# Patient Record
Sex: FEMALE | Race: WHITE | NOT HISPANIC OR LATINO | Employment: FULL TIME | URBAN - METROPOLITAN AREA
[De-identification: names, ages, dates, MRNs, and addresses within clinical notes are randomized per-mention and may not be internally consistent; named-entity substitution may affect disease eponyms.]

---

## 2017-01-17 ENCOUNTER — TELEPHONE (OUTPATIENT)
Dept: SURGERY | Facility: CLINIC | Age: 52
End: 2017-01-17

## 2017-01-17 NOTE — TELEPHONE ENCOUNTER
Per task, I called Sarah and set up an appointment for her to see Dr. Bearden in clinic for a flexible sigmoidoscopy. She wants to do her fleets and will do those at home.

## 2017-01-27 ENCOUNTER — TELEPHONE (OUTPATIENT)
Dept: SURGERY | Facility: CLINIC | Age: 52
End: 2017-01-27

## 2017-01-27 NOTE — TELEPHONE ENCOUNTER
Sarah can't make it to her appointment next week. She called to find out if she could reschedule. I confirmed new time and date with her and let her know that she can contact me should she need to reschedule.

## 2017-01-27 NOTE — TELEPHONE ENCOUNTER
Sarah called me to let me know that she will be able to make the appointment next week. I have changed the appointment back to next week on Wednesday.

## 2017-02-01 DIAGNOSIS — D12.6 BENIGN NEOPLASM OF COLON, UNSPECIFIED PART OF COLON: Primary | ICD-10-CM

## 2017-02-02 DIAGNOSIS — D13.91 FAMILIAL ADENOMATOUS POLYPOSIS: ICD-10-CM

## 2017-03-20 ENCOUNTER — SURGERY (OUTPATIENT)
Age: 52
End: 2017-03-20

## 2017-03-20 ENCOUNTER — HOSPITAL ENCOUNTER (OUTPATIENT)
Facility: CLINIC | Age: 52
Discharge: HOME | End: 2017-03-20
Attending: COLON & RECTAL SURGERY | Admitting: COLON & RECTAL SURGERY
Payer: COMMERCIAL

## 2017-03-20 VITALS
SYSTOLIC BLOOD PRESSURE: 125 MMHG | RESPIRATION RATE: 20 BRPM | DIASTOLIC BLOOD PRESSURE: 75 MMHG | OXYGEN SATURATION: 97 %

## 2017-03-20 LAB — FLEXIBLE SIGMOIDOSCOPY: NORMAL

## 2017-03-20 PROCEDURE — 45331 SIGMOIDOSCOPY AND BIOPSY: CPT | Performed by: COLON & RECTAL SURGERY

## 2017-03-20 PROCEDURE — 88305 TISSUE EXAM BY PATHOLOGIST: CPT | Performed by: COLON & RECTAL SURGERY

## 2017-03-20 NOTE — OR NURSING
No sedation given. Pt tolerated flexible sigmoidoscopy with polypectomy via biopsy forceps well. Pt stable upon transport to recovery.

## 2017-03-20 NOTE — IP AVS SNAPSHOT
[facility], [place], Endoscopy    [address] [facility] [address]    Phone:  [phone]                                       After Visit Summary   3/20/2017    Sarah Duran    MRN: [id number]           After Visit Summary Signature Page     I have received my discharge instructions, and my questions have been answered. I have discussed any challenges I see with this plan with the nurse or doctor.    ..........................................................................................................................................  Patient/Patient Representative Signature      ..........................................................................................................................................  Patient Representative Print Name and Relationship to Patient    ..................................................               ................................................  Date                                            Time    ..........................................................................................................................................  Reviewed by Signature/Title    ...................................................              ..............................................  Date                                                            Time

## 2017-03-20 NOTE — IP AVS SNAPSHOT
MRN:[id number]                      After Visit Summary   3/20/2017    Sarah Duran    MRN: [id number]           Thank you!     Thank you for choosing [facility] for your care. Our goal is always to provide you with excellent care. Hearing back from our patients is one way we can continue to improve our services. Please take a few minutes to complete the written survey that you may receive in the mail after you visit with us. Thank you!        Patient Information     Date Of Birth          1965        About your hospital stay     You were admitted on:  March 20, 2017 You last received care in the:  [facility], Endoscopy    You were discharged on:  March 20, 2017       Who to Call     For medical emergencies, please call 911.  For non-urgent questions about your medical care, please call your primary care provider or clinic, [phone]  For questions related to your surgery, please call your surgery clinic        Attending Provider     Provider Specialty    Mariela Bearden MD Colon and Rectal Surgery       Primary Care Provider Office Phone # Fax #    Ema Torres PA-C [phone]       [facility] [address] [place] [address] [address]  [facility] [id number]        Your next 10 appointments already scheduled     Apr 24, 2017 10:30 AM CDT   MA DIAGNOSTIC DIGITAL RIGHT with [id number]   [facility] ([facility])    [address] [place] [address], [address]  [facility] [id number] [phone]           Do not use any powder, lotion or deodorant under your arms or on your breast. If you do, we will ask you to remove it before your exam.  Wear comfortable, two-piece clothing.  If you have any allergies, tell your care team.  Bring any previous mammograms from other facilities or have them mailed to the breast center.            Apr 24, 2017 11:00 AM CDT   Return Visit with Jessica Rasheed MD   [facility]  ([facility])    [address] [facility]  [address]  [place] [id number] [phone]            Apr 27, 2017 11:15 AM CDT   New Visit with Treasure Cleveland GC   Cancer Risk Management Program ([facility])    [facility]  [address]  [place] [address] [phone]              Further instructions from your care team       Discharge Instructions after Flexible Sigmoidoscopy with polyp removal by Dr Bearden    Activity and Diet  You may return to your normal diet and medicines.    Discomfort    Air was placed in your colon during the exam in order to see it. Walking helps to pass the air.    You may take Tylenol (acetaminophen) for pain unless your doctor has told you not to.  Do not take aspirin or ibuprofen (Advil, Motrin, or other anti-inflammatory  drugs) for __3___ days.    Follow-up  __X__ We took small polyps to study. Your doctor will send you a letter with the results  within two weeks.    When to call:    Call right away if you have:    Unusual pain in belly or chest pain not relieved with passing air.    More than 1 to 2 Tablespoons of bleeding from your rectum.    Fever above 100.6  F (37.5  C).    If you have severe pain, bleeding, or shortness of breath, go to an emergency room.    If you have questions, call:  Monday to Friday, 7 a.m. to 4:30 p.m.  Endoscopy: [phone] (We may have to call you back)    After hours  Hospital: [phone] (Ask for the GI fellow on call)    Pending Results     No orders found from 3/18/2017 to 3/21/2017.            Admission Information     Date & Time Provider Department Dept. Phone    3/20/2017 Mariela Bearden MD [facility], [place], Endoscopy [phone]      Your Vitals Were     Blood Pressure Respirations Last Period Pulse Oximetry          125/75 20 03/19/2017 (Approximate) 97%        MyChart Information     [employer] gives you secure access to your electronic health record. If you see  a primary care provider, you can also send messages to your care team and make appointments. If you have questions, please call your primary care clinic.  If you do not have a primary care provider, please call [phone] and they will assist you.        Care EveryWhere ID     This is your Care EveryWhere ID. This could be used by other organizations to access your [facility] medical records  [id number]           Review of your medicines      UNREVIEWED medicines. Ask your doctor about these medicines        Dose / Directions    acetaminophen 325 MG tablet   Commonly known as:  TYLENOL   Used for:  Atypical hyperplasia of breast        Dose:  650 mg   Take 2 tablets (650 mg) by mouth every 4 hours as needed for other (mild pain)   Quantity:  100 tablet   Refills:  0       albuterol 90 MCG/ACT inhaler        Dose:  2 puff   Inhale 2 puffs into the lungs every 6 hours as needed.   Refills:  0       ALLEGRA 180 MG tablet   Generic drug:  fexofenadine        Dose:  180 mg   Take 180 mg by mouth daily.   Refills:  0       calcium-magnesium 500-250 MG Tabs per tablet   Commonly known as:  CALMAG        Dose:  1 tablet   Take 1 tablet by mouth daily   Refills:  0       celecoxib 100 MG capsule   Commonly known as:  celeBREX   Used for:  FAP (familial adenomatous polyposis)        Dose:  100 mg   Take 1 capsule (100 mg) by mouth 2 times daily as needed for pain   Quantity:  180 capsule   Refills:  6       HYDROcodone-acetaminophen 5-325 MG per tablet   Commonly known as:  NORCO   Used for:  Atypical hyperplasia of breast        Dose:  1-2 tablet   Take 1-2 tablets by mouth every 6 hours as needed for other (Moderate to Severe Pain)   Quantity:  15 tablet   Refills:  0       ibuprofen 600 MG tablet   Commonly known as:  ADVIL/MOTRIN   Used for:  Atypical hyperplasia of breast        Dose:  600 mg   Take 1 tablet (600 mg) by mouth every 6 hours as needed for pain (mild)   Quantity:  30 tablet   Refills:  0       MELATONIN  PO        Dose:  1 mg   Take 1 mg by mouth nightly as needed   Refills:  0       multivitamin, therapeutic with minerals Tabs tablet        Dose:  1 tablet   Take 1 tablet by mouth daily.   Refills:  0       NASONEX 50 MCG/ACT spray   Generic drug:  mometasone        Dose:  2 spray   2 sprays by Both Nostrils route daily.   Refills:  0       OMEGA-3 FISH OIL PO        Take  by mouth.   Refills:  0       ondansetron 4 MG tablet   Commonly known as:  ZOFRAN   Used for:  Nausea        Dose:  4 mg   Take 1 tablet (4 mg) by mouth every 8 hours as needed for nausea   Quantity:  18 tablet   Refills:  0       ZOMIG PO        Take  by mouth at onset of headache.   Refills:  0                Protect others around you: Learn how to safely use, store and throw away your medicines at www.disposemymeds.org.             Medication List: This is a list of all your medications and when to take them. Check marks below indicate your daily home schedule. Keep this list as a reference.      Medications           Morning Afternoon Evening Bedtime As Needed    acetaminophen 325 MG tablet   Commonly known as:  TYLENOL   Take 2 tablets (650 mg) by mouth every 4 hours as needed for other (mild pain)                                albuterol 90 MCG/ACT inhaler   Inhale 2 puffs into the lungs every 6 hours as needed.                                ALLEGRA 180 MG tablet   Take 180 mg by mouth daily.   Generic drug:  fexofenadine                                calcium-magnesium 500-250 MG Tabs per tablet   Commonly known as:  CALMAG   Take 1 tablet by mouth daily                                celecoxib 100 MG capsule   Commonly known as:  celeBREX   Take 1 capsule (100 mg) by mouth 2 times daily as needed for pain                                HYDROcodone-acetaminophen 5-325 MG per tablet   Commonly known as:  NORCO   Take 1-2 tablets by mouth every 6 hours as needed for other (Moderate to Severe Pain)                                ibuprofen  600 MG tablet   Commonly known as:  ADVIL/MOTRIN   Take 1 tablet (600 mg) by mouth every 6 hours as needed for pain (mild)                                MELATONIN PO   Take 1 mg by mouth nightly as needed                                multivitamin, therapeutic with minerals Tabs tablet   Take 1 tablet by mouth daily.                                NASONEX 50 MCG/ACT spray   2 sprays by Both Nostrils route daily.   Generic drug:  mometasone                                OMEGA-3 FISH OIL PO   Take  by mouth.                                ondansetron 4 MG tablet   Commonly known as:  ZOFRAN   Take 1 tablet (4 mg) by mouth every 8 hours as needed for nausea                                ZOMIG PO   Take  by mouth at onset of headache.

## 2017-03-20 NOTE — DISCHARGE INSTRUCTIONS
Discharge Instructions after Flexible Sigmoidoscopy with polyp removal by Dr Bearden    Activity and Diet  You may return to your normal diet and medicines.    Discomfort    Air was placed in your colon during the exam in order to see it. Walking helps to pass the air.    You may take Tylenol (acetaminophen) for pain unless your doctor has told you not to.  Do not take aspirin or ibuprofen (Advil, Motrin, or other anti-inflammatory  drugs) for __3___ days.    Follow-up  __X__ We took small polyps to study. Your doctor will send you a letter with the results  within two weeks.    When to call:    Call right away if you have:    Unusual pain in belly or chest pain not relieved with passing air.    More than 1 to 2 Tablespoons of bleeding from your rectum.    Fever above 100.6  F (37.5  C).    If you have severe pain, bleeding, or shortness of breath, go to an emergency room.    If you have questions, call:  Monday to Friday, 7 a.m. to 4:30 p.m.  Endoscopy: [phone] (We may have to call you back)    After hours  Hospital: [phone] (Ask for the GI fellow on call)

## 2017-03-21 LAB — COPATH REPORT: NORMAL

## 2017-04-24 ENCOUNTER — HOSPITAL ENCOUNTER (OUTPATIENT)
Dept: MAMMOGRAPHY | Facility: CLINIC | Age: 52
Discharge: HOME | End: 2017-04-24
Attending: SURGERY | Admitting: SURGERY
Payer: COMMERCIAL

## 2017-04-24 ENCOUNTER — OFFICE VISIT (OUTPATIENT)
Dept: SURGERY | Facility: CLINIC | Age: 52
End: 2017-04-24
Payer: COMMERCIAL

## 2017-04-24 VITALS
DIASTOLIC BLOOD PRESSURE: 81 MMHG | SYSTOLIC BLOOD PRESSURE: 125 MMHG | HEIGHT: 62 IN | BODY MASS INDEX: 23.92 KG/M2 | WEIGHT: 130 LBS | HEART RATE: 62 BPM

## 2017-04-24 DIAGNOSIS — Z09: ICD-10-CM

## 2017-04-24 DIAGNOSIS — N60.99 ATYPICAL DUCTAL HYPERPLASIA OF BREAST: Primary | ICD-10-CM

## 2017-04-24 PROCEDURE — 99213 OFFICE O/P EST LOW 20 MIN: CPT | Performed by: SURGERY

## 2017-04-24 PROCEDURE — G0206 DX MAMMO INCL CAD UNI: HCPCS | Mod: RT

## 2017-04-24 PROCEDURE — G0206 DX MAMMO INCL CAD UNI: HCPCS

## 2017-04-24 NOTE — PROGRESS NOTES
CHIEF COMPLAINT:  Chief Complaint   Patient presents with     RECHECK     f/u right breast ADH       HISTORY OF PRESENT ILLNESS:  Sarah Duran is a 51 year old female who is seen in follow up regarding right breast atypia and FH for breast cancer.  She is s/p right breast excisional biopsy on 11/28/16.   Follow up right mammograms today to reevaluate the densities seen on mammogram that were not biopsied, shows no findings of concern on review with the radiologist.  Sarah states that she has healed well and has noted no new breast changes except for an occasional sharp pain in her right breast.    REVIEW OF SYSTEMS:  Constitutional:  Negative for chills, fatigue, fever and weight change.  Eyes:  Negative for blurred vision, eye pain and photophobia.  ENT:  Negative for hearing problems, ENT pain, congestion, rhinorrhea, epistaxis, hoarseness and dental problems.  Cardiovascular:  Negative for chest pain, palpitations, tachycardia, orthopnea and edema.  Respiratory:  Negative for cough, dyspnea and hemoptysis.  Gastrointestinal:  Negative for abdominal pain, heartburn, constipation, diarrhea and stool changes.  Musculoskeletal:  Negative for arthralgias, back pain and myalgias.  Integumentary/Breast:  See HPI.    Past Medical History:   Diagnosis Date     Asthma      Coagulation disorder (H)     factor 5     Colon cancer (H)      Familial adenomatous polyposis      GERD (gastroesophageal reflux disease)      Migraine      Pancreatitis, acute      PONV (postoperative nausea and vomiting)        Past Surgical History:   Procedure Laterality Date     BIOPSY BREAST SEED LOCALIZATION Right 11/30/2016    Procedure: BIOPSY BREAST SEED LOCALIZATION;  Surgeon: Jessica Rasheed MD;  Location: [facility] OR     C LAP,SURG,COLECTOMY,W/ANAST      for colon cancer     COLECTOMY      subtotal     ENDOSCOPIC RETROGRADE CHOLANGIOPANCREATOGRAPHY       ESOPHAGOSCOPY, GASTROSCOPY, DUODENOSCOPY (EGD), COMBINED  11/5/2012    Procedure:  COMBINED ESOPHAGOSCOPY, GASTROSCOPY, DUODENOSCOPY (EGD), BIOPSY SINGLE OR MULTIPLE;;  Surgeon: Angélica Garcia MD;  Location: [facility]     ESOPHAGOSCOPY, GASTROSCOPY, DUODENOSCOPY (EGD), COMBINED  12/3/2013    Procedure: COMBINED ESOPHAGOSCOPY, GASTROSCOPY, DUODENOSCOPY (EGD);;  Surgeon: Angélica Garcia MD;  Location: [facility]     EXCISE POLYP RECTUM       LAPAROSCOPIC LYSIS ADHESIONS       SIGMOIDOSCOPY FLEXIBLE       SIGMOIDOSCOPY FLEXIBLE N/A 12/7/2015    Procedure: SIGMOIDOSCOPY FLEXIBLE;  Surgeon: Mariela Bearden MD;  Location: [facility]       Family History   Problem Relation Age of Onset     Hyperlipidemia Mother      CEREBROVASCULAR DISEASE Mother      CEREBROVASCULAR DISEASE Father      Colon Cancer Father      Prostate Cancer Brother        Social History   Substance Use Topics     Smoking status: Never Smoker     Smokeless tobacco: Not on file     Alcohol use No       Patient Active Problem List   Diagnosis     Familial adenomatous polyposis     Atypical hyperplasia of breast     Allergies   Allergen Reactions     Cipro [Ciprofloxacin]      Reddened skin     Flagyl [Metronidazole] Nausea and Vomiting     Keflex [Cephalosporins] Nausea and Vomiting     Prilosec Otc [Omeprazole Magnesium]      migraines     Erythromycin Rash     Sulfa Drugs Rash     Current Outpatient Prescriptions   Medication Sig Dispense Refill     acetaminophen (TYLENOL) 325 MG tablet Take 2 tablets (650 mg) by mouth every 4 hours as needed for other (mild pain) 100 tablet 0     ibuprofen (ADVIL,MOTRIN) 600 MG tablet Take 1 tablet (600 mg) by mouth every 6 hours as needed for pain (mild) 30 tablet 0     calcium-magnesium (CALMAG) 500-250 MG TABS Take 1 tablet by mouth daily       ondansetron (ZOFRAN) 4 MG tablet Take 1 tablet (4 mg) by mouth every 8 hours as needed for nausea 18 tablet 0     MELATONIN PO Take 1 mg by mouth nightly as needed       celecoxib (CELEBREX) 100 MG capsule Take 1 capsule (100 mg) by mouth 2 times daily as  "needed for pain 180 capsule 6     Omega-3 Fatty Acids (OMEGA-3 FISH OIL PO) Take  by mouth.       multivitamin, therapeutic with minerals (THERA-VIT-M) TABS Take 1 tablet by mouth daily.       ZOLMitriptan (ZOMIG PO) Take  by mouth at onset of headache.       fexofenadine (ALLEGRA) 180 MG tablet Take 180 mg by mouth daily.       mometasone (NASONEX) 50 MCG/ACT nasal spray 2 sprays by Both Nostrils route daily.       albuterol 90 MCG/ACT inhaler Inhale 2 puffs into the lungs every 6 hours as needed.       HYDROcodone-acetaminophen (NORCO) 5-325 MG per tablet Take 1-2 tablets by mouth every 6 hours as needed for other (Moderate to Severe Pain) (Patient not taking: Reported on 4/24/2017) 15 tablet 0     Vitals: /81  Pulse 62  Ht 5' 2\" (1.575 m)  Wt 130 lb (59 kg)  BMI 23.78 kg/m2  BMI= Body mass index is 23.78 kg/(m^2).    EXAM:  GENERAL: healthy, alert and no distress   BREAST:  The breasts appear symmetric with no overlying skin changes.  The nipples are normal bilaterally.  There is no dimpling or thickening of the skin.  The right breast periareolar scar is very well healed.  No mass is appreciated in either breast.  The breast tissue is generally soft and without abnormality.  A small skin cyst is palpated on the upper inner right breast that seems to be slightly inflamed.  There is no axillary or supraclavicular lymphadenopathy.  CARDIOVASCULAR:  No edema or JVD.  RESPIRATORY: negative findings: no chest deformities noted, no chest wall tenderness, breathing is unlabored.  NECK: Neck supple. No adenopathy.   SKIN: No suspicious lesions or rashes  LYMPH: Normal cervical lymph nodes    ASSESSMENT:  Sarah Duran is doing well.  There are no findings of concern on exam or right mammograms at this time.  We talked again about her risk which is somewhat elevated due to her FH and atypical ductal hyperplasia in the right breast.  We had previously discussed genetics and I thought that she was scheduled to " see them but I see no notes in Epic.  Today I talked to Sarah about seeing Clarissa Brown in the Cancer Risk Management Program for risk management and to discuss taking Tamoxifen for prevention.  She agreed to do so.  We also talked about the importance of continued screening.    PLAN:  Sarah has been referred to Clarissa Brown.  She will have annual screening mammograms next fall and will follow up with me as needed.      Jessica Rasheed MD    Please route or send letter to:  Primary Care Provider (PCP)

## 2017-04-24 NOTE — LETTER
"2017      RE:  Sarah Duran-:  [birth year]    HISTORY OF PRESENT ILLNESS: Sarah Duran is a 51 year old female who is seen in follow up regarding right breast atypia and FH for breast cancer. She is s/p right breast excisional biopsy on 16. Follow up right mammograms today to reevaluate the densities seen on mammogram that were not biopsied, shows no findings of concern on review with the radiologist. Sarah states that she has healed well and has noted no new breast changes except for an occasional sharp pain in her right breast.     REVIEW OF SYSTEMS:  Constitutional: Negative for chills, fatigue, fever and weight change.  Eyes: Negative for blurred vision, eye pain and photophobia.  ENT: Negative for hearing problems, ENT pain, congestion, rhinorrhea, epistaxis, hoarseness and dental problems.  Cardiovascular: Negative for chest pain, palpitations, tachycardia, orthopnea and edema.  Respiratory: Negative for cough, dyspnea and hemoptysis.  Gastrointestinal: Negative for abdominal pain, heartburn, constipation, diarrhea and stool changes.  Musculoskeletal: Negative for arthralgias, back pain and myalgias.  Integumentary/Breast: See HPI.     Vitals: /81  Pulse 62  Ht 5' 2\" (1.575 m)  Wt 130 lb (59 kg)  BMI 23.78 kg/m2  BMI= Body mass index is 23.78 kg/(m^2).     EXAM:  GENERAL: healthy, alert and no distress   BREAST:  The breasts appear symmetric with no overlying skin changes. The nipples are normal bilaterally. There is no dimpling or thickening of the skin. The right breast periareolar scar is very well healed.  No mass is appreciated in either breast. The breast tissue is generally soft and without abnormality. A small skin cyst is palpated on the upper inner right breast that seems to be slightly inflamed.  There is no axillary or supraclavicular lymphadenopathy.  CARDIOVASCULAR: No edema or JVD.  RESPIRATORY: negative findings: no chest deformities noted, no chest wall " tenderness, breathing is unlabored.  NECK: Neck supple. No adenopathy.   SKIN: No suspicious lesions or rashes  LYMPH: Normal cervical lymph nodes     ASSESSMENT:  Sarah Duran is doing well. There are no findings of concern on exam or right mammograms at this time. We talked again about her risk which is somewhat elevated due to her FH and atypical ductal hyperplasia in the right breast. We had previously discussed genetics and I thought that she was scheduled to see them but I see no notes in Epic. Today I talked to Sarah about seeing Clarissa Brown in the Cancer Risk Management Program for risk management and to discuss taking Tamoxifen for prevention. She agreed to do so. We also talked about the importance of continued screening.     PLAN:  Sarah has been referred to Clarissa Brown. She will have annual screening mammograms next fall and will follow up with me as needed.        Jessica Rasheed MD

## 2017-04-24 NOTE — MR AVS SNAPSHOT
After Visit Summary   4/24/2017    Sarah Duran    MRN: [id number]           Patient Information     Date Of Birth          1965        Visit Information        Provider Department      4/24/2017 11:00 AM Jessica Rasheed MD [facility] General Surgery      Today's Diagnoses     Atypical ductal hyperplasia of breast    -  1       Follow-ups after your visit        Additional Services     CANCER RISK MGMT/CANCER GENETIC COUNSELING REFERRAL       Your provider has referred you to the Cancer Risk Management Program - Cancer Genetic Counseling.    Reason for Referral: history of atypical ductal hyperplasia    We have a sent a notice to a staff member of the Cancer Risk Management Program to give you a call to assist with scheduling your appointment.  You may also call  [phone] ([phone]) to initiate scheduling.    Please be aware that coverage of these services is subject to the terms and limitations of your health insurance plan.  Call member services at your health plan with any benefit or coverage questions.      Please bring the completed family history sheet to your appointment in addition to any available outside medical records documenting your cancer diagnosis.                  Your next 10 appointments already scheduled     Apr 27, 2017 11:15 AM CDT   New Visit with Treasure Cleveland GC   Cancer Risk Management Program ([facility])    [facility]  [address]  [facility] [address] [phone]              Who to contact     If you have questions or need follow up information about today's clinic visit or your schedule please contact [facility] directly at [phone].  Normal or non-critical lab and imaging results will be communicated to you by MyChart, letter or phone within 4 business days after the clinic has received the  "results. If you do not hear from us within 7 days, please contact the clinic through [email or web address] or phone. If you have a critical or abnormal lab result, we will notify you by phone as soon as possible.  Submit refill requests through [email or web address] or call your pharmacy and they will forward the refill request to us. Please allow 3 business days for your refill to be completed.          Additional Information About Your Visit        [employer] Information     [email or web address] gives you secure access to your electronic health record. If you see a primary care provider, you can also send messages to your care team and make appointments. If you have questions, please call your primary care clinic.  If you do not have a primary care provider, please call [phone] and they will assist you.        Care EveryWhere ID     This is your Care EveryWhere ID. This could be used by other organizations to access your [facility] medical records  [id number]        Your Vitals Were     Pulse Height BMI (Body Mass Index)             62 5' 2\" (1.575 m) 23.78 kg/m2          Blood Pressure from Last 3 Encounters:   04/24/17 125/81   03/20/17 125/75   12/19/16 127/81    Weight from Last 3 Encounters:   04/24/17 130 lb (59 kg)   12/19/16 130 lb (59 kg)   11/30/16 134 lb 4.8 oz (60.9 kg)              We Performed the Following     CANCER RISK MGMT/CANCER GENETIC COUNSELING REFERRAL        Primary Care Provider Office Phone # Fax #    Ema Torres PA-C [phone]       [facility] [address] [address]        Thank you!     Thank you for choosing [facility]  for your care. Our goal is always to provide you with excellent care. Hearing back from our patients is one way we can continue to improve our services. Please take a few minutes to complete the written survey that you may receive in the mail after your visit with us. Thank you!             Your Updated Medication " List - Protect others around you: Learn how to safely use, store and throw away your medicines at www.disposemymeds.org.          This list is accurate as of: 4/24/17 11:16 AM.  Always use your most recent med list.                   Brand Name Dispense Instructions for use    acetaminophen 325 MG tablet    TYLENOL    100 tablet    Take 2 tablets (650 mg) by mouth every 4 hours as needed for other (mild pain)       albuterol 90 MCG/ACT inhaler      Inhale 2 puffs into the lungs every 6 hours as needed.       ALLEGRA 180 MG tablet   Generic drug:  fexofenadine      Take 180 mg by mouth daily.       calcium-magnesium 500-250 MG Tabs per tablet    CALMAG     Take 1 tablet by mouth daily       celecoxib 100 MG capsule    celeBREX    180 capsule    Take 1 capsule (100 mg) by mouth 2 times daily as needed for pain       HYDROcodone-acetaminophen 5-325 MG per tablet    NORCO    15 tablet    Take 1-2 tablets by mouth every 6 hours as needed for other (Moderate to Severe Pain)       ibuprofen 600 MG tablet    ADVIL/MOTRIN    30 tablet    Take 1 tablet (600 mg) by mouth every 6 hours as needed for pain (mild)       MELATONIN PO      Take 1 mg by mouth nightly as needed       multivitamin, therapeutic with minerals Tabs tablet      Take 1 tablet by mouth daily.       NASONEX 50 MCG/ACT spray   Generic drug:  mometasone      2 sprays by Both Nostrils route daily.       OMEGA-3 FISH OIL PO      Take  by mouth.       ondansetron 4 MG tablet    ZOFRAN    18 tablet    Take 1 tablet (4 mg) by mouth every 8 hours as needed for nausea       ZOMIG PO      Take  by mouth at onset of headache.

## 2017-04-27 ENCOUNTER — ONCOLOGY VISIT (OUTPATIENT)
Dept: ONCOLOGY | Facility: CLINIC | Age: 52
End: 2017-04-27
Attending: GENETIC COUNSELOR, MS
Payer: COMMERCIAL

## 2017-04-27 DIAGNOSIS — N60.99 ATYPICAL HYPERPLASIA OF BREAST: ICD-10-CM

## 2017-04-27 DIAGNOSIS — D13.91 FAMILIAL ADENOMATOUS POLYPOSIS: Primary | ICD-10-CM

## 2017-04-27 DIAGNOSIS — Z80.0 FAMILY HISTORY OF COLON CANCER: ICD-10-CM

## 2017-04-27 DIAGNOSIS — Z80.42 FAMILY HISTORY OF PROSTATE CANCER: ICD-10-CM

## 2017-04-27 PROCEDURE — 96040 ZZH GENETIC COUNSELING, EACH 30 MINUTES: CPT | Performed by: GENETIC COUNSELOR, MS

## 2017-04-27 NOTE — MR AVS SNAPSHOT
After Visit Summary   4/27/2017    Sarah Duran    MRN: [id number]           Patient Information     Date Of Birth          1965        Visit Information        Provider Department      4/27/2017 11:15 AM Treasure Cleveland GC Cancer Risk Management Program        Today's Diagnoses     Familial adenomatous polyposis    -  1    Atypical hyperplasia of breast        Family history of colon cancer        Family history of prostate cancer          Care Instructions      Assessing Cancer Risk  Only about 5-10% of cancers are thought to be due to an inherited cancer susceptibility gene.    These families often have:    Several people with the same or related types of cancer    Cancers diagnosed at a young age (before age 50)    Individuals with more than one primary cancer    Multiple generations of the family affected with cancer    Familial Adenomatous Polyposis (FAP)  FAP is a hereditary condition that causes the formation of many (more than 100) adenoma type polyps in the colon and increases the risk of colon cancer. Individuals affected with FAP can begin to develop these precancerous polyps in their teens. Without preventative surgery, colon cancer is almost inevitable.     Attenuated Familial Adenomatous Polyposis (AFAP)  AFAP is also a condition that causes colon polyps. It is milder than Classic FAP, however, and the lifetime risk of colon cancer is lower at approximately 70%.  Usually individuals affected with AFAP will have  adenoma type polyps.     FAP and AFAP may also be known as Ernst Syndrome or Turcot Syndrome.    APC Gene  We each inherit two copies of every gene in our bodies: one from our mother, and one from our father.  Each gene has a specific job to do.  When a gene has a mistake or  mutation  in it, it does not work like it should.  Everyone has two copies of the APC gene.  A single mutation in one of the copies of the APC gene causes FAP or AFAP.  This causes the formation  of polyps and increases colon cancer risk.  The risk for other types of cancers including gastric, pancreatic, and certain brain tumors is also slightly increased.   The table below shows the chance that someone with an APC mutation would develop cancer in their lifetime1,2.     Lifetime Cancer Risks   Colon %   Duodenal 4-12%   Thyroid <2%   Liver (before age 5) 1-2%   Pancreatic <1%   Stomach <1%     Other Findings  Individuals with FAP may also show these features:    Polyps in the stomach and small bowel    CHRPE: freckle like spots on the inside of the eye    Desmoid Tumors: benign tumors typically found in the abdomen    Osteomas: benign bony tumors typically found in the skull and/or jaw    Epidermoid cysts: benign cysts found on the skin    Extra teeth or other dental abnormalities     Inheritance   APC mutations are inherited in an autosomal dominant pattern.  This means that if a parent has a mutation, each of his or her children will have a 50% chance of inheriting that same mutation.  Therefore, each child--male or female--would have a 50% chance of being at increased risk for developing colon polyps and cancer.  Up to 30% of people with an APC mutation, however, did not inherit it from a parent.  Rather, the mutation occurred de caitlin (for the first time) in them.  Now that they have the mutation, however, they can pass it on to their children.              Image obtained from Genetics Home Reference, 2013    Genetic Testing  Genetic testing involves a simple blood test and will look at the genetic information in the APC gene for any harmful mutations that are associated with increased risk for polyps and cancer.  If possible, it is recommended that the person(s) who has had polyps or cancer be tested before other family members.  That person will give us the most useful information about whether or not a specific gene is associated with the cancer in the family.     Results  There are three  possible results of APC genetic testing:    Positive--a harmful mutation was identified    Negative--no mutation was identified    Variant of unknown significance--a variation in the gene was identified, but it is unclear how this impacts cancer risk in the family    Advantages and Disadvantages  There are advantages and disadvantages to genetic testing of this gene.    Advantages    May clarify your cancer risk    Can help you make medical decisions    May explain the polyps and cancers in your family    May give useful information to your family members (if you share your results)    Disadvantages    Possible negative emotional impact of learning about inherited cancer risk    Uncertainty in interpreting a negative test result in some situations    Possible genetic discrimination concerns (see below)        Reducing Cancer Risk  Current screening recommendations for people with Classic FAP include1:    Colon:   o Annual colonoscopy or sigmoidoscopy starting at age 10-15 years  o Removal of the colon with continued rectal surveillance as needed    Duodenum and Stomach:   o Baseline upper endoscopy including side-viewing starting at age 25-30; repeated based on polyp findings3  o Consider adding small bowel visualization to CT or MRI done for desmoids (below)    Thyroid:    o Annual thyroid exam starting in late teens  o Consider annual thyroid ultrasound    CNS: Annual physical exam    Liver:   o Liver palpation, abdominal ultrasound, and AFP measurement; repeated every 3-6 months until age 5    Desmoids:   o Annual abdominal palpation    o Consider abdominal MRI or CT 1-3 years after colectomy; repeated every 5-10 years    Current screening recommendations for individuals with Attenuated FAP (AFAP) include1:    Colon:   o Colonoscopy starting at late teens; repeated every 2-3  o Consider removal of the colon when more than 20 or 30 adenoma polyps are found    Duodenum and Stomach:   o Baseline upper endoscopy  starting at age 25-30; repeated based on findings    Thyroid:  Annual thyroid exam     CNS: Annual physical exam    Some medications are available that may reduce the number of polyps.  Osteomas, desmoids, and epidermoid cysts should be evaluated for treatment or removal.    The age at which to start and repeat screening may be modified based on personal and family history.    Genetic Information Nondiscrimination Act (ESTELLA)  ESTELLA is a federal law that protects individuals from health insurance or employment discrimination based on a genetic test result alone.  Although rare, there are currently no legal protections in terms of life insurance, long term care, or disability insurances.  Visit the National Human [employer] [place] at Genome.gov/73934858 to learn more.    Questions to Think About Regarding Genetic Testing    What effect will the test result have on me and my relationship with my family members if I have an inherited gene mutation?  If I don t have a gene mutation?    Should I share my test results, and how will my family react to this news, which may also affect them?    Are my children ready to learn new information that may one day affect their own health?    Resources  Colorectal Cancer Coalition [email or web address]   Colon Cancer [place] (CCA) ccalliance.org   American Cancer Society (ACS) cancer.org   National Cancer [place] (NCI) cancer.gov     Please call us if you have any questions or concerns.     Cancer Risk Management Program [phone] ([phone])    ? Mariela Tim, MS, [facility]  [phone]  ? Sara Sexton, MS, [facility]  [phone]  ? Corazon Perera, MS, [facility]  [phone]  ? Treasure Cleveland, MS, [facility]  [phone]    References  1. National Comprehensive Cancer Network. Clinical practice guidelines in oncology, colorectal cancer screening. Available online (registration required). 2014.  2. Marlen DW, Fmailia J, Tavo KM, Arabella RA, Matssaul N, Rohan J, Eladio PALMER, Reginaldo  HEBERT, Farrukh RW. American  mutation for attenuated familial adenomatous polyposis. Clin Gastroenterol Hepatol. 2008;6:46-52.  3. National Comprehensive Cancer Network. Gastric Cancer. Available online (registration required). 2015.          Follow-ups after your visit        Who to contact     If you have questions or need follow up information about today's clinic visit or your schedule please contact CANCER RISK MANAGEMENT PROGRAM directly at [phone].  Normal or non-critical lab and imaging results will be communicated to you by Adzill[employer]art, letter or phone within 4 business days after the clinic has received the results. If you do not hear from us within 7 days, please contact the clinic through [employer] or phone. If you have a critical or abnormal lab result, we will notify you by phone as soon as possible.  Submit refill requests through [employer] or call your pharmacy and they will forward the refill request to us. Please allow 3 business days for your refill to be completed.          Additional Information About Your Visit        [employer] Information     [employer] gives you secure access to your electronic health record. If you see a primary care provider, you can also send messages to your care team and make appointments. If you have questions, please call your primary care clinic.  If you do not have a primary care provider, please call [phone] and they will assist you.        Care EveryWhere ID     This is your Care EveryWhere ID. This could be used by other organizations to access your [facility] medical records  [id number]         Blood Pressure from Last 3 Encounters:   04/24/17 125/81   03/20/17 125/75   12/19/16 127/81    Weight from Last 3 Encounters:   04/24/17 59 kg (130 lb)   12/19/16 59 kg (130 lb)   11/30/16 60.9 kg (134 lb 4.8 oz)              Today, you had the following     No orders found for display       Primary Care Provider Office Phone # Fax #    Ema Torres PA-C  [phone]       [facility] [address] [facility] [address]  [facility] [address]        Thank you!     Thank you for choosing CANCER RISK MANAGEMENT PROGRAM  for your care. Our goal is always to provide you with excellent care. Hearing back from our patients is one way we can continue to improve our services. Please take a few minutes to complete the written survey that you may receive in the mail after your visit with us. Thank you!             Your Updated Medication List - Protect others around you: Learn how to safely use, store and throw away your medicines at www.disposemymeds.org.          This list is accurate as of: 4/27/17 12:18 PM.  Always use your most recent med list.                   Brand Name Dispense Instructions for use    acetaminophen 325 MG tablet    TYLENOL    100 tablet    Take 2 tablets (650 mg) by mouth every 4 hours as needed for other (mild pain)       albuterol 90 MCG/ACT inhaler      Inhale 2 puffs into the lungs every 6 hours as needed.       ALLEGRA 180 MG tablet   Generic drug:  fexofenadine      Take 180 mg by mouth daily.       calcium-magnesium 500-250 MG Tabs per tablet    CALMAG     Take 1 tablet by mouth daily       celecoxib 100 MG capsule    celeBREX    180 capsule    Take 1 capsule (100 mg) by mouth 2 times daily as needed for pain       HYDROcodone-acetaminophen 5-325 MG per tablet    NORCO    15 tablet    Take 1-2 tablets by mouth every 6 hours as needed for other (Moderate to Severe Pain)       ibuprofen 600 MG tablet    ADVIL/MOTRIN    30 tablet    Take 1 tablet (600 mg) by mouth every 6 hours as needed for pain (mild)       MELATONIN PO      Take 1 mg by mouth nightly as needed       multivitamin, therapeutic with minerals Tabs tablet      Take 1 tablet by mouth daily.       NASONEX 50 MCG/ACT spray   Generic drug:  mometasone      2 sprays by Both Nostrils route daily.       OMEGA-3 FISH OIL PO      Take  by mouth.       ondansetron 4 MG tablet     ZOFRAN    18 tablet    Take 1 tablet (4 mg) by mouth every 8 hours as needed for nausea       ZOMIG PO      Take  by mouth at onset of headache.

## 2017-04-27 NOTE — PATIENT INSTRUCTIONS
Assessing Cancer Risk  Only about 5-10% of cancers are thought to be due to an inherited cancer susceptibility gene.    These families often have:    Several people with the same or related types of cancer    Cancers diagnosed at a young age (before age 50)    Individuals with more than one primary cancer    Multiple generations of the family affected with cancer    Familial Adenomatous Polyposis (FAP)  FAP is a hereditary condition that causes the formation of many (more than 100) adenoma type polyps in the colon and increases the risk of colon cancer. Individuals affected with FAP can begin to develop these precancerous polyps in their teens. Without preventative surgery, colon cancer is almost inevitable.     Attenuated Familial Adenomatous Polyposis (AFAP)  AFAP is also a condition that causes colon polyps. It is milder than Classic FAP, however, and the lifetime risk of colon cancer is lower at approximately 70%.  Usually individuals affected with AFAP will have  adenoma type polyps.     FAP and AFAP may also be known as Ernst Syndrome or Turcot Syndrome.    APC Gene  We each inherit two copies of every gene in our bodies: one from our mother, and one from our father.  Each gene has a specific job to do.  When a gene has a mistake or  mutation  in it, it does not work like it should.  Everyone has two copies of the APC gene.  A single mutation in one of the copies of the APC gene causes FAP or AFAP.  This causes the formation of polyps and increases colon cancer risk.  The risk for other types of cancers including gastric, pancreatic, and certain brain tumors is also slightly increased.   The table below shows the chance that someone with an APC mutation would develop cancer in their lifetime1,2.     Lifetime Cancer Risks   Colon %   Duodenal 4-12%   Thyroid <2%   Liver (before age 5) 1-2%   Pancreatic <1%   Stomach <1%     Other Findings  Individuals with FAP may also show these  features:    Polyps in the stomach and small bowel    CHRPE: freckle like spots on the inside of the eye    Desmoid Tumors: benign tumors typically found in the abdomen    Osteomas: benign bony tumors typically found in the skull and/or jaw    Epidermoid cysts: benign cysts found on the skin    Extra teeth or other dental abnormalities     Inheritance   APC mutations are inherited in an autosomal dominant pattern.  This means that if a parent has a mutation, each of his or her children will have a 50% chance of inheriting that same mutation.  Therefore, each child--male or female--would have a 50% chance of being at increased risk for developing colon polyps and cancer.  Up to 30% of people with an APC mutation, however, did not inherit it from a parent.  Rather, the mutation occurred de caitlin (for the first time) in them.  Now that they have the mutation, however, they can pass it on to their children.              Image obtained from Genetics Home Reference, 2013    Genetic Testing  Genetic testing involves a simple blood test and will look at the genetic information in the APC gene for any harmful mutations that are associated with increased risk for polyps and cancer.  If possible, it is recommended that the person(s) who has had polyps or cancer be tested before other family members.  That person will give us the most useful information about whether or not a specific gene is associated with the cancer in the family.     Results  There are three possible results of APC genetic testing:    Positive--a harmful mutation was identified    Negative--no mutation was identified    Variant of unknown significance--a variation in the gene was identified, but it is unclear how this impacts cancer risk in the family    Advantages and Disadvantages  There are advantages and disadvantages to genetic testing of this gene.    Advantages    May clarify your cancer risk    Can help you make medical decisions    May explain the  polyps and cancers in your family    May give useful information to your family members (if you share your results)    Disadvantages    Possible negative emotional impact of learning about inherited cancer risk    Uncertainty in interpreting a negative test result in some situations    Possible genetic discrimination concerns (see below)        Reducing Cancer Risk  Current screening recommendations for people with Classic FAP include1:    Colon:   o Annual colonoscopy or sigmoidoscopy starting at age 10-15 years  o Removal of the colon with continued rectal surveillance as needed    Duodenum and Stomach:   o Baseline upper endoscopy including side-viewing starting at age 25-30; repeated based on polyp findings3  o Consider adding small bowel visualization to CT or MRI done for desmoids (below)    Thyroid:    o Annual thyroid exam starting in late teens  o Consider annual thyroid ultrasound    CNS: Annual physical exam    Liver:   o Liver palpation, abdominal ultrasound, and AFP measurement; repeated every 3-6 months until age 5    Desmoids:   o Annual abdominal palpation    o Consider abdominal MRI or CT 1-3 years after colectomy; repeated every 5-10 years    Current screening recommendations for individuals with Attenuated FAP (AFAP) include1:    Colon:   o Colonoscopy starting at late teens; repeated every 2-3  o Consider removal of the colon when more than 20 or 30 adenoma polyps are found    Duodenum and Stomach:   o Baseline upper endoscopy starting at age 25-30; repeated based on findings    Thyroid:  Annual thyroid exam     CNS: Annual physical exam    Some medications are available that may reduce the number of polyps.  Osteomas, desmoids, and epidermoid cysts should be evaluated for treatment or removal.    The age at which to start and repeat screening may be modified based on personal and family history.    Genetic Information Nondiscrimination Act (ESTELLA)  ESTELLA is a federal law that protects individuals  from health insurance or employment discrimination based on a genetic test result alone.  Although rare, there are currently no legal protections in terms of life insurance, long term care, or disability insurances.  Visit the National Human Genome Research [place] at Genome.gov/03347680 to learn more.    Questions to Think About Regarding Genetic Testing    What effect will the test result have on me and my relationship with my family members if I have an inherited gene mutation?  If I don t have a gene mutation?    Should I share my test results, and how will my family react to this news, which may also affect them?    Are my children ready to learn new information that may one day affect their own health?    Resources  Colorectal Cancer Coalition [email or web address]   Colon Cancer [place] (CCA) ccalliance.org   American Cancer Society (ACS) cancer.org   National Cancer [place] (NCI) cancer.gov     Please call us if you have any questions or concerns.     Cancer Risk Management Program [phone] ([phone])    ? Mariela Tim, MS, [facility]  [phone]  ? Sara Sexton, MS, [facility]  [phone]  ? Corazon Perera, MS, [facility]  [phone]  ? Treasure [place], MS, [facility]  [phone]    References  1. National Comprehensive Cancer Network. Clinical practice guidelines in oncology, colorectal cancer screening. Available online (registration required). 2014.  2. Marlen BARKLEY, Familia J, Tavo KM, Arabella RA, Matssaul N, Rohan J, Eladio G, Reginaldo MF, Farrukh RW. American  mutation for attenuated familial adenomatous polyposis. Clin Gastroenterol Hepatol. 2008;6:46-52.  3. National Comprehensive Cancer Network. Gastric Cancer. Available online (registration required). 2015.

## 2017-04-27 NOTE — LETTER
Cancer Risk Management  Program Locations    [facility]  Mailing Address  Cancer Risk Management Program  [facility]  [address]  [place] [address]    New patient appointments  [phone]  May 2, 2017    Sarah Duran  [address] [address]      Dear Sarah,    It was a pleasure meeting with you at the [facility] on 4/27/2017.  Here is a copy of the progress note from your recent genetic counseling visit to the Cancer Risk Management Program.  If you have any additional questions, please feel free to call.    Referring Provider: Jessica Rasheed MD    Presenting Information:   I met with Sarah Duran today for genetic counseling at the Cancer Risk Management Program at the [facility] to discuss her personal history of polyposis and family history of colon and prostate cancer.  She is here today to review this history, cancer screening recommendations, and available genetic testing options.    Personal History:  Sarah is a 51 year old female.  She underwent a total colectomy with ileal rectal anastomosis in May 1988 and is currently being followed for familial adenomatous polyposis. She has annual upper endoscopy and annual-biannual flexible sigmoidoscopy with a history of rectal adenomatous polyps and stomach polyps (including fundic gland polyps with focal adenomatous change).      She had her first menstrual period at age 12 and went through menopause at about age 50.  She does not have biological children.  Sarah has her ovaries, fallopian tubes and uterus in place, and she has had no ovarian cancer screening to date.      Her most recent OB-GYN exam and Pap smear in 2016 were normal.  She has annual clinical breast exams and mammograms.  She  underwent a right excisional breast biopsy on 11/28/2017 which identified atypical ductal hyperplasia.  She plans to follow up with REYNOLD Murcia, to discuss breast screening and management options.      Sarah is a non-smoker and reported no concerns regarding alcohol use or environmental exposures.     Family History: (Please see scanned pedigree for detailed family history information)    Sarah's older brother was diagnosed with prostate cancer at age 53 and is now 59.      Sarah's maternal uncle was diagnosed with prostate cancer in his 60's-70's and passed away at 77    Her father was diagnosed with colon cancer at 57 and passed away at 63    Sarah's family history is otherwise not significant for cancer or features of a hereditary cancer syndrome.      Her maternal ethnicity is [place] citizen and [place]. Her paternal ethnicity is [place] citizen and [place].  There is no known Ashkenazi Jew ancestry on either side of her family. There is no reported consanguinity.    Discussion:    Sarah's personal history of polyposis and family history cancer is suggestive of a possible hereditary cancer syndrome.    We first discussed the natural history and genetics of several polyposis syndromes, including Familial Adenomatous Polyposis (FAP), attenuated FAP, and MUTYH Associated Polyposis (MAP). A detailed handout regarding these conditions and the information we discussed was provided to Sarah at the end of our appointment today and can be found in the after visit summary.  Topics included: inheritance pattern, cancer risks, cancer screening recommendations, and also risks, benefits and limitations of testing.    We discussed that there are additional genes that could cause increased risk of colon polyps and colon cancer.  As many of these genes present with overlapping features in a family, it would be reasonable for Sarah to consider panel genetic testing to analyze multiple genes at once.    We reviewed genetic  testing options, including Familial Adenomatous Polyposis (caused by mutations in the APC gene) and MUTYH Associated Polyposis (caused by mutations in the MUTYH gene).  We also discussed that genetic testing is available for 17 genes associated with increased risk for colon cancer: ColoNext (APC, BMPR1A, CDH1, CHEK2, GREM1, EPCAM, MLH1, MSH2, MSH6, MUTYH, PMS2, POLD1, POLE, PTEN, SMAD4, STK11, and TP53).  We discussed that some of the genes in the ColoNext panel are associated with specific hereditary cancer syndromes and have published management guidelines: Adair syndrome (MLH1, MSH2, MSH6, PMS2, EPCAM), Familial Adenomatous Polyposis (APC), MUTYH Associated Polyposis (MUTYH), Juvenile Polyposis syndrome (SMAD4, BMPR1A), Peutz-Jeghers syndrome (STK11), Cowden syndrome (PTEN), Li Fraumeni syndrome (TP53), Hereditary Diffuse Gastric Cancer (CDH1).   The CHEK2, GREM1, POLD1, and POLE genes have also been associated with increased colon cancer risk and have published management guidelines.    Sarah stated that she may have completed genetic testing in the past, however these records were not available today for review.  She elected to postpone genetic testing until after determining whether this testing had indeed been completed.  [facility] to revisit cancer screening recommendations and genetic testing options. My contact information was provided.    Medical Management: The information from genetic testing may determine additional cancer screening recommendations as well as options for risk reducing surgeries for Sarah and her relatives.  She also plans to meet with REYNOLD Murcia, to discuss risk management options due to her history of atypical ductal hyperplasia. These recommendations will be discussed in detail once genetic testing is completed.    Plan:  1) Sarah elected to postpone genetic testing  2) She plans to schedule a follow up appointment  after obtaining her previous genetic testing records.  She has a follow up  appointment scheduled for 5/18/2017      Treasure [place] MS, [facility]  Certified Genetic Counselor  [phone]

## 2017-05-02 NOTE — PROGRESS NOTES
4/27/2017    Referring Provider: Jessica Rasheed MD    Presenting Information:   I met with Sarah Duran today for genetic counseling at the Cancer Risk Management Program at the [facility] to discuss her personal history of polyposis and family history of colon and prostate cancer.  She is here today to review this history, cancer screening recommendations, and available genetic testing options.    Personal History:  Sarah is a 51 year old female.  She underwent colectomy with ileal rectal anastomosis in May 1988 and is currently being followed for familial adenomatous polyposis. She has annual upper endoscopy and biannual flexible sigmoidoscopy with a history of rectal adenomatous polyps and stomach polyps (including fundic gland polyps with focal adenomatous change).      She had her first menstrual period at age 12 and went through menopause at about age 50.  She does not have biological children.  Sarah has her ovaries, fallopian tubes and uterus in place, and she has had no ovarian cancer screening to date.      Her most recent OB-GYN exam and Pap smear in 2016 were normal.  She has annual clinical breast exams and mammograms.  She underwent a right excisional breast biopsy on 11/28/2017 which identified atypical ductal hyperplasia.  She plans to follow up with REYNOLD Murcia, to discuss breast screening and management options.      Sarah is a non-smoker and reported no concerns regarding alcohol use or environmental exposures.     Family History: (Please see scanned pedigree for detailed family history information)    Sarah's older brother was diagnosed with prostate cancer at age 53 and is now 59.      Sarah's maternal uncle was diagnosed with prostate cancer in his 60's-70's and passed away at 77    Her father was diagnosed with colon cancer at 57 and passed away at 63    Sarah's family history is otherwise not significant for cancer or features of a hereditary cancer syndrome.       Her maternal ethnicity is [place] and [place]. Her paternal ethnicity is [place] and [place].  There is no known Ashkenazi Buddhism ancestry on either side of her family. There is no reported consanguinity.    Discussion:    Sarah's personal history of polyposis and family history of cancer is suggestive of a possible hereditary cancer syndrome.    We first discussed the natural history and genetics of several polyposis syndromes, including Familial Adenomatous Polyposis (FAP), attenuated FAP, and MUTYH Associated Polyposis (MAP). A detailed handout regarding these conditions and the information we discussed was provided to Sarah at the end of our appointment today and can be found in the after visit summary.  Topics included: inheritance pattern, cancer risks, cancer screening recommendations, and also risks, benefits and limitations of testing.    We discussed that there are additional genes that could cause increased risk of colon polyps and colon cancer.  As many of these genes present with overlapping features in a family, it would be reasonable for Sarah to consider panel genetic testing to analyze multiple genes at once.    We reviewed genetic testing options, including Familial Adenomatous Polyposis (caused by mutations in the APC gene) and MUTYH Associated Polyposis (caused by mutations in the MUTYH gene).  We also discussed that genetic testing is available for 17 genes associated with increased risk for colon cancer: ColoNext (APC, BMPR1A, CDH1, CHEK2, GREM1, EPCAM, MLH1, MSH2, MSH6, MUTYH, PMS2, POLD1, POLE, PTEN, SMAD4, STK11, and TP53).  We discussed that some of the genes in the ColoNext panel are associated with specific hereditary cancer syndromes and have published management guidelines: Adair syndrome (MLH1, MSH2, MSH6, PMS2, EPCAM), Familial Adenomatous Polyposis (APC), MUTYH Associated Polyposis (MUTYH), Juvenile Polyposis syndrome (SMAD4, BMPR1A), Peutz-Jeghers syndrome (STK11), Cowden  syndrome (PTEN), Li Fraumeni syndrome (TP53), Hereditary Diffuse Gastric Cancer (CDH1).   The CHEK2, GREM1, POLD1, and POLE genes have also been associated with increased colon cancer risk and have published management guidelines.    Sarah stated that she may have completed genetic testing in the past, however these records were not available today for review.  She elected to postpone genetic testing until after determining whether this testing had indeed been completed.  [facility] cancer screening recommendations and genetic testing options. My contact information was provided.    Medical Management: The information from genetic testing may determine additional cancer screening recommendations as well as options for risk reducing surgeries for Sarah and her relatives.  She also plans to meet with REYNOLD Murcia, to discuss risk management options due to her history of atypical ductal hyperplasia. These recommendations will be discussed in detail once genetic testing is completed.    Plan:  1) Sarah elected to postpone genetic testing  2) She plans to schedule a follow up appointment after obtaining her previous genetic testing records.  She has a follow up GC appointment scheduled for 5/18/2017    Face to face time: 40 minutes    Treasure [place] MS, [facility]  Certified Genetic Counselor  [phone]

## 2017-05-04 ENCOUNTER — CARE COORDINATION (OUTPATIENT)
Dept: SURGERY | Facility: CLINIC | Age: 52
End: 2017-05-04

## 2017-05-04 DIAGNOSIS — D13.91 FAP (FAMILIAL ADENOMATOUS POLYPOSIS): ICD-10-CM

## 2017-05-04 RX ORDER — CELECOXIB 100 MG/1
100 CAPSULE ORAL 2 TIMES DAILY PRN
Qty: 180 CAPSULE | Refills: 6 | Status: SHIPPED | OUTPATIENT
Start: 2017-05-04 | End: 2018-05-03 | Stop reason: ALTCHOICE

## 2017-05-04 NOTE — PROGRESS NOTES
Dr. Bearden approved patient for refill of Celebrex.  Mailed to [facility] per patient request (mail only pharmacy)

## 2017-06-24 ENCOUNTER — HEALTH MAINTENANCE LETTER (OUTPATIENT)
Age: 52
End: 2017-06-24

## 2017-09-23 ENCOUNTER — HEALTH MAINTENANCE LETTER (OUTPATIENT)
Age: 52
End: 2017-09-23

## 2017-11-25 ENCOUNTER — HEALTH MAINTENANCE LETTER (OUTPATIENT)
Age: 52
End: 2017-11-25

## 2018-02-07 ENCOUNTER — TELEPHONE (OUTPATIENT)
Dept: SURGERY | Facility: CLINIC | Age: 53
End: 2018-02-07

## 2018-02-07 NOTE — TELEPHONE ENCOUNTER
Per task, called patient and left a message informing her she is due for EGD and flexible sigmoidoscopy.  Informed patient that since Dr. Bearden is no longer here, and that the colon/rectal surgeons cannot do upper endoscopies, that Dr. Gasca can do both procedures (also per Dr. Bearden's last clinic note).  Informed patient  endoscopy scheduling, will contact her to schedule.  Provided my direct number for questions or concerns.

## 2018-02-07 NOTE — TELEPHONE ENCOUNTER
----- Message from Dayana Mason RN sent at 2/6/2018  2:17 PM CST -----  Regarding: RE: Previous patient of Dr. Bearden  Contact: [phone]  Per phone note from 06/9/16 she was suppose to have an EGD 1 year later, meaning she was supposed to have it in 06/17.... The note was forwarded to the [facility] pool to schedule this back in 2016.     KJ   ----- Message -----     From: Liz Saha     Sent: 2/6/2018  12:13 PM       To: Dayana Mason RN  Subject: FW: Previous patient of Dr. Bearden                Farnaz Lipscomb,    Wondering if you ca help me with this or direct me to the right person?  She had an EGD with Dr. Gasca in June, and I can't quite tell when her recall should be (trying to figure out if she needs EGD and flex sig at the same time).    Thanks!  ----- Message -----     From: Vinod Post     Sent: 2/5/2018   1:05 PM       To: Liz Saha  Subject: Previous patient of Dr. Bearden                    Good afternoon Liz,    Patient called in today in regards to setting up annual follow up appointment. Previous patient of Dr. Bearden, stated that someone would usually call her to set up appt. But she wasn't sure who she should see because she saw both Dr. Bearden and Dr. Paulino, therefore she wanted to clarify before making the appt. Please follow up with patient at your earliest convenience.    Thank you,  Vinod Post

## 2018-02-12 ENCOUNTER — TELEPHONE (OUTPATIENT)
Dept: GASTROENTEROLOGY | Facility: CLINIC | Age: 53
End: 2018-02-12

## 2018-02-12 DIAGNOSIS — D13.91 FAP (FAMILIAL ADENOMATOUS POLYPOSIS): Primary | ICD-10-CM

## 2018-03-21 ENCOUNTER — TELEPHONE (OUTPATIENT)
Dept: GASTROENTEROLOGY | Facility: CLINIC | Age: 53
End: 2018-03-21

## 2018-03-21 NOTE — TELEPHONE ENCOUNTER
Patient scheduled for EGD and flexible sigmoidoscopy    Indication for procedure. FAP    Referring Provider. Dr. Samson    ? no    Arrival time verified? 8:00 am    Facility location verified? [address]    Instructions given regarding prep and procedure    Prep Type fleet enemas    Are you taking any anticoagulants or blood thinners? no    Instructions given? Yes, verbally and written    Electronic implanted devices? no    Pre procedure teaching completed? Yes    Transportation from procedure? Yes,     H&P / Pre op physical completed? Na    Mariela Maldonado RN

## 2018-03-28 ENCOUNTER — HOSPITAL ENCOUNTER (OUTPATIENT)
Facility: CLINIC | Age: 53
Discharge: HOME | End: 2018-03-28
Attending: INTERNAL MEDICINE | Admitting: INTERNAL MEDICINE
Payer: COMMERCIAL

## 2018-03-28 ENCOUNTER — TELEPHONE (OUTPATIENT)
Dept: GASTROENTEROLOGY | Facility: CLINIC | Age: 53
End: 2018-03-28
Payer: COMMERCIAL

## 2018-03-28 ENCOUNTER — TELEPHONE (OUTPATIENT)
Dept: GASTROENTEROLOGY | Facility: CLINIC | Age: 53
End: 2018-03-28

## 2018-03-28 VITALS
DIASTOLIC BLOOD PRESSURE: 79 MMHG | HEIGHT: 62 IN | RESPIRATION RATE: 16 BRPM | SYSTOLIC BLOOD PRESSURE: 128 MMHG | WEIGHT: 142 LBS | OXYGEN SATURATION: 95 % | HEART RATE: 76 BPM | BODY MASS INDEX: 26.13 KG/M2

## 2018-03-28 DIAGNOSIS — Z98.890 PONV (POSTOPERATIVE NAUSEA AND VOMITING): Primary | ICD-10-CM

## 2018-03-28 DIAGNOSIS — R11.2 PONV (POSTOPERATIVE NAUSEA AND VOMITING): Primary | ICD-10-CM

## 2018-03-28 DIAGNOSIS — R11.0 NAUSEA: ICD-10-CM

## 2018-03-28 PROCEDURE — G0104 CA SCREEN;FLEXI SIGMOIDSCOPE: HCPCS | Performed by: INTERNAL MEDICINE

## 2018-03-28 PROCEDURE — 43235 EGD DIAGNOSTIC BRUSH WASH: CPT | Performed by: INTERNAL MEDICINE

## 2018-03-28 PROCEDURE — 45330 DIAGNOSTIC SIGMOIDOSCOPY: CPT | Performed by: INTERNAL MEDICINE

## 2018-03-28 PROCEDURE — G0500 MOD SEDAT ENDO SERVICE >5YRS: HCPCS | Performed by: INTERNAL MEDICINE

## 2018-03-28 PROCEDURE — 25000128 H RX IP 250 OP 636: Performed by: INTERNAL MEDICINE

## 2018-03-28 PROCEDURE — 99153 MOD SED SAME PHYS/QHP EA: CPT | Performed by: INTERNAL MEDICINE

## 2018-03-28 RX ORDER — FENTANYL CITRATE 50 UG/ML
INJECTION, SOLUTION INTRAMUSCULAR; INTRAVENOUS PRN
Status: DISCONTINUED | OUTPATIENT
Start: 2018-03-28 | End: 2018-03-28 | Stop reason: HOSPADM

## 2018-03-28 RX ORDER — ONDANSETRON 4 MG/1
4 TABLET, FILM COATED ORAL EVERY 8 HOURS PRN
Qty: 18 TABLET | Refills: 0 | Status: SHIPPED | OUTPATIENT
Start: 2018-03-28 | End: 2019-03-05

## 2018-03-28 RX ORDER — ONDANSETRON 2 MG/ML
INJECTION INTRAMUSCULAR; INTRAVENOUS PRN
Status: DISCONTINUED | OUTPATIENT
Start: 2018-03-28 | End: 2018-03-28 | Stop reason: HOSPADM

## 2018-03-28 NOTE — DISCHARGE INSTRUCTIONS
Discharge Instructions after  Upper Endoscopy (EGD) and Flexible Sigmoidoscopy (UNABLE TO COMPLETE PROCEDURES) by Dr Gasca    Activity and Diet  You were given medicine for pain. You may be dizzy or sleepy.    Do not drive or use heavy equipment today.    Do not make important decisions.    Do not drink any alcohol.  _X__ You may return to your regular diet.    Discomfort  You may have a sore throat for 2 to 3 days. It may help to:    Use sore throat lozenges.    Gargle as needed with salt water up to 4 times a day. Mix 1 cup of warm water  with 1 teaspoon of salt. Do not swallow.  You may take Tylenol (acetaminophen) for pain unless your doctor has told you not to.    Follow-up  __X_ Re-schedule an Upper Endoscopy and Flexible Sigmoidoscopy under [facility] (Monitored Anesthesia Care).    When to call us:  Problems are rare. Call right away if you have:    Unusual throat pain or trouble swallowing    Unusual pain in belly or chest that is not relieved by belching or passing air    Black stools (tar-like looking bowel movement)    Temperature above 100.6  F. (37.5  C).    If you vomit blood or have severe pain, go to an emergency room.    If you have questions, call:  Monday to Friday, 7 a.m. to 4:30 p.m.: Endoscopy: [phone] (We may have to call you back)    After hours: Hospital: [phone] (Ask for the GI fellow on call)

## 2018-03-28 NOTE — TELEPHONE ENCOUNTER
"Pt seen today in GI lab for EGD with duodenoscopy as well as flex sig for polyp surveillance related to history of FAP and prior \"subtotal\" colectomy (has significant residual sigmoid colon).    Unable to adequately sedate for upper exam and definitely has polyps which will require APC, as well and possible gastric polypectomy. In addition, sigmoidoscopic exam was limited by solid stool.    Please reschedule EGD/duodenoscopy and sigmoidoscopy with me in OR with MAC. That would allow for conversion to general if MAC inadequate.    Also will need standard colonoscopy prep rather than flex sig prep.    FATMATA Gasca MD  Associate Professor of Medicine  Division of Gastroenterology, Hepatology and Nutrition  [facility]    "

## 2018-03-28 NOTE — IP AVS SNAPSHOT
MRN:[id number]                      After Visit Summary   3/28/2018    Sarah Duran    MRN: [id number]           Thank you!     Thank you for choosing [facility] for your care. Our goal is always to provide you with excellent care. Hearing back from our patients is one way we can continue to improve our services. Please take a few minutes to complete the written survey that you may receive in the mail after you visit with us. Thank you!        Patient Information     Date Of Birth          1965        About your hospital stay     You were admitted on:  March 28, 2018 You last received care in the:  [facility], Endoscopy    You were discharged on:  March 28, 2018       Who to Call     For medical emergencies, please call 911.  For non-urgent questions about your medical care, please call your primary care provider or clinic, [phone]  For questions related to your surgery, please call your surgery clinic        Attending Provider     Provider Stephen Tran MD Gastroenterology       Primary Care Provider Office Phone # Fax #    Ida Sandoval [phone]      Further instructions from your care team       Discharge Instructions after  Upper Endoscopy (EGD) and Flexible Sigmoidoscopy (UNABLE TO COMPLETE PROCEDURES) by Dr Gasca    Activity and Diet  You were given medicine for pain. You may be dizzy or sleepy.    Do not drive or use heavy equipment today.    Do not make important decisions.    Do not drink any alcohol.  _X__ You may return to your regular diet.    Discomfort  You may have a sore throat for 2 to 3 days. It may help to:    Use sore throat lozenges.    Gargle as needed with salt water up to 4 times a day. Mix 1 cup of warm water  with 1 teaspoon of salt. Do not swallow.  You may take Tylenol (acetaminophen) for pain unless your doctor has told you not to.    Follow-up  __X_ Re-schedule an Upper Endoscopy and Flexible Sigmoidoscopy under  "MAC (Monitored Anesthesia Care).    When to call us:  Problems are rare. Call right away if you have:    Unusual throat pain or trouble swallowing    Unusual pain in belly or chest that is not relieved by belching or passing air    Black stools (tar-like looking bowel movement)    Temperature above 100.6  F. (37.5  C).    If you vomit blood or have severe pain, go to an emergency room.    If you have questions, call:  Monday to Friday, 7 a.m. to 4:30 p.m.: Endoscopy: [phone] (We may have to call you back)    After hours: Hospital: [phone] (Ask for the GI fellow on call)    Pending Results     No orders found from 3/26/2018 to 3/29/2018.            Admission Information     Date & Time Provider Department Dept. Phone    3/28/2018 Stephen Gasca MD [facility], [place], Endoscopy [phone]      Your Vitals Were     Blood Pressure Pulse Respirations Height Weight Pulse Oximetry    117/75 76 20 1.575 m (5' 2\") 64.4 kg (142 lb) 98%    BMI (Body Mass Index)                   25.97 kg/m2           [employer] Information     [facility] gives you secure access to your electronic health record. If you see a primary care provider, you can also send messages to your care team and make appointments. If you have questions, please call your primary care clinic.  If you do not have a primary care provider, please call [phone] and they will assist you.        Care EveryWhere ID     This is your Care EveryWhere ID. This could be used by other organizations to access your [place] medical records  [id number]        Equal Access to Services     [facility]: Hadii aad ku hadasho Soomaali, waaxda luqadaha, qaybta kaalmada adeegyada, thelma hess. So [facility] [phone].    ATENCIÓN: Si habla español, tiene a rodriguez disposición servicios gratuitos de asistencia lingüística. Llame al [phone].    We comply with applicable federal civil rights laws and [place] laws. We do not discriminate on the " basis of race, color, national origin, age, disability, sex, sexual orientation, or gender identity.               Review of your medicines      UNREVIEWED medicines. Ask your doctor about these medicines        Dose / Directions    acetaminophen 325 MG tablet   Commonly known as:  TYLENOL   Used for:  Atypical hyperplasia of breast        Dose:  650 mg   Take 2 tablets (650 mg) by mouth every 4 hours as needed for other (mild pain)   Quantity:  100 tablet   Refills:  0       albuterol 90 MCG/ACT inhaler        Dose:  2 puff   Inhale 2 puffs into the lungs every 6 hours as needed.   Refills:  0       ALLEGRA 180 MG tablet   Generic drug:  fexofenadine        Dose:  180 mg   Take 180 mg by mouth daily.   Refills:  0       calcium-magnesium 500-250 MG Tabs per tablet   Commonly known as:  CALMAG        Dose:  1 tablet   Take 1 tablet by mouth daily   Refills:  0       celecoxib 100 MG capsule   Commonly known as:  celeBREX   Used for:  FAP (familial adenomatous polyposis)        Dose:  100 mg   Take 1 capsule (100 mg) by mouth 2 times daily as needed for pain   Quantity:  180 capsule   Refills:  6       HYDROcodone-acetaminophen 5-325 MG per tablet   Commonly known as:  NORCO   Used for:  Atypical hyperplasia of breast        Dose:  1-2 tablet   Take 1-2 tablets by mouth every 6 hours as needed for other (Moderate to Severe Pain)   Quantity:  15 tablet   Refills:  0       ibuprofen 600 MG tablet   Commonly known as:  ADVIL/MOTRIN   Used for:  Atypical hyperplasia of breast        Dose:  600 mg   Take 1 tablet (600 mg) by mouth every 6 hours as needed for pain (mild)   Quantity:  30 tablet   Refills:  0       MELATONIN PO        Dose:  1 mg   Take 1 mg by mouth nightly as needed   Refills:  0       multivitamin, therapeutic with minerals Tabs tablet        Dose:  1 tablet   Take 1 tablet by mouth daily.   Refills:  0       NASONEX 50 MCG/ACT spray   Generic drug:  mometasone        Dose:  2 spray   2 sprays by Both  Nostrils route daily.   Refills:  0       OMEGA-3 FISH OIL PO        Take  by mouth.   Refills:  0       PREDNISONE PO        Refills:  0       ZOMIG PO        Take  by mouth at onset of headache.   Refills:  0         CONTINUE these medicines which have NOT CHANGED        Dose / Directions    ondansetron 4 MG tablet   Commonly known as:  ZOFRAN   Used for:  Nausea        Dose:  4 mg   Take 1 tablet (4 mg) by mouth every 8 hours as needed for nausea   Quantity:  18 tablet   Refills:  0            Where to get your medicines      These medications were sent to [facility] [place], [place] - [address]  [address] [address]     Phone:  [phone]     ondansetron 4 MG tablet                Protect others around you: Learn how to safely use, store and throw away your medicines at www.disposemymeds.org.             Medication List: This is a list of all your medications and when to take them. Check marks below indicate your daily home schedule. Keep this list as a reference.      Medications           Morning Afternoon Evening Bedtime As Needed    acetaminophen 325 MG tablet   Commonly known as:  TYLENOL   Take 2 tablets (650 mg) by mouth every 4 hours as needed for other (mild pain)                                albuterol 90 MCG/ACT inhaler   Inhale 2 puffs into the lungs every 6 hours as needed.                                ALLEGRA 180 MG tablet   Take 180 mg by mouth daily.   Generic drug:  fexofenadine                                calcium-magnesium 500-250 MG Tabs per tablet   Commonly known as:  CALMAG   Take 1 tablet by mouth daily                                celecoxib 100 MG capsule   Commonly known as:  celeBREX   Take 1 capsule (100 mg) by mouth 2 times daily as needed for pain                                HYDROcodone-acetaminophen 5-325 MG per tablet   Commonly known as:  NORCO   Take 1-2 tablets by mouth every 6 hours as needed for other (Moderate to  Severe Pain)                                ibuprofen 600 MG tablet   Commonly known as:  ADVIL/MOTRIN   Take 1 tablet (600 mg) by mouth every 6 hours as needed for pain (mild)                                MELATONIN PO   Take 1 mg by mouth nightly as needed                                multivitamin, therapeutic with minerals Tabs tablet   Take 1 tablet by mouth daily.                                NASONEX 50 MCG/ACT spray   2 sprays by Both Nostrils route daily.   Generic drug:  mometasone                                OMEGA-3 FISH OIL PO   Take  by mouth.                                ondansetron 4 MG tablet   Commonly known as:  ZOFRAN   Take 1 tablet (4 mg) by mouth every 8 hours as needed for nausea                                PREDNISONE PO                                ZOMIG PO   Take  by mouth at onset of headache.

## 2018-03-28 NOTE — IP AVS SNAPSHOT
[facility], [place], Endoscopy    [address] [address]    Phone:  [phone]                                       After Visit Summary   3/28/2018    Sarah Duran    MRN: [id number]           After Visit Summary Signature Page     I have received my discharge instructions, and my questions have been answered. I have discussed any challenges I see with this plan with the nurse or doctor.    ..........................................................................................................................................  Patient/Patient Representative Signature      ..........................................................................................................................................  Patient Representative Print Name and Relationship to Patient    ..................................................               ................................................  Date                                            Time    ..........................................................................................................................................  Reviewed by Signature/Title    ...................................................              ..............................................  Date                                                            Time

## 2018-03-29 ENCOUNTER — CARE COORDINATION (OUTPATIENT)
Dept: GASTROENTEROLOGY | Facility: CLINIC | Age: 53
End: 2018-03-29

## 2018-03-29 ENCOUNTER — TELEPHONE (OUTPATIENT)
Dept: GASTROENTEROLOGY | Facility: CLINIC | Age: 53
End: 2018-03-29

## 2018-03-29 DIAGNOSIS — D13.91 FAP (FAMILIAL ADENOMATOUS POLYPOSIS): Primary | ICD-10-CM

## 2018-03-30 LAB
FLEXIBLE SIGMOIDOSCOPY: NORMAL
UPPER GI ENDOSCOPY: NORMAL

## 2018-04-04 ENCOUNTER — TELEPHONE (OUTPATIENT)
Dept: SURGERY | Facility: CLINIC | Age: 53
End: 2018-04-04

## 2018-04-04 NOTE — TELEPHONE ENCOUNTER
Patient is previously established with Dr. Phillisp and Dr. Bearden for FAP.  Dr. Gasca has requested patient follow up with a colon/rectal surgeon for future screening.  Called and spoke with patient.  Patient is scheduled 4/24/18 at 3:00 pm.  Offered sooner appointment, however patient kindly declined. Dr. Gasca updated.

## 2018-04-06 ENCOUNTER — CARE COORDINATION (OUTPATIENT)
Dept: GASTROENTEROLOGY | Facility: CLINIC | Age: 53
End: 2018-04-06

## 2018-04-06 NOTE — PROGRESS NOTES
"Care Coordination Telephone Call  GI Service and Surgical Oncology    Called patient to discuss worries that she has about doing complete colon prep with Bridgett.  She relates that the last time she did this prior to her surgery years ago she \"threw up\" and \"granted it could have been nerves\" but is concerned.  I have provided her with instructions for cutting back on her diet of solid foods a couple of days before and try liquids and clear liquids day before only.  She is averse to doing so as she does not use any artifical sweeteners or anything with corn syrup and does not like juices.  I have asked her to try to find things that will work for her that are clear liquids.    I have asked the patient to call with any additional questions or concerns and have provided my contact information.    Char WEBSTERN, HNBC, STAR-T  RN Care Coordinator  Surgical Oncology and GI service  Ph: [phone]  FAX: [phone]          "

## 2018-04-09 ENCOUNTER — TRANSFERRED RECORDS (OUTPATIENT)
Dept: HEALTH INFORMATION MANAGEMENT | Facility: CLINIC | Age: 53
End: 2018-04-09

## 2018-04-09 RX ORDER — TRIAMCINOLONE ACETONIDE 55 UG/1
2 SPRAY, METERED NASAL DAILY
COMMUNITY

## 2018-04-12 ENCOUNTER — SURGERY (OUTPATIENT)
Age: 53
End: 2018-04-12

## 2018-04-12 ENCOUNTER — HOSPITAL ENCOUNTER (OUTPATIENT)
Facility: CLINIC | Age: 53
Discharge: HOME | End: 2018-04-12
Attending: INTERNAL MEDICINE | Admitting: INTERNAL MEDICINE
Payer: COMMERCIAL

## 2018-04-12 ENCOUNTER — ANESTHESIA EVENT (OUTPATIENT)
Dept: SURGERY | Facility: CLINIC | Age: 53
End: 2018-04-12
Payer: COMMERCIAL

## 2018-04-12 ENCOUNTER — ANESTHESIA (OUTPATIENT)
Dept: SURGERY | Facility: CLINIC | Age: 53
End: 2018-04-12
Payer: COMMERCIAL

## 2018-04-12 VITALS
WEIGHT: 141.09 LBS | HEART RATE: 71 BPM | DIASTOLIC BLOOD PRESSURE: 83 MMHG | HEIGHT: 63 IN | OXYGEN SATURATION: 97 % | TEMPERATURE: 98.4 F | BODY MASS INDEX: 25 KG/M2 | RESPIRATION RATE: 16 BRPM | SYSTOLIC BLOOD PRESSURE: 131 MMHG

## 2018-04-12 DIAGNOSIS — K31.7 GASTRIC POLYP: Primary | ICD-10-CM

## 2018-04-12 LAB
GLUCOSE BLDC GLUCOMTR-MCNC: 119 MG/DL (ref 70–99)
HGB BLD-MCNC: 13.7 G/DL (ref 11.7–15.7)

## 2018-04-12 PROCEDURE — 25000128 H RX IP 250 OP 636: Performed by: NURSE ANESTHETIST, CERTIFIED REGISTERED

## 2018-04-12 PROCEDURE — 71000027 ZZH RECOVERY PHASE 2 EACH 15 MINS: Performed by: INTERNAL MEDICINE

## 2018-04-12 PROCEDURE — 36000064 ZZH SURGERY LEVEL 4 EA 15 ADDTL MIN - UMMC: Performed by: INTERNAL MEDICINE

## 2018-04-12 PROCEDURE — 82962 GLUCOSE BLOOD TEST: CPT

## 2018-04-12 PROCEDURE — 25000128 H RX IP 250 OP 636: Performed by: INTERNAL MEDICINE

## 2018-04-12 PROCEDURE — 85018 HEMOGLOBIN: CPT | Performed by: RESIDENTIAL TREATMENT FACILITY, PHYSICAL DISABILITIES

## 2018-04-12 PROCEDURE — 88305 TISSUE EXAM BY PATHOLOGIST: CPT | Performed by: INTERNAL MEDICINE

## 2018-04-12 PROCEDURE — 37000008 ZZH ANESTHESIA TECHNICAL FEE, 1ST 30 MIN: Performed by: INTERNAL MEDICINE

## 2018-04-12 PROCEDURE — 36415 COLL VENOUS BLD VENIPUNCTURE: CPT | Performed by: RESIDENTIAL TREATMENT FACILITY, PHYSICAL DISABILITIES

## 2018-04-12 PROCEDURE — 25000125 ZZHC RX 250: Performed by: NURSE ANESTHETIST, CERTIFIED REGISTERED

## 2018-04-12 PROCEDURE — 25000125 ZZHC RX 250: Performed by: INTERNAL MEDICINE

## 2018-04-12 PROCEDURE — 36000062 ZZH SURGERY LEVEL 4 1ST 30 MIN - UMMC: Performed by: INTERNAL MEDICINE

## 2018-04-12 PROCEDURE — 25000128 H RX IP 250 OP 636: Performed by: RESIDENTIAL TREATMENT FACILITY, PHYSICAL DISABILITIES

## 2018-04-12 PROCEDURE — 27210794 ZZH OR GENERAL SUPPLY STERILE: Performed by: INTERNAL MEDICINE

## 2018-04-12 PROCEDURE — 40000170 ZZH STATISTIC PRE-PROCEDURE ASSESSMENT II: Performed by: INTERNAL MEDICINE

## 2018-04-12 PROCEDURE — 37000009 ZZH ANESTHESIA TECHNICAL FEE, EACH ADDTL 15 MIN: Performed by: INTERNAL MEDICINE

## 2018-04-12 RX ORDER — PRAMIPEXOLE DIHYDROCHLORIDE 0.25 MG/1
0.25 TABLET ORAL
COMMUNITY
Start: 2014-07-29 | End: 2019-05-29

## 2018-04-12 RX ORDER — NALOXONE HYDROCHLORIDE 0.4 MG/ML
.1-.4 INJECTION, SOLUTION INTRAMUSCULAR; INTRAVENOUS; SUBCUTANEOUS
Status: DISCONTINUED | OUTPATIENT
Start: 2018-04-12 | End: 2018-04-12 | Stop reason: HOSPADM

## 2018-04-12 RX ORDER — MEPERIDINE HYDROCHLORIDE 25 MG/ML
12.5 INJECTION INTRAMUSCULAR; INTRAVENOUS; SUBCUTANEOUS
Status: DISCONTINUED | OUTPATIENT
Start: 2018-04-12 | End: 2018-04-12 | Stop reason: HOSPADM

## 2018-04-12 RX ORDER — TRIAMCINOLONE ACETONIDE 1 MG/G
CREAM TOPICAL
COMMUNITY
Start: 2018-02-26 | End: 2019-03-05

## 2018-04-12 RX ORDER — ONDANSETRON 2 MG/ML
4 INJECTION INTRAMUSCULAR; INTRAVENOUS EVERY 30 MIN PRN
Status: DISCONTINUED | OUTPATIENT
Start: 2018-04-12 | End: 2018-04-12 | Stop reason: HOSPADM

## 2018-04-12 RX ORDER — ONDANSETRON 2 MG/ML
4 INJECTION INTRAMUSCULAR; INTRAVENOUS
Status: COMPLETED | OUTPATIENT
Start: 2018-04-12 | End: 2018-04-12

## 2018-04-12 RX ORDER — FLUMAZENIL 0.1 MG/ML
0.2 INJECTION, SOLUTION INTRAVENOUS
Status: DISCONTINUED | OUTPATIENT
Start: 2018-04-12 | End: 2018-04-12 | Stop reason: HOSPADM

## 2018-04-12 RX ORDER — PROPOFOL 10 MG/ML
INJECTION, EMULSION INTRAVENOUS CONTINUOUS PRN
Status: DISCONTINUED | OUTPATIENT
Start: 2018-04-12 | End: 2018-04-12

## 2018-04-12 RX ORDER — ONDANSETRON 4 MG/1
4 TABLET, ORALLY DISINTEGRATING ORAL EVERY 30 MIN PRN
Status: DISCONTINUED | OUTPATIENT
Start: 2018-04-12 | End: 2018-04-12 | Stop reason: HOSPADM

## 2018-04-12 RX ORDER — PROPOFOL 10 MG/ML
INJECTION, EMULSION INTRAVENOUS PRN
Status: DISCONTINUED | OUTPATIENT
Start: 2018-04-12 | End: 2018-04-12

## 2018-04-12 RX ORDER — FENTANYL CITRATE 50 UG/ML
25-50 INJECTION, SOLUTION INTRAMUSCULAR; INTRAVENOUS
Status: DISCONTINUED | OUTPATIENT
Start: 2018-04-12 | End: 2018-04-12 | Stop reason: HOSPADM

## 2018-04-12 RX ORDER — PANTOPRAZOLE SODIUM 40 MG/1
40 TABLET, DELAYED RELEASE ORAL 2 TIMES DAILY
Qty: 60 TABLET | Refills: 0 | Status: SHIPPED | OUTPATIENT
Start: 2018-04-12 | End: 2018-05-12

## 2018-04-12 RX ORDER — LIDOCAINE 40 MG/G
CREAM TOPICAL
Status: DISCONTINUED | OUTPATIENT
Start: 2018-04-12 | End: 2018-04-12 | Stop reason: HOSPADM

## 2018-04-12 RX ORDER — DEXAMETHASONE SODIUM PHOSPHATE 4 MG/ML
INJECTION, SOLUTION INTRA-ARTICULAR; INTRALESIONAL; INTRAMUSCULAR; INTRAVENOUS; SOFT TISSUE PRN
Status: DISCONTINUED | OUTPATIENT
Start: 2018-04-12 | End: 2018-04-12

## 2018-04-12 RX ORDER — SODIUM CHLORIDE, SODIUM LACTATE, POTASSIUM CHLORIDE, CALCIUM CHLORIDE 600; 310; 30; 20 MG/100ML; MG/100ML; MG/100ML; MG/100ML
INJECTION, SOLUTION INTRAVENOUS CONTINUOUS
Status: DISCONTINUED | OUTPATIENT
Start: 2018-04-12 | End: 2018-04-12 | Stop reason: HOSPADM

## 2018-04-12 RX ORDER — BENZONATATE 100 MG/1
CAPSULE ORAL
COMMUNITY
Start: 2018-02-07 | End: 2018-05-03

## 2018-04-12 RX ORDER — LIDOCAINE HYDROCHLORIDE 20 MG/ML
INJECTION, SOLUTION INFILTRATION; PERINEURAL PRN
Status: DISCONTINUED | OUTPATIENT
Start: 2018-04-12 | End: 2018-04-12

## 2018-04-12 RX ADMIN — PROPOFOL 20 MG: 10 INJECTION, EMULSION INTRAVENOUS at 10:11

## 2018-04-12 RX ADMIN — SODIUM CHLORIDE, POTASSIUM CHLORIDE, SODIUM LACTATE AND CALCIUM CHLORIDE: 600; 310; 30; 20 INJECTION, SOLUTION INTRAVENOUS at 09:14

## 2018-04-12 RX ADMIN — PROPOFOL 10 MG: 10 INJECTION, EMULSION INTRAVENOUS at 09:33

## 2018-04-12 RX ADMIN — PROPOFOL 100 MCG/KG/MIN: 10 INJECTION, EMULSION INTRAVENOUS at 09:20

## 2018-04-12 RX ADMIN — SIMETHICONE 4 ML: 63.3; 3.7 SOLUTION/ DROPS ORAL at 09:52

## 2018-04-12 RX ADMIN — PROPOFOL 40 MG: 10 INJECTION, EMULSION INTRAVENOUS at 09:48

## 2018-04-12 RX ADMIN — PROPOFOL 40 MG: 10 INJECTION, EMULSION INTRAVENOUS at 10:00

## 2018-04-12 RX ADMIN — PROPOFOL 20 MG: 10 INJECTION, EMULSION INTRAVENOUS at 09:30

## 2018-04-12 RX ADMIN — PROPOFOL 30 MG: 10 INJECTION, EMULSION INTRAVENOUS at 09:20

## 2018-04-12 RX ADMIN — GLUCAGON HYDROCHLORIDE 0.4 MG: KIT at 09:54

## 2018-04-12 RX ADMIN — MIDAZOLAM 2 MG: 1 INJECTION INTRAMUSCULAR; INTRAVENOUS at 09:14

## 2018-04-12 RX ADMIN — ONDANSETRON 4 MG: 2 INJECTION INTRAMUSCULAR; INTRAVENOUS at 10:36

## 2018-04-12 RX ADMIN — DEXAMETHASONE SODIUM PHOSPHATE 6 MG: 4 INJECTION, SOLUTION INTRA-ARTICULAR; INTRALESIONAL; INTRAMUSCULAR; INTRAVENOUS; SOFT TISSUE at 10:36

## 2018-04-12 RX ADMIN — PROPOFOL 20 MG: 10 INJECTION, EMULSION INTRAVENOUS at 10:08

## 2018-04-12 RX ADMIN — LIDOCAINE HYDROCHLORIDE 40 MG: 20 INJECTION, SOLUTION INFILTRATION; PERINEURAL at 09:20

## 2018-04-12 NOTE — DISCHARGE INSTRUCTIONS
[facility]  Same-Day Surgery   Adult Discharge Orders & Instructions     For 24 hours after surgery    1. Get plenty of rest.  A responsible adult must stay with you for at least 24 hours after you leave the hospital.   2. Do not drive or use heavy equipment.  If you have weakness or tingling, don't drive or use heavy equipment until this feeling goes away.  3. Do not drink alcohol.  4. Avoid strenuous or risky activities.  Ask for help when climbing stairs.   5. You may feel lightheaded.  IF so, sit for a few minutes before standing.  Have someone help you get up.   6. If you have nausea (feel sick to your stomach): Drink only clear liquids such as apple juice, ginger ale, broth or 7-Up.  Rest may also help.  Be sure to drink enough fluids.  Move to a regular diet as you feel able.  7. You may have a slight fever. Call the doctor if your fever is over 100 F (37.7 C) (taken under the tongue) or lasts longer than 24 hours.  8. You may have a dry mouth, a sore throat, muscle aches or trouble sleeping.  These should go away after 24 hours.  9. Do not make important or legal decisions.   Call your doctor for any of the followin.  Signs of infection (fever, growing tenderness at the surgery site, a large amount of drainage or bleeding, severe pain, foul-smelling drainage, redness, swelling).    2. It has been over 8 to 10 hours since surgery and you are still not able to urinate (pass water).    3.  Headache for over 24 hours.    4.  Numbness, tingling or weakness the day after surgery (if you had spinal anesthesia).  To contact a doctor, call Dr Gasca's office at [phone]   or:        [phone] and ask for the resident on call for   ______________________________________________ (answered 24 hours a day)      Emergency Department:    [facility]: [phone]       (TTY for hearing impaired: [phone])    [facility]: [phone]       (TTY for hearing  impaired: [phone])           Tips for taking pain medications  To get the best pain relief possible , remember these points:      Take pain medications as directed, before pain becomes severe      Pain medication can upset your stomach: taking it with food may help      Constipation is a common side effect of pain medication. Drink plenty of  Fluids      Eat foods high in fiber. Take a stool softener  if recommended by your doctor or  Pharmacist.        Do not drink alcohol, drive or operate machinery while taking pain medications.      Ask about other ways to control pain, such as with heat, ice or relaxation.

## 2018-04-12 NOTE — BRIEF OP NOTE
[facility], [place]  Gastroenterology Brief Operative Note    Pre-operative diagnosis: Familial adenomatous polypsis   Post-operative diagnosis Same   Procedure: EGD, flexible sigmoidoscopy   Surgeon: See Gasca MD   Assistants(s): Marciano Gonzalez MD   Anesthesia: MAC   Estimated blood loss: Minimal    Total IV fluids: (See anesthesia record)   Blood transfusion: No transfusion was given during surgery   Total urine output: (See anesthesia record)   Drains: None   Specimens: Gastric polyp   Implants: None   Findings: EGD: multiple fundic gland polyps with one large prominent polyp slightly different from the rest. This was removed with hot snare polypectomy. Multiple duodenal polyps seen and treated with APC. Major papilla examined and no periampullary lesions seen.  Flexible sigmoidoscopy: ileocolonic anastomosis at 25 cm from the anus. Multiple small polyps seen and treated with APC.   Complications: None   Condition: Stable   Comments:      Recommendations:         See dictated procedure report for full details (found in chart review under 'Procedures')    - Observe in Same Day for possible discharge  - Await pathology results  - Discharge home if minimal or no pain   - Take twice daily pantoprazole 40 mg BID for one month after gastric polyp removal  - Repeat EGD/Flexible sigmoidoscopy in 1 year     Marciano Gonzalez MD  [phone]

## 2018-04-12 NOTE — ANESTHESIA POSTPROCEDURE EVALUATION
Patient: Sarah Duran    Procedure(s):  Esophagogastroduodenoscopy with polypectomy and polyp ablation - Wound Class: II-Clean Contaminated  Flexible Sigmoidoscopy with polyp ablation - Wound Class: II-Clean Contaminated    Diagnosis:Familial Adenomatous Polyposis  Diagnosis Additional Information: No value filed.    Anesthesia Type:  MAC    Note:  Anesthesia Post Evaluation    Patient location during evaluation: Phase 2  Patient participation: Able to fully participate in evaluation  Level of consciousness: awake and alert  Pain management: adequate  Airway patency: patent  Cardiovascular status: hemodynamically stable  Respiratory status: acceptable  Hydration status: acceptable  PONV: none             Last vitals:  Vitals:    04/12/18 1115 04/12/18 1130 04/12/18 1145   BP:  135/84 131/83   Pulse:      Resp: 14 16 16   Temp:      SpO2: 97%           Electronically Signed By: Abe Levy MD  April 12, 2018  12:12 PM

## 2018-04-12 NOTE — ANESTHESIA PREPROCEDURE EVALUATION
Anesthesia Evaluation     . Pt has had prior anesthetic.     No history of anesthetic complications          ROS/MED HX    ENT/Pulmonary:     (+)asthma , . .    Neurologic:  - neg neurologic ROS     Cardiovascular:  - neg cardiovascular ROS       METS/Exercise Tolerance:     Hematologic:     (+) Other Hematologic Disorder-Factor V Leiden      Musculoskeletal:  - neg musculoskeletal ROS       GI/Hepatic:     (+) GERD Asymptomatic on medication, Other GI/Hepatic FAP      Renal/Genitourinary:         Endo:  - neg endo ROS       Psychiatric:  - neg psychiatric ROS       Infectious Disease:  - neg infectious disease ROS       Malignancy:         Other:                     Physical Exam  Normal systems: cardiovascular, pulmonary and dental    Airway   Mallampati: II  TM distance: >3 FB  Neck ROM: full    Dental     Cardiovascular       Pulmonary                     Anesthesia Plan      History & Physical Review  History and physical reviewed and following examination; no interval change.    ASA Status:  2 .    NPO Status:  > 8 hours    Plan for MAC with Propofol induction. Maintenance will be TIVA.  Reason for MAC:  Deep or markedly invasive procedure (G8)  PONV prophylaxis:  Ondansetron (or other 5HT-3)       Postoperative Care  Postoperative pain management:  Multi-modal analgesia.      Consents  Anesthetic plan, risks, benefits and alternatives discussed with:  Patient..                          .

## 2018-04-12 NOTE — IP AVS SNAPSHOT
MRN:[id number]                      After Visit Summary   4/12/2018    Sarah Duran    MRN: [id number]           Thank you!     Thank you for choosing [facility] for your care. Our goal is always to provide you with excellent care. Hearing back from our patients is one way we can continue to improve our services. Please take a few minutes to complete the written survey that you may receive in the mail after you visit with us. Thank you!        Patient Information     Date Of Birth          1965        About your hospital stay     You were admitted on:  April 12, 2018 You last received care in the:  Same Day Surgery [facility]    You were discharged on:  April 12, 2018       Who to Call     For medical emergencies, please call 911.  For non-urgent questions about your medical care, please call your primary care provider or clinic, [phone]  For questions related to your surgery, please call your surgery clinic        Attending Provider     Provider Stephen Tran MD Gastroenterology       Primary Care Provider Office Phone # Fax #    Ida Sandoval [phone]      After Care Instructions     Discharge Instructions       No driving or operating machinery until the day after procedure.            Discharge Instructions       Recommend that a responsible adult remain with the patient at home for 24 hours post discharge.            Discharge Instructions       Start with clear liquids, sips of water 1 hour after procedure. If no abdominal pain and gag reflex has returned, advance as tolerated to pre-procedure diet.              Discharge Instructions       Restart home medications.            Discharge Instructions       Check with your Provider when to start anticoagulant medication.            Discharge Instructions       No ALCOHOL 24 hours post procedure.                  Your next 10 appointments already scheduled     Apr 24, 2018  3:00 PM CDT    (Arrive by 2:45 PM)   Return Visit with Zbigniew Hernandez MD   [facility] ([facility])    [address]  4th Floor  [facility] [address] [phone]              Further instructions from your care team       [facility] [place]  Same-Day Surgery   Adult Discharge Orders & Instructions     For 24 hours after surgery    1. Get plenty of rest.  A responsible adult must stay with you for at least 24 hours after you leave the hospital.   2. Do not drive or use heavy equipment.  If you have weakness or tingling, don't drive or use heavy equipment until this feeling goes away.  3. Do not drink alcohol.  4. Avoid strenuous or risky activities.  Ask for help when climbing stairs.   5. You may feel lightheaded.  IF so, sit for a few minutes before standing.  Have someone help you get up.   6. If you have nausea (feel sick to your stomach): Drink only clear liquids such as apple juice, ginger ale, broth or 7-Up.  Rest may also help.  Be sure to drink enough fluids.  Move to a regular diet as you feel able.  7. You may have a slight fever. Call the doctor if your fever is over 100 F (37.7 C) (taken under the tongue) or lasts longer than 24 hours.  8. You may have a dry mouth, a sore throat, muscle aches or trouble sleeping.  These should go away after 24 hours.  9. Do not make important or legal decisions.   Call your doctor for any of the followin.  Signs of infection (fever, growing tenderness at the surgery site, a large amount of drainage or bleeding, severe pain, foul-smelling drainage, redness, swelling).    2. It has been over 8 to 10 hours since surgery and you are still not able to urinate (pass water).    3.  Headache for over 24 hours.    4.  Numbness, tingling or weakness the day after surgery (if you had spinal anesthesia).  To contact a doctor, call Dr Gasca's office at [phone]   or:        [phone] and  "ask for the resident on call for   ______________________________________________ (answered 24 hours a day)      Emergency Department:    [facility]: [phone]       (TTY for hearing impaired: [phone])    [facility]: [phone]       (TTY for hearing impaired: [phone])           Tips for taking pain medications  To get the best pain relief possible , remember these points:      Take pain medications as directed, before pain becomes severe      Pain medication can upset your stomach: taking it with food may help      Constipation is a common side effect of pain medication. Drink plenty of  Fluids      Eat foods high in fiber. Take a stool softener  if recommended by your doctor or  Pharmacist.        Do not drink alcohol, drive or operate machinery while taking pain medications.      Ask about other ways to control pain, such as with heat, ice or relaxation.          Pending Results     No orders found from 4/10/2018 to 4/13/2018.            Admission Information     Date & Time Provider Department Dept. Phone    4/12/2018 Stephen Gasca MD Same Day Surgery [facility] [phone]      Your Vitals Were     Blood Pressure Pulse Temperature Respirations Height Weight    131/84 71 98.4  F (36.9  C) (Oral) 14 1.588 m (5' 2.5\") 64 kg (141 lb 1.5 oz)    Last Period Pulse Oximetry BMI (Body Mass Index)             04/07/2018 98% 25.4 kg/m2         [employer] Information     [employer] gives you secure access to your electronic health record. If you see a primary care provider, you can also send messages to your care team and make appointments. If you have questions, please call your primary care clinic.  If you do not have a primary care provider, please call [phone] and they will assist you.        Care EveryWhere ID     This is your Care EveryWhere ID. This could be used by other organizations to access your [facility] medical records  [id number]        Equal Access to Services     " [facility]: Hadii aad ku peggy Astudillo, waaxda luqadaha, qaybta kaalmada adebrandon, thelma ayush renamary ellen duckworthrustam robertson shea . So [facility] [phone].    ATENCIÓN: Si habla español, tiene a rodriguez disposición servicios gratuitos de asistencia lingüística. Llame al [phone].    We comply with applicable federal civil rights laws and [place] laws. We do not discriminate on the basis of race, color, national origin, age, disability, sex, sexual orientation, or gender identity.               Review of your medicines      START taking        Dose / Directions    pantoprazole 40 MG EC tablet   Commonly known as:  PROTONIX   Used for:  Gastric polyp        Dose:  40 mg   Take 1 tablet (40 mg) by mouth 2 times daily Take 30-60 minutes before a meal.   Quantity:  60 tablet   Refills:  0         CONTINUE these medicines which have NOT CHANGED        Dose / Directions    acetaminophen 325 MG tablet   Commonly known as:  TYLENOL   Used for:  Atypical hyperplasia of breast        Dose:  650 mg   Take 2 tablets (650 mg) by mouth every 4 hours as needed for other (mild pain)   Quantity:  100 tablet   Refills:  0       albuterol 90 MCG/ACT inhaler        Dose:  2 puff   Inhale 2 puffs into the lungs every 6 hours as needed.   Refills:  0       ALLEGRA 180 MG tablet   Generic drug:  fexofenadine        Dose:  180 mg   Take 180 mg by mouth daily.   Refills:  0       benzonatate 100 MG capsule   Commonly known as:  TESSALON        Refills:  0       calcium-magnesium 500-250 MG Tabs per tablet   Commonly known as:  CALMAG        Dose:  1 tablet   Take 1 tablet by mouth daily   Refills:  0       celecoxib 100 MG capsule   Commonly known as:  celeBREX   Used for:  FAP (familial adenomatous polyposis)        Dose:  100 mg   Take 1 capsule (100 mg) by mouth 2 times daily as needed for pain   Quantity:  180 capsule   Refills:  6       ibuprofen 600 MG tablet   Commonly known as:  ADVIL/MOTRIN   Used for:  Atypical hyperplasia of  breast        Dose:  600 mg   Take 1 tablet (600 mg) by mouth every 6 hours as needed for pain (mild)   Quantity:  30 tablet   Refills:  0       MELATONIN PO        Dose:  1 mg   Take 1 mg by mouth nightly as needed   Refills:  0       multivitamin, therapeutic with minerals Tabs tablet        Dose:  1 tablet   Take 1 tablet by mouth daily.   Refills:  0       OMEGA-3 FISH OIL PO        Take  by mouth.   Refills:  0       ondansetron 4 MG tablet   Commonly known as:  ZOFRAN   Used for:  Nausea        Dose:  4 mg   Take 1 tablet (4 mg) by mouth every 8 hours as needed for nausea   Quantity:  18 tablet   Refills:  0       PEG 3350-KCl-NaBcb-NaCl-NaSulf 227.1 g Pack   Commonly known as:  GOLYTELY   Used for:  FAP (familial adenomatous polyposis)        4 - 6 pm start  GoLytely Drink 8 -8oz glasses every 10-15 minutes; 10 pm drink rest of Golytely until gone.   Quantity:  1 each   Refills:  0       pramipexole 0.25 MG tablet   Commonly known as:  MIRAPEX        Dose:  0.25 mg   Take 0.25 mg by mouth   Refills:  0       triamcinolone 0.1 % cream   Commonly known as:  KENALOG        Refills:  0       triamcinolone 55 MCG/ACT Inhaler   Commonly known as:  NASACORT        Dose:  2 spray   Spray 2 sprays into both nostrils daily   Refills:  0       ZOMIG PO        Take  by mouth at onset of headache.   Refills:  0            Where to get your medicines      These medications were sent to [facility] [place], [place] - [address], [facility] [address]  [address], [place] [address], [facility] [address]     Phone:  [phone]     pantoprazole 40 MG EC tablet                Protect others around you: Learn how to safely use, store and throw away your medicines at www.disposemymeds.org.             Medication List: This is a list of all your medications and when to take them. Check marks below indicate your daily home schedule. Keep this list as a reference.      Medications           Morning Afternoon Evening Bedtime  As Needed    acetaminophen 325 MG tablet   Commonly known as:  TYLENOL   Take 2 tablets (650 mg) by mouth every 4 hours as needed for other (mild pain)                                albuterol 90 MCG/ACT inhaler   Inhale 2 puffs into the lungs every 6 hours as needed.                                ALLEGRA 180 MG tablet   Take 180 mg by mouth daily.   Generic drug:  fexofenadine                                benzonatate 100 MG capsule   Commonly known as:  TESSALON                                calcium-magnesium 500-250 MG Tabs per tablet   Commonly known as:  CALMAG   Take 1 tablet by mouth daily                                celecoxib 100 MG capsule   Commonly known as:  celeBREX   Take 1 capsule (100 mg) by mouth 2 times daily as needed for pain                                ibuprofen 600 MG tablet   Commonly known as:  ADVIL/MOTRIN   Take 1 tablet (600 mg) by mouth every 6 hours as needed for pain (mild)                                MELATONIN PO   Take 1 mg by mouth nightly as needed                                multivitamin, therapeutic with minerals Tabs tablet   Take 1 tablet by mouth daily.                                OMEGA-3 FISH OIL PO   Take  by mouth.                                ondansetron 4 MG tablet   Commonly known as:  ZOFRAN   Take 1 tablet (4 mg) by mouth every 8 hours as needed for nausea                                pantoprazole 40 MG EC tablet   Commonly known as:  PROTONIX   Take 1 tablet (40 mg) by mouth 2 times daily Take 30-60 minutes before a meal.                                PEG 3350-KCl-NaBcb-NaCl-NaSulf 227.1 g Pack   Commonly known as:  GOLYTELY   4 - 6 pm start  GoLytely Drink 8 -8oz glasses every 10-15 minutes; 10 pm drink rest of Golytely until gone.                                pramipexole 0.25 MG tablet   Commonly known as:  MIRAPEX   Take 0.25 mg by mouth                                triamcinolone 0.1 % cream   Commonly known as:  KENALOG                                 triamcinolone 55 MCG/ACT Inhaler   Commonly known as:  NASACORT   Spray 2 sprays into both nostrils daily                                ZOMIG PO   Take  by mouth at onset of headache.

## 2018-04-12 NOTE — OR NURSING
Instructions to pt  to remind pt to check for return of gag reflex before she eats or drinks, he verbalized understanding

## 2018-04-12 NOTE — ANESTHESIA CARE TRANSFER NOTE
Patient: Sarah Duran    Procedure(s):  Esophagogastroduodenoscopy with polypectomy and polyp ablation - Wound Class: II-Clean Contaminated  Flexible Sigmoidoscopy with polyp ablation - Wound Class: II-Clean Contaminated    Diagnosis: Familial Adenomatous Polyposis  Diagnosis Additional Information: No value filed.    Anesthesia Type:   MAC     Note:  Airway :Room Air  Patient transferred to:Phase II  Comments: VSS, alert, patent airway, report to RN.Handoff Report: Identifed the Patient, Identified the Reponsible Provider, Reviewed the pertinent medical history, Discussed the surgical course, Reviewed Intra-OP anesthesia mangement and issues during anesthesia, Set expectations for post-procedure period and Allowed opportunity for questions and acknowledgement of understanding      Vitals: (Last set prior to Anesthesia Care Transfer)    CRNA VITALS  4/12/2018 1011 - 4/12/2018 1047      4/12/2018             EKG: Sinus rhythm                Electronically Signed By: REYNOLD Padilla CRNA  April 12, 2018  10:47 AM

## 2018-04-12 NOTE — OR NURSING
[place]  [place]ers to  protonix [place] at [place], they will fill RX here for pt  to pickup

## 2018-04-12 NOTE — IP AVS SNAPSHOT
Same Day Surgery [address] [address]    Phone:  [phone]                                       After Visit Summary   4/12/2018    Sarah Duran    MRN: [id number]           After Visit Summary Signature Page     I have received my discharge instructions, and my questions have been answered. I have discussed any challenges I see with this plan with the nurse or doctor.    ..........................................................................................................................................  Patient/Patient Representative Signature      ..........................................................................................................................................  Patient Representative Print Name and Relationship to Patient    ..................................................               ................................................  Date                                            Time    ..........................................................................................................................................  Reviewed by Signature/Title    ...................................................              ..............................................  Date                                                            Time

## 2018-04-16 LAB — COPATH REPORT: NORMAL

## 2018-04-17 ENCOUNTER — TELEPHONE (OUTPATIENT)
Dept: GASTROENTEROLOGY | Facility: CLINIC | Age: 53
End: 2018-04-17

## 2018-04-17 LAB
COLONOSCOPY: NORMAL
UPPER GI ENDOSCOPY: NORMAL

## 2018-04-17 NOTE — TELEPHONE ENCOUNTER
Please arrange for repeat flex sig and upper endoscopy with me in OR with MAC in 1 year. Ind = surveillance of FAP.    FATMATA Gasca MD  Associate Professor of Medicine  Division of Gastroenterology, Hepatology and Nutrition  [facility]

## 2018-04-24 ENCOUNTER — OFFICE VISIT (OUTPATIENT)
Dept: SURGERY | Facility: CLINIC | Age: 53
End: 2018-04-24
Payer: COMMERCIAL

## 2018-04-24 VITALS
DIASTOLIC BLOOD PRESSURE: 70 MMHG | TEMPERATURE: 97.7 F | HEART RATE: 50 BPM | BODY MASS INDEX: 26.36 KG/M2 | OXYGEN SATURATION: 100 % | SYSTOLIC BLOOD PRESSURE: 132 MMHG | WEIGHT: 148.8 LBS | HEIGHT: 63 IN

## 2018-04-24 DIAGNOSIS — D13.91 FAP (FAMILIAL ADENOMATOUS POLYPOSIS): Primary | ICD-10-CM

## 2018-04-24 ASSESSMENT — PAIN SCALES - GENERAL: PAINLEVEL: NO PAIN (0)

## 2018-04-24 NOTE — NURSING NOTE
"Chief Complaint   Patient presents with     GI Problem     History of FAP, here to discuss screening.       Vitals:    04/24/18 1520   BP: 132/70   BP Location: Left arm   Patient Position: Chair   Cuff Size: Adult Regular   Pulse: 50   Temp: 97.7  F (36.5  C)   TempSrc: Oral   SpO2: 100%   Weight: 148 lb 12.8 oz   Height: 5' 2.5\"       Body mass index is 26.78 kg/(m^2).    Arnold QUARLES LPN                  "

## 2018-04-24 NOTE — PROGRESS NOTES
Colon and Rectal Surgery Clinic Note      RE: Sarah Duran  : 1965  ANDREI: 2018    Mrs. Duran was seen in clinic today for follow up.  She had total abdominal colectomy with ileosigmoid/ileorectal anastomosis 30 years ago for FAP and has been undergoing stump surveillance with flexible sigmoidoscopy every 6 months to a year.  She was on Sulindac before  but took herself off it due to side effects and Dr. Beraden recommended Celebrex, which she's been taking ever since.  Between  to , she had numerous polyps in her stump that required flex sig every 6 months but this got better and she has settled into fewer and manageable polyps over the last couple of years.  Most recently she appeares to have had a poor prep after enemas with Dr. Gasca and required a full colon prep to visualize the length of the stump.  She was referred to our clinic for consideration of stump shortening/completion proctocolectomy.  On a repeat flexible sigmoidoscopy following colon prep, she was noted to have small polyps treated with APC.     Mrs. Duran states that she has satisfactory bowel function and no issues with control.  Her bowel movements alternate between diarrhea and semi-formed stool about 2-7x/day depending on her diet.         Impression: 52F with FAP and total abdominal colectomy 30 years ago.  She's been undergoing routine stump surveillance with flexible sigmoidoscopy since her surgery.  Recently scoped by Dr. Gasca and it was noted that her stump is > 50 cm.  Seen today for consideration of stump revision/resection.     Recommendations:   Given she has manageable polyp burden in the rectosigmoid stump and no evidence of dysplasia, we would not recommend any resection presently.  Indications for further resection would be overwhelming polyp burden or dysplasia.  We discussed that she may require modified colon bowel prep for her future lower endoscopies.   If for reasons mentioned above she  requires a resection, surgical option be a completion proctocolectomy with ileoanal J pouch, we briefly discussed this surgery today and it was clear that she prefers not to undergo any surgery right now.      Many thanks for involving us in her care, please don't hesitate to call for questions or if situation changes.         For details of past medical history, surgical history, family history, medications, allergies, and review of systems, please see details below.    Medical history:  Past Medical History:   Diagnosis Date     Asthma      Coagulation disorder (H)     factor 5     Familial adenomatous polyposis      GERD (gastroesophageal reflux disease)      Migraine      Pancreatitis, acute      PONV (postoperative nausea and vomiting)        Surgical history:  Past Surgical History:   Procedure Laterality Date     BIOPSY BREAST SEED LOCALIZATION Right 11/30/2016    Procedure: BIOPSY BREAST SEED LOCALIZATION;  Surgeon: Jessica Rasheed MD;  Location:  OR     C LAP,SURG,COLECTOMY,W/ANAST      for colon cancer     COLECTOMY      subtotal     COMBINED ESOPHAGOSCOPY, GASTROSCOPY, DUODENOSCOPY (EGD) WITH ARGON PLASMA COAGULATOR (APC) N/A 4/12/2018    Procedure: COMBINED ESOPHAGOSCOPY, GASTROSCOPY, DUODENOSCOPY (EGD) WITH ARGON PLASMA COAGULATOR (APC);  Esophagogastroduodenoscopy with polypectomy and polyp ablation;  Surgeon: Stephen Gasca MD;  Location: [facility] OR     ENDOSCOPIC RETROGRADE CHOLANGIOPANCREATOGRAPHY       ESOPHAGOSCOPY, GASTROSCOPY, DUODENOSCOPY (EGD), COMBINED  11/5/2012    Procedure: COMBINED ESOPHAGOSCOPY, GASTROSCOPY, DUODENOSCOPY (EGD), BIOPSY SINGLE OR MULTIPLE;;  Surgeon: Angélica Garcia MD;  Location: [facility] GI     ESOPHAGOSCOPY, GASTROSCOPY, DUODENOSCOPY (EGD), COMBINED  12/3/2013    Procedure: COMBINED ESOPHAGOSCOPY, GASTROSCOPY, DUODENOSCOPY (EGD);;  Surgeon: Angélica Garcia MD;  Location:  GI     ESOPHAGOSCOPY, GASTROSCOPY, DUODENOSCOPY (EGD), COMBINED N/A 3/28/2018     Procedure: COMBINED ESOPHAGOSCOPY, GASTROSCOPY, DUODENOSCOPY (EGD);;  Surgeon: Stephen Gasca MD;  Location: [facility] GI     EXCISE POLYP RECTUM       LAPAROSCOPIC LYSIS ADHESIONS       SIGMOIDOSCOPY FLEXIBLE       SIGMOIDOSCOPY FLEXIBLE N/A 12/7/2015    Procedure: SIGMOIDOSCOPY FLEXIBLE;  Surgeon: Mariela Bearden MD;  Location: [facility] GI     SIGMOIDOSCOPY FLEXIBLE N/A 3/28/2018    Procedure: SIGMOIDOSCOPY FLEXIBLE;  EGD/Flex Sig;  Surgeon: Stephen Gasca MD;  Location: [facility] GI     SIGMOIDOSCOPY FLEXIBLE N/A 4/12/2018    Procedure: SIGMOIDOSCOPY FLEXIBLE;  Flexible Sigmoidoscopy with polyp ablation;  Surgeon: Stephen Gasca MD;  Location: [facility] OR       Family history:  Family History   Problem Relation Age of Onset     Hyperlipidemia Mother      CEREBROVASCULAR DISEASE Mother      CEREBROVASCULAR DISEASE Father      Colon Cancer Father      Prostate Cancer Brother        Medications:  Current Outpatient Prescriptions   Medication Sig Dispense Refill     acetaminophen (TYLENOL) 325 MG tablet Take 2 tablets (650 mg) by mouth every 4 hours as needed for other (mild pain) 100 tablet 0     albuterol 90 MCG/ACT inhaler Inhale 2 puffs into the lungs every 6 hours as needed.       benzonatate (TESSALON) 100 MG capsule        calcium-magnesium (CALMAG) 500-250 MG TABS Take 1 tablet by mouth daily       celecoxib (CELEBREX) 100 MG capsule Take 1 capsule (100 mg) by mouth 2 times daily as needed for pain 180 capsule 6     fexofenadine (ALLEGRA) 180 MG tablet Take 180 mg by mouth daily.       ibuprofen (ADVIL,MOTRIN) 600 MG tablet Take 1 tablet (600 mg) by mouth every 6 hours as needed for pain (mild) 30 tablet 0     MELATONIN PO Take 1 mg by mouth nightly as needed       multivitamin, therapeutic with minerals (THERA-VIT-M) TABS Take 1 tablet by mouth daily.       Omega-3 Fatty Acids (OMEGA-3 FISH OIL PO) Take  by mouth.       ondansetron (ZOFRAN) 4 MG tablet Take 1 tablet (4 mg) by mouth every 8 hours as needed for  "nausea 18 tablet 0     pantoprazole (PROTONIX) 40 MG EC tablet Take 1 tablet (40 mg) by mouth 2 times daily Take 30-60 minutes before a meal. 60 tablet 0     pramipexole (MIRAPEX) 0.25 MG tablet Take 0.25 mg by mouth       triamcinolone (KENALOG) 0.1 % cream        triamcinolone (NASACORT) 55 MCG/ACT Inhaler Spray 2 sprays into both nostrils daily       ZOLMitriptan (ZOMIG PO) Take  by mouth at onset of headache.       Allergies:  The patientis allergic to cephalexin; cipro [ciprofloxacin]; flagyl [metronidazole]; keflex [cephalosporins]; omeprazole; prilosec otc [omeprazole magnesium]; doxycycline; erythromycin; and sulfa drugs.    Social history:  Social History   Substance Use Topics     Smoking status: Never Smoker     Smokeless tobacco: Never Used     Alcohol use No     Marital status: .    Review of Systems:  Nursing Notes:   Tashi Thorne LPN  4/24/2018  3:24 PM  Signed  Chief Complaint   Patient presents with     GI Problem     History of FAP, here to discuss screening.       Vitals:    04/24/18 1520   BP: 132/70   BP Location: Left arm   Patient Position: Chair   Cuff Size: Adult Regular   Pulse: 50   Temp: 97.7  F (36.5  C)   TempSrc: Oral   SpO2: 100%   Weight: 148 lb 12.8 oz   Height: 5' 2.5\"       Body mass index is 26.78 kg/(m^2).    TAYLOR Asencio MD   Colon and Rectal Surgery Fellow  [facility]      The patient was seen & reviewed with Dr. Hernandez.    I saw and examined the patient, led the discussion and edited the resident note.  I agree with the assessment and plan as outlined.  In brief, Sarah is currently asymptomatic and she prefers to maintain the status quo of her ileosigmoid anastomosis.  She understands that she will need a more aggressive oral bowel prep in order to provide an adequate view of her remaining large bowel.  She prefers to continue her surveillance with Dr. Paulino so she can do her upper and lower GI scopes " at the same time.  We discussed the option of converting her to an ileal rectal anastomosis, which takes care of some but not all of her problem of cancer risk.  I reemphasized the fact that if her polyps become unmanageable that she will need to proceed with surgery.  If she develops innumerable rectal polyps, she would need to decide about a proctocolectomy with ileostomy versus a J-pouch reconstruction.  We discussed her Celebrex use.  She had GI problems with sulindac, so I think this is a reasonable option, though I pointed out to her that there is some increased risk of cardiovascular events associated with Celebrex use.  Sarah has never had a thyroid ultrasound, and she should have thyroid screening based upon her FAP.  We placed a referral to the Cancer Risk Management Program so that her ongoing surveillance can be managed by Clarissa FLAHERTY.  Sarah has adopted but no biologic children.    I answered all of Sarah's questions to her stated satisfaction.  She expressed her understanding and agreement with the proposed plan.    Total time 25 minutes.  Greater than half the time was spent counseling.        Zbigniew Hernandez MD  Professor of Surgery    Referring Provider:  Stephen Gasca MD  [address]  [address] [place] [address]     Primary Care Provider:  Ida Sandoval

## 2018-04-24 NOTE — MR AVS SNAPSHOT
After Visit Summary   4/24/2018    Sarah Duran    MRN: [id number]           Patient Information     Date Of Birth          1965        Visit Information        Provider Department      4/24/2018 3:00 PM Zbigniew Hernandez MD [facility] Colon and Rectal Surgery        Today's Diagnoses     FAP (familial adenomatous polyposis)    -  1       Follow-ups after your visit        Additional Services     CANCER RISK MGMT/CANCER GENETIC COUNSELING REFERRAL       Your provider has referred you to the Cancer Risk Management Program - Cancer Genetic Counseling.    Reason for Referral: patient has known FAP and needs follow up with Clarissa    We have a sent a notice to a staff member of the Cancer Risk Management Program to give you a call to assist with scheduling your appointment.  You may also call  [phone] ([phone]) to initiate scheduling.    Please be aware that coverage of these services is subject to the terms and limitations of your health insurance plan.  Call member services at your health plan with any benefit or coverage questions.      Please bring the completed family history sheet to your appointment in addition to any available outside medical records documenting your cancer diagnosis.                  Your next 10 appointments already scheduled     May 03, 2018 11:00 AM CDT   New Visit with REYNOLD March CNS   Cancer Risk Management Program ([facility])    [facility]  [address] [address] [phone]              Who to contact     Please call your clinic at [phone] to:    Ask questions about your health    Make or cancel appointments    Discuss your medicines    Learn about your test results    Speak to your doctor            Additional Information About Your Visit        MyChart Information     [employer] gives you secure access to your electronic health record. If you see a primary care  "provider, you can also send messages to your care team and make appointments. If you have questions, please call your primary care clinic.  If you do not have a primary care provider, please call [phone] and they will assist you.      [employer] is an electronic gateway that provides easy, online access to your medical records. With [employer], you can request a clinic appointment, read your test results, renew a prescription or communicate with your care team.     To access your existing account, please contact your [facility] or call [phone] for assistance.        Care EveryWhere ID     This is your Care EveryWhere ID. This could be used by other organizations to access your [place] medical records  [id number]        Your Vitals Were     Pulse Temperature Height Last Period Pulse Oximetry BMI (Body Mass Index)    50 97.7  F (36.5  C) (Oral) 5' 2.5\" 04/07/2018 100% 26.78 kg/m2       Blood Pressure from Last 3 Encounters:   04/24/18 132/70   04/12/18 131/83   03/28/18 128/79    Weight from Last 3 Encounters:   04/24/18 148 lb 12.8 oz   04/12/18 141 lb 1.5 oz   03/28/18 142 lb              We Performed the Following     CANCER RISK MGMT/CANCER GENETIC COUNSELING REFERRAL        Primary Care Provider Office Phone # Fax #    Ida ESPINO Jaime [phone]       [facility] [place] [address]    [facility] [address]        Equal Access to Services     CASSIE ANDRES : Hadii ellis ku locoo Sosita, waaxda luqadaha, qaybta kaalmada adeegyada, thelma hess. So [facility] [phone].    ATENCIÓN: Si habla español, tiene a rodriguez disposición servicios gratuitos de asistencia lingüística. Llame al [phone].    We comply with applicable federal civil rights laws and [place] laws. We do not discriminate on the basis of race, color, national origin, age, disability, sex, sexual orientation, or gender identity.            Thank you!     " Thank you for choosing [facility] COLON AND RECTAL SURGERY  for your care. Our goal is always to provide you with excellent care. Hearing back from our patients is one way we can continue to improve our services. Please take a few minutes to complete the written survey that you may receive in the mail after your visit with us. Thank you!             Your Updated Medication List - Protect others around you: Learn how to safely use, store and throw away your medicines at www.disposemymeds.org.          This list is accurate as of 4/24/18 11:59 PM.  Always use your most recent med list.                   Brand Name Dispense Instructions for use Diagnosis    acetaminophen 325 MG tablet    TYLENOL    100 tablet    Take 2 tablets (650 mg) by mouth every 4 hours as needed for other (mild pain)    Atypical hyperplasia of breast       albuterol 90 MCG/ACT inhaler      Inhale 2 puffs into the lungs every 6 hours as needed.        ALLEGRA 180 MG tablet   Generic drug:  fexofenadine      Take 180 mg by mouth daily.        benzonatate 100 MG capsule    TESSALON          calcium-magnesium 500-250 MG Tabs per tablet    CALMAG     Take 1 tablet by mouth daily        celecoxib 100 MG capsule    celeBREX    180 capsule    Take 1 capsule (100 mg) by mouth 2 times daily as needed for pain    FAP (familial adenomatous polyposis)       ibuprofen 600 MG tablet    ADVIL/MOTRIN    30 tablet    Take 1 tablet (600 mg) by mouth every 6 hours as needed for pain (mild)    Atypical hyperplasia of breast       MELATONIN PO      Take 1 mg by mouth nightly as needed        multivitamin, therapeutic with minerals Tabs tablet      Take 1 tablet by mouth daily.        OMEGA-3 FISH OIL PO      Take  by mouth.        ondansetron 4 MG tablet    ZOFRAN    18 tablet    Take 1 tablet (4 mg) by mouth every 8 hours as needed for nausea    Nausea       pantoprazole 40 MG EC tablet    PROTONIX    60 tablet    Take 1 tablet (40 mg) by mouth 2 times daily Take 30-60  minutes before a meal.    Gastric polyp       pramipexole 0.25 MG tablet    MIRAPEX     Take 0.25 mg by mouth        triamcinolone 0.1 % cream    KENALOG          triamcinolone 55 MCG/ACT Inhaler    NASACORT     Spray 2 sprays into both nostrils daily        ZOMIG PO      Take  by mouth at onset of headache.

## 2018-04-24 NOTE — LETTER
2018       RE: Sarah Duran  [address] [facility] [id number]     Dear Colleague,    Thank you for referring your patient, Sarah Duran, to the [facility] COLON AND RECTAL SURGERY at [facility]. Please see a copy of my visit note below.    Colon and Rectal Surgery Clinic Note      RE: Sarah Duran  : 1965  ANDREI: 2018    Mrs. Duran was seen in clinic today for follow up.  She had total abdominal colectomy with ileosigmoid/ileorectal anastomosis 30 years ago for FAP and has been undergoing stump surveillance with flexible sigmoidoscopy every 6 months to a year.  She was on Sulindac before  but took herself off it due to side effects and Dr. Bearden recommended Celebrex, which she's been taking ever since.  Between  to , she had numerous polyps in her stump that required flex sig every 6 months but this got better and she has settled into fewer and manageable polyps over the last couple of years.  Most recently she appeares to have had a poor prep after enemas with Dr. Gasca and required a full colon prep to visualize the length of the stump.  She was referred to our clinic for consideration of stump shortening/completion proctocolectomy.  On a repeat flexible sigmoidoscopy following colon prep, she was noted to have small polyps treated with APC.     Mrs. Duran states that she has satisfactory bowel function and no issues with control.  Her bowel movements alternate between diarrhea and semi-formed stool about 2-7x/day depending on her diet.         Impression: 52F with FAP and total abdominal colectomy 30 years ago.  She's been undergoing routine stump surveillance with flexible sigmoidoscopy since her surgery.  Recently scoped by Dr. Gasca and it was noted that her stump is > 50 cm.  Seen today for consideration of stump revision/resection.     Recommendations:   Given she has manageable polyp burden in the rectosigmoid stump and  no evidence of dysplasia, we would not recommend any resection presently.  Indications for further resection would be overwhelming polyp burden or dysplasia.  We discussed that she may require modified colon bowel prep for her future lower endoscopies.   If for reasons mentioned above she requires a resection, surgical option be a completion proctocolectomy with ileoanal J pouch, we briefly discussed this surgery today and it was clear that she prefers not to undergo any surgery right now.      Many thanks for involving us in her care, please don't hesitate to call for questions or if situation changes.         For details of past medical history, surgical history, family history, medications, allergies, and review of systems, please see details below.    Medical history:  Past Medical History:   Diagnosis Date     Asthma      Coagulation disorder (H)     factor 5     Familial adenomatous polyposis      GERD (gastroesophageal reflux disease)      Migraine      Pancreatitis, acute      PONV (postoperative nausea and vomiting)        Surgical history:  Past Surgical History:   Procedure Laterality Date     BIOPSY BREAST SEED LOCALIZATION Right 11/30/2016    Procedure: BIOPSY BREAST SEED LOCALIZATION;  Surgeon: Jessica Rasheed MD;  Location:  OR     C LAP,SURG,COLECTOMY,W/ANAST      for colon cancer     COLECTOMY      subtotal     COMBINED ESOPHAGOSCOPY, GASTROSCOPY, DUODENOSCOPY (EGD) WITH ARGON PLASMA COAGULATOR (APC) N/A 4/12/2018    Procedure: COMBINED ESOPHAGOSCOPY, GASTROSCOPY, DUODENOSCOPY (EGD) WITH ARGON PLASMA COAGULATOR (APC);  Esophagogastroduodenoscopy with polypectomy and polyp ablation;  Surgeon: Stephen Gasca MD;  Location: [facility] OR     ENDOSCOPIC RETROGRADE CHOLANGIOPANCREATOGRAPHY       ESOPHAGOSCOPY, GASTROSCOPY, DUODENOSCOPY (EGD), COMBINED  11/5/2012    Procedure: COMBINED ESOPHAGOSCOPY, GASTROSCOPY, DUODENOSCOPY (EGD), BIOPSY SINGLE OR MULTIPLE;;  Surgeon: Angélica Garcia MD;   Location: [facility] GI     ESOPHAGOSCOPY, GASTROSCOPY, DUODENOSCOPY (EGD), COMBINED  12/3/2013    Procedure: COMBINED ESOPHAGOSCOPY, GASTROSCOPY, DUODENOSCOPY (EGD);;  Surgeon: Angélica Garcia MD;  Location: [facility] GI     ESOPHAGOSCOPY, GASTROSCOPY, DUODENOSCOPY (EGD), COMBINED N/A 3/28/2018    Procedure: COMBINED ESOPHAGOSCOPY, GASTROSCOPY, DUODENOSCOPY (EGD);;  Surgeon: Stephen Gasca MD;  Location: [facility] GI     EXCISE POLYP RECTUM       LAPAROSCOPIC LYSIS ADHESIONS       SIGMOIDOSCOPY FLEXIBLE       SIGMOIDOSCOPY FLEXIBLE N/A 12/7/2015    Procedure: SIGMOIDOSCOPY FLEXIBLE;  Surgeon: Mariela Bearden MD;  Location: [facility] GI     SIGMOIDOSCOPY FLEXIBLE N/A 3/28/2018    Procedure: SIGMOIDOSCOPY FLEXIBLE;  EGD/Flex Sig;  Surgeon: Stephen Gasca MD;  Location: [facility] GI     SIGMOIDOSCOPY FLEXIBLE N/A 4/12/2018    Procedure: SIGMOIDOSCOPY FLEXIBLE;  Flexible Sigmoidoscopy with polyp ablation;  Surgeon: Stephen Gasca MD;  Location: [facility] OR       Family history:  Family History   Problem Relation Age of Onset     Hyperlipidemia Mother      CEREBROVASCULAR DISEASE Mother      CEREBROVASCULAR DISEASE Father      Colon Cancer Father      Prostate Cancer Brother        Medications:  Current Outpatient Prescriptions   Medication Sig Dispense Refill     acetaminophen (TYLENOL) 325 MG tablet Take 2 tablets (650 mg) by mouth every 4 hours as needed for other (mild pain) 100 tablet 0     albuterol 90 MCG/ACT inhaler Inhale 2 puffs into the lungs every 6 hours as needed.       benzonatate (TESSALON) 100 MG capsule        calcium-magnesium (CALMAG) 500-250 MG TABS Take 1 tablet by mouth daily       celecoxib (CELEBREX) 100 MG capsule Take 1 capsule (100 mg) by mouth 2 times daily as needed for pain 180 capsule 6     fexofenadine (ALLEGRA) 180 MG tablet Take 180 mg by mouth daily.       ibuprofen (ADVIL,MOTRIN) 600 MG tablet Take 1 tablet (600 mg) by mouth every 6 hours as needed for pain (mild) 30 tablet 0     MELATONIN PO Take  "1 mg by mouth nightly as needed       multivitamin, therapeutic with minerals (THERA-VIT-M) TABS Take 1 tablet by mouth daily.       Omega-3 Fatty Acids (OMEGA-3 FISH OIL PO) Take  by mouth.       ondansetron (ZOFRAN) 4 MG tablet Take 1 tablet (4 mg) by mouth every 8 hours as needed for nausea 18 tablet 0     pantoprazole (PROTONIX) 40 MG EC tablet Take 1 tablet (40 mg) by mouth 2 times daily Take 30-60 minutes before a meal. 60 tablet 0     pramipexole (MIRAPEX) 0.25 MG tablet Take 0.25 mg by mouth       triamcinolone (KENALOG) 0.1 % cream        triamcinolone (NASACORT) 55 MCG/ACT Inhaler Spray 2 sprays into both nostrils daily       ZOLMitriptan (ZOMIG PO) Take  by mouth at onset of headache.       Allergies:  The patientis allergic to cephalexin; cipro [ciprofloxacin]; flagyl [metronidazole]; keflex [cephalosporins]; omeprazole; prilosec otc [omeprazole magnesium]; doxycycline; erythromycin; and sulfa drugs.    Social history:  Social History   Substance Use Topics     Smoking status: Never Smoker     Smokeless tobacco: Never Used     Alcohol use No     Marital status: .    Review of Systems:  Nursing Notes:   Tashi Thorne LPN  4/24/2018  3:24 PM  Signed  Chief Complaint   Patient presents with     GI Problem     History of FAP, here to discuss screening.       Vitals:    04/24/18 1520   BP: 132/70   BP Location: Left arm   Patient Position: Chair   Cuff Size: Adult Regular   Pulse: 50   Temp: 97.7  F (36.5  C)   TempSrc: Oral   SpO2: 100%   Weight: 148 lb 12.8 oz   Height: 5' 2.5\"       Body mass index is 26.78 kg/(m^2).    TAYLOR Asencio MD   Colon and Rectal Surgery Fellow  [facility]      The patient was seen & reviewed with Dr. Hernandez.    I saw and examined the patient, led the discussion and edited the resident note.  I agree with the assessment and plan as outlined.  In brief, Sarah is currently asymptomatic and she prefers to maintain the status quo of " her ileosigmoid anastomosis.  She understands that she will need a more aggressive oral bowel prep in order to provide an adequate view of her remaining large bowel.  She prefers to continue her surveillance with Dr. Paulino so she can do her upper and lower GI scopes at the same time.  We discussed the option of converting her to an ileal rectal anastomosis, which takes care of some but not all of her problem of cancer risk.  I reemphasized the fact that if her polyps become unmanageable that she will need to proceed with surgery.  If she develops innumerable rectal polyps, she would need to decide about a proctocolectomy with ileostomy versus a J-pouch reconstruction.  We discussed her Celebrex use.  She had GI problems with sulindac, so I think this is a reasonable option, though I pointed out to her that there is some increased risk of cardiovascular events associated with Celebrex use.  Sarah has never had a thyroid ultrasound, and she should have thyroid screening based upon her FAP.  We placed a referral to the Cancer Risk Management Program so that her ongoing surveillance can be managed by Clarissa FLAHERTY.  Sarah has adopted but no biologic children.    I answered all of Sarah's questions to her stated satisfaction.  She expressed her understanding and agreement with the proposed plan.    Total time 25 minutes.  Greater than half the time was spent counseling.      Referring Provider:  Stephen Gasca MD  [address]  [address] [place] [address]     Primary Care Provider:  Ida Sandoval, thank you for allowing me to participate in the care of your patient.      Sincerely,    Zbigniew Hernandez MD

## 2018-05-03 ENCOUNTER — ONCOLOGY VISIT (OUTPATIENT)
Dept: ONCOLOGY | Facility: CLINIC | Age: 53
End: 2018-05-03
Attending: CLINICAL NURSE SPECIALIST
Payer: COMMERCIAL

## 2018-05-03 VITALS
BODY MASS INDEX: 26.42 KG/M2 | HEART RATE: 66 BPM | DIASTOLIC BLOOD PRESSURE: 71 MMHG | RESPIRATION RATE: 16 BRPM | WEIGHT: 146.8 LBS | SYSTOLIC BLOOD PRESSURE: 109 MMHG | OXYGEN SATURATION: 98 % | TEMPERATURE: 97.5 F

## 2018-05-03 DIAGNOSIS — N60.91 ATYPICAL DUCTAL HYPERPLASIA OF RIGHT BREAST: Primary | ICD-10-CM

## 2018-05-03 DIAGNOSIS — Z12.31 ENCOUNTER FOR SCREENING MAMMOGRAM FOR HIGH-RISK PATIENT: ICD-10-CM

## 2018-05-03 DIAGNOSIS — Z12.39 ENCOUNTER FOR BREAST CANCER SCREENING OTHER THAN MAMMOGRAM: ICD-10-CM

## 2018-05-03 DIAGNOSIS — D13.91 FAP (FAMILIAL ADENOMATOUS POLYPOSIS): ICD-10-CM

## 2018-05-03 PROCEDURE — G0463 HOSPITAL OUTPT CLINIC VISIT: HCPCS

## 2018-05-03 PROCEDURE — 99205 OFFICE O/P NEW HI 60 MIN: CPT | Performed by: CLINICAL NURSE SPECIALIST

## 2018-05-03 RX ORDER — FEXOFENADINE HCL 60 MG/1
TABLET, FILM COATED ORAL
COMMUNITY
End: 2019-03-05

## 2018-05-03 RX ORDER — TRIAMCINOLONE ACETONIDE 55 UG/1
2 SPRAY, METERED NASAL
COMMUNITY
Start: 2016-08-24 | End: 2019-03-05

## 2018-05-03 RX ORDER — SULINDAC 200 MG/1
200 TABLET ORAL 2 TIMES DAILY WITH MEALS
Qty: 180 TABLET | Refills: 11 | Status: SHIPPED | OUTPATIENT
Start: 2018-05-03 | End: 2018-07-05

## 2018-05-03 RX ORDER — CELECOXIB 100 MG/1
100 CAPSULE ORAL
COMMUNITY
Start: 2014-04-22 | End: 2018-05-03 | Stop reason: ALTCHOICE

## 2018-05-03 ASSESSMENT — PAIN SCALES - GENERAL: PAINLEVEL: NO PAIN (0)

## 2018-05-03 NOTE — PROGRESS NOTES
"Oncology Rooming Note    May 3, 2018 11:07 AM   Sarah Duran is a 52 year old female who presents for:    Chief Complaint   Patient presents with     Oncology Clinic Visit     Benign neoplasm of colon     Initial Vitals: /71 (BP Location: Left arm, Patient Position: Sitting, Cuff Size: Adult Regular)  Pulse 66  Temp 97.5  F (36.4  C) (Oral)  Resp 16  Wt 66.6 kg (146 lb 12.8 oz)  LMP 04/07/2018  SpO2 98%  BMI 26.42 kg/m2 Estimated body mass index is 26.42 kg/(m^2) as calculated from the following:    Height as of 4/24/18: 1.588 m (5' 2.5\").    Weight as of this encounter: 66.6 kg (146 lb 12.8 oz). Body surface area is 1.71 meters squared.  No Pain (0) Comment: Data Unavailable   Patient's last menstrual period was 04/07/2018.  Allergies reviewed: Yes  Medications reviewed: Yes    Medications: Medication refills not needed today.  Pharmacy name entered into [facility]:    [facility] - [place], [place] - [address], [address]  [facility] USE FOR MAILING ONLY - [place], [place]    Clinical concerns: no      5 minutes for nursing intake (face to face time)        Ema Delgado MA            "

## 2018-05-03 NOTE — PROGRESS NOTES
Oncology Risk Management Consultation:  Date on this visit: 5/3/2018    Sarah Duran  is referred by Dr. Jessica Rasheed for an oncology risk management consultation. She requires evaluation for her risk of cancer secondary to having a personal history of atypical ductal hyperplasia. She also has a personal history of colonic polyposis and a family history of colon and prostate cancers.     Her history of atypical ductal hyperplasia (ADH) confers approximately a 30% lifetime  risk for breast cancer.       Primary Physician: Ida Sandoval MD    History Of Present Illness:  Ms. Duran is a 52 year old female who presents with a history of ADH in the right breast in 2016; she is s/p right excisional breast biopsy on 11/28/2016.    Pertinent history:  Colonic polyposis, s/p ileal rectal anastomosis in May 1988.   Subsequent laparotomy in 1990s for bowel obstruction from adhesions.  Previously on sulindac 150 mg BID to suppress rectal polyps.  Changed to Celebrex 100 mg BID on 8/24/2012     Followed for familial adenomatous polyposis with annual upper endoscopy and biannual flexible sigmoidoscopy screenings.     Genetic testing:  None to date. States that she discussed it while doing IVF about 17 years ago, but did  have testing done.    Pertinent history (see chart for further details)  11/7/2005 - Small bowel enteroscopy, one tubular adenoma removed from partial anal verge.  12/7/2005 - adenomatous polyp removed from rectum  2/28/2005 - tubular adenoma, duodenum.  11/16/2011 - fundic gland polyps in stomach, tubular adenoma removed from rectum.  11/5/2012 - 1 tubulovillous adenoma, rectum  7/8/2013 - 1 tubulovillous adenoma, rectum  12/3/2013 - 1 tubulovillous adenoma, rectum  7/21/2014 - 1 tubular adenoma and hyperplastic polyp, rectum  1/20/2015 - 1 tubular adenoma and hyperplastic polyp, rectum  12/7/2015 - 1 tubular adenoma, rectum  6/6/2016 - 1 fundic gland polyp with focal adenomatous changes  3/20/2017 - 2  "tubular adenomas removed from rectum  4/12/2017 - fundic gland gastric polyp with focal low grade dysplasia  10/6/2016 - right breast lumpectomy with seed localization, fibrocystic changes with ADH.    Review of Systems:  GENERAL: No change in weight, sleep or appetite.  Normal energy.  No fever or chills  EYES: Negative for vision changes or eye problems  ENT: No problems with ears, nose or throat.  No difficulty swallowing.  RESP: No coughing, wheezing or shortness of breath  CV: No chest pains or palpitations  GI: No nausea, vomiting,  heartburn, abdominal pain, diarrhea, constipation or change in bowel habits  : No urinary frequency or dysuria, bladder or kidney problems  MUSCULOSKELETAL: No significant muscle or joint pains  NEUROLOGIC: No headaches, numbness, tingling, weakness, problems with balance or coordination  PSYCHIATRIC: No problems with anxiety, depression or mental health  HEME/IMMUNE/ALLERGY: No history of bleeding or clotting problems or anemia.  No allergies or immune system problems  ENDOCRINE: No history of thyroid disease, diabetes or other endocrine disorders  Skin: POSITIVE for round, hard lump on sternum, about 1.5cm in diameter; patient states she believes it is a cyst, reviewed by Dr. Rasheed previously but that it is now \"bigger\" and she wants to have it assessed by Derm. States \"I got pus out of it before but now it's hard.\"    She denies new fatigue, breast pain, lumps, masses, thickenings, nipple discharge, nipple inversion and changes in symmetry, skin and texture of her breasts.    Past Medical/Surgical History:  Past Medical History:   Diagnosis Date     Asthma      Atypical ductal hyperplasia of right breast 10/2016    fibrocystic changes, PASH and atypical ductal hyperplasia on needle biopsy, s/p R excisional biopsy with radioactive seed localization     Coagulation disorder (H)     factor 5     Familial adenomatous polyposis      GERD (gastroesophageal reflux disease)      " Migraine      Pancreatitis, acute      PONV (postoperative nausea and vomiting)      Past Surgical History:   Procedure Laterality Date     BIOPSY BREAST SEED LOCALIZATION Right 11/30/2016    Procedure: BIOPSY BREAST SEED LOCALIZATION;  Surgeon: Jessica Rasheed MD;  Location: [facility] OR     C LAP,SURG,COLECTOMY,W/ANAST      for colon cancer     COLECTOMY      subtotal     COMBINED ESOPHAGOSCOPY, GASTROSCOPY, DUODENOSCOPY (EGD) WITH ARGON PLASMA COAGULATOR (APC) N/A 4/12/2018    Procedure: COMBINED ESOPHAGOSCOPY, GASTROSCOPY, DUODENOSCOPY (EGD) WITH ARGON PLASMA COAGULATOR (APC);  Esophagogastroduodenoscopy with polypectomy and polyp ablation;  Surgeon: Stephen Gasca MD;  Location: [facility] OR     ENDOSCOPIC RETROGRADE CHOLANGIOPANCREATOGRAPHY       ESOPHAGOSCOPY, GASTROSCOPY, DUODENOSCOPY (EGD), COMBINED  11/5/2012    Procedure: COMBINED ESOPHAGOSCOPY, GASTROSCOPY, DUODENOSCOPY (EGD), BIOPSY SINGLE OR MULTIPLE;;  Surgeon: Angélica Garcia MD;  Location: [facility]     ESOPHAGOSCOPY, GASTROSCOPY, DUODENOSCOPY (EGD), COMBINED  12/3/2013    Procedure: COMBINED ESOPHAGOSCOPY, GASTROSCOPY, DUODENOSCOPY (EGD);;  Surgeon: Angélica Garcia MD;  Location: [facility]     ESOPHAGOSCOPY, GASTROSCOPY, DUODENOSCOPY (EGD), COMBINED N/A 3/28/2018    Procedure: COMBINED ESOPHAGOSCOPY, GASTROSCOPY, DUODENOSCOPY (EGD);;  Surgeon: Stephen Gasca MD;  Location: [facility]     EXCISE POLYP RECTUM       LAPAROSCOPIC LYSIS ADHESIONS       SIGMOIDOSCOPY FLEXIBLE       SIGMOIDOSCOPY FLEXIBLE N/A 12/7/2015    Procedure: SIGMOIDOSCOPY FLEXIBLE;  Surgeon: Mariela Bearden MD;  Location: [facility]     SIGMOIDOSCOPY FLEXIBLE N/A 3/28/2018    Procedure: SIGMOIDOSCOPY FLEXIBLE;  EGD/Flex Sig;  Surgeon: Stephen Gasca MD;  Location: [facility]     SIGMOIDOSCOPY FLEXIBLE N/A 4/12/2018    Procedure: SIGMOIDOSCOPY FLEXIBLE;  Flexible Sigmoidoscopy with polyp ablation;  Surgeon: Stephen Gasca MD;  Location: [facility]       Allergies:  Allergies as  of 05/03/2018 - Roberto as Reviewed 04/24/2018   Allergen Reaction Noted     Cephalexin  06/14/2004     Cipro [ciprofloxacin]  02/24/2005     Flagyl [metronidazole] Nausea and Vomiting 02/24/2005     Keflex [cephalosporins] Nausea and Vomiting 02/24/2005     Omeprazole  04/29/2010     Prilosec otc [omeprazole magnesium]  01/28/2012     Doxycycline Rash 04/12/2018     Erythromycin Rash 02/24/2005     Sulfa drugs Rash 02/24/2005       Current Medications:  Current Outpatient Prescriptions   Medication Sig Dispense Refill     acetaminophen (TYLENOL) 325 MG tablet Take 2 tablets (650 mg) by mouth every 4 hours as needed for other (mild pain) 100 tablet 0     albuterol 90 MCG/ACT inhaler Inhale 2 puffs into the lungs every 6 hours as needed.       benzonatate (TESSALON) 100 MG capsule        calcium-magnesium (CALMAG) 500-250 MG TABS Take 1 tablet by mouth daily       celecoxib (CELEBREX) 100 MG capsule Take 1 capsule (100 mg) by mouth 2 times daily as needed for pain 180 capsule 6     fexofenadine (ALLEGRA) 180 MG tablet Take 180 mg by mouth daily.       ibuprofen (ADVIL,MOTRIN) 600 MG tablet Take 1 tablet (600 mg) by mouth every 6 hours as needed for pain (mild) 30 tablet 0     MELATONIN PO Take 1 mg by mouth nightly as needed       multivitamin, therapeutic with minerals (THERA-VIT-M) TABS Take 1 tablet by mouth daily.       Omega-3 Fatty Acids (OMEGA-3 FISH OIL PO) Take  by mouth.       ondansetron (ZOFRAN) 4 MG tablet Take 1 tablet (4 mg) by mouth every 8 hours as needed for nausea 18 tablet 0     pantoprazole (PROTONIX) 40 MG EC tablet Take 1 tablet (40 mg) by mouth 2 times daily Take 30-60 minutes before a meal. 60 tablet 0     pramipexole (MIRAPEX) 0.25 MG tablet Take 0.25 mg by mouth       triamcinolone (KENALOG) 0.1 % cream        triamcinolone (NASACORT) 55 MCG/ACT Inhaler Spray 2 sprays into both nostrils daily       ZOLMitriptan (ZOMIG PO) Take  by mouth at onset of headache.          Family History:  Family  History   Problem Relation Age of Onset     Hyperlipidemia Mother      CEREBROVASCULAR DISEASE Mother      CEREBROVASCULAR DISEASE Father      Colon Cancer Father 57      at 63     Prostate Cancer Brother 53     Prostate Cancer Maternal Uncle 65       Social History:  Social History     Social History     Marital status:      Spouse name: N/A     Number of children: 2     Years of education: N/A     Occupational History      [employer]     Social History Main Topics     Smoking status: Never Smoker     Smokeless tobacco: Never Used     Alcohol use No     Drug use: No     Sexual activity: Not on file      Comment: denies pregnancy     Other Topics Concern     Not on file     Social History Narrative    Two adopted daughters, ages 13 and 15       Physical Exam:  LMP 2018    GENERAL APPEARANCE: healthy, alert and no distress     NECK: no adenopathy, no asymmetry or masses     LYMPHATICS: No cervical, supraclavicular or axillary lymphadenopathy     RESP: lungs clear to auscultation - no rales, rhonchi or wheezes     BREAST: A multi positional, bilateral breast exam was performed.  Fairly symmetrical -Rss>L. Right breast: no palpable dominant masses, no nipple discharge, no skin changes. Fibrocystic changes at 4 o'clock.  Right axilla: no palpable adenopathy. Left breast: no palpable dominant masses, no nipple discharge, no skin changes. Left axilla: no palpable adenopathy. Fibrocystic changes throughout tissue.   CARDIOVASCULAR: regular rate and rhythm, normal S1 S2, no S3 or S4 and no murmur.      ABDOMEN:  soft, nontender       SKIN: Round, firm hard lump on sternum, about 1.5cm in diameter, beneath skin, nontender, no erythema. No suspicious lesions or rashes    Laboratory/Imaging Studies  Results for orders placed or performed during the hospital encounter of 18   UPPER GI ENDOSCOPY   Result Value Ref Range    Upper GI Endoscopy       [facility]  [address] [place] [address] [phone]     Endoscopy Department  _______________________________________________________________________________  Patient Name: Sarah Duran            Procedure Date: 4/12/2018 8:48 AM  MRN: [id number]                       Account Number: [id number]  YOB: 1965              Admit Type: Outpatient  Age: 52                               Room: OR  Gender: Female                        Note Status: Finalized  Attending MD: Stephen Gasca MD   Pause for the Cause: completed  Total Sedation Time:                    _______________________________________________________________________________     Procedure:           Upper GI endoscopy  Indications:         History of FAP, 52 year old female with FAP s/p subtotal                        colectomy with ileocolonic anastomosis, s/p ampulectomy,                        and h/o 30mm gland polyp with focal adenomatous change                        on EGD  in 2016. Patient here for surveillance                        EGD/Flexible sigmoidoscopy. Did not tolerate moderate                        sedation in J-unit and had a poor prep so reschedule in                        the OR with a full bowel prep.  Providers:           Stephen Gasca MD, Marciano Gonzalez MD  Referring MD:          Requesting Provider: Pancho Samson MD  Medicines:           Monitored Anesthesia Care  Complications:       No immediate complications. Estimated blood loss:                        Minimal.  _______________________________________________________________________________  Procedure:           Pre-Anesthesia Assessment:                       - Prior to the procedure, a History and Physical was                        performed, and patient medications and allergies were                        reviewed. The patient is competent. The risks and                        benefits of the procedure and the sedation options and                        risks  were d iscussed with the patient. All questions                        were answered and informed consent was obtained. Patient                        identification and proposed procedure were verified by                        the physician, the nurse, the anesthesiologist and the                        anesthetist in the procedure room. Mental Status                        Examination: alert and oriented. Airway Examination:                        normal oropharyngeal airway and neck mobility.                        Respiratory Examination: clear to auscultation. CV                        Examination: normal. Prophylactic Antibiotics: The                        patient does not require prophylactic antibiotics. Prior                        Anticoagulants: The patient has taken no previous                        anticoagulant or antiplatelet agents. ASA Grade                        Assessment: III - A patient with severe systemic                        disease. After reviewing the risks and  benefits, the                        patient was deemed in satisfactory condition to undergo                        the procedure. The anesthesia plan was to use moderate                        sedation / analgesia (conscious sedation). Immediately                        prior to administration of medications, the patient was                        re-assessed for adequacy to receive sedatives. The heart                        rate, respiratory rate, oxygen saturations, blood                        pressure, adequacy of pulmonary ventilation, and                        response to care were monitored throughout the                        procedure. The physical status of the patient was                        re-assessed after the procedure.                       After obtaining informed consent, the endoscope was                        passed under direct vision. Throughout the procedure,                        the patient's  blood pressure, pulse, and oxygen                        sa turations were monitored continuously. The                        Duodenoscope was introduced through the mouth, and                        advanced to the second part of duodenum. After obtaining                        informed consent, the endoscope was passed under direct                        vision. Throughout the procedure, the patient's blood                        pressure, pulse, and oxygen saturations were monitored                        continuously.The upper GI endoscopy was accomplished                        without difficulty. The patient tolerated the procedure                        well. The Endoscope was introduced through the mouth,                        and advanced to the second part of duodenum.                                                                                   Findings:       The examined esophagus was normal.       Multiple pedunculated and sessile polyps with no stigmata of recent        bleeding were found in the cardia, in the gastric fundu s and in the        gastric body consistent with hyperplastic polyps.       A single 20 mm sessile polyp was found in the gastric body with a        different pit pattern and was larger than the rest of the polyps. The        polyp was removed with a hot snare. Resection and retrieval were        complete. Verification of patient identification for the specimen was        done by the physician and nurse using the patient's name, birth date and        medical record number. Estimated blood loss was minimal.       Multiple small sessile polyps were found in the first portion of the        duodenum and in the second portion of the duodenum with the bulb mostly        spared. Fulguration to ablate the lesion by argon plasma at 0.3        liters/minute and 20 molina was successful. Estimated blood loss was        minimal.       A side viewing duodenoscope was advanced to the major  papilla. Evidence        of a prio papillectomy. There is no endoscopic evidence of mucosal        abnormalities  in the area of the papilla.                                                                                   Impression:          - Normal esophagus.                       - Multiple gastric fundic gland/hyperplastic polyps.                       - A single larger gastric polyp in the body with altered                        pit pattern was seen. Resected and retrieved with hot                        snare.                       - Multiple duodenal polyps. Treated with argon plasma                        coagulation (APC).                       - Major papilla with evidence of a prior papillotomy. No                        evidence of periampullary polypoid tissue.  Recommendation:      - Await pathology results.                       - Start pantoprazole 40 mg twice daily for 1 month for                        bleeding prophylaxis after gastric polyp resection.                       - Proceed with a flexible sigmoidoscopy for surveillance                        today. See nitin prince.                       - Repeat upper endoscopy/flexible sigmoidoscopy in 1                        year for surveillance. Will need Monitored Anesthesia                        Care for future endoscopies.                                                                                     Electronically signed by Dr. See Gasca  ____________________  Stephen Gasca MD  4/17/2018 5:42:06 PM  I was physically present for the entire viewing portion of the exam.  __________________________  Signature of teaching physician  NIKOc/Gail Gasca MD    ________________  Marciano Gonzalez MD  Number of Addenda: 0    Note Initiated On: 4/12/2018 8:48 AM  Scope In:  Scope Out:     COLONOSCOPY   Result Value Ref Range    COLONOSCOPY       [address] [place] [address] [phone]      Endoscopy Department  _______________________________________________________________________________  Patient Name: Sarah Duran            Procedure Date: 4/12/2018 8:52 AM  MRN: [id number]                       Account Number: [id number]  YOB: 1965              Admit Type: Outpatient  Age: 52                               Room: OR  Gender: Female                        Note Status: Finalized  Attending MD: Stephen Gasca MD   Pause for the Cause: completed  Total Sedation Time:                    _______________________________________________________________________________     Procedure:           Colonoscopy  Indications:         High risk colon cancer surveillance: Personal history of                        familial adenomatous polyposis, 52 year old female with                        FAP s/p subtotal colectomy with ileocolonic anastomosis,                        s/p ampulecto my, and h/o 30mm gland polyp with focal                        adenomatous change on EGD in 2016. Patient here for                        surveillance EGD/Flexible sigmoidoscopy. Did not                        tolerate moderate sedation in J-unit and had a poor prep                        so reschedule in the OR with a full bowel prep.  Providers:           Stephen Gasca MD, Marciano Gonzalez MD  Referring MD:          Requesting Provider: Pancho Samson MD  Medicines:           Monitored Anesthesia Care  Complications:       No immediate complications. Estimated blood loss:                        Minimal.  _______________________________________________________________________________  Procedure:           Pre-Anesthesia Assessment:                       - Prior to the procedure, a History and Physical was                        performed, and patient medications and allergies were                        reviewed. The patient is competent. The risks and                        b enefits of the procedure and  the sedation options and                        risks were discussed with the patient. All questions                        were answered and informed consent was obtained. Patient                        identification and proposed procedure were verified by                        the physician, the nurse, the anesthesiologist and the                        anesthetist in the procedure room. Mental Status                        Examination: alert and oriented. Airway Examination:                        normal oropharyngeal airway and neck mobility.                        Respiratory Examination: clear to auscultation. CV                        Examination: normal. Prophylactic Antibiotics: The                        patient does not require prophylactic antibiotics. Prior                        Anticoagulants: The patient has taken no previous                        anticoagulant or antiplatelet agents. ASA Grade                        Assessment: III - A p atient with severe systemic                        disease. After reviewing the risks and benefits, the                        patient was deemed in satisfactory condition to undergo                        the procedure. The anesthesia plan was to use moderate                        sedation / analgesia (conscious sedation). Immediately                        prior to administration of medications, the patient was                        re-assessed for adequacy to receive sedatives. The heart                        rate, respiratory rate, oxygen saturations, blood                        pressure, adequacy of pulmonary ventilation, and                        response to care were monitored throughout the                        procedure. The physical status of the patient was                        re-assessed after the procedure.                       After obtaining informed consent, the colonoscope was                        passed under direct vision.  Throughout the procedure,                         the patient's blood pressure, pulse, and oxygen                        saturations were monitored continuously. The Endoscope                        was introduced through the anus and advanced to the                        ileocolonic anastomosis. The colonoscopy was performed                        without difficulty. The patient tolerated the procedure                        well. The quality of the bowel preparation was adequate.                                                                                   Findings:       The perianal and digital rectal examinations were normal. Pertinent        negatives include no palpable rectal lesions.       There was evidence of a prior end-to-end ileo-colonic anastomosis at 25        cm proximal to the anus. This was patent and was characterized by        healthy appearing mucosa. The anastomosis was traversed.       The terminal ileum appeared normal.       Multiple sessile polyps were found in the recto-sigmoid colon.  The        polyps were small in size. Fulguration to ablate the lesion by argon        plasma at 0.3 liters/minute and 20 molina was successful.       The retroflexed view of the distal rectum and anal verge was normal and        showed no anal or rectal abnormalities.                                                                                   Impression:          - Patent end-to-end ileo-colonic anastomosis,                        characterized by healthy appearing mucosa at 25 cm from                        the anus.                       - The examined portion of the ileum was normal.                       - Multiple small polyps at the recto-sigmoid colon.                        Treated with argon plasma coagulation (APC).                       - The distal rectum and anal verge are normal on                        retroflexion view.                       - No specimens  "collected.  Recommendation:      - Discharge patient to home (ambulatory).                       - See EGD  report for other recommendations.                       - Repeat EGD/Flexible sigmoidoscopy in 1 year for FAP                        surveillance. Will require Monitored Anesthesia Care and                        a full bowel prep.                                                                                     Electronically signed by Dr. See Huang  ____________________  Janes Huang MD  4/17/2018 5:45:32 PM  I was physically present for the entire viewing portion of the exam.  __________________________  Signature of teaching physician  Reagan/Gail Huang MD    ________________  Marciano Gonzalez MD  Number of Addenda: 0    Note Initiated On: 4/12/2018 8:52 AM  Scope In:  Scope Out:     Hemoglobin   Result Value Ref Range    Hemoglobin 13.7 11.7 - 15.7 g/dL   Glucose by meter   Result Value Ref Range    Glucose 119 (H) 70 - 99 mg/dL   Surgical pathology exam   Result Value Ref Range    Copath Report       Patient Name: TRE HEDRICK  MR#: [id number]  Specimen #: [id number]  Collected: 4/12/2018  Received: 4/12/2018  Reported: 4/16/2018 15:13  Ordering Phy(s): JANES HUANG    For improved result formatting, select 'View Enhanced Report Format' under   Linked Documents section.    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    Electronically signed out by:    Georgiana Alford M.D., PhD, Physicians    CLINICAL HISTORY:  Familial adenomatous polyposis  GROSS:  The specimen is received in formalin with proper patient identification,   labeled \"gastric polyp\".  The  specimen consists of a 1.6 x 1.5 x 1.5 cm pink-tan bosselated " polyp.  The   presumed resection margin is inked  black, and the specimen is serially section ed and entirely submitted in   cassettes A1-A3. (Dictated by: Karin Perez 4/12/2018 05:05 PM)    MICROSCOPIC:  Microscopic examination was performed.    CPT Codes:  A: 22606-JQ1    TESTING LAB LOCATION:  [facility], [address]   [id number]  [phone]    COLLECTION SITE:  Client: [facility]  Location: [facility] (B)           ASSESSMENT    Atypical hyperplasias (ADH and ALH), especially multifocal lesions, confer a substantial increase in the risk of subsequent breast cancer (relative risk [RR] 3.7 to 5.3)  AH is associated with an increased risk of both ipsilateral and contralateral breast cancer and thus provides evidence of underlying breast abnormalities that predispose to breast cancer. In a report from the Nurses' Health Study, only 56 percent of cancers that developed in women with AH occurred in the ipsilateral breast. The cumulative incidence of breast cancer over 30 years approached 35 percent. (Jass et al. 2007. Magnitude and laterality of breast cancer risk according to histological type of atypical hyperplasia: results from the Nurses Health Study. Cancer. 109:180.)    We discussed signs and symptoms to be watchful for and she practiced palpating with the clinic's breast simulation model. She verbalized understanding of signs and symptoms to address with her physicians (vaginal bleeding, unresolved bloating, pain, tenderness, early satiety) and lumps, thickening, swelling, tenderness, nipple discharge or changes in skin of breasts.    We also discussed following  a healthy lifestyle plan recommended by both NCCN and the American Cancer Society that can reduce the risk of breast cancer. She agreed to the following recommendations:  1 Limit alcohol consumption to less than 1 drink per day  (1 drink=5 oz.wine, 12 oz. Beer or 1.5 oz. 80-proof liquor).  2. Exercise per American Cancer Society guidelines of at least 150 minutes of moderate-intensity activity or 75 minutes of vigorous activity each week. (Or a combination of both.) Exercise should be spread  out over the week, at least 5 days per week.  3. Maintain a healthy weight with a Body Mass Index between 19-24.9.  4. Do not use tobacco products and limit exposure to passive smoke.    Sarah is also under care for familial adenomatous polyposis and we reviewed the screening plan recommended for that syndrome. Because she has not had genetic testing, I am referring her back to Treasure [facility], Licensed Genetic Counselor, to follow up on this.  We can defer extracolonic screening for FAP until our next visit.    We also reviewed her continued rectal polyps and the reasons why she changed from sulindac (dietary issues and stomach upset) to Celebrex to help with rectal polyp regression. She is amenable to switching back to sulindac so I have ordered that for her today and discontinued the Celebrex.     We also discussed anastrozole as chemoprevention for breast cancer; I have consulted with Dr. Daly Levin on this and she is in agreement that this would be safer option for her and could potentially reduce her risk for breast cancer by 50%, per the KENDRA-II clinical trial results. She was given printed information on this today and will discuss it with her mother, who is a pharmacist.  Because she has Factor V Leiden, we would recommend anastrozole off label, per ASCO recommendations for breast cancer risk reduction.    I will be happy to work with her to manage her cancer risk, will follow up on her screenings and see her in the Cancer Risk Management clinic in six months.    INDIVIDUALIZED CANCER RISK MANAGEMENT PLAN:  Individualized Surveillance Plan for women  With 20% or greater lifetime risk of breast cancer   Per NCCN Breast Cancer Screening and  Diagnosis Guidelines Version 1.2017    Recommended screening Test or procedure Last done Next Scheduled    Clinical encounter Ongoing risk assessment, risk reduction counseling and clinical breast exam, every 6-12 months.   5/3/2018   Nov.  2018   However, some family histories with breast cancers at a very young age, may warrant screening starting earlier.    *May begin at age 40 if breast cancers in the family occur at later ages.    Annual mammogram beginning 10 years younger than the earliest breast cancer in the family but not prior to age 30.    Recommend annual breast MRI to begin 10 years younger than the earliest breast cancer in the family but not prior to age 25.    Breast MRIs are preferably done on day 7-15 of the menstrual cycle in premenopausal women. 4/24/2017 - R mammogram post excisional biopsy for ADH, [email or web address]    No history of breast MRI NEXT: Breast MRI soon.    Next exam: Nov. 2018 followed by tomosynthesis mammogram   Breast screening for patients at high risk due to thoracic radiation between the ages of 10-30     Begin 8-10 years post radiation treatment or age 25.   Annual mammogram   and  Annual breast MRI, preferably on day 7-15 of menstrual cycle for premenopausal women.    Annual clinical breast exams with ongoing risk assessment and risk reduction counseling until age 25, then every 6-12 months.         I spent 60 minutes with the patient with greater that 50% of it in counseling and coordinating care as documented above.    Clarissa Brown, REYNOLD-CNS, OCN, ANG-BC  Clinical Nurse Specialist  Cancer Risk Management Program  [address]  [place] [address]    phone:  [phone]  Pager: [phone]  fax: [phone]    Cc:  MD Treasure Isabel, MD Pancho Kirkland MD

## 2018-05-03 NOTE — PATIENT INSTRUCTIONS
Individualized Surveillance Plan for women  With 20% or greater lifetime risk of breast cancer   Per NCCN Breast Cancer Screening and Diagnosis Guidelines Version 1.2017    Recommended screening Test or procedure Last done Next Scheduled    Clinical encounter Ongoing risk assessment, risk reduction counseling and clinical breast exam, every 6-12 months.   5/3/2018   Nov. 2018   However, some family histories with breast cancers at a very young age, may warrant screening starting earlier.    *May begin at age 40 if breast cancers in the family occur at later ages.    Annual mammogram beginning 10 years younger than the earliest breast cancer in the family but not prior to age 30.    Recommend annual breast MRI to begin 10 years younger than the earliest breast cancer in the family but not prior to age 25.    Breast MRIs are preferably done on day 7-15 of the menstrual cycle in premenopausal women. 4/24/2017 - R mammogram post excisional biopsy for ADH, [email or web address]    No history of breast MRI NEXT: Breast MRI soon.    Next exam: Nov. 2018 followed by tomosynthesis mammogram   Breast screening for patients at high risk due to thoracic radiation between the ages of 10-30     Begin 8-10 years post radiation treatment or age 25.   Annual mammogram   and  Annual breast MRI, preferably on day 7-15 of menstrual cycle for premenopausal women.    Annual clinical breast exams with ongoing risk assessment and risk reduction counseling until age 25, then every 6-12 months.         What Are Benign Breast Conditions?  Most breast conditions are not cancer (benign), causing no serious harm to you. But all women are at some risk for breast cancer. Your risk increases as you grow older.  Common benign breast changes are covered here. Having one of these conditions does not put you at a higher risk for breast cancer. But all breast changes should be checked by a healthcare provider to know what the change is and whether it should be  treated.   Breast infection  Infections of the breast tissue can cause skin redness, warmth, and pain or tenderness. The most common infection is mastitis. This inflammation of the mammary glands can happen during breastfeeding. Mastitis and other breast infections are often treated with antibiotics.  Nipple discharge  Many women can squeeze a tiny amount of a clear or milky discharge from one or both nipples. This discharge may be normal. Other kinds of discharge may be symptoms of a breast condition. A dark or bloody discharge can be caused by a benign growth in a duct near the nipple (an intraductal papilloma). It should be checked by your healthcare provider.    Fibrocystic changes  These benign changes may cause a thickening in the breasts. Breasts may be tender or painful. They may feel more dense in some areas than in others.  These benign changes may cause a thickening and firmness in the breasts. Breasts may be tender or painful. They may feel more dense in some areas than in others. Sometimes solid or fluid-filled lumps (cysts) may also form. Cysts may be smooth, soft or firm, and tender. They may become larger and more tender right before your period. An ultrasound may be done to make sure that a lump is a fluid-filled cyst and not cancer.    Benign breast lumps  Benign breast lumps come in all shapes, textures, and sizes. A lump of fibrous tissue (fibroadenoma) may be smooth, firm, and rubbery. This type of lump is usually painless and movable. Have any lump checked by your healthcare provider.  Date Last Reviewed: 9/1/2017 2000-2017 [employer]. [address] [place] [address], [place] [address]. All rights reserved. This information is not intended as a substitute for professional medical care. Always follow your healthcare professional's instructions.        Magnetic Resonance Imaging (MRI) of the Breast  Magnetic resonance imaging (MRI) of the breast is an imaging test that uses strong  magnets and radio waves to form pictures of the inside of the breast. It also creates images of the tissues that surround the breast. Breast MRI is used to check for problems, such as a leaking breast implant or a suspicious lump or mass. It can also be used to help determine if breast cancer is present and aid in diagnosis and management. Most MRI tests take 30 to 60 minutes. Depending on the type of MRI you are having, the test may take longer. Give yourself extra time to check in.      During a breast MRI, you lie face down on a platform that slides into a tubelike machine called a scanner.   Before your test    Follow any directions you are given for taking medicines and for not eating or drinking before the test.    Follow your normal daily routine unless your provider tells you otherwise.    You ll be asked to remove your watch, hearing aids, credit cards, pens, eyeglasses, and other metal objects.    You may be asked to remove your makeup. Makeup may contain some metal.  MRI uses strong magnets. Metal is affected by magnets and can distort the image. The magnet used in MRI can cause metal objects in your body to move. If you have a metal implant, you may not be able to have an MRI unless the implant is certified as MRI safe. People with these implants should not have an MRI:    Ear (cochlear) implants    Certain clips used for brain aneurysms    Certain metal coils put in blood vessels    Most defibrillators    Most pacemakers  The radiologist or technologist may ask you if you:    Have had stereotactic breast biopsy    Have a pacemaker or implanted cardiac defibrillator    Have an artificial body part (prosthesis)    Have metal rods, screws, plates, or splinters in your body    Wear a medicated adhesive patch    Have tattoos or body piercings. Some tattoo inks contain metal.    Have implanted nerve stimulators or drug-infusion ports    Work with metal    Have braces    Have a bullet or other metal in your  body  Tell the radiologist or technologist if you:    Are allergic to any medicines    Are pregnant or think you may be pregnant    Are afraid of small, enclosed spaces (claustrophobic)    Have any serious health problems (If you have kidney disease or a liver transplant  you may not be able to have the contrast material used for MRI)    Have had previous surgeries    Are breastfeeding   During your test    You may be asked to wear a hospital gown.    You may be given earplugs to wear if you desire.    You may be injected with contrast through an intravenous (IV) line in your hand or arm. This is a special dye that makes the MRI image sharp that may be needed depending on the reason for your exam.    You ll lie on a platform that slides into a tube-like machine called a scanner. You ll be on your stomach with your breasts placed through openings in the platform.    Remain as still as you can while the camera takes the pictures. This will ensure the best images.  After your test    You can get back to normal activities right away.    If you were given contrast, it will pass naturally through your body within a day.    Drink lots of water so that the dye passes quickly out of your body.  Getting your results  Your healthcare provider will discuss the test results with you during a follow-up appointment or over the phone.  Date Last Reviewed: 2/1/2017 2000-2017 [employer]. [address] [place] [address], [place] [address]. All rights reserved. This information is not intended as a substitute for professional medical care. Always follow your healthcare professional's instructions.

## 2018-05-03 NOTE — MR AVS SNAPSHOT
After Visit Summary   5/3/2018    Sarah Duran    MRN: [id number]           Patient Information     Date Of Birth          1965        Visit Information        Provider Department      5/3/2018 11:00 AM Clarissa Brown APRN [facility]        Today's Diagnoses     Atypical ductal hyperplasia of right breast    -  1    FAP (familial adenomatous polyposis)        Encounter for breast cancer screening other than mammogram        Encounter for screening mammogram for high-risk patient          Care Instructions    Individualized Surveillance Plan for women  With 20% or greater lifetime risk of breast cancer   Per NCCN Breast Cancer Screening and Diagnosis Guidelines Version 1.2017    Recommended screening Test or procedure Last done Next Scheduled    Clinical encounter Ongoing risk assessment, risk reduction counseling and clinical breast exam, every 6-12 months.   5/3/2018   Nov. 2018   However, some family histories with breast cancers at a very young age, may warrant screening starting earlier.    *May begin at age 40 if breast cancers in the family occur at later ages.    Annual mammogram beginning 10 years younger than the earliest breast cancer in the family but not prior to age 30.    Recommend annual breast MRI to begin 10 years younger than the earliest breast cancer in the family but not prior to age 25.    Breast MRIs are preferably done on day 7-15 of the menstrual cycle in premenopausal women. 4/24/2017 - R mammogram post excisional biopsy for ADH, BiRads2    No history of breast MRI NEXT: Breast MRI soon.    Next exam: Nov. 2018 followed by tomosynthesis mammogram   Breast screening for patients at high risk due to thoracic radiation between the ages of 10-30     Begin 8-10 years post radiation treatment or age 25.   Annual mammogram   and  Annual breast MRI, preferably on day 7-15 of menstrual cycle for premenopausal women.    Annual clinical breast  exams with ongoing risk assessment and risk reduction counseling until age 25, then every 6-12 months.         What Are Benign Breast Conditions?  Most breast conditions are not cancer (benign), causing no serious harm to you. But all women are at some risk for breast cancer. Your risk increases as you grow older.  Common benign breast changes are covered here. Having one of these conditions does not put you at a higher risk for breast cancer. But all breast changes should be checked by a healthcare provider to know what the change is and whether it should be treated.   Breast infection  Infections of the breast tissue can cause skin redness, warmth, and pain or tenderness. The most common infection is mastitis. This inflammation of the mammary glands can happen during breastfeeding. Mastitis and other breast infections are often treated with antibiotics.  Nipple discharge  Many women can squeeze a tiny amount of a clear or milky discharge from one or both nipples. This discharge may be normal. Other kinds of discharge may be symptoms of a breast condition. A dark or bloody discharge can be caused by a benign growth in a duct near the nipple (an intraductal papilloma). It should be checked by your healthcare provider.    Fibrocystic changes  These benign changes may cause a thickening in the breasts. Breasts may be tender or painful. They may feel more dense in some areas than in others.  These benign changes may cause a thickening and firmness in the breasts. Breasts may be tender or painful. They may feel more dense in some areas than in others. Sometimes solid or fluid-filled lumps (cysts) may also form. Cysts may be smooth, soft or firm, and tender. They may become larger and more tender right before your period. An ultrasound may be done to make sure that a lump is a fluid-filled cyst and not cancer.    Benign breast lumps  Benign breast lumps come in all shapes, textures, and sizes. A lump of fibrous tissue  (fibroadenoma) may be smooth, firm, and rubbery. This type of lump is usually painless and movable. Have any lump checked by your healthcare provider.  Date Last Reviewed: 9/1/2017 2000-2017 [employer]. [address] [place] [address]. All rights reserved. This information is not intended as a substitute for professional medical care. Always follow your healthcare professional's instructions.        Magnetic Resonance Imaging (MRI) of the Breast  Magnetic resonance imaging (MRI) of the breast is an imaging test that uses strong magnets and radio waves to form pictures of the inside of the breast. It also creates images of the tissues that surround the breast. Breast MRI is used to check for problems, such as a leaking breast implant or a suspicious lump or mass. It can also be used to help determine if breast cancer is present and aid in diagnosis and management. Most MRI tests take 30 to 60 minutes. Depending on the type of MRI you are having, the test may take longer. Give yourself extra time to check in.      During a breast MRI, you lie face down on a platform that slides into a tubelike machine called a scanner.   Before your test    Follow any directions you are given for taking medicines and for not eating or drinking before the test.    Follow your normal daily routine unless your provider tells you otherwise.    You ll be asked to remove your watch, hearing aids, credit cards, pens, eyeglasses, and other metal objects.    You may be asked to remove your makeup. Makeup may contain some metal.  MRI uses strong magnets. Metal is affected by magnets and can distort the image. The magnet used in MRI can cause metal objects in your body to move. If you have a metal implant, you may not be able to have an MRI unless the implant is certified as MRI safe. People with these implants should not have an MRI:    Ear (cochlear) implants    Certain clips used for brain aneurysms    Certain  metal coils put in blood vessels    Most defibrillators    Most pacemakers  The radiologist or technologist may ask you if you:    Have had stereotactic breast biopsy    Have a pacemaker or implanted cardiac defibrillator    Have an artificial body part (prosthesis)    Have metal rods, screws, plates, or splinters in your body    Wear a medicated adhesive patch    Have tattoos or body piercings. Some tattoo inks contain metal.    Have implanted nerve stimulators or drug-infusion ports    Work with metal    Have braces    Have a bullet or other metal in your body  Tell the radiologist or technologist if you:    Are allergic to any medicines    Are pregnant or think you may be pregnant    Are afraid of small, enclosed spaces (claustrophobic)    Have any serious health problems (If you have kidney disease or a liver transplant  you may not be able to have the contrast material used for MRI)    Have had previous surgeries    Are breastfeeding   During your test    You may be asked to wear a hospital gown.    You may be given earplugs to wear if you desire.    You may be injected with contrast through an intravenous (IV) line in your hand or arm. This is a special dye that makes the MRI image sharp that may be needed depending on the reason for your exam.    You ll lie on a platform that slides into a tube-like machine called a scanner. You ll be on your stomach with your breasts placed through openings in the platform.    Remain as still as you can while the camera takes the pictures. This will ensure the best images.  After your test    You can get back to normal activities right away.    If you were given contrast, it will pass naturally through your body within a day.    Drink lots of water so that the dye passes quickly out of your body.  Getting your results  Your healthcare provider will discuss the test results with you during a follow-up appointment or over the phone.  Date Last Reviewed: 2/1/2017 2000-2017  [employer]. [address] [place] [address], [place] [address]. All rights reserved. This information is not intended as a substitute for professional medical care. Always follow your healthcare professional's instructions.                Follow-ups after your visit        Follow-up notes from your care team     Return in about 6 months (around 11/3/2018) for Physical Exam.      Your next 10 appointments already scheduled     May 17, 2018  6:00 PM CDT   MR BREAST BILATERAL W/O & W CONTRAST with SHMRP1   [facility] ([facility])    [address] [place] [address] [phone]           Take your medicines as usual, unless your doctor tells you not to. Bring a list of your current medicines to your exam (including vitamins, minerals and over-the-counter drugs).  The timing of your exam may depend on the start of your last period. If you re in menopause, you may have the exam anytime.  Please bring any previous mammograms or breast MRIs from other facilities to the MRI dept. Do not mail these items to us.   You will have IV contrast for this exam.  You do not need to do anything special to prepare.  The MRI machine uses a strong magnet. Please wear clothes without metal (snaps, zippers). A sweatsuit works well, or we may give you a hospital gown.  Please remove any body piercings and hair extensions before you arrive. You will also remove watches, jewelry, hairpins, wallets, dentures, partial dental plates and hearing aids. You may wear contact lenses, and you may be able to wear your rings. We have a safe place to keep your personal items, but it is safer to leave them at home.  **IMPORTANT** THE INSTRUCTIONS BELOW ARE ONLY FOR THOSE PATIENTS WHO HAVE BEEN PRESCRIBED SEDATION OR GENERAL ANESTHESIA DURING THEIR MRI PROCEDURE:  IF YOUR DOCTOR PRESCRIBED ORAL SEDATION (take medicine to help you relax during your exam):   You must get the medicine from your doctor (oral  medication) before you arrive. Bring the medicine to the exam. Do not take it at home. You ll be told when to take it upon arriving for your exam.   Arrive one hour early. Bring someone who can take you home after the test. Your medicine will make you sleepy. After the exam, you may not drive, take a bus or take a taxi by yourself.  IF YOUR DOCTOR PRESCRIBED IV SEDATION:   Arrive one hour early. Bring someone who can take you home after the test. Your medicine will make you sleepy. After the exam, you may not drive, take a bus or take a taxi by yourself.   No eating 6 hours before your exam. You may have clear liquids up until 4 hours before your exam. (Clear liquids include water, clear tea, black coffee and fruit juice without pulp.)  IF YOUR DOCTOR PRESCRIBED ANESTHESIA (be asleep for your exam):   Arrive 1 1/2 hours early. Bring someone who can take you home after the test. You may not drive, take a bus or take a taxi by yourself.   No eating 8 hours before your exam. You may have clear liquids up until 4 hours before your exam. (Clear liquids include water, clear tea, black coffee and fruit juice without pulp.)   You will spend four to five hours in the recovery room.  If you have any questions, please contact your Imaging Department exam site.            May 31, 2018 10:15 AM CDT   Return Visit with Treasure Cleveland GC   [facility] Risk Management Program ([facility])    [facility]  [address]  [facility] [address]   [phone]            Nov 08, 2018 11:30 AM CST   Return Visit with REYNOLD March CNS   Cancer Risk Management Program ([facility])    [facility]  [address]  [facility] [address]   [phone]              Future tests that were ordered for you today     Open Future Orders        Priority Expected Expires Ordered    MA Screen Bilateral w/Jose Routine 11/1/2018 5/4/2019 5/3/2018      Breast Bilateral w/o & w Contrast Routine  5/4/2019 5/3/2018            Who to contact     If you have questions or need follow up information about today's clinic visit or your schedule please contact [facility] directly at [phone].  Normal or non-critical lab and imaging results will be communicated to you by [employer], letter or phone within 4 business days after the clinic has received the results. If you do not hear from us within 7 days, please contact the clinic through [email or web address] or phone. If you have a critical or abnormal lab result, we will notify you by phone as soon as possible.  Submit refill requests through [employer] or call your pharmacy and they will forward the refill request to us. Please allow 3 business days for your refill to be completed.          Additional Information About Your Visit        [employer] Information     [employer] gives you secure access to your electronic health record. If you see a primary care provider, you can also send messages to your care team and make appointments. If you have questions, please call your primary care clinic.  If you do not have a primary care provider, please call [phone] and they will assist you.        Care EveryWhere ID     This is your Care EveryWhere ID. This could be used by other organizations to access your [facility] medical records  [id number]        Your Vitals Were     Pulse Temperature Respirations Last Period Pulse Oximetry BMI (Body Mass Index)    66 97.5  F (36.4  C) (Oral) 16 04/07/2018 98% 26.42 kg/m2       Blood Pressure from Last 3 Encounters:   05/03/18 109/71   04/24/18 132/70   04/12/18 131/83    Weight from Last 3 Encounters:   05/03/18 66.6 kg (146 lb 12.8 oz)   04/24/18 67.5 kg (148 lb 12.8 oz)   04/12/18 64 kg (141 lb 1.5 oz)                 Today's Medication Changes          These changes are accurate as of 5/3/18 12:44 PM.  If you have any questions, ask your nurse or doctor.               Start taking  these medicines.        Dose/Directions    sulindac 200 MG tablet   Commonly known as:  CLINORIL   Used for:  FAP (familial adenomatous polyposis)        Dose:  200 mg   Take 1 tablet (200 mg) by mouth 2 times daily (with meals)   Quantity:  180 tablet   Refills:  11         Stop taking these medicines if you haven't already. Please contact your care team if you have questions.     benzonatate 100 MG capsule   Commonly known as:  TESSALON           celecoxib 100 MG capsule   Commonly known as:  celeBREX                Where to get your medicines      These medications were sent to [facility] [address]  [address] [facility] [address]     Phone:  [phone]     sulindac 200 MG tablet                Primary Care Provider Office Phone # Fax #    Ida Sandoval [phone]       [facility] [address]    [facility] [address]        Equal Access to Services     [facility]: Hadii ellis gan hadasho Soomaali, waaxda luqadaha, qaybta kaalmada adeegyada, waxay ayush valdivia . So [facility] [phone].    ATENCIÓN: Si habla español, tiene a rodriguez disposición servicios gratuitos de asistencia lingüística. Fatuma al [phone].    We comply with applicable federal civil rights laws and [place] laws. We do not discriminate on the basis of race, color, national origin, age, disability, sex, sexual orientation, or gender identity.            Thank you!     Thank you for choosing [facility]  for your care. Our goal is always to provide you with excellent care. Hearing back from our patients is one way we can continue to improve our services. Please take a few minutes to complete the written survey that you may receive in the mail after your visit with us. Thank you!             Your Updated Medication List - Protect others around you: Learn how to safely use, store and throw away your medicines at  www.disposemymeds.org.          This list is accurate as of 5/3/18 12:44 PM.  Always use your most recent med list.                   Brand Name Dispense Instructions for use Diagnosis    acetaminophen 325 MG tablet    TYLENOL    100 tablet    Take 2 tablets (650 mg) by mouth every 4 hours as needed for other (mild pain)    Atypical hyperplasia of breast       albuterol 90 MCG/ACT inhaler      Inhale 2 puffs into the lungs every 6 hours as needed.        * fexofenadine 60 MG tablet    ALLEGRA          * ALLEGRA 180 MG tablet   Generic drug:  fexofenadine      Take 180 mg by mouth daily.        calcium-magnesium 500-250 MG Tabs per tablet    CALMAG     Take 1 tablet by mouth daily        ibuprofen 600 MG tablet    ADVIL/MOTRIN    30 tablet    Take 1 tablet (600 mg) by mouth every 6 hours as needed for pain (mild)    Atypical hyperplasia of breast       MELATONIN PO      Take 1 mg by mouth nightly as needed        multivitamin, therapeutic with minerals Tabs tablet      Take 1 tablet by mouth daily.        OMEGA-3 FISH OIL PO      Take  by mouth.        ondansetron 4 MG tablet    ZOFRAN    18 tablet    Take 1 tablet (4 mg) by mouth every 8 hours as needed for nausea    Nausea       pantoprazole 40 MG EC tablet    PROTONIX    60 tablet    Take 1 tablet (40 mg) by mouth 2 times daily Take 30-60 minutes before a meal.    Gastric polyp       pramipexole 0.25 MG tablet    MIRAPEX     Take 0.25 mg by mouth        sulindac 200 MG tablet    CLINORIL    180 tablet    Take 1 tablet (200 mg) by mouth 2 times daily (with meals)    FAP (familial adenomatous polyposis)       triamcinolone 0.1 % cream    KENALOG          * triamcinolone 55 MCG/ACT Inhaler    NASACORT     Spray 2 sprays into both nostrils daily        * triamcinolone 55 MCG/ACT Inhaler    NASACORT     Spray 2 sprays in nostril        ZOMIG PO      Take  by mouth at onset of headache.        * Notice:  This list has 4 medication(s) that are the same as other  medications prescribed for you. Read the directions carefully, and ask your doctor or other care provider to review them with you.

## 2018-05-03 NOTE — LETTER
Cancer Risk Management  Program Locations    [facility]  [facility]  Mailing Address  Cancer Risk Management Program  [facility]  [address]  [place] [address]    New patient appointments  [phone]  May 3, 2018    Sarah Durna  [address] [address]      Dear Sarah,    It was a pleasure meeting with you today.  Below is a copy of my note from our visit, outlining your surveillance plan.      I look forward to seeing you in the future to coordinate your care and reduce your health risks. Please feel free to contact me if you have any questions or concerns.      Oncology Risk Management Consultation:  Date on this visit: 5/3/2018    Sarah Duran  is referred by Dr. Jessica Rasheed for an oncology risk management consultation. She requires evaluation for her risk of cancer secondary to having a personal history of atypical ductal hyperplasia. She also has a personal history of colonic polyposis and a family history of colon and prostate cancers.     Her history of atypical ductal hyperplasia (ADH) confers approximately a 30% lifetime  risk for breast cancer.       Primary Physician: Ida Sandoval MD    History Of Present Illness:  Ms. Duran is a 52 year old female who presents with a history of ADH in the right breast in 2016; she is s/p right excisional breast biopsy on 11/28/2016.    Pertinent history:  Colonic polyposis, s/p ileal rectal anastomosis in May 1988.   Subsequent laparotomy in 1990s for bowel obstruction from adhesions.  Previously on sulindac 150 mg BID to suppress rectal polyps.  Changed to Celebrex 100 mg BID on 8/24/2012     Followed for familial adenomatous polyposis with annual upper endoscopy and biannual flexible sigmoidoscopy screenings.     Genetic  testing:  None to date. States that she discussed it while doing IVF about 17 years ago, but did  have testing done.    Pertinent history (see chart for further details)  11/7/2005 - Small bowel enteroscopy, one tubular adenoma removed from partial anal verge.  12/7/2005 - adenomatous polyp removed from rectum  2/28/2005 - tubular adenoma, duodenum.  11/16/2011 - fundic gland polyps in stomach, tubular adenoma removed from rectum.  11/5/2012 - 1 tubulovillous adenoma, rectum  7/8/2013 - 1 tubulovillous adenoma, rectum  12/3/2013 - 1 tubulovillous adenoma, rectum  7/21/2014 - 1 tubular adenoma and hyperplastic polyp, rectum  1/20/2015 - 1 tubular adenoma and hyperplastic polyp, rectum  12/7/2015 - 1 tubular adenoma, rectum  6/6/2016 - 1 fundic gland polyp with focal adenomatous changes  3/20/2017 - 2 tubular adenomas removed from rectum  4/12/2017 - fundic gland gastric polyp with focal low grade dysplasia  10/6/2016 - right breast lumpectomy with seed localization, fibrocystic changes with ADH.    Review of Systems:  GENERAL: No change in weight, sleep or appetite.  Normal energy.  No fever or chills  EYES: Negative for vision changes or eye problems  ENT: No problems with ears, nose or throat.  No difficulty swallowing.  RESP: No coughing, wheezing or shortness of breath  CV: No chest pains or palpitations  GI: No nausea, vomiting,  heartburn, abdominal pain, diarrhea, constipation or change in bowel habits  : No urinary frequency or dysuria, bladder or kidney problems  MUSCULOSKELETAL: No significant muscle or joint pains  NEUROLOGIC: No headaches, numbness, tingling, weakness, problems with balance or coordination  PSYCHIATRIC: No problems with anxiety, depression or mental health  HEME/IMMUNE/ALLERGY: No history of bleeding or clotting problems or anemia.  No allergies or immune system problems  ENDOCRINE: No history of thyroid disease, diabetes or other endocrine disorders  Skin: POSITIVE for round, hard lump  "on sternum, about 1.5cm in diameter; patient states she believes it is a cyst, reviewed by Dr. Rasheed previously but that it is now \"bigger\" and she wants to have it assessed by Derm. States \"I got pus out of it before but now it's hard.\"    She denies new fatigue, breast pain, lumps, masses, thickenings, nipple discharge, nipple inversion and changes in symmetry, skin and texture of her breasts.    Past Medical/Surgical History:  Past Medical History:   Diagnosis Date     Asthma      Atypical ductal hyperplasia of right breast 10/2016    fibrocystic changes, PASH and atypical ductal hyperplasia on needle biopsy, s/p R excisional biopsy with radioactive seed localization     Coagulation disorder (H)     factor 5     Familial adenomatous polyposis      GERD (gastroesophageal reflux disease)      Migraine      Pancreatitis, acute      PONV (postoperative nausea and vomiting)      Past Surgical History:   Procedure Laterality Date     BIOPSY BREAST SEED LOCALIZATION Right 11/30/2016    Procedure: BIOPSY BREAST SEED LOCALIZATION;  Surgeon: Jessica Rasheed MD;  Location:  OR     C LAP,SURG,COLECTOMY,W/ANAST      for colon cancer     COLECTOMY      subtotal     COMBINED ESOPHAGOSCOPY, GASTROSCOPY, DUODENOSCOPY (EGD) WITH ARGON PLASMA COAGULATOR (APC) N/A 4/12/2018    Procedure: COMBINED ESOPHAGOSCOPY, GASTROSCOPY, DUODENOSCOPY (EGD) WITH ARGON PLASMA COAGULATOR (APC);  Esophagogastroduodenoscopy with polypectomy and polyp ablation;  Surgeon: Stephen Gasca MD;  Location: [facility] OR     ENDOSCOPIC RETROGRADE CHOLANGIOPANCREATOGRAPHY       ESOPHAGOSCOPY, GASTROSCOPY, DUODENOSCOPY (EGD), COMBINED  11/5/2012    Procedure: COMBINED ESOPHAGOSCOPY, GASTROSCOPY, DUODENOSCOPY (EGD), BIOPSY SINGLE OR MULTIPLE;;  Surgeon: Angélica Garcia MD;  Location: [facility] GI     ESOPHAGOSCOPY, GASTROSCOPY, DUODENOSCOPY (EGD), COMBINED  12/3/2013    Procedure: COMBINED ESOPHAGOSCOPY, GASTROSCOPY, DUODENOSCOPY (EGD);;  Surgeon: Jose, " Angélica Camarillo MD;  Location: [facility] GI     ESOPHAGOSCOPY, GASTROSCOPY, DUODENOSCOPY (EGD), COMBINED N/A 3/28/2018    Procedure: COMBINED ESOPHAGOSCOPY, GASTROSCOPY, DUODENOSCOPY (EGD);;  Surgeon: Stephen Gasca MD;  Location: [facility] GI     EXCISE POLYP RECTUM       LAPAROSCOPIC LYSIS ADHESIONS       SIGMOIDOSCOPY FLEXIBLE       SIGMOIDOSCOPY FLEXIBLE N/A 12/7/2015    Procedure: SIGMOIDOSCOPY FLEXIBLE;  Surgeon: Mariela Bearden MD;  Location: [facility] GI     SIGMOIDOSCOPY FLEXIBLE N/A 3/28/2018    Procedure: SIGMOIDOSCOPY FLEXIBLE;  EGD/Flex Sig;  Surgeon: Stephen Gasca MD;  Location: [facility]     SIGMOIDOSCOPY FLEXIBLE N/A 4/12/2018    Procedure: SIGMOIDOSCOPY FLEXIBLE;  Flexible Sigmoidoscopy with polyp ablation;  Surgeon: Stephen Gasca MD;  Location: [facility] OR       Allergies:  Allergies as of 05/03/2018 - Roberto as Reviewed 04/24/2018   Allergen Reaction Noted     Cephalexin  06/14/2004     Cipro [ciprofloxacin]  02/24/2005     Flagyl [metronidazole] Nausea and Vomiting 02/24/2005     Keflex [cephalosporins] Nausea and Vomiting 02/24/2005     Omeprazole  04/29/2010     Prilosec otc [omeprazole magnesium]  01/28/2012     Doxycycline Rash 04/12/2018     Erythromycin Rash 02/24/2005     Sulfa drugs Rash 02/24/2005       Current Medications:  Current Outpatient Prescriptions   Medication Sig Dispense Refill     acetaminophen (TYLENOL) 325 MG tablet Take 2 tablets (650 mg) by mouth every 4 hours as needed for other (mild pain) 100 tablet 0     albuterol 90 MCG/ACT inhaler Inhale 2 puffs into the lungs every 6 hours as needed.       benzonatate (TESSALON) 100 MG capsule        calcium-magnesium (CALMAG) 500-250 MG TABS Take 1 tablet by mouth daily       celecoxib (CELEBREX) 100 MG capsule Take 1 capsule (100 mg) by mouth 2 times daily as needed for pain 180 capsule 6     fexofenadine (ALLEGRA) 180 MG tablet Take 180 mg by mouth daily.       ibuprofen (ADVIL,MOTRIN) 600 MG tablet Take 1 tablet (600 mg) by mouth every  6 hours as needed for pain (mild) 30 tablet 0     MELATONIN PO Take 1 mg by mouth nightly as needed       multivitamin, therapeutic with minerals (THERA-VIT-M) TABS Take 1 tablet by mouth daily.       Omega-3 Fatty Acids (OMEGA-3 FISH OIL PO) Take  by mouth.       ondansetron (ZOFRAN) 4 MG tablet Take 1 tablet (4 mg) by mouth every 8 hours as needed for nausea 18 tablet 0     pantoprazole (PROTONIX) 40 MG EC tablet Take 1 tablet (40 mg) by mouth 2 times daily Take 30-60 minutes before a meal. 60 tablet 0     pramipexole (MIRAPEX) 0.25 MG tablet Take 0.25 mg by mouth       triamcinolone (KENALOG) 0.1 % cream        triamcinolone (NASACORT) 55 MCG/ACT Inhaler Spray 2 sprays into both nostrils daily       ZOLMitriptan (ZOMIG PO) Take  by mouth at onset of headache.          Family History:  Family History   Problem Relation Age of Onset     Hyperlipidemia Mother      CEREBROVASCULAR DISEASE Mother      CEREBROVASCULAR DISEASE Father      Colon Cancer Father 57      at 63     Prostate Cancer Brother 53     Prostate Cancer Maternal Uncle 65       Social History:  Social History     Social History     Marital status:      Spouse name: N/A     Number of children: 2     Years of education: N/A     Occupational History      [employer]     Social History Main Topics     Smoking status: Never Smoker     Smokeless tobacco: Never Used     Alcohol use No     Drug use: No     Sexual activity: Not on file      Comment: denies pregnancy     Other Topics Concern     Not on file     Social History Narrative    Two adopted daughters, ages 13 and 15       Physical Exam:  LMP 2018    GENERAL APPEARANCE: healthy, alert and no distress     NECK: no adenopathy, no asymmetry or masses     LYMPHATICS: No cervical, supraclavicular or axillary lymphadenopathy     RESP: lungs clear to auscultation - no rales, rhonchi or wheezes     BREAST: A multi positional, bilateral breast exam was performed.  Fairly symmetrical  -Rss>L. Right breast: no palpable dominant masses, no nipple discharge, no skin changes. Fibrocystic changes at 4 o'clock.  Right axilla: no palpable adenopathy. Left breast: no palpable dominant masses, no nipple discharge, no skin changes. Left axilla: no palpable adenopathy. Fibrocystic changes throughout tissue.   CARDIOVASCULAR: regular rate and rhythm, normal S1 S2, no S3 or S4 and no murmur.      ABDOMEN:  soft, nontender       SKIN: Round, firm hard lump on sternum, about 1.5cm in diameter, beneath skin, nontender, no erythema. No suspicious lesions or rashes    Laboratory/Imaging Studies  Results for orders placed or performed during the hospital encounter of 04/12/18   UPPER GI ENDOSCOPY   Result Value Ref Range    Upper GI Endoscopy       [address] [place] [address] [phone]     Endoscopy Department  _______________________________________________________________________________  Patient Name: Sarah Duran            Procedure Date: 4/12/2018 8:48 AM  MRN: [id number]                       Account Number: [id number]  YOB: 1965              Admit Type: Outpatient  Age: 52                               Room: OR  Gender: Female                        Note Status: Finalized  Attending MD: Stephen Gasca MD   Pause for the Cause: completed  Total Sedation Time:                    _______________________________________________________________________________     Procedure:           Upper GI endoscopy  Indications:         History of FAP, 52 year old female with FAP s/p subtotal                        colectomy with ileocolonic anastomosis, s/p ampulectomy,                        and h/o 30mm gland polyp with focal adenomatous change                        on EGD  in 2016. Patient here for surveillance                        EGD/Flexible sigmoidoscopy. Did not tolerate moderate                        sedation in J-unit and had a poor prep so  reschedule in                        the OR with a full bowel prep.  Providers:           Stephen Gasca MD, Marciano Gonzalez MD  Referring MD:          Requesting Provider: Pancho Samson MD  Medicines:           Monitored Anesthesia Care  Complications:       No immediate complications. Estimated blood loss:                        Minimal.  _______________________________________________________________________________  Procedure:           Pre-Anesthesia Assessment:                       - Prior to the procedure, a History and Physical was                        performed, and patient medications and allergies were                        reviewed. The patient is competent. The risks and                        benefits of the procedure and the sedation options and                        risks were d iscussed with the patient. All questions                        were answered and informed consent was obtained. Patient                        identification and proposed procedure were verified by                        the physician, the nurse, the anesthesiologist and the                        anesthetist in the procedure room. Mental Status                        Examination: alert and oriented. Airway Examination:                        normal oropharyngeal airway and neck mobility.                        Respiratory Examination: clear to auscultation. CV                        Examination: normal. Prophylactic Antibiotics: The                        patient does not require prophylactic antibiotics. Prior                        Anticoagulants: The patient has taken no previous                        anticoagulant or antiplatelet agents. ASA Grade                        Assessment: III - A patient with severe systemic                        disease. After reviewing the risks and  benefits, the                        patient was deemed in satisfactory condition to undergo                        the  procedure. The anesthesia plan was to use moderate                        sedation / analgesia (conscious sedation). Immediately                        prior to administration of medications, the patient was                        re-assessed for adequacy to receive sedatives. The heart                        rate, respiratory rate, oxygen saturations, blood                        pressure, adequacy of pulmonary ventilation, and                        response to care were monitored throughout the                        procedure. The physical status of the patient was                        re-assessed after the procedure.                       After obtaining informed consent, the endoscope was                        passed under direct vision. Throughout the procedure,                        the patient's blood pressure, pulse, and oxygen                        sa turations were monitored continuously. The                        Duodenoscope was introduced through the mouth, and                        advanced to the second part of duodenum. After obtaining                        informed consent, the endoscope was passed under direct                        vision. Throughout the procedure, the patient's blood                        pressure, pulse, and oxygen saturations were monitored                        continuously.The upper GI endoscopy was accomplished                        without difficulty. The patient tolerated the procedure                        well. The Endoscope was introduced through the mouth,                        and advanced to the second part of duodenum.                                                                                   Findings:       The examined esophagus was normal.       Multiple pedunculated and sessile polyps with no stigmata of recent        bleeding were found in the cardia, in the gastric fundu s and in the        gastric body consistent with hyperplastic  polyps.       A single 20 mm sessile polyp was found in the gastric body with a        different pit pattern and was larger than the rest of the polyps. The        polyp was removed with a hot snare. Resection and retrieval were        complete. Verification of patient identification for the specimen was        done by the physician and nurse using the patient's name, birth date and        medical record number. Estimated blood loss was minimal.       Multiple small sessile polyps were found in the first portion of the        duodenum and in the second portion of the duodenum with the bulb mostly        spared. Fulguration to ablate the lesion by argon plasma at 0.3        liters/minute and 20 molina was successful. Estimated blood loss was        minimal.       A side viewing duodenoscope was advanced to the major papilla. Evidence        of a prio papillectomy. There is no endoscopic evidence of mucosal        abnormalities  in the area of the papilla.                                                                                   Impression:          - Normal esophagus.                       - Multiple gastric fundic gland/hyperplastic polyps.                       - A single larger gastric polyp in the body with altered                        pit pattern was seen. Resected and retrieved with hot                        snare.                       - Multiple duodenal polyps. Treated with argon plasma                        coagulation (APC).                       - Major papilla with evidence of a prior papillotomy. No                        evidence of periampullary polypoid tissue.  Recommendation:      - Await pathology results.                       - Start pantoprazole 40 mg twice daily for 1 month for                        bleeding prophylaxis after gastric polyp resection.                       - Proceed with a flexible sigmoidoscopy for surveillance                        today. See nitin prince.                        - Repeat upper endoscopy/flexible sigmoidoscopy in 1                        year for surveillance. Will need Monitored Anesthesia                        Care for future endoscopies.                                                                                     Electronically signed by Dr. See Gasca  ____________________  Stephen Gasca MD  4/17/2018 5:42:06 PM  I was physically present for the entire viewing portion of the exam.  __________________________  Signature of teaching physician  Reagan/Gail Gasca MD    ________________  Marciano Gonzalez MD  Number of Addenda: 0    Note Initiated On: 4/12/2018 8:48 AM  Scope In:  Scope Out:     COLONOSCOPY   Result Value Ref Range    COLONOSCOPY       [facility], [address] [place] [address] [phone]     Endoscopy Department  _______________________________________________________________________________  Patient Name: Sarah Duran            Procedure Date: 4/12/2018 8:52 AM  MRN: [id number]                       Account Number: [id number]  YOB: 1965              Admit Type: Outpatient  Age: 52                               Room: OR  Gender: Female                        Note Status: Finalized  Attending MD: Stephen Gasca MD   Pause for the Cause: completed  Total Sedation Time:                    _______________________________________________________________________________     Procedure:           Colonoscopy  Indications:         High risk colon cancer surveillance: Personal history of                        familial adenomatous polyposis, 52 year old female with                        FAP s/p subtotal colectomy with ileocolonic anastomosis,                        s/p ampulecto my, and h/o 30mm gland polyp with focal                        adenomatous change on EGD in 2016. Patient here for                        surveillance EGD/Flexible sigmoidoscopy. Did not                         tolerate moderate sedation in J-unit and had a poor prep                        so reschedule in the OR with a full bowel prep.  Providers:           Stephen Gasca MD, Marciano Gonzalez MD  Referring MD:          Requesting Provider: Pancho Samson MD  Medicines:           Monitored Anesthesia Care  Complications:       No immediate complications. Estimated blood loss:                        Minimal.  _______________________________________________________________________________  Procedure:           Pre-Anesthesia Assessment:                       - Prior to the procedure, a History and Physical was                        performed, and patient medications and allergies were                        reviewed. The patient is competent. The risks and                        b enefits of the procedure and the sedation options and                        risks were discussed with the patient. All questions                        were answered and informed consent was obtained. Patient                        identification and proposed procedure were verified by                        the physician, the nurse, the anesthesiologist and the                        anesthetist in the procedure room. Mental Status                        Examination: alert and oriented. Airway Examination:                        normal oropharyngeal airway and neck mobility.                        Respiratory Examination: clear to auscultation. CV                        Examination: normal. Prophylactic Antibiotics: The                        patient does not require prophylactic antibiotics. Prior                        Anticoagulants: The patient has taken no previous                        anticoagulant or antiplatelet agents. ASA Grade                        Assessment: III - A p atient with severe systemic                        disease. After reviewing the risks and benefits, the                        patient was deemed in  satisfactory condition to undergo                        the procedure. The anesthesia plan was to use moderate                        sedation / analgesia (conscious sedation). Immediately                        prior to administration of medications, the patient was                        re-assessed for adequacy to receive sedatives. The heart                        rate, respiratory rate, oxygen saturations, blood                        pressure, adequacy of pulmonary ventilation, and                        response to care were monitored throughout the                        procedure. The physical status of the patient was                        re-assessed after the procedure.                       After obtaining informed consent, the colonoscope was                        passed under direct vision. Throughout the procedure,                         the patient's blood pressure, pulse, and oxygen                        saturations were monitored continuously. The Endoscope                        was introduced through the anus and advanced to the                        ileocolonic anastomosis. The colonoscopy was performed                        without difficulty. The patient tolerated the procedure                        well. The quality of the bowel preparation was adequate.                                                                                   Findings:       The perianal and digital rectal examinations were normal. Pertinent        negatives include no palpable rectal lesions.       There was evidence of a prior end-to-end ileo-colonic anastomosis at 25        cm proximal to the anus. This was patent and was characterized by        healthy appearing mucosa. The anastomosis was traversed.       The terminal ileum appeared normal.       Multiple sessile polyps were found in the recto-sigmoid colon.  The        polyps were small in size. Fulguration to ablate the lesion by argon        plasma  at 0.3 liters/minute and 20 molina was successful.       The retroflexed view of the distal rectum and anal verge was normal and        showed no anal or rectal abnormalities.                                                                                   Impression:          - Patent end-to-end ileo-colonic anastomosis,                        characterized by healthy appearing mucosa at 25 cm from                        the anus.                       - The examined portion of the ileum was normal.                       - Multiple small polyps at the recto-sigmoid colon.                        Treated with argon plasma coagulation (APC).                       - The distal rectum and anal verge are normal on                        retroflexion view.                       - No specimens collected.  Recommendation:      - Discharge patient to home (ambulatory).                       - See EGD  report for other recommendations.                       - Repeat EGD/Flexible sigmoidoscopy in 1 year for FAP                        surveillance. Will require Monitored Anesthesia Care and                        a full bowel prep.                                                                                     Electronically signed by Dr. See Gasca  ____________________  Stephen Gasca MD  4/17/2018 5:45:32 PM  I was physically present for the entire viewing portion of the exam.  __________________________  Signature of teaching physician  Reagan/Gail Gasca MD    ________________  Marciano Gonzalez MD  Number of Addenda: 0    Note Initiated On: 4/12/2018 8:52 AM  Scope In:  Scope Out:     Hemoglobin   Result Value Ref Range    Hemoglobin 13.7 11.7 - 15.7 g/dL   Glucose by meter   Result Value Ref Range    Glucose 119 (H) 70 - 99 mg/dL   Surgical pathology exam   Result Value Ref Range    Copath Report       Patient Name: TRE HEDRICK  MR#: [id number]  Specimen #: [id number]  Collected: 4/12/2018  Received:  "4/12/2018  Reported: 4/16/2018 15:13  Ordering Phy(s): JANES HUANG    For improved result formatting, select 'View Enhanced Report Format' under   Linked Documents section.    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    Electronically signed out by:    Georgiana Alford M.D., PhD, [facility]    CLINICAL HISTORY:  Familial adenomatous polyposis  GROSS:  The specimen is received in formalin with proper patient identification,   labeled \"gastric polyp\".  The  specimen consists of a 1.6 x 1.5 x 1.5 cm pink-tan bosselated polyp.  The   presumed resection margin is inked  black, and the specimen is serially section ed and entirely submitted in   cassettes A1-A3. (Dictated by: Karin Perez 4/12/2018 05:05 PM)    MICROSCOPIC:  Microscopic examination was performed.    CPT Codes:  A: 04391-LI1    TESTING LAB LOCATION:  [facility], [address]   [address]  [phone]    COLLECTION SITE:  Client: [facility]  Location: [facility] ([place])           ASSESSMENT    Atypical hyperplasias (ADH and ALH), especially multifocal lesions, confer a substantial increase in the risk of subsequent breast cancer (relative risk [RR] 3.7 to 5.3)  AH is associated with an increased risk of both ipsilateral and contralateral breast cancer and thus provides evidence of underlying breast abnormalities that predispose to breast cancer. In a report from the Nurses' Health Study, only 56 percent of cancers that developed in women with AH occurred in the ipsilateral breast. The cumulative incidence of breast cancer over 30 years approached 35 percent. (Regan " et al. 2007. Magnitude and laterality of breast cancer risk according to histological type of atypical hyperplasia: results from the Nurses Health Study. Cancer. 109:180.)    We discussed signs and symptoms to be watchful for and she practiced palpating with the clinic's breast simulation model. She verbalized understanding of signs and symptoms to address with her physicians (vaginal bleeding, unresolved bloating, pain, tenderness, early satiety) and lumps, thickening, swelling, tenderness, nipple discharge or changes in skin of breasts.    We also discussed following  a healthy lifestyle plan recommended by both NCCN and the American Cancer Society that can reduce the risk of breast cancer. She agreed to the following recommendations:  1 Limit alcohol consumption to less than 1 drink per day (1 drink=5 oz.wine, 12 oz. Beer or 1.5 oz. 80-proof liquor).  2. Exercise per American Cancer Society guidelines of at least 150 minutes of moderate-intensity activity or 75 minutes of vigorous activity each week. (Or a combination of both.) Exercise should be spread  out over the week, at least 5 days per week.  3. Maintain a healthy weight with a Body Mass Index between 19-24.9.  4. Do not use tobacco products and limit exposure to passive smoke.    Sarah is also under care for familial adenomatous polyposis and we reviewed the screening plan recommended for that syndrome. Because she has not had genetic testing, I am referring her back to Treasure Cleveland, Licensed Genetic Counselor, to follow up on this.  We can defer extracolonic screening for FAP until our next visit.    We also reviewed her continued rectal polyps and the reasons why she changed from sulindac (dietary issues and stomach upset) to Celebrex to help with rectal polyp regression. She is amenable to switching back to sulindac so I have ordered that for her today and discontinued the Celebrex.     We also discussed anastrozole as chemoprevention for breast cancer;  I have consulted with Dr. Daly Levin on this and she is in agreement that this would be safer option for her and could potentially reduce her risk for breast cancer by 50%, per the KENDRA-II clinical trial results. She was given printed information on this today and will discuss it with her mother, who is a pharmacist.  Because she has Factor V Leiden, we would recommend anastrozole off label, per ASCO recommendations for breast cancer risk reduction.    I will be happy to work with her to manage her cancer risk, will follow up on her screenings and see her in the Cancer Risk Management clinic in six months.    INDIVIDUALIZED CANCER RISK MANAGEMENT PLAN:  Individualized Surveillance Plan for women  With 20% or greater lifetime risk of breast cancer   Per NCCN Breast Cancer Screening and Diagnosis Guidelines Version 1.2017    Recommended screening Test or procedure Last done Next Scheduled    Clinical encounter Ongoing risk assessment, risk reduction counseling and clinical breast exam, every 6-12 months.   5/3/2018   Nov.  2018   However, some family histories with breast cancers at a very young age, may warrant screening starting earlier.    *May begin at age 40 if breast cancers in the family occur at later ages.    Annual mammogram beginning 10 years younger than the earliest breast cancer in the family but not prior to age 30.    Recommend annual breast MRI to begin 10 years younger than the earliest breast cancer in the family but not prior to age 25.    Breast MRIs are preferably done on day 7-15 of the menstrual cycle in premenopausal women. 4/24/2017 - R mammogram post excisional biopsy for ADH, BiRads2    No history of breast MRI NEXT: Breast MRI soon.    Next exam: Nov. 2018 followed by tomosynthesis mammogram   Breast screening for patients at high risk due to thoracic radiation between the ages of 10-30     Begin 8-10 years post radiation treatment or age 25.   Annual mammogram   and  Annual breast  "MRI, preferably on day 7-15 of menstrual cycle for premenopausal women.    Annual clinical breast exams with ongoing risk assessment and risk reduction counseling until age 25, then every 6-12 months.         I spent 60 minutes with the patient with greater that 50% of it in counseling and coordinating care as documented above.    Clarissa Brown, REYNOLD-CNS, OCN, ANG-BC  Clinical Nurse Specialist  Cancer Risk Management Program  [address]  [place] [address]    phone:  [phone]  Pager: [phone]  fax: [phone]    Cc:  MD Treasure [facility]   MD Pancho Melton MD                            Oncology Rooming Note    May 3, 2018 11:07 AM   Sarah Duran is a 52 year old female who presents for:    Chief Complaint   Patient presents with     Oncology Clinic Visit     Benign neoplasm of colon     Initial Vitals: /71 (BP Location: Left arm, Patient Position: Sitting, Cuff Size: Adult Regular)  Pulse 66  Temp 97.5  F (36.4  C) (Oral)  Resp 16  Wt 66.6 kg (146 lb 12.8 oz)  LMP 04/07/2018  SpO2 98%  BMI 26.42 kg/m2 Estimated body mass index is 26.42 kg/(m^2) as calculated from the following:    Height as of 4/24/18: 1.588 m (5' 2.5\").    Weight as of this encounter: 66.6 kg (146 lb 12.8 oz). Body surface area is 1.71 meters squared.  No Pain (0) Comment: Data Unavailable   Patient's last menstrual period was 04/07/2018.  Allergies reviewed: Yes  Medications reviewed: Yes    Medications: Medication refills not needed today.  Pharmacy name entered into UN[facility]:    [facility] - [place], [place] - [address], [address]  [facility] USE FOR MAILING ONLY - [place], [place]    Clinical concerns: no      5 minutes for nursing intake (face to face time)        Ema Delgado MA                                  "

## 2018-05-10 ENCOUNTER — CARE COORDINATION (OUTPATIENT)
Dept: GASTROENTEROLOGY | Facility: CLINIC | Age: 53
End: 2018-05-10

## 2018-05-25 ENCOUNTER — HOSPITAL ENCOUNTER (OUTPATIENT)
Dept: MRI IMAGING | Facility: CLINIC | Age: 53
Discharge: HOME | End: 2018-05-25
Attending: CLINICAL NURSE SPECIALIST | Admitting: CLINICAL NURSE SPECIALIST
Payer: COMMERCIAL

## 2018-05-25 DIAGNOSIS — Z12.39 ENCOUNTER FOR BREAST CANCER SCREENING OTHER THAN MAMMOGRAM: ICD-10-CM

## 2018-05-25 DIAGNOSIS — N60.91 ATYPICAL DUCTAL HYPERPLASIA OF RIGHT BREAST: ICD-10-CM

## 2018-05-25 PROCEDURE — 27210995 ZZH RX 272: Performed by: CLINICAL NURSE SPECIALIST

## 2018-05-25 PROCEDURE — 25000128 H RX IP 250 OP 636: Performed by: CLINICAL NURSE SPECIALIST

## 2018-05-25 PROCEDURE — A9585 GADOBUTROL INJECTION: HCPCS | Performed by: CLINICAL NURSE SPECIALIST

## 2018-05-25 PROCEDURE — 77059 MR BREAST BILATERAL W/O & W CONTRAST: CPT

## 2018-05-25 RX ORDER — ACYCLOVIR 200 MG/1
60 CAPSULE ORAL ONCE
Status: COMPLETED | OUTPATIENT
Start: 2018-05-25 | End: 2018-05-25

## 2018-05-25 RX ORDER — GADOBUTROL 604.72 MG/ML
6 INJECTION INTRAVENOUS ONCE
Status: COMPLETED | OUTPATIENT
Start: 2018-05-25 | End: 2018-05-25

## 2018-05-25 RX ADMIN — GADOBUTROL 6 ML: 604.72 INJECTION INTRAVENOUS at 12:24

## 2018-05-25 RX ADMIN — SODIUM CHLORIDE 60 ML: 9 INJECTION, SOLUTION INTRAMUSCULAR; INTRAVENOUS; SUBCUTANEOUS at 12:24

## 2018-06-25 ENCOUNTER — ONCOLOGY VISIT (OUTPATIENT)
Dept: ONCOLOGY | Facility: CLINIC | Age: 53
End: 2018-06-25
Attending: CLINICAL NURSE SPECIALIST
Payer: COMMERCIAL

## 2018-06-25 DIAGNOSIS — K63.5 POLYPOSIS OF COLON: Primary | ICD-10-CM

## 2018-06-25 DIAGNOSIS — Z80.42 FAMILY HISTORY OF PROSTATE CANCER: ICD-10-CM

## 2018-06-25 DIAGNOSIS — Z80.0 FAMILY HISTORY OF COLON CANCER: ICD-10-CM

## 2018-06-25 PROCEDURE — 36415 COLL VENOUS BLD VENIPUNCTURE: CPT

## 2018-06-25 PROCEDURE — 96040 ZZH GENETIC COUNSELING, EACH 30 MINUTES: CPT | Performed by: GENETIC COUNSELOR, MS

## 2018-06-25 NOTE — PROGRESS NOTES
Medical Assistant Note:  Sarah Duran presents today for yes.    Patient seen by provider today: Yes: Treasure.   present during visit today: Not Applicable.    Concerns: No Concerns.    Procedure:  Lab draw site: LAC, Needle type: BF, Gauge: 21. Gauze and coban applied    Post Assessment:  Labs drawn without difficulty: Yes.    Discharge Plan:  Departure Mode: Ambulatory.    Face to Face Time: 5.    Ema Delgado MA

## 2018-06-25 NOTE — PROGRESS NOTES
6/25/2018    Referring Provider: Clarissa Brown, APRN-CNS, OCN, ANG-BC    Presenting Information:   Sarah Duran came in to clinic for a follow-up visit today. She had previously been seen in the Cancer Risk Management Program at the [facility] on 4/27/2017. Sarah came to clinic today to revisit genetic testing options due to her personal history of colon polyposis and family history of colon cancer.    Discussion:    We previously reviewed the details of Sarah's family and personal history. See note from previous appointment for details.  She is currently being followed by Clarissa Brown, Clinical Nurse Specialist with the Cancer Risk Management Program, for high risk breast screening due to her personal history of atypical ductal hyperplasia. No significant family history updates were made today.    We reviewed the natural history and genetics of several polyposis syndromes, including Familial Adenomatous Polyposis (FAP) and MUTYH Associated Polyposis (MAP). Topics included: inheritance pattern, cancer risks, cancer screening recommendations, and also risks, benefits and limitations of testing.    We reviewed that there are additional genes that could cause increased risk of colon polyps and colon cancer.  As many of these genes present with overlapping features in a family, it would be reasonable for Sarah to consider panel genetic testing to analyze multiple genes at once.  Sarah has a detailed handout regarding these genes and the information discussed from her previous appointment, which she brought to her visit today.    During her previous genetic counseling visit, Sarah stated that she may have completed genetic testing in the past for FAP, however these records were not available for review.  She had elected to postpone genetic testing at that time, and stated today that no known genetic test was completed.  She was interested in competing this testing today.       We reviewed genetic testing  options due to her personal history of colon polyposis and family history of colon cancer , including Familial Adenomatous Polyposis (caused by mutations in the APC gene) and MUTYH Associated Polyposis (caused by mutations in the MUTYH gene).  We also discussed that expanded genetic testing is available for 17 genes associated with increased risk for colon cancer: ColoNext (APC, BMPR1A, CDH1, CHEK2, GREM1, EPCAM, MLH1, MSH2, MSH6, MUTYH, PMS2, POLD1, POLE, PTEN, SMAD4, STK11, and TP53).  Sarah elected to proceed with the ColoNext panel.    o Genetic testing is available for 17 genes associated with increased risk for colon cancer: ColoNext (APC, BMPR1A, CDH1, CHEK2, GREM1, EPCAM, MLH1, MSH2, MSH6, MUTYH, PMS2, POLD1, POLE, PTEN, SMAD4, STK11, and TP53).  o We discussed that some of the genes in the ColoNext panel are associated with specific hereditary cancer syndromes and have published management guidelines::  Adair syndrome (MLH1, MSH2, MSH6, PMS2, EPCAM), Familial Adenomatous Polyposis (APC), MUTYH Associated Polyposis (MUTYH), Juvenile Polyposis syndrome (SMAD4, BMPR1A), Peutz-Jeghers syndrome (STK11), Cowden syndrome (PTEN), Li Fraumeni syndrome (TP53), Hereditary Diffuse Gastric Cancer (CDH1).   o The CHEK2, GREM1, POLD1, and POLE genes have also been associated with increased colon cancer risk and have published management guidelines.    Sarah was previously provided with a detailed brochure from [employer] explaining the ColoNext testing, which she brought to her appointment today.    Consent was obtained and genetic testing for ColoNext was sent to [facility]. Turn around time: 3-4 weeks after insurance authorization is obtained.     The information from this test may determine cancer screening recommendations as well as options for risk reducing surgeries for Sarah and family members.  These recommendations will be discussed in detail once genetic testing is completed.    Plan:  1. All  of Sarah's questions were answered.   2. Sarah signed a consent form at the conclusion of this visit and had her blood drawn for the ColoNext panel offered through [employer].   3. Sarah will return to clinic to discuss the results.  Turn around time: 3-4 weeks once insurance authorization is obtained     Face to face time 35 minutes.    Treasure Cleveland MS, [facility]  Licensed Genetic Counselor  [phone]

## 2018-06-25 NOTE — LETTER
6/25/2018         RE: Sarah Duran  [address] [address]        Dear Colleague,    Thank you for referring your patient, Sarah Duran, to the CANCER RISK MANAGEMENT PROGRAM. Please see a copy of my visit note below.    6/25/2018    Referring Provider: Clarissa Brown, APRN-CNS, OCN, ANG-BC    Presenting Information:   Sarah Duran came in to clinic for a follow-up visit today. She had previously been seen in the Cancer Risk Management Program at the [facility] on 4/27/2017. Sarah came to clinic today to revisit genetic testing options due to her personal history of colon polyposis and family history of colon cancer.    Discussion:    We previously reviewed the details of Sarah's family and personal history. See note from previous appointment for details.  She is currently being followed by Clarissa Brown, Clinical Nurse Specialist with the Cancer Risk Management Program, for high risk breast screening due to her personal history of atypical ductal hyperplasia. No significant family history updates were made today.    We reviewed the natural history and genetics of several polyposis syndromes, including Familial Adenomatous Polyposis (FAP) and MUTYH Associated Polyposis (MAP). Topics included: inheritance pattern, cancer risks, cancer screening recommendations, and also risks, benefits and limitations of testing.    We reviewed that there are additional genes that could cause increased risk of colon polyps and colon cancer.  As many of these genes present with overlapping features in a family, it would be reasonable for Sarah to consider panel genetic testing to analyze multiple genes at once.  Sarah has a detailed handout regarding these genes and the information discussed from her previous appointment, which she brought to her visit today.    During her previous genetic counseling visit, Sarah stated that she may have completed genetic testing in the past for FAP,  however these records were not available for review.  She  had elected to postpone genetic testing at that time, and stated today that no known genetic test was completed.  She was interested in competing this testing today.       We reviewed genetic testing options due to her personal history of colon polyposis and family history of colon cancer , including Familial Adenomatous Polyposis (caused by mutations in the APC gene) and MUTYH Associated Polyposis (caused by mutations in the MUTYH gene).  We also discussed that expanded genetic testing is available for 17 genes associated with increased risk for colon cancer: ColoNext (APC, BMPR1A, CDH1, CHEK2, GREM1, EPCAM, MLH1, MSH2, MSH6, MUTYH, PMS2, POLD1, POLE, PTEN, SMAD4, STK11, and TP53).  Sarah elected to proceed with the ColoNext panel.    o Genetic testing is available for 17 genes associated with increased risk for colon cancer: ColoNext (APC, BMPR1A, CDH1, CHEK2, GREM1, EPCAM, MLH1, MSH2, MSH6, MUTYH, PMS2, POLD1, POLE, PTEN, SMAD4, STK11, and TP53).  o We discussed that some of the genes in the ColoNext panel are associated with specific hereditary cancer syndromes and have published management guidelines::  Adair syndrome (MLH1, MSH2, MSH6, PMS2, EPCAM), Familial Adenomatous Polyposis (APC), MUTYH Associated Polyposis (MUTYH), Juvenile Polyposis syndrome (SMAD4, BMPR1A), Peutz-Jeghers syndrome (STK11), Cowden syndrome (PTEN), Li Fraumeni syndrome (TP53), Hereditary Diffuse Gastric Cancer (CDH1).   o The CHEK2, GREM1, POLD1, and POLE genes have also been associated with increased colon cancer risk and have published management guidelines.    Sarah was previously provided with a detailed brochure from [employer] explaining the ColoNext testing, which she brought to her appointment today.    Consent was obtained and genetic testing for ColoNext was sent to [facility]. Turn around time: 3-4 weeks after insurance authorization is obtained.      The information from this test may determine cancer screening recommendations as well as options for risk reducing surgeries for Sarah and family members.  These recommendations will be discussed in detail once genetic testing is completed.    Plan:  1. All of Sarah's questions were answered.   2. Sarah signed a consent form at the conclusion of this visit and had her blood drawn for the ColoNext panel offered through [employer].   3. Sarah will return to clinic to discuss the results.  Turn around time: 3-4 weeks once insurance authorization is obtained     Face to face time 35 minutes.    Treasure Cleveland MS, [facility]  Licensed Genetic Counselor  [phone]              Medical Assistant Note:  Sarah Duran presents today for yes.    Patient seen by provider today: Yes: Treasure.   present during visit today: Not Applicable.    Concerns: No Concerns.    Procedure:  Lab draw site: LAC, Needle type: BF, Gauge: 21. Gauze and coban applied    Post Assessment:  Labs drawn without difficulty: Yes.    Discharge Plan:  Departure Mode: Ambulatory.    Face to Face Time: 5.    Ema Delgado MA                Again, thank you for allowing me to participate in the care of your patient.        Sincerely,        Treasure Cleveland, GC

## 2018-06-26 LAB — MISCELLANEOUS TEST: NORMAL

## 2018-06-28 ENCOUNTER — TELEPHONE (OUTPATIENT)
Dept: ONCOLOGY | Facility: CLINIC | Age: 53
End: 2018-06-28

## 2018-07-05 ENCOUNTER — TELEPHONE (OUTPATIENT)
Dept: ONCOLOGY | Facility: CLINIC | Age: 53
End: 2018-07-05

## 2018-07-05 DIAGNOSIS — D13.91 FAP (FAMILIAL ADENOMATOUS POLYPOSIS): ICD-10-CM

## 2018-07-05 RX ORDER — SULINDAC 200 MG/1
200 TABLET ORAL 2 TIMES DAILY WITH MEALS
Qty: 180 TABLET | Refills: 11 | Status: SHIPPED | OUTPATIENT
Start: 2018-07-05 | End: 2018-09-18

## 2018-07-10 ENCOUNTER — TELEPHONE (OUTPATIENT)
Dept: ONCOLOGY | Facility: CLINIC | Age: 53
End: 2018-07-10

## 2018-07-10 NOTE — TELEPHONE ENCOUNTER
----- Message from Treasure Cleveland GC sent at 7/6/2018  1:43 PM CDT -----  Regarding: Please schedule return genetic counseling appointment  Good afternoon!  Please contact this patient to schedule a return genetic counseling appointment to discuss her test results.  I can see her during any available time slot.  Thanks!    Treasure

## 2018-07-12 LAB — LAB SCANNED RESULT: ABNORMAL

## 2018-07-30 ENCOUNTER — ONCOLOGY VISIT (OUTPATIENT)
Dept: ONCOLOGY | Facility: CLINIC | Age: 53
End: 2018-07-30
Attending: CLINICAL NURSE SPECIALIST
Payer: COMMERCIAL

## 2018-07-30 DIAGNOSIS — Z15.09 FAMILIAL ADENOMATOUS POLYPOSIS GENE POSITIVE: Primary | ICD-10-CM

## 2018-07-30 DIAGNOSIS — K63.5 POLYPOSIS OF COLON: ICD-10-CM

## 2018-07-30 DIAGNOSIS — Z80.0 FAMILY HISTORY OF COLON CANCER: ICD-10-CM

## 2018-07-30 DIAGNOSIS — D13.91 FAP (FAMILIAL ADENOMATOUS POLYPOSIS): ICD-10-CM

## 2018-07-30 DIAGNOSIS — Z80.42 FAMILY HISTORY OF PROSTATE CANCER: ICD-10-CM

## 2018-07-30 PROCEDURE — 96040 ZZH GENETIC COUNSELING, EACH 30 MINUTES: CPT | Performed by: GENETIC COUNSELOR, MS

## 2018-07-30 NOTE — PROGRESS NOTES
"7/30/2018    Referring Provider: Clarissa Brown, APRN-CNS, OCN, ANG-BC    Presenting Information:   Sarah returned to the Cancer Risk Management Program at the [facility] to discuss her genetic testing results. Her blood was drawn on 6/25/2018 and we ordered ColoNext testing from [facility]. This testing was done because of her personal history of colon polyposis and family history of colon cancer.     Genetic Testing Results: POSITIVE  Sarah is POSITIVE for a APC mutation.  Specifically her mutation is called [id number]. We discussed this mutation is consistent with a diagnosis of familial adenomatous polyposis (FAP) or attenuated FAP (AFAP). We discussed the impact of this testing on Sarah in detail.     Of note, Sarah tested negative for mutations in the following genes: BMPR1A, CDH1, CHEK2, MLH1, MSH2, MSH6, MUTYH, PMS2, POLD1, POLE, PTEN, SMAD4, STK11 and TP53 (sequencing and deletion/duplication); EPCAM and GREM1 (deletion/duplication only).  We reviewed the autosomal dominant inheritance of these genes.  Mutations in these genes do not skip generations.      A copy of the test report can be found in the Laboratory tab, dated 6/25/2018, and named \"SEND OUTS MISC TEST\". The report is scanned in as a linked document.    Cancer Risks:   We reviewed that mutations in the APC gene are associated with a condition call Familial Adenomatous Polyposis (FAP).  Individuals with FAP are at an increased risk for several cancers and other benign tumors/physical findings.       Colon Cancer and Colon Polyps:  Individuals with classic FAP typically have over 100 adenomatous colon polyps identified in their lifetime.  Individuals with classic FAP have a % lifetime risk of developing colorectal cancer, compared to a 5-6% lifetime risk in the general population.    Duodenal cancer: Approximately 4-12% lifetime risk.  Up to 50-90% of individuals with FAP have polyps of the small bowel    Thyroid " cancer (particularly papillary thyroid cancer): Up to a 2% lifetime risk    Pancreatic cancer: Up to a 1% lifetime risk    Stomach cancer: Up to a 1% lifetime risk.  Up to 50% of individuals with FAP have polyps in the stomach    Liver cancer (typically diagnosed under the age of 5): Up to a 1-2% risk    Central nervous system tumors (typically medulloblastoma): Up to a 1% lifetime risk      Individuals with FAP are also at increased risk for other benign tumors and/or physical findings. Approximately 10-30% of individuals with FAP will develop benign desmoid tumors, typically of the abdomen. Other findings include CHRPEs (freckle-like spots on the inside of the eye), osteomas (benign bone tumors, typically of the skull and/or jaw), and dental changes (including missing and/or extra teeth). Individuals with FAP may also have differences to the skin, including benign epidermoid cysts.     Cancer Screening and Prevention:  The following screening, per current National Comprehensive Cancer Network (NCCN) guidelines, is recommended for individuals who have a mutation in the APC gene.    Colon:  Annual colonoscopy or sigmoidoscopy starting at age 10-15 years.  Depending upon colon polyp burden, removal of the colon may be recommended with continued surveillance of the rectum (as needed)    Duodenum and Stomach: Baseline upper endoscopy including side-viewing starting at age 25-30; repeated based on polyp findings.  Consider adding small bowel visualization to CT or MRI done for desmoid tumors    Desmoids: Annual abdominal palpation.  Consider abdominal MRI or CT every 5-10 years if family history is significant for desmoid tumors     Thyroid: Annual thyroid exam starting in late teens.  Consider annual thyroid ultrasound    CNS: Annual physical exam    Liver: Liver palpation, abdominal ultrasound, and AFP measurement; repeated every 3-6 months until age 5.  Consider abdominal MRI or CT 1-3 years after colectomy; repeated  "every 5-10 years    Additional screening recommendations:    Sarah has a history of atypical ductal hyperplasia identified on a breast biopsy from 11/27/2017, and is being followed by DANIEL March, OCN, ANG-BC for high risk breast screening.      We discussed that Sarah could participate in our Cancer Risk Management Program in which our nursing specialist provides an individual screening plan and assists with medical management.  As Sarah is already being followed by DANIEL March, OCN, ANG-BC, for high risk breast screening, she should follow up to discuss additional screening for FAP.  She currently has an appointment scheduled in November of 2018, however this appointment may be rescheduled if needed sooner.     The above information is based on our current understanding of Sarah's genetic findings.  Sarah is encouraged to reach out to me as needed regarding any pertinent updates to her personal and/or family history of cancer, as our understanding of the genetic findings in her family may change over time.     Implications for Family Members:  We reviewed that mutations in APC are inherited in an autosomal dominant pattern. Sarah does not have biological children.  We discussed that each of her siblings have a 50% risk of having the same mutation. I am happy to help her relatives connect with a genetic counselor in their area if they would like to discuss testing.    Support Resources:  National support resources were provided with Sarah's test results today, and she is encouraged to follow up should she wish to discuss these resources further in the future.     Plan:  1.  I provided Sarah with a copy of her test results and support resources today.  2.  I provided Sarah with a \"Dear Relative\" letter to share with her family members.  3.  She plans to follow up with Clarissa Brown for high risk breast screening and management for Familial Adenomatous Polyposis.    If  " Sarah has additional questions, I encouraged her to contact  me directly at [phone].     Time spent face to face: 25 minutes.    Treasure Cleveland MS, [facility]  Licensed Genetic Counselor  [phone]

## 2018-07-30 NOTE — MR AVS SNAPSHOT
After Visit Summary   7/30/2018    Sarah Duran    MRN: [id number]           Patient Information     Date Of Birth          1965        Visit Information        Provider Department      7/30/2018 10:15 AM Treasure Cleveland GC Cancer Risk Management Program        Today's Diagnoses     Familial adenomatous polyposis gene positive    -  1    FAP (familial adenomatous polyposis)        Polyposis of colon        Family history of colon cancer        Family history of prostate cancer          Care Instructions      Assessing Cancer Risk  Only about 5-10% of cancers are thought to be due to an inherited cancer susceptibility gene.    These families often have:    Several people with the same or related types of cancer    Cancers diagnosed at a young age (before age 50)    Individuals with more than one primary cancer    Multiple generations of the family affected with cancer    Familial Adenomatous Polyposis (FAP)  FAP is a hereditary condition that causes the formation of many (more than 100) adenoma type polyps in the colon and increases the risk of colon cancer. Individuals affected with FAP can begin to develop these precancerous polyps in their teens. Without preventative surgery, colon cancer is almost inevitable.     Attenuated Familial Adenomatous Polyposis (AFAP)  AFAP is also a condition that causes colon polyps. It is milder than Classic FAP, however, and the lifetime risk of colon cancer is lower at approximately 70%.  Usually individuals affected with AFAP will have  adenoma type polyps.     FAP and AFAP may also be known as Ernst Syndrome or Turcot Syndrome.    APC Gene  We each inherit two copies of every gene in our bodies: one from our mother, and one from our father.  Each gene has a specific job to do.  When a gene has a mistake or  mutation  in it, it does not work like it should.  Everyone has two copies of the APC gene.  A single mutation in one of the copies of the APC gene  causes FAP or AFAP.  This causes the formation of polyps and increases colon cancer risk.  The risk for other types of cancers including gastric, pancreatic, and certain brain tumors is also slightly increased.   The table below shows the chance that someone with an APC mutation would develop cancer in their lifetime1,2.     Lifetime Cancer Risks   Colon %   Duodenal 4-12%   Thyroid <2%   Liver (before age 5) 1-2%   Pancreatic <1%   Stomach <1%     Other Findings  Individuals with FAP may also show these features:    Polyps in the stomach and small bowel    CHRPE: freckle like spots on the inside of the eye    Desmoid Tumors: benign tumors typically found in the abdomen    Osteomas: benign bony tumors typically found in the skull and/or jaw    Epidermoid cysts: benign cysts found on the skin    Extra teeth or other dental abnormalities     Inheritance   APC mutations are inherited in an autosomal dominant pattern.  This means that if a parent has a mutation, each of his or her children will have a 50% chance of inheriting that same mutation.  Therefore, each child--male or female--would have a 50% chance of being at increased risk for developing colon polyps and cancer.  Up to 30% of people with an APC mutation, however, did not inherit it from a parent.  Rather, the mutation occurred de caitlin (for the first time) in them.  Now that they have the mutation, however, they can pass it on to their children.              Image obtained from Genetics Home Reference, 2013    Genetic Testing  Genetic testing involves a simple blood test and will look at the genetic information in the APC gene for any harmful mutations that are associated with increased risk for polyps and cancer.  If possible, it is recommended that the person(s) who has had polyps or cancer be tested before other family members.  That person will give us the most useful information about whether or not a specific gene is associated with the cancer in  the family.     Results  There are three possible results of APC genetic testing:    Positive--a harmful mutation was identified    Negative--no mutation was identified    Variant of unknown significance--a variation in the gene was identified, but it is unclear how this impacts cancer risk in the family    Advantages and Disadvantages  There are advantages and disadvantages to genetic testing of this gene.    Advantages    May clarify your cancer risk    Can help you make medical decisions    May explain the polyps and cancers in your family    May give useful information to your family members (if you share your results)    Disadvantages    Possible negative emotional impact of learning about inherited cancer risk    Uncertainty in interpreting a negative test result in some situations    Possible genetic discrimination concerns (see below)        Reducing Cancer Risk  Current screening recommendations for people with Classic FAP include1:    Colon:   o Annual colonoscopy or sigmoidoscopy starting at age 10-15 years  o Removal of the colon with continued rectal surveillance as needed    Duodenum and Stomach:   o Baseline upper endoscopy including side-viewing starting at age 25-30; repeated based on polyp findings3  o Consider adding small bowel visualization to CT or MRI done for desmoids (below)    Thyroid:    o Annual thyroid exam starting in late teens  o Consider annual thyroid ultrasound    CNS: Annual physical exam    Liver:   o Liver palpation, abdominal ultrasound, and AFP measurement; repeated every 3-6 months until age 5    Desmoids:   o Annual abdominal palpation    o Consider abdominal MRI or CT 1-3 years after colectomy; repeated every 5-10 years    Current screening recommendations for individuals with Attenuated FAP (AFAP) include1:    Colon:   o Colonoscopy starting at late teens; repeated every 2-3  o Consider removal of the colon when more than 20 or 30 adenoma polyps are found    Duodenum and  Stomach:   o Baseline upper endoscopy starting at age 25-30; repeated based on findings    Thyroid:  Annual thyroid exam     CNS: Annual physical exam    Some medications are available that may reduce the number of polyps.  Osteomas, desmoids, and epidermoid cysts should be evaluated for treatment or removal.    The age at which to start and repeat screening may be modified based on personal and family history.    Genetic Information Nondiscrimination Act (ESTELLA)  ESTELLA is a federal law that protects individuals from health insurance or employment discrimination based on a genetic test result alone.  Although rare, there are currently no legal protections in terms of life insurance, long term care, or disability insurances.  Visit the National Human Genome Research [place] at Genome.gov/58465446 to learn more.    Questions to Think About Regarding Genetic Testing    What effect will the test result have on me and my relationship with my family members if I have an inherited gene mutation?  If I don t have a gene mutation?    Should I share my test results, and how will my family react to this news, which may also affect them?    Are my children ready to learn new information that may one day affect their own health?    Resources  Colorectal Cancer Coalition [email or web address]   Colon Cancer [place] (CCA) ccalliance.org   American Cancer Society (ACS) cancer.org   National Cancer [place] (NCI) cancer.gov     Please call us if you have any questions or concerns.     Cancer Risk Management Program [phone] ([phone])  ? Sara Lester, MS, [facility]  [phone]  ? Corazon Perera, MS, [facility]  [phone]   ? Treasure Cleveland, MS, [facility]  [phone]   ? Jeannine Mcpherson, MS, [facility]  [phone]    References  1. National Comprehensive Cancer Network. Clinical practice guidelines in oncology, colorectal cancer screening. Available online (registration required). 2014.  2. Marlen DW, Familia J, Tavo KM, Arabella RYAN,  Shahbaz N, Rohan J, Eladio G, Reginaldo MF, Farrukh RW. American  mutation for attenuated familial adenomatous polyposis. Clin Gastroenterol Hepatol. 2008;6:46-52.  3. National Comprehensive Cancer Network. Gastric Cancer. Available online (registration required). 2015.            Follow-ups after your visit        Additional Services     CANCER RISK MGMT/CANCER GENETIC COUNSELING REFERRAL       Your provider has referred you to the Cancer Risk Management Program to meet with REYNOLD March-CNS, OCN, ANG-BC    Reason for Referral: APC gene positive    We have a sent a notice to a staff member of the Cancer Risk Management Program to give you a call to assist with scheduling your appointment.  You may also call  [phone] ([phone]) to initiate scheduling.    Please be aware that coverage of these services is subject to the terms and limitations of your health insurance plan.  Call member services at your health plan with any benefit or coverage questions.      Please bring the completed family history sheet to your appointment in addition to any available outside medical records documenting your cancer diagnosis.                  Your next 10 appointments already scheduled     Nov 08, 2018 11:30 AM CST   Return Visit with REYNOLD March   Cancer Risk Management Program ([facility])    [facility]  [address] [id number] [phone]              Who to contact     If you have questions or need follow up information about today's clinic visit or your schedule please contact CANCER RISK MANAGEMENT PROGRAM directly at [phone].  Normal or non-critical lab and imaging results will be communicated to you by MyChart, letter or phone within 4 business days after the clinic has received the results. If you do not hear from us within 7 days, please contact the clinic through MyChart or phone. If you have  a critical or abnormal lab result, we will notify you by phone as soon as possible.  Submit refill requests through [employer] or call your pharmacy and they will forward the refill request to us. Please allow 3 business days for your refill to be completed.          Additional Information About Your Visit        [facility] Information     [employer] gives you secure access to your electronic health record. If you see a primary care provider, you can also send messages to your care team and make appointments. If you have questions, please call your primary care clinic.  If you do not have a primary care provider, please call [phone] and they will assist you.        Care EveryWhere ID     This is your Care EveryWhere ID. This could be used by other organizations to access your [facility] medical records  [id number]         Blood Pressure from Last 3 Encounters:   05/03/18 109/71   04/24/18 132/70   04/12/18 131/83    Weight from Last 3 Encounters:   05/03/18 66.6 kg (146 lb 12.8 oz)   04/24/18 67.5 kg (148 lb 12.8 oz)   04/12/18 64 kg (141 lb 1.5 oz)              We Performed the Following     CANCER RISK MGMT/CANCER GENETIC COUNSELING REFERRAL        Primary Care Provider Office Phone # Fax #    Ida ESPINO Jaime [phone]       [facility] [place] [address]    [facility] [address]        Equal Access to Services     CASSIE ANDRES : Hadii aad ku hadasho Soomaali, waaxda luqadaha, qaybta kaalmada jone, thelma hess. So [facility] [phone].    ATENCIÓN: Si habla español, tiene a rodriguez disposición servicios gratuitos de asistencia lingüística. Fatuma al [phone].    We comply with applicable federal civil rights laws and [place] laws. We do not discriminate on the basis of race, color, national origin, age, disability, sex, sexual orientation, or gender identity.            Thank you!     Thank you for choosing CANCER RISK MANAGEMENT PROGRAM  for your care.  Our goal is always to provide you with excellent care. Hearing back from our patients is one way we can continue to improve our services. Please take a few minutes to complete the written survey that you may receive in the mail after your visit with us. Thank you!             Your Updated Medication List - Protect others around you: Learn how to safely use, store and throw away your medicines at www.disposemymeds.org.          This list is accurate as of 7/30/18 10:52 AM.  Always use your most recent med list.                   Brand Name Dispense Instructions for use Diagnosis    acetaminophen 325 MG tablet    TYLENOL    100 tablet    Take 2 tablets (650 mg) by mouth every 4 hours as needed for other (mild pain)    Atypical hyperplasia of breast       albuterol 90 MCG/ACT inhaler      Inhale 2 puffs into the lungs every 6 hours as needed.        * fexofenadine 60 MG tablet    ALLEGRA          * ALLEGRA 180 MG tablet   Generic drug:  fexofenadine      Take 180 mg by mouth daily.        calcium-magnesium 500-250 MG Tabs per tablet    CALMAG     Take 1 tablet by mouth daily        ibuprofen 600 MG tablet    ADVIL/MOTRIN    30 tablet    Take 1 tablet (600 mg) by mouth every 6 hours as needed for pain (mild)    Atypical hyperplasia of breast       MELATONIN PO      Take 1 mg by mouth nightly as needed        multivitamin, therapeutic with minerals Tabs tablet      Take 1 tablet by mouth daily.        OMEGA-3 FISH OIL PO      Take  by mouth.        ondansetron 4 MG tablet    ZOFRAN    18 tablet    Take 1 tablet (4 mg) by mouth every 8 hours as needed for nausea    Nausea       pramipexole 0.25 MG tablet    MIRAPEX     Take 0.25 mg by mouth        sulindac 200 MG tablet    CLINORIL    180 tablet    Take 1 tablet (200 mg) by mouth 2 times daily (with meals)    FAP (familial adenomatous polyposis)       triamcinolone 0.1 % cream    KENALOG          * triamcinolone 55 MCG/ACT Inhaler    NASACORT     Spray 2 sprays into both  nostrils daily        * triamcinolone 55 MCG/ACT Inhaler    NASACORT     Spray 2 sprays in nostril        ZOMIG PO      Take  by mouth at onset of headache.        * Notice:  This list has 4 medication(s) that are the same as other medications prescribed for you. Read the directions carefully, and ask your doctor or other care provider to review them with you.

## 2018-07-30 NOTE — LETTER
Cancer Risk Management  Program Locations    [facility]  [facility]  Mailing Address  Cancer Risk Management Program  [facility]  [address]  [place] [address]    New patient appointments  [phone]  July 30, 2018      Dear Relative:  The purpose of this letter is to inform you that your relative recently underwent genetic counseling and genetic testing due to the history of colorectal cancer and polyposis.  The testing done through [employer] identified a mutation in the APC gene.  Specifically, the mutation is called [id number].  A mutation (or change in the genetic code) causes a specific gene to stop working properly.  Ultimately, individuals who have this mutation in the APC gene have a diagnosis of Familial Adenomatous Polyposis (FAP).   Familial adenomatous polyposis and attenuated FAP are colon cancer predisposition syndromes characterized by hundreds to thousands of adenomatous colon polyps.  If not treated, individuals with FAP have a % lifetime risk of developing colon cancer.  FAP is also associated with an increased risk of several other cancers, including duodenal, thyroid, pancreatic, stomach, liver cancer, and brain tumors.  Several other benign growths have been reported in FAP, such as desmoid tumors (typically of the abdomen), CHRPEs (freckle-like spots on the inside of the eye), osteomas (benign bone tumors), dental changes (including missing or extra teeth), and benign skin changes (such as epidermoid cysts).  Both men and women can have mutations in the APC gene and have a 50% chance of passing this mutation on to each of their children. If individuals are found to have a mutation in the APC gene, we would recommend increased cancer screening at younger ages.  Risk  reduction surgeries are also available options that can prevent the development of certain cancers.  All cancer risk management options should be discussed in more detail with an individual s medical providers.   I understand that this may be surprising, unexpected, and even scary news.  Because this mutation has been identified in your family, you are at risk for having the same mutation.  Thus, I encourage you to contact me with questions or to schedule a genetic counseling appointment.  If you do not live in the [facility] area, I would be happy to provide you with contact information for genetic counselors in your city or state.  Genetic counselors can be found by visiting www.nsgc.org.   Scheduling a genetic counseling appointment does not mean you have to undergo genetic testing.  The decision to pursue such testing is a very personal one that is discussed in more detail during the session.  Much of cancer genetic counseling is providing valuable information to individuals who are impacted by genetic information such as this.    Please feel free to contact me with any questions or concerns.  I can be reached at [phone].  Sincerely,   Treasure Cleveland MS, [facility]  Certified Genetic Counselor  Cancer Risk Management Program  [facility]

## 2018-07-30 NOTE — LETTER
"    7/30/2018         RE: Sarah Duran  [address] [address]        Dear Colleague,    Thank you for referring your patient, Sarah Duran, to the CANCER RISK MANAGEMENT PROGRAM. Please see a copy of my visit note below.    7/30/2018    Referring Provider: Clarissa Brown, APRN-CNS, OCN, ANG-BC    Presenting Information:   Sarah returned to the Cancer Risk Management Program at the [facility] to discuss her genetic testing results. Her blood was drawn on 6/25/2018 and we ordered ColoNext testing from [facility]. This testing was done because of her personal history of colon polyposis and family history of colon cancer.     Genetic Testing Results: POSITIVE  Sarah is POSITIVE for a APC mutation.  Specifically her mutation is called [id number]. We discussed this mutation is consistent with a diagnosis of familial adenomatous polyposis (FAP) or attenuated FAP (AFAP). We discussed the impact of this testing on Sarah in detail.     Of note, Sarah tested negative for mutations in the following genes: BMPR1A, CDH1, CHEK2, MLH1, MSH2, MSH6, MUTYH, PMS2, POLD1, POLE, PTEN, SMAD4, STK11 and TP53 (sequencing and deletion/duplication); EPCAM and GREM1 (deletion/duplication only).  We reviewed the autosomal dominant inheritance of these genes.  Mutations in these genes do not skip generations.      A copy of the test report can be found in the Laboratory tab, dated 6/25/2018, and named \"SEND OUTS MISC TEST\". The report is scanned in as a linked document.    Cancer Risks:   We reviewed that mutations in the APC gene are associated with a condition call Familial Adenomatous Polyposis (FAP).  Individuals with FAP are at an increased risk for several cancers and other benign tumors/physical findings.       Colon Cancer and Colon Polyps:  Individuals with classic FAP typically have over 100 adenomatous colon polyps identified in their lifetime.  Individuals with classic FAP have " a % lifetime risk of developing colorectal cancer, compared to a 5-6% lifetime risk in the general population.    Duodenal cancer: Approximately 4-12% lifetime risk.  Up to 50-90% of individuals with FAP have polyps of the small bowel    Thyroid cancer (particularly papillary thyroid cancer): Up to a 2% lifetime risk    Pancreatic cancer: Up to a 1% lifetime risk    Stomach cancer: Up to a 1% lifetime risk.  Up to 50% of individuals with FAP have polyps in the stomach    Liver cancer (typically diagnosed under the age of 5): Up to a 1-2% risk    Central nervous system tumors (typically medulloblastoma): Up to a 1% lifetime risk      Individuals with FAP are also at increased risk for other benign tumors and/or physical findings. Approximately 10-30% of individuals with FAP will develop benign desmoid tumors, typically of the abdomen. Other findings include CHRPEs (freckle-like spots on the inside of the eye), osteomas (benign bone tumors, typically of the skull and/or jaw), and dental changes (including missing and/or extra teeth). Individuals with FAP may also have differences to the skin, including benign epidermoid cysts.     Cancer Screening and Prevention:  The following screening, per current National Comprehensive Cancer Network (NCCN) guidelines, is recommended for individuals who have a mutation in the APC gene.    Colon:  Annual colonoscopy or sigmoidoscopy starting at age 10-15 years.  Depending upon colon polyp burden, removal of the colon may be recommended with continued surveillance of the rectum (as needed)    Duodenum and Stomach: Baseline upper endoscopy including side-viewing starting at age 25-30; repeated based on polyp findings.  Consider adding small bowel visualization to CT or MRI done for desmoid tumors    Desmoids: Annual abdominal palpation.  Consider abdominal MRI or CT every 5-10 years if family history is significant for desmoid tumors     Thyroid: Annual thyroid exam starting in  late teens.  Consider annual thyroid ultrasound    CNS: Annual physical exam    Liver: Liver palpation, abdominal ultrasound, and AFP measurement; repeated every 3-6 months until age 5.  Consider abdominal MRI or CT 1-3 years after colectomy; repeated every 5-10 years    Additional screening recommendations:    Sarah has a history of atypical ductal hyperplasia identified on a breast biopsy from 11/27/2017, and is being followed by Clarissa Brown, REYNOLD-CNS, OCN, ANG-BC for high risk breast screening.      We discussed that Sarah could participate in our Cancer Risk Management Program in which our nursing specialist provides an individual screening plan and assists with medical management.  As Sarah is already being followed by Clarissa Brown, REYNOLD-CNS, OCN, ANG-BC, for high risk breast screening, she should follow up to discuss additional screening for FAP.  She currently has an appointment scheduled in November of 2018, however this appointment may be rescheduled if needed sooner.     The above information is based on our current understanding of Sarah's genetic findings.  Sarah is encouraged to reach out to me as needed regarding any pertinent updates to her personal and/or family history of cancer, as our understanding of the genetic findings in her family may change over time.     Implications for Family Members:  We reviewed that mutations in APC are inherited in an autosomal dominant pattern. Sarah does not have biological children.  We discussed that each of her siblings have a 50% risk of having the same mutation. I am happy to help her relatives connect with a genetic counselor in their area if they would like to discuss testing.    Support Resources:  National support resources were provided with Sarah's test results today, and she is encouraged to follow up should she wish to discuss these resources further in the future.     Plan:  1.  I provided Sarah with a copy of her test results  "and support resources today.  2.  I provided Sarah with a \"Dear Relative\" letter to share with her family members.  3.  She plans to follow up with Clarissa Brown for high risk breast screening and management for Familial Adenomatous Polyposis.    If  Sarah has additional questions, I encouraged her to contact  me directly at [phone].     Time spent face to face: 25 minutes.    Treasure Cleveland MS, [facility]  Licensed Genetic Counselor  [phone]          "

## 2018-07-30 NOTE — LETTER
Cancer Risk Management  Program Locations    [facility]  Mailing Address  Cancer Risk Management Program  [facility]  [address]  [place] [address]    New patient appointments  [phone]  August 3, 2018    Sarah Duran  [address] [address]      Dear Sarah,    It was a pleasure meeting with you at the [facility] on 7/30/2018.  Here is a copy of the progress note from your recent genetic counseling visit to the Cancer Risk Management Program.  If you have any additional questions, please feel free to call.    Referring Provider: Clarissa Brown, REYNOLD-CNS, OCN, ANG-BC    Presenting Information:   Sarah returned to the Cancer Risk Management Program at the [facility] to discuss her genetic testing results. Her blood was drawn on 6/25/2018 and we ordered ColoNext testing from [facility]. This testing was done because of her personal history of colon polyposis and family history of colon cancer.     Genetic Testing Results: POSITIVE  Sarah is POSITIVE for a APC mutation.  Specifically her mutation is called [id number]. We discussed this mutation is consistent with a diagnosis of familial adenomatous polyposis (FAP) or attenuated FAP (AFAP). We discussed the impact of this testing on Sarah in detail.     Of note, Saarh tested negative for mutations in the following genes: BMPR1A, CDH1, CHEK2, MLH1, MSH2, MSH6, MUTYH, PMS2, POLD1, POLE, PTEN, SMAD4, STK11 and TP53 (sequencing and deletion/duplication); EPCAM and GREM1 (deletion/duplication only).  We reviewed the autosomal dominant inheritance of these genes.  Mutations in these genes do not skip generations.      Cancer Risks:   We reviewed that mutations in the APC gene are associated with a  condition call Familial Adenomatous Polyposis (FAP).  Individuals with FAP are at an increased risk for several cancers and other benign tumors/physical findings.       Colon Cancer and Colon Polyps:  Individuals with classic FAP typically have over 100 adenomatous colon polyps identified in their lifetime.  Individuals with classic FAP have a % lifetime risk of developing colorectal cancer, compared to a 5-6% lifetime risk in the general population.    Duodenal cancer: Approximately 4-12% lifetime risk.  Up to 50-90% of individuals with FAP have polyps of the small bowel    Thyroid cancer (particularly papillary thyroid cancer): Up to a 2% lifetime risk    Pancreatic cancer: Up to a 1% lifetime risk    Stomach cancer: Up to a 1% lifetime risk.  Up to 50% of individuals with FAP have polyps in the stomach    Liver cancer (typically diagnosed under the age of 5): Up to a 1-2% risk    Central nervous system tumors (typically medulloblastoma): Up to a 1% lifetime risk      Individuals with FAP are also at increased risk for other benign tumors and/or physical findings. Approximately 10-30% of individuals with FAP will develop benign desmoid tumors, typically of the abdomen. Other findings include CHRPEs (freckle-like spots on the inside of the eye), osteomas (benign bone tumors, typically of the skull and/or jaw), and dental changes (including missing and/or extra teeth). Individuals with FAP may also have differences to the skin, including benign epidermoid cysts.     Cancer Screening and Prevention:  The following screening, per current National Comprehensive Cancer Network (NCCN) guidelines, is recommended for individuals who have a mutation in the APC gene.    Colon:  Annual colonoscopy or sigmoidoscopy starting at age 10-15 years.  Depending upon colon polyp burden, removal of the colon may be recommended with continued surveillance of the rectum (as needed)    Duodenum and Stomach: Baseline upper endoscopy  including side-viewing starting at age 25-30; repeated based on polyp findings.  Consider adding small bowel visualization to CT or MRI done for desmoid tumors    Desmoids: Annual abdominal palpation.  Consider abdominal MRI or CT every 5-10 years if family history is significant for desmoid tumors     Thyroid: Annual thyroid exam starting in late teens.  Consider annual thyroid ultrasound    CNS: Annual physical exam    Liver: Liver palpation, abdominal ultrasound, and AFP measurement; repeated every 3-6 months until age 5.  Consider abdominal MRI or CT 1-3 years after colectomy; repeated every 5-10 years    Additional screening recommendations:    Sarah has a history of atypical ductal hyperplasia identified on a breast biopsy from 11/27/2017, and is being followed by DANIEL March, OCN, ANG-BC for high risk breast screening.      We discussed that Sarah could participate in our Cancer Risk Management Program in which our nursing specialist provides an individual screening plan and assists with medical management.  As Sarah is already being followed by DANIEL March, OCN, ANG-BC, for high risk breast screening, she should follow up to discuss additional screening for FAP.  She currently has an appointment scheduled in November of 2018, however this appointment may be rescheduled if needed sooner.     The above information is based on our current understanding of Sarah's genetic findings.  Sarah is encouraged to reach out to me as needed regarding any pertinent updates to her personal and/or family history of cancer, as our understanding of the genetic findings in her family may change over time.     Implications for Family Members:  We reviewed that mutations in APC are inherited in an autosomal dominant pattern. Sarah does not have biological children.  We discussed that each of her siblings have a 50% risk of having the same mutation. I am happy to help her relatives  "connect with a genetic counselor in their area if they would like to discuss testing.    Support Resources:  National support resources were provided with Sarah's test results today, and she is encouraged to follow up should she wish to discuss these resources further in the future.     Plan:  1.  I provided Sarah with a copy of her test results and support resources today.  2.  I provided Sarah with a \"Dear Relative\" letter to share with her family members.  3.  She plans to follow up with Clarissa Brown for high risk breast screening and management for Familial Adenomatous Polyposis.    If  Sarah has additional questions, I encouraged her to contact  me directly at [phone].     Treasure Cleveland MS, [facility]  Licensed Genetic Counselor  [phone]                              "

## 2018-07-30 NOTE — PATIENT INSTRUCTIONS
Assessing Cancer Risk  Only about 5-10% of cancers are thought to be due to an inherited cancer susceptibility gene.    These families often have:    Several people with the same or related types of cancer    Cancers diagnosed at a young age (before age 50)    Individuals with more than one primary cancer    Multiple generations of the family affected with cancer    Familial Adenomatous Polyposis (FAP)  FAP is a hereditary condition that causes the formation of many (more than 100) adenoma type polyps in the colon and increases the risk of colon cancer. Individuals affected with FAP can begin to develop these precancerous polyps in their teens. Without preventative surgery, colon cancer is almost inevitable.     Attenuated Familial Adenomatous Polyposis (AFAP)  AFAP is also a condition that causes colon polyps. It is milder than Classic FAP, however, and the lifetime risk of colon cancer is lower at approximately 70%.  Usually individuals affected with AFAP will have  adenoma type polyps.     FAP and AFAP may also be known as Ernst Syndrome or Turcot Syndrome.    APC Gene  We each inherit two copies of every gene in our bodies: one from our mother, and one from our father.  Each gene has a specific job to do.  When a gene has a mistake or  mutation  in it, it does not work like it should.  Everyone has two copies of the APC gene.  A single mutation in one of the copies of the APC gene causes FAP or AFAP.  This causes the formation of polyps and increases colon cancer risk.  The risk for other types of cancers including gastric, pancreatic, and certain brain tumors is also slightly increased.   The table below shows the chance that someone with an APC mutation would develop cancer in their lifetime1,2.     Lifetime Cancer Risks   Colon %   Duodenal 4-12%   Thyroid <2%   Liver (before age 5) 1-2%   Pancreatic <1%   Stomach <1%     Other Findings  Individuals with FAP may also show these  features:    Polyps in the stomach and small bowel    CHRPE: freckle like spots on the inside of the eye    Desmoid Tumors: benign tumors typically found in the abdomen    Osteomas: benign bony tumors typically found in the skull and/or jaw    Epidermoid cysts: benign cysts found on the skin    Extra teeth or other dental abnormalities     Inheritance   APC mutations are inherited in an autosomal dominant pattern.  This means that if a parent has a mutation, each of his or her children will have a 50% chance of inheriting that same mutation.  Therefore, each child--male or female--would have a 50% chance of being at increased risk for developing colon polyps and cancer.  Up to 30% of people with an APC mutation, however, did not inherit it from a parent.  Rather, the mutation occurred de caitlin (for the first time) in them.  Now that they have the mutation, however, they can pass it on to their children.              Image obtained from Genetics Home Reference, 2013    Genetic Testing  Genetic testing involves a simple blood test and will look at the genetic information in the APC gene for any harmful mutations that are associated with increased risk for polyps and cancer.  If possible, it is recommended that the person(s) who has had polyps or cancer be tested before other family members.  That person will give us the most useful information about whether or not a specific gene is associated with the cancer in the family.     Results  There are three possible results of APC genetic testing:    Positive--a harmful mutation was identified    Negative--no mutation was identified    Variant of unknown significance--a variation in the gene was identified, but it is unclear how this impacts cancer risk in the family    Advantages and Disadvantages  There are advantages and disadvantages to genetic testing of this gene.    Advantages    May clarify your cancer risk    Can help you make medical decisions    May explain the  polyps and cancers in your family    May give useful information to your family members (if you share your results)    Disadvantages    Possible negative emotional impact of learning about inherited cancer risk    Uncertainty in interpreting a negative test result in some situations    Possible genetic discrimination concerns (see below)        Reducing Cancer Risk  Current screening recommendations for people with Classic FAP include1:    Colon:   o Annual colonoscopy or sigmoidoscopy starting at age 10-15 years  o Removal of the colon with continued rectal surveillance as needed    Duodenum and Stomach:   o Baseline upper endoscopy including side-viewing starting at age 25-30; repeated based on polyp findings3  o Consider adding small bowel visualization to CT or MRI done for desmoids (below)    Thyroid:    o Annual thyroid exam starting in late teens  o Consider annual thyroid ultrasound    CNS: Annual physical exam    Liver:   o Liver palpation, abdominal ultrasound, and AFP measurement; repeated every 3-6 months until age 5    Desmoids:   o Annual abdominal palpation    o Consider abdominal MRI or CT 1-3 years after colectomy; repeated every 5-10 years    Current screening recommendations for individuals with Attenuated FAP (AFAP) include1:    Colon:   o Colonoscopy starting at late teens; repeated every 2-3  o Consider removal of the colon when more than 20 or 30 adenoma polyps are found    Duodenum and Stomach:   o Baseline upper endoscopy starting at age 25-30; repeated based on findings    Thyroid:  Annual thyroid exam     CNS: Annual physical exam    Some medications are available that may reduce the number of polyps.  Osteomas, desmoids, and epidermoid cysts should be evaluated for treatment or removal.    The age at which to start and repeat screening may be modified based on personal and family history.    Genetic Information Nondiscrimination Act (ESTELLA)  ESTELLA is a federal law that protects individuals  from health insurance or employment discrimination based on a genetic test result alone.  Although rare, there are currently no legal protections in terms of life insurance, long term care, or disability insurances.  Visit the National Human Genome Research [place] at Genome.gov/77707743 to learn more.    Questions to Think About Regarding Genetic Testing    What effect will the test result have on me and my relationship with my family members if I have an inherited gene mutation?  If I don t have a gene mutation?    Should I share my test results, and how will my family react to this news, which may also affect them?    Are my children ready to learn new information that may one day affect their own health?    Resources  Colorectal Cancer Coalition [email or web address]   Colon Cancer [place] (CCA) ccalliance.org   American Cancer Society (ACS) cancer.org   National Cancer [place] (NCI) cancer.gov     Please call us if you have any questions or concerns.     Cancer Risk Management Program [phone] ([phone])  ? Sara Lester, MS, [facility]  [phone]  ? Corazon Perera, MS, [facility]  [phone]   ? Treasure Cleveland, MS, [facility]  [phone]   ? Jeannine Mcpherson, MS, [facility]  [phone]    References  1. National Comprehensive Cancer Network. Clinical practice guidelines in oncology, colorectal cancer screening. Available online (registration required). 2014.  2. Marlen BARKLEY, Familia J, Tavo KM, Arabella RA, Matssaul N, Rohan J, Eladio G, Reginaldo MF, [place] RW. American  mutation for attenuated familial adenomatous polyposis. Clin Gastroenterol Hepatol. 2008;6:46-52.  3. National Comprehensive Cancer Network. Gastric Cancer. Available online (registration required). 2015.

## 2018-07-31 ENCOUNTER — TELEPHONE (OUTPATIENT)
Dept: ONCOLOGY | Facility: CLINIC | Age: 53
End: 2018-07-31

## 2018-08-09 NOTE — LETTER
Cancer Risk Management  Program Locations    [facility]  Mailing Address  Cancer Risk Management Program  [facility]  [address]  [place] [address]    New patient appointments  [phone]  June 26, 2018    Sarah Duran  [address] [address]      Dear Sarah,    It was a pleasure meeting with you at the [facility] on 6/25/2018.  Here is a copy of the progress note from your recent genetic counseling visit to the Cancer Risk Management Program.  If you have any additional questions, please feel free to call.    Referring Provider: Clarissa Brown, APRN-CNS, OCN, ANG-BC    Presenting Information:   Sarah Duran came in to clinic for a follow-up visit today. She had previously been seen in the Cancer Risk Management Program at the [facility] on 4/27/2017. Sarah came to clinic today to revisit genetic testing options due to her personal history of colon polyposis and family history of colon cancer.    Discussion:    We previously reviewed the details of Sarah's family and personal history. See note from previous appointment for details.  She is currently being followed by Clarissa Brown, Clinical Nurse Specialist with the Cancer Risk Management Program, for high risk breast screening due to her personal history of atypical ductal hyperplasia. No significant family history updates were made today.    We reviewed the natural history and genetics of several polyposis syndromes, including Familial Adenomatous Polyposis (FAP) and MUTYH Associated Polyposis (MAP). Topics included: inheritance pattern, cancer risks, cancer screening recommendations, and also risks, benefits and limitations of testing.    We reviewed that there are additional genes that could cause   Section Procedure Note  Preoperative Diagnosis:  36 year old  female at 37w4d   Active Hospital Problems    Diagnosis Date Noted   • Chronic hypertension with superimposed preeclampsia (MFM) 2018     Priority: Medium     Class: Current Pregnancy   • Transverse lie of fetus 2018     Priority: Low   • Elderly multigravida (MFM) 2018     Priority: Low     Class: Current Pregnancy   • Supervision of normal pregnancy  2017     Priority: Low   • Benign essential hypertension 2016     Priority: Low        Postoperative Diagnosis:    Same    Procedure:  primary low transverse  section    Surgeon:  Jackson Chino M.D.    Asst.:  Tech asst    Specimen:  Arterial and venous cord gas.  Cord blood    Anesthesia:  Spinal    Estimated Blood Loss:  1800 ml    Complications:  None.    Drains:  Pickens to gravity    Findings:  7 lbs,  0 oz.  female.   Apgars: 8/9     Details of Procedure:  The risks, benefits, complications, treatment options, and expected outcomes were discussed with the patient.  The patient concurred with the proposed plan, giving informed consent.  The site of surgery properly noted/marked. The patient was taken to the  section room, identified as Ambar Holt and the procedure verified as  Section Delivery. A Time Out was held and the above information confirmed.    After induction of anesthesia, the patient was prepped and draped in supine position with left lateral tilt.  A Pfannenstiel incision was made and carried down through the subcutaneous tissue to the fascia. The fascial incision was made and extended laterally using Hdz scissors. The rectus muscles were then dissected off sharply and bluntly. The parietal peritoneum was entered sharply and then extended superiorly and inferiorly. Inferior extension was with direct visualization of the bladder.     An Hussein-O retractor was placed into the abdomen.  The visceral peritoneum on  increased risk of colon polyps and colon cancer.  As many of these genes present with overlapping features in a family, it would be reasonable for Sarah to consider panel genetic testing to analyze multiple genes at once.  Sarah has a detailed handout regarding these genes and the information discussed from her previous appointment, which she brought to her visit today.    During her previous genetic counseling visit, Sarah stated that she may have completed genetic testing in the past for FAP, however these records were not available for review.  She  had elected to postpone genetic testing at that time, and stated today that no known genetic test was completed.  She was interested in competing this testing today.       We reviewed genetic testing options due to her personal history of colon polyposis and family history of colon cancer , including Familial Adenomatous Polyposis (caused by mutations in the APC gene) and MUTYH Associated Polyposis (caused by mutations in the MUTYH gene).  We also discussed that expanded genetic testing is available for 17 genes associated with increased risk for colon cancer: ColoNext (APC, BMPR1A, CDH1, CHEK2, GREM1, EPCAM, MLH1, MSH2, MSH6, MUTYH, PMS2, POLD1, POLE, PTEN, SMAD4, STK11, and TP53).  Sarah elected to proceed with the ColoNext panel.    o Genetic testing is available for 17 genes associated with increased risk for colon cancer: [facility]oNext (APC, BMPR1A, CDH1, CHEK2, GREM1, EPCAM, MLH1, MSH2, MSH6, MUTYH, PMS2, POLD1, POLE, PTEN, SMAD4, STK11, and TP53).  o We discussed that some of the genes in the ColoNext panel are associated with specific hereditary cancer syndromes and have published management guidelines::  Adair syndrome (MLH1, MSH2, MSH6, PMS2, EPCAM), Familial Adenomatous Polyposis (APC), MUTYH Associated Polyposis (MUTYH), Juvenile Polyposis syndrome (SMAD4, BMPR1A), Peutz-Jeghers syndrome (STK11), Cowden syndrome (PTEN), Li Fraumeni syndrome (TP53), Hereditary  the uterus was entered sharply and extended laterally. The bladder flap was created sharply and pushed below the retractor. A scalpel was used to make a low transverse uterine incision. The incision was extended laterally using the index fingers.    A 7 lb, 0 oz female was delivered from a transverse presentation turned to a beech and then delivered without difficulty.  No nuchal cord noted. Nose and mouth were suctioned upon the abdomen.  After the umbilical cord was clamped and cut, cord gases and cord blood were obtained for evaluation.    Placenta was extracted manually. Inspection revealed it to be intact with a 3 vessel cord.  The uterus was cleared of clot and debris.  The uterus, tubes and ovaries appeared normal.    The uterine incision was closed in 2 layers.  The first was a running locking stitch of 0 Vicryl.  The second was an imbricating stitch 0 Vicryl. Hemostasis was assured and where necessary figure-of-eight stitch was placed.    The abdomen was cleared of clot and debris and irrigated with normal saline.  The Hussein-O retractor was removed.  Fascia was closed using 0 Vicryl. Hemostasis was obtained in the subcutaneous tissue using Bovie. The skin was reapproximated using 4-0 Monocryl. Dermabond dressing was applied.    Patient was taken to recovery/postoperative blood in stable condition. All lap instrument and needle counts were completed at the conclusion of the procedure. She did receive prophylactic antibiotics. SCDs on throughout the case and then to recovery.           Diffuse Gastric Cancer (CDH1).   o The CHEK2, GREM1, POLD1, and POLE genes have also been associated with increased colon cancer risk and have published management guidelines.    Sarah was previously provided with a detailed brochure from [facility] explaining the ColoNext testing, which she brought to her appointment today.    Consent was obtained and genetic testing for ColoNext was sent to [facility]. Turn around time: 3-4 weeks after insurance authorization is obtained.     The information from this test may determine cancer screening recommendations as well as options for risk reducing surgeries for Sarah and family members.  These recommendations will be discussed in detail once genetic testing is completed.    Plan:  1. All of Sarah's questions were answered.   2. Saarh signed a consent form at the conclusion of this visit and had her blood drawn for the ColoNext panel offered through [facility].   3. Sarah will return to clinic to discuss the results.  Turn around time: 3-4 weeks once insurance authorization is obtained     Treasure Cleveland MS, [facility]  Licensed Genetic Counselor  [phone]

## 2018-09-18 DIAGNOSIS — D13.91 FAP (FAMILIAL ADENOMATOUS POLYPOSIS): ICD-10-CM

## 2018-09-18 RX ORDER — SULINDAC 200 MG/1
200 TABLET ORAL 2 TIMES DAILY WITH MEALS
Qty: 180 TABLET | Refills: 11 | Status: SHIPPED | OUTPATIENT
Start: 2018-09-18 | End: 2019-07-23

## 2018-11-07 DIAGNOSIS — D13.91 FAP (FAMILIAL ADENOMATOUS POLYPOSIS): ICD-10-CM

## 2018-11-07 DIAGNOSIS — Z15.89 MONOALLELIC MUTATION OF APC GENE: Primary | ICD-10-CM

## 2018-11-07 DIAGNOSIS — Z15.09 MONOALLELIC MUTATION OF APC GENE: Primary | ICD-10-CM

## 2018-11-08 ENCOUNTER — ONCOLOGY VISIT (OUTPATIENT)
Dept: ONCOLOGY | Facility: CLINIC | Age: 53
End: 2018-11-08
Attending: COLON & RECTAL SURGERY
Payer: COMMERCIAL

## 2018-11-08 ENCOUNTER — HOSPITAL ENCOUNTER (OUTPATIENT)
Dept: MAMMOGRAPHY | Facility: CLINIC | Age: 53
Discharge: HOME | End: 2018-11-08
Attending: CLINICAL NURSE SPECIALIST | Admitting: CLINICAL NURSE SPECIALIST
Payer: COMMERCIAL

## 2018-11-08 VITALS
HEART RATE: 57 BPM | SYSTOLIC BLOOD PRESSURE: 115 MMHG | RESPIRATION RATE: 18 BRPM | DIASTOLIC BLOOD PRESSURE: 73 MMHG | OXYGEN SATURATION: 100 % | TEMPERATURE: 97.7 F | BODY MASS INDEX: 27.86 KG/M2 | WEIGHT: 154.8 LBS

## 2018-11-08 DIAGNOSIS — D13.91 FAP (FAMILIAL ADENOMATOUS POLYPOSIS): Primary | ICD-10-CM

## 2018-11-08 DIAGNOSIS — Z12.31 ENCOUNTER FOR SCREENING MAMMOGRAM FOR HIGH-RISK PATIENT: ICD-10-CM

## 2018-11-08 DIAGNOSIS — N60.99 DUCTAL HYPERPLASIA, ATYPICAL, BREAST: ICD-10-CM

## 2018-11-08 DIAGNOSIS — N60.91 ATYPICAL DUCTAL HYPERPLASIA OF RIGHT BREAST: ICD-10-CM

## 2018-11-08 DIAGNOSIS — R92.30 DENSE BREAST: ICD-10-CM

## 2018-11-08 DIAGNOSIS — Z12.39 BREAST CANCER SCREENING, HIGH RISK PATIENT: ICD-10-CM

## 2018-11-08 PROCEDURE — 99214 OFFICE O/P EST MOD 30 MIN: CPT | Performed by: CLINICAL NURSE SPECIALIST

## 2018-11-08 PROCEDURE — G0463 HOSPITAL OUTPT CLINIC VISIT: HCPCS

## 2018-11-08 PROCEDURE — 77063 BREAST TOMOSYNTHESIS BI: CPT

## 2018-11-08 ASSESSMENT — PAIN SCALES - GENERAL: PAINLEVEL: NO PAIN (0)

## 2018-11-08 NOTE — PROGRESS NOTES
Oncology Risk Management Consultation:  Date on this visit: 2018    Sarah Duran  returns to the clinic today for a follow up appointment. She requires high risk screening and surveillance to reduce her risk of cancer secondary to having a history of atypical ductal hyperplasia (ADH) .  She is considered to be at high risk for breast cancer, with a risk of up to 35%.    She also carries an APC+ genetic mutation, consistent with a diagnosis of Familial Adenomatous Polyposis (FAP) or attenuated FAP (AFAP)    Primary Physician: Ida Sandoval MD    History Of Present Illness:  Ms. Duran is a very pleasant 53 year old female who presents with a personal history of ADH and a likely attenuated version of Familial Adenomatous Polyposis. She is s/p right excisional breast biopsy on 2016. She is also s/p ileocolonic anastomosis in .    Genetic Testin2018: POSITIVE for a APC mutation.  Specifically her mutation is called [id number],  consistent with a diagnosis of familial adenomatous polyposis (FAP) or attenuated FAP (AFAP). The mutation was identified using [employer] testing through [employer].  The testing was done because of her personal history of colonic polyposis and family history of colon cancer.     Of note, Sarah tested negative for mutations in the following genes: BMPR1A, CDH1, CHEK2, MLH1, MSH2, MSH6, MUTYH, PMS2, POLD1, POLE, PTEN, SMAD4, STK11 and TP53 (sequencing and deletion/duplication); EPCAM and GREM1 (deletion/duplication only).       Pertinent history:  Attenuated Familial adenomatous polyposis, s/p ileal rectal anastomosis in May 1988. Confirmed with APC testing (see below)  Subsequent laparotomy in  for bowel obstruction from adhesions.  Sulindac 200 mg BID to suppress rectal polyps. Taking 1/2 pill BID for total dose of 200 daily.  Menarche at 12.  Nulliparous.  Menopause at 50.  Ovaries, fallopian tubes and uterus in place.  2016 - R excisional  breast biopsy with seed localization, Fibrocystic tissue with ADH on pathology.    Pertinent screening history (see chart for further details)  11/7/2005 - Small bowel enteroscopy, one tubular adenoma removed from partial anal verge.  12/7/2005 - adenomatous polyp removed from rectum  2/28/2005 - tubular adenoma, duodenum.  11/16/2011 - fundic gland polyps in stomach, tubular adenoma removed from rectum.  11/5/2012 - 1 tubulovillous adenoma, rectum  7/8/2013 - 1 tubulovillous adenoma, rectum  12/3/2013 - 1 tubulovillous adenoma, rectum  7/21/2014 - 1 tubular adenoma and hyperplastic polyp, rectum  1/20/2015 - 1 tubular adenoma and hyperplastic polyp, rectum  12/7/2015 - 1 tubular adenoma, rectum  6/6/2016 - 1 fundic gland polyp with focal adenomatous changes  3/20/2017 - 2 tubular adenomas removed from rectum  4/12/2017 - colonoscopy and EGD, one fundic gland gastric polyp with focal low grade dysplasia removed, multiple duodenal polyps and multiple rectal polyps.    4/24/2017 - R breast mammogram, BiRads2.  5/25/2018 - Breast MRI, BiRads2.    Review of Systems:  GENERAL: No change in weight, sleep or appetite.  Normal energy.  No fever or chills  EYES: Negative for vision changes or eye problems  ENT: No problems with ears, nose or throat.  No difficulty swallowing.  BREASTS: Denies breast pain, asymmetry, lumps, masses, thickening, nipple discharge and skin changes.  RESP: No coughing, wheezing or shortness of breath  CV: No chest pains or palpitations  GI: No nausea, vomiting,  heartburn, abdominal pain, diarrhea, constipation or change in bowel habits  : No urinary frequency or dysuria, bladder or kidney problems  MUSCULOSKELETAL: No significant muscle or joint pains  NEUROLOGIC: No headaches, numbness, tingling, weakness, problems with balance or coordination  PSYCHIATRIC: No problems with anxiety, depression or mental health  HEME/IMMUNE/ALLERGY: No history of bleeding or clotting problems or anemia.  No  allergies or immune system problems  ENDOCRINE: No history of thyroid disease, diabetes or other endocrine disorders  SKIN: No rashes,worrisome lesions or skin problems       Past Medical/Surgical History:  Past Medical History:   Diagnosis Date     Asthma      Atypical ductal hyperplasia of right breast 10/2016    fibrocystic changes, PASH and atypical ductal hyperplasia on needle biopsy, s/p R excisional biopsy with radioactive seed localization     Factor 5 Leiden mutation, heterozygous (H)     factor 5     Familial adenomatous polyposis 1988    clinical presentation; s/p total abdominal colectomy with TRACEY; confirmed APC+ 2018     GERD (gastroesophageal reflux disease)      Migraine      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    [id number]     Pancreatitis, acute      PONV (postoperative nausea and vomiting)      Past Surgical History:   Procedure Laterality Date     BIOPSY BREAST SEED LOCALIZATION Right 11/30/2016    Procedure: BIOPSY BREAST SEED LOCALIZATION;  Surgeon: Jessica Rasheed MD;  Location:  OR      LAP,SURG,COLECTOMY,W/ANAST      for colon cancer     COLECTOMY      subtotal     COMBINED ESOPHAGOSCOPY, GASTROSCOPY, DUODENOSCOPY (EGD) WITH ARGON PLASMA COAGULATOR (APC) N/A 4/12/2018    Procedure: COMBINED ESOPHAGOSCOPY, GASTROSCOPY, DUODENOSCOPY (EGD) WITH ARGON PLASMA COAGULATOR (APC);  Esophagogastroduodenoscopy with polypectomy and polyp ablation;  Surgeon: Stephen Gasca MD;  Location:  OR     ENDOSCOPIC RETROGRADE CHOLANGIOPANCREATOGRAPHY       ESOPHAGOSCOPY, GASTROSCOPY, DUODENOSCOPY (EGD), COMBINED  11/5/2012    Procedure: COMBINED ESOPHAGOSCOPY, GASTROSCOPY, DUODENOSCOPY (EGD), BIOPSY SINGLE OR MULTIPLE;;  Surgeon: Angélica Garcia MD;  Location:  GI     ESOPHAGOSCOPY, GASTROSCOPY, DUODENOSCOPY (EGD), COMBINED  12/3/2013    Procedure: COMBINED ESOPHAGOSCOPY, GASTROSCOPY, DUODENOSCOPY (EGD);;  Surgeon: Angélica Garcia MD;  Location:  GI      ESOPHAGOSCOPY, GASTROSCOPY, DUODENOSCOPY (EGD), COMBINED N/A 3/28/2018    Procedure: COMBINED ESOPHAGOSCOPY, GASTROSCOPY, DUODENOSCOPY (EGD);;  Surgeon: Stephen Gasca MD;  Location: [facility] GI     EXCISE POLYP RECTUM       LAPAROSCOPIC LYSIS ADHESIONS       SIGMOIDOSCOPY FLEXIBLE       SIGMOIDOSCOPY FLEXIBLE N/A 12/7/2015    Procedure: SIGMOIDOSCOPY FLEXIBLE;  Surgeon: Mariela Bearden MD;  Location: [facility] GI     SIGMOIDOSCOPY FLEXIBLE N/A 3/28/2018    Procedure: SIGMOIDOSCOPY FLEXIBLE;  EGD/Flex Sig;  Surgeon: Stephen Gasca MD;  Location: [facility]     SIGMOIDOSCOPY FLEXIBLE N/A 4/12/2018    Procedure: SIGMOIDOSCOPY FLEXIBLE;  Flexible Sigmoidoscopy with polyp ablation;  Surgeon: Stephen Gasca MD;  Location: [facility] OR       Allergies:  Allergies as of 11/08/2018 - Roberto as Reviewed 11/08/2018   Allergen Reaction Noted     Cephalexin  06/14/2004     Cipro [ciprofloxacin]  02/24/2005     Flagyl [metronidazole] Nausea and Vomiting 02/24/2005     Keflex [cephalosporins] Nausea and Vomiting 02/24/2005     Omeprazole  04/29/2010     Prilosec otc [omeprazole magnesium]  01/28/2012     Doxycycline Rash 04/12/2018     Erythromycin Rash 02/24/2005     Sulfa drugs Rash 02/24/2005       Current Medications:  Current Outpatient Prescriptions   Medication Sig Dispense Refill     acetaminophen (TYLENOL) 325 MG tablet Take 2 tablets (650 mg) by mouth every 4 hours as needed for other (mild pain) 100 tablet 0     albuterol 90 MCG/ACT inhaler Inhale 2 puffs into the lungs every 6 hours as needed.       calcium-magnesium (CALMAG) 500-250 MG TABS Take 1 tablet by mouth daily       fexofenadine (ALLEGRA) 180 MG tablet Take 180 mg by mouth daily.       fexofenadine (ALLEGRA) 60 MG tablet        ibuprofen (ADVIL,MOTRIN) 600 MG tablet Take 1 tablet (600 mg) by mouth every 6 hours as needed for pain (mild) 30 tablet 0     MELATONIN PO Take 1 mg by mouth nightly as needed       multivitamin, therapeutic with minerals (THERA-VIT-M)  TABS Take 1 tablet by mouth daily.       Omega-3 Fatty Acids (OMEGA-3 FISH OIL PO) Take  by mouth.       ondansetron (ZOFRAN) 4 MG tablet Take 1 tablet (4 mg) by mouth every 8 hours as needed for nausea 18 tablet 0     pramipexole (MIRAPEX) 0.25 MG tablet Take 0.25 mg by mouth       sulindac (CLINORIL) 200 MG tablet Take 1 tablet (200 mg) by mouth 2 times daily (with meals) 180 tablet 11     triamcinolone (KENALOG) 0.1 % cream        triamcinolone (NASACORT) 55 MCG/ACT Inhaler Spray 2 sprays in nostril       triamcinolone (NASACORT) 55 MCG/ACT Inhaler Spray 2 sprays into both nostrils daily       ZOLMitriptan (ZOMIG PO) Take  by mouth at onset of headache.          Family History:  Family History   Problem Relation Age of Onset     Hyperlipidemia Mother      Cerebrovascular Disease Mother      Cerebrovascular Disease Father      Colon Cancer Father 57      at 63     Prostate Cancer Brother 53     Prostate Cancer Maternal Uncle 65       Social History:  Social History     Social History     Marital status:      Spouse name: N/A     Number of children: 2     Years of education: N/A     Occupational History      [employer]     Social History Main Topics     Smoking status: Never Smoker     Smokeless tobacco: Never Used     Alcohol use No     Drug use: No     Sexual activity: Not on file      Comment: denies pregnancy     Other Topics Concern     Not on file     Social History Narrative    Two adopted daughters, ages 13 and 15       Physical Exam:  /73 (BP Location: Right arm, Patient Position: Sitting, Cuff Size: Adult Regular)  Pulse 57  Temp 97.7  F (36.5  C) (Oral)  Resp 18  Wt 70.2 kg (154 lb 12.8 oz)  SpO2 100%  BMI 27.86 kg/m2    GENERAL APPEARANCE: healthy, alert and no distress     NECK: no adenopathy, no asymmetry or masses; no thyroid nodules palpated. Thyroid cartilage symmetrical.     LYMPHATICS: No cervical, supraclavicular or axillary  lymphadenopathy     RESP:  lungs clear to auscultation - no rales, rhonchi or wheezes     CARDIOVASCULAR: regular rate and rhythm, normal S1 S2, no S3 or S4 and no murmur.   BREASTS: Examined in a sitting and lying position. L sl>R, No visible distortion, swelling or rashes. No nipple inversion, nipple discharge, breast dimpling or puckering bilaterally. Breast tissue is heterogenous dense with fibrocystic changes throughout on  palpation bilaterally.  Axillary area without masses or lymphadenopathy.  SKIN: no suspicious lesions or rashes on upper extremities, anterior torso or face. Oval, hard, raised, non movable, subcutaneous nodule palpated on upper sternum, to the upper right of Left breast. About 1-2 cm in diameter. Two areas of scar tissue perpendicular to nodule in upper mid sternal area.    Laboratory/Imaging Studies  Results for orders placed or performed in visit on 06/25/18   Carolann [employer] Skype test 8822: Laboratory Miscellaneous Order   Result Value Ref Range    Miscellaneous Test         Specimen Received, Reordered and sent to Performing laboratory - Report to follow upon   completion.     Send outs misc test   Result Value Ref Range    Lab Scanned Result SEND OUTS MISC TEST-Scanned (A)        ASSESSMENT  We reviewed her history and screening plan for both AFAP and ADH.     Atypical hyperplasias (ADH and ALH), especially multifocal lesions, confer a substantial increase in the risk of subsequent breast cancer (relative risk of 3.7 to 5.3)  Atypia is associated with an increased risk of both ipsilateral and contralateral breast cancer and thus provides evidence of underlying breast abnormalities that predispose to breast cancer. In a report from the Nurses' Health Study, 56 percent of cancers that developed in women with AH occurred in the ipsilateral breast. The cumulative incidence of breast cancer over 30 years approached 35 percent. (Jass et al. 2007. Magnitude and laterality of breast cancer risk according to  histological type of atypical hyperplasia: results from the Nurses Health Study. Cancer. 109:180.)    We discussed signs and symptoms to be watchful for in between surveillance visits.  She verbalized understanding of signs and symptoms to address with her health care providers including lumps, thickening, swelling, tenderness, nipple discharge or changes in skin of breasts.    We discussed the differences between cancer risk for the general population and those with FAP according to the NCCN Guidelines  and the Gene Review from the [facility].  Familial adenomatous polyposis (FAP) occurs with an autosomal dominant genetic mutation of the adenomatous polyposis coli (APC) gene located on chromosome [id number].  This syndrome, which results in more than 100 colon polyps, occurs in approximately 1:10,000 to 1:30,000 live births.   It accounts for less than 1 percent of the total colon cancer risk in the [place]. Approximately 10-30 percent of patients who meet the clinical criteria for FAP do not have a detectable APC genetic mutation.      People with an FAP have a cumulative risk of developing colon cancer by age 80 of 70%, compared to the 4.5% risk within the general population.     They also have a 4-12 percent lifetime risk of duodenal ampullary carcinoma. They are also reported to have a greater risk of papillary thyroid cancer, with cribriform pattern,  gastric carcinoma and central nervous system tumors, mostly medulloblastomas.  Upper GI polyps occur commonly at least  30 percent of patients with FAP (up to 100 percent in some studies).  Although patients may develop adenomas in the distal small bowel and stomach (20-40 percent of FAP patients), the risk for cancer is not as great as that for colon cancer.    We reviewed her screening plan. Her exam was unremarkable; she will have a mammogram after her visit today. She will defer her thyroid ultrasound and coordinate that around her breast  screening in May.     We also discussed that she could consider using anastrozole off label, to reduce her risk for breast cancer.  The recommendation, per [employer], and with consultation with Dr. Daly Levin, would be a safer option for her than tamoxifen, as she has Factor V Leiden.  Anastrozole has been shown to reduce the incidence of breast cancer by 50% in the KENDRA-II clinical trial. She is not interested in this today.    INDIVIDUALIZED CANCER RISK MANAGEMENT PLAN:  Individualized Surveillance Plan for women  With 20% or greater lifetime risk of breast cancer   Per NCCN Breast Cancer Screening and Diagnosis Guidelines Version 2.2018    Recommended screening Test or procedure Last done Next Scheduled    Clinical encounter Ongoing risk assessment, risk reduction counseling and clinical breast exam, every 6-12 months. Refer to genetic counseling if not already done.   11/8/2018 November 2019   However, some family histories with breast cancers at a very young age, may warrant screening starting earlier.    *May begin at age 40 if breast cancers in the family occur at later ages.    Annual mammogram beginning 10 years younger than the earliest breast cancer in the family but not prior to age 30.    Recommend annual breast MRI to begin 10 years younger than the earliest breast cancer in the family but not prior to age 25.    Breast MRIs are preferably done on day 7-15 of the menstrual cycle in premenopausal women.       NA       NA   Breast screening for patients at high risk due to thoracic radiation before 30 years old    Begin 10 years post radiation treatment or age 25.   Annual mammogram beginning 10 years after radiation but not prior to age 30    Annual breast MRI, preferably on day 7-15 of menstrual cycle for premenopausal women.       NA     NA   Women who have a lifetime risk of >20% based on history of LCIS or ADH/ALH Annual screening mammogram beginning at age of LCIS or ADH/ALH but not prior to age  30.    Consider annual MRI to begin at age of diagnosis of LCIS or ADH/ALH but not prior to age 25.    Consider risk reducing strategies. 5/25/2018 - Breast MRI, BiRads2    4/24/2017 - Right breast Mammogram, BiRads2 NEXT: Tomosynthesis mammogram today after appt.    Next breast MRI in late May (no sooner than 5/26)    Next exam in November 2019 followed by tomosynthesis mammogram    Recommend risk reducing strategies for women with 1.7% 5 year risk of breast cancer.       Individualized Surveillance Plan for Familial Adenomatous Polyposis                 for Children and Adults Based on NCCN Guidelines Version 1.2018   Type of Screening  Recommendation  Last Done  Next Due    Recommendations for Children    Colon Cancer Screening  Flexible sigmoidoscopy or colonoscopy every 12 months beginning at age 10-15.       See below   See below   Hepatoblastoma  Consider liver palpation, abdominal ultrasound, and measurement of AFP every 3-6 months, during first 5 years of life.  NA NA   Thyroid Cancer Screening  Annual thyroid examination, starting in teenage years.   See below See below   Recommendations for Adults    Colon Cancer Screening  Colectomy with ileorectal anastomosis (TRACEY)  recommended, then endoscopic evaluation of the rectum every 6-12 months, depending on the polyp burden.     If ileo proctocolectomy with ileal pouch anal anastomosis, endoscopic surveillance of ileal pouch every 1-3 years depending on polyp burden.    Surveillance frequency should be increased to every 6 months for large, flat polyps with villous histology and/or high grade dysplasia  Ileocolonic anastomosis in 1988 for polyposis    4/12/2018 - Colonoscopy with Dr. Gasca, multiple small polyps found in rectum   April 2019 in conjunction with EGD with full duodenoscopy   Duodenal or periampullary cancer screening and gastric cancer screening  Baseline upper endoscopy including side-viewing examination. Start between 20-25 or earlier if  colectomy was prior to age 20. Base subsequent screenings on findings (see below).    Manage non-fundic gland polyps endoscopically if possible.      These occur in the majority of FAP patients and may have focal low grade dysplasia but typically are non progressive.Special screening or surgery should only be considered in the presence of high grade dysplasia.   4/12/2018 - multiple duodenal polyps, ablated. Multiple fundic gland polyps in stomach, one with low grade dysplasia   April 2019 in conjunction with colonoscopy   Thyroid Cancer Screening    Annual thyroid examination.    Annual ultrasound of thyroid may be considered.  11/8/2018 November 2019   CNS Cancer  Annual physical examination.  11/8/2018 November 2019   Intra-abdominal desmoids  Annual abdominal palpation.     If family history of symptomatic desmoids, consider abdominal MRI or CT 1-3 years post colectomy and then every 5-10 years.     11/8/2018 November 2019   Small bowel polyps and cancer  Consider adding small bowel visualization to CT or MRI for desmoids, especially if duodenal polyposis is advanced.  Discuss Defer to Dr. Gasca's assessment   Duodenal findings:  Stage 0 (no polyposis) - repeat endoscopy every 4 years.  Stage 1 (minimal polyposis 1-4 tubular adenomas, 1-4mm each) - repeat EGD every 2-3 years.  Stage 2 (mild polyposis - 5-9 tubular adenomas, 5-9 mm each) - repeat EGD every 1-3 years.  Stage 3 (moderate polyposis - >20 lesions or size >1 cm) - repeat EGD every 6-12 months  Stage 4 (dense polyposis or high grade dysplasia) - surgical evaluation, with expert surveillance every 3-6 months.  Due to limited data, no screening recommendation is made for pancreatic cancer at this time.        I spent 30 minutes with the patient with greater that 50% of it in counseling and coordinating care as documented above.    Clarissa Brown, REYNOLD-CNS, OCN, AGN-BC  Clinical Nurse Specialist  Cancer Risk Management Program  [facility]  [address]  [place] [address]    phone:  [phone]  Pager: [phone]  fax: [phone]    CC: MD See Isabel MD Alyssa Kne, [facility]

## 2018-11-08 NOTE — MR AVS SNAPSHOT
After Visit Summary   11/8/2018    Sarah Duran    MRN: [id number]           Patient Information     Date Of Birth          1965        Visit Information        Provider Department      11/8/2018 11:30 AM Clarissa Brown APRN [facility]        Today's Diagnoses     FAP (familial adenomatous polyposis)    -  1    Ductal hyperplasia, atypical, breast        Dense breast        Breast cancer screening, high risk patient          Care Instructions    Individualized Surveillance Plan for women  With 20% or greater lifetime risk of breast cancer   Per NCCN Breast Cancer Screening and Diagnosis Guidelines Version 2.2018    Recommended screening Test or procedure Last done Next Scheduled    Clinical encounter Ongoing risk assessment, risk reduction counseling and clinical breast exam, every 6-12 months. Refer to genetic counseling if not already done.   11/8/2018 November 2019   However, some family histories with breast cancers at a very young age, may warrant screening starting earlier.    *May begin at age 40 if breast cancers in the family occur at later ages.    Annual mammogram beginning 10 years younger than the earliest breast cancer in the family but not prior to age 30.    Recommend annual breast MRI to begin 10 years younger than the earliest breast cancer in the family but not prior to age 25.    Breast MRIs are preferably done on day 7-15 of the menstrual cycle in premenopausal women.       NA       NA   Breast screening for patients at high risk due to thoracic radiation before 30 years old    Begin 10 years post radiation treatment or age 25.   Annual mammogram beginning 10 years after radiation but not prior to age 30    Annual breast MRI, preferably on day 7-15 of menstrual cycle for premenopausal women.       NA     NA   Women who have a lifetime risk of >20% based on history of LCIS or ADH/ALH Annual screening mammogram beginning at age of LCIS or  ADH/ALH but not prior to age 30.    Consider annual MRI to begin at age of diagnosis of LCIS or ADH/ALH but not prior to age 25.    Consider risk reducing strategies. 5/25/2018 - Breast MRI, BiRads2    4/24/2017 - Right breast Mammogram, BiRads2 NEXT: Tomosynthesis mammogram today after appt.    Next breast MRI in late May (no sooner than 5/26)    Next exam in November 2019 followed by tomosynthesis mammogram    Recommend risk reducing strategies for women with 1.7% 5 year risk of breast cancer.       Individualized Surveillance Plan for Familial Adenomatous Polyposis                 for Children and Adults Based on NCCN Guidelines Version 1.2018   Type of Screening  Recommendation  Last Done  Next Due    Recommendations for Children    Colon Cancer Screening  Flexible sigmoidoscopy or colonoscopy every 12 months beginning at age 10-15.       See below   See below   Hepatoblastoma  Consider liver palpation, abdominal ultrasound, and measurement of AFP every 3-6 months, during first 5 years of life.  NA NA   Thyroid Cancer Screening  Annual thyroid examination, starting in teenage years.   See below See below   Recommendations for Adults    Colon Cancer Screening  Colectomy with ileorectal anastomosis (TRACEY)  recommended, then endoscopic evaluation of the rectum every 6-12 months, depending on the polyp burden.     If ileo proctocolectomy with ileal pouch anal anastomosis, endoscopic surveillance of ileal pouch every 1-3 years depending on polyp burden.    Surveillance frequency should be increased to every 6 months for large, flat polyps with villous histology and/or high grade dysplasia  Ileocolonic anastomosis in 1988 for polyposis    4/12/2018 - Colonoscopy with Dr. Gasca, multiple small polyps found in rectum   April 2019 in conjunction with EGD with full duodenoscopy   Duodenal or periampullary cancer screening and gastric cancer screening  Baseline upper endoscopy including side-viewing examination. Start  between 20-25 or earlier if colectomy was prior to age 20. Base subsequent screenings on findings (see below).    Manage non-fundic gland polyps endoscopically if possible.      These occur in the majority of FAP patients and may have focal low grade dysplasia but typically are non progressive.Special screening or surgery should only be considered in the presence of high grade dysplasia.   4/12/2018 - multiple duodenal polyps, ablated. Multiple fundic gland polyps in stomach, one with low grade dysplasia   April 2019 in conjunction with colonoscopy   Thyroid Cancer Screening    Annual thyroid examination.    Annual ultrasound of thyroid may be considered.  11/8/2018 November 2019   CNS Cancer  Annual physical examination.  11/8/2018 November 2019   Intra-abdominal desmoids  Annual abdominal palpation.     If family history of symptomatic desmoids, consider abdominal MRI or CT 1-3 years post colectomy and then every 5-10 years.     11/8/2018 November 2019   Small bowel polyps and cancer  Consider adding small bowel visualization to CT or MRI for desmoids, especially if duodenal polyposis is advanced.  Discuss Defer to Dr. Gasca's assessment   Duodenal findings:  Stage 0 (no polyposis) - repeat endoscopy every 4 years.  Stage 1 (minimal polyposis 1-4 tubular adenomas, 1-4mm each) - repeat EGD every 2-3 years.  Stage 2 (mild polyposis - 5-9 tubular adenomas, 5-9 mm each) - repeat EGD every 1-3 years.  Stage 3 (moderate polyposis - >20 lesions or size >1 cm) - repeat EGD every 6-12 months  Stage 4 (dense polyposis or high grade dysplasia) - surgical evaluation, with expert surveillance every 3-6 months.  Due to limited data, no screening recommendation is made for pancreatic cancer at this time.                  Follow-ups after your visit        Additional Services     GASTROENTEROLOGY ADULT REF PROCEDURE ONLY [facility] [phone]       To be done in conjunction with EGD with full duodenoscopy in OR by  Dr. Gasca in April.    Last Lab Result: Creatinine (mg/dL)       Date                     Value                 05/07/2009               0.81             ----------  Body mass index is 27.86 kg/(m^2).     Needed:  No  Language:  English    Patient will be contacted to schedule procedure.     Please be aware that coverage of these services is subject to the terms and limitations of your health insurance plan.  Call member services at your health plan with any benefit or coverage questions.  Any procedures must be performed at a [facility] facility OR coordinated by your clinic's referral office.    Please bring the following with you to your appointment:    (1) Any X-Rays, CTs or MRIs which have been performed.  Contact the facility where they were done to arrange for  prior to your scheduled appointment.    (2) List of current medications   (3) This referral request   (4) Any documents/labs given to you for this referral                  Follow-up notes from your care team     Return in about 1 year (around 11/8/2019) for Physical Exam.      Your next 10 appointments already scheduled     Nov 08, 2018  4:30 PM CST   US THYROID with SHUS1   [facility] ([facility])    [address] [place] [id number] [phone]           How do I prepare for my exam? (Food and drink instructions) No Food and Drink Restrictions.  How do I prepare for my exam? (Other instructions) You do not need to do anything special to prepare for your exam.  What should I wear: Wear comfortable clothes.  How long does the exam take: Most ultrasounds take 30 to 60 minutes.  What should I bring: Bring a list of your medicines, including vitamins, minerals and over-the-counter drugs. It is safest to leave personal items at home.  Do I need a :  No  is needed.  What do I need to tell my doctor: Tell your doctor about any allergies you may have.  What should I do after the  exam: No restrictions, You may resume normal activities.  What is this test: An ultrasound uses sound waves to make pictures of the body. Sound waves do not cause pain. The only discomfort may be the pressure of the wand against your skin or full bladder.  Who should I call with questions: If you have any questions, please call the Imaging Department where you will have your exam. Directions, parking instructions, and other information is available on our website, [email or web address].              Future tests that were ordered for you today     Open Future Orders        Priority Expected Expires Ordered    MR Breast Bilateral w Contrast Routine 5/26/2019 11/8/2019 11/8/2018    MA Screen Bilateral w/[place] Routine 11/8/2019 11/9/2019 11/8/2018    UPPER GI ENDOSCOPY Routine 4/1/2019 11/9/2019 11/8/2018    US Thyroid Routine  11/7/2019 11/7/2018            Who to contact     If you have questions or need follow up information about today's clinic visit or your schedule please contact [facility] directly at [phone].  Normal or non-critical lab and imaging results will be communicated to you by [employer], letter or phone within 4 business days after the clinic has received the results. If you do not hear from us within 7 days, please contact the clinic through [employer] or phone. If you have a critical or abnormal lab result, we will notify you by phone as soon as possible.  Submit refill requests through [employer] or call your pharmacy and they will forward the refill request to us. Please allow 3 business days for your refill to be completed.          Additional Information About Your Visit        Snapflow Information     [employer]flow gives you secure access to your electronic health record. If you see a primary care provider, you can also send messages to your care team and make appointments. If you have questions, please call your primary care clinic.  If you do not have a primary care provider, please  call [phone] and they will assist you.        Care EveryWhere ID     This is your Care EveryWhere ID. This could be used by other organizations to access your [facility] medical records  [id number]        Your Vitals Were     Pulse Temperature Respirations Pulse Oximetry BMI (Body Mass Index)       57 97.7  F (36.5  C) (Oral) 18 100% 27.86 kg/m2        Blood Pressure from Last 3 Encounters:   11/08/18 115/73   05/03/18 109/71   04/24/18 132/70    Weight from Last 3 Encounters:   11/08/18 70.2 kg (154 lb 12.8 oz)   05/03/18 66.6 kg (146 lb 12.8 oz)   04/24/18 67.5 kg (148 lb 12.8 oz)              We Performed the Following     GASTROENTEROLOGY ADULT REF PROCEDURE ONLY [facility] [phone]        Primary Care Provider Office Phone # Fax #    Ida ESPINO Jaime [phone]       [facility] [address]    [facility] [id number]        Equal Access to Services     [facility]: Hadii aad ku hadasho Soomaali, waaxda luqadaha, qaybta kaalmada adebrandon, thelma hess. So [facility] [phone].    ATENCIÓN: Si habla español, tiene a rodriguez disposición servicios gratuitos de asistencia lingüística. Fatuma al [phone].    We comply with applicable federal civil rights laws and [place] laws. We do not discriminate on the basis of race, color, national origin, age, disability, sex, sexual orientation, or gender identity.            Thank you!     Thank you for choosing [facility]  for your care. Our goal is always to provide you with excellent care. Hearing back from our patients is one way we can continue to improve our services. Please take a few minutes to complete the written survey that you may receive in the mail after your visit with us. Thank you!             Your Updated Medication List - Protect others around you: Learn how to safely use, store and throw away your medicines at www.disposemymeds.org.          This list is  accurate as of 11/8/18 12:59 PM.  Always use your most recent med list.                   Brand Name Dispense Instructions for use Diagnosis    acetaminophen 325 MG tablet    TYLENOL    100 tablet    Take 2 tablets (650 mg) by mouth every 4 hours as needed for other (mild pain)    Atypical hyperplasia of breast       albuterol 90 MCG/ACT inhaler      Inhale 2 puffs into the lungs every 6 hours as needed.        * fexofenadine 60 MG tablet    ALLEGRA          * ALLEGRA 180 MG tablet   Generic drug:  fexofenadine      Take 180 mg by mouth daily.        calcium-magnesium 500-250 MG Tabs per tablet    CALMAG     Take 1 tablet by mouth daily        ibuprofen 600 MG tablet    ADVIL/MOTRIN    30 tablet    Take 1 tablet (600 mg) by mouth every 6 hours as needed for pain (mild)    Atypical hyperplasia of breast       MELATONIN PO      Take 1 mg by mouth nightly as needed        multivitamin, therapeutic with minerals Tabs tablet      Take 1 tablet by mouth daily.        OMEGA-3 FISH OIL PO      Take  by mouth.        ondansetron 4 MG tablet    ZOFRAN    18 tablet    Take 1 tablet (4 mg) by mouth every 8 hours as needed for nausea    Nausea       pramipexole 0.25 MG tablet    MIRAPEX     Take 0.25 mg by mouth        sulindac 200 MG tablet    CLINORIL    180 tablet    Take 1 tablet (200 mg) by mouth 2 times daily (with meals)    FAP (familial adenomatous polyposis)       triamcinolone 0.1 % cream    KENALOG          * triamcinolone 55 MCG/ACT inhaler    NASACORT     Spray 2 sprays into both nostrils daily        * triamcinolone 55 MCG/ACT inhaler    NASACORT     Spray 2 sprays in nostril        ZOMIG PO      Take  by mouth at onset of headache.        * Notice:  This list has 4 medication(s) that are the same as other medications prescribed for you. Read the directions carefully, and ask your doctor or other care provider to review them with you.

## 2018-11-08 NOTE — PROGRESS NOTES
"Oncology Rooming Note    November 8, 2018 11:45 AM   Sarah Duran is a 53 year old female who presents for:    Chief Complaint   Patient presents with     Oncology Clinic Visit     Benign neoplasm of colon     Initial Vitals: /73 (BP Location: Right arm, Patient Position: Sitting, Cuff Size: Adult Regular)  Pulse 57  Temp 97.7  F (36.5  C) (Oral)  Resp 18  Wt 70.2 kg (154 lb 12.8 oz)  SpO2 100%  BMI 27.86 kg/m2 Estimated body mass index is 27.86 kg/(m^2) as calculated from the following:    Height as of 4/24/18: 1.588 m (5' 2.5\").    Weight as of this encounter: 70.2 kg (154 lb 12.8 oz). Body surface area is 1.76 meters squared.  No Pain (0) Comment: Data Unavailable   No LMP recorded. Patient is not currently having periods (Reason: Perimenopausal).  Allergies reviewed: Yes  Medications reviewed: Yes    Medications: Medication refills not needed today.  Pharmacy name entered into [facility]:    [facility] [place], [place] - [address]  [facility] USE FOR MAIL[facility] ONLY - [place], [place]  [facility], [place] - [address]  [facility] - [place], [place] - [address] [facility] - [place], [place] - [address]    Clinical concerns: no   5 minutes for nursing intake (face to face time)       Ema Delgado MA              "

## 2018-11-08 NOTE — LETTER
Cancer Risk Management  Program Locations    [facility]  [facility]  [facility]  Mailing Address  Cancer Risk Management Program  [facility]  [address]  [address]  [place] [address]    New patient appointments  [phone]  2018    Sarah Duran  [address] [address]      Dear Sarah,    It was a pleasure meeting with you today.  Below is a copy of my note from our visit, outlining your surveillance plan.      I look forward to seeing you in the future to coordinate your care and reduce your health risks. Please feel free to contact me if you have any questions or concerns.      Oncology Risk Management Consultation:  Date on this visit: 2018    Sarah Duran  returns to the clinic today for a follow up appointment. She requires high risk screening and surveillance to reduce her risk of cancer secondary to having a history of atypical ductal hyperplasia (ADH) .  She is considered to be at high risk for breast cancer, with a risk of up to 35%.    She also carries an APC+ genetic mutation, consistent with a diagnosis of Familial Adenomatous Polyposis (FAP) or attenuated FAP (AFAP)    Primary Physician: Ida Sandoval MD    History Of Present Illness:  Ms. Duran is a very pleasant 53 year old female who presents with a personal history of ADH and a likely attenuated version of Familial Adenomatous Polyposis. She is s/p right excisional breast biopsy on 2016. She is also s/p ileocolonic anastomosis in .    Genetic Testin2018: POSITIVE for a APC mutation.  Specifically her mutation is called [id number],  consistent with a diagnosis of familial adenomatous polyposis (FAP) or attenuated FAP (AFAP). The mutation was identified using [employer] testing through [employer].   The testing was done because of her personal history of colonic polyposis and family history of colon cancer.     Of note, Sarah tested negative for mutations in the following genes: BMPR1A, CDH1, CHEK2, MLH1, MSH2, MSH6, MUTYH, PMS2, POLD1, POLE, PTEN, SMAD4, STK11 and TP53 (sequencing and deletion/duplication); EPCAM and GREM1 (deletion/duplication only).       Pertinent history:  Attenuated Familial adenomatous polyposis, s/p ileal rectal anastomosis in May 1988. Confirmed with APC testing (see below)  Subsequent laparotomy in 1990s for bowel obstruction from adhesions.  Sulindac 200 mg BID to suppress rectal polyps. Taking 1/2 pill BID for total dose of 200 daily.  Menarche at 12.  Nulliparous.  Menopause at 50.  Ovaries, fallopian tubes and uterus in place.  11/28/2016 - R excisional breast biopsy with seed localization, Fibrocystic tissue with ADH on pathology.    Pertinent  screening history (see chart for further details)  11/7/2005 - Small bowel enteroscopy, one tubular adenoma removed from partial anal verge.  12/7/2005 - adenomatous polyp removed from rectum  2/28/2005 - tubular adenoma, duodenum.  11/16/2011 - fundic gland polyps in stomach, tubular adenoma removed from rectum.  11/5/2012 - 1 tubulovillous adenoma, rectum  7/8/2013 - 1 tubulovillous adenoma, rectum  12/3/2013 - 1 tubulovillous adenoma, rectum  7/21/2014 - 1 tubular adenoma and hyperplastic polyp, rectum  1/20/2015 - 1 tubular adenoma and hyperplastic polyp, rectum  12/7/2015 - 1 tubular adenoma, rectum  6/6/2016 - 1 fundic gland polyp with focal adenomatous changes  3/20/2017 - 2 tubular adenomas removed from rectum  4/12/2017 - colonoscopy and EGD, one fundic gland gastric polyp with focal low grade dysplasia removed, multiple duodenal polyps and multiple rectal polyps.    4/24/2017 - R breast mammogram, BiRads2.  5/25/2018 - Breast MRI, BiRads2.    Review of Systems:  GENERAL: No change in weight, sleep or appetite.  Normal energy.   No fever or chills  EYES: Negative for vision changes or eye problems  ENT: No problems with ears, nose or throat.  No difficulty swallowing.  BREASTS: Denies breast pain, asymmetry, lumps, masses, thickening, nipple discharge and skin changes.  RESP: No coughing, wheezing or shortness of breath  CV: No chest pains or palpitations  GI: No nausea, vomiting,  heartburn, abdominal pain, diarrhea, constipation or change in bowel habits  : No urinary frequency or dysuria, bladder or kidney problems  MUSCULOSKELETAL: No significant muscle or joint pains  NEUROLOGIC: No headaches, numbness, tingling, weakness, problems with balance or coordination  PSYCHIATRIC: No problems with anxiety, depression or mental health  HEME/IMMUNE/ALLERGY: No history of bleeding or clotting problems or anemia.  No allergies or immune system problems  ENDOCRINE: No history of thyroid disease, diabetes or other endocrine disorders  SKIN: No rashes,worrisome lesions or skin problems       Past Medical/Surgical History:  Past Medical History:   Diagnosis Date     Asthma      Atypical ductal hyperplasia of right breast 10/2016    fibrocystic changes, PASH and atypical ductal hyperplasia on needle biopsy, s/p R excisional biopsy with radioactive seed localization     Factor 5 Leiden mutation, heterozygous (H)     factor 5     Familial adenomatous polyposis 1988    clinical presentation; s/p total abdominal colectomy with TRACEY; confirmed APC+ 2018     GERD (gastroesophageal reflux disease)      Migraine      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    [id number]     Pancreatitis, acute      PONV (postoperative nausea and vomiting)      Past Surgical History:   Procedure Laterality Date     BIOPSY BREAST SEED LOCALIZATION Right 11/30/2016    Procedure: BIOPSY BREAST SEED LOCALIZATION;  Surgeon: Jessica Rasheed MD;  Location:  OR     C LAP,SURG,COLECTOMY,W/ANAST      for colon cancer     COLECTOMY      subtotal     COMBINED  ESOPHAGOSCOPY, GASTROSCOPY, DUODENOSCOPY (EGD) WITH ARGON PLASMA COAGULATOR (APC) N/A 4/12/2018    Procedure: COMBINED ESOPHAGOSCOPY, GASTROSCOPY, DUODENOSCOPY (EGD) WITH ARGON PLASMA COAGULATOR (APC);  Esophagogastroduodenoscopy with polypectomy and polyp ablation;  Surgeon: Stephen Gasca MD;  Location: [facility] OR     ENDOSCOPIC RETROGRADE CHOLANGIOPANCREATOGRAPHY       ESOPHAGOSCOPY, GASTROSCOPY, DUODENOSCOPY (EGD), COMBINED  11/5/2012    Procedure: COMBINED ESOPHAGOSCOPY, GASTROSCOPY, DUODENOSCOPY (EGD), BIOPSY SINGLE OR MULTIPLE;;  Surgeon: Angélica Garcia MD;  Location: [facility] GI     ESOPHAGOSCOPY, GASTROSCOPY, DUODENOSCOPY (EGD), COMBINED  12/3/2013    Procedure: COMBINED ESOPHAGOSCOPY, GASTROSCOPY, DUODENOSCOPY (EGD);;  Surgeon: Angélica Garcia MD;  Location: [facility] GI     ESOPHAGOSCOPY, GASTROSCOPY, DUODENOSCOPY (EGD), COMBINED N/A 3/28/2018    Procedure: COMBINED ESOPHAGOSCOPY, GASTROSCOPY, DUODENOSCOPY (EGD);;  Surgeon: Stephen Gasca MD;  Location: [facility] GI     EXCISE POLYP RECTUM       LAPAROSCOPIC LYSIS ADHESIONS       SIGMOIDOSCOPY FLEXIBLE       SIGMOIDOSCOPY FLEXIBLE N/A 12/7/2015    Procedure: SIGMOIDOSCOPY FLEXIBLE;  Surgeon: Mariela Bearden MD;  Location: [facility] GI     SIGMOIDOSCOPY FLEXIBLE N/A 3/28/2018    Procedure: SIGMOIDOSCOPY FLEXIBLE;  EGD/Flex Sig;  Surgeon: Stephen Gasca MD;  Location: [facility]     SIGMOIDOSCOPY FLEXIBLE N/A 4/12/2018    Procedure: SIGMOIDOSCOPY FLEXIBLE;  Flexible Sigmoidoscopy with polyp ablation;  Surgeon: Stephen Gasca MD;  Location: [facility] OR       Allergies:  Allergies as of 11/08/2018 - Roberto as Reviewed 11/08/2018   Allergen Reaction Noted     Cephalexin  06/14/2004     Cipro [ciprofloxacin]  02/24/2005     Flagyl [metronidazole] Nausea and Vomiting 02/24/2005     Keflex [cephalosporins] Nausea and Vomiting 02/24/2005     Omeprazole  04/29/2010     Prilosec otc [omeprazole magnesium]  01/28/2012     Doxycycline Rash 04/12/2018     Erythromycin Rash  2005     Sulfa drugs Rash 2005       Current Medications:  Current Outpatient Prescriptions   Medication Sig Dispense Refill     acetaminophen (TYLENOL) 325 MG tablet Take 2 tablets (650 mg) by mouth every 4 hours as needed for other (mild pain) 100 tablet 0     albuterol 90 MCG/ACT inhaler Inhale 2 puffs into the lungs every 6 hours as needed.       calcium-magnesium (CALMAG) 500-250 MG TABS Take 1 tablet by mouth daily       fexofenadine (ALLEGRA) 180 MG tablet Take 180 mg by mouth daily.       fexofenadine (ALLEGRA) 60 MG tablet        ibuprofen (ADVIL,MOTRIN) 600 MG tablet Take 1 tablet (600 mg) by mouth every 6 hours as needed for pain (mild) 30 tablet 0     MELATONIN PO Take 1 mg by mouth nightly as needed       multivitamin, therapeutic with minerals (THERA-VIT-M) TABS Take 1 tablet by mouth daily.       Omega-3 Fatty Acids (OMEGA-3 FISH OIL PO) Take  by mouth.       ondansetron (ZOFRAN) 4 MG tablet Take 1 tablet (4 mg) by mouth every 8 hours as needed for nausea 18 tablet 0     pramipexole (MIRAPEX) 0.25 MG tablet Take 0.25 mg by mouth       sulindac (CLINORIL) 200 MG tablet Take 1 tablet (200 mg) by mouth 2 times daily (with meals) 180 tablet 11     triamcinolone (KENALOG) 0.1 % cream        triamcinolone (NASACORT) 55 MCG/ACT Inhaler Spray 2 sprays in nostril       triamcinolone (NASACORT) 55 MCG/ACT Inhaler Spray 2 sprays into both nostrils daily       ZOLMitriptan (ZOMIG PO) Take  by mouth at onset of headache.          Family History:  Family History   Problem Relation Age of Onset     Hyperlipidemia Mother      Cerebrovascular Disease Mother      Cerebrovascular Disease Father      Colon Cancer Father 57      at 63     Prostate Cancer Brother 53     Prostate Cancer Maternal Uncle 65       Social History:  Social History     Social History     Marital status:      Spouse name: N/A     Number of children: 2     Years of education: N/A     Occupational History      [place]  [employer]     Social History Main Topics     Smoking status: Never Smoker     Smokeless tobacco: Never Used     Alcohol use No     Drug use: No     Sexual activity: Not on file      Comment: denies pregnancy     Other Topics Concern     Not on file     Social History Narrative    Two adopted daughters, ages 13 and 15       Physical Exam:  /73 (BP Location: Right arm, Patient Position: Sitting, Cuff Size: Adult Regular)  Pulse 57  Temp 97.7  F (36.5  C) (Oral)  Resp 18  Wt 70.2 kg (154 lb 12.8 oz)  SpO2 100%  BMI 27.86 kg/m2    GENERAL APPEARANCE: healthy, alert and no distress     NECK: no adenopathy, no asymmetry or masses; no thyroid nodules palpated. Thyroid cartilage symmetrical.     LYMPHATICS: No cervical, supraclavicular or axillary  lymphadenopathy     RESP: lungs clear to auscultation - no rales, rhonchi or wheezes     CARDIOVASCULAR: regular rate and rhythm, normal S1 S2, no S3 or S4 and no murmur.   BREASTS: Examined in a sitting and lying position. L sl>R, No visible distortion, swelling or rashes. No nipple inversion, nipple discharge, breast dimpling or puckering bilaterally. Breast tissue is heterogenous dense with fibrocystic changes throughout on  palpation bilaterally.  Axillary area without masses or lymphadenopathy.  SKIN: no suspicious lesions or rashes on upper extremities, anterior torso or face. Oval, hard, raised, non movable, subcutaneous nodule palpated on upper sternum, to the upper right of Left breast. About 1-2 cm in diameter. Two areas of scar tissue perpendicular to nodule in upper mid sternal area.    Laboratory/Imaging Studies  Results for orders placed or performed in visit on 06/25/18   [employer] test 8822: Laboratory Miscellaneous Order   Result Value Ref Range    Miscellaneous Test         Specimen Received, Reordered and sent to Performing laboratory - Report to follow upon   completion.     Send outs misc test   Result Value Ref Range    Lab  Scanned Result SEND OUTS MISC TEST-Scanned (A)        ASSESSMENT  We reviewed her history and screening plan for both AFAP and ADH.     Atypical hyperplasias (ADH and ALH), especially multifocal lesions, confer a substantial increase in the risk of subsequent breast cancer (relative risk of 3.7 to 5.3)  Atypia is associated with an increased risk of both ipsilateral and contralateral breast cancer and thus provides evidence of underlying breast abnormalities that predispose to breast cancer. In a report from the Nurses' Health Study, 56 percent of cancers that developed in women with AH occurred in the ipsilateral breast. The cumulative incidence of breast cancer over 30 years approached 35 percent. (Jass et al. 2007. Magnitude and laterality of breast cancer risk according to histological type of atypical hyperplasia: results from the Nurses Health Study. Cancer. 109:180.)    We discussed signs and symptoms to be watchful for in between surveillance visits.  She verbalized understanding of signs and symptoms to address with her health care providers including lumps, thickening, swelling, tenderness, nipple discharge or changes in skin of breasts.    We discussed the differences between cancer risk for the general population and those with FAP according to the NCCN Guidelines  and the Gene Review from the [facility].  Familial adenomatous polyposis (FAP) occurs with an autosomal dominant genetic mutation of the adenomatous polyposis coli (APC) gene located on chromosome [id number].  This syndrome, which results in more than 100 colon polyps, occurs in approximately 1:10,000 to 1:30,000 live births.   It accounts for less than 1 percent of the total colon cancer risk in the [place]. Approximately 10-30 percent of patients who meet the clinical criteria for FAP do not have a detectable APC genetic mutation.      People with an FAP have a cumulative risk of developing colon cancer by age 80 of 70%,  compared to the 4.5% risk within the general population.     They also have a 4-12 percent lifetime risk of duodenal ampullary carcinoma. They are also reported to have a greater risk of papillary thyroid cancer, with cribriform pattern,  gastric carcinoma and central nervous system tumors, mostly medulloblastomas.  Upper GI polyps occur commonly at least  30 percent of patients with FAP (up to 100 percent in some studies).  Although patients may develop adenomas in the distal small bowel and stomach (20-40 percent of FAP patients), the risk for cancer is not as great as that for colon cancer.    We reviewed her screening plan. Her exam was unremarkable; she will have a mammogram after her visit today. She will defer her thyroid ultrasound and coordinate that around her breast screening in May.     We also discussed that she could consider using anastrozole off label, to reduce her risk for breast cancer.  The recommendation, per [employer], and with consultation with Dr. Daly Levin, would be a safer option for her than tamoxifen, as she has Factor V Leiden.  Anastrozole has been shown to reduce the incidence of breast cancer by 50% in the KENDRA-II clinical trial. She is not interested in this today.    INDIVIDUALIZED CANCER RISK MANAGEMENT PLAN:  Individualized Surveillance Plan for women  With 20% or greater lifetime risk of breast cancer   Per NCCN Breast Cancer Screening and Diagnosis Guidelines Version 2.2018    Recommended screening Test or procedure Last done Next Scheduled    Clinical encounter Ongoing risk assessment, risk reduction counseling and clinical breast exam, every 6-12 months. Refer to genetic counseling if not already done.   11/8/2018 November 2019   However, some family histories with breast cancers at a very young age, may warrant screening starting earlier.    *May begin at age 40 if breast cancers in the family occur at later ages.    Annual mammogram beginning 10 years younger than the  earliest breast cancer in the family but not prior to age 30.    Recommend annual breast MRI to begin 10 years younger than the earliest breast cancer in the family but not prior to age 25.    Breast MRIs are preferably done on day 7-15 of the menstrual cycle in premenopausal women.       NA       NA   Breast screening for patients at high risk due to thoracic radiation before 30 years old    Begin 10 years post radiation treatment or age 25.   Annual mammogram beginning 10 years after radiation but not prior to age 30    Annual breast MRI, preferably on day 7-15 of menstrual cycle for premenopausal women.       NA     NA   Women who have a lifetime risk of >20% based on history of LCIS or ADH/ALH Annual screening mammogram beginning at age of LCIS or ADH/ALH but not prior to age 30.    Consider annual MRI to begin at age of diagnosis of LCIS or ADH/ALH but not prior to age 25.    Consider risk reducing strategies. 5/25/2018 - Breast MRI, BiRads2    4/24/2017 - Right breast Mammogram, BiRads2 NEXT: Tomosynthesis mammogram today after appt.    Next breast MRI in late May (no sooner than 5/26)    Next exam in November 2019 followed by tomosynthesis mammogram    Recommend risk reducing strategies for women with 1.7% 5 year risk of breast cancer.       Individualized Surveillance Plan for Familial Adenomatous Polyposis                 for Children and Adults Based on NCCN Guidelines Version 1.2018   Type of Screening  Recommendation  Last Done  Next Due    Recommendations for Children    Colon Cancer Screening  Flexible sigmoidoscopy or colonoscopy every 12 months beginning at age 10-15.       See below   See below   Hepatoblastoma  Consider liver palpation, abdominal ultrasound, and measurement of AFP every 3-6 months, during first 5 years of life.  NA NA   Thyroid Cancer Screening  Annual thyroid examination, starting in teenage years.   See below See below   Recommendations for Adults    Colon Cancer Screening   Colectomy with ileorectal anastomosis (TRACEY)  recommended, then endoscopic evaluation of the rectum every 6-12 months, depending on the polyp burden.     If ileo proctocolectomy with ileal pouch anal anastomosis, endoscopic surveillance of ileal pouch every 1-3 years depending on polyp burden.    Surveillance frequency should be increased to every 6 months for large, flat polyps with villous histology and/or high grade dysplasia  Ileocolonic anastomosis in 1988 for polyposis    4/12/2018 - Colonoscopy with Dr. Gasca, multiple small polyps found in rectum   April 2019 in conjunction with EGD with full duodenoscopy   Duodenal or periampullary cancer screening and gastric cancer screening  Baseline upper endoscopy including side-viewing examination. Start between 20-25 or earlier if colectomy was prior to age 20. Base subsequent screenings on findings (see below).    Manage non-fundic gland polyps endoscopically if possible.      These occur in the majority of FAP patients and may have focal low grade dysplasia but typically are non progressive.Special screening or surgery should only be considered in the presence of high grade dysplasia.   4/12/2018 - multiple duodenal polyps, ablated. Multiple fundic gland polyps in stomach, one with low grade dysplasia   April 2019 in conjunction with colonoscopy   Thyroid Cancer Screening    Annual thyroid examination.    Annual ultrasound of thyroid may be considered.  11/8/2018 November 2019   CNS Cancer  Annual physical examination.  11/8/2018 November 2019   Intra-abdominal desmoids  Annual abdominal palpation.     If family history of symptomatic desmoids, consider abdominal MRI or CT 1-3 years post colectomy and then every 5-10 years.     11/8/2018 November 2019   Small bowel polyps and cancer  Consider adding small bowel visualization to CT or MRI for desmoids, especially if duodenal polyposis is advanced.  Discuss Defer to Dr. Gasca's assessment   Duodenal  "findings:  Stage 0 (no polyposis) - repeat endoscopy every 4 years.  Stage 1 (minimal polyposis 1-4 tubular adenomas, 1-4mm each) - repeat EGD every 2-3 years.  Stage 2 (mild polyposis - 5-9 tubular adenomas, 5-9 mm each) - repeat EGD every 1-3 years.  Stage 3 (moderate polyposis - >20 lesions or size >1 cm) - repeat EGD every 6-12 months  Stage 4 (dense polyposis or high grade dysplasia) - surgical evaluation, with expert surveillance every 3-6 months.  Due to limited data, no screening recommendation is made for pancreatic cancer at this time.        I spent 30 minutes with the patient with greater that 50% of it in counseling and coordinating care as documented above.    REYNOLD March-CNS, OCN, AGN-BC  Clinical Nurse Specialist  Cancer Risk Management Program  [address]  [place] [address]    phone:  [phone]  Pager: [phone]  fax: [phone]    CC: MD See Isabel MD Alyssa Kne, [facility]                           Oncology Rooming Note    November 8, 2018 11:45 AM   Sarah Duran is a 53 year old female who presents for:    Chief Complaint   Patient presents with     Oncology Clinic Visit     Benign neoplasm of colon     Initial Vitals: /73 (BP Location: Right arm, Patient Position: Sitting, Cuff Size: Adult Regular)  Pulse 57  Temp 97.7  F (36.5  C) (Oral)  Resp 18  Wt 70.2 kg (154 lb 12.8 oz)  SpO2 100%  BMI 27.86 kg/m2 Estimated body mass index is 27.86 kg/(m^2) as calculated from the following:    Height as of 4/24/18: 1.588 m (5' 2.5\").    Weight as of this encounter: 70.2 kg (154 lb 12.8 oz). Body surface area is 1.76 meters squared.  No Pain (0) Comment: Data Unavailable   No LMP recorded. Patient is not currently having periods (Reason: Perimenopausal).  Allergies reviewed: Yes  Medications reviewed: Yes    Medications: Medication refills not needed today.  Pharmacy name entered into " EPIC:    [facility] - [place], [place] - [address]  [facility] USE FOR MAIL[facility] ONLY - [place], [place]  [facility] - [place], [place] - [address]  [facility] - [place], [place] - [address] [facility] - [place], [place] - [address]    Clinical concerns: no   5 minutes for nursing intake (face to face time)       Ema Delgado MA

## 2018-11-08 NOTE — PATIENT INSTRUCTIONS
Individualized Surveillance Plan for women  With 20% or greater lifetime risk of breast cancer   Per NCCN Breast Cancer Screening and Diagnosis Guidelines Version 2.2018    Recommended screening Test or procedure Last done Next Scheduled    Clinical encounter Ongoing risk assessment, risk reduction counseling and clinical breast exam, every 6-12 months. Refer to genetic counseling if not already done.   11/8/2018 November 2019   However, some family histories with breast cancers at a very young age, may warrant screening starting earlier.    *May begin at age 40 if breast cancers in the family occur at later ages.    Annual mammogram beginning 10 years younger than the earliest breast cancer in the family but not prior to age 30.    Recommend annual breast MRI to begin 10 years younger than the earliest breast cancer in the family but not prior to age 25.    Breast MRIs are preferably done on day 7-15 of the menstrual cycle in premenopausal women.       NA       NA   Breast screening for patients at high risk due to thoracic radiation before 30 years old    Begin 10 years post radiation treatment or age 25.   Annual mammogram beginning 10 years after radiation but not prior to age 30    Annual breast MRI, preferably on day 7-15 of menstrual cycle for premenopausal women.       NA     NA   Women who have a lifetime risk of >20% based on history of LCIS or ADH/ALH Annual screening mammogram beginning at age of LCIS or ADH/ALH but not prior to age 30.    Consider annual MRI to begin at age of diagnosis of LCIS or ADH/ALH but not prior to age 25.    Consider risk reducing strategies. 5/25/2018 - Breast MRI, BiRads2    4/24/2017 - Right breast Mammogram, BiRads2 NEXT: Tomosynthesis mammogram today after appt.    Next breast MRI in late May (no sooner than 5/26)    Next exam in November 2019 followed by tomosynthesis mammogram    Recommend risk reducing strategies for women with 1.7% 5 year risk of breast cancer.        Individualized Surveillance Plan for Familial Adenomatous Polyposis                 for Children and Adults Based on NCCN Guidelines Version 1.2018   Type of Screening  Recommendation  Last Done  Next Due    Recommendations for Children    Colon Cancer Screening  Flexible sigmoidoscopy or colonoscopy every 12 months beginning at age 10-15.       See below   See below   Hepatoblastoma  Consider liver palpation, abdominal ultrasound, and measurement of AFP every 3-6 months, during first 5 years of life.  NA NA   Thyroid Cancer Screening  Annual thyroid examination, starting in teenage years.   See below See below   Recommendations for Adults    Colon Cancer Screening  Colectomy with ileorectal anastomosis (TRACEY)  recommended, then endoscopic evaluation of the rectum every 6-12 months, depending on the polyp burden.     If ileo proctocolectomy with ileal pouch anal anastomosis, endoscopic surveillance of ileal pouch every 1-3 years depending on polyp burden.    Surveillance frequency should be increased to every 6 months for large, flat polyps with villous histology and/or high grade dysplasia  Ileocolonic anastomosis in 1988 for polyposis    4/12/2018 - Colonoscopy with Dr. Gasca, multiple small polyps found in rectum   April 2019 in conjunction with EGD with full duodenoscopy   Duodenal or periampullary cancer screening and gastric cancer screening  Baseline upper endoscopy including side-viewing examination. Start between 20-25 or earlier if colectomy was prior to age 20. Base subsequent screenings on findings (see below).    Manage non-fundic gland polyps endoscopically if possible.      These occur in the majority of FAP patients and may have focal low grade dysplasia but typically are non progressive.Special screening or surgery should only be considered in the presence of high grade dysplasia.   4/12/2018 - multiple duodenal polyps, ablated. Multiple fundic gland polyps in stomach, one with low grade  dysplasia   April 2019 in conjunction with colonoscopy   Thyroid Cancer Screening    Annual thyroid examination.    Annual ultrasound of thyroid may be considered.  11/8/2018 November 2019   CNS Cancer  Annual physical examination.  11/8/2018 November 2019   Intra-abdominal desmoids  Annual abdominal palpation.     If family history of symptomatic desmoids, consider abdominal MRI or CT 1-3 years post colectomy and then every 5-10 years.     11/8/2018 November 2019   Small bowel polyps and cancer  Consider adding small bowel visualization to CT or MRI for desmoids, especially if duodenal polyposis is advanced.  Discuss Defer to Dr. Gasca's assessment   Duodenal findings:  Stage 0 (no polyposis) - repeat endoscopy every 4 years.  Stage 1 (minimal polyposis 1-4 tubular adenomas, 1-4mm each) - repeat EGD every 2-3 years.  Stage 2 (mild polyposis - 5-9 tubular adenomas, 5-9 mm each) - repeat EGD every 1-3 years.  Stage 3 (moderate polyposis - >20 lesions or size >1 cm) - repeat EGD every 6-12 months  Stage 4 (dense polyposis or high grade dysplasia) - surgical evaluation, with expert surveillance every 3-6 months.  Due to limited data, no screening recommendation is made for pancreatic cancer at this time.

## 2018-11-15 ENCOUNTER — TELEPHONE (OUTPATIENT)
Dept: GASTROENTEROLOGY | Facility: CLINIC | Age: 53
End: 2018-11-15

## 2019-03-05 ENCOUNTER — OFFICE VISIT (OUTPATIENT)
Dept: OBGYN | Facility: CLINIC | Age: 54
End: 2019-03-05
Payer: COMMERCIAL

## 2019-03-05 VITALS — WEIGHT: 149 LBS | HEIGHT: 63 IN | BODY MASS INDEX: 26.4 KG/M2

## 2019-03-05 DIAGNOSIS — Z13.6 ENCOUNTER FOR LIPID SCREENING FOR CARDIOVASCULAR DISEASE: ICD-10-CM

## 2019-03-05 DIAGNOSIS — N76.0 BACTERIAL VAGINOSIS: ICD-10-CM

## 2019-03-05 DIAGNOSIS — Z01.419 ENCOUNTER FOR GYNECOLOGICAL EXAMINATION WITHOUT ABNORMAL FINDING: Primary | ICD-10-CM

## 2019-03-05 DIAGNOSIS — Z13.29 SCREENING FOR THYROID DISORDER: ICD-10-CM

## 2019-03-05 DIAGNOSIS — N89.8 VAGINAL DISCHARGE: ICD-10-CM

## 2019-03-05 DIAGNOSIS — Z13.220 ENCOUNTER FOR LIPID SCREENING FOR CARDIOVASCULAR DISEASE: ICD-10-CM

## 2019-03-05 DIAGNOSIS — Z11.3 SCREENING EXAMINATION FOR SEXUALLY TRANSMITTED DISEASE: ICD-10-CM

## 2019-03-05 DIAGNOSIS — Z13.228 SCREENING FOR METABOLIC DISORDER: ICD-10-CM

## 2019-03-05 DIAGNOSIS — G43.109 MIGRAINE WITH AURA AND WITHOUT STATUS MIGRAINOSUS, NOT INTRACTABLE: ICD-10-CM

## 2019-03-05 DIAGNOSIS — B96.89 BACTERIAL VAGINOSIS: ICD-10-CM

## 2019-03-05 LAB
GRAM STN SPEC: ABNORMAL
Lab: ABNORMAL
SPECIMEN SOURCE: ABNORMAL
SPECIMEN SOURCE: NORMAL
T VAGINALIS DNA SPEC QL NAA+PROBE: NORMAL

## 2019-03-05 PROCEDURE — 99396 PREV VISIT EST AGE 40-64: CPT | Performed by: OBSTETRICS & GYNECOLOGY

## 2019-03-05 PROCEDURE — 87205 SMEAR GRAM STAIN: CPT | Performed by: OBSTETRICS & GYNECOLOGY

## 2019-03-05 PROCEDURE — 87624 HPV HI-RISK TYP POOLED RSLT: CPT | Performed by: OBSTETRICS & GYNECOLOGY

## 2019-03-05 PROCEDURE — 0064U ANTB TP TOTAL&RPR IA QUAL: CPT | Performed by: OBSTETRICS & GYNECOLOGY

## 2019-03-05 PROCEDURE — 87389 HIV-1 AG W/HIV-1&-2 AB AG IA: CPT | Performed by: OBSTETRICS & GYNECOLOGY

## 2019-03-05 PROCEDURE — 87591 N.GONORRHOEAE DNA AMP PROB: CPT | Performed by: OBSTETRICS & GYNECOLOGY

## 2019-03-05 PROCEDURE — 87491 CHLMYD TRACH DNA AMP PROBE: CPT | Performed by: OBSTETRICS & GYNECOLOGY

## 2019-03-05 PROCEDURE — 86803 HEPATITIS C AB TEST: CPT | Performed by: OBSTETRICS & GYNECOLOGY

## 2019-03-05 PROCEDURE — 84443 ASSAY THYROID STIM HORMONE: CPT | Performed by: OBSTETRICS & GYNECOLOGY

## 2019-03-05 PROCEDURE — 87661 TRICHOMONAS VAGINALIS AMPLIF: CPT | Performed by: OBSTETRICS & GYNECOLOGY

## 2019-03-05 PROCEDURE — G0145 SCR C/V CYTO,THINLAYER,RESCR: HCPCS | Performed by: OBSTETRICS & GYNECOLOGY

## 2019-03-05 PROCEDURE — 87340 HEPATITIS B SURFACE AG IA: CPT | Performed by: OBSTETRICS & GYNECOLOGY

## 2019-03-05 PROCEDURE — 36415 COLL VENOUS BLD VENIPUNCTURE: CPT | Performed by: OBSTETRICS & GYNECOLOGY

## 2019-03-05 RX ORDER — ZOLMITRIPTAN 5 MG/1
5 TABLET, FILM COATED ORAL
Qty: 90 TABLET | Refills: 3 | Status: SHIPPED | OUTPATIENT
Start: 2019-03-05

## 2019-03-05 ASSESSMENT — MIFFLIN-ST. JEOR: SCORE: 1242.05

## 2019-03-05 ASSESSMENT — ANXIETY QUESTIONNAIRES
2. NOT BEING ABLE TO STOP OR CONTROL WORRYING: NOT AT ALL
IF YOU CHECKED OFF ANY PROBLEMS ON THIS QUESTIONNAIRE, HOW DIFFICULT HAVE THESE PROBLEMS MADE IT FOR YOU TO DO YOUR WORK, TAKE CARE OF THINGS AT HOME, OR GET ALONG WITH OTHER PEOPLE: NOT DIFFICULT AT ALL
7. FEELING AFRAID AS IF SOMETHING AWFUL MIGHT HAPPEN: NOT AT ALL
6. BECOMING EASILY ANNOYED OR IRRITABLE: NOT AT ALL
5. BEING SO RESTLESS THAT IT IS HARD TO SIT STILL: NOT AT ALL
3. WORRYING TOO MUCH ABOUT DIFFERENT THINGS: NOT AT ALL
GAD7 TOTAL SCORE: 0
1. FEELING NERVOUS, ANXIOUS, OR ON EDGE: NOT AT ALL

## 2019-03-05 ASSESSMENT — PATIENT HEALTH QUESTIONNAIRE - PHQ9
5. POOR APPETITE OR OVEREATING: NOT AT ALL
SUM OF ALL RESPONSES TO PHQ QUESTIONS 1-9: 1

## 2019-03-05 NOTE — PROGRESS NOTES
"  Sarah is a 53 year old  female who presents for annual exam.     Besides routine health maintenance,  she would like to discuss some personal issues.    HPI:    Sarah presents for an annual GYN visit. She states that she had unprotected intercourse last week with someone that she knows from the gym. She would like STD testing. She reports that she is  to a \"nice randy\" and is adamant that there is no intimate partner violence at home. She states that although she did not intend to sleep with this person, it was consensual sex. She has had intermittent vaginal discharge on and off and has noticed a new vaginal discharge this week. She has no other complaints today. She is not fasting. Her last lipid screening was here in 2016.  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  She is also being followed closely at the breast center for atypical ductal hyperplasia.    The patient's PCP is Dr Ida Sandoval.      GYNECOLOGIC HISTORY:    No LMP recorded. Patient is perimenopausal.  Her current contraception method is: none.  She  reports that  has never smoked. she has never used smokeless tobacco.  Patient is sexually active.  STD testing offered?  Accepted     Last PHQ-9 score on record =   PHQ-9 SCORE 3/5/2019   PHQ-9 Total Score 1     Last GAD7 score on record =   BIJAL-7 SCORE 3/5/2019   Total Score 0     Alcohol Score = 0    HEALTH MAINTENANCE:  Cholesterol: 11/10/16   Total= 238, Triglycerides=62, HDL=86, OAE=608, FBS=88  Cholesterol   Date Value Ref Range Status   11/10/2016 238 (H) <200 mg/dL Final     Comment:     Desirable:       <200 mg/dl   Last Mammo: 18, Result: normal, Next Mammo:   Pap:   Lab Results   Component Value Date    PAP Negative 2012   Colonoscopy:  18, Result: small polyps, repeat 1 year due family & personal history, Next Colonoscopy: 2019  Dexa:  never  Health maintenance updated:  yes    HISTORY:  Obstetric History    "    T0      L0     SAB0   TAB0   Ectopic0   Multiple0   Live Births0       Obstetric Comments   2 adopted children       Patient Active Problem List   Diagnosis     Familial adenomatous polyposis, confirmed with APC+ genetic testing      Atypical hyperplasia of breast     Acne     Allergic rhinitis     Asthma     Benign neoplasm of colon     Combined pyramidal-extrapyramidal syndrome     Congenital deficiency of clotting factors (H)     Infertility, female     Migraine without aura     Past Surgical History:   Procedure Laterality Date     BIOPSY BREAST SEED LOCALIZATION Right 2016    Procedure: BIOPSY BREAST SEED LOCALIZATION;  Surgeon: Jessica Rasheed MD;  Location: [facility] OR     C LAP,SURG,COLECTOMY,W/ANAST      for colon cancer     COLECTOMY      subtotal     COMBINED ESOPHAGOSCOPY, GASTROSCOPY, DUODENOSCOPY (EGD) WITH ARGON PLASMA COAGULATOR (APC) N/A 2018    Procedure: COMBINED ESOPHAGOSCOPY, GASTROSCOPY, DUODENOSCOPY (EGD) WITH ARGON PLASMA COAGULATOR (APC);  Esophagogastroduodenoscopy with polypectomy and polyp ablation;  Surgeon: Stephen Gasca MD;  Location: [facility]     ENDOSCOPIC RETROGRADE CHOLANGIOPANCREATOGRAPHY       ESOPHAGOSCOPY, GASTROSCOPY, DUODENOSCOPY (EGD), COMBINED  2012    Procedure: COMBINED ESOPHAGOSCOPY, GASTROSCOPY, DUODENOSCOPY (EGD), BIOPSY SINGLE OR MULTIPLE;;  Surgeon: Angélica Garcia MD;  Location: [facility]     ESOPHAGOSCOPY, GASTROSCOPY, DUODENOSCOPY (EGD), COMBINED  12/3/2013    Procedure: COMBINED ESOPHAGOSCOPY, GASTROSCOPY, DUODENOSCOPY (EGD);;  Surgeon: Angélica Garcia MD;  Location: [facility]     ESOPHAGOSCOPY, GASTROSCOPY, DUODENOSCOPY (EGD), COMBINED N/A 3/28/2018    Procedure: COMBINED ESOPHAGOSCOPY, GASTROSCOPY, DUODENOSCOPY (EGD);;  Surgeon: Stephen Gasca MD;  Location: [facility]     EXCISE POLYP RECTUM       LAPAROSCOPIC LYSIS ADHESIONS       SIGMOIDOSCOPY FLEXIBLE       SIGMOIDOSCOPY FLEXIBLE N/A 2015    Procedure:  SIGMOIDOSCOPY FLEXIBLE;  Surgeon: Mariela Bearden MD;  [facility]     SIGMOIDOSCOPY FLEXIBLE N/A 3/28/2018    Procedure: SIGMOIDOSCOPY FLEXIBLE;  EGD/Flex Sig;  Surgeon: Stephen Gasca MD;  [facility]     SIGMOIDOSCOPY FLEXIBLE N/A 2018    Procedure: SIGMOIDOSCOPY FLEXIBLE;  Flexible Sigmoidoscopy with polyp ablation;  Surgeon: Stephen Gasca MD;  Location:  OR      Social History     Tobacco Use     Smoking status: Never Smoker     Smokeless tobacco: Never Used   Substance Use Topics     Alcohol use: No      Problem (# of Occurrences) Relation (Name,Age of Onset)    Cerebrovascular Disease (2) Mother, Father    Colon Cancer (1) Father (57):  at 63    Hyperlipidemia (1) Mother    Prostate Cancer (2) Brother (53), Maternal Uncle (65)            Current Outpatient Medications   Medication Sig     albuterol 90 MCG/ACT inhaler Inhale 2 puffs into the lungs every 6 hours as needed.     calcium-magnesium (CALMAG) 500-250 MG TABS Take 1 tablet by mouth daily     fexofenadine (ALLEGRA) 180 MG tablet Take 180 mg by mouth daily.     MELATONIN PO Take 1 mg by mouth nightly as needed     multivitamin, therapeutic with minerals (THERA-VIT-M) TABS Take 1 tablet by mouth daily.     Omega-3 Fatty Acids (OMEGA-3 FISH OIL PO) Take  by mouth.     sulindac (CLINORIL) 200 MG tablet Take 1 tablet (200 mg) by mouth 2 times daily (with meals)     triamcinolone (NASACORT) 55 MCG/ACT Inhaler Spray 2 sprays into both nostrils daily     ZOLMitriptan (ZOMIG) 5 MG tablet Take 1 tablet (5 mg) by mouth at onset of headache for migraine     pramipexole (MIRAPEX) 0.25 MG tablet Take 0.25 mg by mouth     No current facility-administered medications for this visit.      Facility-Administered Medications Ordered in Other Visits   Medication     ondansetron (ZOFRAN) injection     Allergies   Allergen Reactions     Cephalexin      PN: Rash, Generalized     Cipro [Ciprofloxacin]      Reddened skin     Flagyl  "[Metronidazole] Nausea and Vomiting     Keflex [Cephalosporins] Nausea and Vomiting     Omeprazole      PN: headache     Prilosec Otc [Omeprazole Magnesium]      migraines     Doxycycline Rash     blisters     Erythromycin Rash     Sulfa Drugs Rash       Past medical, surgical, social and family histories were reviewed and updated in EPIC.    ROS:   12 point review of systems negative other than symptoms noted below.  Genitourinary: Hot Flashes, Night Sweats, Vaginal Discharge and Vaginal Dryness    EXAM:  Ht 1.588 m (5' 2.5\")   Wt 67.6 kg (149 lb)   BMI 26.82 kg/m     BMI: Body mass index is 26.82 kg/m .    PHYSICAL EXAM:  Constitutional:  Appearance: Well nourished, well developed, alert, in no acute distress  Neck:  Lymph Nodes:  No lymphadenopathy present    Thyroid:  Gland size normal, nontender, no nodules or masses present on palpation  Chest:  Respiratory Effort:  Breathing unlabored, CTAB  Cardiovascular:    Heart: Auscultation:  Regular rate, normal rhythm, no murmurs present  Breasts: Inspection of Breasts:  Visible lipoma in the mid chest., Palpation of Breasts and Axillae:  No masses present on palpation, bilatera breast tenderness., dense breasts bilaterally particularly in the subareola area, Axillary Lymph Nodes:  No lymphadenopathy present. and No nodularity, asymmetry or nipple discharge bilaterally.  Gastrointestinal:   Abdominal Examination:  Abdomen nontender to palpation, tone normal without rigidity or guarding, no masses present, umbilicus without lesions, well healed midline scar.   Liver and Spleen:  No hepatomegaly present, liver nontender to palpation    Hernias:  No hernias present  Lymphatic: Lymph Nodes:  No other lymphadenopathy present  Skin:  General Inspection:  No rashes present, no lesions present, no areas of  discoloration    Genitalia and Groin:  No rashes present, no lesions present, no areas of  discoloration, no masses present  Neurologic/Psychiatric:    Mental Status:  " Oriented X3     Pelvic Exam:  External Genitalia:     Normal appearance for age, no discharge present, no tenderness present, no inflammatory lesions present, color normal  Vagina:     Thin yellow frothy discharge in the vaginal vault  Bladder:     Nontender to palpation  Urethra:   Urethral Body:  Urethra palpation normal, urethra structural support normal   Urethral Meatus:  No erythema or lesions present  Cervix:     Hyperemic cervix, more than expected for her age. No cervical motion tenderness  Uterus:     Uterus: firm, normal sized and nontender, anteverted in position.   Adnexa:     No adnexal tenderness present, no adnexal masses present  Perineum:     Perineum within normal limits, no evidence of trauma, no rashes or skin lesions present  Anus:     Anus within normal limits, no hemorrhoids present  Inguinal Lymph Nodes:     No lymphadenopathy present  Pubic Hair:     Normal pubic hair distribution for age  Genitalia and Groin:     No rashes present, no lesions present, no areas of discoloration, no masses present      COUNSELING:   Reviewed preventive health counseling, as reflected in patient instructions    BMI: Body mass index is 26.82 kg/m .      ASSESSMENT:  53 year old female with satisfactory annual exam.    ICD-10-CM    1. Encounter for gynecological examination without abnormal finding Z01.419 Pap imaged thin layer screen with HPV - recommended age 30 - 65     HPV High Risk Types DNA Cervical   2. Encounter for lipid screening for cardiovascular disease Z13.220 Lipid panel reflex to direct LDL Fasting    Z13.6    3. Screening for metabolic disorder Z13.228 Comprehensive metabolic panel   4. Screening for thyroid disorder Z13.29 TSH with free T4 reflex   5. Migraine with aura and without status migrainosus, not intractable G43.109 ZOLMitriptan (ZOMIG) 5 MG tablet   6. Screening examination for sexually transmitted disease Z11.3 HIV Antigen Antibody Combo     Hepatitis C antibody     Hepatitis B  surface antigen     Treponema Abs w Reflex to RPR and Titer     Trichomonas vaginalis DNA PCR     Neisseria gonorrhoeae PCR     Chlamydia trachomatis PCR   7. Vaginal discharge N89.8 Gram stain       PLAN:  Pap smear was performed today  There is a vaginal discharge present, trichomonas was added given the appearance of the discharge and her cervix. Discussed that we would need to treat with flagyl despite her GI intolerance of the medication. Could premedicate with an antiemetic to help.   Other STD screening ordered as well. I recommended repeat screening in 3 to 6 months to really rule out HIV exposure if she is concerned.  I recommended she return to the lab in the future for fasting labs and a CMP as her cholesterol level was abnormal in 2016.  Colonoscopy and mammogram/breast MRIs are via other providers. She likely has a lipoma in her midchest and can consult in the breast center or with a Dermatologist about removal.    Alysia Emmanuel MD

## 2019-03-06 LAB
C TRACH DNA SPEC QL NAA+PROBE: NEGATIVE
HBV SURFACE AG SERPL QL IA: NONREACTIVE
HCV AB SERPL QL IA: NONREACTIVE
HIV 1+2 AB+HIV1 P24 AG SERPL QL IA: NONREACTIVE
N GONORRHOEA DNA SPEC QL NAA+PROBE: NEGATIVE
SPECIMEN SOURCE: NORMAL
SPECIMEN SOURCE: NORMAL
T PALLIDUM AB SER QL: NONREACTIVE
TSH SERPL DL<=0.005 MIU/L-ACNC: 0.88 MU/L (ref 0.4–4)

## 2019-03-06 RX ORDER — CLINDAMYCIN HCL 300 MG
300 CAPSULE ORAL
Qty: 14 CAPSULE | Refills: 0 | Status: SHIPPED | OUTPATIENT
Start: 2019-03-06 | End: 2019-04-19

## 2019-03-06 ASSESSMENT — ANXIETY QUESTIONNAIRES: GAD7 TOTAL SCORE: 0

## 2019-03-07 LAB
COPATH REPORT: NORMAL
PAP: NORMAL

## 2019-03-11 LAB
FINAL DIAGNOSIS: NORMAL
HPV HR 12 DNA CVX QL NAA+PROBE: NEGATIVE
HPV16 DNA SPEC QL NAA+PROBE: NEGATIVE
HPV18 DNA SPEC QL NAA+PROBE: NEGATIVE
SPECIMEN DESCRIPTION: NORMAL
SPECIMEN SOURCE CVX/VAG CYTO: NORMAL

## 2019-03-20 DIAGNOSIS — Z13.228 SCREENING FOR METABOLIC DISORDER: ICD-10-CM

## 2019-03-20 DIAGNOSIS — Z13.6 ENCOUNTER FOR LIPID SCREENING FOR CARDIOVASCULAR DISEASE: ICD-10-CM

## 2019-03-20 DIAGNOSIS — Z13.220 ENCOUNTER FOR LIPID SCREENING FOR CARDIOVASCULAR DISEASE: ICD-10-CM

## 2019-03-20 LAB
ALBUMIN SERPL-MCNC: 3.5 G/DL (ref 3.4–5)
ALP SERPL-CCNC: 47 U/L (ref 40–150)
ALT SERPL W P-5'-P-CCNC: 35 U/L (ref 0–50)
ANION GAP SERPL CALCULATED.3IONS-SCNC: 10 MMOL/L (ref 3–14)
AST SERPL W P-5'-P-CCNC: 24 U/L (ref 0–45)
BILIRUB SERPL-MCNC: 0.5 MG/DL (ref 0.2–1.3)
BUN SERPL-MCNC: 15 MG/DL (ref 7–30)
CALCIUM SERPL-MCNC: 9.4 MG/DL (ref 8.5–10.1)
CHLORIDE SERPL-SCNC: 110 MMOL/L (ref 94–109)
CHOLEST SERPL-MCNC: 222 MG/DL
CO2 SERPL-SCNC: 24 MMOL/L (ref 20–32)
CREAT SERPL-MCNC: 0.9 MG/DL (ref 0.52–1.04)
GFR SERPL CREATININE-BSD FRML MDRD: 73 ML/MIN/{1.73_M2}
GLUCOSE SERPL-MCNC: 97 MG/DL (ref 70–99)
HDLC SERPL-MCNC: 75 MG/DL
LDLC SERPL CALC-MCNC: 132 MG/DL
NONHDLC SERPL-MCNC: 147 MG/DL
POTASSIUM SERPL-SCNC: 4 MMOL/L (ref 3.4–5.3)
PROT SERPL-MCNC: 7.1 G/DL (ref 6.8–8.8)
SODIUM SERPL-SCNC: 144 MMOL/L (ref 133–144)
TRIGL SERPL-MCNC: 76 MG/DL

## 2019-03-20 PROCEDURE — 80053 COMPREHEN METABOLIC PANEL: CPT | Performed by: OBSTETRICS & GYNECOLOGY

## 2019-03-20 PROCEDURE — 80061 LIPID PANEL: CPT | Performed by: OBSTETRICS & GYNECOLOGY

## 2019-03-20 PROCEDURE — 36415 COLL VENOUS BLD VENIPUNCTURE: CPT | Performed by: OBSTETRICS & GYNECOLOGY

## 2019-03-21 ENCOUNTER — MYC MEDICAL ADVICE (OUTPATIENT)
Dept: OBGYN | Facility: CLINIC | Age: 54
End: 2019-03-21

## 2019-04-15 ENCOUNTER — MYC MEDICAL ADVICE (OUTPATIENT)
Dept: OBGYN | Facility: CLINIC | Age: 54
End: 2019-04-15

## 2019-04-15 DIAGNOSIS — N76.0 BACTERIAL VAGINOSIS: ICD-10-CM

## 2019-04-15 DIAGNOSIS — B96.89 BACTERIAL VAGINOSIS: ICD-10-CM

## 2019-04-15 NOTE — TELEPHONE ENCOUNTER
Annual 3/5/19. Please see my chart. Pt has appointment but not until 4/25. Would like med for BV. Routing to Dr. Hernandez. OK to send        3/7/19 Rick Smith   You have bacterial vaginosis, no trichomonas. Remember this is not a sexually transmitted infection. Since you are allergic to metronidazole I have prescribed clindamycin for 7 days. Please take a probiotic or eat yogurt daily while on this medication.

## 2019-04-19 RX ORDER — CLINDAMYCIN HCL 300 MG
300 CAPSULE ORAL
Qty: 14 CAPSULE | Refills: 0 | Status: SHIPPED | OUTPATIENT
Start: 2019-04-19 | End: 2019-05-29

## 2019-04-23 DIAGNOSIS — D13.91 APC (ADENOMATOUS POLYPOSIS COLI): Primary | ICD-10-CM

## 2019-04-24 ENCOUNTER — CARE COORDINATION (OUTPATIENT)
Dept: SURGERY | Facility: CLINIC | Age: 54
End: 2019-04-24

## 2019-04-24 DIAGNOSIS — D13.91 FAMILIAL ADENOMATOUS POLYPOSIS: Primary | Chronic | ICD-10-CM

## 2019-04-24 NOTE — LETTER
April 24, 2019       TO: Sarah Duran  [address] [facility] [address]         Dear Sarah,    Endoscopy Instructions    You are scheduled for Endoscopy and flexible sigmoidoscopy with Dr. Gasca on ***. This will be in the main hospital in the Operating Room on the 3rd floor at ***.   You will need to arrive for your procedure at ***.    Please arrive at this address:  [address] [address]      The following will need to be completed for the procedure:     1. FOLLOW THE DIRECTIONS BELOW FOR BOWEL PREP  2. You must have a  present the day of the exam, cabs and buses are not accepted as rides. If you do not have a proper ride at the time of the exam your exam will be canceled and rescheduled.   3. If you are currently taking a blood thinner: It is recommended that you stop taking your Coumadin/Warfarin or Plavix at least 5 days prior to exam, as long as it is ok with the prescribing MD.  If you are taking aspirin we prefer stopping the medication 3 days prior to procedure. Please check with your provider.  - If your MD switches you to Lovenox prior to exam we prefer the PM dose the evening before the exam and the AM dose the day of the exam are held.   4. If you are a diabetic please ask your primary care provider about adjusting your insulin for the procedure as you will not be able to eat or drink prior to the procedure.   5. If you have medications that need to be taken the morning of the procedure,  please take with small sips of water.   It is an anesthesia requirement to have a PRE-OP PHYSICAL by your Primary Care Clinic before receiving GENERAL ANESTHESIA. THIS MUST BE COMPLETED WITHIN 30 DAYS OF YOUR PROCEDURE.  Without it, your procedure will be delayed or cancelled. The physical can be faxed to [phone]    Pre-Op Physical Fax Number is [phone]. If you have additional questions regarding your admission  requirements please contact [phone] to speak with a member of the clinic staff.      FOLLOW-UP, POST PROCEDURE:     Activity:  You should not drive, operate machinery, make important decisions, or do activities that require coordination or balance until the following day.    Abdominal Cramping:  It is normal to have mild cramping after the study.  This is due to air and water put into the intestines during the ultrasound.  Walking or turning side to side will usually help the air to pass.  Call if the pain is severe.    Sore Throat:  You may have a sore throat for one to two days.  Throat lozenges or warm saltwater gargles may help.  Mix   teaspoon salt in a glass of warm water, gargle, and then spit.    One week before your exam:  - Talk to your doctor if you take blood-thinners (such as asprin, Coumadin or Plavix). He or she may change your medication before the test.   - Stop taking fiber supplements, multi-vitamins with iron and medicines that contain iron.  - If you have diabetes, ask to have your exam early in the morning. Also ask your doctor if you should change your diet or medicines.  - Go to the drug store to fill your prescription for GoLytely (Sometimes called CoLyte) and Dulcolax (bisacodyl). You might also buy Tucks wipes, Vaseline and other items. (See  Tips for Colon Cleansing.   3 days before your exam:  - Begin a low-fiber diet. No raw fruits or vegetables, whole wheat, nuts, popcorn or other high-fiber foods. (See  Diet Guidelines before Your Exam).  - No bulking agents (Bran, Metamucil, Fibercon) and no Olestra (a fat substitute).  2 days before your exam:  - Drink at least 4-6 large glasses of a sports drink each day (not red or purple).   - Stop taking these medicines: ibuprofen (Advil, Motrin), Clinoril, Feldene, Naprosyn, Aleve and other NSAIDs. You may take acetaminophen (Tylenol) for pain.  The day before your exam:  - You may eat until 1 p.m. Do not eat any other solid food the rest  of the day.  - At 1 p.m.:  o Begin Drinking clear liquids (see list of clear liquids). Drink at least 8-10 full glasses of clear liquid during the day.  o Take 3 tablets of Dulcolox  o Use warm water to fill the jug that contains your GoLytely powder. Cover and shake until the powder dissolves. You must use a full gallon of water to dissolve the powder. Chill the liquid for at least three hours. Do not add ice.  - Between 4 and 6 p.m., start drinking the GoLytely as fast as you can. Drink an 8-ounce glass every 10-15 minutes. Drink 8 glasses.  - ***After 10 p.m., quickly drink the rest of the GoLytely (one glass every 10-15 minutes) until it is gone. ***this part is only for morning exam- remove if afternoon exam***  Stay near a toilet when using this medicine. You may have diarrhea (watery stools), mild cramping, bloating and nausea. Your colon must be clean for the doctor to do this exam. If you do not take the medicine as ordered, we may cancel your test.  The day of your exam:  - Do not eat. You may drink clear liquids until 4 hours prior to your exam, unless your doctor tells you otherwise. If you must take medicine, you may take it with sips of water.  - ***At 6 a.m., drink the rest of the Golytely. ***this part is only for an afternoon exam- remove if morning exam***  - Please arrive with an adult who can take you home after the test and stay with you for at least the next 6 hours. The medicine used in this test will make you sleepy. If you do not have someone to take you home, we may cancel this test. ***  - Do not take diabetes medicine by mouth until after your exam. If you take insulin:  o Take all of your long-acting insulin (glargine, Lantus, detemir or Levemir).  o Take 2/3 of your normal dose of NPH.  o Do not take regular insulin: Lispro (Humalog), Aspart (NovoLog) or Glulisine (Apidra). ***  - If you have asthma, bring your inhaler.  Tips for Colon Cleansing:  To get accurate results from your exam,  your colon must be clean and empty. Please follow your doctor s instructions. If you do not, you may need to repeat both the exam and the colon-cleansing process.  The medicine you will take may cause bloating, nausea and other discomfort. Follow these tips to make the process as easy as possible:  - You may use alcohol-free baby wipes to ease anal irritation. You may also use Vaseline to help protect the skin. Other options include Tucks wipes, hemorrhoid treatments and hydrocortisone cream.   - Most people prefer the unflavored version of GoLytely. (You may request this at the pharmacy). You may wish to squeeze some lemon juice into it or add a packet of crystal light (not red or purple). This is fine.   - To chill the solution, put it in your refrigerator or set it in a bowl of ice. Do NOT add ice in your drinking glass. You may remove the GoLytely from the refrigerator 15-30 minutes before drinking.  - Quickly drink one whole glass every 10-15 minutes. It may help to use a timer. If the liquid is too salty, you may use a straw.   - Stay near a toilet!  - You will have diarrhea (watery, loose stools) and may also have chills. Dress for comfort.  - Expect to feel discomfort until the stool clears from your colon. This takes about 2-4 hours.   - Even when you are sitting on the toilet, keep drinking a glass of solution every 10-15 minutes.  - If you have nausea or vomiting (throwing up), rinse your mouth with water. Take a break for 15-30 minutes, and then keep drinking the solution.  - Some people find it helpful to suck on a wedge of lime or lemon. You may also try sucking on hard candy (not red or purple) or washing your mouth out with water, clear soda or mouthwash.   If you followed your doctor s orders and your stool is clear or yellow liquid, then you are ready for the exam.  If you are not sure if your colon is clean, please call. We may recommend a Fleets enema before coming to the hospital. You can buy  this at any drug store over the counter.   What are Clear Liquids?  You may have:  - Water, tea, coffee (no cream)  - Soda pop, Gatorade (not red or purple)  - Clear nutrition drinks (Enlive, Resource Breeze)  - Jell-O, Popsicles (no milk or fruit pieces) or sorbet (not red or purple)  - Fat-Free soup broth or bouillon  - Plain hard candy, such as clear lifesavers (not red or purple)  - Clear juices and fruit-flavored drinks such as apple juice, white grape juice, Hi-C and Gonzales-Aid (not red or purple)  Do NOT have:  - Milk or Milk products such as ice cream, malts or shakes.   - Red or purple drinks of any kind such as cranberry juice or grape juice. Avoid red or purple Jell-O, Popsicles, Gonzales-Aid, sorbet and candy.  - Juices with pulp such as orange, grapefruit, pineapple or tomato juice.   - Cream soups of any kind  - Alcohol  Diet Guidelines before Your Exam-  Start this diet 3 days before your exam. Stop all solid foods by 1:00 PM the day before your exam.  Foods You CAN eat:  Starches: White bread, rolls, biscuits, croissants, Starkweather toast, white flour tortillas, waffles, pancakes, Thai toast; white rice, noodles; plain crackers, saltines; cooked farina or cream of rice; puffed rice, corn flakes, Rice Krispies, Special K.  Vegetables: Tender cooked and canned vegetables without seeds, including carrots, asparagus tips, green beans, wax beans, spinach; vegetable broths.  Fruits and Fruit Juices: Strained fruit juice, canned fruit without seeds or skin (not pineapple), applesauce, pear sauce, ripe bananas, melons (NOT watermelon).  Milk Products: Milk (plain or flavored), cheese, cottage cheese, yogurt (no berries), custard, ice cream (no nuts).  Proteins: Tender, well-cooked ground beef, lamb, veal, ham, pork, chicken, turkey, fish or organ meats; eggs; creamy peanut butter.  Fats and Condiments: Margarine, butter, oils, mayonnaise, sour cream, salad dressing, plain gravy; spices, cooked herbs; bouillon; sugar,  clear jelly, honey, syrup.  Snacks, sweets and drinks: Pretzels, hard candy; plain cakes and cookies (no nuts or seeds); gelatin, plain pudding, sherbet, popsicles; coffee, tea, carbonated ( Fizzy ) drinks.         Foods to AVOID  Starches: Breads or rolls that contain nuts, seeds or fruit; whole wheat or whole grain breads that contain more than 1 gram of fiber per slice (check the nutrition facts label); cornbread; corn or whole wheat tortillas; potatoes with skin; brown rice, wild rice, kasha (buckwheat).  Vegetables: Any raw or steamed vegetables; vegetables with seeds; corn in any form.  Fruits and Fruit Juices: Prunes, prune juice, raisins and other dried fruits; berried and other fruits with seeds; canned pineapple; fresh or frozen fruits not listed above.   Milk and Milk Products: Any yogurt with nuts, seeds or berries.  Proteins: Tough, fibrous meats with gristle; cooked dried beans, peas or lentils; crunchy peanut butter.   Fats and Condiments: Pickles, olives, relish, horseradish; jam, marmalade, preserves.  Snacks, Sweets and Drinks: Popcorn, nuts, seeds, granola, coconut, candies made with nuts or seeds; all desserts that contain nuts, seeds, raisins and other dried fruits, coconut, whole grains or bran.       Feel free to contact me with any additional questions or concerns prior to this exam.  Char Turpin RN   BSN, HNBC, STAR-T  Surgical Oncology and GI Service  Care Coordinator  Ph: [phone]

## 2019-04-25 ENCOUNTER — TELEPHONE (OUTPATIENT)
Dept: GASTROENTEROLOGY | Facility: CLINIC | Age: 54
End: 2019-04-25

## 2019-04-25 NOTE — PROGRESS NOTES
Called patient to schedule procedure with Dr. See Gasca on 05/30/2019, there was no answer. Left message with scheduling line [phone].

## 2019-04-25 NOTE — TELEPHONE ENCOUNTER
Patient called me back in regards to message Karol had left her. Informed patient she is scheduled with Dr. Gasca on 5/30/19. Informed patient she will need an updated pre-op physical within 30 days of her procedure. Patient stated she is going to have this done locally. Informed patient she will need a  and someone to monitor her for 24 hours after the procedure. Confirmed location of the procedure with the patient. Patient stated she would like to talk to a nurse regarding prep. Message sent to RN[facility] to give patient a call. Informed patient all scheduling details will be sent to [facility] per patient request.     4/25/19

## 2019-04-26 NOTE — PROGRESS NOTES
As near as I can tell, my notes from previous attempt were that she did not tolerate without MAC and was initially inadequately prepped with a flex sig prep.    As such, should be EGD/flex with MAC in OR with full Golytely colonoscopy prep.    FATMATA Gasca MD  Associate Professor of Medicine  Division of Gastroenterology, Hepatology and Nutrition  [facility]

## 2019-04-29 ENCOUNTER — CARE COORDINATION (OUTPATIENT)
Dept: SURGERY | Facility: CLINIC | Age: 54
End: 2019-04-29

## 2019-05-22 ENCOUNTER — PATIENT OUTREACH (OUTPATIENT)
Dept: GASTROENTEROLOGY | Facility: CLINIC | Age: 54
End: 2019-05-22

## 2019-05-22 DIAGNOSIS — D13.91 FAP (FAMILIAL ADENOMATOUS POLYPOSIS): Primary | ICD-10-CM

## 2019-05-22 NOTE — PROGRESS NOTES
Surgical Oncology RN Care Coordination Note:     Called and spoke with patient regarding prep instruction and procedure information. Informed her she will need to schedule a pre op H&P and have a  for the procedure. Confirmed she is familiar with [place] location. She has no received prep instructions as of yet     Spoke with Dr. Gasca and he was ok with patient doing the Miralax prep as long as she does the full colonoscopy prep for the procedure.    Dayana Mason RN, BSN  Care Coordinator   [phone]

## 2019-05-29 RX ORDER — DOCUSATE SODIUM 100 MG/1
100 CAPSULE, LIQUID FILLED ORAL DAILY
COMMUNITY

## 2019-05-30 ENCOUNTER — HOSPITAL ENCOUNTER (OUTPATIENT)
Facility: CLINIC | Age: 54
Discharge: HOME | End: 2019-05-30
Attending: INTERNAL MEDICINE | Admitting: INTERNAL MEDICINE
Payer: COMMERCIAL

## 2019-05-30 ENCOUNTER — ANESTHESIA (OUTPATIENT)
Dept: SURGERY | Facility: CLINIC | Age: 54
End: 2019-05-30
Payer: COMMERCIAL

## 2019-05-30 ENCOUNTER — ANESTHESIA EVENT (OUTPATIENT)
Dept: SURGERY | Facility: CLINIC | Age: 54
End: 2019-05-30
Payer: COMMERCIAL

## 2019-05-30 VITALS
BODY MASS INDEX: 25.88 KG/M2 | HEIGHT: 62 IN | WEIGHT: 140.65 LBS | SYSTOLIC BLOOD PRESSURE: 128 MMHG | HEART RATE: 66 BPM | OXYGEN SATURATION: 100 % | RESPIRATION RATE: 16 BRPM | TEMPERATURE: 98 F | DIASTOLIC BLOOD PRESSURE: 80 MMHG

## 2019-05-30 DIAGNOSIS — Z98.890 PONV (POSTOPERATIVE NAUSEA AND VOMITING): Primary | ICD-10-CM

## 2019-05-30 DIAGNOSIS — D13.91 FAP (FAMILIAL ADENOMATOUS POLYPOSIS): ICD-10-CM

## 2019-05-30 DIAGNOSIS — K20.90 ESOPHAGITIS: ICD-10-CM

## 2019-05-30 DIAGNOSIS — R11.2 PONV (POSTOPERATIVE NAUSEA AND VOMITING): Primary | ICD-10-CM

## 2019-05-30 LAB
GLUCOSE BLDC GLUCOMTR-MCNC: 102 MG/DL (ref 70–99)
HCG UR QL: NEGATIVE
HGB BLD-MCNC: 13.8 G/DL (ref 11.7–15.7)

## 2019-05-30 PROCEDURE — 36000053 ZZH SURGERY LEVEL 2 EA 15 ADDTL MIN - UMMC: Performed by: INTERNAL MEDICINE

## 2019-05-30 PROCEDURE — 37000008 ZZH ANESTHESIA TECHNICAL FEE, 1ST 30 MIN: Performed by: INTERNAL MEDICINE

## 2019-05-30 PROCEDURE — 27210794 ZZH OR GENERAL SUPPLY STERILE: Performed by: INTERNAL MEDICINE

## 2019-05-30 PROCEDURE — 85018 HEMOGLOBIN: CPT | Performed by: ANESTHESIOLOGY

## 2019-05-30 PROCEDURE — 25000125 ZZHC RX 250: Performed by: NURSE ANESTHETIST, CERTIFIED REGISTERED

## 2019-05-30 PROCEDURE — 36000051 ZZH SURGERY LEVEL 2 1ST 30 MIN - UMMC: Performed by: INTERNAL MEDICINE

## 2019-05-30 PROCEDURE — 25800030 ZZH RX IP 258 OP 636: Performed by: NURSE ANESTHETIST, CERTIFIED REGISTERED

## 2019-05-30 PROCEDURE — 40000170 ZZH STATISTIC PRE-PROCEDURE ASSESSMENT II: Performed by: INTERNAL MEDICINE

## 2019-05-30 PROCEDURE — 25000128 H RX IP 250 OP 636: Performed by: ANESTHESIOLOGY

## 2019-05-30 PROCEDURE — 82962 GLUCOSE BLOOD TEST: CPT

## 2019-05-30 PROCEDURE — 88305 TISSUE EXAM BY PATHOLOGIST: CPT | Performed by: INTERNAL MEDICINE

## 2019-05-30 PROCEDURE — 81025 URINE PREGNANCY TEST: CPT | Performed by: ANESTHESIOLOGY

## 2019-05-30 PROCEDURE — 25000128 H RX IP 250 OP 636: Performed by: NURSE ANESTHETIST, CERTIFIED REGISTERED

## 2019-05-30 PROCEDURE — 37000009 ZZH ANESTHESIA TECHNICAL FEE, EACH ADDTL 15 MIN: Performed by: INTERNAL MEDICINE

## 2019-05-30 PROCEDURE — 71000027 ZZH RECOVERY PHASE 2 EACH 15 MINS: Performed by: INTERNAL MEDICINE

## 2019-05-30 PROCEDURE — 25000566 ZZH SEVOFLURANE, EA 15 MIN: Performed by: INTERNAL MEDICINE

## 2019-05-30 PROCEDURE — 71000014 ZZH RECOVERY PHASE 1 LEVEL 2 FIRST HR: Performed by: INTERNAL MEDICINE

## 2019-05-30 RX ORDER — HYDROMORPHONE HYDROCHLORIDE 1 MG/ML
.3-.5 INJECTION, SOLUTION INTRAMUSCULAR; INTRAVENOUS; SUBCUTANEOUS EVERY 10 MIN PRN
Status: DISCONTINUED | OUTPATIENT
Start: 2019-05-30 | End: 2019-05-30 | Stop reason: HOSPADM

## 2019-05-30 RX ORDER — FLUMAZENIL 0.1 MG/ML
0.2 INJECTION, SOLUTION INTRAVENOUS
Status: DISCONTINUED | OUTPATIENT
Start: 2019-05-30 | End: 2019-05-30 | Stop reason: HOSPADM

## 2019-05-30 RX ORDER — LIDOCAINE HYDROCHLORIDE 20 MG/ML
SOLUTION OROPHARYNGEAL PRN
Status: DISCONTINUED | OUTPATIENT
Start: 2019-05-30 | End: 2019-05-30

## 2019-05-30 RX ORDER — ONDANSETRON 4 MG/1
4 TABLET, ORALLY DISINTEGRATING ORAL EVERY 30 MIN PRN
Status: DISCONTINUED | OUTPATIENT
Start: 2019-05-30 | End: 2019-05-30 | Stop reason: HOSPADM

## 2019-05-30 RX ORDER — NALOXONE HYDROCHLORIDE 0.4 MG/ML
.1-.4 INJECTION, SOLUTION INTRAMUSCULAR; INTRAVENOUS; SUBCUTANEOUS
Status: DISCONTINUED | OUTPATIENT
Start: 2019-05-30 | End: 2019-05-30 | Stop reason: HOSPADM

## 2019-05-30 RX ORDER — SODIUM CHLORIDE, SODIUM LACTATE, POTASSIUM CHLORIDE, CALCIUM CHLORIDE 600; 310; 30; 20 MG/100ML; MG/100ML; MG/100ML; MG/100ML
INJECTION, SOLUTION INTRAVENOUS CONTINUOUS
Status: DISCONTINUED | OUTPATIENT
Start: 2019-05-30 | End: 2019-05-30 | Stop reason: HOSPADM

## 2019-05-30 RX ORDER — LIDOCAINE 40 MG/G
CREAM TOPICAL
Status: DISCONTINUED | OUTPATIENT
Start: 2019-05-30 | End: 2019-05-30 | Stop reason: HOSPADM

## 2019-05-30 RX ORDER — PROPOFOL 10 MG/ML
INJECTION, EMULSION INTRAVENOUS PRN
Status: DISCONTINUED | OUTPATIENT
Start: 2019-05-30 | End: 2019-05-30

## 2019-05-30 RX ORDER — ONDANSETRON 2 MG/ML
4 INJECTION INTRAMUSCULAR; INTRAVENOUS EVERY 30 MIN PRN
Status: DISCONTINUED | OUTPATIENT
Start: 2019-05-30 | End: 2019-05-30 | Stop reason: HOSPADM

## 2019-05-30 RX ORDER — DEXAMETHASONE SODIUM PHOSPHATE 4 MG/ML
INJECTION, SOLUTION INTRA-ARTICULAR; INTRALESIONAL; INTRAMUSCULAR; INTRAVENOUS; SOFT TISSUE PRN
Status: DISCONTINUED | OUTPATIENT
Start: 2019-05-30 | End: 2019-05-30

## 2019-05-30 RX ORDER — ONDANSETRON 2 MG/ML
4 INJECTION INTRAMUSCULAR; INTRAVENOUS
Status: DISCONTINUED | OUTPATIENT
Start: 2019-05-30 | End: 2019-05-30 | Stop reason: HOSPADM

## 2019-05-30 RX ORDER — SODIUM CHLORIDE, SODIUM LACTATE, POTASSIUM CHLORIDE, CALCIUM CHLORIDE 600; 310; 30; 20 MG/100ML; MG/100ML; MG/100ML; MG/100ML
INJECTION, SOLUTION INTRAVENOUS CONTINUOUS PRN
Status: DISCONTINUED | OUTPATIENT
Start: 2019-05-30 | End: 2019-05-30

## 2019-05-30 RX ORDER — FENTANYL CITRATE 50 UG/ML
25-50 INJECTION, SOLUTION INTRAMUSCULAR; INTRAVENOUS
Status: DISCONTINUED | OUTPATIENT
Start: 2019-05-30 | End: 2019-05-30 | Stop reason: HOSPADM

## 2019-05-30 RX ORDER — ONDANSETRON 2 MG/ML
INJECTION INTRAMUSCULAR; INTRAVENOUS PRN
Status: DISCONTINUED | OUTPATIENT
Start: 2019-05-30 | End: 2019-05-30

## 2019-05-30 RX ORDER — MEPERIDINE HYDROCHLORIDE 25 MG/ML
12.5 INJECTION INTRAMUSCULAR; INTRAVENOUS; SUBCUTANEOUS
Status: DISCONTINUED | OUTPATIENT
Start: 2019-05-30 | End: 2019-05-30 | Stop reason: HOSPADM

## 2019-05-30 RX ORDER — CELECOXIB 100 MG/1
100 CAPSULE ORAL DAILY
Qty: 90 CAPSULE | Refills: 3 | Status: ON HOLD | OUTPATIENT
Start: 2019-05-30 | End: 2020-07-30

## 2019-05-30 RX ORDER — GLYCOPYRROLATE 0.2 MG/ML
INJECTION, SOLUTION INTRAMUSCULAR; INTRAVENOUS PRN
Status: DISCONTINUED | OUTPATIENT
Start: 2019-05-30 | End: 2019-05-30

## 2019-05-30 RX ORDER — ACETAMINOPHEN 325 MG/1
975 TABLET ORAL ONCE
Status: DISCONTINUED | OUTPATIENT
Start: 2019-05-30 | End: 2019-05-30 | Stop reason: HOSPADM

## 2019-05-30 RX ORDER — ONDANSETRON 4 MG/1
4 TABLET, FILM COATED ORAL EVERY 8 HOURS PRN
Qty: 10 TABLET | Refills: 0 | Status: ON HOLD | OUTPATIENT
Start: 2019-05-30 | End: 2020-07-30

## 2019-05-30 RX ORDER — PROPOFOL 10 MG/ML
INJECTION, EMULSION INTRAVENOUS CONTINUOUS PRN
Status: DISCONTINUED | OUTPATIENT
Start: 2019-05-30 | End: 2019-05-30

## 2019-05-30 RX ORDER — FENTANYL CITRATE 50 UG/ML
INJECTION, SOLUTION INTRAMUSCULAR; INTRAVENOUS PRN
Status: DISCONTINUED | OUTPATIENT
Start: 2019-05-30 | End: 2019-05-30

## 2019-05-30 RX ADMIN — ROCURONIUM BROMIDE 40 MG: 10 INJECTION INTRAVENOUS at 08:35

## 2019-05-30 RX ADMIN — DEXAMETHASONE SODIUM PHOSPHATE 4 MG: 4 INJECTION, SOLUTION INTRA-ARTICULAR; INTRALESIONAL; INTRAMUSCULAR; INTRAVENOUS; SOFT TISSUE at 09:33

## 2019-05-30 RX ADMIN — ONDANSETRON 4 MG: 2 INJECTION INTRAMUSCULAR; INTRAVENOUS at 08:02

## 2019-05-30 RX ADMIN — PROPOFOL 30 MG: 10 INJECTION, EMULSION INTRAVENOUS at 08:10

## 2019-05-30 RX ADMIN — FENTANYL CITRATE 50 MCG: 50 INJECTION, SOLUTION INTRAMUSCULAR; INTRAVENOUS at 08:02

## 2019-05-30 RX ADMIN — PROPOFOL 50 MG: 10 INJECTION, EMULSION INTRAVENOUS at 08:52

## 2019-05-30 RX ADMIN — PROPOFOL 30 MG: 10 INJECTION, EMULSION INTRAVENOUS at 08:30

## 2019-05-30 RX ADMIN — MIDAZOLAM 2 MG: 1 INJECTION INTRAMUSCULAR; INTRAVENOUS at 07:48

## 2019-05-30 RX ADMIN — MEPERIDINE HYDROCHLORIDE 12.5 MG: 25 INJECTION INTRAMUSCULAR; INTRAVENOUS; SUBCUTANEOUS at 10:04

## 2019-05-30 RX ADMIN — PROPOFOL 30 MG: 10 INJECTION, EMULSION INTRAVENOUS at 08:13

## 2019-05-30 RX ADMIN — PROPOFOL 100 MG: 10 INJECTION, EMULSION INTRAVENOUS at 08:35

## 2019-05-30 RX ADMIN — GLYCOPYRROLATE 0.1 MCG: 0.2 INJECTION, SOLUTION INTRAMUSCULAR; INTRAVENOUS at 09:22

## 2019-05-30 RX ADMIN — SUGAMMADEX 200 MG: 100 INJECTION, SOLUTION INTRAVENOUS at 09:40

## 2019-05-30 RX ADMIN — PROPOFOL 30 MG: 10 INJECTION, EMULSION INTRAVENOUS at 08:05

## 2019-05-30 RX ADMIN — LIDOCAINE HYDROCHLORIDE 5 ML: 20 SOLUTION ORAL; TOPICAL at 10:39

## 2019-05-30 RX ADMIN — SODIUM CHLORIDE, POTASSIUM CHLORIDE, SODIUM LACTATE AND CALCIUM CHLORIDE: 600; 310; 30; 20 INJECTION, SOLUTION INTRAVENOUS at 08:54

## 2019-05-30 RX ADMIN — SODIUM CHLORIDE, POTASSIUM CHLORIDE, SODIUM LACTATE AND CALCIUM CHLORIDE: 600; 310; 30; 20 INJECTION, SOLUTION INTRAVENOUS at 07:48

## 2019-05-30 RX ADMIN — FENTANYL CITRATE 50 MCG: 50 INJECTION, SOLUTION INTRAMUSCULAR; INTRAVENOUS at 08:33

## 2019-05-30 RX ADMIN — PROPOFOL 75 MCG/KG/MIN: 10 INJECTION, EMULSION INTRAVENOUS at 07:55

## 2019-05-30 ASSESSMENT — MIFFLIN-ST. JEOR: SCORE: 1196.25

## 2019-05-30 NOTE — BRIEF OP NOTE
[facility], [place]  Gastroenterology Brief Operative Note    Pre-operative diagnosis: Familial adenomatous polyposis   Post-operative diagnosis Same   Procedure: Upper endoscopy with APC of duodenal adenomas, Biopsy of the stomach polyps.    Surgeon: Dr Gasca    Assistants(s): Ira Reinoso MD   Anesthesia: General endotracheal anesthesia   Estimated blood loss: Minimal    Total IV fluids: (See anesthesia record)   Blood transfusion: No transfusion was given during surgery   Total urine output: (See anesthesia record)   Drains: None   Specimens: A : Antral polyp   B : Gastric Cardia Polyp   C : Rectal - Sigmoid polyp   D : Rectum      Implants: None   Findings: EGD: Perfomed with upper gastroscope and peds colon  - LA grade B esophagitis.   - Multiple fundic gland polyps in the gastric body and fundus. A 2.5 cm polyp was noted in the gastric body. Biopsied.   - Two 4 mm and 8 mm sessile polyps were noted in the antrum. Larger polyp had a central indentation. Biopsied.   - Multiple <5 mm duodenal polyps were treated with APC.   - Major papilla was examined with a duodenoscope and no nadeem-ampullary lesions were noted.     Flex Sig:  - Ileocolonic anastomosis at 25 cm from anal verge.   - Multiple small polyps in the rectosigmoid and rectum were resected with a cold snare.  - Normal retroflexion.       Complications: None   Condition: Stable   Comments:      Recommendations:         See dictated procedure report for full details (found in chart review under 'Procedures')    - Observe in Same Day for possible discharge  - Await pathology results  - Page Dr Gasca when the patinet arrives to . Discharge home if minimal or no pain   - Based on biopsy results, she may need EUS scheduled with possible EMR of antral lesions.      Ira Reinoso MD  Advanced Endoscopy Fellow  Pager: [phone]

## 2019-05-30 NOTE — ANESTHESIA POSTPROCEDURE EVALUATION
Anesthesia POST Procedure Evaluation    Patient: Sarah Duran   MRN:     [id number] Gender:   female   Age:    53 year old :      1965        Preoperative Diagnosis: Familial Adenomatous Polyposis   Procedure(s):  Esophagogastroduodenoscopy with biopsy x2 APC  Flexible Sigmoidoscopy with polypectomy   Postop Comments: No value filed.       Anesthesia Type:  MAC  No value filed.    Reportable Event: NO     PAIN: Uncomplicated   Sign Out status: Comfortable, Well controlled pain     PONV: No PONV   Sign Out status:  No Nausea or Vomiting     Neuro/Psych: Uneventful perioperative course   Sign Out Status: Preoperative baseline     Airway/Resp.: Uneventful perioperative course   Sign Out Status: Resp. Status within EXPECTED Parameters     CV: Uneventful perioperative course   Sign Out status: Appropriate BP and perfusion indices; Appropriate HR/Rhythm     Disposition:   Sign Out in:  PACU  Recovery Course: Uneventful  Follow-Up: Not required           Last Anesthesia Record Vitals:  CRNA VITALS  2019 0913 - 2019 1013      2019             Pulse:  75    Temp:  18.1  C (64.6  F)  (Abnormal)     SpO2:  97 %    Resp Rate (observed):  8          Last PACU Vitals:  Vitals Value Taken Time   /74 2019 10:37 AM   Temp 36.7  C (98  F) 2019 10:37 AM   Pulse 66 2019 10:37 AM   Resp 16 2019 10:37 AM   SpO2 95 % 2019 10:37 AM   Temp src     NIBP     Pulse     SpO2     Resp     Temp     Ht Rate     Temp 2           Electronically Signed By: Tami Chávez MD, May 30, 2019, 12:40 PM

## 2019-05-30 NOTE — ANESTHESIA PREPROCEDURE EVALUATION
Anesthesia Pre-Procedure Evaluation    Patient: Sarah Duran   MRN:     [id number] Gender:   female   Age:    53 year old :      1965        Preoperative Diagnosis: Familial Adenomatous Polyposis   Procedure(s):  Esophagogastroduodenoscopy with biopsy x2  Flexible Sigmoidoscopy with biopsy X2     Past Medical History:   Diagnosis Date     Asthma      Atypical ductal hyperplasia of right breast 10/2016    fibrocystic changes, PASH and atypical ductal hyperplasia on needle biopsy, s/p R excisional biopsy with radioactive seed localization     Factor 5 Leiden mutation, heterozygous (H)     factor 5     Familial adenomatous polyposis     clinical presentation; s/p total abdominal colectomy with TRACEY; confirmed APC+      GERD (gastroesophageal reflux disease)      Migraine      Monoallelic mutation of APC gene 2018    [id number]     Pancreatitis, acute      PONV (postoperative nausea and vomiting)       Past Surgical History:   Procedure Laterality Date     BIOPSY BREAST SEED LOCALIZATION Right 2016    Procedure: BIOPSY BREAST SEED LOCALIZATION;  Surgeon: Jessica Rasheed MD;  Location: [facility] OR     C LAP,SURG,COLECTOMY,W/ANAST      for colon cancer     COLECTOMY      subtotal     COMBINED ESOPHAGOSCOPY, GASTROSCOPY, DUODENOSCOPY (EGD) WITH ARGON PLASMA COAGULATOR (APC) N/A 2018    Procedure: COMBINED ESOPHAGOSCOPY, GASTROSCOPY, DUODENOSCOPY (EGD) WITH ARGON PLASMA COAGULATOR (APC);  Esophagogastroduodenoscopy with polypectomy and polyp ablation;  Surgeon: Stephen Gasca MD;  Location: [facility] OR     ENDOSCOPIC RETROGRADE CHOLANGIOPANCREATOGRAPHY       ESOPHAGOSCOPY, GASTROSCOPY, DUODENOSCOPY (EGD), COMBINED  2012    Procedure: COMBINED ESOPHAGOSCOPY, GASTROSCOPY, DUODENOSCOPY (EGD), BIOPSY SINGLE OR MULTIPLE;;  Surgeon: Angélica Garcia MD;  Location: [facility] GI     ESOPHAGOSCOPY, GASTROSCOPY, DUODENOSCOPY (EGD), COMBINED  12/3/2013    Procedure: COMBINED ESOPHAGOSCOPY,  GASTROSCOPY, DUODENOSCOPY (EGD);;  Surgeon: Angélica Garcia MD;  Location: [facility] GI     ESOPHAGOSCOPY, GASTROSCOPY, DUODENOSCOPY (EGD), COMBINED N/A 3/28/2018    Procedure: COMBINED ESOPHAGOSCOPY, GASTROSCOPY, DUODENOSCOPY (EGD);;  Surgeon: Stephen Gasca MD;  Location: [facility] GI     EXCISE POLYP RECTUM       LAPAROSCOPIC LYSIS ADHESIONS       SIGMOIDOSCOPY FLEXIBLE       SIGMOIDOSCOPY FLEXIBLE N/A 12/7/2015    Procedure: SIGMOIDOSCOPY FLEXIBLE;  Surgeon: Mariela Bearden MD;  Location: [facility] GI     SIGMOIDOSCOPY FLEXIBLE N/A 3/28/2018    Procedure: SIGMOIDOSCOPY FLEXIBLE;  EGD/Flex Sig;  Surgeon: Stephen Gasca MD;  Location: [facility] GI     SIGMOIDOSCOPY FLEXIBLE N/A 4/12/2018    Procedure: SIGMOIDOSCOPY FLEXIBLE;  Flexible Sigmoidoscopy with polyp ablation;  Surgeon: Stephen Gasca MD;  Location: [facility] OR                   PHYSICAL EXAM:   Mental Status/Neuro: A/A/O   Airway:   Mallampati: I  Mouth/Opening: Full  TM distance: > 6 cm  Neck ROM: Full   Respiratory:   Resp. Rate: Normal     Resp. Effort: Normal      CV:    Comments:                    Lab Results   Component Value Date    WBC 4.6 11/10/2016    HGB 13.8 05/30/2019    HCT 41.3 11/10/2016     11/10/2016     03/20/2019    POTASSIUM 4.0 03/20/2019    CHLORIDE 110 (H) 03/20/2019    CO2 24 03/20/2019    BUN 15 03/20/2019    CR 0.90 03/20/2019    GLC 97 03/20/2019    WOLF 9.4 03/20/2019    ALBUMIN 3.5 03/20/2019    PROTTOTAL 7.1 03/20/2019    ALT 35 03/20/2019    AST 24 03/20/2019    ALKPHOS 47 03/20/2019    BILITOTAL 0.5 03/20/2019    AMYLASE 103 11/16/2006    PTT 26 05/07/2009    INR 0.94 05/07/2009    TSH 0.88 03/05/2019    HCG Negative 05/30/2019       Preop Vitals  BP Readings from Last 3 Encounters:   05/30/19 (!) 135/91   11/08/18 115/73   05/03/18 109/71    Pulse Readings from Last 3 Encounters:   05/30/19 68   11/08/18 57   05/03/18 66      Resp Readings from Last 3 Encounters:   05/30/19 18   11/08/18 18   05/03/18 16    SpO2  "Readings from Last 3 Encounters:   05/30/19 97%   11/08/18 100%   05/03/18 98%      Temp Readings from Last 1 Encounters:   05/30/19 37.1  C (98.7  F) (Oral)    Ht Readings from Last 1 Encounters:   05/30/19 1.575 m (5' 2\")      Wt Readings from Last 1 Encounters:   05/30/19 63.8 kg (140 lb 10.5 oz)    Estimated body mass index is 25.73 kg/m  as calculated from the following:    Height as of this encounter: 1.575 m (5' 2\").    Weight as of this encounter: 63.8 kg (140 lb 10.5 oz).     LDA:  Peripheral IV 05/30/19 Right Upper forearm (Active)   Site Assessment WDL 5/30/2019  7:38 AM   Line Status Saline locked 5/30/2019  7:38 AM   Number of days: 0       ETT (adult) 7 (Active)   Number of days: 0            Assessment:   ASA SCORE: 2    NPO Status: > 6 hours since completed Solid Foods   Documentation: Preop Testing complete   Proceeding: Proceed without further delay  Tobacco Use:  NO Active use of Tobacco/UNKNOWN Tobacco use status     Plan:   Anes. Type:  MAC   Pre-Induction: Midazolam IV   Induction:  IV (Standard)      Access/Monitoring: PIV              Postop Pain/Sedation Strategy:  Standard-Options: Opioids PRN     PONV Management:  Adult Risk Factors: Female, H/o PONV or Motion Sickness, Non-Smoker, Postop Opioids  Prevention: Ondansetron; Propofol Infusion     CONSENT: Direct conversation   Plan and risks discussed with: Patient                            Tami Chávez MD  "

## 2019-05-30 NOTE — DISCHARGE INSTRUCTIONS
[facility]   Adult Discharge Orders & Instructions     For 24 hours after surgery    1. Get plenty of rest.  A responsible adult must stay with you for at least 24 hours after you leave the hospital.   2. Do not drive or use heavy equipment.  If you have weakness or tingling, don't drive or use heavy equipment until this feeling goes away.  3. Do not drink alcohol.  4. Avoid strenuous or risky activities.  Ask for help when climbing stairs.   5. You may feel lightheaded.  IF so, sit for a few minutes before standing.  Have someone help you get up.   6. If you have nausea (feel sick to your stomach): Drink only clear liquids such as apple juice, ginger ale, broth or 7-Up.  Rest may also help.  Be sure to drink enough fluids.  Move to a regular diet as you feel able.  7. You may have a slight fever. Call the doctor if your fever is over 100 F (37.7 C) (taken under the tongue) or lasts longer than 24 hours.  8. You may have a dry mouth, a sore throat, muscle aches or trouble sleeping.  These should go away after 24 hours.  9. Do not make important or legal decisions.   Call your doctor for any of the followin.  Signs of infection (fever, growing tenderness at the surgery site, a large amount of drainage or bleeding, severe pain, foul-smelling drainage, redness, swelling).    2. It has been over 8 to 10 hours since surgery and you are still not able to urinate (pass water).    3.  Headache for over 24 hours.    4.  Numbness, tingling or weakness the day after surgery (if you had spinal anesthesia).  To contact a doctor, call Dr Gasca's office at [phone] or call the hospital and ask the  for the gastroenterologist resident on call

## 2019-05-30 NOTE — ANESTHESIA CARE TRANSFER NOTE
Patient: Sarah Duran    Procedure(s):  Esophagogastroduodenoscopy with biopsy x2 APC  Flexible Sigmoidoscopy with polypectomy    Diagnosis: Familial Adenomatous Polyposis  Diagnosis Additional Information: No value filed.    Anesthesia Type:   No value filed.     Note:  Airway :Nasal Cannula  Patient transferred to:PACU  Handoff Report: Identifed the Patient, Identified the Reponsible Provider, Reviewed the pertinent medical history, Discussed the surgical course, Reviewed Intra-OP anesthesia mangement and issues during anesthesia, Set expectations for post-procedure period and Allowed opportunity for questions and acknowledgement of understanding      Vitals: (Last set prior to Anesthesia Care Transfer)    CRNA VITALS  5/30/2019 0913 - 5/30/2019 0952      5/30/2019             Pulse:  75    Temp:  18.1  C (64.6  F)  (Abnormal)     SpO2:  97 %    Resp Rate (observed):  8                Electronically Signed By: REYNOLD Zarate CRNA  May 30, 2019  9:52 AM

## 2019-05-31 LAB — COPATH REPORT: NORMAL

## 2019-06-03 LAB
FLEXIBLE SIGMOIDOSCOPY: NORMAL
UPPER GI ENDOSCOPY: NORMAL

## 2019-06-04 ENCOUNTER — TELEPHONE (OUTPATIENT)
Dept: GASTROENTEROLOGY | Facility: CLINIC | Age: 54
End: 2019-06-04

## 2019-06-04 DIAGNOSIS — D13.1 ADENOMA OF STOMACH: Primary | ICD-10-CM

## 2019-06-04 NOTE — TELEPHONE ENCOUNTER
Karol:    See recent EGD note.    Pt had two polyps in antrum which were not removed and will likely require EMR. Biopsy showed adenoma and thus requires resection.    Discussed with pt.     Please arrange for EGD/EUS with me in OR with general anesthesia (difficult to sedate). Non-urgent. Pt aware. Order placed.    FATMATA Gasca MD  Associate Professor of Medicine  Division of Gastroenterology, Hepatology and Nutrition  [facility]

## 2019-06-04 NOTE — TELEPHONE ENCOUNTER
Char:    Pt will need repeat EGD and flex sig (with full colonoscopy prep) with me in OR in 1 year with general. This is separate from the EGD/EUS that is needed now that I sent to Karol.    FATMATA Gasca MD  Associate Professor of Medicine  Division of Gastroenterology, Hepatology and Nutrition  [facility]

## 2019-06-10 ENCOUNTER — HOSPITAL ENCOUNTER (OUTPATIENT)
Dept: MRI IMAGING | Facility: CLINIC | Age: 54
Discharge: HOME | End: 2019-06-10
Attending: CLINICAL NURSE SPECIALIST | Admitting: CLINICAL NURSE SPECIALIST
Payer: COMMERCIAL

## 2019-06-10 ENCOUNTER — HOSPITAL ENCOUNTER (OUTPATIENT)
Dept: ULTRASOUND IMAGING | Facility: CLINIC | Age: 54
End: 2019-06-10
Attending: CLINICAL NURSE SPECIALIST
Payer: COMMERCIAL

## 2019-06-10 DIAGNOSIS — D13.91 FAP (FAMILIAL ADENOMATOUS POLYPOSIS): ICD-10-CM

## 2019-06-10 DIAGNOSIS — N60.99 DUCTAL HYPERPLASIA, ATYPICAL, BREAST: ICD-10-CM

## 2019-06-10 DIAGNOSIS — Z15.89 MONOALLELIC MUTATION OF APC GENE: ICD-10-CM

## 2019-06-10 DIAGNOSIS — Z15.09 MONOALLELIC MUTATION OF APC GENE: ICD-10-CM

## 2019-06-10 DIAGNOSIS — R92.30 DENSE BREAST: ICD-10-CM

## 2019-06-10 DIAGNOSIS — Z12.39 BREAST CANCER SCREENING, HIGH RISK PATIENT: ICD-10-CM

## 2019-06-10 PROCEDURE — 25800025 ZZH RX 258: Performed by: CLINICAL NURSE SPECIALIST

## 2019-06-10 PROCEDURE — 76536 US EXAM OF HEAD AND NECK: CPT

## 2019-06-10 PROCEDURE — A9585 GADOBUTROL INJECTION: HCPCS | Performed by: CLINICAL NURSE SPECIALIST

## 2019-06-10 PROCEDURE — 77049 MRI BREAST C-+ W/CAD BI: CPT

## 2019-06-10 PROCEDURE — 25500064 ZZH RX 255 OP 636: Performed by: CLINICAL NURSE SPECIALIST

## 2019-06-10 RX ORDER — ACYCLOVIR 200 MG/1
60 CAPSULE ORAL ONCE
Status: COMPLETED | OUTPATIENT
Start: 2019-06-10 | End: 2019-06-10

## 2019-06-10 RX ORDER — GADOBUTROL 604.72 MG/ML
6 INJECTION INTRAVENOUS ONCE
Status: COMPLETED | OUTPATIENT
Start: 2019-06-10 | End: 2019-06-10

## 2019-06-10 RX ADMIN — GADOBUTROL 6 ML: 604.72 INJECTION INTRAVENOUS at 09:54

## 2019-06-10 RX ADMIN — SODIUM CHLORIDE 60 ML: 9 INJECTION, SOLUTION INTRAMUSCULAR; INTRAVENOUS; SUBCUTANEOUS at 09:55

## 2019-06-14 ENCOUNTER — TELEPHONE (OUTPATIENT)
Dept: GASTROENTEROLOGY | Facility: CLINIC | Age: 54
End: 2019-06-14

## 2019-06-14 NOTE — TELEPHONE ENCOUNTER
Spoke to patient in regards to scheduling EUS with Dr. Gasca. Patient stated that she has several meeting these next few weeks and is not available until 7/25/19. Informed patient she is scheduled with Dr. Gasca on 7/25/19. Informed patient she will need a  and someone to monitor her for 24 hours after the procedure. Informed patient she will need an updated pre-op physical within 30 days of the procedure. Patient stated she is going to have this done locally with her PCP. Informed patient all [facility]     6/14/19 1141am

## 2019-06-19 ENCOUNTER — TELEPHONE (OUTPATIENT)
Dept: ONCOLOGY | Facility: CLINIC | Age: 54
End: 2019-06-19

## 2019-06-19 DIAGNOSIS — Z91.89 AT RISK FOR CANCER: ICD-10-CM

## 2019-06-19 DIAGNOSIS — D13.91 FAP (FAMILIAL ADENOMATOUS POLYPOSIS): ICD-10-CM

## 2019-06-19 DIAGNOSIS — E04.1 THYROID NODULE: Primary | ICD-10-CM

## 2019-07-16 ENCOUNTER — TELEPHONE (OUTPATIENT)
Dept: ONCOLOGY | Facility: CLINIC | Age: 54
End: 2019-07-16

## 2019-07-25 ENCOUNTER — TELEPHONE (OUTPATIENT)
Dept: GASTROENTEROLOGY | Facility: CLINIC | Age: 54
End: 2019-07-25

## 2019-07-25 ENCOUNTER — ANESTHESIA (OUTPATIENT)
Dept: SURGERY | Facility: CLINIC | Age: 54
End: 2019-07-25
Payer: COMMERCIAL

## 2019-07-25 ENCOUNTER — ANESTHESIA EVENT (OUTPATIENT)
Dept: SURGERY | Facility: CLINIC | Age: 54
End: 2019-07-25
Payer: COMMERCIAL

## 2019-07-25 ENCOUNTER — HOSPITAL ENCOUNTER (OUTPATIENT)
Facility: CLINIC | Age: 54
Discharge: HOME | End: 2019-07-25
Attending: INTERNAL MEDICINE | Admitting: INTERNAL MEDICINE
Payer: COMMERCIAL

## 2019-07-25 VITALS
BODY MASS INDEX: 27.06 KG/M2 | SYSTOLIC BLOOD PRESSURE: 125 MMHG | DIASTOLIC BLOOD PRESSURE: 89 MMHG | OXYGEN SATURATION: 100 % | TEMPERATURE: 98.9 F | HEART RATE: 60 BPM | WEIGHT: 147.05 LBS | HEIGHT: 62 IN | RESPIRATION RATE: 16 BRPM

## 2019-07-25 DIAGNOSIS — K31.7 GASTRIC POLYP: Primary | ICD-10-CM

## 2019-07-25 LAB
BUN SERPL-MCNC: 16 MG/DL (ref 7–30)
ERYTHROCYTE [DISTWIDTH] IN BLOOD BY AUTOMATED COUNT: 13 % (ref 10–15)
GLUCOSE BLDC GLUCOMTR-MCNC: 91 MG/DL (ref 70–99)
HCG UR QL: NEGATIVE
HCT VFR BLD AUTO: 43.8 % (ref 35–47)
HGB BLD-MCNC: 13.6 G/DL (ref 11.7–15.7)
INR PPP: 0.99 (ref 0.86–1.14)
MCH RBC QN AUTO: 28.1 PG (ref 26.5–33)
MCHC RBC AUTO-ENTMCNC: 31.1 G/DL (ref 31.5–36.5)
MCV RBC AUTO: 91 FL (ref 78–100)
PLATELET # BLD AUTO: 199 10E9/L (ref 150–450)
RBC # BLD AUTO: 4.84 10E12/L (ref 3.8–5.2)
WBC # BLD AUTO: 4.4 10E9/L (ref 4–11)

## 2019-07-25 PROCEDURE — 25800030 ZZH RX IP 258 OP 636: Performed by: NURSE ANESTHETIST, CERTIFIED REGISTERED

## 2019-07-25 PROCEDURE — 81025 URINE PREGNANCY TEST: CPT | Performed by: ANESTHESIOLOGY

## 2019-07-25 PROCEDURE — 71000014 ZZH RECOVERY PHASE 1 LEVEL 2 FIRST HR: Performed by: INTERNAL MEDICINE

## 2019-07-25 PROCEDURE — 85610 PROTHROMBIN TIME: CPT | Performed by: INTERNAL MEDICINE

## 2019-07-25 PROCEDURE — 36415 COLL VENOUS BLD VENIPUNCTURE: CPT | Performed by: INTERNAL MEDICINE

## 2019-07-25 PROCEDURE — 40000170 ZZH STATISTIC PRE-PROCEDURE ASSESSMENT II: Performed by: INTERNAL MEDICINE

## 2019-07-25 PROCEDURE — 37000009 ZZH ANESTHESIA TECHNICAL FEE, EACH ADDTL 15 MIN: Performed by: INTERNAL MEDICINE

## 2019-07-25 PROCEDURE — 27210794 ZZH OR GENERAL SUPPLY STERILE: Performed by: INTERNAL MEDICINE

## 2019-07-25 PROCEDURE — 71000027 ZZH RECOVERY PHASE 2 EACH 15 MINS: Performed by: INTERNAL MEDICINE

## 2019-07-25 PROCEDURE — 36000064 ZZH SURGERY LEVEL 4 EA 15 ADDTL MIN - UMMC: Performed by: INTERNAL MEDICINE

## 2019-07-25 PROCEDURE — 85027 COMPLETE CBC AUTOMATED: CPT | Performed by: INTERNAL MEDICINE

## 2019-07-25 PROCEDURE — 25000128 H RX IP 250 OP 636: Performed by: NURSE ANESTHETIST, CERTIFIED REGISTERED

## 2019-07-25 PROCEDURE — 88305 TISSUE EXAM BY PATHOLOGIST: CPT | Performed by: INTERNAL MEDICINE

## 2019-07-25 PROCEDURE — 84520 ASSAY OF UREA NITROGEN: CPT | Performed by: INTERNAL MEDICINE

## 2019-07-25 PROCEDURE — 25000125 ZZHC RX 250: Performed by: NURSE ANESTHETIST, CERTIFIED REGISTERED

## 2019-07-25 PROCEDURE — 25800030 ZZH RX IP 258 OP 636: Performed by: ANESTHESIOLOGY

## 2019-07-25 PROCEDURE — 36000062 ZZH SURGERY LEVEL 4 1ST 30 MIN - UMMC: Performed by: INTERNAL MEDICINE

## 2019-07-25 PROCEDURE — 25000125 ZZHC RX 250: Performed by: INTERNAL MEDICINE

## 2019-07-25 PROCEDURE — 37000008 ZZH ANESTHESIA TECHNICAL FEE, 1ST 30 MIN: Performed by: INTERNAL MEDICINE

## 2019-07-25 PROCEDURE — 82962 GLUCOSE BLOOD TEST: CPT

## 2019-07-25 RX ORDER — ONDANSETRON 2 MG/ML
INJECTION INTRAMUSCULAR; INTRAVENOUS PRN
Status: DISCONTINUED | OUTPATIENT
Start: 2019-07-25 | End: 2019-07-25

## 2019-07-25 RX ORDER — HYDROMORPHONE HYDROCHLORIDE 1 MG/ML
.3-.5 INJECTION, SOLUTION INTRAMUSCULAR; INTRAVENOUS; SUBCUTANEOUS EVERY 5 MIN PRN
Status: DISCONTINUED | OUTPATIENT
Start: 2019-07-25 | End: 2019-07-25 | Stop reason: HOSPADM

## 2019-07-25 RX ORDER — ONDANSETRON 2 MG/ML
4 INJECTION INTRAMUSCULAR; INTRAVENOUS EVERY 30 MIN PRN
Status: DISCONTINUED | OUTPATIENT
Start: 2019-07-25 | End: 2019-07-25 | Stop reason: HOSPADM

## 2019-07-25 RX ORDER — NALOXONE HYDROCHLORIDE 0.4 MG/ML
.1-.4 INJECTION, SOLUTION INTRAMUSCULAR; INTRAVENOUS; SUBCUTANEOUS
Status: DISCONTINUED | OUTPATIENT
Start: 2019-07-25 | End: 2019-07-25 | Stop reason: HOSPADM

## 2019-07-25 RX ORDER — PROPOFOL 10 MG/ML
INJECTION, EMULSION INTRAVENOUS PRN
Status: DISCONTINUED | OUTPATIENT
Start: 2019-07-25 | End: 2019-07-25

## 2019-07-25 RX ORDER — LIDOCAINE 40 MG/G
CREAM TOPICAL
Status: DISCONTINUED | OUTPATIENT
Start: 2019-07-25 | End: 2019-07-25 | Stop reason: HOSPADM

## 2019-07-25 RX ORDER — SODIUM CHLORIDE, SODIUM LACTATE, POTASSIUM CHLORIDE, CALCIUM CHLORIDE 600; 310; 30; 20 MG/100ML; MG/100ML; MG/100ML; MG/100ML
INJECTION, SOLUTION INTRAVENOUS CONTINUOUS
Status: DISCONTINUED | OUTPATIENT
Start: 2019-07-25 | End: 2019-07-25 | Stop reason: HOSPADM

## 2019-07-25 RX ORDER — FENTANYL CITRATE 50 UG/ML
25-50 INJECTION, SOLUTION INTRAMUSCULAR; INTRAVENOUS
Status: DISCONTINUED | OUTPATIENT
Start: 2019-07-25 | End: 2019-07-25 | Stop reason: HOSPADM

## 2019-07-25 RX ORDER — FENTANYL CITRATE 50 UG/ML
INJECTION, SOLUTION INTRAMUSCULAR; INTRAVENOUS PRN
Status: DISCONTINUED | OUTPATIENT
Start: 2019-07-25 | End: 2019-07-25

## 2019-07-25 RX ORDER — RABEPRAZOLE SODIUM 20 MG/1
20 TABLET, DELAYED RELEASE ORAL 2 TIMES DAILY
Qty: 60 TABLET | Refills: 1 | Status: SHIPPED | OUTPATIENT
Start: 2019-07-25 | End: 2021-02-05

## 2019-07-25 RX ORDER — FLUMAZENIL 0.1 MG/ML
0.2 INJECTION, SOLUTION INTRAVENOUS
Status: DISCONTINUED | OUTPATIENT
Start: 2019-07-25 | End: 2019-07-25 | Stop reason: HOSPADM

## 2019-07-25 RX ORDER — NALOXONE HYDROCHLORIDE 0.4 MG/ML
.1-.4 INJECTION, SOLUTION INTRAMUSCULAR; INTRAVENOUS; SUBCUTANEOUS
Status: DISCONTINUED | OUTPATIENT
Start: 2019-07-25 | End: 2019-07-25

## 2019-07-25 RX ORDER — PROPOFOL 10 MG/ML
INJECTION, EMULSION INTRAVENOUS CONTINUOUS PRN
Status: DISCONTINUED | OUTPATIENT
Start: 2019-07-25 | End: 2019-07-25

## 2019-07-25 RX ORDER — DEXAMETHASONE SODIUM PHOSPHATE 4 MG/ML
INJECTION, SOLUTION INTRA-ARTICULAR; INTRALESIONAL; INTRAMUSCULAR; INTRAVENOUS; SOFT TISSUE PRN
Status: DISCONTINUED | OUTPATIENT
Start: 2019-07-25 | End: 2019-07-25

## 2019-07-25 RX ORDER — LIDOCAINE HYDROCHLORIDE 20 MG/ML
INJECTION, SOLUTION INFILTRATION; PERINEURAL PRN
Status: DISCONTINUED | OUTPATIENT
Start: 2019-07-25 | End: 2019-07-25

## 2019-07-25 RX ORDER — ONDANSETRON 4 MG/1
4 TABLET, ORALLY DISINTEGRATING ORAL EVERY 30 MIN PRN
Status: DISCONTINUED | OUTPATIENT
Start: 2019-07-25 | End: 2019-07-25 | Stop reason: HOSPADM

## 2019-07-25 RX ADMIN — SODIUM CHLORIDE, POTASSIUM CHLORIDE, SODIUM LACTATE AND CALCIUM CHLORIDE: 600; 310; 30; 20 INJECTION, SOLUTION INTRAVENOUS at 07:50

## 2019-07-25 RX ADMIN — PHENYLEPHRINE HYDROCHLORIDE 100 MCG: 10 INJECTION INTRAVENOUS at 08:55

## 2019-07-25 RX ADMIN — PHENYLEPHRINE HYDROCHLORIDE 100 MCG: 10 INJECTION INTRAVENOUS at 08:41

## 2019-07-25 RX ADMIN — SUGAMMADEX 150 MG: 100 INJECTION, SOLUTION INTRAVENOUS at 09:36

## 2019-07-25 RX ADMIN — PROPOFOL 140 MG: 10 INJECTION, EMULSION INTRAVENOUS at 08:10

## 2019-07-25 RX ADMIN — LIDOCAINE HYDROCHLORIDE 50 MG: 20 INJECTION, SOLUTION INFILTRATION; PERINEURAL at 08:09

## 2019-07-25 RX ADMIN — PROPOFOL 30 MG: 10 INJECTION, EMULSION INTRAVENOUS at 09:07

## 2019-07-25 RX ADMIN — PROPOFOL 150 MCG/KG/MIN: 10 INJECTION, EMULSION INTRAVENOUS at 08:11

## 2019-07-25 RX ADMIN — DEXAMETHASONE SODIUM PHOSPHATE 8 MG: 4 INJECTION, SOLUTION INTRA-ARTICULAR; INTRALESIONAL; INTRAMUSCULAR; INTRAVENOUS; SOFT TISSUE at 08:15

## 2019-07-25 RX ADMIN — FENTANYL CITRATE 50 MCG: 50 INJECTION, SOLUTION INTRAMUSCULAR; INTRAVENOUS at 08:00

## 2019-07-25 RX ADMIN — ONDANSETRON 4 MG: 2 INJECTION INTRAMUSCULAR; INTRAVENOUS at 08:00

## 2019-07-25 RX ADMIN — MIDAZOLAM 2 MG: 1 INJECTION INTRAMUSCULAR; INTRAVENOUS at 07:50

## 2019-07-25 RX ADMIN — ROCURONIUM BROMIDE 50 MG: 10 INJECTION INTRAVENOUS at 08:11

## 2019-07-25 RX ADMIN — PHENYLEPHRINE HYDROCHLORIDE 100 MCG: 10 INJECTION INTRAVENOUS at 08:52

## 2019-07-25 ASSESSMENT — MIFFLIN-ST. JEOR: SCORE: 1225.25

## 2019-07-25 NOTE — LETTER
"    Patient:  Tre Duran  :   1965  MRN:     [id number]        Ms.Karen JO ANN Duran  [address] [address]        2019    Dear ,    We are writing to inform you of your test results. These are the biopsy results we discussed by phone today. For some reason I cannot forward these to you through [facility]. I have already placed the order for the repeat exam in about 1 year.    Please feel free to call if you have any questions about this letter. I can be reached at [phone].    FATMATA Huang MD  Associate Professor of Medicine  Division of Gastroenterology, Hepatology and Nutrition  [facility]      Resulted Orders   Surgical pathology exam   Result Value Ref Range    Copath Report       Patient Name: TRE DURAN  MR#: [id number]  Specimen #: [id number]  Collected: 2019  Received: 2019  Reported: 2019 14:04  Ordering Phy(s): JANES HUANG    For improved result formatting, select 'View Enhanced Report Format' under   Linked Documents section.    SPECIMEN(S):  Gastric antrum polyps (2)    FINAL DIAGNOSIS:  STOMACH, ANTRUM, POLYPS (X2), POLYPECTOMY:  - One tubular adenoma  - One polyp with foveolar hyperplasia and reactive epithelial changes  - No evidence of high-grade dysplasia or invasive carcinoma    I have personally reviewed all specimens and/or slides, including the   listed special stains, and used them  with my medical judgement to determine or confirm the final diagnosis.    Electronically signed out by:    Romina Bryant M.D., [facility]    CLINICAL HISTORY:  53 year old female with history of fundic gland polyps and gastric tubular   adenoma.    GROSS:  A: The specimen is received in formalin with proper patient   identification, la beled \"gastric antrum polyps  (2)\".  The specimen consists of two red- tan polyps ranging from 1.2-1.3   cm in greatest dimension.  The  resection margins are inked black, they are " serially sectioned and   entirely submitted in cassettes A1- A2,  respectively. (Dictated by: Monalisa CAPPS [facility] 7/25/2019 12:29 PM)    MICROSCOPIC:  Microscopic examination is performed.    The technical component of this testing was completed at the [facility], with the professional component performed   at the [facility], [address] [place] [id number] ([phone])    CPT Codes:  A: 67051-EP1    COLLECTION SITE:  Client: [facility]  Location: [facility] (B)    Resident  [id number]

## 2019-07-25 NOTE — ANESTHESIA PREPROCEDURE EVALUATION
Anesthesia Pre-Procedure Evaluation    Patient: Sarah Duran   MRN:     [id number] Gender:   female   Age:    53 year old :      1965        Preoperative Diagnosis: Adenoma Of Stomach   Procedure(s):  Endoscopic Ultrasound     Past Medical History:   Diagnosis Date     Asthma      Atypical ductal hyperplasia of right breast 10/2016    fibrocystic changes, PASH and atypical ductal hyperplasia on needle biopsy, s/p R excisional biopsy with radioactive seed localization     Factor 5 Leiden mutation, heterozygous (H)     factor 5     Familial adenomatous polyposis     clinical presentation; s/p total abdominal colectomy with TRACEY; confirmed APC+      GERD (gastroesophageal reflux disease)      Migraine      Monoallelic mutation of APC gene 2018    [id number]     Pancreatitis, acute      PONV (postoperative nausea and vomiting)       Past Surgical History:   Procedure Laterality Date     BIOPSY BREAST SEED LOCALIZATION Right 2016    Procedure: BIOPSY BREAST SEED LOCALIZATION;  Surgeon: Jessica Rasheed MD;  Location:  OR     C LAP,SURG,COLECTOMY,W/ANAST      for colon cancer     COLECTOMY      subtotal     COMBINED ESOPHAGOSCOPY, GASTROSCOPY, DUODENOSCOPY (EGD) WITH ARGON PLASMA COAGULATOR (APC) N/A 2018    Procedure: COMBINED ESOPHAGOSCOPY, GASTROSCOPY, DUODENOSCOPY (EGD) WITH ARGON PLASMA COAGULATOR (APC);  Esophagogastroduodenoscopy with polypectomy and polyp ablation;  Surgeon: Stephen Gasca MD;  Location: [facility] OR     ENDOSCOPIC RETROGRADE CHOLANGIOPANCREATOGRAPHY       ESOPHAGOSCOPY, GASTROSCOPY, DUODENOSCOPY (EGD), COMBINED  2012    Procedure: COMBINED ESOPHAGOSCOPY, GASTROSCOPY, DUODENOSCOPY (EGD), BIOPSY SINGLE OR MULTIPLE;;  Surgeon: Angélica Garcia MD;  Location: [facility] GI     ESOPHAGOSCOPY, GASTROSCOPY, DUODENOSCOPY (EGD), COMBINED  12/3/2013    Procedure: COMBINED ESOPHAGOSCOPY, GASTROSCOPY, DUODENOSCOPY (EGD);;  Surgeon: Angélica Garcia MD;   Location: [facility] GI     ESOPHAGOSCOPY, GASTROSCOPY, DUODENOSCOPY (EGD), COMBINED N/A 3/28/2018    Procedure: COMBINED ESOPHAGOSCOPY, GASTROSCOPY, DUODENOSCOPY (EGD);;  Surgeon: Stephen Gasca MD;  Location: [facility] GI     ESOPHAGOSCOPY, GASTROSCOPY, DUODENOSCOPY (EGD), COMBINED N/A 5/30/2019    Procedure: Esophagogastroduodenoscopy with biopsy x2 APC;  Surgeon: Stephen Gasca MD;  Location: [facility] OR     EXCISE POLYP RECTUM       LAPAROSCOPIC LYSIS ADHESIONS       SIGMOIDOSCOPY FLEXIBLE       SIGMOIDOSCOPY FLEXIBLE N/A 12/7/2015    Procedure: SIGMOIDOSCOPY FLEXIBLE;  Surgeon: Mariela Bearden MD;  Location: [facility] GI     SIGMOIDOSCOPY FLEXIBLE N/A 3/28/2018    Procedure: SIGMOIDOSCOPY FLEXIBLE;  EGD/Flex Sig;  Surgeon: Stephen Gasca MD;  Location: [facility] GI     SIGMOIDOSCOPY FLEXIBLE N/A 4/12/2018    Procedure: SIGMOIDOSCOPY FLEXIBLE;  Flexible Sigmoidoscopy with polyp ablation;  Surgeon: Stephen Gasca MD;  Location: [facility] OR     SIGMOIDOSCOPY FLEXIBLE N/A 5/30/2019    Procedure: Flexible Sigmoidoscopy with polypectomy;  Surgeon: Stephen Gasca MD;  Location: [facility] OR          Anesthesia Evaluation     .             ROS/MED HX    ENT/Pulmonary: Comment: Asthma exercised induced    (+)asthma Treatment: Inhaler prn,  , . .    Neurologic:  - neg neurologic ROS     Cardiovascular:  - neg cardiovascular ROS       METS/Exercise Tolerance:     Hematologic: Comments: Factor 5 Leiden - heterozygous        Musculoskeletal:  - neg musculoskeletal ROS       GI/Hepatic: Comment: FAP    (+) GERD       Renal/Genitourinary:  - ROS Renal section negative       Endo:  - neg endo ROS       Psychiatric:  - neg psychiatric ROS       Infectious Disease:  - neg infectious disease ROS       Malignancy:      - no malignancy   Other:                         PHYSICAL EXAM:   Mental Status/Neuro: A/A/O   Airway:   Mallampati: II  Mouth/Opening: Full  TM distance: < 6 cm  Neck ROM: Full   Respiratory: Auscultation: CTAB     Resp.  "Rate: Normal     Resp. Effort: Normal      CV: Rhythm: Regular  Rate: Age appropriate  Heart: Normal Sounds  Edema: None   Comments:      Dental: Normal Dentition                LABS:  CBC:   Lab Results   Component Value Date    WBC 4.6 11/10/2016    WBC 7.5 01/28/2012    HGB 13.8 05/30/2019    HGB 13.7 04/12/2018    HCT 41.3 11/10/2016    HCT 37.8 01/28/2012     11/10/2016     01/28/2012     BMP:   Lab Results   Component Value Date     03/20/2019     05/07/2009    POTASSIUM 4.0 03/20/2019    POTASSIUM 4.1 05/07/2009    CHLORIDE 110 (H) 03/20/2019    CHLORIDE 105 05/07/2009    CO2 24 03/20/2019    CO2 25 05/07/2009    BUN 15 03/20/2019    BUN 11 05/07/2009    CR 0.90 03/20/2019    CR 0.81 05/07/2009    GLC 97 03/20/2019    GLC 86 05/07/2009     COAGS:   Lab Results   Component Value Date    PTT 26 05/07/2009    INR 0.94 05/07/2009     POC:   Lab Results   Component Value Date    BGM 91 07/25/2019    HCG Negative 07/25/2019     OTHER:   Lab Results   Component Value Date    WOLF 9.4 03/20/2019    ALBUMIN 3.5 03/20/2019    PROTTOTAL 7.1 03/20/2019    ALT 35 03/20/2019    AST 24 03/20/2019    ALKPHOS 47 03/20/2019    BILITOTAL 0.5 03/20/2019    AMYLASE 103 11/16/2006    TSH 0.88 03/05/2019        Preop Vitals    BP Readings from Last 3 Encounters:   07/25/19 132/84   05/30/19 128/80   11/08/18 115/73    Pulse Readings from Last 3 Encounters:   07/25/19 64   05/30/19 66   11/08/18 57      Resp Readings from Last 3 Encounters:   07/25/19 18   05/30/19 16   11/08/18 18    SpO2 Readings from Last 3 Encounters:   07/25/19 98%   05/30/19 100%   11/08/18 100%      Temp Readings from Last 1 Encounters:   07/25/19 36.8  C (98.2  F) (Oral)    Ht Readings from Last 1 Encounters:   07/25/19 1.575 m (5' 2\")      Wt Readings from Last 1 Encounters:   07/25/19 66.7 kg (147 lb 0.8 oz)    Estimated body mass index is 26.9 kg/m  as calculated from the following:    Height as of this encounter: 1.575 m (5' " "2\").    Weight as of this encounter: 66.7 kg (147 lb 0.8 oz).     LDA:  Peripheral IV 06/10/19 Right Upper forearm (Active)   Number of days: 45        Assessment:   ASA SCORE: 2    H&P: History and physical reviewed and following examination; no interval change.   Smoking Status:  Non-Smoker/Unknown   NPO Status: NPO Appropriate     Plan:   Anes. Type:  General   Pre-Medication: Midazolam   Induction:  IV (Standard)   Airway: ETT; Oral   Access/Monitoring: PIV   Maintenance: Balanced     Postop Plan:   Postop Pain: Opioids  Postop Sedation/Airway: Not planned  Disposition: Outpatient     PONV Management:   Adult Risk Factors: Female, H/o PONV or Motion Sickness, Non-Smoker, Postop Opioids   Prevention: Ondansetron, Dexamethasone     CONSENT: Direct conversation   Plan and risks discussed with: Patient   Blood Products: Consent Deferred (Minimal Blood Loss)                  Yuri Finley MD  Staff Anesthesiologist  [phone]    "

## 2019-07-25 NOTE — OR NURSING
"Patient had a peas size swelling on her left upper lip. Not open no drainage pt denies discomfort. She felt like she was \"tight\" I had her take some deep breaths and listened to lung sounds which were clear and she then said she felt better.   "

## 2019-07-25 NOTE — BRIEF OP NOTE
[facility] Brief Operative Note    Pre-operative diagnosis: Adenoma Of Stomach   Post-operative diagnosis Gastric polyp   Procedure: Procedure(s):  Endoscopic Ultrasound  Esophagogastroduodenoscopy with gastric mucosal resection and clip application   Surgeon: JANES HUANG   Assistants(s): Ned Faustin MD    Estimated blood loss: None    Specimens: Gastric polyps from the antrum (x2)   Findings: -Innumerous polyps in the body and fundus consistent with known fundic gland polyps  -Two polyps seen in the antrum (previously biopsied.  -EUS was performed and the polyps did not involve the deep layers   -EMR was performed by placing a band and using hot snare  -There was no bleeding and no perforation  -The defect was closed with 6 clips     Recommendations:                      -Monitor patient in preparation for discharge                                                        -Resume home medications                                                        -Start Rabeprazole (Acephex) 20mg po BID for 30 days                                                        -Ice chips for now and gradually advance to clear liquids                                                              Ned Faustin MD  Interventional Endoscopy Fellow

## 2019-07-25 NOTE — DISCHARGE INSTRUCTIONS
[facility]   Adult Discharge Orders & Instructions     For 24 hours after surgery    1. Get plenty of rest.  A responsible adult must stay with you for at least 24 hours after you leave the hospital.   2. Do not drive or use heavy equipment.  If you have weakness or tingling, don't drive or use heavy equipment until this feeling goes away.  3. Do not drink alcohol.  4. Avoid strenuous or risky activities.  Ask for help when climbing stairs.   5. You may feel lightheaded.  IF so, sit for a few minutes before standing.  Have someone help you get up.   6. If you have nausea (feel sick to your stomach): Drink only clear liquids such as apple juice, ginger ale, broth or 7-Up.  Rest may also help.  Be sure to drink enough fluids.  Move to a regular diet as you feel able.  7. You may have a slight fever. Call the doctor if your fever is over 100 F (37.7 C) (taken under the tongue) or lasts longer than 24 hours.  8. You may have a dry mouth, a sore throat, muscle aches or trouble sleeping.  These should go away after 24 hours.  9. Do not make important or legal decisions.   Call your doctor for any of the followin.  Signs of infection (fever, growing tenderness at the surgery site, a large amount of drainage or bleeding, severe pain, foul-smelling drainage, redness, swelling).    2. It has been over 8 to 10 hours since surgery and you are still not able to urinate (pass water).    3.  Headache for over 24 hours.    4.  Numbness, tingling or weakness the day after surgery (if you had spinal anesthesia).  To contact a doctor, call ________________________________________

## 2019-07-25 NOTE — ANESTHESIA POSTPROCEDURE EVALUATION
Anesthesia POST Procedure Evaluation    Patient: Sarah Duran   MRN:     [id number] Gender:   female   Age:    53 year old :      1965        Preoperative Diagnosis: Adenoma Of Stomach   Procedure(s):  Endoscopic Ultrasound  Esophagogastroduodenoscopy with gastric mucosal resection and clip application   Postop Comments: No value filed.       Anesthesia Type:  Not documented  General    Reportable Event: NO     PAIN: Uncomplicated   Sign Out status: Comfortable, Well controlled pain     PONV: No PONV   Sign Out status:  No Nausea or Vomiting     Neuro/Psych: Uneventful perioperative course   Sign Out Status: Preoperative baseline; Age appropriate mentation     Airway/Resp.: Uneventful perioperative course   Sign Out Status: Non labored breathing, age appropriate RR; Resp. Status within EXPECTED Parameters     CV: Uneventful perioperative course   Sign Out status: Appropriate BP and perfusion indices; Appropriate HR/Rhythm     Disposition:   Sign Out in:  PACU  Disposition:  Phase II; Home  Recovery Course: Uneventful  Follow-Up: Not required           Last Anesthesia Record Vitals:  CRNA VITALS  2019 0914 - 2019 1014      2019             Resp Rate (observed):  12    EKG:  NSR          Last PACU Vitals:  Vitals Value Taken Time   /79 2019 10:20 AM   Temp 36.7  C (98  F) 2019 10:10 AM   Pulse 55 2019 10:20 AM   Resp 16 2019 10:15 AM   SpO2 99 % 2019 10:24 AM   Temp src     NIBP     Pulse     SpO2     Resp     Temp     Ht Rate     Temp 2     Vitals shown include unvalidated device data.      Electronically Signed By: Yuri Finley MD, 2019, 11:31 AM

## 2019-07-25 NOTE — OR NURSING
Patient arrived from PACU awake and alert; no complaints of pain or nausea.  Skin pink, warm and dry.  Able to ambulated from cart to chair.

## 2019-07-25 NOTE — ANESTHESIA CARE TRANSFER NOTE
Patient: Sarah Duran    Procedure(s):  Endoscopic Ultrasound  Esophagogastroduodenoscopy with gastric mucosal resection and clip application    Diagnosis: Adenoma Of Stomach  Diagnosis Additional Information: No value filed.    Anesthesia Type:   General     Note:  Airway :Nasal Cannula  Patient transferred to:PACU  Handoff Report: Identifed the Patient, Identified the Reponsible Provider, Reviewed the pertinent medical history, Discussed the surgical course, Reviewed Intra-OP anesthesia mangement and issues during anesthesia, Set expectations for post-procedure period and Allowed opportunity for questions and acknowledgement of understanding      Vitals: (Last set prior to Anesthesia Care Transfer)    CRNA VITALS  7/25/2019 0914 - 7/25/2019 0950      7/25/2019             Resp Rate (observed):  1  (Abnormal)                 Electronically Signed By: REYNOLD Natarajan CRNA  July 25, 2019  9:50 AM

## 2019-07-27 ENCOUNTER — TELEPHONE (OUTPATIENT)
Dept: GASTROENTEROLOGY | Facility: CLINIC | Age: 54
End: 2019-07-27

## 2019-07-27 NOTE — TELEPHONE ENCOUNTER
Sarah Duran  July 27, 2019  4:00 PM      Received a page from Ms Duran about ongoing heartburn like symptoms. She recently underwent polypectomy in for her gastric polyps by Dr Gasca on 7/25. Better throughout the day with activity but somewhat worse at night. Still able to eat and drink without issues. No melena, hematochezia or vomiting.     Recommended that this is usual and for that reason PPI has already been prescribed. I recommended to take this about 30 min prior to her meals (she had been taking it Q12 hours). If worsening symptoms or any red flags, I instructed her to go to the ED.    Raymundo FINLEY MD  GI Fellow on call

## 2019-08-01 LAB — UPPER EUS: NORMAL

## 2019-08-02 LAB — COPATH REPORT: NORMAL

## 2019-09-06 ENCOUNTER — HOSPITAL ENCOUNTER (OUTPATIENT)
Facility: CLINIC | Age: 54
Discharge: HOME | End: 2019-09-06
Admitting: CLINICAL NURSE SPECIALIST
Payer: COMMERCIAL

## 2019-09-06 ENCOUNTER — HOSPITAL ENCOUNTER (OUTPATIENT)
Dept: ULTRASOUND IMAGING | Facility: CLINIC | Age: 54
Discharge: HOME | End: 2019-09-06
Attending: CLINICAL NURSE SPECIALIST | Admitting: CLINICAL NURSE SPECIALIST
Payer: COMMERCIAL

## 2019-09-06 VITALS
RESPIRATION RATE: 16 BRPM | HEART RATE: 72 BPM | TEMPERATURE: 97.3 F | SYSTOLIC BLOOD PRESSURE: 118 MMHG | DIASTOLIC BLOOD PRESSURE: 72 MMHG

## 2019-09-06 DIAGNOSIS — E04.1 THYROID NODULE: ICD-10-CM

## 2019-09-06 DIAGNOSIS — Z91.89 AT RISK FOR CANCER: ICD-10-CM

## 2019-09-06 DIAGNOSIS — D13.91 FAP (FAMILIAL ADENOMATOUS POLYPOSIS): ICD-10-CM

## 2019-09-06 PROCEDURE — 88173 CYTOPATH EVAL FNA REPORT: CPT

## 2019-09-06 PROCEDURE — 88173 CYTOPATH EVAL FNA REPORT: CPT | Mod: 26

## 2019-09-06 PROCEDURE — 40000863 ZZH STATISTIC RADIOLOGY XRAY, US, CT, MAR, NM

## 2019-09-06 PROCEDURE — 10005 FNA BX W/US GDN 1ST LES: CPT

## 2019-09-06 RX ORDER — NICOTINE POLACRILEX 4 MG
15-30 LOZENGE BUCCAL
Status: DISCONTINUED | OUTPATIENT
Start: 2019-09-06 | End: 2019-09-06 | Stop reason: HOSPADM

## 2019-09-06 RX ORDER — DEXTROSE MONOHYDRATE 25 G/50ML
25-50 INJECTION, SOLUTION INTRAVENOUS
Status: DISCONTINUED | OUTPATIENT
Start: 2019-09-06 | End: 2019-09-06 | Stop reason: HOSPADM

## 2019-09-06 NOTE — DISCHARGE INSTRUCTIONS
Thyroid/Lymph Node Biopsy Discharge Instructions     After you go home:      You may resume your normal diet    Care of Puncture Site:      You may have mild bruising, soreness & swelling at the puncture site. This will go away in a few days    For swelling & bruising, you may use an ice pack on the site.     Activity:      You may go back to your normal activity    Avoid strenuous activity for 24 hours    Medicines:      You may resume all medications    For minor pain, you may take Acetaminophen (Tylenol) or Ibuprofen (Advil)            Call the provider who ordered this test if:      Increased redness or swelling at the site    Fluid or blood oozing from the site    Severe pain at the site    Chills or a fever greater than 101 F (38 C).    Any questions or concerns    Call  911 or go to the Emergency Room if:      Trouble breathing    Your neck swells    Bleeding that you cannot control    Severe difficulty swallowing    If you have questions call:      [facility] Radiology Dept @ [phone]    The provider who performed your procedure was _________________.

## 2019-09-06 NOTE — PROGRESS NOTES
[facility] Suit[facility] Post-Procedure Note    Procedure: thyroid bx  CS arrival time: n/a  Accompanied by: self  Concerns/abnormal assessment after procedure: none.  Band aid to neck CDI, no new pain.  VSS  Plan: discharge to home.

## 2019-09-06 NOTE — PROGRESS NOTES
Care Suites Admission Nursing Note    Reason for admission: thyroid bx  CS arrival time: unknown, assessment at 1040  Accompanied by: selft  Name/phone of DC : n/a  Medications held: n/a  Consent signed: will sign with MD  Abnormal assessment/labs: none  If abnormal, provider notified: none  Education/questions answered: yes, discharge instr given pre procedure.  States understanding. Pt has copy.  Plan: Thyroid bx.

## 2019-09-09 LAB — COPATH REPORT: NORMAL

## 2019-09-11 ENCOUNTER — TELEPHONE (OUTPATIENT)
Dept: ONCOLOGY | Facility: CLINIC | Age: 54
End: 2019-09-11

## 2019-09-11 DIAGNOSIS — E04.1 THYROID NODULE: Primary | ICD-10-CM

## 2019-09-11 NOTE — TELEPHONE ENCOUNTER
Left voicemail message for patient requesting a return call regarding scheduling ultrasound biopsy of thyroid per Clarissa Brown.

## 2019-12-18 NOTE — PROGRESS NOTES
Oncology Risk Management Consultation:  Date on this visit: 2019    Sarah Duran  returns to the clinic today for a six month follow up.  She requires high risk screening and surveillance to reduce her risk of cancer secondary to having a history of atypical ductal hyperplasia (ADH).  She is considered to be at high risk for breast cancer, with a risk of up to 35%.     She also carries an APC+ genetic mutation, consistent with a diagnosis of Familial Adenomatous Polyposis (FAP) or attenuated FAP (AFAP) with a history of a thyroid nodule and gastric polyps.    Primary Physician: Ida Sandoval MD    History Of Present Illness:  Ms. Duran is a very pleasant  54 year old female who presents with a personal history of ADH and a likely attenuated version of Familial Adenomatous Polyposis. She is s/p right excisional breast biopsy on 2016. She is also s/p ileocolonic anastomosis in .    Genetic Testin2018: POSITIVE for a APC mutation.  Specifically her mutation is called [id number],  consistent with a diagnosis of familial adenomatous polyposis (FAP) or attenuated FAP (AFAP). The mutation was identified using [place] Next testing through [employer].  The testing was done because of her personal history of colonic polyposis and family history of colon cancer.     Of note, Sarah tested negative for mutations in the following genes: BMPR1A, CDH1, CHEK2, MLH1, MSH2, MSH6, MUTYH, PMS2, POLD1, POLE, PTEN, SMAD4, STK11 and TP53 (sequencing and deletion/duplication); EPCAM and GREM1 (deletion/duplication only).       Pertinent history:  Attenuated Familial adenomatous polyposis, s/p ileal rectal anastomosis in May 1988. Confirmed with APC testing (see below)  Subsequent laparotomy in  for bowel obstruction from adhesions.  Taking rabeprazole 20 mg EC BID for gastric polyps.  Menarche at 12.  Nulliparous.  Menopause at 50.  Breast density: scattered fibroglandular densities  Ovaries,  fallopian tubes and uterus in place.  11/28/2016 - R excisional breast biopsy with seed localization, Fibrocystic tissue with ADH on pathology.  9/6/2019 - FNA biopsy of thyroid, for solid appearing nodule in isthmus, biopsy non diagnostic; cyst fluid only.     Pertinent screening history (see chart for further details)  11/7/2005 - Small bowel enteroscopy, one tubular adenoma removed from partial anal verge.  12/7/2005 - adenomatous polyp removed from rectum  2/28/2005 - tubular adenoma, duodenum.  11/16/2011 - fundic gland polyps in stomach, tubular adenoma removed from rectum.  11/5/2012 - 1 tubulovillous adenoma, rectum  7/8/2013 - 1 tubulovillous adenoma, rectum  12/3/2013 - 1 tubulovillous adenoma, rectum  7/21/2014 - 1 tubular adenoma and hyperplastic polyp, rectum  1/20/2015 - 1 tubular adenoma and hyperplastic polyp, rectum  12/7/2015 - 1 tubular adenoma, rectum  6/6/2016 - 1 fundic gland polyp with focal adenomatous changes  3/20/2017 - 2 tubular adenomas removed from rectum  4/12/2017 - colonoscopy and EGD, one fundic gland gastric polyp with focal low grade dysplasia removed, multiple duodenal polyps and multiple rectal polyps.  7/25/2019 - EGD: Two previously reported polyps (6.2 mm and 5.3mm) in      the antrum were evaluate by EUS, which did not                        demonstrate deep layer involvement, and then they were                        removed by EMR by using a band ligator and a snare. The                        defect was then closed with hemoclips.                        -Innumerous fundic gland polyps in the gastric body,                        fundus, and cardia. Consistent with diagnosis of FAP.                        -A large fundic gland polyp in the gastric body, which                        has been previously resected and recent biopsied. Recommendation: Use Aciphex (rabeprazole) 20 mg PO BID for 1 month.                        - Repeat the upper endoscopy in 1 year for  surveillance.      4/24/2017 - R breast mammogram, BiRads2.  5/25/2018 - Breast MRI, BiRads2.  11/8/2018 - Screening tomosynthesis mammogram, BiRads1  6/10/2019 - Breast MRI, BiRAds2.    At this visit, she denies asymmetry, lumps, masses, thickening, pain, nipple discharge and skin changes in her breasts.    Review of Systems:  GENERAL: No change in weight, sleep or appetite.  Normal energy.  No fever or chills  EYES: Negative for vision changes or eye problems  ENT: No problems with ears, nose or throat.  No difficulty swallowing.  RESP: No coughing, wheezing or shortness of breath  CV: No chest pains or palpitations  GI: No nausea, vomiting,  heartburn, abdominal pain, diarrhea, constipation or change in bowel habits  : No urinary frequency or dysuria, bladder or kidney problems  MUSCULOSKELETAL: No significant muscle or joint pains  NEUROLOGIC: No headaches, numbness, tingling, weakness, problems with balance or coordination  PSYCHIATRIC: No problems with anxiety, depression or mental health  HEME/IMMUNE/ALLERGY: No new bleeding  or anemia. Known Factor V deficiency.  No allergies or immune system problems  ENDOCRINE: No history of thyroid disease, diabetes or other endocrine disorders  SKIN: No rashes,worrisome lesions or skin problems      Past Medical/Surgical History:  Past Medical History:   Diagnosis Date     Asthma      Atypical ductal hyperplasia of right breast 10/2016    fibrocystic changes, PASH and atypical ductal hyperplasia on needle biopsy, s/p R excisional biopsy with radioactive seed localization     Factor 5 Leiden mutation, heterozygous (H)     factor 5     Familial adenomatous polyposis 1988    clinical presentation; s/p total abdominal colectomy with TRACEY; confirmed APC+ 2018     GERD (gastroesophageal reflux disease)      Migraine      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    [id number]     Pancreatitis, acute      PONV (postoperative nausea and vomiting)      Past Surgical  History:   Procedure Laterality Date     BIOPSY BREAST SEED LOCALIZATION Right 11/30/2016    Procedure: BIOPSY BREAST SEED LOCALIZATION;  Surgeon: Jessica Rasheed MD;  Location: [facility] OR     C LAP,SURG,COLECTOMY,W/ANAST      for colon cancer     COLECTOMY      subtotal     COMBINED ESOPHAGOSCOPY, GASTROSCOPY, DUODENOSCOPY (EGD) WITH ARGON PLASMA COAGULATOR (APC) N/A 4/12/2018    Procedure: COMBINED ESOPHAGOSCOPY, GASTROSCOPY, DUODENOSCOPY (EGD) WITH ARGON PLASMA COAGULATOR (APC);  Esophagogastroduodenoscopy with polypectomy and polyp ablation;  Surgeon: Stephen Gasca MD;  Location: [facility]     ENDOSCOPIC RETROGRADE CHOLANGIOPANCREATOGRAPHY       ENDOSCOPIC ULTRASOUND UPPER GASTROINTESTINAL TRACT (GI) N/A 7/25/2019    Procedure: Endoscopic Ultrasound;  Surgeon: Stephen Gasca MD;  Location: [facility]     ESOPHAGOSCOPY, GASTROSCOPY, DUODENOSCOPY (EGD), COMBINED  11/5/2012    Procedure: COMBINED ESOPHAGOSCOPY, GASTROSCOPY, DUODENOSCOPY (EGD), BIOPSY SINGLE OR MULTIPLE;;  Surgeon: Angélica Garcia MD;  Location: [facility] GI     ESOPHAGOSCOPY, GASTROSCOPY, DUODENOSCOPY (EGD), COMBINED  12/3/2013    Procedure: COMBINED ESOPHAGOSCOPY, GASTROSCOPY, DUODENOSCOPY (EGD);;  Surgeon: Angélica Garcia MD;  Location: [facility]     ESOPHAGOSCOPY, GASTROSCOPY, DUODENOSCOPY (EGD), COMBINED N/A 3/28/2018    Procedure: COMBINED ESOPHAGOSCOPY, GASTROSCOPY, DUODENOSCOPY (EGD);;  Surgeon: Stephen Gasca MD;  Location: [facility]     ESOPHAGOSCOPY, GASTROSCOPY, DUODENOSCOPY (EGD), COMBINED N/A 5/30/2019    Procedure: Esophagogastroduodenoscopy with biopsy x2 APC;  Surgeon: Stephen Gasca MD;  Location: [facility]     ESOPHAGOSCOPY, GASTROSCOPY, DUODENOSCOPY (EGD), RESECT MUCOSA, COMBINED N/A 7/25/2019    Procedure: Esophagogastroduodenoscopy with gastric mucosal resection and clip application;  Surgeon: Stephen Gasca MD;  Location: [facility]     EXCISE POLYP RECTUM       LAPAROSCOPIC LYSIS ADHESIONS       SIGMOIDOSCOPY  FLEXIBLE       SIGMOIDOSCOPY FLEXIBLE N/A 12/7/2015    Procedure: SIGMOIDOSCOPY FLEXIBLE;  Surgeon: Mariela Bearden MD;  Location: [facility]     SIGMOIDOSCOPY FLEXIBLE N/A 3/28/2018    Procedure: SIGMOIDOSCOPY FLEXIBLE;  EGD/Flex Sig;  Surgeon: Stephen Gasca MD;  Location: [facility]     SIGMOIDOSCOPY FLEXIBLE N/A 4/12/2018    Procedure: SIGMOIDOSCOPY FLEXIBLE;  Flexible Sigmoidoscopy with polyp ablation;  Surgeon: Stephen Gasca MD;  Location: [facility]     SIGMOIDOSCOPY FLEXIBLE N/A 5/30/2019    Procedure: Flexible Sigmoidoscopy with polypectomy;  Surgeon: Stephen Gasca MD;  Location: [facility]       Allergies:  Allergies as of 12/20/2019 - Reviewed 12/20/2019   Allergen Reaction Noted     Cephalexin  06/14/2004     Cipro [ciprofloxacin]  02/24/2005     Flagyl [metronidazole] Nausea and Vomiting 02/24/2005     Keflex [cephalosporins] Nausea and Vomiting 02/24/2005     Omeprazole  04/29/2010     Prilosec otc [omeprazole magnesium]  01/28/2012     Doxycycline Rash 04/12/2018     Erythromycin Rash 02/24/2005     Sulfa drugs Rash 02/24/2005       Current Medications:  Current Outpatient Medications   Medication Sig Dispense Refill     albuterol 90 MCG/ACT inhaler Inhale 2 puffs into the lungs every 6 hours as needed.       calcium-magnesium (CALMAG) 500-250 MG TABS Take 1 tablet by mouth daily       celecoxib (CELEBREX) 100 MG capsule Take 1 capsule (100 mg) by mouth daily 90 capsule 3     docusate sodium (COLACE) 100 MG capsule Take 100 mg by mouth daily       fexofenadine (ALLEGRA) 180 MG tablet Take 180 mg by mouth daily.       MELATONIN PO Take 2 mg by mouth nightly as needed        multivitamin, therapeutic with minerals (THERA-VIT-M) TABS Take 3 tablets by mouth 2 times daily        Omega-3 Fatty Acids (OMEGA-3 FISH OIL PO) Take 2 g by mouth daily        ondansetron (ZOFRAN) 4 MG tablet Take 1 tablet (4 mg) by mouth every 8 hours as needed for nausea 10 tablet 0     RABEprazole (ACIPHEX) 20 MG EC tablet  Take 1 tablet (20 mg) by mouth 2 times daily 60 tablet 1     ranitidine (ZANTAC) 150 MG capsule Take 1 capsule (150 mg) by mouth 2 times daily 120 capsule 3     triamcinolone (NASACORT) 55 MCG/ACT Inhaler Spray 2 sprays into both nostrils daily       ZOLMitriptan (ZOMIG) 5 MG tablet Take 1 tablet (5 mg) by mouth at onset of headache for migraine 90 tablet 3        Family History:  Family History   Problem Relation Age of Onset     Hyperlipidemia Mother      Cerebrovascular Disease Mother      Cerebrovascular Disease Father      Colon Cancer Father 57         at 63     Prostate Cancer Brother 53     Prostate Cancer Maternal Uncle 65       Social History:  Social History     Socioeconomic History     Marital status:      Spouse name: Not on file     Number of children: 2     Years of education: Not on file     Highest education level: Not on file   Occupational History     Occupation:      Employer: [employer]   Social Needs     Financial resource strain: Not on file     Food insecurity:     Worry: Not on file     Inability: Not on file     Transportation needs:     Medical: Not on file     Non-medical: Not on file   Tobacco Use     Smoking status: Never Smoker     Smokeless tobacco: Never Used   Substance and Sexual Activity     Alcohol use: Yes     Comment: 1 or 2 drinks per year     Drug use: No     Sexual activity: Not on file     Comment: denies pregnancy   Lifestyle     Physical activity:     Days per week: Not on file     Minutes per session: Not on file     Stress: Not on file   Relationships     Social connections:     Talks on phone: Not on file     Gets together: Not on file     Attends [employer] service: Not on file     Active member of club or organization: Not on file     Attends meetings of clubs or organizations: Not on file     Relationship status: Not on file     Intimate partner violence:     Fear of current or ex partner: Not on file     Emotionally abused: Not on file  "    Physically abused: Not on file     Forced sexual activity: Not on file   Other Topics Concern     Parent/sibling w/ CABG, MI or angioplasty before 65F 55M? Not Asked   Social History Narrative    Two adopted daughters, ages 13 and 15       Physical Exam:  /84   Pulse 63   Temp 97.8  F (36.6  C) (Oral)   Resp 14   Ht 1.575 m (5' 2\")   Wt 70.4 kg (155 lb 3.2 oz)   SpO2 98%   BMI 28.39 kg/m      GENERAL APPEARANCE: healthy, alert and no distress     NECK: no adenopathy, no asymmetry or masses     LYMPHATICS: No cervical, supraclavicular or axillary lymphadenopathy     RESP: lungs clear to auscultation - no rales, rhonchi or wheezes     CARDIOVASCULAR: regular rate and rhythm, normal S1 S2, no S3 or S4 and no murmur.   BREASTS: Examined in a sitting and lying position. Symmetrical without visible distortion, swelling or rashes. No nipple inversion, nipple discharge, breast dimpling or puckering. Breast tissue is heterogenous dense to palpation.  Axillary area without masses or lymphadenopathy.  ABDOMEN: soft, nontender, no masses or lumps palpated  SKIN: no suspicious lesions or rashes on anterior torso, upper extremities or face.    Laboratory/Imaging Studies  No results found for any visits on 12/20/19.    ASSESSMENT  We discussed her last screening tests and reviewed results.  We discussed that she will continue taking the Celebrex to reduce the number of rectal and possibly gastric polyps. She is tolerating this well and denies any gastric pain, discomfort, abdominal pain, heartburn and bloating. We did discuss that if she feels things are changing and she needs to start taking Tums again, to let me know immediately, as her over the counter treatments may mask a developing ulcer.  We discussed that she should not start taking ranatadine, Tums or any other over the counter medication without letting me or Dr. Gasca know, as she may need to be seen sooner for her EGD if this were the case. She " expressed understanding.     We will re image her thyroid again soon, as she has not had follow up since her FNA was done; it did not yield a good enough sample to make an assessment.  No nodules are palpated on exam today.     We also reviewed that she could consider using anastrozole off label, to reduce her risk for breast cancer.  The recommendation, per [facility], and with consultation with Dr. Daly Levin, would be a safer option for her than tamoxifen, as she has Factor V Leiden.  Anastrozole has been shown to reduce the incidence of breast cancer by 50% in the KENDRA-II clinical trial. She is not interested in this today.    INDIVIDUALIZED CANCER RISK MANAGEMENT PLAN:  Individualized Surveillance Plan for women  With 20% or greater lifetime risk of breast cancer   Per NCCN Breast Cancer Screening and Diagnosis Guidelines Version 2.2018    Recommended screening Test or procedure Last done Next Scheduled    Clinical encounter Ongoing risk assessment, risk reduction counseling and clinical breast exam, every 6-12 months. Refer to genetic counseling if not already done.   12/20/2019 December 2020   However, some family histories with breast cancers at a very young age, may warrant screening starting earlier.    *May begin at age 40 if breast cancers in the family occur at later ages.    Annual mammogram beginning 10 years younger than the earliest breast cancer in the family but not prior to age 30.    Recommend annual breast MRI to begin 10 years younger than the earliest breast cancer in the family but not prior to age 25.    Breast MRIs are preferably done on day 7-15 of the menstrual cycle in premenopausal women. 11/8/2018 - Screening tomosynthesis mammogram, BiRads1  6/10/2019 - Breast MRI, BiRads2. Mammogram today after appt    Next breast MRI: June 2020    Next exam: December 2020 followed by tomosynthesis mammogram   Breast screening for patients at high risk due to thoracic radiation before 30 years  old    Begin 10 years post radiation treatment or age 25.   Annual mammogram beginning 10 years after radiation but not prior to age 30    Annual breast MRI, preferably on day 7-15 of menstrual cycle for premenopausal women.       NA     NA   Women who have a lifetime risk of >20% based on history of LCIS or ADH/ALH Annual screening mammogram beginning at age of LCIS or ADH/ALH but not prior to age 30.    Consider annual MRI to begin at age of diagnosis of LCIS or ADH/ALH but not prior to age 25.    Consider risk reducing strategies.     NA     NA    Recommend risk reducing strategies for women with 1.7% 5 year risk of breast cancer.       Individualized Surveillance Plan for Familial Adenomatous Polyposis                 for Children and Adults Based on NCCN Guidelines Version 3.2019   Type of Screening  Recommendation  Last Done  Next Due    Recommendations for Children    Colon Cancer Screening  Flexible sigmoidoscopy or colonoscopy every 12 months beginning at age 10-15.        See below    See below   Hepatoblastoma  Consider liver palpation, abdominal ultrasound, and measurement of AFP every 3-6 months, during first 5 years of life.  NA NA   Thyroid Cancer Screening  Annual thyroid examination, starting in teenage years.   See below See below   Recommendations for Adults    Colon Cancer Screening  Colectomy with ileorectal anastomosis (TRACEY)  recommended, then endoscopic evaluation of the rectum every 6-12 months, depending on the polyp burden.      If ileo proctocolectomy with ileal pouch anal anastomosis, endoscopic surveillance of ileal pouch every 1-3 years depending on polyp burden.     Surveillance frequency should be increased to every 6 months for large, flat polyps with villous histology and/or high grade dysplasia  Ileocolonic anastomosis in 1988 for polyposis    5/30/2019 - Flex sig, few 2 to 4 mm, non-bleeding polyps in the rectum and  at the recto-sigmoid colon, removed with a cold snare; pathology  two tubular adenomas.        May 2020 in conjunction with EGD with full duodenoscopy   Duodenal or periampullary cancer screening and gastric cancer screening  Baseline upper endoscopy including side-viewing examination. Start between 20-25 or earlier if colectomy was prior to age 20. Base subsequent screenings on findings (see below).     Manage non-fundic gland polyps endoscopically if possible.       These occur in the majority of FAP patients and may have focal low grade dysplasia but typically are non progressive.Special screening or surgery should only be considered in the presence of high grade dysplasia. 5/30/2019 - Two previously reported polyps (6.2 mm and 5.3mm) in  the antrum     Innumerous fundic gland polyps in the gastric body, fundus, and cardia. Consistent with diagnosis of FAP.   A large fundic gland polyp in the gastric body,    Recommendation:  Aciphex (rabeprazole) 20 mg PO BID for 1 month. Course completed. Pathology: STOMACH, ANTRUM, POLYPS (X2), POLYPECTOMY:   - One tubular adenoma   - One polyp with foveolar hyperplasia and reactive epithelial changes   - No evidence of high-grade dysplasia or invasive carcinoma                        - Repeat the upper endoscopy in 1 year for surveillance.     May 2020 in conjunction with flexible sigmoidoscopy   Thyroid Cancer Screening    Annual thyroid examination.     Annual ultrasound of thyroid may be considered.  12/20/2019 Thyroid US ordered to be done soon.    Revisit in December 2020   CNS Cancer  Annual physical examination.  12/20/2019 December 2020   Intra-abdominal desmoids  Annual abdominal palpation.      If family history of symptomatic desmoids, consider abdominal MRI or CT 1-3 years post colectomy and then every 5-10 years.      12/20/2019 December 2020   Small bowel polyps and cancer  Consider adding small bowel visualization to CT or MRI for desmoids, especially if duodenal polyposis is advanced.  Discuss Defer to Dr. Gasca's  assessment   Duodenal findings:  Stage 0 (no polyposis) - repeat endoscopy every 4 years.  Stage 1 (minimal polyposis 1-4 tubular adenomas, 1-4mm each) - repeat EGD every 2-3 years.  Stage 2 (mild polyposis - 5-9 tubular adenomas, 5-9 mm each) - repeat EGD every 1-3 years.  Stage 3 (moderate polyposis - >20 lesions or size >1 cm) - repeat EGD every 6-12 months  Stage 4 (dense polyposis or high grade dysplasia) - surgical evaluation, with expert surveillance every 3-6 months.  Due to limited data, no screening recommendation is made for pancreatic cancer at this time.           I spent 30 minutes with the patient with greater that 50% of it in counseling and coordinating care as documented above.    REYNOLD March-CNS, OCN, AGN-BC  Clinical Nurse Specialist  Cancer Risk Management Program  [facility] [address]  [place] [address]    phone:  [phone]  Pager: [phone]  fax: [phone]    CC: MD Ida Melton MD

## 2019-12-20 ENCOUNTER — ANCILLARY PROCEDURE (OUTPATIENT)
Dept: MAMMOGRAPHY | Facility: CLINIC | Age: 54
End: 2019-12-20
Payer: COMMERCIAL

## 2019-12-20 ENCOUNTER — ONCOLOGY VISIT (OUTPATIENT)
Dept: ONCOLOGY | Facility: CLINIC | Age: 54
End: 2019-12-20
Attending: CLINICAL NURSE SPECIALIST
Payer: COMMERCIAL

## 2019-12-20 VITALS
DIASTOLIC BLOOD PRESSURE: 84 MMHG | SYSTOLIC BLOOD PRESSURE: 125 MMHG | OXYGEN SATURATION: 98 % | BODY MASS INDEX: 28.56 KG/M2 | RESPIRATION RATE: 14 BRPM | HEIGHT: 62 IN | TEMPERATURE: 97.8 F | WEIGHT: 155.2 LBS | HEART RATE: 63 BPM

## 2019-12-20 DIAGNOSIS — D13.91 FAP (FAMILIAL ADENOMATOUS POLYPOSIS): ICD-10-CM

## 2019-12-20 DIAGNOSIS — Z12.31 VISIT FOR SCREENING MAMMOGRAM: ICD-10-CM

## 2019-12-20 DIAGNOSIS — R92.30 DENSE BREAST: Primary | ICD-10-CM

## 2019-12-20 DIAGNOSIS — Z12.39 BREAST CANCER SCREENING, HIGH RISK PATIENT: ICD-10-CM

## 2019-12-20 DIAGNOSIS — E04.1 THYROID NODULE: ICD-10-CM

## 2019-12-20 DIAGNOSIS — N60.91 ATYPICAL DUCTAL HYPERPLASIA OF RIGHT BREAST: ICD-10-CM

## 2019-12-20 PROCEDURE — G0463 HOSPITAL OUTPT CLINIC VISIT: HCPCS | Mod: ZF

## 2019-12-20 PROCEDURE — 99214 OFFICE O/P EST MOD 30 MIN: CPT | Mod: ZP | Performed by: CLINICAL NURSE SPECIALIST

## 2019-12-20 ASSESSMENT — PAIN SCALES - GENERAL: PAINLEVEL: NO PAIN (0)

## 2019-12-20 ASSESSMENT — MIFFLIN-ST. JEOR: SCORE: 1257.23

## 2019-12-20 NOTE — LETTER
Cancer Risk Management  Program Locations    [facility]  [facility]  [facility]  Mailing Address  Cancer Risk Management Program  [facility]  [address]  [place] [address]    New patient appointments  [phone]  2019    Sarah Duran  [address] [address]      Dear Sarah,    It was a pleasure meeting with you today.  Below is a copy of my note from our visit, outlining your surveillance plan.      I look forward to seeing you in the future to coordinate your care and reduce your health risks. Please feel free to contact me if you have any questions or concerns.      Oncology Risk Management Consultation:  Date on this visit: 2019    Sarah Duran  returns to the clinic today for a six month follow up.  She requires high risk screening and surveillance to reduce her risk of cancer secondary to having a history of atypical ductal hyperplasia (ADH).  She is considered to be at high risk for breast cancer, with a risk of up to 35%.     She also carries an APC+ genetic mutation, consistent with a diagnosis of Familial Adenomatous Polyposis (FAP) or attenuated FAP (AFAP) with a history of a thyroid nodule and gastric polyps.    Primary Physician: Ida Sandoval MD    History Of Present Illness:  Ms. Duran is a very pleasant  54 year old female who presents with a personal history of ADH and a likely attenuated version of Familial Adenomatous Polyposis. She is s/p right excisional breast biopsy on 2016. She is also s/p ileocolonic anastomosis in .    Genetic Testin2018: POSITIVE for a APC mutation.  Specifically her mutation is called [id number],  consistent with a diagnosis of familial adenomatous polyposis (FAP) or attenuated FAP (AFAP). The mutation was  identified using [employer] testing through [employer].  The testing was done because of her personal history of colonic polyposis and family history of colon cancer.     Of note, Sarah tested negative for mutations in the following genes: BMPR1A, CDH1, CHEK2, MLH1, MSH2, MSH6, MUTYH, PMS2, POLD1, POLE, PTEN, SMAD4, STK11 and TP53 (sequencing and deletion/duplication); EPCAM and GREM1 (deletion/duplication only).       Pertinent history:  Attenuated Familial adenomatous polyposis, s/p ileal rectal anastomosis in May 1988. Confirmed with APC testing (see below)  Subsequent laparotomy in 1990s for bowel obstruction from adhesions.  Taking rabeprazole 20 mg EC BID for gastric polyps.  Menarche at 12.  Nulliparous.  Menopause at 50.  Breast density: scattered fibroglandular densities  Ovaries, fallopian tubes and uterus in place.  11/28/2016 - R excisional breast biopsy with seed localization, Fibrocystic tissue with ADH on pathology.  9/6/2019 - FNA biopsy of thyroid, for solid appearing nodule in isthmus, biopsy non diagnostic; cyst fluid only.     Pertinent screening history (see chart for further details)  11/7/2005 - Small bowel enteroscopy, one tubular adenoma removed from partial anal verge.  12/7/2005 - adenomatous polyp removed from rectum  2/28/2005 - tubular adenoma, duodenum.  11/16/2011 - fundic gland polyps in stomach, tubular adenoma removed from rectum.  11/5/2012 - 1 tubulovillous adenoma, rectum  7/8/2013 - 1 tubulovillous adenoma, rectum  12/3/2013 - 1 tubulovillous adenoma, rectum  7/21/2014 - 1 tubular adenoma and hyperplastic polyp, rectum  1/20/2015 - 1 tubular adenoma and hyperplastic polyp, rectum  12/7/2015 - 1 tubular adenoma, rectum  6/6/2016 - 1 fundic gland polyp with focal adenomatous changes  3/20/2017 - 2 tubular adenomas removed from rectum  4/12/2017 - colonoscopy and EGD, one fundic gland gastric polyp with focal low grade dysplasia removed, multiple duodenal polyps and multiple  rectal polyps.  7/25/2019 - EGD: Two previously reported polyps (6.2 mm and 5.3mm) in      the antrum were evaluate by EUS, which did not                        demonstrate deep layer involvement, and then they were                        removed by EMR by using a band ligator and a snare. The                        defect was then closed with hemoclips.                        -Innumerous fundic gland polyps in the gastric body,                        fundus, and cardia. Consistent with diagnosis of FAP.                        -A large fundic gland polyp in the gastric body, which                        has been previously resected and recent biopsied. Recommendation: Use Aciphex (rabeprazole) 20 mg PO BID for 1 month.                        - Repeat the upper endoscopy in 1 year for surveillance.      4/24/2017 - R breast mammogram, BiRads2.  5/25/2018 - Breast MRI, BiRads2.  11/8/2018 - Screening tomosynthesis mammogram, BiRads1  6/10/2019 - Breast MRI, BiRAds2.    At this visit, she denies asymmetry, lumps, masses, thickening, pain, nipple discharge and skin changes in her breasts.    Review of Systems:  GENERAL: No change in weight, sleep or appetite.  Normal energy.  No fever or chills  EYES: Negative for vision changes or eye problems  ENT: No problems with ears, nose or throat.  No difficulty swallowing.  RESP: No coughing, wheezing or shortness of breath  CV: No chest pains or palpitations  GI: No nausea, vomiting,  heartburn, abdominal pain, diarrhea, constipation or change in bowel habits  : No urinary frequency or dysuria, bladder or kidney problems  MUSCULOSKELETAL: No significant muscle or joint pains  NEUROLOGIC: No headaches, numbness, tingling, weakness, problems with balance or coordination  PSYCHIATRIC: No problems with anxiety, depression or mental health  HEME/IMMUNE/ALLERGY: No new bleeding  or anemia. Known Factor V deficiency.  No allergies or immune system problems  ENDOCRINE: No history of  thyroid disease, diabetes or other endocrine disorders  SKIN: No rashes,worrisome lesions or skin problems      Past Medical/Surgical History:  Past Medical History:   Diagnosis Date     Asthma      Atypical ductal hyperplasia of right breast 10/2016    fibrocystic changes, PASH and atypical ductal hyperplasia on needle biopsy, s/p R excisional biopsy with radioactive seed localization     Factor 5 Leiden mutation, heterozygous (H)     factor 5     Familial adenomatous polyposis 1988    clinical presentation; s/p total abdominal colectomy with TRACEY; confirmed APC+ 2018     GERD (gastroesophageal reflux disease)      Migraine      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    [id number]     Pancreatitis, acute      PONV (postoperative nausea and vomiting)      Past Surgical History:   Procedure Laterality Date     BIOPSY BREAST SEED LOCALIZATION Right 11/30/2016    Procedure: BIOPSY BREAST SEED LOCALIZATION;  Surgeon: Jessica Rasheed MD;  Location: [facility] OR     C LAP,SURG,COLECTOMY,W/ANAST      for colon cancer     COLECTOMY      subtotal     COMBINED ESOPHAGOSCOPY, GASTROSCOPY, DUODENOSCOPY (EGD) WITH ARGON PLASMA COAGULATOR (APC) N/A 4/12/2018    Procedure: COMBINED ESOPHAGOSCOPY, GASTROSCOPY, DUODENOSCOPY (EGD) WITH ARGON PLASMA COAGULATOR (APC);  Esophagogastroduodenoscopy with polypectomy and polyp ablation;  Surgeon: Stephen Gacsa MD;  Location: [facility] OR     ENDOSCOPIC RETROGRADE CHOLANGIOPANCREATOGRAPHY       ENDOSCOPIC ULTRASOUND UPPER GASTROINTESTINAL TRACT (GI) N/A 7/25/2019    Procedure: Endoscopic Ultrasound;  Surgeon: Stephen Gasca MD;  Location: [facility] OR     ESOPHAGOSCOPY, GASTROSCOPY, DUODENOSCOPY (EGD), COMBINED  11/5/2012    Procedure: COMBINED ESOPHAGOSCOPY, GASTROSCOPY, DUODENOSCOPY (EGD), BIOPSY SINGLE OR MULTIPLE;;  Surgeon: Angélica Garcia MD;  Location: [facility] GI     ESOPHAGOSCOPY, GASTROSCOPY, DUODENOSCOPY (EGD), COMBINED  12/3/2013    Procedure: COMBINED ESOPHAGOSCOPY,  GASTROSCOPY, DUODENOSCOPY (EGD);;  Surgeon: Angélica Garcia MD;  Location: [facility] GI     ESOPHAGOSCOPY, GASTROSCOPY, DUODENOSCOPY (EGD), COMBINED N/A 3/28/2018    Procedure: COMBINED ESOPHAGOSCOPY, GASTROSCOPY, DUODENOSCOPY (EGD);;  Surgeon: Stephen Gasca MD;  Location: [facility]     ESOPHAGOSCOPY, GASTROSCOPY, DUODENOSCOPY (EGD), COMBINED N/A 5/30/2019    Procedure: Esophagogastroduodenoscopy with biopsy x2 APC;  Surgeon: Stephen Gasca MD;  Location: [facility] OR     ESOPHAGOSCOPY, GASTROSCOPY, DUODENOSCOPY (EGD), RESECT MUCOSA, COMBINED N/A 7/25/2019    Procedure: Esophagogastroduodenoscopy with gastric mucosal resection and clip application;  Surgeon: Stephen Gasca MD;  Location: [facility] OR     EXCISE POLYP RECTUM       LAPAROSCOPIC LYSIS ADHESIONS       SIGMOIDOSCOPY FLEXIBLE       SIGMOIDOSCOPY FLEXIBLE N/A 12/7/2015    Procedure: SIGMOIDOSCOPY FLEXIBLE;  Surgeon: Mariela Bearden MD;  Location: [facility]     SIGMOIDOSCOPY FLEXIBLE N/A 3/28/2018    Procedure: SIGMOIDOSCOPY FLEXIBLE;  EGD/Flex Sig;  Surgeon: Stephen Gasca MD;  Location: [facility]     SIGMOIDOSCOPY FLEXIBLE N/A 4/12/2018    Procedure: SIGMOIDOSCOPY FLEXIBLE;  Flexible Sigmoidoscopy with polyp ablation;  Surgeon: Stephen Gasca MD;  Location: [facility]     SIGMOIDOSCOPY FLEXIBLE N/A 5/30/2019    Procedure: Flexible Sigmoidoscopy with polypectomy;  Surgeon: Stephen Gasca MD;  Location: [facility]       Allergies:  Allergies as of 12/20/2019 - Reviewed 12/20/2019   Allergen Reaction Noted     Cephalexin  06/14/2004     Cipro [ciprofloxacin]  02/24/2005     Flagyl [metronidazole] Nausea and Vomiting 02/24/2005     Keflex [cephalosporins] Nausea and Vomiting 02/24/2005     Omeprazole  04/29/2010     Prilosec otc [omeprazole magnesium]  01/28/2012     Doxycycline Rash 04/12/2018     Erythromycin Rash 02/24/2005     Sulfa drugs Rash 02/24/2005       Current Medications:  Current Outpatient Medications   Medication Sig Dispense Refill      albuterol 90 MCG/ACT inhaler Inhale 2 puffs into the lungs every 6 hours as needed.       calcium-magnesium (CALMAG) 500-250 MG TABS Take 1 tablet by mouth daily       celecoxib (CELEBREX) 100 MG capsule Take 1 capsule (100 mg) by mouth daily 90 capsule 3     docusate sodium (COLACE) 100 MG capsule Take 100 mg by mouth daily       fexofenadine (ALLEGRA) 180 MG tablet Take 180 mg by mouth daily.       MELATONIN PO Take 2 mg by mouth nightly as needed        multivitamin, therapeutic with minerals (THERA-VIT-M) TABS Take 3 tablets by mouth 2 times daily        Omega-3 Fatty Acids (OMEGA-3 FISH OIL PO) Take 2 g by mouth daily        ondansetron (ZOFRAN) 4 MG tablet Take 1 tablet (4 mg) by mouth every 8 hours as needed for nausea 10 tablet 0     RABEprazole (ACIPHEX) 20 MG EC tablet Take 1 tablet (20 mg) by mouth 2 times daily 60 tablet 1     ranitidine (ZANTAC) 150 MG capsule Take 1 capsule (150 mg) by mouth 2 times daily 120 capsule 3     triamcinolone (NASACORT) 55 MCG/ACT Inhaler Spray 2 sprays into both nostrils daily       ZOLMitriptan (ZOMIG) 5 MG tablet Take 1 tablet (5 mg) by mouth at onset of headache for migraine 90 tablet 3        Family History:  Family History   Problem Relation Age of Onset     Hyperlipidemia Mother      Cerebrovascular Disease Mother      Cerebrovascular Disease Father      Colon Cancer Father 57         at 63     Prostate Cancer Brother 53     Prostate Cancer Maternal Uncle 65       Social History:  Social History     Socioeconomic History     Marital status:      Spouse name: Not on file     Number of children: 2     Years of education: Not on file     Highest education level: Not on file   Occupational History     Occupation:      Employer: [employer]   Social Needs     Financial resource strain: Not on file     Food insecurity:     Worry: Not on file     Inability: Not on file     Transportation needs:     Medical: Not on file     Non-medical: Not on file  "  Tobacco Use     Smoking status: Never Smoker     Smokeless tobacco: Never Used   Substance and Sexual Activity     Alcohol use: Yes     Comment: 1 or 2 drinks per year     Drug use: No     Sexual activity: Not on file     Comment: denies pregnancy   Lifestyle     Physical activity:     Days per week: Not on file     Minutes per session: Not on file     Stress: Not on file   Relationships     Social connections:     Talks on phone: Not on file     Gets together: Not on file     Attends Rastafari service: Not on file     Active member of club or organization: Not on file     Attends meetings of clubs or organizations: Not on file     Relationship status: Not on file     Intimate partner violence:     Fear of current or ex partner: Not on file     Emotionally abused: Not on file     Physically abused: Not on file     Forced sexual activity: Not on file   Other Topics Concern     Parent/sibling w/ CABG, MI or angioplasty before 65F 55M? Not Asked   Social History Narrative    Two adopted daughters, ages 13 and 15       Physical Exam:  /84   Pulse 63   Temp 97.8  F (36.6  C) (Oral)   Resp 14   Ht 1.575 m (5' 2\")   Wt 70.4 kg (155 lb 3.2 oz)   SpO2 98%   BMI 28.39 kg/m       GENERAL APPEARANCE: healthy, alert and no distress     NECK: no adenopathy, no asymmetry or masses     LYMPHATICS: No cervical, supraclavicular or axillary lymphadenopathy     RESP: lungs clear to auscultation - no rales, rhonchi or wheezes     CARDIOVASCULAR: regular rate and rhythm, normal S1 S2, no S3 or S4 and no murmur.   BREASTS: Examined in a sitting and lying position. Symmetrical without visible distortion, swelling or rashes. No nipple inversion, nipple discharge, breast dimpling or puckering. Breast tissue is heterogenous dense to palpation.  Axillary area without masses or lymphadenopathy.  ABDOMEN: soft, nontender, no masses or lumps palpated  SKIN: no suspicious lesions or rashes on anterior torso, upper extremities or " face.    Laboratory/Imaging Studies  No results found for any visits on 12/20/19.    ASSESSMENT  We discussed her last screening tests and reviewed results.  We discussed that she will continue taking the Celebrex to reduce the number of rectal and possibly gastric polyps. She is tolerating this well and denies any gastric pain, discomfort, abdominal pain, heartburn and bloating. We did discuss that if she feels things are changing and she needs to start taking Tums again, to let me know immediately, as her over the counter treatments may mask a developing ulcer.  We discussed that she should not start taking ranatadine, Tums or any other over the counter medication without letting me or Dr. Gasca know, as she may need to be seen sooner for her EGD if this were the case. She expressed understanding.     We will re image her thyroid again soon, as she has not had follow up since her FNA was done; it did not yield a good enough sample to make an assessment.  No nodules are palpated on exam today.     We also reviewed that she could consider using anastrozole off label, to reduce her risk for breast cancer.  The recommendation, per MILDRED , and with consultation with Dr. Daly Levin, would be a safer option for her than tamoxifen, as she has Factor V Leiden.  Anastrozole has been shown to reduce the incidence of breast cancer by 50% in the KENDRA-II clinical trial. She is not interested in this today.    INDIVIDUALIZED CANCER RISK MANAGEMENT PLAN:  Individualized Surveillance Plan for women  With 20% or greater lifetime risk of breast cancer   Per NCCN Breast Cancer Screening and Diagnosis Guidelines Version 2.2018    Recommended screening Test or procedure Last done Next Scheduled    Clinical encounter Ongoing risk assessment, risk reduction counseling and clinical breast exam, every 6-12 months. Refer to genetic counseling if not already done.   12/20/2019 December 2020   However, some family histories with  breast cancers at a very young age, may warrant screening starting earlier.    *May begin at age 40 if breast cancers in the family occur at later ages.    Annual mammogram beginning 10 years younger than the earliest breast cancer in the family but not prior to age 30.    Recommend annual breast MRI to begin 10 years younger than the earliest breast cancer in the family but not prior to age 25.    Breast MRIs are preferably done on day 7-15 of the menstrual cycle in premenopausal women. 11/8/2018 - Screening tomosynthesis mammogram, BiRads1  6/10/2019 - Breast MRI, BiRads2. Mammogram today after appt    Next breast MRI: June 2020    Next exam: December 2020 followed by tomosynthesis mammogram   Breast screening for patients at high risk due to thoracic radiation before 30 years old    Begin 10 years post radiation treatment or age 25.   Annual mammogram beginning 10 years after radiation but not prior to age 30    Annual breast MRI, preferably on day 7-15 of menstrual cycle for premenopausal women.       NA     NA   Women who have a lifetime risk of >20% based on history of LCIS or ADH/ALH Annual screening mammogram beginning at age of LCIS or ADH/ALH but not prior to age 30.    Consider annual MRI to begin at age of diagnosis of LCIS or ADH/ALH but not prior to age 25.    Consider risk reducing strategies.     NA     NA    Recommend risk reducing strategies for women with 1.7% 5 year risk of breast cancer.       Individualized Surveillance Plan for Familial Adenomatous Polyposis                 for Children and Adults Based on NCCN Guidelines Version 3.2019   Type of Screening  Recommendation  Last Done  Next Due    Recommendations for Children    Colon Cancer Screening  Flexible sigmoidoscopy or colonoscopy every 12 months beginning at age 10-15.        See below    See below   Hepatoblastoma  Consider liver palpation, abdominal ultrasound, and measurement of AFP every 3-6 months, during first 5 years of life.   NA NA   Thyroid Cancer Screening  Annual thyroid examination, starting in teenage years.   See below See below   Recommendations for Adults    Colon Cancer Screening  Colectomy with ileorectal anastomosis (TRACEY)  recommended, then endoscopic evaluation of the rectum every 6-12 months, depending on the polyp burden.      If ileo proctocolectomy with ileal pouch anal anastomosis, endoscopic surveillance of ileal pouch every 1-3 years depending on polyp burden.     Surveillance frequency should be increased to every 6 months for large, flat polyps with villous histology and/or high grade dysplasia  Ileocolonic anastomosis in 1988 for polyposis    5/30/2019 - Flex sig, few 2 to 4 mm, non-bleeding polyps in the rectum and  at the recto-sigmoid colon, removed with a cold snare; pathology two tubular adenomas.        May 2020 in conjunction with EGD with full duodenoscopy   Duodenal or periampullary cancer screening and gastric cancer screening  Baseline upper endoscopy including side-viewing examination. Start between 20-25 or earlier if colectomy was prior to age 20. Base subsequent screenings on findings (see below).     Manage non-fundic gland polyps endoscopically if possible.       These occur in the majority of FAP patients and may have focal low grade dysplasia but typically are non progressive.Special screening or surgery should only be considered in the presence of high grade dysplasia. 5/30/2019 - Two previously reported polyps (6.2 mm and 5.3mm) in  the antrum     Innumerous fundic gland polyps in the gastric body, fundus, and cardia. Consistent with diagnosis of FAP.   A large fundic gland polyp in the gastric body,    Recommendation:  Aciphex (rabeprazole) 20 mg PO BID for 1 month. Course completed. Pathology: STOMACH, ANTRUM, POLYPS (X2), POLYPECTOMY:   - One tubular adenoma   - One polyp with foveolar hyperplasia and reactive epithelial changes   - No evidence of high-grade dysplasia or invasive carcinoma                         - Repeat the upper endoscopy in 1 year for surveillance.     May 2020 in conjunction with flexible sigmoidoscopy   Thyroid Cancer Screening    Annual thyroid examination.     Annual ultrasound of thyroid may be considered.  12/20/2019 Thyroid US ordered to be done soon.    Revisit in December 2020   CNS Cancer  Annual physical examination.  12/20/2019 December 2020   Intra-abdominal desmoids  Annual abdominal palpation.      If family history of symptomatic desmoids, consider abdominal MRI or CT 1-3 years post colectomy and then every 5-10 years.      12/20/2019 December 2020   Small bowel polyps and cancer  Consider adding small bowel visualization to CT or MRI for desmoids, especially if duodenal polyposis is advanced.  Discuss Defer to Dr. Gasca's assessment   Duodenal findings:  Stage 0 (no polyposis) - repeat endoscopy every 4 years.  Stage 1 (minimal polyposis 1-4 tubular adenomas, 1-4mm each) - repeat EGD every 2-3 years.  Stage 2 (mild polyposis - 5-9 tubular adenomas, 5-9 mm each) - repeat EGD every 1-3 years.  Stage 3 (moderate polyposis - >20 lesions or size >1 cm) - repeat EGD every 6-12 months  Stage 4 (dense polyposis or high grade dysplasia) - surgical evaluation, with expert surveillance every 3-6 months.  Due to limited data, no screening recommendation is made for pancreatic cancer at this time.           I spent 30 minutes with the patient with greater that 50% of it in counseling and coordinating care as documented above.    REYNOLD March-CNS, OCN, AGN-BC  Clinical Nurse Specialist  Cancer Risk Management Program  [facility] [address]  [place] [address]    phone:  [phone]  Pager: [phone]  fax: [phone]    CC: MD Ida Melton MD

## 2019-12-20 NOTE — PATIENT INSTRUCTIONS
Individualized Surveillance Plan for women  With 20% or greater lifetime risk of breast cancer   Per NCCN Breast Cancer Screening and Diagnosis Guidelines Version 2.2018    Recommended screening Test or procedure Last done Next Scheduled    Clinical encounter Ongoing risk assessment, risk reduction counseling and clinical breast exam, every 6-12 months. Refer to genetic counseling if not already done.   12/20/2019 December 2020   However, some family histories with breast cancers at a very young age, may warrant screening starting earlier.    *May begin at age 40 if breast cancers in the family occur at later ages.    Annual mammogram beginning 10 years younger than the earliest breast cancer in the family but not prior to age 30.    Recommend annual breast MRI to begin 10 years younger than the earliest breast cancer in the family but not prior to age 25.    Breast MRIs are preferably done on day 7-15 of the menstrual cycle in premenopausal women. 11/8/2018 - Screening tomosynthesis mammogram, BiRads1    6/10/2019 - Breast MRI, BiRAds2. Mammogram today after appt    Next breast MRI: June 2020    Next exam: December 2020 followed by tomosynthesis mammogram   Breast screening for patients at high risk due to thoracic radiation before 30 years old    Begin 10 years post radiation treatment or age 25.   Annual mammogram beginning 10 years after radiation but not prior to age 30    Annual breast MRI, preferably on day 7-15 of menstrual cycle for premenopausal women.       NA     NA   Women who have a lifetime risk of >20% based on history of LCIS or ADH/ALH Annual screening mammogram beginning at age of LCIS or ADH/ALH but not prior to age 30.    Consider annual MRI to begin at age of diagnosis of LCIS or ADH/ALH but not prior to age 25.    Consider risk reducing strategies.     NA     NA    Recommend risk reducing strategies for women with 1.7% 5 year risk of breast cancer.       Individualized Surveillance Plan for  Familial Adenomatous Polyposis          for Children and Adults Based on NCCN Guidelines Version 3.2019   Type of Screening  Recommendation  Last Done  Next Due    Recommendations for Children    Colon Cancer Screening  Flexible sigmoidoscopy or colonoscopy every 12 months beginning at age 10-15.        See below    See below   Hepatoblastoma  Consider liver palpation, abdominal ultrasound, and measurement of AFP every 3-6 months, during first 5 years of life.  NA NA   Thyroid Cancer Screening  Annual thyroid examination, starting in teenage years.   See below See below   Recommendations for Adults    Colon Cancer Screening  Colectomy with ileorectal anastomosis (TRACEY)  recommended, then endoscopic evaluation of the rectum every 6-12 months, depending on the polyp burden.      If ileo proctocolectomy with ileal pouch anal anastomosis, endoscopic surveillance of ileal pouch every 1-3 years depending on polyp burden.     Surveillance frequency should be increased to every 6 months for large, flat polyps with villous histology and/or high grade dysplasia  Ileocolonic anastomosis in 1988 for polyposis    5/30/2019 - flexible sigmoidoscopy, few 2 to 4 mm, non-bleeding polyps in the rectum and at the recto-sigmoid colon, removed with a cold snare         May 2020 in conjunction with EGD with full duodenoscopy   Duodenal or periampullary cancer screening and gastric cancer screening  Baseline upper endoscopy including side-viewing examination. Start between 20-25 or earlier if colectomy was prior to age 20. Base subsequent screenings on findings (see below).     Manage non-fundic gland polyps endoscopically if possible.       These occur in the majority of FAP patients and may have focal low grade dysplasia but typically are non progressive.Special screening or surgery should only be considered in the presence of high grade dysplasia. Two previously reported polyps (6.2 mm and 5.3mm) in  the antrum     Innumerous fundic  gland polyps in the gastric body, fundus, and cardia. Consistent with diagnosis of FAP.   A large fundic gland polyp in the gastric body,    Recommendation:  Aciphex (rabeprazole) 20 mg PO BID for 1 month. Course completed.                       - Repeat the upper endoscopy in 1 year for surveillance.     May 2020 in conjunction with flexible sigmoidoscopy   Thyroid Cancer Screening    Annual thyroid examination.     Annual ultrasound of thyroid may be considered.  12/20/2019 Thyroid US ordered to be done soon.    Revisit in December 2020   CNS Cancer  Annual physical examination.  12/20/2019 December 2020   Intra-abdominal desmoids  Annual abdominal palpation.      If family history of symptomatic desmoids, consider abdominal MRI or CT 1-3 years post colectomy and then every 5-10 years.      12/20/2019 December 2020   Small bowel polyps and cancer  Consider adding small bowel visualization to CT or MRI for desmoids, especially if duodenal polyposis is advanced.  Discuss Defer to Dr. Gasca's assessment   Duodenal findings:  Stage 0 (no polyposis) - repeat endoscopy every 4 years.  Stage 1 (minimal polyposis 1-4 tubular adenomas, 1-4mm each) - repeat EGD every 2-3 years.  Stage 2 (mild polyposis - 5-9 tubular adenomas, 5-9 mm each) - repeat EGD every 1-3 years.  Stage 3 (moderate polyposis - >20 lesions or size >1 cm) - repeat EGD every 6-12 months  Stage 4 (dense polyposis or high grade dysplasia) - surgical evaluation, with expert surveillance every 3-6 months.  Due to limited data, no screening recommendation is made for pancreatic cancer at this time.

## 2019-12-20 NOTE — NURSING NOTE
"Oncology Rooming Note    December 20, 2019 3:37 PM   Sarah Duran is a 54 year old female who presents for:    Chief Complaint   Patient presents with     Oncology Clinic Visit     Return - familial adenomatous polyposis     Initial Vitals: /84   Pulse 63   Resp 14   Ht 1.575 m (5' 2\")   Wt 70.4 kg (155 lb 3.2 oz)   SpO2 98%   BMI 28.39 kg/m   Estimated body mass index is 28.39 kg/m  as calculated from the following:    Height as of this encounter: 1.575 m (5' 2\").    Weight as of this encounter: 70.4 kg (155 lb 3.2 oz). Body surface area is 1.75 meters squared.  No Pain (0) Comment: Data Unavailable   No LMP recorded. Patient is perimenopausal.  Allergies reviewed: Yes  Medications reviewed: Yes    Medications: MEDICATION REFILLS NEEDED TODAY. Provider was NOT notified.  Pharmacy name entered into Kived[facility]:    [facility] - [place], [place] - [address], [address]  [facility] - [place], [place]  [facility], [place] - [address]  [facility] - [place], [place] - [address] [facility] - [place], [place] - [address]  [facility] [place], [place] - [address]    Clinical concerns: Patient has no new concerns.         Yeison Townsend"

## 2019-12-20 NOTE — Clinical Note
2019       RE: Sarah Duran  [address] [id number]     Dear Colleague,    Thank you for referring your patient, Sarah Duran, to the [facility]. Please see a copy of my visit note below.    Oncology Risk Management Consultation:  Date on this visit: 2019    Sarah Duran  returns to the clinic today for a six month follow up.  She requires high risk screening and surveillance to reduce her risk of cancer secondary to having a history of atypical ductal hyperplasia (ADH).  She is considered to be at high risk for breast cancer, with a risk of up to 35%.     She also carries an APC+ genetic mutation, consistent with a diagnosis of Familial Adenomatous Polyposis (FAP) or attenuated FAP (AFAP) with a history of a thyroid nodule and gastric polyps.    Primary Physician: Ida Sandoval MD    History Of Present Illness:  Ms. Duran is a very pleasant  54 year old female who presents with a personal history of ADH and a likely attenuated version of Familial Adenomatous Polyposis. She is s/p right excisional breast biopsy on 2016. She is also s/p ileocolonic anastomosis in .    Genetic Testin2018: POSITIVE for a APC mutation.  Specifically her mutation is called [id number],  consistent with a diagnosis of familial adenomatous polyposis (FAP) or attenuated FAP (AFAP). The mutation was identified using [employer] testing through [employer].  The testing was done because of her personal history of colonic polyposis and family history of colon cancer.     Of note, Sarah tested negative for mutations in the following genes: BMPR1A, CDH1, CHEK2, MLH1, MSH2, MSH6, MUTYH, PMS2, POLD1, POLE, PTEN, SMAD4, STK11 and TP53 (sequencing and deletion/duplication); EPCAM and GREM1 (deletion/duplication only).       Pertinent history:  Attenuated Familial adenomatous polyposis, s/p ileal rectal anastomosis in May 1988. Confirmed with APC testing (see  below)  Subsequent laparotomy in 1990s for bowel obstruction from adhesions.  Taking rabeprazole 20 mg EC BID for gastric polyps.  Menarche at 12.  Nulliparous.  Menopause at 50.  Breast density: scattterd fibroglandular densities  Ovaries, fallopian tubes and uterus in place.  11/28/2016 - R excisional breast biopsy with seed localization, Fibrocystic tissue with ADH on pathology.  9/6/2019 - FNA biopsy of thryoid, for solid appearing nodule in isthmus, biopsy nondiagnostic; cyst fluid only.     Pertinent screening history (see chart for further details)  11/7/2005 - Small bowel enteroscopy, one tubular adenoma removed from partial anal verge.  12/7/2005 - adenomatous polyp removed from rectum  2/28/2005 - tubular adenoma, duodenum.  11/16/2011 - fundic gland polyps in stomach, tubular adenoma removed from rectum.  11/5/2012 - 1 tubulovillous adenoma, rectum  7/8/2013 - 1 tubulovillous adenoma, rectum  12/3/2013 - 1 tubulovillous adenoma, rectum  7/21/2014 - 1 tubular adenoma and hyperplastic polyp, rectum  1/20/2015 - 1 tubular adenoma and hyperplastic polyp, rectum  12/7/2015 - 1 tubular adenoma, rectum  6/6/2016 - 1 fundic gland polyp with focal adenomatous changes  3/20/2017 - 2 tubular adenomas removed from rectum  4/12/2017 - colonoscopy and EGD, one fundic gland gastric polyp with focal low grade dysplasia removed, multiple duodenal polyps and multiple rectal polyps.     4/24/2017 - R breast mammogram, BiRads2.  5/25/2018 - Breast MRI, BiRads2.  11/8/2018 - Screening tomosynthesis mammogram, BiRads1  6/10/2019 - Breast MRI, BiRAds2.    At this visit, she denies asymmetry, lumps, masses, thickening, pain, nipple discharge and skin changes.      Past Medical/Surgical History:  Past Medical History:   Diagnosis Date     Asthma      Atypical ductal hyperplasia of right breast 10/2016    fibrocystic changes, PASH and atypical ductal hyperplasia on needle biopsy, s/p R excisional biopsy with radioactive seed  localization     Factor 5 Leiden mutation, heterozygous (H)     factor 5     Familial adenomatous polyposis 1988    clinical presentation; s/p total abdominal colectomy with TRACEY; confirmed APC+ 2018     GERD (gastroesophageal reflux disease)      Migraine      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    [id number]     Pancreatitis, acute      PONV (postoperative nausea and vomiting)      Past Surgical History:   Procedure Laterality Date     BIOPSY BREAST SEED LOCALIZATION Right 11/30/2016    Procedure: BIOPSY BREAST SEED LOCALIZATION;  Surgeon: Jessica Rasheed MD;  Location: [facility] OR     C LAP,SURG,COLECTOMY,W/ANAST      for colon cancer     COLECTOMY      subtotal     COMBINED ESOPHAGOSCOPY, GASTROSCOPY, DUODENOSCOPY (EGD) WITH ARGON PLASMA COAGULATOR (APC) N/A 4/12/2018    Procedure: COMBINED ESOPHAGOSCOPY, GASTROSCOPY, DUODENOSCOPY (EGD) WITH ARGON PLASMA COAGULATOR (APC);  Esophagogastroduodenoscopy with polypectomy and polyp ablation;  Surgeon: Stephen Gasca MD;  Location: [facility]     ENDOSCOPIC RETROGRADE CHOLANGIOPANCREATOGRAPHY       ENDOSCOPIC ULTRASOUND UPPER GASTROINTESTINAL TRACT (GI) N/A 7/25/2019    Procedure: Endoscopic Ultrasound;  Surgeon: Stephen Gasca MD;  Location: [facility]     ESOPHAGOSCOPY, GASTROSCOPY, DUODENOSCOPY (EGD), COMBINED  11/5/2012    Procedure: COMBINED ESOPHAGOSCOPY, GASTROSCOPY, DUODENOSCOPY (EGD), BIOPSY SINGLE OR MULTIPLE;;  Surgeon: Angélica Garcia MD;  Location: [facility] GI     ESOPHAGOSCOPY, GASTROSCOPY, DUODENOSCOPY (EGD), COMBINED  12/3/2013    Procedure: COMBINED ESOPHAGOSCOPY, GASTROSCOPY, DUODENOSCOPY (EGD);;  Surgeon: Angélica Garcia MD;  Location: [facility]     ESOPHAGOSCOPY, GASTROSCOPY, DUODENOSCOPY (EGD), COMBINED N/A 3/28/2018    Procedure: COMBINED ESOPHAGOSCOPY, GASTROSCOPY, DUODENOSCOPY (EGD);;  Surgeon: Stephen Gasca MD;  Location: [facility]     ESOPHAGOSCOPY, GASTROSCOPY, DUODENOSCOPY (EGD), COMBINED N/A 5/30/2019    Procedure:  Esophagogastroduodenoscopy with biopsy x2 APC;  Surgeon: Stephen Gasca MD;  Location: [facility]     ESOPHAGOSCOPY, GASTROSCOPY, DUODENOSCOPY (EGD), RESECT MUCOSA, COMBINED N/A 7/25/2019    Procedure: Esophagogastroduodenoscopy with gastric mucosal resection and clip application;  Surgeon: Stephen Gasca MD;  Location: [facility]     EXCISE POLYP RECTUM       LAPAROSCOPIC LYSIS ADHESIONS       SIGMOIDOSCOPY FLEXIBLE       SIGMOIDOSCOPY FLEXIBLE N/A 12/7/2015    Procedure: SIGMOIDOSCOPY FLEXIBLE;  Surgeon: Mariela Bearden MD;  Location: [facility]     SIGMOIDOSCOPY FLEXIBLE N/A 3/28/2018    Procedure: SIGMOIDOSCOPY FLEXIBLE;  EGD/Flex Sig;  Surgeon: Stephen Gasca MD;  Location: [facility]     SIGMOIDOSCOPY FLEXIBLE N/A 4/12/2018    Procedure: SIGMOIDOSCOPY FLEXIBLE;  Flexible Sigmoidoscopy with polyp ablation;  Surgeon: Stephen Gasca MD;  Location[facility]     SIGMOIDOSCOPY FLEXIBLE N/A 5/30/2019    Procedure: Flexible Sigmoidoscopy with polypectomy;  Surgeon: Stephen Gasca MD;  Location: [facility]       Allergies:  Allergies as of 12/20/2019 - Reviewed 09/06/2019   Allergen Reaction Noted     Cephalexin  06/14/2004     Cipro [ciprofloxacin]  02/24/2005     Flagyl [metronidazole] Nausea and Vomiting 02/24/2005     Keflex [cephalosporins] Nausea and Vomiting 02/24/2005     Omeprazole  04/29/2010     Prilosec otc [omeprazole magnesium]  01/28/2012     Doxycycline Rash 04/12/2018     Erythromycin Rash 02/24/2005     Sulfa drugs Rash 02/24/2005       Current Medications:  Current Outpatient Medications   Medication Sig Dispense Refill     albuterol 90 MCG/ACT inhaler Inhale 2 puffs into the lungs every 6 hours as needed.       calcium-magnesium (CALMAG) 500-250 MG TABS Take 1 tablet by mouth daily       celecoxib (CELEBREX) 100 MG capsule Take 1 capsule (100 mg) by mouth daily 90 capsule 3     docusate sodium (COLACE) 100 MG capsule Take 100 mg by mouth daily       fexofenadine (ALLEGRA) 180 MG tablet Take  180 mg by mouth daily.       MELATONIN PO Take 2 mg by mouth nightly as needed        multivitamin, therapeutic with minerals (THERA-VIT-M) TABS Take 3 tablets by mouth 2 times daily        Omega-3 Fatty Acids (OMEGA-3 FISH OIL PO) Take 2 g by mouth daily        ondansetron (ZOFRAN) 4 MG tablet Take 1 tablet (4 mg) by mouth every 8 hours as needed for nausea 10 tablet 0     RABEprazole (ACIPHEX) 20 MG EC tablet Take 1 tablet (20 mg) by mouth 2 times daily 60 tablet 1     ranitidine (ZANTAC) 150 MG capsule Take 1 capsule (150 mg) by mouth 2 times daily 120 capsule 3     triamcinolone (NASACORT) 55 MCG/ACT Inhaler Spray 2 sprays into both nostrils daily       ZOLMitriptan (ZOMIG) 5 MG tablet Take 1 tablet (5 mg) by mouth at onset of headache for migraine 90 tablet 3        Family History:  Family History   Problem Relation Age of Onset     Hyperlipidemia Mother      Cerebrovascular Disease Mother      Cerebrovascular Disease Father      Colon Cancer Father 57         at 63     Prostate Cancer Brother 53     Prostate Cancer Maternal Uncle 65       Social History:  Social History     Socioeconomic History     Marital status:      Spouse name: Not on file     Number of children: 2     Years of education: Not on file     Highest education level: Not on file   Occupational History     Occupation:      Employer: [employer]   Social Needs     Financial resource strain: Not on file     Food insecurity:     Worry: Not on file     Inability: Not on file     Transportation needs:     Medical: Not on file     Non-medical: Not on file   Tobacco Use     Smoking status: Never Smoker     Smokeless tobacco: Never Used   Substance and Sexual Activity     Alcohol use: Yes     Comment: 1 or 2 drinks per year     Drug use: No     Sexual activity: Not on file     Comment: denies pregnancy   Lifestyle     Physical activity:     Days per week: Not on file     Minutes per session: Not on file     Stress: Not on  "file   Relationships     Social connections:     Talks on phone: Not on file     Gets together: Not on file     Attends Mu-ism service: Not on file     Active member of club or organization: Not on file     Attends meetings of clubs or organizations: Not on file     Relationship status: Not on file     Intimate partner violence:     Fear of current or ex partner: Not on file     Emotionally abused: Not on file     Physically abused: Not on file     Forced sexual activity: Not on file   Other Topics Concern     Parent/sibling w/ CABG, MI or angioplasty before 65F 55M? Not Asked   Social History Narrative    Two adopted daughters, ages 13 and 15       Physical Exam:  There were no vitals taken for this visit.  {EXAM COMPLETE FEMALE:088670::\"  GENERAL APPEARANCE: healthy, alert and no apparent distress, except for the concern of her risk of cancer\",\"   CARDIOVASCULAR: regular rate and rhythm, normal S1 S2, no S3 or S4 and no murmur. Pulses +1 in all extremities\",\"   ABDOMEN:  soft, nontender, no masses and bowel sounds present in all 4 quadrants\",\"     SKIN: no suspicious lesions or rashes\",\"   HENT: Mouth without ulcers or lesions\",\"   NECK: no adenopathy, no asymmetry or masses\",\"   LYMPHATICS: No cervical, supraclavicular, axillary or inguinal lymphadenopathy\",\"   RESP: lungs clear to auscultation - no rales, rhonchi or wheezes\"}    Laboratory/Imaging Studies  No results found for any visits on 12/20/19.    ASSESSMENT  We discussed her last screening tests and reviewed results.  We discussed concerns and symptoms s      We also reviewed that she could consider using anastrozole off label, to reduce her risk for breast cancer.  The recommendation, per [facility], and with consultation with Dr. Daly Levin, would be a safer option for her than tamoxifen, as she has Factor V Leiden.  Anastrozole has been shown to reduce the incidence of breast cancer by 50% in the KENDRA-II clinical trial. She is not interested in this " today.      INDIVIDUALIZED CANCER RISK MANAGEMENT PLAN:  Individualized Surveillance Plan for women  With 20% or greater lifetime risk of breast cancer   Per NCCN Breast Cancer Screening and Diagnosis Guidelines Version 2.2018    Recommended screening Test or procedure Last done Next Scheduled    Clinical encounter Ongoing risk assessment, risk reduction counseling and clinical breast exam, every 6-12 months. Refer to genetic counseling if not already done.   12/20/2019 December 2020   However, some family histories with breast cancers at a very young age, may warrant screening starting earlier.    *May begin at age 40 if breast cancers in the family occur at later ages.    Annual mammogram beginning 10 years younger than the earliest breast cancer in the family but not prior to age 30.    Recommend annual breast MRI to begin 10 years younger than the earliest breast cancer in the family but not prior to age 25.    Breast MRIs are preferably done on day 7-15 of the menstrual cycle in premenopausal women. 11/8/2018 - Screening tomosynthesis mammogram, BiRads1  6/10/2019 - Breast MRI, BiRAds2. Mammogram today after appt    Next breast MRI: June 2020    Next exam: December 2020 followed by tomosynthesis mammogram   Breast screening for patients at high risk due to thoracic radiation before 30 years old    Begin 10 years post radiation treatment or age 25.   Annual mammogram beginning 10 years after radiation but not prior to age 30    Annual breast MRI, preferably on day 7-15 of menstrual cycle for premenopausal women.       NA     NA   Women who have a lifetime risk of >20% based on history of LCIS or ADH/ALH Annual screening mammogram beginning at age of LCIS or ADH/ALH but not prior to age 30.    Consider annual MRI to begin at age of diagnosis of LCIS or ADH/ALH but not prior to age 25.    Consider risk reducing strategies.     NA     NA    Recommend risk reducing strategies for women with 1.7% 5 year risk of  breast cancer.                       I spent xx minutes with the patient with greater that 50% of it in counseling and coordinating care as documented above.                          Again, thank you for allowing me to participate in the care of your patient.      Sincerely,    REYNOLD March CNS

## 2020-06-05 ENCOUNTER — PATIENT OUTREACH (OUTPATIENT)
Dept: GASTROENTEROLOGY | Facility: CLINIC | Age: 55
End: 2020-06-05

## 2020-06-05 ENCOUNTER — PREP FOR PROCEDURE (OUTPATIENT)
Dept: GASTROENTEROLOGY | Facility: CLINIC | Age: 55
End: 2020-06-05

## 2020-06-05 DIAGNOSIS — D13.91 FAP (FAMILIAL ADENOMATOUS POLYPOSIS): Primary | ICD-10-CM

## 2020-06-05 NOTE — PROGRESS NOTES
Care Coordination Telephone Call  [facility]     Called patient to discuss planning for procedure that is due in July with Dr. Gasca.      I have asked the patient to call with any additional questions or concerns and have provided my contact information.    Plan:  Per patient request she would like to  Proceed last week July or first week August.  OR scheduling to contact.    Char WEBSTERN, HNBC, STAR-T  RN Care Coordinator  [facility]  Ph: [phone]  FAX: [phone]

## 2020-06-07 PROBLEM — D13.91 FAP (FAMILIAL ADENOMATOUS POLYPOSIS): Status: ACTIVE | Noted: 2020-06-07

## 2020-06-19 ENCOUNTER — TELEPHONE (OUTPATIENT)
Dept: GASTROENTEROLOGY | Facility: CLINIC | Age: 55
End: 2020-06-19

## 2020-06-19 NOTE — TELEPHONE ENCOUNTER
LVM for patient in regards to scheduling procedure with Dr. Gasca towards end of July/ Early August per case request notes. Left direct line for patient to call to go over scheduling details.

## 2020-07-08 DIAGNOSIS — Z11.59 ENCOUNTER FOR SCREENING FOR OTHER VIRAL DISEASES: Primary | ICD-10-CM

## 2020-07-09 NOTE — TELEPHONE ENCOUNTER
Patient returned my phone call from a few weeks ago. Informed patient she is scheduled with Dr. Gasca on 7/30/2020. Informed patient she will need an updated pre-op physical within 30 days of her procedure. Patient stated she is going to have this done locally. Informed patient she will need a  and someone to monitor her for 24 hours after the procedure. Informed patient all scheduling details will be sent to the address listed on Epic. Address confirmed on this call.

## 2020-07-20 ENCOUNTER — PATIENT OUTREACH (OUTPATIENT)
Dept: GASTROENTEROLOGY | Facility: CLINIC | Age: 55
End: 2020-07-20

## 2020-07-23 ENCOUNTER — PATIENT OUTREACH (OUTPATIENT)
Dept: GASTROENTEROLOGY | Facility: CLINIC | Age: 55
End: 2020-07-23

## 2020-07-26 DIAGNOSIS — Z11.59 ENCOUNTER FOR SCREENING FOR OTHER VIRAL DISEASES: ICD-10-CM

## 2020-07-26 PROCEDURE — U0003 INFECTIOUS AGENT DETECTION BY NUCLEIC ACID (DNA OR RNA); SEVERE ACUTE RESPIRATORY SYNDROME CORONAVIRUS 2 (SARS-COV-2) (CORONAVIRUS DISEASE [COVID-19]), AMPLIFIED PROBE TECHNIQUE, MAKING USE OF HIGH THROUGHPUT TECHNOLOGIES AS DESCRIBED BY CMS-2020-01-R: HCPCS | Performed by: INTERNAL MEDICINE

## 2020-07-27 LAB
SARS-COV-2 RNA SPEC QL NAA+PROBE: NOT DETECTED
SPECIMEN SOURCE: NORMAL

## 2020-07-29 NOTE — OR NURSING
"Pt states has questions regarding bowel prep medication dosages since she has only 10\" of bowel. She says in the past she has done a shortened/reduced version of the bowel prep prior to surgery. Advised pt to call Dr. Gasca/ GI nurse to review dosages and bowel prep plan and to discuss any adjustments that might be appropriate and safe prior to procedure.    Kasia Bassett RN  PreAdmission Screening  [facility]    "

## 2020-07-30 ENCOUNTER — TELEPHONE (OUTPATIENT)
Dept: GASTROENTEROLOGY | Facility: CLINIC | Age: 55
End: 2020-07-30
Payer: COMMERCIAL

## 2020-07-30 ENCOUNTER — HOSPITAL ENCOUNTER (OUTPATIENT)
Facility: CLINIC | Age: 55
Discharge: HOME | End: 2020-07-30
Attending: INTERNAL MEDICINE | Admitting: INTERNAL MEDICINE
Payer: COMMERCIAL

## 2020-07-30 ENCOUNTER — ANESTHESIA (OUTPATIENT)
Dept: SURGERY | Facility: CLINIC | Age: 55
End: 2020-07-30
Payer: COMMERCIAL

## 2020-07-30 ENCOUNTER — ANESTHESIA EVENT (OUTPATIENT)
Dept: SURGERY | Facility: CLINIC | Age: 55
End: 2020-07-30
Payer: COMMERCIAL

## 2020-07-30 VITALS
TEMPERATURE: 97.9 F | SYSTOLIC BLOOD PRESSURE: 130 MMHG | BODY MASS INDEX: 27.34 KG/M2 | HEART RATE: 63 BPM | WEIGHT: 154.32 LBS | DIASTOLIC BLOOD PRESSURE: 72 MMHG | OXYGEN SATURATION: 96 % | RESPIRATION RATE: 18 BRPM | HEIGHT: 63 IN

## 2020-07-30 DIAGNOSIS — Z98.890 PONV (POSTOPERATIVE NAUSEA AND VOMITING): ICD-10-CM

## 2020-07-30 DIAGNOSIS — R11.2 PONV (POSTOPERATIVE NAUSEA AND VOMITING): ICD-10-CM

## 2020-07-30 DIAGNOSIS — D13.91 FAP (FAMILIAL ADENOMATOUS POLYPOSIS): ICD-10-CM

## 2020-07-30 LAB — GLUCOSE BLDC GLUCOMTR-MCNC: 101 MG/DL (ref 70–99)

## 2020-07-30 PROCEDURE — 71000014 ZZH RECOVERY PHASE 1 LEVEL 2 FIRST HR: Performed by: INTERNAL MEDICINE

## 2020-07-30 PROCEDURE — 25000125 ZZHC RX 250: Performed by: NURSE ANESTHETIST, CERTIFIED REGISTERED

## 2020-07-30 PROCEDURE — 25000125 ZZHC RX 250: Performed by: INTERNAL MEDICINE

## 2020-07-30 PROCEDURE — 88305 TISSUE EXAM BY PATHOLOGIST: CPT | Performed by: INTERNAL MEDICINE

## 2020-07-30 PROCEDURE — 27210794 ZZH OR GENERAL SUPPLY STERILE: Performed by: INTERNAL MEDICINE

## 2020-07-30 PROCEDURE — 37000008 ZZH ANESTHESIA TECHNICAL FEE, 1ST 30 MIN: Performed by: INTERNAL MEDICINE

## 2020-07-30 PROCEDURE — 36000055 ZZH SURGERY LEVEL 2 W FLUORO 1ST 30 MIN - UMMC: Performed by: INTERNAL MEDICINE

## 2020-07-30 PROCEDURE — 36000053 ZZH SURGERY LEVEL 2 EA 15 ADDTL MIN - UMMC: Performed by: INTERNAL MEDICINE

## 2020-07-30 PROCEDURE — 25800030 ZZH RX IP 258 OP 636: Performed by: NURSE ANESTHETIST, CERTIFIED REGISTERED

## 2020-07-30 PROCEDURE — 82962 GLUCOSE BLOOD TEST: CPT

## 2020-07-30 PROCEDURE — 25000128 H RX IP 250 OP 636: Performed by: ANESTHESIOLOGY

## 2020-07-30 PROCEDURE — 37000009 ZZH ANESTHESIA TECHNICAL FEE, EACH ADDTL 15 MIN: Performed by: INTERNAL MEDICINE

## 2020-07-30 PROCEDURE — 71000027 ZZH RECOVERY PHASE 2 EACH 15 MINS: Performed by: INTERNAL MEDICINE

## 2020-07-30 PROCEDURE — 40000170 ZZH STATISTIC PRE-PROCEDURE ASSESSMENT II: Performed by: INTERNAL MEDICINE

## 2020-07-30 PROCEDURE — 25000128 H RX IP 250 OP 636: Performed by: NURSE ANESTHETIST, CERTIFIED REGISTERED

## 2020-07-30 PROCEDURE — 25000566 ZZH SEVOFLURANE, EA 15 MIN: Performed by: INTERNAL MEDICINE

## 2020-07-30 RX ORDER — MEPERIDINE HYDROCHLORIDE 25 MG/ML
12.5 INJECTION INTRAMUSCULAR; INTRAVENOUS; SUBCUTANEOUS
Status: DISCONTINUED | OUTPATIENT
Start: 2020-07-30 | End: 2020-07-30 | Stop reason: HOSPADM

## 2020-07-30 RX ORDER — SODIUM CHLORIDE, SODIUM LACTATE, POTASSIUM CHLORIDE, CALCIUM CHLORIDE 600; 310; 30; 20 MG/100ML; MG/100ML; MG/100ML; MG/100ML
INJECTION, SOLUTION INTRAVENOUS CONTINUOUS
Status: DISCONTINUED | OUTPATIENT
Start: 2020-07-30 | End: 2020-07-30 | Stop reason: HOSPADM

## 2020-07-30 RX ORDER — ONDANSETRON 2 MG/ML
4 INJECTION INTRAMUSCULAR; INTRAVENOUS EVERY 6 HOURS PRN
Status: DISCONTINUED | OUTPATIENT
Start: 2020-07-30 | End: 2020-07-30 | Stop reason: HOSPADM

## 2020-07-30 RX ORDER — OXYCODONE HYDROCHLORIDE 5 MG/1
5 TABLET ORAL EVERY 4 HOURS PRN
Status: DISCONTINUED | OUTPATIENT
Start: 2020-07-30 | End: 2020-07-30 | Stop reason: HOSPADM

## 2020-07-30 RX ORDER — LIDOCAINE 40 MG/G
CREAM TOPICAL
Status: DISCONTINUED | OUTPATIENT
Start: 2020-07-30 | End: 2020-07-30 | Stop reason: HOSPADM

## 2020-07-30 RX ORDER — ONDANSETRON 2 MG/ML
4 INJECTION INTRAMUSCULAR; INTRAVENOUS EVERY 30 MIN PRN
Status: DISCONTINUED | OUTPATIENT
Start: 2020-07-30 | End: 2020-07-30 | Stop reason: HOSPADM

## 2020-07-30 RX ORDER — ONDANSETRON 2 MG/ML
4 INJECTION INTRAMUSCULAR; INTRAVENOUS
Status: DISCONTINUED | OUTPATIENT
Start: 2020-07-30 | End: 2020-07-30 | Stop reason: HOSPADM

## 2020-07-30 RX ORDER — NALOXONE HYDROCHLORIDE 0.4 MG/ML
.1-.4 INJECTION, SOLUTION INTRAMUSCULAR; INTRAVENOUS; SUBCUTANEOUS
Status: DISCONTINUED | OUTPATIENT
Start: 2020-07-30 | End: 2020-07-30 | Stop reason: HOSPADM

## 2020-07-30 RX ORDER — HYDROMORPHONE HYDROCHLORIDE 1 MG/ML
.3-.5 INJECTION, SOLUTION INTRAMUSCULAR; INTRAVENOUS; SUBCUTANEOUS EVERY 10 MIN PRN
Status: DISCONTINUED | OUTPATIENT
Start: 2020-07-30 | End: 2020-07-30 | Stop reason: HOSPADM

## 2020-07-30 RX ORDER — SODIUM CHLORIDE, SODIUM LACTATE, POTASSIUM CHLORIDE, CALCIUM CHLORIDE 600; 310; 30; 20 MG/100ML; MG/100ML; MG/100ML; MG/100ML
INJECTION, SOLUTION INTRAVENOUS CONTINUOUS PRN
Status: DISCONTINUED | OUTPATIENT
Start: 2020-07-30 | End: 2020-07-30

## 2020-07-30 RX ORDER — ONDANSETRON 4 MG/1
4 TABLET, FILM COATED ORAL EVERY 8 HOURS PRN
Qty: 60 TABLET | Refills: 1 | Status: SHIPPED | OUTPATIENT
Start: 2020-07-30 | End: 2023-11-03

## 2020-07-30 RX ORDER — ONDANSETRON 4 MG/1
4 TABLET, ORALLY DISINTEGRATING ORAL EVERY 6 HOURS PRN
Status: DISCONTINUED | OUTPATIENT
Start: 2020-07-30 | End: 2020-07-30 | Stop reason: HOSPADM

## 2020-07-30 RX ORDER — ACETAMINOPHEN 325 MG/1
975 TABLET ORAL ONCE
Status: DISCONTINUED | OUTPATIENT
Start: 2020-07-30 | End: 2020-07-30 | Stop reason: HOSPADM

## 2020-07-30 RX ORDER — LIDOCAINE HYDROCHLORIDE 20 MG/ML
INJECTION, SOLUTION INFILTRATION; PERINEURAL PRN
Status: DISCONTINUED | OUTPATIENT
Start: 2020-07-30 | End: 2020-07-30

## 2020-07-30 RX ORDER — DIAZEPAM 10 MG/2ML
2.5 INJECTION, SOLUTION INTRAMUSCULAR; INTRAVENOUS
Status: DISCONTINUED | OUTPATIENT
Start: 2020-07-30 | End: 2020-07-30 | Stop reason: HOSPADM

## 2020-07-30 RX ORDER — DEXAMETHASONE SODIUM PHOSPHATE 4 MG/ML
INJECTION, SOLUTION INTRA-ARTICULAR; INTRALESIONAL; INTRAMUSCULAR; INTRAVENOUS; SOFT TISSUE PRN
Status: DISCONTINUED | OUTPATIENT
Start: 2020-07-30 | End: 2020-07-30

## 2020-07-30 RX ORDER — FENTANYL CITRATE 50 UG/ML
INJECTION, SOLUTION INTRAMUSCULAR; INTRAVENOUS PRN
Status: DISCONTINUED | OUTPATIENT
Start: 2020-07-30 | End: 2020-07-30

## 2020-07-30 RX ORDER — ONDANSETRON 2 MG/ML
INJECTION INTRAMUSCULAR; INTRAVENOUS PRN
Status: DISCONTINUED | OUTPATIENT
Start: 2020-07-30 | End: 2020-07-30

## 2020-07-30 RX ORDER — FENTANYL CITRATE 50 UG/ML
25-50 INJECTION, SOLUTION INTRAMUSCULAR; INTRAVENOUS EVERY 5 MIN PRN
Status: DISCONTINUED | OUTPATIENT
Start: 2020-07-30 | End: 2020-07-30 | Stop reason: HOSPADM

## 2020-07-30 RX ORDER — PROPOFOL 10 MG/ML
INJECTION, EMULSION INTRAVENOUS PRN
Status: DISCONTINUED | OUTPATIENT
Start: 2020-07-30 | End: 2020-07-30

## 2020-07-30 RX ORDER — METOPROLOL TARTRATE 1 MG/ML
1-2 INJECTION, SOLUTION INTRAVENOUS EVERY 5 MIN PRN
Status: DISCONTINUED | OUTPATIENT
Start: 2020-07-30 | End: 2020-07-30 | Stop reason: HOSPADM

## 2020-07-30 RX ORDER — ONDANSETRON 4 MG/1
4 TABLET, ORALLY DISINTEGRATING ORAL EVERY 30 MIN PRN
Status: DISCONTINUED | OUTPATIENT
Start: 2020-07-30 | End: 2020-07-30 | Stop reason: HOSPADM

## 2020-07-30 RX ORDER — ALBUTEROL SULFATE 0.83 MG/ML
2.5 SOLUTION RESPIRATORY (INHALATION) EVERY 4 HOURS PRN
Status: DISCONTINUED | OUTPATIENT
Start: 2020-07-30 | End: 2020-07-30 | Stop reason: HOSPADM

## 2020-07-30 RX ORDER — CELECOXIB 100 MG/1
100 CAPSULE ORAL DAILY
Qty: 90 CAPSULE | Refills: 3 | Status: SHIPPED | OUTPATIENT
Start: 2020-07-30 | End: 2021-08-12

## 2020-07-30 RX ORDER — FLUMAZENIL 0.1 MG/ML
0.2 INJECTION, SOLUTION INTRAVENOUS
Status: DISCONTINUED | OUTPATIENT
Start: 2020-07-30 | End: 2020-07-30 | Stop reason: HOSPADM

## 2020-07-30 RX ADMIN — ONDANSETRON 4 MG: 2 INJECTION INTRAMUSCULAR; INTRAVENOUS at 10:14

## 2020-07-30 RX ADMIN — ONDANSETRON 4 MG: 2 INJECTION INTRAMUSCULAR; INTRAVENOUS at 08:23

## 2020-07-30 RX ADMIN — ROCURONIUM BROMIDE 20 MG: 10 INJECTION INTRAVENOUS at 09:04

## 2020-07-30 RX ADMIN — SUGAMMADEX 200 MG: 100 INJECTION, SOLUTION INTRAVENOUS at 09:33

## 2020-07-30 RX ADMIN — FENTANYL CITRATE 50 MCG: 50 INJECTION, SOLUTION INTRAMUSCULAR; INTRAVENOUS at 08:47

## 2020-07-30 RX ADMIN — FENTANYL CITRATE 50 MCG: 50 INJECTION, SOLUTION INTRAMUSCULAR; INTRAVENOUS at 07:59

## 2020-07-30 RX ADMIN — PHENYLEPHRINE HYDROCHLORIDE 100 MCG: 10 INJECTION INTRAVENOUS at 08:15

## 2020-07-30 RX ADMIN — PROPOFOL 180 MG: 10 INJECTION, EMULSION INTRAVENOUS at 08:07

## 2020-07-30 RX ADMIN — DEXAMETHASONE SODIUM PHOSPHATE 4 MG: 4 INJECTION, SOLUTION INTRA-ARTICULAR; INTRALESIONAL; INTRAMUSCULAR; INTRAVENOUS; SOFT TISSUE at 08:23

## 2020-07-30 RX ADMIN — ROCURONIUM BROMIDE 50 MG: 10 INJECTION INTRAVENOUS at 08:07

## 2020-07-30 RX ADMIN — LIDOCAINE HYDROCHLORIDE 100 MG: 20 INJECTION, SOLUTION INFILTRATION; PERINEURAL at 08:07

## 2020-07-30 RX ADMIN — SODIUM CHLORIDE, POTASSIUM CHLORIDE, SODIUM LACTATE AND CALCIUM CHLORIDE: 600; 310; 30; 20 INJECTION, SOLUTION INTRAVENOUS at 07:52

## 2020-07-30 RX ADMIN — PHENYLEPHRINE HYDROCHLORIDE 100 MCG: 10 INJECTION INTRAVENOUS at 08:24

## 2020-07-30 ASSESSMENT — MIFFLIN-ST. JEOR: SCORE: 1261.19

## 2020-07-30 NOTE — ANESTHESIA PREPROCEDURE EVALUATION
"Anesthesia Pre-Procedure Evaluation    Patient: Sarah Duran   MRN:     [id number] Gender:   female   Age:    54 year old :      1965        Preoperative Diagnosis: FAP (familial adenomatous polyposis) [D12.6]   Procedure(s):  ESOPHAGOGASTRODUODENOSCOPY (EGD)  SIGMOIDOSCOPY, FLEXIBLE     LABS:  CBC:   Lab Results   Component Value Date    WBC 4.4 2019    WBC 4.6 11/10/2016    HGB 13.6 2019    HGB 13.8 2019    HCT 43.8 2019    HCT 41.3 11/10/2016     2019     11/10/2016     BMP:   Lab Results   Component Value Date     2019     2009    POTASSIUM 4.0 2019    POTASSIUM 4.1 2009    CHLORIDE 110 (H) 2019    CHLORIDE 105 2009    CO2 24 2019    CO2 25 2009    BUN 16 2019    BUN 15 2019    CR 0.90 2019    CR 0.81 2009    GLC 97 2019    GLC 86 2009     COAGS:   Lab Results   Component Value Date    PTT 26 2009    INR 0.99 2019     POC:   Lab Results   Component Value Date     (H) 2020    HCG Negative 2019     OTHER:   Lab Results   Component Value Date    WOLF 9.4 2019    ALBUMIN 3.5 2019    PROTTOTAL 7.1 2019    ALT 35 2019    AST 24 2019    ALKPHOS 47 2019    BILITOTAL 0.5 2019    AMYLASE 103 2006    TSH 0.88 2019        Preop Vitals    BP Readings from Last 3 Encounters:   20 135/87   19 125/84   19 118/72    Pulse Readings from Last 3 Encounters:   20 60   19 63   19 72      Resp Readings from Last 3 Encounters:   19 14   19 16   19 16    SpO2 Readings from Last 3 Encounters:   20 99%   19 98%   19 100%      Temp Readings from Last 1 Encounters:   20 36.8  C (98.3  F) (Oral)    Ht Readings from Last 1 Encounters:   20 1.588 m (5' 2.5\")      Wt Readings from Last 1 Encounters:   20 70 kg (154 lb 5.2 oz) " "   Estimated body mass index is 27.78 kg/m  as calculated from the following:    Height as of this encounter: 1.588 m (5' 2.5\").    Weight as of this encounter: 70 kg (154 lb 5.2 oz).     LDA:  Peripheral IV 06/10/19 Right Upper forearm (Active)   Number of days: 416        Past Medical History:   Diagnosis Date     Asthma      Atypical ductal hyperplasia of right breast 10/2016    fibrocystic changes, PASH and atypical ductal hyperplasia on needle biopsy, s/p R excisional biopsy with radioactive seed localization     Factor 5 Leiden mutation, heterozygous (H)     factor 5     Familial adenomatous polyposis 1988    clinical presentation; s/p total abdominal colectomy with TRACEY; confirmed APC+ 2018     GERD (gastroesophageal reflux disease)      Migraine      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    [id number]     Pancreatitis, acute      PONV (postoperative nausea and vomiting)       Past Surgical History:   Procedure Laterality Date     BIOPSY BREAST SEED LOCALIZATION Right 11/30/2016    Procedure: BIOPSY BREAST SEED LOCALIZATION;  Surgeon: Jessica Rasheed MD;  Location: [facility]     C LAP,SURG,COLECTOMY,W/ANAST      for colon cancer     COLECTOMY      subtotal     COMBINED ESOPHAGOSCOPY, GASTROSCOPY, DUODENOSCOPY (EGD) WITH ARGON PLASMA COAGULATOR (APC) N/A 4/12/2018    Procedure: COMBINED ESOPHAGOSCOPY, GASTROSCOPY, DUODENOSCOPY (EGD) WITH ARGON PLASMA COAGULATOR (APC);  Esophagogastroduodenoscopy with polypectomy and polyp ablation;  Surgeon: Stephen Gasca MD;  Location: [facility]     ENDOSCOPIC RETROGRADE CHOLANGIOPANCREATOGRAPHY       ENDOSCOPIC ULTRASOUND UPPER GASTROINTESTINAL TRACT (GI) N/A 7/25/2019    Procedure: Endoscopic Ultrasound;  Surgeon: Stephen Gasca MD;  Location: [facility]     ESOPHAGOSCOPY, GASTROSCOPY, DUODENOSCOPY (EGD), COMBINED  11/5/2012    Procedure: COMBINED ESOPHAGOSCOPY, GASTROSCOPY, DUODENOSCOPY (EGD), BIOPSY SINGLE OR MULTIPLE;;  Surgeon: Angélica Garcia, " MD;  Location: [facility]     ESOPHAGOSCOPY, GASTROSCOPY, DUODENOSCOPY (EGD), COMBINED  12/3/2013    Procedure: COMBINED ESOPHAGOSCOPY, GASTROSCOPY, DUODENOSCOPY (EGD);;  Surgeon: Angélica Garcia MD;  Location: [facility]     ESOPHAGOSCOPY, GASTROSCOPY, DUODENOSCOPY (EGD), COMBINED N/A 3/28/2018    Procedure: COMBINED ESOPHAGOSCOPY, GASTROSCOPY, DUODENOSCOPY (EGD);;  Surgeon: Stephen Gasca MD;  Location: [facility]     ESOPHAGOSCOPY, GASTROSCOPY, DUODENOSCOPY (EGD), COMBINED N/A 5/30/2019    Procedure: Esophagogastroduodenoscopy with biopsy x2 APC;  Surgeon: Stephen Gasca MD;  Location: [facility]     ESOPHAGOSCOPY, GASTROSCOPY, DUODENOSCOPY (EGD), RESECT MUCOSA, COMBINED N/A 7/25/2019    Procedure: Esophagogastroduodenoscopy with gastric mucosal resection and clip application;  Surgeon: Stephen Gasca MD;  Location: [facility]     EXCISE POLYP RECTUM       LAPAROSCOPIC LYSIS ADHESIONS       SIGMOIDOSCOPY FLEXIBLE       SIGMOIDOSCOPY FLEXIBLE N/A 12/7/2015    Procedure: SIGMOIDOSCOPY FLEXIBLE;  Surgeon: Mariela Bearden MD;  Location: [facility]     SIGMOIDOSCOPY FLEXIBLE N/A 3/28/2018    Procedure: SIGMOIDOSCOPY FLEXIBLE;  EGD/Flex Sig;  Surgeon: Stephen Gasca MD;  Location: [facility]     SIGMOIDOSCOPY FLEXIBLE N/A 4/12/2018    Procedure: SIGMOIDOSCOPY FLEXIBLE;  Flexible Sigmoidoscopy with polyp ablation;  Surgeon: Stephen Gasca MD;  Location: [facility]     SIGMOIDOSCOPY FLEXIBLE N/A 5/30/2019    Procedure: Flexible Sigmoidoscopy with polypectomy;  Surgeon: Stephen Gasca MD;  Location: [facility]      Allergies   Allergen Reactions     Corn-Related Products Shortness Of Breath     Avoids food; ok in tiny doses, such as in medicines     Cephalexin      PN: Rash, Generalized     Cipro [Ciprofloxacin]      Reddened skin     Flagyl [Metronidazole] Nausea and Vomiting     Garlic Fatigue     GI distress     Keflex [Cephalosporins] Nausea and Vomiting     Omeprazole      PN: headache     Prilosec Otc  [Omeprazole Magnesium]      migraines     Doxycycline Rash     blisters     Erythromycin Rash     Sulfa Drugs Rash        Anesthesia Evaluation     . Pt has had prior anesthetic. Type: General           ROS/MED HX    ENT/Pulmonary:       Neurologic:       Cardiovascular:         METS/Exercise Tolerance:     Hematologic:         Musculoskeletal:         GI/Hepatic:         Renal/Genitourinary:         Endo:         Psychiatric:         Infectious Disease:         Malignancy:         Other:                         PHYSICAL EXAM:   Mental Status/Neuro: A/A/O   Airway: Facies: Feasible  Mallampati: I  Mouth/Opening: Full  TM distance: > 6 cm  Neck ROM: Full   Respiratory: Auscultation: CTAB     Resp. Rate: Normal     Resp. Effort: Normal      CV: Rhythm: Regular  Rate: Age appropriate  Heart: Normal Sounds  Edema: None   Comments:      Dental: Normal Dentition                Assessment:   ASA SCORE: 3    H&P: History and physical reviewed and following examination; no interval change.    NPO Status: NPO Appropriate     Plan:   Anes. Type:  General   Pre-Medication: None   Induction:  IV (Standard)   Airway: ETT; Oral   Access/Monitoring: PIV   Maintenance: Balanced     Postop Plan:   Postop Pain: Opioids  Postop Sedation/Airway: Not planned  Disposition: Outpatient     PONV Management:   Adult Risk Factors: Female, H/o PONV or Motion Sickness, Postop Opioids   Prevention: Ondansetron, Dexamethasone     CONSENT: Direct conversation   Plan and risks discussed with: Patient   Blood Products: Consent Deferred (Minimal Blood Loss)                   Christopher J. Behrens, MD

## 2020-07-30 NOTE — BRIEF OP NOTE
[facility] Brief Operative Note    Pre-operative diagnosis: FAP (familial adenomatous polyposis) [D12.6]   Post-operative diagnosis FAP   Procedure: Procedure(s):  ESOPHAGOGASTRODUODENOSCOPY (EGD) with polypectomy by hot snare; argonplasma coaglution ablation of polyps  SIGMOIDOSCOPY, FLEXIBLE with Ablation of Polyps   Surgeon: Stephen Gasca MD   Assistants(s): Corbin Sosa MD   Estimated blood loss: Minimal    Specimens: Gastric polyp   Findings: Large polyp in proximal gastric body, resected with hot snare. Innumerable fundic gland polyps. Approximately 15-20 very small duodenal polyps ablated with APC. Suspected periampullary adenomatous tissue. Very small rectal polyps ablated with APC.     Recommendations:  - Discharge.      Corbin Sosa MD on 7/30/2020 at 10:14 AM

## 2020-07-30 NOTE — ANESTHESIA CARE TRANSFER NOTE
Patient: Sarah Duran    Procedure(s):  ESOPHAGOGASTRODUODENOSCOPY (EGD) with polypectomy by hot snare; argonplasma coaglution ablation of polyps  SIGMOIDOSCOPY, FLEXIBLE with Ablation of Polyps    Diagnosis: FAP (familial adenomatous polyposis) [D12.6]  Diagnosis Additional Information: No value filed.    Anesthesia Type:   General     Note:  Airway :Nasal Cannula  Patient transferred to:PACU  Comments: To PACU, VSS, airway patent, Rn at bedside.Handoff Report: Identifed the Patient, Identified the Reponsible Provider, Reviewed the pertinent medical history, Discussed the surgical course, Reviewed Intra-OP anesthesia mangement and issues during anesthesia, Set expectations for post-procedure period and Allowed opportunity for questions and acknowledgement of understanding      Vitals: (Last set prior to Anesthesia Care Transfer)    CRNA VITALS  7/30/2020 0912 - 7/30/2020 0946      7/30/2020             Pulse:  78    SpO2:  96 %                Electronically Signed By: REYNOLD Saldana CRNA  July 30, 2020  9:46 AM

## 2020-07-30 NOTE — ANESTHESIA POSTPROCEDURE EVALUATION
Anesthesia POST Procedure Evaluation    Patient: Sarah Duran   MRN:     [id number] Gender:   female   Age:    54 year old :      1965        Preoperative Diagnosis: FAP (familial adenomatous polyposis) [D12.6]   Procedure(s):  ESOPHAGOGASTRODUODENOSCOPY (EGD) with polypectomy by hot snare; argonplasma coaglution ablation of polyps  SIGMOIDOSCOPY, FLEXIBLE with Ablation of Polyps   Postop Comments: No value filed.     Anesthesia Type: General       Disposition: Outpatient   Postop Pain Control: Uneventful            Sign Out: Well controlled pain   PONV: No   Neuro/Psych: Uneventful            Sign Out: Acceptable/Baseline neuro status   Airway/Respiratory: Uneventful            Sign Out: Acceptable/Baseline resp. status   CV/Hemodynamics: Uneventful            Sign Out: Acceptable CV status   Other NRE: NONE   DID A NON-ROUTINE EVENT OCCUR? No         Last Anesthesia Record Vitals:  CRNA VITALS  2020 0912 - 2020 1012      2020             Pulse:  78    SpO2:  96 %          Last PACU Vitals:  Vitals Value Taken Time   /77 2020 10:20 AM   Temp 36.5  C (97.7  F) 2020 10:15 AM   Pulse 60 2020 10:20 AM   Resp 16 2020 10:15 AM   SpO2 97 % 2020 10:26 AM   Temp src     NIBP     Pulse     SpO2     Resp     Temp     Ht Rate     Temp 2     Vitals shown include unvalidated device data.      Electronically Signed By: Christopher J. Behrens, MD, 2020, 10:27 AM

## 2020-07-30 NOTE — DISCHARGE INSTRUCTIONS
REMEMBER: SAME DAY SURGERY IS NOT SAME DAY RECOVERY. TAKE IT EASY, GET PLENTY OF REST, AND HAVE SOMEONE WITH YOU FOR 24 HOURS. FOLLOW THESE INSTRUCTIONS AND PLEASE DO NOT HESITATE TO CALL WITH ANY QUESTIONS.   [facility], [place]  Same-Day Surgery   Adult Discharge Orders & Instructions   For 24 hours after surgery  1. Get plenty of rest.  A responsible adult must stay with you for at least 24 hours after you leave the hospital.   2. Do not drive or use heavy equipment.  If you have weakness or tingling, don't drive or use heavy equipment until this feeling goes away.  3. Do not drink alcohol.  4. Avoid strenuous or risky activities.  Ask for help when climbing stairs.   5. You may feel lightheaded.  IF so, sit for a few minutes before standing.  Have someone help you get up.   6. If you have nausea (feel sick to your stomach): Drink only clear liquids such as apple juice, ginger ale, broth or 7-Up.  Rest may also help.  Be sure to drink enough fluids.  Move to a regular diet as you feel able.  7. You may have a slight fever. Call the doctor if your fever is over 100 F (37.7 C) (taken under the tongue) or lasts longer than 24 hours.  8. You may have a dry mouth, a sore throat, muscle aches or trouble sleeping.  These should go away after 24 hours.  9. Do not make important or legal decisions.   Call your doctor for any of the followin.  Signs of infection (fever, growing tenderness at the surgery site, a large amount of drainage or bleeding, severe pain, foul-smelling drainage, redness, swelling).    2. It has been over 8 to 10 hours since surgery and you are still not able to urinate (pass water).    3.  Headache for over 24 hours.  To contact a doctor, call Dr Gasca's office at [phone] or:      [phone] and ask for the resident on call for Gastroenterology (answered 24 hours a day)      Emergency Department: [facility]: [phone]

## 2020-07-31 ENCOUNTER — PREP FOR PROCEDURE (OUTPATIENT)
Dept: GASTROENTEROLOGY | Facility: CLINIC | Age: 55
End: 2020-07-31

## 2020-07-31 ENCOUNTER — PATIENT OUTREACH (OUTPATIENT)
Dept: GASTROENTEROLOGY | Facility: CLINIC | Age: 55
End: 2020-07-31

## 2020-07-31 DIAGNOSIS — K63.89 POLYP OF SMALL INTESTINE: Primary | ICD-10-CM

## 2020-07-31 NOTE — PROGRESS NOTES
[facility] Internal Procedure form:    Referring/Requesting provider: Campos    Procedure Requested: per Dr. Gonzalez   Procedure/Imaging/Clinic: ERCP   Physician: Carlos   Timing: next available   Procedure length: 60 min   Anesthesia: Gen   Dx: ampullary polyp   Tier: 3   [facility]       Has patient been evaluated in clinic or had a procedure [facility] provider in the last 5 years: yes    History and physical within last 30 days? Will need to be reviewed      Is patient is aware of request for procedure and ok to be contacted to schedule? Yes     OR orders placed.    Char Turpin RN   BSN, HNBC, STAR-T  [facility]  Care Coordinator  Ph: [phone]  FAX: [phone]

## 2020-08-05 ENCOUNTER — TELEPHONE (OUTPATIENT)
Dept: GASTROENTEROLOGY | Facility: CLINIC | Age: 55
End: 2020-08-05

## 2020-08-05 LAB
COPATH REPORT: NORMAL
FLEXIBLE SIGMOIDOSCOPY: NORMAL
UPPER GI ENDOSCOPY: NORMAL

## 2020-08-05 NOTE — TELEPHONE ENCOUNTER
Char - per previous staff messages, this pt is being scheduled for enteroscopy and ERCP in 1 year with Dr. Gonzalez to allow treatment of apparent small periampullary adenoma.    Please ensure that this is scheduled with enteroscopy and also with sigmoidoscopy.    And pt will need full colonoscopy prep for the sigmoidoscopy. Previous preps for flex sig have been inadequate.    FATMATA Gasca MD  Professor of Medicine  Division of Gastroenterology, Hepatology and Nutrition  [facility]

## 2020-08-13 ENCOUNTER — TELEPHONE (OUTPATIENT)
Dept: GASTROENTEROLOGY | Facility: CLINIC | Age: 55
End: 2020-08-13

## 2020-08-13 NOTE — TELEPHONE ENCOUNTER
LVM for patient in regards to scheduling procedure with Dr. Gonzalez. Left direct line for patient to call to go over scheduling details.

## 2020-12-01 ENCOUNTER — HOSPITAL ENCOUNTER (OUTPATIENT)
Dept: ULTRASOUND IMAGING | Facility: CLINIC | Age: 55
Discharge: HOME | End: 2020-12-01
Attending: CLINICAL NURSE SPECIALIST | Admitting: CLINICAL NURSE SPECIALIST
Payer: COMMERCIAL

## 2020-12-01 DIAGNOSIS — D13.91 FAP (FAMILIAL ADENOMATOUS POLYPOSIS): ICD-10-CM

## 2020-12-01 DIAGNOSIS — E04.1 THYROID NODULE: ICD-10-CM

## 2020-12-01 PROCEDURE — 76536 US EXAM OF HEAD AND NECK: CPT

## 2021-01-15 ENCOUNTER — HEALTH MAINTENANCE LETTER (OUTPATIENT)
Age: 56
End: 2021-01-15

## 2021-01-31 DIAGNOSIS — N93.9 VAGINAL BLEEDING: Primary | ICD-10-CM

## 2021-02-03 ENCOUNTER — HOSPITAL ENCOUNTER (OUTPATIENT)
Dept: MRI IMAGING | Facility: CLINIC | Age: 56
Discharge: HOME | End: 2021-02-03
Attending: CLINICAL NURSE SPECIALIST | Admitting: CLINICAL NURSE SPECIALIST
Payer: COMMERCIAL

## 2021-02-03 DIAGNOSIS — Z12.39 BREAST CANCER SCREENING, HIGH RISK PATIENT: ICD-10-CM

## 2021-02-03 DIAGNOSIS — R92.30 DENSE BREAST: ICD-10-CM

## 2021-02-03 DIAGNOSIS — N60.91 ATYPICAL DUCTAL HYPERPLASIA OF RIGHT BREAST: ICD-10-CM

## 2021-02-03 PROCEDURE — 255N000002 HC RX 255 OP 636: Performed by: CLINICAL NURSE SPECIALIST

## 2021-02-03 PROCEDURE — A9585 GADOBUTROL INJECTION: HCPCS | Performed by: CLINICAL NURSE SPECIALIST

## 2021-02-03 PROCEDURE — 77049 MRI BREAST C-+ W/CAD BI: CPT

## 2021-02-03 RX ORDER — GADOBUTROL 604.72 MG/ML
7 INJECTION INTRAVENOUS ONCE
Status: COMPLETED | OUTPATIENT
Start: 2021-02-03 | End: 2021-02-03

## 2021-02-03 RX ADMIN — GADOBUTROL 7 ML: 604.72 INJECTION INTRAVENOUS at 12:50

## 2021-02-03 NOTE — PROGRESS NOTES
Consult Notes on Referred Patient        Dr. Clarissa Brown, APRN CNS  [address] [facility]  [place] [address]       RE: Sarah Duran  : 1965  ANDREI: 2021    Dear Dr. Clarissa Brown:    I had the pleasure of seeing your patient Sarah Duran here at the [facility] at the [facility] on 2021.  As you know she is a very pleasant 55 year old woman with a recent diagnosis of post-menopausal bleeding.  Given these findings she was subsequently sent to the Gynecologic Cancer Clinic for new patient consultation.  She is unaccompanied today.    She reports she began having daily bleeding in .  She notes she had previously gone 15 months with no bleeding.  She reports the bleeding is sometimes heavy like a period but then can also be somewhat light and only like spotting.  She had an attempted EMB with her primary gynecologist, however this was unsuccessful.  She is here today for a second opinion.      21:  US Pelvis:  Uterus: Measures 8.9 x 5.8 x 6.4 cm. The endometrium is thickened at 2.9 cm. The endometrium is heterogeneous in appearance with an irregular, nodular contour of the endometrial surface. There is hypoechoic complex fluid in the endometrial cavity likely containing blood. There is vascularity of the endometrium on color imaging. The abnormal endometrium occupies the majority of the uterus. Right Ovary: Measures 2.0 x 1.0 x 1.1 cm and appears unremarkable Right Ovary Blood Flow: Present. Left Ovary: Not seen. Free Fluid: No significant free fluid.There are no suspicious adnexal masses.      Review of Systems:  Systemic           no weight changes; no fever; no chills; no night sweats; no appetite changes  Skin           no rashes, or lesions  Eye           no irritation; no changes in vision  Alise-Laryngeal           no dysphagia; no hoarseness   Pulmonary    no cough; no shortness of breath  Cardiovascular    no chest  pain; no palpitations  Gastrointestinal    no diarrhea; no constipation; no abdominal pain; no changes in bowel  habits; no blood in stool  Genitourinary   no urinary frequency; no urinary urgency; no dysuria; no pain; no abnormal vaginal discharge; no abnormal vaginal bleeding  Breast    no breast discharge; no breast changes; no breast pain  Musculoskeletal    no myalgias; no arthralgias; no back pain  Psychiatric           no depressed mood; no anxiety    Hematologic            no tender lymph nodes; no noticeable swellings or lumps   Endocrine    no hot flashes; no heat/cold intolerance         Neurological   no tremor; no numbness and tingling; no headaches; no difficulty sleeping    Obstetrics and Gynecology History:  G0  no HRT use      Past Medical History:  Past Medical History:   Diagnosis Date     Acute deep vein thrombosis (DVT) of left lower extremity (H)      Asthma      Atypical ductal hyperplasia of right breast 10/2016    fibrocystic changes, PASH and atypical ductal hyperplasia on needle biopsy, s/p R excisional biopsy with radioactive seed localization     Factor 5 Leiden mutation, heterozygous (H)     factor 5     Familial adenomatous polyposis 1988    clinical presentation; s/p total abdominal colectomy with TRACEY; confirmed APC+ 2018     GERD (gastroesophageal reflux disease)      Migraine      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    [id number]       Past Surgical History:  Past Surgical History:   Procedure Laterality Date     BIOPSY BREAST SEED LOCALIZATION Right 11/30/2016    Procedure: BIOPSY BREAST SEED LOCALIZATION;  Surgeon: Jessica Rasheed MD;  Location: [facility] OR     C LAP,SURG,COLECTOMY,W/ANAST      for colon cancer     COLECTOMY      subtotal     COMBINED ESOPHAGOSCOPY, GASTROSCOPY, DUODENOSCOPY (EGD) WITH ARGON PLASMA COAGULATOR (APC) N/A 4/12/2018    Procedure: COMBINED ESOPHAGOSCOPY, GASTROSCOPY, DUODENOSCOPY (EGD) WITH ARGON PLASMA COAGULATOR (APC);   Esophagogastroduodenoscopy with polypectomy and polyp ablation;  Surgeon: Stephen Gasca MD;  Location: [facility] OR     ENDOSCOPIC RETROGRADE CHOLANGIOPANCREATOGRAPHY       ENDOSCOPIC ULTRASOUND UPPER GASTROINTESTINAL TRACT (GI) N/A 7/25/2019    Procedure: Endoscopic Ultrasound;  Surgeon: Stephen Gasca MD;  Location: [facility] OR     ESOPHAGOSCOPY, GASTROSCOPY, DUODENOSCOPY (EGD), COMBINED  11/5/2012    Procedure: COMBINED ESOPHAGOSCOPY, GASTROSCOPY, DUODENOSCOPY (EGD), BIOPSY SINGLE OR MULTIPLE;;  Surgeon: Angélica Garcia MD;  Location: [facility] GI     ESOPHAGOSCOPY, GASTROSCOPY, DUODENOSCOPY (EGD), COMBINED  12/3/2013    Procedure: COMBINED ESOPHAGOSCOPY, GASTROSCOPY, DUODENOSCOPY (EGD);;  Surgeon: Angélica Garcia MD;  Location: [facility]     ESOPHAGOSCOPY, GASTROSCOPY, DUODENOSCOPY (EGD), COMBINED N/A 3/28/2018    Procedure: COMBINED ESOPHAGOSCOPY, GASTROSCOPY, DUODENOSCOPY (EGD);;  Surgeon: Stephen Gasca MD;  Location: [facility]     ESOPHAGOSCOPY, GASTROSCOPY, DUODENOSCOPY (EGD), COMBINED N/A 5/30/2019    Procedure: Esophagogastroduodenoscopy with biopsy x2 APC;  Surgeon: Stephen Gasca MD;  Location: [facility]     ESOPHAGOSCOPY, GASTROSCOPY, DUODENOSCOPY (EGD), COMBINED N/A 7/30/2020    Procedure: ESOPHAGOGASTRODUODENOSCOPY (EGD) with polypectomy by hot snare; argonplasma coaglution ablation of polyps;  Surgeon: Stephen Gasca MD;  Location: [facility]     ESOPHAGOSCOPY, GASTROSCOPY, DUODENOSCOPY (EGD), RESECT MUCOSA, COMBINED N/A 7/25/2019    Procedure: Esophagogastroduodenoscopy with gastric mucosal resection and clip application;  Surgeon: Stephen Gasca MD;  Location: [facility]     EXCISE POLYP RECTUM       LAPAROSCOPIC LYSIS ADHESIONS       SIGMOIDOSCOPY FLEXIBLE       SIGMOIDOSCOPY FLEXIBLE N/A 12/7/2015    Procedure: SIGMOIDOSCOPY FLEXIBLE;  Surgeon: Mariela Bearden MD;  Location: [facility]     SIGMOIDOSCOPY FLEXIBLE N/A 3/28/2018    Procedure: SIGMOIDOSCOPY FLEXIBLE;  EGD/Flex Sig;  Surgeon:  Stephen Gasca MD;  Location: [facility]     SIGMOIDOSCOPY FLEXIBLE N/A 4/12/2018    Procedure: SIGMOIDOSCOPY FLEXIBLE;  Flexible Sigmoidoscopy with polyp ablation;  Surgeon: Stephen Gasca MD;  Location: [facility]     SIGMOIDOSCOPY FLEXIBLE N/A 5/30/2019    Procedure: Flexible Sigmoidoscopy with polypectomy;  Surgeon: Stephen Gasca MD;  Location: [facility]     SIGMOIDOSCOPY FLEXIBLE N/A 7/30/2020    Procedure: SIGMOIDOSCOPY, FLEXIBLE with Ablation of Polyps;  Surgeon: Stephen Gasca MD;  Location: [facility]       Health Maintenance:  Last Pap Smear: 1/25/21            Result: Negative, HPV negative  She has not had a history of abnormal Pap smears.    Last Mammogram: 12/20/19              Result: normal    Last MRI 2/3/21  Result:  Normal     She has a history of abnormal ductal hyperplasia    Last Colonoscopy: 7/30/20              Result: normal-repeat yearly due to FAP      Current Medications:   has a current medication list which includes the following prescription(s): albuterol, calcium-magnesium, celecoxib, docusate sodium, fexofenadine, melatonin, multivitamin w/minerals, omega-3 fatty acids, ondansetron, triamcinolone, and zolmitriptan, and the following Facility-Administered Medications: ondansetron.     Allergies:   Corn-related products, Cephalexin, Cipro [ciprofloxacin], Flagyl [metronidazole], Garlic, Keflex [cephalosporins], Omeprazole, Prilosec otc [omeprazole magnesium], Doxycycline, Erythromycin, and Sulfa drugs      Social History:  Social History     Socioeconomic History     Marital status:      Spouse name: Not on file     Number of children: 2     Years of education: Not on file     Highest education level: Not on file   Occupational History     Occupation:      Employer: [employer]   Social Needs     Financial resource strain: Not on file     Food insecurity     Worry: Not on file     Inability: Not on file     Transportation needs     Medical: Not on file  "    Non-medical: Not on file   Tobacco Use     Smoking status: Never Smoker     Smokeless tobacco: Never Used   Substance and Sexual Activity     Alcohol use: Yes     Comment: 1 or 2 drinks per year     Drug use: No     Sexual activity: Not on file     Comment: denies pregnancy   Lifestyle     Physical activity     Days per week: Not on file     Minutes per session: Not on file     Stress: Not on file   Relationships     Social connections     Talks on phone: Not on file     Gets together: Not on file     Attends Yazidi service: Not on file     Active member of club or organization: Not on file     Attends meetings of clubs or organizations: Not on file     Relationship status: Not on file     Intimate partner violence     Fear of current or ex partner: Not on file     Emotionally abused: Not on file     Physically abused: Not on file     Forced sexual activity: Not on file   Other Topics Concern     Parent/sibling w/ CABG, MI or angioplasty before 65F 55M? Not Asked   Social History Narrative    Two adopted daughters, ages 13 and 15       Lives with spouse and children, feels safe at home.  Works as an .  Enjoys working out, spending time with children, shopping.  Does not have an advanced directive on file and would like her  to be her POA.  Full code.    Family History:   The patient's family history is significant for.  Family History   Problem Relation Age of Onset     Hyperlipidemia Mother      Cerebrovascular Disease Mother      Cerebrovascular Disease Father      Colon Cancer Father 57         at 63     Prostate Cancer Brother 53     Prostate Cancer Maternal Uncle 65         Physical Exam:   /77 (BP Location: Left arm, Patient Position: Sitting, Cuff Size: Adult Large)   Pulse 76   Temp 97.4  F (36.3  C) (Oral)   Resp 18   Ht 1.58 m (5' 2.21\")   Wt 74.7 kg (164 lb 9.6 oz)   SpO2 98%   BMI 29.91 kg/m    Body mass index is 29.91 kg/m .    General Appearance: healthy and " alert, no distress     HEENT:  no thyromegaly, no palpable nodules or masses        Cardiovascular: regular rate and rhythm, no gallops, rubs or murmurs     Respiratory: lungs clear, no rales, rhonchi or wheezes, normal diaphragmatic excursion    Musculoskeletal: extremities non tender and without edema    Skin: no lesions or rashes     Neurological: normal gait, no gross defects     Psychiatric: appropriate mood and affect                               Hematological: normal cervical, supraclavicular and inguinal lymph nodes     Gastrointestinal:       abdomen soft, non-tender, non-distended, no organomegaly or masses    Genitourinary: External genitalia and urethral meatus appears normal.  Vagina is smooth without nodularity or masses.  Cervix appears normal and without lesions.  Bimanual exam reveal no masses, nodularity or fullness, however there does feel to be a possible cervical fibroid obstructing the os.  Recto-vaginal exam confirms these findings.    Procedure Note:  Consent was obtained.  Speculum was placed and there was a moderate amount of blood in the vault.  Cervix was grasped with a single toothed tenaculum.  An attempt to pass the Pipelle was unsuccessful.  I attempted to pass the stenosis with a small incision with the scalpel at the external os as well as using a sound.  Neither of these attempts were successful and I was still unable to pass the Pipelle.  Patient tolerated the attempted procedure well      Assessment:    Sarah Duran is a 55 year old woman with a new diagnosis of post-menopausal bleeding.     A total of 60 minutes was spent on patient care.      Plan:     1.)    Post-menopausal bleeding.  Discussed possible etiologies including benign, hyperplasia and malignancy.  She has now had two unsuccessful attempts at EMB and I recommend hysteroscopy, D&C.  Risks, benefits, indications and alternatives were discussed.  COVID testing and precautions discussed.  She will have hysteroscopy,  D&C on 2/10.       2.) Genetic risk factors were assessed and the patient is POSITIVE for a APC mutation.  Specifically her mutation is called [id number],  consistent with a diagnosis of familial adenomatous polyposis (FAP) or attenuated FAP (AFAP)    3.) Labs and/or tests ordered include:  None.     4.) Health maintenance issues addressed today include pt is up to date.    5.) Pre-op teaching was completed today.        Thank you for allowing us to participate in the care of your patient.         Sincerely,    Sandie Euceda MD  Gynecologic Oncology  [facility]       MYCHAL PINZON

## 2021-02-04 ENCOUNTER — TELEPHONE (OUTPATIENT)
Dept: ONCOLOGY | Facility: CLINIC | Age: 56
End: 2021-02-04

## 2021-02-04 NOTE — TELEPHONE ENCOUNTER
Called Sarah to remind her of her upcoming appointment on 2/5 with Dr. Euceda.  Left brief VM with clinic phone number for any question or directions.    ELAENOR ChambersN, RN, OCN  Oncology Care Coordinator  [facility]

## 2021-02-05 ENCOUNTER — PRE VISIT (OUTPATIENT)
Dept: ONCOLOGY | Facility: CLINIC | Age: 56
End: 2021-02-05

## 2021-02-05 ENCOUNTER — TELEPHONE (OUTPATIENT)
Dept: ONCOLOGY | Facility: CLINIC | Age: 56
End: 2021-02-05

## 2021-02-05 ENCOUNTER — ONCOLOGY VISIT (OUTPATIENT)
Dept: ONCOLOGY | Facility: CLINIC | Age: 56
End: 2021-02-05
Attending: CLINICAL NURSE SPECIALIST
Payer: COMMERCIAL

## 2021-02-05 VITALS
TEMPERATURE: 97.4 F | OXYGEN SATURATION: 98 % | RESPIRATION RATE: 18 BRPM | SYSTOLIC BLOOD PRESSURE: 121 MMHG | BODY MASS INDEX: 30.29 KG/M2 | WEIGHT: 164.6 LBS | DIASTOLIC BLOOD PRESSURE: 77 MMHG | HEIGHT: 62 IN | HEART RATE: 76 BPM

## 2021-02-05 DIAGNOSIS — N93.9 VAGINAL BLEEDING: ICD-10-CM

## 2021-02-05 DIAGNOSIS — Z11.59 ENCOUNTER FOR SCREENING FOR OTHER VIRAL DISEASES: ICD-10-CM

## 2021-02-05 DIAGNOSIS — N95.0 POST-MENOPAUSAL BLEEDING: Primary | ICD-10-CM

## 2021-02-05 PROCEDURE — 58100 BIOPSY OF UTERUS LINING: CPT | Mod: 52 | Performed by: OBSTETRICS & GYNECOLOGY

## 2021-02-05 PROCEDURE — G0463 HOSPITAL OUTPT CLINIC VISIT: HCPCS

## 2021-02-05 PROCEDURE — 99204 OFFICE O/P NEW MOD 45 MIN: CPT | Mod: 25 | Performed by: OBSTETRICS & GYNECOLOGY

## 2021-02-05 ASSESSMENT — MIFFLIN-ST. JEOR: SCORE: 1298.12

## 2021-02-05 ASSESSMENT — PAIN SCALES - GENERAL: PAINLEVEL: NO PAIN (0)

## 2021-02-05 NOTE — LETTER
2021         RE: Sarah Duran  [address] [address]        Dear Colleague,    Thank you for referring your patient, Sarah Duran, to the [facility]. Please see a copy of my visit note below.    Consult Notes on Referred Patient        Dr. Clarissa Brown, APRN CNS  [address] [place] [address]       RE: Sarah Duran  : 1965  ANDREI: 2021    Dear Dr. Clarissa Brown:    I had the pleasure of seeing your patient Sarah Duran here at the Gynecologic Cancer Clinic at the [facility] on 2021.  As you know she is a very pleasant 55 year old woman with a recent diagnosis of post-menopausal bleeding.  Given these findings she was subsequently sent to the Gynecologic Cancer Clinic for new patient consultation.  She is unaccompanied today.    She reports she began having daily bleeding in .  She notes she had previously gone 15 months with no bleeding.  She reports the bleeding is sometimes heavy like a period but then can also be somewhat light and only like spotting.  She had an attempted EMB with her primary gynecologist, however this was unsuccessful.  She is here today for a second opinion.      21:  US Pelvis:  Uterus: Measures 8.9 x 5.8 x 6.4 cm. The endometrium is thickened at 2.9 cm. The endometrium is heterogeneous in appearance with an irregular, nodular contour of the endometrial surface. There is hypoechoic complex fluid in the endometrial cavity likely containing blood. There is vascularity of the endometrium on color imaging. The abnormal endometrium occupies the majority of the uterus. Right Ovary: Measures 2.0 x 1.0 x 1.1 cm and appears unremarkable Right Ovary Blood Flow: Present. Left Ovary: Not seen. Free Fluid: No significant free fluid.There are no suspicious adnexal masses.      Review of Systems:  Systemic           no weight changes; no fever; no chills; no night  sweats; no appetite changes  Skin           no rashes, or lesions  Eye           no irritation; no changes in vision  Alise-Laryngeal           no dysphagia; no hoarseness   Pulmonary    no cough; no shortness of breath  Cardiovascular    no chest pain; no palpitations  Gastrointestinal    no diarrhea; no constipation; no abdominal pain; no changes in bowel  habits; no blood in stool  Genitourinary   no urinary frequency; no urinary urgency; no dysuria; no pain; no abnormal vaginal discharge; no abnormal vaginal bleeding  Breast    no breast discharge; no breast changes; no breast pain  Musculoskeletal    no myalgias; no arthralgias; no back pain  Psychiatric           no depressed mood; no anxiety    Hematologic            no tender lymph nodes; no noticeable swellings or lumps   Endocrine    no hot flashes; no heat/cold intolerance         Neurological   no tremor; no numbness and tingling; no headaches; no difficulty sleeping    Obstetrics and Gynecology History:  G0  no HRT use      Past Medical History:  Past Medical History:   Diagnosis Date     Acute deep vein thrombosis (DVT) of left lower extremity (H)      Asthma      Atypical ductal hyperplasia of right breast 10/2016    fibrocystic changes, PASH and atypical ductal hyperplasia on needle biopsy, s/p R excisional biopsy with radioactive seed localization     Factor 5 Leiden mutation, heterozygous (H)     factor 5     Familial adenomatous polyposis 1988    clinical presentation; s/p total abdominal colectomy with TRACEY; confirmed APC+ 2018     GERD (gastroesophageal reflux disease)      Migraine      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    [id number]       Past Surgical History:  Past Surgical History:   Procedure Laterality Date     BIOPSY BREAST SEED LOCALIZATION Right 11/30/2016    Procedure: BIOPSY BREAST SEED LOCALIZATION;  Surgeon: Jessica Rasheed MD;  Location: [facility] OR     C LAP,SURG,COLECTOMY,W/ANAST      for colon cancer      COLECTOMY      subtotal     COMBINED ESOPHAGOSCOPY, GASTROSCOPY, DUODENOSCOPY (EGD) WITH ARGON PLASMA COAGULATOR (APC) N/A 4/12/2018    Procedure: COMBINED ESOPHAGOSCOPY, GASTROSCOPY, DUODENOSCOPY (EGD) WITH ARGON PLASMA COAGULATOR (APC);  Esophagogastroduodenoscopy with polypectomy and polyp ablation;  Surgeon: Stephen Gasca MD;  [facility]     ENDOSCOPIC RETROGRADE CHOLANGIOPANCREATOGRAPHY       ENDOSCOPIC ULTRASOUND UPPER GASTROINTESTINAL TRACT (GI) N/A 7/25/2019    Procedure: Endoscopic Ultrasound;  Surgeon: Stephen Gasca MD;  [facility]     ESOPHAGOSCOPY, GASTROSCOPY, DUODENOSCOPY (EGD), COMBINED  11/5/2012    Procedure: COMBINED ESOPHAGOSCOPY, GASTROSCOPY, DUODENOSCOPY (EGD), BIOPSY SINGLE OR MULTIPLE;;  Surgeon: Angélica Garcia MD;  [facility] GI     ESOPHAGOSCOPY, GASTROSCOPY, DUODENOSCOPY (EGD), COMBINED  12/3/2013    Procedure: COMBINED ESOPHAGOSCOPY, GASTROSCOPY, DUODENOSCOPY (EGD);;  Surgeon: Angélica Garcia MD;  [facility]     ESOPHAGOSCOPY, GASTROSCOPY, DUODENOSCOPY (EGD), COMBINED N/A 3/28/2018    Procedure: COMBINED ESOPHAGOSCOPY, GASTROSCOPY, DUODENOSCOPY (EGD);;  Surgeon: Stephen Gasca MD;  [facility]     ESOPHAGOSCOPY, GASTROSCOPY, DUODENOSCOPY (EGD), COMBINED N/A 5/30/2019    Procedure: Esophagogastroduodenoscopy with biopsy x2 APC;  Surgeon: Stephen Gasca MD;  [facility]     ESOPHAGOSCOPY, GASTROSCOPY, DUODENOSCOPY (EGD), COMBINED N/A 7/30/2020    Procedure: ESOPHAGOGASTRODUODENOSCOPY (EGD) with polypectomy by hot snare; argonplasma coaglution ablation of polyps;  Surgeon: Stephen Gasca MD;  [facility]     ESOPHAGOSCOPY, GASTROSCOPY, DUODENOSCOPY (EGD), RESECT MUCOSA, COMBINED N/A 7/25/2019    Procedure: Esophagogastroduodenoscopy with gastric mucosal resection and clip application;  Surgeon: Stephen Gasca MD;  [facility]     EXCISE POLYP RECTUM       LAPAROSCOPIC LYSIS ADHESIONS       SIGMOIDOSCOPY  FLEXIBLE       SIGMOIDOSCOPY FLEXIBLE N/A 12/7/2015    Procedure: SIGMOIDOSCOPY FLEXIBLE;  Surgeon: Mariela Bearden MD;  [facility]     SIGMOIDOSCOPY FLEXIBLE N/A 3/28/2018    Procedure: SIGMOIDOSCOPY FLEXIBLE;  EGD/Flex Sig;  Surgeon: Stephen Gasca MD;  [facility]     SIGMOIDOSCOPY FLEXIBLE N/A 4/12/2018    Procedure: SIGMOIDOSCOPY FLEXIBLE;  Flexible Sigmoidoscopy with polyp ablation;  Surgeon: Stephen Gasca MD;  [facility]     SIGMOIDOSCOPY FLEXIBLE N/A 5/30/2019    Procedure: Flexible Sigmoidoscopy with polypectomy;  Surgeon: Stephen Gasca MD;  [facility]     SIGMOIDOSCOPY FLEXIBLE N/A 7/30/2020    Procedure: SIGMOIDOSCOPY, FLEXIBLE with Ablation of Polyps;  Surgeon: Stephen Gasca MD;  Location:  OR       Health Maintenance:  Last Pap Smear: 1/25/21            Result: Negative, HPV negative  She has not had a history of abnormal Pap smears.    Last Mammogram: 12/20/19              Result: normal    Last MRI 2/3/21  Result:  Normal     She has a history of abnormal ductal hyperplasia    Last Colonoscopy: 7/30/20              Result: normal-repeat yearly due to FAP      Current Medications:   has a current medication list which includes the following prescription(s): albuterol, calcium-magnesium, celecoxib, docusate sodium, fexofenadine, melatonin, multivitamin w/minerals, omega-3 fatty acids, ondansetron, triamcinolone, and zolmitriptan, and the following Facility-Administered Medications: ondansetron.     Allergies:   Corn-related products, Cephalexin, Cipro [ciprofloxacin], Flagyl [metronidazole], Garlic, Keflex [cephalosporins], Omeprazole, Prilosec otc [omeprazole magnesium], Doxycycline, Erythromycin, and Sulfa drugs      Social History:  Social History     Socioeconomic History     Marital status:      Spouse name: Not on file     Number of children: 2     Years of education: Not on file     Highest education level: Not on file   Occupational  History     Occupation:      Employer: [employer]   Social Needs     Financial resource strain: Not on file     Food insecurity     Worry: Not on file     Inability: Not on file     Transportation needs     Medical: Not on file     Non-medical: Not on file   Tobacco Use     Smoking status: Never Smoker     Smokeless tobacco: Never Used   Substance and Sexual Activity     Alcohol use: Yes     Comment: 1 or 2 drinks per year     Drug use: No     Sexual activity: Not on file     Comment: denies pregnancy   Lifestyle     Physical activity     Days per week: Not on file     Minutes per session: Not on file     Stress: Not on file   Relationships     Social connections     Talks on phone: Not on file     Gets together: Not on file     Attends Taoist service: Not on file     Active member of club or organization: Not on file     Attends meetings of clubs or organizations: Not on file     Relationship status: Not on file     Intimate partner violence     Fear of current or ex partner: Not on file     Emotionally abused: Not on file     Physically abused: Not on file     Forced sexual activity: Not on file   Other Topics Concern     Parent/sibling w/ CABG, MI or angioplasty before 65F 55M? Not Asked   Social History Narrative    Two adopted daughters, ages 13 and 15       Lives with spouse and children, feels safe at home.  Works as an .  Enjoys working out, spending time with children, shopping.  Does not have an advanced directive on file and would like her  to be her POA.  Full code.    Family History:   The patient's family history is significant for.  Family History   Problem Relation Age of Onset     Hyperlipidemia Mother      Cerebrovascular Disease Mother      Cerebrovascular Disease Father      Colon Cancer Father 57         at 63     Prostate Cancer Brother 53     Prostate Cancer Maternal Uncle 65         Physical Exam:   /77 (BP Location: Left arm, Patient Position:  "Sitting, Cuff Size: Adult Large)   Pulse 76   Temp 97.4  F (36.3  C) (Oral)   Resp 18   Ht 1.58 m (5' 2.21\")   Wt 74.7 kg (164 lb 9.6 oz)   SpO2 98%   BMI 29.91 kg/m    Body mass index is 29.91 kg/m .    General Appearance: healthy and alert, no distress     HEENT:  no thyromegaly, no palpable nodules or masses        Cardiovascular: regular rate and rhythm, no gallops, rubs or murmurs     Respiratory: lungs clear, no rales, rhonchi or wheezes, normal diaphragmatic excursion    Musculoskeletal: extremities non tender and without edema    Skin: no lesions or rashes     Neurological: normal gait, no gross defects     Psychiatric: appropriate mood and affect                               Hematological: normal cervical, supraclavicular and inguinal lymph nodes     Gastrointestinal:       abdomen soft, non-tender, non-distended, no organomegaly or masses    Genitourinary: External genitalia and urethral meatus appears normal.  Vagina is smooth without nodularity or masses.  Cervix appears normal and without lesions.  Bimanual exam reveal no masses, nodularity or fullness, however there does feel to be a possible cervical fibroid obstructing the os.  Recto-vaginal exam confirms these findings.    Procedure Note:  Consent was obtained.  Speculum was placed and there was a moderate amount of blood in the vault.  Cervix was grasped with a single toothed tenaculum.  An attempt to pass the Pipelle was unsuccessful.  I attempted to pass the stenosis with a small incision with the scalpel at the external os as well as using a sound.  Neither of these attempts were successful and I was still unable to pass the Pipelle.  Patient tolerated the attempted procedure well      Assessment:    Sarah Duran is a 55 year old woman with a new diagnosis of post-menopausal bleeding.     A total of 60 minutes was spent on patient care.      Plan:     1.)    Post-menopausal bleeding.  Discussed possible etiologies including benign, " "hyperplasia and malignancy.  She has now had two unsuccessful attempts at EMB and I recommend hysteroscopy, D&C.  Risks, benefits, indications and alternatives were discussed.  COVID testing and precautions discussed.  She will have hysteroscopy, D&C on 2/10.       2.) Genetic risk factors were assessed and the patient is POSITIVE for a APC mutation.  Specifically her mutation is called [id number],  consistent with a diagnosis of familial adenomatous polyposis (FAP) or attenuated FAP (AFAP)    3.) Labs and/or tests ordered include:  None.     4.) Health maintenance issues addressed today include pt is up to date.    5.) Pre-op teaching was completed today.        Thank you for allowing us to participate in the care of your patient.         Sincerely,    Sandie Euceda MD  Gynecologic Oncology  [facility]       MYCHAL PINZON       Oncology Rooming Note    February 5, 2021 11:04 AM   Sarah Duran is a 55 year old female who presents for:    Chief Complaint   Patient presents with     Video Visit     Initial Vitals: /77 (BP Location: Left arm, Patient Position: Sitting, Cuff Size: Adult Large)   Pulse 76   Temp 97.4  F (36.3  C) (Oral)   Resp 18   Ht 1.58 m (5' 2.21\")   Wt 74.7 kg (164 lb 9.6 oz)   SpO2 98%   BMI 29.91 kg/m   Estimated body mass index is 29.91 kg/m  as calculated from the following:    Height as of this encounter: 1.58 m (5' 2.21\").    Weight as of this encounter: 74.7 kg (164 lb 9.6 oz). Body surface area is 1.81 meters squared.  No Pain (0) Comment: Data Unavailable   No LMP recorded. Patient is perimenopausal.  Allergies reviewed: Yes  Medications reviewed: Yes    Medications: Medication refills not needed today.  Pharmacy name entered into [facility]:    [facility] - [place], [place] - [address]  [facility] - USE FOR MAILING ONLY - [place]  [facility]  FOR [facility] - [place], [place] - [address] [facility]  JOINT hospitals - [place], [place] [address] [facility]/SPECIALTY [facility]Y - [place], [place] - [address]  [place], [place] - [address]  [facility] [place], [place] - [address]    Clinical concerns: fredy Delgado CMA                  Again, thank you for allowing me to participate in the care of your patient.        Sincerely,        Teja Euceda MD

## 2021-02-05 NOTE — NURSING NOTE
GYN ONC Pre-Op Education - Nursing     Relevant Diagnosis: post-menopausal bleeding    Teaching Topic: hysteroscopy, dilation and curettage    Teaching Concerns addressed: Yes    Patient demonstrate understanding of the following:     Reason for the appointment, diagnosis and treatment plan:   Yes     Knowledge of proper use of medications and conditions for which they are ordered (with special attention to potential side effects or drug interactions): Yes     Which situations necessitate calling provider and whom to contact: Yes       Nutritional needs and diet plan:  Yes        Pain management techniques:  Yes    Diet:  Yes     Infection Prevention:  Patient demonstrate understanding of the following:     Pre-Op CHG Bathing Instructions: Yes    Surgical procedure site care taught:   Yes     Signs and symptoms of infection taught: Yes     Instructional Materials Used/Given:    Your Surgery Day ([facility])    Preparing for Your Surgery ([facility])    Showering before Surgery ([facility] [employer] handout)  o Provided patient with 2 bottles of CHG product.    Dilation and Curettage (D&C) Care at Home    Eating After Surgery Gynecologic Oncology    Tips to Increase the Protein in Your Diet     Map    Phone number for [facility] [place]     (Consents to be signed in pre-op.)      Met with patient for pre-operative teaching.  [facility]     Reviewed NPO restrictions prior to surgery--including no food or milk products 8 hours prior to surgery; only clear liquids up to 2 hours prior to surgery i.e., water, black coffee, tea, apple juice).    Reviewed medications that must be held for at least 7 days prior to surgery (Aspirin, Ibuprofen, Naproxen, Fish oil/Flax seed oil, Multivitamin/Vit. E, or any other product containing aspirin, or NSAIDs).  o Patient does not take medications in the AM.  She will stop  her Celebrex 7 days before surgery.    It is scheduled as a Same Day surgery, so she was informed that she will need someone to drive her home after surgery and someone to stay with her for at least 24 hours after she arrives home.     Patient will need to see Dr. Euceda, 1-2 weeks after her surgery.  Post-op visit to be scheduled.    Reviewed signs and symptoms that would warrant contacting provider immediately.  o Reviewed after-hours/holiday/weekends contact: Patient should call clinic number and ask the answering service to connect them with the GYN Oncology Physician on-call.    Patient had the opportunity to ask questions and all questions were answered to her satisfaction.  Patient verbalized understanding and agreement with plan.  Instructed to call the clinic with any questions, concerns, or worsening symptoms.     ELEANOR ChambersN, RN, OCN  Oncology Care Coordinator  [facility]

## 2021-02-05 NOTE — PROGRESS NOTES
"Oncology Rooming Note    February 5, 2021 11:04 AM   Sarah Duran is a 55 year old female who presents for:    Chief Complaint   Patient presents with     Video Visit     Initial Vitals: /77 (BP Location: Left arm, Patient Position: Sitting, Cuff Size: Adult Large)   Pulse 76   Temp 97.4  F (36.3  C) (Oral)   Resp 18   Ht 1.58 m (5' 2.21\")   Wt 74.7 kg (164 lb 9.6 oz)   SpO2 98%   BMI 29.91 kg/m   Estimated body mass index is 29.91 kg/m  as calculated from the following:    Height as of this encounter: 1.58 m (5' 2.21\").    Weight as of this encounter: 74.7 kg (164 lb 9.6 oz). Body surface area is 1.81 meters squared.  No Pain (0) Comment: Data Unavailable   No LMP recorded. Patient is perimenopausal.  Allergies reviewed: Yes  Medications reviewed: Yes    Medications: Medication refills not needed today.  Pharmacy name entered into [facility]:    [facility] - [place], [place] - [address], [address]  [facility] - USE FOR MAILING ONLY - [place], [place]  [facility]  FOR [facility] [place] - [address] [facility]  [facility] - [place], [place] - [address] [facility]CIAL[facility] - [place], [place] - [address]  [place] [address]  [facility] [place], [place] - [address]    Clinical concerns: no     Ema Delgado CMA              "

## 2021-02-05 NOTE — PATIENT INSTRUCTIONS
You have been scheduled for surgery on: 2/10    Diagnosis:  Post-menopausal bleeding    The surgical procedure is: Hysteroscopy, dilation and curettage    Anticipated length of surgery: 30 minutes.  You will be in the recovery room for approximately 1-2 hrs after surgery.  Your family will not be able to see you until after you leave the recovery room.    Length of hospital stay:  This is an outpatient surgery.  ______________________________________________________________________    Preparation for Surgery:    To Schedule   1. Post-operative virtual visit with me 1-2 weeks following surgery      Postoperative Restrictions:  Nothing in the vagina (no tampons, no intercourse, no douching) for 2 weeks.     Postoperative visit:  Virtual visit 1-2 weeks after surgery for post operative visit.      Please contact the clinic with any questions or concerns.    Sandie Euceda MD  Gynecologic Oncology  [facility]

## 2021-02-05 NOTE — TELEPHONE ENCOUNTER
Surgery is scheduled with Dr. Euceda on 2/10 at the [facility] ([facility] .  Scheduled per orders.    H&P: to be completed by Surgeon    Additional appointments:   NO ADDITIONAL APPOINTMENTS NEEDED AT THIS TIME    COVID-19 test: 2/7 at [facility]    Post-op: will be scheduled by the clinic.     The RN completed the education regarding the surgery.     Patient will receive a phone call from pre-admission nurses 1-2 days prior to surgery with arrival and start time.    The surgery packet was sent via [employer].    Patient will complete COVID-19 test that was scheduled by surgical coordinator 2-4 days prior to surgery.     I called the patient and was able to confirm the scheduled information above.

## 2021-02-07 DIAGNOSIS — Z11.59 ENCOUNTER FOR SCREENING FOR OTHER VIRAL DISEASES: ICD-10-CM

## 2021-02-07 LAB
SARS-COV-2 RNA RESP QL NAA+PROBE: NORMAL
SPECIMEN SOURCE: NORMAL

## 2021-02-07 PROCEDURE — 87635 SARS-COV-2 COVID-19 AMP PRB: CPT | Performed by: OBSTETRICS & GYNECOLOGY

## 2021-02-07 PROCEDURE — 99207 PR NO CHARGE LOS: CPT

## 2021-02-08 LAB
LABORATORY COMMENT REPORT: NORMAL
SARS-COV-2 RNA RESP QL NAA+PROBE: NEGATIVE
SPECIMEN SOURCE: NORMAL

## 2021-02-09 ENCOUNTER — ANESTHESIA EVENT (OUTPATIENT)
Dept: SURGERY | Facility: AMBULATORY SURGERY CENTER | Age: 56
End: 2021-02-09

## 2021-02-09 NOTE — PROGRESS NOTES
Oncology Risk Management Consultation:  Date on this visit: 2021    Sarah Duran returns to the Cancer Risk Management Program today for coordination of care for her screening and surveillance plan.     She requires high risk screening and surveillance to reduce her risk of cancer secondary to  a history of atypical ductal hyperplasia (ADH).  She is considered to be at high risk for breast cancer, with a risk of up to 35%.     She also carries an APC+ genetic mutation, consistent with a diagnosis of Familial Adenomatous Polyposis (FAP) or attenuated FAP (AFAP) with a history of a thyroid nodule and gastric polyps.     Primary Physician: Ida Sandoval MD     History Of Present Illness:  Ms. Duran is a very pleasant  55 year old female who presents with a personal history of ADH and a likely attenuated version of Familial Adenomatous Polyposis. She is s/p right excisional breast biopsy on 2016. She is also s/p ileocolonic anastomosis in .     Genetic Testin2018: POSITIVE for a APC mutation.  Specifically her mutation is called [id number],  consistent with a diagnosis of familial adenomatous polyposis (FAP) or attenuated FAP (AFAP). The mutation was identified using Elect[employer] Next testing through [facility].  The testing was done because of her personal history of colonic polyposis and family history of colon cancer.     Of note, Sarah tested negative for mutations in the following genes: BMPR1A, CDH1, CHEK2, MLH1, MSH2, MSH6, MUTYH, PMS2, POLD1, POLE, PTEN, SMAD4, STK11 and TP53 (sequencing and deletion/duplication); EPCAM and GREM1 (deletion/duplication only).        Pertinent history:  Attenuated Familial adenomatous polyposis, s/p ileal rectal anastomosis in May 1988. Confirmed with APC testing (see below)  Subsequent laparotomy in  for bowel obstruction from adhesions.  Taking rabeprazole 20 mg EC BID for gastric polyps.  Menarche at 12.  Nulliparous.  Menopause at  50.  Breast density: scattered fibroglandular densities  Ovaries, fallopian tubes and uterus in place.    Thyroid Screenin2019 - FNA biopsy of thyroid, for solid appearing nodule in isthmus, biopsy non diagnostic; cyst fluid only.   2020- US of the thyroid: Previously biopsied isthmus nodule is no longer present. There are numerous additional colloid cysts and a benign complex cyst   left lobe, not appreciably changed from previous.      GI screening history (see chart for further details)  2005 - Small bowel enteroscopy, one tubular adenoma removed from partial anal verge.  2005 - adenomatous polyp removed from rectum  2005 - tubular adenoma, duodenum.  2011 - fundic gland polyps in stomach, tubular adenoma removed from rectum.  2012 - 1 tubulovillous adenoma, rectum  2013 - 1 tubulovillous adenoma, rectum  12/3/2013 - 1 tubulovillous adenoma, rectum  2014 - 1 tubular adenoma and hyperplastic polyp, rectum  2015 - 1 tubular adenoma and hyperplastic polyp, rectum  2015 - 1 tubular adenoma, rectum  2016 - 1 fundic gland polyp with focal adenomatous changes  3/20/2017 - 2 tubular adenomas removed from rectum  2017 - colonoscopy and EGD, one fundic gland gastric polyp with focal low grade dysplasia removed, multiple duodenal polyps and multiple rectal polyps.  2019 - EGD: Two previously reported polyps (6.2 mm and 5.3mm) in the antrum were evaluate by EUS, which did not demonstrate deep layer involvement, and then they were  removed by EMR by using a band ligator and a snare. The defect was then closed with hemoclips. Innumerous fundic gland polyps in the gastric body,  fundus, and cardia. Consistent with diagnosis of FAP. A large fundic gland polyp in the gastric body, which has been previously resected and recent biopsied. Recommendation: Use Aciphex (rabeprazole) 20 mg PO BID for 1 month.  Repeat the upper endoscopy in 1 year for surveillance.      7/30/2020- EGD and flexible sigmoidoscopy:  15-20 duodenal polyps, measuring 1-3 mm, all of which were ablated using argon plasma coagulation.    - Innumerable fundic gland polyps in a confluent carpet-like distribution throughout the cardia, fundus, and gastric body. Antral-sparing present. A large 2-3   semi-pedunculated polyp in the proximal gastric body  (along the lesser curvature) was resected.  - Suspected very small adenomas flanking the pancreaticobiliary orifices at the prior ampullectomy site.  - Normal esophagus. Pathology: Fundic gland polyp with adenomatous change (low grade dysplasia). Negative for intestinal metaplasia, high-grade dysplasia or malignancy. Recommendation: Schedule for flexible sigmoidoscopy (along with EGD and ERCP) with Dr. Fernández in 1-year.     Breast Screening history:   11/28/2016 - R excisional breast biopsy with seed localization, Fibrocystic tissue with ADH on pathology.  4/24/2017 - R breast mammogram, BiRads2.  5/25/2018 - Breast MRI, BiRads2.  11/8/2018 - Screening tomosynthesis mammogram, BiRads1  6/10/2019 - Breast MRI, BiRads2.  12/20/2019- Screening tomosynthesis mammogram, BiRads1  2/3/2021- Breast MRI, BiRads1     At this visit, she denies asymmetry, lumps, masses, thickening, pain, nipple discharge and skin changes in her breasts. She acknowledges fatigue, which she says makes it harder for her to enjoy her new Peloton bike.  She acknowledges vaginal bleeding, which worsens with exercise and stair climbing.  She denies abdominal pain, bloating, heartburn, diarrhea and early satiety.     Past Medical/Surgical History:  Past Medical History:   Diagnosis Date     Acute deep vein thrombosis (DVT) of left lower extremity (H)      Asthma      Atypical ductal hyperplasia of right breast 10/2016    fibrocystic changes, PASH and atypical ductal hyperplasia on needle biopsy, s/p R excisional biopsy with radioactive seed localization     Endometrial cancer (H) 2/15/2021      Factor 5 Leiden mutation, heterozygous (H)     factor 5     Familial adenomatous polyposis 1988    clinical presentation; s/p total abdominal colectomy with TRACEY; confirmed APC+ 2018     GERD (gastroesophageal reflux disease)      Migraine      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    [id number]     PONV (postoperative nausea and vomiting)      Past Surgical History:   Procedure Laterality Date     BIOPSY BREAST SEED LOCALIZATION Right 11/30/2016    Procedure: BIOPSY BREAST SEED LOCALIZATION;  Surgeon: Jessica Rasheed MD;  Location: [facility] OR     C LAP,SURG,COLECTOMY,W/ANAST      for colon cancer     COLECTOMY      subtotal     COMBINED ESOPHAGOSCOPY, GASTROSCOPY, DUODENOSCOPY (EGD) WITH ARGON PLASMA COAGULATOR (APC) N/A 4/12/2018    Procedure: COMBINED ESOPHAGOSCOPY, GASTROSCOPY, DUODENOSCOPY (EGD) WITH ARGON PLASMA COAGULATOR (APC);  Esophagogastroduodenoscopy with polypectomy and polyp ablation;  Surgeon: Stephen Gasca MD;  Location: [facility] OR     DILATION AND CURETTAGE, HYSTEROSCOPY DIAGNOSTIC, COMBINED N/A 2/10/2021    Procedure: HYSTEROSCOPY, DIAGNOSTIC, WITH DILATION AND CURETTAGE OF UTERUS;  Surgeon: Sandie Samaniego MD;  Location: [facility] OR     ENDOSCOPIC RETROGRADE CHOLANGIOPANCREATOGRAPHY       ENDOSCOPIC ULTRASOUND UPPER GASTROINTESTINAL TRACT (GI) N/A 7/25/2019    Procedure: Endoscopic Ultrasound;  Surgeon: Stephen Gasca MD;  Location: [facility]     ESOPHAGOSCOPY, GASTROSCOPY, DUODENOSCOPY (EGD), COMBINED  11/5/2012    Procedure: COMBINED ESOPHAGOSCOPY, GASTROSCOPY, DUODENOSCOPY (EGD), BIOPSY SINGLE OR MULTIPLE;;  Surgeon: Angélica Garcia MD;  Location: [facility] GI     ESOPHAGOSCOPY, GASTROSCOPY, DUODENOSCOPY (EGD), COMBINED  12/3/2013    Procedure: COMBINED ESOPHAGOSCOPY, GASTROSCOPY, DUODENOSCOPY (EGD);;  Surgeon: Angélica Garcia MD;  Location: [facility]     ESOPHAGOSCOPY, GASTROSCOPY, DUODENOSCOPY (EGD), COMBINED N/A 3/28/2018    Procedure: COMBINED ESOPHAGOSCOPY,  GASTROSCOPY, DUODENOSCOPY (EGD);;  Surgeon: Stephen Gasca MD;  Location: [facility]     ESOPHAGOSCOPY, GASTROSCOPY, DUODENOSCOPY (EGD), COMBINED N/A 5/30/2019    Procedure: Esophagogastroduodenoscopy with biopsy x2 APC;  Surgeon: Stephen Gasca MD;  Location: [facility]     ESOPHAGOSCOPY, GASTROSCOPY, DUODENOSCOPY (EGD), COMBINED N/A 7/30/2020    Procedure: ESOPHAGOGASTRODUODENOSCOPY (EGD) with polypectomy by hot snare; argonplasma coaglution ablation of polyps;  Surgeon: Stephen Gasca MD;  Location: [facility]     ESOPHAGOSCOPY, GASTROSCOPY, DUODENOSCOPY (EGD), RESECT MUCOSA, COMBINED N/A 7/25/2019    Procedure: Esophagogastroduodenoscopy with gastric mucosal resection and clip application;  Surgeon: Stephen Gasca MD;  Location: [facility]     EXCISE POLYP RECTUM       LAPAROSCOPIC LYSIS ADHESIONS       SIGMOIDOSCOPY FLEXIBLE       SIGMOIDOSCOPY FLEXIBLE N/A 12/7/2015    Procedure: SIGMOIDOSCOPY FLEXIBLE;  Surgeon: Mariela Bearden MD;  Location: [facility]     SIGMOIDOSCOPY FLEXIBLE N/A 3/28/2018    Procedure: SIGMOIDOSCOPY FLEXIBLE;  EGD/Flex Sig;  Surgeon: Stephen Gasca MD;  Location: [facility]     SIGMOIDOSCOPY FLEXIBLE N/A 4/12/2018    Procedure: SIGMOIDOSCOPY FLEXIBLE;  Flexible Sigmoidoscopy with polyp ablation;  Surgeon: Stephen Gasca MD;  Location: [facility]     SIGMOIDOSCOPY FLEXIBLE N/A 5/30/2019    Procedure: Flexible Sigmoidoscopy with polypectomy;  Surgeon: Stephen Gasca MD;  Location: [facility]     SIGMOIDOSCOPY FLEXIBLE N/A 7/30/2020    Procedure: SIGMOIDOSCOPY, FLEXIBLE with Ablation of Polyps;  Surgeon: Stephen Gasca MD;  Location: [facility]       Allergies:  Allergies as of 02/12/2021 - Reviewed 02/12/2021   Allergen Reaction Noted     Corn-related products Shortness Of Breath 07/29/2020     Cipro [ciprofloxacin]  02/24/2005     Flagyl [metronidazole] Nausea and Vomiting 02/24/2005     Garlic Fatigue 07/29/2020     Keflex [cephalosporins] Nausea and Vomiting 02/24/2005      Omeprazole  2010     Prilosec otc [omeprazole magnesium]  2012     Cephalexin Rash 2004     Doxycycline Rash 2018     Erythromycin Rash 2005     Sulfa drugs Rash 2005       Current Medications:  Current Outpatient Medications   Medication Sig Dispense Refill     acetaminophen (TYLENOL) 325 MG tablet Take 2 tablets (650 mg) by mouth every 6 hours as needed for mild pain       albuterol 90 MCG/ACT inhaler Inhale 2 puffs into the lungs every 6 hours as needed.       calcium-magnesium (CALMAG) 500-250 MG TABS Take 1 tablet by mouth daily       celecoxib (CELEBREX) 100 MG capsule Take 1 capsule (100 mg) by mouth daily 90 capsule 3     docusate sodium (COLACE) 100 MG capsule Take 100 mg by mouth daily       fexofenadine (ALLEGRA) 180 MG tablet Take 180 mg by mouth daily.       ibuprofen (ADVIL/MOTRIN) 600 MG tablet Take 1 tablet (600 mg) by mouth every 6 hours as needed for moderate pain       MELATONIN PO Take 2 mg by mouth nightly as needed        multivitamin, therapeutic with minerals (THERA-VIT-M) TABS Take 3 tablets by mouth 2 times daily        Omega-3 Fatty Acids (OMEGA-3 FISH OIL PO) Take 2 g by mouth daily        ondansetron (ZOFRAN) 4 MG tablet Take 1 tablet (4 mg) by mouth every 8 hours as needed for nausea 60 tablet 1     triamcinolone (NASACORT) 55 MCG/ACT Inhaler Spray 2 sprays into both nostrils daily       ZOLMitriptan (ZOMIG) 5 MG tablet Take 1 tablet (5 mg) by mouth at onset of headache for migraine 90 tablet 3        Family History:  Family History   Problem Relation Age of Onset     Hyperlipidemia Mother      Cerebrovascular Disease Mother      Cerebrovascular Disease Father      Colon Cancer Father 57         at 63     Prostate Cancer Brother 53     Prostate Cancer Maternal Uncle 65       Social History:  Social History     Socioeconomic History     Marital status:      Spouse name: Not on file     Number of children: 2     Years of education: Not on  file     Highest education level: Not on file   Occupational History     Occupation:      Employer: [employer]   Social Needs     Financial resource strain: Not on file     Food insecurity     Worry: Not on file     Inability: Not on file     Transportation needs     Medical: Not on file     Non-medical: Not on file   Tobacco Use     Smoking status: Never Smoker     Smokeless tobacco: Never Used   Substance and Sexual Activity     Alcohol use: Yes     Comment: 1 or 2 drinks per year     Drug use: No     Sexual activity: Not on file     Comment: denies pregnancy   Lifestyle     Physical activity     Days per week: Not on file     Minutes per session: Not on file     Stress: Not on file   Relationships     Social connections     Talks on phone: Not on file     Gets together: Not on file     Attends [employer] service: Not on file     Active member of club or organization: Not on file     Attends meetings of clubs or organizations: Not on file     Relationship status: Not on file     Intimate partner violence     Fear of current or ex partner: Not on file     Emotionally abused: Not on file     Physically abused: Not on file     Forced sexual activity: Not on file   Other Topics Concern     Parent/sibling w/ CABG, MI or angioplasty before 65F 55M? Not Asked   Social History Narrative    Two adopted daughters, ages 13 and 15       Physical Exam:  LMP 05/01/2019   GENERAL: Healthy, alert and no distress  EYES: Eyes grossly normal to inspection.  No discharge or erythema, or obvious scleral/conjunctival abnormalities.  RESP: No audible wheeze, cough, or visible cyanosis.  No visible retractions or increased work of breathing.    SKIN: Visible skin clear. No significant rash, abnormal pigmentation or lesions.  NEURO: Cranial nerves grossly intact.  Mentation and speech appropriate for age.  PSYCH: Mentation appears normal, affect normal/bright, judgement and insight intact, normal speech and appearance  "well-groomed.     Laboratory/Imaging Studies  No results found for any visits on 02/12/21.    ASSESSMENT  We discussed her interval history and her concern about the prospect of endometrial cancer, pending pathology results.  We discussed that regardless of whether it is or not, she will likely be proceeding with a hysterectomy and she is in agreement with that plan. She continues to report heavy vaginal bleeding, especially with exercise.      She notes \"curves\" in her fingernails, which is new for her; she denies brittle hair, nails, heart palpitations, hoarseness and insomnia.  She denies abdominal pain, diarrhea, constipation, heartburn, early satiety and rectal bleeding.  She states there are no changes in her family history.    At this point, her priority is gynecological care and she will proceed with her routine screening for Attenuated FAP and her high risk breast screening, per below.    Individualized Surveillance Plan for Attenuated version of Familial Adenomatous Polyposis                 for Children and Adults Based on NCCN Guidelines Version 1.2020   Type of Screening  Recommendation  Last Done  Next Due    Recommendations for Age <21   Colon Cancer Screening     Small Adenoma/polyp burden (fewer than 20 adenomas, all <1 cm in diameter, none with advanced histology)     Colonoscopy and polypectomy every 1-2 years    Surgical evaluation and counseling if appropriate        NA - see below     NA - see below         Recommendations for Ages >21     Colon Cancer Screening     Small Adenoma/polyp burden (fewer than 20 adenomas, all <1 cm in diameter, none with advanced histology) Colonoscopy and polypectomy every 1-2 years    Colectomy with ileorectal anastomosis (TRACEY) may be considered.    See below for flex sig report from 7/302020 August 2021   flexible sigmoidoscopy (along with EGD and ERCP) with Dr. Fernández        If adenoma burden cannot be handled endoscopically    Colectomy with TRACEY (preferred " in most cases).   NA Review in future if necessary         Thyroid Cancer Screening    Baseline ultrasound of thyroid starting in teenage years.    If normal, consider repeating every 2-5 years.    If abnormal, refer to Endocrinology. 2/1/2020- US of the thyroid: Previously biopsied isthmus nodule is no longer present. There are numerous additional colloid cysts and a benign complex cyst   left lobe, not appreciably changed from previous.  August 2021, then, if normal, will re-image every 2 years.   General Annual physical examination.  Deferred August 2021         Duodenal (small bowel) screening Upper endoscopy (including complete visualization of ampulla of Vater) starting around age 20-25.   Consider baseline upper endoscopy earlier if colectomy is prior to age 20. 7/30/2020- EGD and flexible sigmoidoscopy:  15-20 duodenal polyps, measuring 1-3 mm, all of which were ablated    - Innumerable fundic gland polyps . A large 2-3 semi-pedunculated polyp in the proximal gastric body  (along the lesser curvature) was resected.                     Suspected very small adenomas flanking the   pancreaticobiliary orifices at the prior ampullectomy   site.   Normal esophagus.      August 2021  EGD/ERCP in conjunction with flex sig with Dr. Fernández   Duodenal findings:  Stage 0 (no polyposis) - repeat endoscopy every 4 years.  Stage 1 (minimal polyposis 1-4 tubular adenomas, 1-4 mm each) - repeat EGD every 2-3 years.  Stage 2 (mild polyposis - 5-9 tubular adenomas, 5-9 mm each) - repeat EGD every 1-3 years.  Stage 3 (moderate polyposis - >20 lesions or size >1 cm) - repeat EGD every 6-12 months  Stage 4 (dense polyposis or high grade dysplasia) - surgical evaluation, with expert surveillance every 3-6 months.  Due to limited data, no screening recommendation is made for pancreatic cancer at this time.          Individualized Surveillance Plan for women  With 20% or greater lifetime risk of breast cancer   Per NCCN Breast  Cancer Screening and Diagnosis Guidelines Version 1.2020   Recommended screening Test or procedure Last done Next Scheduled    Clinical encounter Clinical exam every 6-12 months.   Refer to genetic counseling if not already done.  Consider risk reduction strategies.   2/12/2021 August 2021   However, some family histories with breast cancers at a very young age, may warrant screening starting earlier.    *May begin at age 40 if breast cancers in the family occur at later ages.    Annual mammogram beginning 10 years younger than the earliest breast cancer in the family but not prior to age 30.    Recommend annual breast MRI to begin 10 years younger than the earliest breast cancer in the family but not prior to age 25.    Breast MRIs are preferably done on day 7-15 of the menstrual cycle in premenopausal women.   See below   See below   Breast screening for patients at high risk due to thoracic radiation between the ages of 10-30   Annual clinical exam beginning 8 years after radiation therapy.    Annual screening mammogram beginning at age 30 or 8 years after radiation therapy    Annual breast MRI, beginning at age 25 or 8 years after radiation therapy.       NA     NA   Women who have a lifetime risk of >20% based on history of LCIS or ADH/ALH Annual screening mammogram beginning at age of LCIS or ADH/ALH but not prior to age 30.    Consider annual MRI to begin at age of diagnosis of LCIS or ADH/ALH but not prior to age 25.    Consider risk reducing strategies. 12/20/2019- Screening tomosynthesis mammogram, BiRads1    2/3/2021- Breast MRI, BiRads1 Exam in August followed by tomosynthesis mammogram    Next breast MRI in February 2022    Recommend risk reducing strategies for women with 1.7% 5 year risk of breast cancer. Previously discussed tamoxifen for prevention.  Will revisit after hysterectomy       I spent a total of 34 minutes on the day of the visit. Please see the note for further information on patient  assessment and treatment.    REYNOLD March-CNS, OCN, AGN-BC  Clinical Nurse Specialist  Cancer Risk Management Program  [facility] [address]  [place] [address]    phone:  [phone]  Pager: [phone]  fax: [phone]    CC: MD FATMATA Mckeon MD Colleen Rivard Hunt, MD

## 2021-02-10 ENCOUNTER — ANESTHESIA (OUTPATIENT)
Dept: SURGERY | Facility: AMBULATORY SURGERY CENTER | Age: 56
End: 2021-02-10

## 2021-02-10 ENCOUNTER — HOSPITAL ENCOUNTER (OUTPATIENT)
Facility: AMBULATORY SURGERY CENTER | Age: 56
Discharge: HOME | End: 2021-02-10
Attending: OBSTETRICS & GYNECOLOGY | Admitting: OBSTETRICS & GYNECOLOGY
Payer: COMMERCIAL

## 2021-02-10 VITALS
HEART RATE: 73 BPM | HEIGHT: 62 IN | TEMPERATURE: 97 F | RESPIRATION RATE: 16 BRPM | SYSTOLIC BLOOD PRESSURE: 130 MMHG | BODY MASS INDEX: 28.52 KG/M2 | DIASTOLIC BLOOD PRESSURE: 82 MMHG | WEIGHT: 155 LBS | OXYGEN SATURATION: 98 %

## 2021-02-10 DIAGNOSIS — N95.0 POST-MENOPAUSAL BLEEDING: ICD-10-CM

## 2021-02-10 PROCEDURE — 88360 TUMOR IMMUNOHISTOCHEM/MANUAL: CPT | Performed by: PATHOLOGY

## 2021-02-10 PROCEDURE — 88305 TISSUE EXAM BY PATHOLOGIST: CPT | Performed by: PATHOLOGY

## 2021-02-10 PROCEDURE — 88342 IMHCHEM/IMCYTCHM 1ST ANTB: CPT | Mod: XS | Performed by: PATHOLOGY

## 2021-02-10 PROCEDURE — 58558 HYSTEROSCOPY BIOPSY: CPT

## 2021-02-10 PROCEDURE — 88341 IMHCHEM/IMCYTCHM EA ADD ANTB: CPT | Performed by: PATHOLOGY

## 2021-02-10 RX ORDER — ONDANSETRON 2 MG/ML
4 INJECTION INTRAMUSCULAR; INTRAVENOUS EVERY 30 MIN PRN
Status: DISCONTINUED | OUTPATIENT
Start: 2021-02-10 | End: 2021-02-11 | Stop reason: HOSPADM

## 2021-02-10 RX ORDER — IBUPROFEN 600 MG/1
600 TABLET, FILM COATED ORAL EVERY 6 HOURS PRN
Status: ON HOLD | COMMUNITY
Start: 2021-02-10 | End: 2021-02-25

## 2021-02-10 RX ORDER — ONDANSETRON 2 MG/ML
INJECTION INTRAMUSCULAR; INTRAVENOUS PRN
Status: DISCONTINUED | OUTPATIENT
Start: 2021-02-10 | End: 2021-02-10

## 2021-02-10 RX ORDER — SODIUM CHLORIDE, SODIUM LACTATE, POTASSIUM CHLORIDE, CALCIUM CHLORIDE 600; 310; 30; 20 MG/100ML; MG/100ML; MG/100ML; MG/100ML
INJECTION, SOLUTION INTRAVENOUS CONTINUOUS
Status: DISCONTINUED | OUTPATIENT
Start: 2021-02-10 | End: 2021-02-11 | Stop reason: HOSPADM

## 2021-02-10 RX ORDER — FENTANYL CITRATE 50 UG/ML
INJECTION, SOLUTION INTRAMUSCULAR; INTRAVENOUS PRN
Status: DISCONTINUED | OUTPATIENT
Start: 2021-02-10 | End: 2021-02-10

## 2021-02-10 RX ORDER — KETOROLAC TROMETHAMINE 30 MG/ML
INJECTION, SOLUTION INTRAMUSCULAR; INTRAVENOUS PRN
Status: DISCONTINUED | OUTPATIENT
Start: 2021-02-10 | End: 2021-02-10

## 2021-02-10 RX ORDER — ACETAMINOPHEN 325 MG/1
650 TABLET ORAL EVERY 6 HOURS PRN
Status: ON HOLD | COMMUNITY
Start: 2021-02-10 | End: 2021-02-25

## 2021-02-10 RX ORDER — LIDOCAINE 40 MG/G
CREAM TOPICAL
Status: DISCONTINUED | OUTPATIENT
Start: 2021-02-10 | End: 2021-02-11 | Stop reason: HOSPADM

## 2021-02-10 RX ORDER — ACETAMINOPHEN 325 MG/1
650 TABLET ORAL
Status: CANCELLED | OUTPATIENT
Start: 2021-02-10

## 2021-02-10 RX ORDER — NALOXONE HYDROCHLORIDE 0.4 MG/ML
0.4 INJECTION, SOLUTION INTRAMUSCULAR; INTRAVENOUS; SUBCUTANEOUS
Status: DISCONTINUED | OUTPATIENT
Start: 2021-02-10 | End: 2021-02-11 | Stop reason: HOSPADM

## 2021-02-10 RX ORDER — ACETAMINOPHEN 325 MG/1
975 TABLET ORAL ONCE
Status: COMPLETED | OUTPATIENT
Start: 2021-02-10 | End: 2021-02-10

## 2021-02-10 RX ORDER — MEPERIDINE HYDROCHLORIDE 25 MG/ML
12.5 INJECTION INTRAMUSCULAR; INTRAVENOUS; SUBCUTANEOUS
Status: DISCONTINUED | OUTPATIENT
Start: 2021-02-10 | End: 2021-02-11 | Stop reason: HOSPADM

## 2021-02-10 RX ORDER — PROPOFOL 10 MG/ML
INJECTION, EMULSION INTRAVENOUS PRN
Status: DISCONTINUED | OUTPATIENT
Start: 2021-02-10 | End: 2021-02-10

## 2021-02-10 RX ORDER — NALOXONE HYDROCHLORIDE 0.4 MG/ML
0.2 INJECTION, SOLUTION INTRAMUSCULAR; INTRAVENOUS; SUBCUTANEOUS
Status: DISCONTINUED | OUTPATIENT
Start: 2021-02-10 | End: 2021-02-11 | Stop reason: HOSPADM

## 2021-02-10 RX ORDER — OXYCODONE HYDROCHLORIDE 5 MG/1
5 TABLET ORAL
Status: CANCELLED | OUTPATIENT
Start: 2021-02-10

## 2021-02-10 RX ORDER — PROPOFOL 10 MG/ML
INJECTION, EMULSION INTRAVENOUS CONTINUOUS PRN
Status: DISCONTINUED | OUTPATIENT
Start: 2021-02-10 | End: 2021-02-10

## 2021-02-10 RX ORDER — GABAPENTIN 300 MG/1
300 CAPSULE ORAL ONCE
Status: COMPLETED | OUTPATIENT
Start: 2021-02-10 | End: 2021-02-10

## 2021-02-10 RX ORDER — ONDANSETRON 4 MG/1
4 TABLET, ORALLY DISINTEGRATING ORAL EVERY 30 MIN PRN
Status: DISCONTINUED | OUTPATIENT
Start: 2021-02-10 | End: 2021-02-11 | Stop reason: HOSPADM

## 2021-02-10 RX ADMIN — FENTANYL CITRATE 50 MCG: 50 INJECTION, SOLUTION INTRAMUSCULAR; INTRAVENOUS at 07:06

## 2021-02-10 RX ADMIN — ONDANSETRON 4 MG: 2 INJECTION INTRAMUSCULAR; INTRAVENOUS at 07:18

## 2021-02-10 RX ADMIN — FENTANYL CITRATE 50 MCG: 50 INJECTION, SOLUTION INTRAMUSCULAR; INTRAVENOUS at 07:10

## 2021-02-10 RX ADMIN — GABAPENTIN 300 MG: 300 CAPSULE ORAL at 06:28

## 2021-02-10 RX ADMIN — SODIUM CHLORIDE, SODIUM LACTATE, POTASSIUM CHLORIDE, CALCIUM CHLORIDE: 600; 310; 30; 20 INJECTION, SOLUTION INTRAVENOUS at 06:36

## 2021-02-10 RX ADMIN — KETOROLAC TROMETHAMINE 30 MG: 30 INJECTION, SOLUTION INTRAMUSCULAR; INTRAVENOUS at 07:18

## 2021-02-10 RX ADMIN — PROPOFOL 30 MG: 10 INJECTION, EMULSION INTRAVENOUS at 07:08

## 2021-02-10 RX ADMIN — PROPOFOL 150 MCG/KG/MIN: 10 INJECTION, EMULSION INTRAVENOUS at 07:08

## 2021-02-10 RX ADMIN — ACETAMINOPHEN 975 MG: 325 TABLET ORAL at 06:27

## 2021-02-10 ASSESSMENT — MIFFLIN-ST. JEOR: SCORE: 1257.68

## 2021-02-10 NOTE — ANESTHESIA PREPROCEDURE EVALUATION
Anesthesia Pre-Procedure Evaluation    Patient: Sarah Duran   MRN: [id number] : 1965        Preoperative Diagnosis: Post-menopausal bleeding [N95.0]   Procedure : Procedure(s):  HYSTEROSCOPY, DIAGNOSTIC, WITH DILATION AND CURETTAGE OF UTERUS     Past Medical History:   Diagnosis Date     Acute deep vein thrombosis (DVT) of left lower extremity (H)      Asthma      Atypical ductal hyperplasia of right breast 10/2016    fibrocystic changes, PASH and atypical ductal hyperplasia on needle biopsy, s/p R excisional biopsy with radioactive seed localization     Factor 5 Leiden mutation, heterozygous (H)     factor 5     Familial adenomatous polyposis     clinical presentation; s/p total abdominal colectomy with TRACEY; confirmed APC+      GERD (gastroesophageal reflux disease)      Migraine      Monoallelic mutation of APC gene 2018    [id number]     PONV (postoperative nausea and vomiting)       Past Surgical History:   Procedure Laterality Date     BIOPSY BREAST SEED LOCALIZATION Right 2016    Procedure: BIOPSY BREAST SEED LOCALIZATION;  Surgeon: Jessica Rasheed MD;  Location:  OR     C LAP,SURG,COLECTOMY,W/ANAST      for colon cancer     COLECTOMY      subtotal     COMBINED ESOPHAGOSCOPY, GASTROSCOPY, DUODENOSCOPY (EGD) WITH ARGON PLASMA COAGULATOR (APC) N/A 2018    Procedure: COMBINED ESOPHAGOSCOPY, GASTROSCOPY, DUODENOSCOPY (EGD) WITH ARGON PLASMA COAGULATOR (APC);  Esophagogastroduodenoscopy with polypectomy and polyp ablation;  Surgeon: Stephen Gasca MD;  Location: [facility]     ENDOSCOPIC RETROGRADE CHOLANGIOPANCREATOGRAPHY       ENDOSCOPIC ULTRASOUND UPPER GASTROINTESTINAL TRACT (GI) N/A 2019    Procedure: Endoscopic Ultrasound;  Surgeon: Stephen Gasca MD;  Location: [facility]     ESOPHAGOSCOPY, GASTROSCOPY, DUODENOSCOPY (EGD), COMBINED  2012    Procedure: COMBINED ESOPHAGOSCOPY, GASTROSCOPY, DUODENOSCOPY (EGD), BIOPSY SINGLE OR MULTIPLE;;   Surgeon: Angélica Garcia MD;  Location: [facility] GI     ESOPHAGOSCOPY, GASTROSCOPY, DUODENOSCOPY (EGD), COMBINED  12/3/2013    Procedure: COMBINED ESOPHAGOSCOPY, GASTROSCOPY, DUODENOSCOPY (EGD);;  Surgeon: Angélica Garcia MD;  Location: [facility]     ESOPHAGOSCOPY, GASTROSCOPY, DUODENOSCOPY (EGD), COMBINED N/A 3/28/2018    Procedure: COMBINED ESOPHAGOSCOPY, GASTROSCOPY, DUODENOSCOPY (EGD);;  Surgeon: Stephen Gasca MD;  Location: [facility]     ESOPHAGOSCOPY, GASTROSCOPY, DUODENOSCOPY (EGD), COMBINED N/A 5/30/2019    Procedure: Esophagogastroduodenoscopy with biopsy x2 APC;  Surgeon: Stephen Gasca MD;  Location: [facility]     ESOPHAGOSCOPY, GASTROSCOPY, DUODENOSCOPY (EGD), COMBINED N/A 7/30/2020    Procedure: ESOPHAGOGASTRODUODENOSCOPY (EGD) with polypectomy by hot snare; argonplasma coaglution ablation of polyps;  Surgeon: Stephen Gasca MD;  Location: [facility]     ESOPHAGOSCOPY, GASTROSCOPY, DUODENOSCOPY (EGD), RESECT MUCOSA, COMBINED N/A 7/25/2019    Procedure: Esophagogastroduodenoscopy with gastric mucosal resection and clip application;  Surgeon: Stephen Gasca MD;  Location: [facility]     EXCISE POLYP RECTUM       LAPAROSCOPIC LYSIS ADHESIONS       SIGMOIDOSCOPY FLEXIBLE       SIGMOIDOSCOPY FLEXIBLE N/A 12/7/2015    Procedure: SIGMOIDOSCOPY FLEXIBLE;  Surgeon: Mariela Bearden MD;  Location: [facility]     SIGMOIDOSCOPY FLEXIBLE N/A 3/28/2018    Procedure: SIGMOIDOSCOPY FLEXIBLE;  EGD/Flex Sig;  Surgeon: Stephen Gasca MD;  Location: [facility]     SIGMOIDOSCOPY FLEXIBLE N/A 4/12/2018    Procedure: SIGMOIDOSCOPY FLEXIBLE;  Flexible Sigmoidoscopy with polyp ablation;  Surgeon: Stephen Gasca MD;  [facility]     SIGMOIDOSCOPY FLEXIBLE N/A 5/30/2019    Procedure: Flexible Sigmoidoscopy with polypectomy;  Surgeon: Stephen Gasca MD;  Location: [facility]     SIGMOIDOSCOPY FLEXIBLE N/A 7/30/2020    Procedure: SIGMOIDOSCOPY, FLEXIBLE with Ablation of Polyps;  Surgeon: Stephen Gasca,  MD;  Location: [facility]      Allergies   Allergen Reactions     Corn-Related Products Shortness Of Breath     Avoids food; ok in tiny doses, such as in medicines     Cephalexin      PN: Rash, Generalized     Cipro [Ciprofloxacin]      Reddened skin     Flagyl [Metronidazole] Nausea and Vomiting     Garlic Fatigue     GI distress     Keflex [Cephalosporins] Nausea and Vomiting     Omeprazole      PN: headache     Prilosec Otc [Omeprazole Magnesium]      migraines     Doxycycline Rash     blisters     Erythromycin Rash     Sulfa Drugs Rash      Social History     Tobacco Use     Smoking status: Never Smoker     Smokeless tobacco: Never Used   Substance Use Topics     Alcohol use: Yes     Comment: 1 or 2 drinks per year      Wt Readings from Last 1 Encounters:   02/10/21 70.3 kg (155 lb)        Anesthesia Evaluation   Pt has had prior anesthetic. Type: General.    History of anesthetic complications  - PONV.  Requests ondansetron intraop.    ROS/MED HX  ENT/Pulmonary:     (+) Intermittent, asthma Treatment: Inhaler prn,      Neurologic:       Cardiovascular:  - neg cardiovascular ROS     METS/Exercise Tolerance: >4 METS    Hematologic: Comments: Factor V Leiden mutation, no history of blood clots      Musculoskeletal:       GI/Hepatic:     (+) GERD (Occasional GERD. No symptoms today),     Renal/Genitourinary:       Endo:       Psychiatric/Substance Use:       Infectious Disease:       Malignancy:       Other:            Physical Exam    Airway        Mallampati: II   TM distance: > 3 FB   Neck ROM: full   Mouth opening: > 3 cm    Respiratory Devices and Support         Dental           Cardiovascular          Rhythm and rate: regular and normal     Pulmonary           breath sounds clear to auscultation           OUTSIDE LABS:  CBC:   Lab Results   Component Value Date    WBC 4.4 07/25/2019    WBC 4.6 11/10/2016    HGB 13.6 07/25/2019    HGB 13.8 05/30/2019    HCT 43.8 07/25/2019    HCT 41.3 11/10/2016      07/25/2019     11/10/2016     BMP:   Lab Results   Component Value Date     03/20/2019     05/07/2009    POTASSIUM 4.0 03/20/2019    POTASSIUM 4.1 05/07/2009    CHLORIDE 110 (H) 03/20/2019    CHLORIDE 105 05/07/2009    CO2 24 03/20/2019    CO2 25 05/07/2009    BUN 16 07/25/2019    BUN 15 03/20/2019    CR 0.90 03/20/2019    CR 0.81 05/07/2009    GLC 97 03/20/2019    GLC 86 05/07/2009     COAGS:   Lab Results   Component Value Date    PTT 26 05/07/2009    INR 0.99 07/25/2019     POC:   Lab Results   Component Value Date     (H) 07/30/2020    HCG Negative 07/25/2019     HEPATIC:   Lab Results   Component Value Date    ALBUMIN 3.5 03/20/2019    PROTTOTAL 7.1 03/20/2019    ALT 35 03/20/2019    AST 24 03/20/2019    ALKPHOS 47 03/20/2019    BILITOTAL 0.5 03/20/2019     OTHER:   Lab Results   Component Value Date    WOLF 9.4 03/20/2019    AMYLASE 103 11/16/2006    TSH 0.88 03/05/2019       Anesthesia Plan    ASA Status:  2   NPO Status:  NPO Appropriate    Anesthesia Type: MAC.              Consents    Anesthesia Plan(s) and associated risks, benefits, and realistic alternatives discussed. Questions answered and patient/representative(s) expressed understanding.     - Discussed with:  Patient         Postoperative Care       PONV prophylaxis: Ondansetron (or other 5HT-3)     Comments:    Discussed plan for MAC, including risk of aspiration pneumonia, backup plan of general anesthesia and risks of general anesthesia, including sore throat/hoarse voice, abrasions/damage to lips/tongue/teeth, nausea, rare complications (including medication reactions, cardiac, pulmonary).              Janneth Mcghee MD

## 2021-02-10 NOTE — ANESTHESIA POSTPROCEDURE EVALUATION
Patient: Sarah Duran    Procedure(s):  HYSTEROSCOPY, DIAGNOSTIC, WITH DILATION AND CURETTAGE OF UTERUS    Diagnosis:Post-menopausal bleeding [N95.0]  Diagnosis Additional Information: No value filed.    Anesthesia Type:  MAC    Note:  Disposition: Outpatient   Postop Pain Control: Uneventful            Sign Out: Well controlled pain   PONV: No   Neuro/Psych: Uneventful            Sign Out: Acceptable/Baseline neuro status   Airway/Respiratory: Uneventful            Sign Out: Acceptable/Baseline resp. status   CV/Hemodynamics: Uneventful            Sign Out: Acceptable CV status   Other NRE: NONE   DID A NON-ROUTINE EVENT OCCUR? No         Last vitals:  Vitals:    02/10/21 0746 02/10/21 0756 02/10/21 0810   BP: 128/78 128/79 130/82   Pulse:      Resp: 16 16 16   Temp: 36.2  C (97.1  F)  36.1  C (97  F)   SpO2: 98% 98% 98%       Last vitals prior to Anesthesia Care Transfer:  CRNA VITALS  2/10/2021 0714 - 2/10/2021 0814      2/10/2021             Resp Rate (set):  10          Electronically Signed By: Janneth Mcghee MD  February 10, 2021  4:39 PM

## 2021-02-10 NOTE — BRIEF OP NOTE
[facility]    Brief Operative Note    Pre-operative diagnosis: Post-menopausal bleeding [N95.0], thickened endometrial stripe  Post-operative diagnosis Same as pre-operative diagnosis    Procedure: Procedure(s):  HYSTEROSCOPY, DIAGNOSTIC, WITH DILATION AND CURETTAGE OF UTERUS  Surgeon: Surgeon(s) and Role:     * Sandie Samaniego MD - Primary      * Charisma Franco MD - Resident, Assisting  Anesthesia: Monitor Anesthesia Care   Estimated blood loss: Less than 10 ml  Drains: None  Specimens:   ID Type Source Tests Collected by Time Destination   A : Endometrial Currentings Tissue Endometrium SURGICAL PATHOLOGY EXAM Sandie Samaniego MD 2/10/2021  7:31 AM      Findings:   Cervix with firm mass anteriorly with resulting difficulty in dilation and entry into uterin cavity. Uterus significantly anteverted. Cervix dilated to 6mm, hysteroscopic view with multiple large polyps vs areas of endometrial hyperplasia. Unable to visualize tubal ostia. No evidence of uterine perforation, fluid deficit ~100cc. Sharp curettage performed with adequate specimen obtained. Cervix hemostatic at end of procedure..  Complications: None.  Implants: * No implants in log *    Charisma Franco MD  Ob/Gyn Resident, PGY-3  Pager: [phone]  02/10/21 7:56 AM

## 2021-02-10 NOTE — OP NOTE
Hysteroscopy with D&C    Date of Surgery: 02/10/21     Surgeon: Sandie Euceda MD    Assistants: Charisma Franco MD PGY-3    Pre-operative Diagnoses: Postmenopausal bleeding, thickened endometrial stripe, inability to obtain office EMB    Post-operative Diagnoses: Same, final pathology pending    Procedure: Hysteroscopy, dilation and curettage    Anesthesia: MAC     EBL: 5cc  IVF: 300cc  UOP: not measured, patient voided prior to surgery  Fluid deficit:  100mL    Complications: none  Findings: Cervix with firm mass anteriorly with resulting difficulty in dilation and entry into uterine cavity. Uterus significantly anteverted. Cervix dilated to 6mm, hysteroscopic view with multiple large polyps somewhat concerning for malignancy. Unable to visualize tubal ostia. No evidence of uterine perforation, fluid deficit ~100cc. Sharp curettage performed with large amount of polypoid tissue obtained. Cervix hemostatic at end of procedure.  Specimens: Endometrial curettings    Indications:  Sarah Duran is a 55 year old G0 who initially presented to her primary gynecologist with postmenopausal bleeding. Pelvic ultrasound was performed which showed a thickened endometrial stripe at 29mm. Endometrial biopsy was attempted in the office but failed due to inability to pass the sampling pipelle. She presented to Gyn Onc clinic for a second opinion and biopsy was again attempted. Biopsy again failed due to cervical stenosis likely from obstructing cervical mass with subsequent inability to pass the pipelle. Sampling in the operating room was therefore recommended with hysteroscopy, dilation and curettage.  Risks, benefits, and alternatives to the procedure were discussed with the patient who elected to proceed.  All questions were answered and an informed consent was obtained.    Procedure:  The patient was taken to the operating room where she underwent MAC without difficulty.  She was placed in a dorsal lithotomy position using  yellow fin stirrups.  The patient was examined for the above noted findings and then prepped and draped in the usual sterile fashion. Timeout procedure was completed confirming the correct patient, procedure and operative site. A medium graves speculum was inserted into the vagina.  A tenaculum was placed on the anterior lip of the cervix. Significant difficulty was encountered in passing the dilators, including the 3mm Hegar dilator. The pipelle and sound were also unable to be passed beyond approximately 1cm. An 11-blade scalpel was used to make a stab incision in the cervical os to relieve any possible stenosis. The speculum was removed and the cervix was reexamined digitally, with more dilation of the external cervical os appreciated. The sound was then able to be placed under manual guidance, with the uterine cavity sounding to 7cm. The endocervical canal was then serially dilated to 6 mm using Hegar dilators. The hysteroscope was inserted with the above findings noted. Visualization was achieved using normal saline as a distending medium. The hysteroscope was removed and sharp curettage was performed. The tissue was collected on a piece of Telfa and sent to pathology. The speculum was reinserted and the tenaculum was removed from the cervix. The tenaculum sites were noted to be hemostatic.  The speculum was then removed. The patient tolerated the procedure well and was taken to the recovery room in stable condition. Dr. Euceda was scrubbed and present for the entire procedure.    Charisma Franco MD  Ob/Gyn Resident, PGY-3  Pager: [phone]  02/10/21 8:58 AM     I was present and scrubbed for the entire procedure.    Sandie Euceda MD  Gynecologic Oncology  [facility]

## 2021-02-10 NOTE — ANESTHESIA CARE TRANSFER NOTE
Patient: Sarah Duran    Procedure(s):  HYSTEROSCOPY, DIAGNOSTIC, WITH DILATION AND CURETTAGE OF UTERUS    Diagnosis: Post-menopausal bleeding [N95.0]  Diagnosis Additional Information: No value filed.    Anesthesia Type:   No value filed.     Note:    Oropharynx: spontaneously breathing  Level of Consciousness: awake  Oxygen Supplementation: room air    Independent Airway: airway patency satisfactory and stable  Dentition: dentition unchanged  Vital Signs Stable: post-procedure vital signs reviewed and stable  Report to RN Given: handoff report given  Patient transferred to: Phase II    Handoff Report: Identifed the Patient, Identified the Reponsible Provider, Reviewed the pertinent medical history, Discussed the surgical course, Reviewed Intra-OP anesthesia mangement and issues during anesthesia, Set expectations for post-procedure period and Allowed opportunity for questions and acknowledgement of understanding      Vitals: (Last set prior to Anesthesia Care Transfer)  CRNA VITALS  2/10/2021 0714 - 2/10/2021 0747      2/10/2021             Resp Rate (set):  10        Electronically Signed By: REYNOLD Downing CRNA  February 10, 2021  7:47 AM

## 2021-02-10 NOTE — DISCHARGE INSTRUCTIONS
[facility]  Home Care Following Anesthesia  For 24 hours after surgery:  1. Get plenty of rest.  A responsible adult must stay with you for at least 24 hours after you leave the surgery center.  2. Do not drive or use heavy equipment.  If you have weakness or tingling, don't drive or use heavy equipment until this feeling goes away.   3. Do not drink alcohol.   4. Avoid strenuous or risky activities.  Ask for help when climbing stairs.  5. You may feel lightheaded.  IF so, sit for a few minutes before standing.  Have someone help you get up.   6. If you have nausea (feel sick to your stomach): Drink only clear liquids such as apple juice, ginger ale, broth or 7-Up.  Rest may also help.  Be sure to drink enough fluids.  Move to a regular diet as you feel able.   7. You may have a slight fever.  Call the doctor if your fever is over 100 F (37.7 C) (taken under the tongue) or lasts longer than 24 hours.  8. You may have a dry mouth, a sore throat, muscle aches or trouble sleeping. These should go away after 24 hours.  9. Do not make important or legal decisions.               Tips for taking pain medications  To get the best pain relief possible, remember these points:    Take pain medications as directed, before pain becomes severe.    Pain medication can upset your stomach: taking it with food may help.    Constipation is a common side effect of pain medication. Drink plenty of  fluids.    Eat foods high in fiber. Take a stool softener if recommended by your doctor or pharmacist.    Do not drink alcohol, drive or operate machinery while taking pain medications.    Ask about other ways to control pain, such as with heat, ice or relaxation.    Tylenol/Acetaminophen Consumption  To help encourage the safe use of acetaminophen, the makers of TYLENOL  have lowered the maximum daily dose for single-ingredient Extra Strength TYLENOL  (acetaminophen) products sold in the [place] from 8  pills per day (4,000 mg) to 6 pills per day (3,000 mg). The dosing interval has also changed from 2 pills every 4-6 hours to 2 pills every 6 hours.    If you feel your pain relief is insufficient, you may take Tylenol/Acetaminophen in addition to your narcotic pain medication.     Be careful not to exceed 3,000 mg of Tylenol/Acetaminophen in a 24 hour period from all sources.    If you are taking extra strength Tylenol/acetaminophen (500 mg), the maximum dose is 6 tablets in 24 hours.    If you are taking regular strength acetaminophen (325 mg), the maximum dose is 9 tablets in 24 hours.    Call a doctor for any of the followin. Signs of infection (fever, growing tenderness at the surgery site, a large amount of drainage or bleeding, severe pain, foul-smelling drainage, redness, swelling).  2. It has been over 8 to 10 hours since surgery and you are still not able to urinate (pass water).  3. Headache for over 24 hours.  4. Numbness, tingling or weakness the day after surgery (if you had spinal anesthesia).  5. Signs of Covid-19 infection (temperature over 100 degrees, shortness of breath, cough, loss of taste/smell, generalized body aches, persistent headache, chills, sore throat, nausea/vomiting/diarrhea)  Your doctor is:       Dr. Mena Lezama, Gynecologic Oncology: [phone]               Or dial [phone] and ask for the resident on call for:  Gynecologic Oncology  For emergency care, call the:  [place] [facility]:  [phone] (TTY for hearing impaired: [phone])

## 2021-02-11 ENCOUNTER — PATIENT OUTREACH (OUTPATIENT)
Dept: ONCOLOGY | Facility: CLINIC | Age: 56
End: 2021-02-11

## 2021-02-11 NOTE — PROGRESS NOTES
Attempted post-op call, but no answer.  LVM requesting return call.    ELEANOR ChambersN, RN, OCN  Oncology Care Coordinator  [facility]

## 2021-02-11 NOTE — PROGRESS NOTES
[facility]: Post-Discharge Note  SITUATION                                                      Admission:    Admission Date: 02/10/21   Reason for Admission: Postmenopausal bleeding, thickened endometrial stripe, inability to obtain office EMB  Discharge:   Discharge Date: 02/10/21  Discharge Diagnosis: Postmenopausal bleeding, thickened endometrial stripe, inability to obtain office EMB; s/p Hysteroscopy, dilation and curettage (final pathology pending)    ASSESSMENT      Sarah reports that she was having restless leg symptoms last evening, so she decided to go on her Peloton bike last night and lightly pedal because this usually helps.  Afterward, she states she had a gush of blood that quickly resolved.  She also reported that a short time later she passed something that was a pale skin-colored via her vagina.  She has since only had light vaginal spotting.    Encouraged her to rest for a few days and avoid exercise.  She protested that her Peloton bike is important because it is her only form of cardio.  Educated patient on the importance of rest to allow healing to begin and recommended she walk instead.  She verbalized understanding.  Advised her to call in with any further heavy bleeding.    She denies pain.  She continues to take ibuprofen and tylenol around the clock.  Advised her to try taking the medications on an as needed basis.  No signs/sytmpoms of infection.  Eating and drinking normally.  Voiding and passing BMs without issue (loose stools at baseline).    Update sent to Dr. Euceda regarding exercise biking and vaginal bleeding.    Discharge Assessment  Patient reports symptoms are: Improved  Does the patient have all of their medications?: Yes  Does patient know what their new medications are for?: Yes  Does patient have a follow-up appointment scheduled?: Yes  Does patient have any other questions or concerns?: No    Post-op  Did the patient have surgery or a procedure: Yes  Drainage:  Yes  Drainage Color: sanguineous  Incision Drainage Amount: light  Bleeding: light(Reports light vaginal spotting.)  Fever: No  Chills: No  Redness: No  Warmth: No  Swelling: No  Incision site pain: Yes(Taking ibuprofen and tylenol, as needed.)  Closure: none  Eating & Drinking: eating and drinking without complaints/concerns  PO Intake: regular diet  Bowel Function: loose stools  Date of last BM: 02/11/21  Urinary Status: voiding without complaint/concerns    PLAN                                                      Outpatient Plan:  Follow-up visit scheduled on 2/23.    Patient is aware that Dr. Euceda will likely call her with pathology results when they are back.  Informed her that it can take a week or more, but they may also come back sooner.  She verbalized understanding.    Future Appointments   Date Time Provider Department Center   2/12/2021  9:45 AM Clarissa Brown [facility]N [facility]   2/23/2021 11:30 AM Sandie Samaniego MD [facility]     JAMES Chambers, RN, OCN  Oncology Care Coordinator  [facility]

## 2021-02-12 ENCOUNTER — VIRTUAL VISIT (OUTPATIENT)
Dept: ONCOLOGY | Facility: CLINIC | Age: 56
End: 2021-02-12
Attending: CLINICAL NURSE SPECIALIST
Payer: COMMERCIAL

## 2021-02-12 DIAGNOSIS — Z12.39 BREAST CANCER SCREENING, HIGH RISK PATIENT: ICD-10-CM

## 2021-02-12 DIAGNOSIS — C54.1 ENDOMETRIAL CANCER (H): Primary | ICD-10-CM

## 2021-02-12 DIAGNOSIS — D13.91 FAP (FAMILIAL ADENOMATOUS POLYPOSIS): Primary | ICD-10-CM

## 2021-02-12 DIAGNOSIS — N60.91 ATYPICAL DUCTAL HYPERPLASIA OF RIGHT BREAST: ICD-10-CM

## 2021-02-12 DIAGNOSIS — R92.30 DENSE BREAST: ICD-10-CM

## 2021-02-12 DIAGNOSIS — E04.1 THYROID NODULE: ICD-10-CM

## 2021-02-12 LAB — COPATH REPORT: NORMAL

## 2021-02-12 PROCEDURE — 999N001193 HC VIDEO/TELEPHONE VISIT; NO CHARGE

## 2021-02-12 PROCEDURE — 99214 OFFICE O/P EST MOD 30 MIN: CPT | Mod: 95 | Performed by: CLINICAL NURSE SPECIALIST

## 2021-02-12 RX ORDER — PHENAZOPYRIDINE HYDROCHLORIDE 200 MG/1
200 TABLET, FILM COATED ORAL ONCE
Status: CANCELLED | OUTPATIENT
Start: 2021-02-12 | End: 2021-02-12

## 2021-02-12 RX ORDER — CEFAZOLIN SODIUM 2 G/50ML
2 SOLUTION INTRAVENOUS
Status: CANCELLED | OUTPATIENT
Start: 2021-02-12

## 2021-02-12 RX ORDER — CEFAZOLIN SODIUM 1 G/50ML
1 INJECTION, SOLUTION INTRAVENOUS SEE ADMIN INSTRUCTIONS
Status: CANCELLED | OUTPATIENT
Start: 2021-02-12

## 2021-02-12 RX ORDER — HEPARIN SODIUM 5000 [USP'U]/.5ML
5000 INJECTION, SOLUTION INTRAVENOUS; SUBCUTANEOUS
Status: CANCELLED | OUTPATIENT
Start: 2021-02-12

## 2021-02-12 NOTE — LETTER
2021         RE: Sarah Duran  [address] [facility] [address]        Dear Colleague,    Thank you for referring your patient, Sarah Duran, to the [facility]. Please see a copy of my visit note below.    Oncology Risk Management Consultation:  Date on this visit: 2021    Sarah Duran returns to the Cancer Risk Management Program today for coordination of care for her screening and surveillance plan.     She requires high risk screening and surveillance to reduce her risk of cancer secondary to  a history of atypical ductal hyperplasia (ADH).  She is considered to be at high risk for breast cancer, with a risk of up to 35%.     She also carries an APC+ genetic mutation, consistent with a diagnosis of Familial Adenomatous Polyposis (FAP) or attenuated FAP (AFAP) with a history of a thyroid nodule and gastric polyps.     Primary Physician: Ida Sandoval MD     History Of Present Illness:  Ms. Duran is a very pleasant  55 year old female who presents with a personal history of ADH and a likely attenuated version of Familial Adenomatous Polyposis. She is s/p right excisional breast biopsy on 2016. She is also s/p ileocolonic anastomosis in .     Genetic Testin2018: POSITIVE for a APC mutation.  Specifically her mutation is called [id number],  consistent with a diagnosis of familial adenomatous polyposis (FAP) or attenuated FAP (AFAP). The mutation was identified using [place] Next testing through [employer].  The testing was done because of her personal history of colonic polyposis and family history of colon cancer.     Of note, Sarah tested negative for mutations in the following genes: BMPR1A, CDH1, CHEK2, MLH1, MSH2, MSH6, MUTYH, PMS2, POLD1, POLE, PTEN, SMAD4, STK11 and TP53 (sequencing and deletion/duplication); EPCAM and GREM1 (deletion/duplication only).        Pertinent history:  Attenuated Familial adenomatous polyposis,  s/p ileal rectal anastomosis in May 1988. Confirmed with APC testing (see below)  Subsequent laparotomy in  for bowel obstruction from adhesions.  Taking rabeprazole 20 mg EC BID for gastric polyps.  Menarche at 12.  Nulliparous.  Menopause at 50.  Breast density: scattered fibroglandular densities  Ovaries, fallopian tubes and uterus in place.    Thyroid Screenin2019 - FNA biopsy of thyroid, for solid appearing nodule in isthmus, biopsy non diagnostic; cyst fluid only.   2020- US of the thyroid: Previously biopsied isthmus nodule is no longer present. There are numerous additional colloid cysts and a benign complex cyst   left lobe, not appreciably changed from previous.      GI screening history (see chart for further details)  2005 - Small bowel enteroscopy, one tubular adenoma removed from partial anal verge.  2005 - adenomatous polyp removed from rectum  2005 - tubular adenoma, duodenum.  2011 - fundic gland polyps in stomach, tubular adenoma removed from rectum.  2012 - 1 tubulovillous adenoma, rectum  2013 - 1 tubulovillous adenoma, rectum  12/3/2013 - 1 tubulovillous adenoma, rectum  2014 - 1 tubular adenoma and hyperplastic polyp, rectum  2015 - 1 tubular adenoma and hyperplastic polyp, rectum  2015 - 1 tubular adenoma, rectum  2016 - 1 fundic gland polyp with focal adenomatous changes  3/20/2017 - 2 tubular adenomas removed from rectum  2017 - colonoscopy and EGD, one fundic gland gastric polyp with focal low grade dysplasia removed, multiple duodenal polyps and multiple rectal polyps.  2019 - EGD: Two previously reported polyps (6.2 mm and 5.3mm) in the antrum were evaluate by EUS, which did not demonstrate deep layer involvement, and then they were  removed by EMR by using a band ligator and a snare. The defect was then closed with hemoclips. Innumerous fundic gland polyps in the gastric body,  fundus, and cardia. Consistent with  diagnosis of FAP. A large fundic gland polyp in the gastric body, which has been previously resected and recent biopsied. Recommendation: Use Aciphex (rabeprazole) 20 mg PO BID for 1 month.  Repeat the upper endoscopy in 1 year for surveillance.     7/30/2020- EGD and flexible sigmoidoscopy:  15-20 duodenal polyps, measuring 1-3 mm, all of which were ablated using argon plasma coagulation.    - Innumerable fundic gland polyps in a confluent carpet-like distribution throughout the cardia, fundus, and gastric body. Antral-sparing present. A large 2-3   semi-pedunculated polyp in the proximal gastric body  (along the lesser curvature) was resected.  - Suspected very small adenomas flanking the pancreaticobiliary orifices at the prior ampullectomy site.  - Normal esophagus. Pathology: Fundic gland polyp with adenomatous change (low grade dysplasia). Negative for intestinal metaplasia, high-grade dysplasia or malignancy. Recommendation: Schedule for flexible sigmoidoscopy (along with EGD and ERCP) with Dr. Fernández in 1-year.     Breast Screening history:   11/28/2016 - R excisional breast biopsy with seed localization, Fibrocystic tissue with ADH on pathology.  4/24/2017 - R breast mammogram, BiRads2.  5/25/2018 - Breast MRI, BiRads2.  11/8/2018 - Screening tomosynthesis mammogram, BiRads1  6/10/2019 - Breast MRI, BiRads2.  12/20/2019- Screening tomosynthesis mammogram, BiRads1  2/3/2021- Breast MRI, BiRads1     At this visit, she denies asymmetry, lumps, masses, thickening, pain, nipple discharge and skin changes in her breasts. She acknowledges fatigue, which she says makes it harder for her to enjoy her new [employer] bike.  She acknowledges vaginal bleeding, which worsens with exercise and stair climbing.  She denies abdominal pain, bloating, heartburn, diarrhea and early satiety.     Past Medical/Surgical History:  Past Medical History:   Diagnosis Date     Acute deep vein thrombosis (DVT) of left lower extremity (H)       Asthma      Atypical ductal hyperplasia of right breast 10/2016    fibrocystic changes, PASH and atypical ductal hyperplasia on needle biopsy, s/p R excisional biopsy with radioactive seed localization     Endometrial cancer (H) 2/15/2021     Factor 5 Leiden mutation, heterozygous (H)     factor 5     Familial adenomatous polyposis 1988    clinical presentation; s/p total abdominal colectomy with TRACEY; confirmed APC+ 2018     GERD (gastroesophageal reflux disease)      Migraine      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    [id number]     PONV (postoperative nausea and vomiting)      Past Surgical History:   Procedure Laterality Date     BIOPSY BREAST SEED LOCALIZATION Right 11/30/2016    Procedure: BIOPSY BREAST SEED LOCALIZATION;  Surgeon: Jessica Rasheed MD;  Location:  OR     C LAP,SURG,COLECTOMY,W/ANAST      for colon cancer     COLECTOMY      subtotal     COMBINED ESOPHAGOSCOPY, GASTROSCOPY, DUODENOSCOPY (EGD) WITH ARGON PLASMA COAGULATOR (APC) N/A 4/12/2018    Procedure: COMBINED ESOPHAGOSCOPY, GASTROSCOPY, DUODENOSCOPY (EGD) WITH ARGON PLASMA COAGULATOR (APC);  Esophagogastroduodenoscopy with polypectomy and polyp ablation;  Surgeon: Stephen Gasca MD;  Location: [facility] OR     DILATION AND CURETTAGE, HYSTEROSCOPY DIAGNOSTIC, COMBINED N/A 2/10/2021    Procedure: HYSTEROSCOPY, DIAGNOSTIC, WITH DILATION AND CURETTAGE OF UTERUS;  Surgeon: Sandie Samaniego MD;  [place] [facility]     ENDOSCOPIC RETROGRADE CHOLANGIOPANCREATOGRAPHY       ENDOSCOPIC ULTRASOUND UPPER GASTROINTESTINAL TRACT (GI) N/A 7/25/2019    Procedure: Endoscopic Ultrasound;  Surgeon: Stephen Gasca MD;  Location:  OR     ESOPHAGOSCOPY, GASTROSCOPY, DUODENOSCOPY (EGD), COMBINED  11/5/2012    Procedure: COMBINED ESOPHAGOSCOPY, GASTROSCOPY, DUODENOSCOPY (EGD), BIOPSY SINGLE OR MULTIPLE;;  Surgeon: Angélica Garcia MD;  [facility]     ESOPHAGOSCOPY, GASTROSCOPY, DUODENOSCOPY (EGD), COMBINED   12/3/2013    Procedure: COMBINED ESOPHAGOSCOPY, GASTROSCOPY, DUODENOSCOPY (EGD);;  Surgeon: Angélica Garcia MD;  Location: [facility] GI     ESOPHAGOSCOPY, GASTROSCOPY, DUODENOSCOPY (EGD), COMBINED N/A 3/28/2018    Procedure: COMBINED ESOPHAGOSCOPY, GASTROSCOPY, DUODENOSCOPY (EGD);;  Surgeon: Stephen Gasca MD;  Location: [facility]     ESOPHAGOSCOPY, GASTROSCOPY, DUODENOSCOPY (EGD), COMBINED N/A 5/30/2019    Procedure: Esophagogastroduodenoscopy with biopsy x2 APC;  Surgeon: Stephen Gasca MD;  Location: [facility]     ESOPHAGOSCOPY, GASTROSCOPY, DUODENOSCOPY (EGD), COMBINED N/A 7/30/2020    Procedure: ESOPHAGOGASTRODUODENOSCOPY (EGD) with polypectomy by hot snare; argonplasma coaglution ablation of polyps;  Surgeon: Stephen Gasca MD;  Location: [facility]     ESOPHAGOSCOPY, GASTROSCOPY, DUODENOSCOPY (EGD), RESECT MUCOSA, COMBINED N/A 7/25/2019    Procedure: Esophagogastroduodenoscopy with gastric mucosal resection and clip application;  Surgeon: Stephen Gasca MD;  Location: [facility]     EXCISE POLYP RECTUM       LAPAROSCOPIC LYSIS ADHESIONS       SIGMOIDOSCOPY FLEXIBLE       SIGMOIDOSCOPY FLEXIBLE N/A 12/7/2015    Procedure: SIGMOIDOSCOPY FLEXIBLE;  Surgeon: Mariela Bearden MD;  [facility]: [facility]     SIGMOIDOSCOPY FLEXIBLE N/A 3/28/2018    Procedure: SIGMOIDOSCOPY FLEXIBLE;  EGD/Flex Sig;  Surgeon: Stephen Gasca MD;  Location: [facility]     SIGMOIDOSCOPY FLEXIBLE N/A 4/12/2018    Procedure: SIGMOIDOSCOPY FLEXIBLE;  Flexible Sigmoidoscopy with polyp ablation;  Surgeon: Stephen Gasca MD;  Location[facility]     SIGMOIDOSCOPY FLEXIBLE N/A 5/30/2019    Procedure: Flexible Sigmoidoscopy with polypectomy;  Surgeon: Stephen Gasca MD;  [facility]     SIGMOIDOSCOPY FLEXIBLE N/A 7/30/2020    Procedure: SIGMOIDOSCOPY, FLEXIBLE with Ablation of Polyps;  Surgeon: Stephen Gasca MD;  Location: [facility]       Allergies:  Allergies as of 02/12/2021 - Reviewed 02/12/2021   Allergen Reaction Noted      Corn-related products Shortness Of Breath 2020     Cipro [ciprofloxacin]  2005     Flagyl [metronidazole] Nausea and Vomiting 2005     Garlic Fatigue 2020     Keflex [cephalosporins] Nausea and Vomiting 2005     Omeprazole  2010     Prilosec otc [omeprazole magnesium]  2012     Cephalexin Rash 2004     Doxycycline Rash 2018     Erythromycin Rash 2005     Sulfa drugs Rash 2005       Current Medications:  Current Outpatient Medications   Medication Sig Dispense Refill     acetaminophen (TYLENOL) 325 MG tablet Take 2 tablets (650 mg) by mouth every 6 hours as needed for mild pain       albuterol 90 MCG/ACT inhaler Inhale 2 puffs into the lungs every 6 hours as needed.       calcium-magnesium (CALMAG) 500-250 MG TABS Take 1 tablet by mouth daily       celecoxib (CELEBREX) 100 MG capsule Take 1 capsule (100 mg) by mouth daily 90 capsule 3     docusate sodium (COLACE) 100 MG capsule Take 100 mg by mouth daily       fexofenadine (ALLEGRA) 180 MG tablet Take 180 mg by mouth daily.       ibuprofen (ADVIL/MOTRIN) 600 MG tablet Take 1 tablet (600 mg) by mouth every 6 hours as needed for moderate pain       MELATONIN PO Take 2 mg by mouth nightly as needed        multivitamin, therapeutic with minerals (THERA-VIT-M) TABS Take 3 tablets by mouth 2 times daily        Omega-3 Fatty Acids (OMEGA-3 FISH OIL PO) Take 2 g by mouth daily        ondansetron (ZOFRAN) 4 MG tablet Take 1 tablet (4 mg) by mouth every 8 hours as needed for nausea 60 tablet 1     triamcinolone (NASACORT) 55 MCG/ACT Inhaler Spray 2 sprays into both nostrils daily       ZOLMitriptan (ZOMIG) 5 MG tablet Take 1 tablet (5 mg) by mouth at onset of headache for migraine 90 tablet 3        Family History:  Family History   Problem Relation Age of Onset     Hyperlipidemia Mother      Cerebrovascular Disease Mother      Cerebrovascular Disease Father      Colon Cancer Father 57         at 63      Prostate Cancer Brother 53     Prostate Cancer Maternal Uncle 65       Social History:  Social History     Socioeconomic History     Marital status:      Spouse name: Not on file     Number of children: 2     Years of education: Not on file     Highest education level: Not on file   Occupational History     Occupation:      Employer: [employer]   Social Needs     Financial resource strain: Not on file     Food insecurity     Worry: Not on file     Inability: Not on file     Transportation needs     Medical: Not on file     Non-medical: Not on file   Tobacco Use     Smoking status: Never Smoker     Smokeless tobacco: Never Used   Substance and Sexual Activity     Alcohol use: Yes     Comment: 1 or 2 drinks per year     Drug use: No     Sexual activity: Not on file     Comment: denies pregnancy   Lifestyle     Physical activity     Days per week: Not on file     Minutes per session: Not on file     Stress: Not on file   Relationships     Social connections     Talks on phone: Not on file     Gets together: Not on file     Attends [employer] service: Not on file     Active member of club or organization: Not on file     Attends meetings of clubs or organizations: Not on file     Relationship status: Not on file     Intimate partner violence     Fear of current or ex partner: Not on file     Emotionally abused: Not on file     Physically abused: Not on file     Forced sexual activity: Not on file   Other Topics Concern     Parent/sibling w/ CABG, MI or angioplasty before 65F 55M? Not Asked   Social History Narrative    Two adopted daughters, ages 13 and 15       Physical Exam:  LMP 05/01/2019   GENERAL: Healthy, alert and no distress  EYES: Eyes grossly normal to inspection.  No discharge or erythema, or obvious scleral/conjunctival abnormalities.  RESP: No audible wheeze, cough, or visible cyanosis.  No visible retractions or increased work of breathing.    SKIN: Visible skin clear. No  "significant rash, abnormal pigmentation or lesions.  NEURO: Cranial nerves grossly intact.  Mentation and speech appropriate for age.  PSYCH: Mentation appears normal, affect normal/bright, judgement and insight intact, normal speech and appearance well-groomed.     Laboratory/Imaging Studies  No results found for any visits on 02/12/21.    ASSESSMENT  We discussed her interval history and her concern about the prospect of endometrial cancer, pending pathology results.  We discussed that regardless of whether it is or not, she will likely be proceeding with a hysterectomy and she is in agreement with that plan. She continues to report heavy vaginal bleeding, especially with exercise.      She notes \"curves\" in her fingernails, which is new for her; she denies brittle hair, nails, heart palpitations, hoarseness and insomnia.  She denies abdominal pain, diarrhea, constipation, heartburn, early satiety and rectal bleeding.  She states there are no changes in her family history.    At this point, her priority is gynecological care and she will proceed with her routine screening for Attenuated FAP and her high risk breast screening, per below.    Individualized Surveillance Plan for Attenuated version of Familial Adenomatous Polyposis                 for Children and Adults Based on NCCN Guidelines Version 1.2020   Type of Screening  Recommendation  Last Done  Next Due    Recommendations for Age <21   Colon Cancer Screening     Small Adenoma/polyp burden (fewer than 20 adenomas, all <1 cm in diameter, none with advanced histology)     Colonoscopy and polypectomy every 1-2 years    Surgical evaluation and counseling if appropriate        NA - see below     NA - see below         Recommendations for Ages >21     Colon Cancer Screening     Small Adenoma/polyp burden (fewer than 20 adenomas, all <1 cm in diameter, none with advanced histology) Colonoscopy and polypectomy every 1-2 years    Colectomy with ileorectal " anastomosis (TRACEY) may be considered.    See below for flex sig report from 7/302020 August 2021   flexible sigmoidoscopy (along with EGD and ERCP) with Dr. Fernández        If adenoma burden cannot be handled endoscopically    Colectomy with TRACEY (preferred in most cases).   NA Review in future if necessary         Thyroid Cancer Screening    Baseline ultrasound of thyroid starting in teenage years.    If normal, consider repeating every 2-5 years.    If abnormal, refer to Endocrinology. 2/1/2020- US of the thyroid: Previously biopsied isthmus nodule is no longer present. There are numerous additional colloid cysts and a benign complex cyst   left lobe, not appreciably changed from previous.  August 2021, then, if normal, will re-image every 2 years.   General Annual physical examination.  Deferred August 2021         Duodenal (small bowel) screening Upper endoscopy (including complete visualization of ampulla of Vater) starting around age 20-25.   Consider baseline upper endoscopy earlier if colectomy is prior to age 20. 7/30/2020- EGD and flexible sigmoidoscopy:  15-20 duodenal polyps, measuring 1-3 mm, all of which were ablated    - Innumerable fundic gland polyps . A large 2-3 semi-pedunculated polyp in the proximal gastric body  (along the lesser curvature) was resected.                     Suspected very small adenomas flanking the   pancreaticobiliary orifices at the prior ampullectomy   site.   Normal esophagus.      August 2021  EGD/ERCP in conjunction with flex sig with Dr. Fernández   Duodenal findings:  Stage 0 (no polyposis) - repeat endoscopy every 4 years.  Stage 1 (minimal polyposis 1-4 tubular adenomas, 1-4 mm each) - repeat EGD every 2-3 years.  Stage 2 (mild polyposis - 5-9 tubular adenomas, 5-9 mm each) - repeat EGD every 1-3 years.  Stage 3 (moderate polyposis - >20 lesions or size >1 cm) - repeat EGD every 6-12 months  Stage 4 (dense polyposis or high grade dysplasia) - surgical evaluation, with  expert surveillance every 3-6 months.  Due to limited data, no screening recommendation is made for pancreatic cancer at this time.          Individualized Surveillance Plan for women  With 20% or greater lifetime risk of breast cancer   Per NCCN Breast Cancer Screening and Diagnosis Guidelines Version 1.2020   Recommended screening Test or procedure Last done Next Scheduled    Clinical encounter Clinical exam every 6-12 months.   Refer to genetic counseling if not already done.  Consider risk reduction strategies.   2/12/2021 August 2021   However, some family histories with breast cancers at a very young age, may warrant screening starting earlier.    *May begin at age 40 if breast cancers in the family occur at later ages.    Annual mammogram beginning 10 years younger than the earliest breast cancer in the family but not prior to age 30.    Recommend annual breast MRI to begin 10 years younger than the earliest breast cancer in the family but not prior to age 25.    Breast MRIs are preferably done on day 7-15 of the menstrual cycle in premenopausal women.   See below   See below   Breast screening for patients at high risk due to thoracic radiation between the ages of 10-30   Annual clinical exam beginning 8 years after radiation therapy.    Annual screening mammogram beginning at age 30 or 8 years after radiation therapy    Annual breast MRI, beginning at age 25 or 8 years after radiation therapy.       NA     NA   Women who have a lifetime risk of >20% based on history of LCIS or ADH/ALH Annual screening mammogram beginning at age of LCIS or ADH/ALH but not prior to age 30.    Consider annual MRI to begin at age of diagnosis of LCIS or ADH/ALH but not prior to age 25.    Consider risk reducing strategies. 12/20/2019- Screening tomosynthesis mammogram, BiRads1    2/3/2021- Breast MRI, BiRads1 Exam in August followed by tomosynthesis mammogram    Next breast MRI in February 2022    Recommend risk reducing  strategies for women with 1.7% 5 year risk of breast cancer. Previously discussed tamoxifen for prevention.  Will revisit after hysterectomy       I spent a total of 34 minutes on the day of the visit. Please see the note for further information on patient assessment and treatment.    REYNOLD March-CNS, OCN, AGN-BC  Clinical Nurse Specialist  Cancer Risk Management Program  [facility] [address]  [place] [address]    phone:  [phone]  Pager: [phone]  fax: [phone]    CC: MD FATMATA Mckeon MD Colleen Rivard Hunt, MD                  Sarah is a 55 year old who is being evaluated via a billable video visit.      How would you like to obtain your AVS? MyChart  If the video visit is dropped, the invitation should be resent by: Send to e-mail at: [email or web address]  Will anyone else be joining your video visit? No     SUKHDEV Patel      Video Start Time: 0950  Video-Visit Details    Type of service:  Video Visit    Video End Time:1009    Originating Location (pt. Location): Home    Distant Location (provider location):  [facility]     Platform used for Video Visit: [employer]      Again, thank you for allowing me to participate in the care of your patient.        Sincerely,        REYNOLD March CNS

## 2021-02-12 NOTE — PROGRESS NOTES
Sarah is a 55 year old who is being evaluated via a billable video visit.      How would you like to obtain your AVS? MyChart  If the video visit is dropped, the invitation should be resent by: Send to e-mail at: [email or web address]  Will anyone else be joining your video visit? No     SUKHDEV Patel      Video Start Time: 0950  Video-Visit Details    Type of service:  Video Visit    Video End Time:1009    Originating Location (pt. Location): Home    Distant Location (provider location):  [facility]     Platform used for Video Visit: [employer]

## 2021-02-15 ENCOUNTER — TELEPHONE (OUTPATIENT)
Dept: ONCOLOGY | Facility: CLINIC | Age: 56
End: 2021-02-15

## 2021-02-15 DIAGNOSIS — Z11.59 ENCOUNTER FOR SCREENING FOR OTHER VIRAL DISEASES: Primary | ICD-10-CM

## 2021-02-15 PROBLEM — C54.1 ENDOMETRIAL CANCER (H): Status: ACTIVE | Noted: 2021-02-15

## 2021-02-15 NOTE — TELEPHONE ENCOUNTER
Surgery is scheduled with Dr. Euceda on 2/25 at [facility].  Scheduled per orders.    H&P: to be completed by Surgeon    Additional appointments:   NO ADDITIONAL APPOINTMENTS NEEDED AT THIS TIME    COVID-19 test: 2/21 at [facility]    Post-op: will be scheduled by the clinic.     The RN completed the education regarding the surgery.     Patient will receive a phone call from pre-admission nurses 1-2 days prior to surgery with arrival and start time.    The surgery packet was sent via [employer].    Patient will complete COVID-19 test that was scheduled by surgical coordinator 2-4 days prior to surgery.     I called the patient and was able to confirm the scheduled information above.

## 2021-02-16 ENCOUNTER — VIRTUAL VISIT (OUTPATIENT)
Dept: ONCOLOGY | Facility: CLINIC | Age: 56
End: 2021-02-16
Attending: OBSTETRICS & GYNECOLOGY
Payer: COMMERCIAL

## 2021-02-16 ENCOUNTER — TELEPHONE (OUTPATIENT)
Dept: ONCOLOGY | Facility: CLINIC | Age: 56
End: 2021-02-16

## 2021-02-16 DIAGNOSIS — C54.1 ENDOMETRIAL CANCER (H): Primary | ICD-10-CM

## 2021-02-16 PROCEDURE — 999N001193 HC VIDEO/TELEPHONE VISIT; NO CHARGE

## 2021-02-16 PROCEDURE — 99214 OFFICE O/P EST MOD 30 MIN: CPT | Mod: 95 | Performed by: OBSTETRICS & GYNECOLOGY

## 2021-02-16 NOTE — PROGRESS NOTES
Sarah is a 55 year old who is being evaluated via a billable video visit.      How would you like to obtain your AVS? MyChart  If the video visit is dropped, the invitation should be resent by: Text to cell phone: [phone]  Will anyone else be joining your video visit? No      Video-Visit Details    Type of service:  Video Visit    Originating Location (pt. Location): Home    Distant Location (provider location):  [facility]     Platform used for Video Visit: Doximity    Consult Notes on Referred Patient        Dr. Clarissa Brown, APRN CNS  [address] [place] [address]       RE: Sarah Duran  : 1965  ANDREI: 2021    HPI:  Sarah Duran is 55 year old with grade 1 endometrial adenocarcinoma.  She is accompanied today by her .  Since her D&C she has had some bleeding but now is doing well.  Her pap smear from [facility] came back AGC but negative HPV.    Cancer Course:    She reports she began having daily bleeding in .  She notes she had previously gone 15 months with no bleeding.  She reports the bleeding is sometimes heavy like a period but then can also be somewhat light and only like spotting.  She had an attempted EMB with her primary gynecologist, however this was unsuccessful.  She is here today for a second opinion.    21:  US Pelvis:  Uterus: Measures 8.9 x 5.8 x 6.4 cm. The endometrium is thickened at 2.9 cm. The endometrium is heterogeneous in appearance with an irregular, nodular contour of the endometrial surface. There is hypoechoic complex fluid in the endometrial cavity likely containing blood. There is vascularity of the endometrium on color imaging. The abnormal endometrium occupies the majority of the uterus. Right Ovary: Measures 2.0 x 1.0 x 1.1 cm and appears unremarkable Right Ovary Blood Flow: Present. Left Ovary: Not seen. Free Fluid: No significant free fluid.There are no suspicious  adnexal masses.    2/10/21:  Hysteroscopy, D&C   Pathology:  Grade 1 endometrial adenocarcinoma, MMR intact, p53 wild type, ER/[place] +    Review of Systems:  Systemic           no weight changes; no fever; no chills; no night sweats; no appetite changes  Skin           no rashes, or lesions  Eye           no irritation; no changes in vision  Alise-Laryngeal           no dysphagia; no hoarseness   Pulmonary    no cough; no shortness of breath  Cardiovascular    no chest pain; no palpitations  Gastrointestinal    no diarrhea; no constipation; no abdominal pain; no changes in bowel  habits; no blood in stool  Genitourinary   no urinary frequency; no urinary urgency; no dysuria; no pain; no abnormal vaginal discharge; + abnormal vaginal bleeding  Breast    no breast discharge; no breast changes; no breast pain  Musculoskeletal    no myalgias; no arthralgias; no back pain  Psychiatric           no depressed mood; no anxiety    Hematologic            no tender lymph nodes; no noticeable swellings or lumps   Endocrine    no hot flashes; no heat/cold intolerance         Neurological   no tremor; no numbness and tingling; no headaches; no difficulty sleeping    Obstetrics and Gynecology History:  G0  no HRT use      Past Medical History:  Past Medical History:   Diagnosis Date     Acute deep vein thrombosis (DVT) of left lower extremity (H)      Asthma      Atypical ductal hyperplasia of right breast 10/2016    fibrocystic changes, PASH and atypical ductal hyperplasia on needle biopsy, s/p R excisional biopsy with radioactive seed localization     Endometrial cancer (H) 2/15/2021     Factor 5 Leiden mutation, heterozygous (H)     factor 5     Familial adenomatous polyposis 1988    clinical presentation; s/p total abdominal colectomy with TRACEY; confirmed APC+ 2018     GERD (gastroesophageal reflux disease)      Migraine      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    [id number]     PONV (postoperative nausea and  vomiting)        Past Surgical History:  Past Surgical History:   Procedure Laterality Date     BIOPSY BREAST SEED LOCALIZATION Right 11/30/2016    Procedure: BIOPSY BREAST SEED LOCALIZATION;  Surgeon: Jessica Rasheed MD;  Location: [facility] OR     C LAP,SURG,COLECTOMY,W/ANAST      for colon cancer     COLECTOMY      subtotal     COMBINED ESOPHAGOSCOPY, GASTROSCOPY, DUODENOSCOPY (EGD) WITH ARGON PLASMA COAGULATOR (APC) N/A 4/12/2018    Procedure: COMBINED ESOPHAGOSCOPY, GASTROSCOPY, DUODENOSCOPY (EGD) WITH ARGON PLASMA COAGULATOR (APC);  Esophagogastroduodenoscopy with polypectomy and polyp ablation;  Surgeon: Stephen Gasca MD;  Location: [facility] OR     DILATION AND CURETTAGE, HYSTEROSCOPY DIAGNOSTIC, COMBINED N/A 2/10/2021    Procedure: HYSTEROSCOPY, DIAGNOSTIC, WITH DILATION AND CURETTAGE OF UTERUS;  Surgeon: Sandie Samaniego MD;  [place] [facility]     ENDOSCOPIC RETROGRADE CHOLANGIOPANCREATOGRAPHY       ENDOSCOPIC ULTRASOUND UPPER GASTROINTESTINAL TRACT (GI) N/A 7/25/2019    Procedure: Endoscopic Ultrasound;  Surgeon: Stephen Gasca MD;  Location: [facility]     ESOPHAGOSCOPY, GASTROSCOPY, DUODENOSCOPY (EGD), COMBINED  11/5/2012    Procedure: COMBINED ESOPHAGOSCOPY, GASTROSCOPY, DUODENOSCOPY (EGD), BIOPSY SINGLE OR MULTIPLE;;  Surgeon: Angélica Garcia MD;  Location: [facility] GI     ESOPHAGOSCOPY, GASTROSCOPY, DUODENOSCOPY (EGD), COMBINED  12/3/2013    Procedure: COMBINED ESOPHAGOSCOPY, GASTROSCOPY, DUODENOSCOPY (EGD);;  Surgeon: Angélica Garcia MD;  Location: [facility]     ESOPHAGOSCOPY, GASTROSCOPY, DUODENOSCOPY (EGD), COMBINED N/A 3/28/2018    Procedure: COMBINED ESOPHAGOSCOPY, GASTROSCOPY, DUODENOSCOPY (EGD);;  Surgeon: Stephen Gasca MD;  Location: [facility]     ESOPHAGOSCOPY, GASTROSCOPY, DUODENOSCOPY (EGD), COMBINED N/A 5/30/2019    Procedure: Esophagogastroduodenoscopy with biopsy x2 APC;  Surgeon: Stephen Gasca MD;  Location: [facility]     ESOPHAGOSCOPY, GASTROSCOPY, DUODENOSCOPY  (EGD), COMBINED N/A 7/30/2020    Procedure: ESOPHAGOGASTRODUODENOSCOPY (EGD) with polypectomy by hot snare; argonplasma coaglution ablation of polyps;  Surgeon: Stephen Gasca MD;  Location: [facility]     ESOPHAGOSCOPY, GASTROSCOPY, DUODENOSCOPY (EGD), RESECT MUCOSA, COMBINED N/A 7/25/2019    Procedure: Esophagogastroduodenoscopy with gastric mucosal resection and clip application;  Surgeon: Stephen Gasca MD;  Location: [facility]     EXCISE POLYP RECTUM       LAPAROSCOPIC LYSIS ADHESIONS       SIGMOIDOSCOPY FLEXIBLE       SIGMOIDOSCOPY FLEXIBLE N/A 12/7/2015    Procedure: SIGMOIDOSCOPY FLEXIBLE;  Surgeon: Mariela Bearden MD;  Location: [facility]     SIGMOIDOSCOPY FLEXIBLE N/A 3/28/2018    Procedure: SIGMOIDOSCOPY FLEXIBLE;  EGD/Flex Sig;  Surgeon: Stephen Gasca MD;  Location: [facility]     SIGMOIDOSCOPY FLEXIBLE N/A 4/12/2018    Procedure: SIGMOIDOSCOPY FLEXIBLE;  Flexible Sigmoidoscopy with polyp ablation;  Surgeon: Stephen Gasca MD;  Location: [facility]     SIGMOIDOSCOPY FLEXIBLE N/A 5/30/2019    Procedure: Flexible Sigmoidoscopy with polypectomy;  Surgeon: Stephen Gasca MD;  Location: [facility]     SIGMOIDOSCOPY FLEXIBLE N/A 7/30/2020    Procedure: SIGMOIDOSCOPY, FLEXIBLE with Ablation of Polyps;  Surgeon: Stephen Gasca MD;  Location: [facility]       Health Maintenance:  Last Pap Smear: 1/25/21            Result: AGC, HPV negative  She has not had a history of abnormal Pap smears.    Last Mammogram: 12/20/19              Result: normal    Last MRI 2/3/21  Result:  Normal     She has a history of abnormal ductal hyperplasia    Last Colonoscopy: 7/30/20              Result: normal-repeat yearly due to FAP      Current Medications:   has a current medication list which includes the following prescription(s): acetaminophen, albuterol, calcium-magnesium, celecoxib, docusate sodium, fexofenadine, ibuprofen, melatonin, multivitamin w/minerals, omega-3 fatty acids, ondansetron, triamcinolone, and  zolmitriptan, and the following Facility-Administered Medications: ondansetron.     Allergies:   Corn-related products, Cipro [ciprofloxacin], Flagyl [metronidazole], Garlic, Keflex [cephalosporins], Omeprazole, Prilosec otc [omeprazole magnesium], Cephalexin, Doxycycline, Erythromycin, and Sulfa drugs      Social History:  Social History     Socioeconomic History     Marital status:      Spouse name: Not on file     Number of children: 2     Years of education: Not on file     Highest education level: Not on file   Occupational History     Occupation:      Employer: [employer]   Social Needs     Financial resource strain: Not on file     Food insecurity     Worry: Not on file     Inability: Not on file     Transportation needs     Medical: Not on file     Non-medical: Not on file   Tobacco Use     Smoking status: Never Smoker     Smokeless tobacco: Never Used   Substance and Sexual Activity     Alcohol use: Yes     Comment: 1 or 2 drinks per year     Drug use: No     Sexual activity: Not on file     Comment: denies pregnancy   Lifestyle     Physical activity     Days per week: Not on file     Minutes per session: Not on file     Stress: Not on file   Relationships     Social connections     Talks on phone: Not on file     Gets together: Not on file     Attends [employer] service: Not on file     Active member of club or organization: Not on file     Attends meetings of clubs or organizations: Not on file     Relationship status: Not on file     Intimate partner violence     Fear of current or ex partner: Not on file     Emotionally abused: Not on file     Physically abused: Not on file     Forced sexual activity: Not on file   Other Topics Concern     Parent/sibling w/ CABG, MI or angioplasty before 65F 55M? Not Asked   Social History Narrative    Two adopted daughters, ages 13 and 15       Lives with spouse and children, feels safe at home.  Works as an .  Enjoys working out,  spending time with children, shopping.  Does not have an advanced directive on file and would like her  to be her POA.  Full code.    Family History:   The patient's family history is significant for.  Family History   Problem Relation Age of Onset     Hyperlipidemia Mother      Cerebrovascular Disease Mother      Cerebrovascular Disease Father      Colon Cancer Father 57         at 63     Prostate Cancer Brother 53     Prostate Cancer Maternal Uncle 65         Physical Exam:   GENERAL: Healthy, alert and no distress  EYES: Eyes grossly normal to inspection.  No discharge or erythema, or obvious scleral/conjunctival abnormalities.  HENT: Normal cephalic/atraumatic.  External ears, nose and mouth without ulcers or lesions.  No nasal drainage visible.  NECK: No asymmetry, visible masses or scars  RESP: No audible wheeze, cough, or visible cyanosis.  No visible retractions or increased work of breathing.    MS: No gross musculoskeletal defects noted.  Normal range of motion.  No visible edema.  SKIN: Visible skin clear. No significant rash, abnormal pigmentation or lesions.  NEURO: Cranial nerves grossly intact.  Mentation and speech appropriate for age.  PSYCH: Mentation appears normal, affect normal/bright, judgement and insight intact, normal speech and appearance well-groomed.       Assessment:    Sarah Duran is a 55 year old woman with grade 1 endometrial adenocarcinoma.     A total of 30 minutes was spent on patient care.      Plan:     1.)    Grade 1 endometrial adenocarcinoma.  Discussed natural history and progression of disease.  Discussed standard surgical staging procedure.  Discussed the role of sentinel lymph node dissection and that if sentinel lymph node could not be identified on one or both sides then we would use frozen section analysis to determine if full lymph node dissection was necessary.  Discussed role of frozen section analysis and that further surgical decisions would be based  on these findings.  Discussed the small possibility of adjuvant post-operative therapy. We also reviewed the higher chance of conversion to an open procedure given her significant surgical history and that this may also interfere with our ability to identify sentinel lymph nodes. Discussed her AGV pap with negative HPV, is likely due to her endometrial cancer so she does not need further work up for this.  Risks, benefits, indications and alternatives discussed with the patient.  All her questions were answered and consents were signed for laparoscopic removal of uterus, cervix, both ovaries and fallopian tubes, sentinel lymph node dissection, possible full lymph node dissection, possible open, cystoscopy on 2/25.      2.) Genetic risk factors were assessed and the patient is POSITIVE for a APC mutation.  Specifically her mutation is called [id number],  consistent with a diagnosis of familial adenomatous polyposis (FAP) or attenuated FAP (AFAP)    3.) Labs and/or tests ordered include:  CBC, CMP, T/S     4.) Health maintenance issues addressed today include pt is up to date.    5.) Pre-op teaching was completed today.        Thank you for allowing us to participate in the care of your patient.         Sincerely,    Sandie Euceda MD  Gynecologic Oncology  [facility]       MYCHAL IPNZON

## 2021-02-16 NOTE — LETTER
2021         RE: Sarah Duran  [address] [facility] [address]        Dear Colleague,    Thank you for referring your patient, Sarah Duran, to the [facility]. Please see a copy of my visit note below.    Sarah is a 55 year old who is being evaluated via a billable video visit.      How would you like to obtain your AVS? MyChart  If the video visit is dropped, the invitation should be resent by: Text to cell phone: [phone]  Will anyone else be joining your video visit? No      Video-Visit Details    Type of service:  Video Visit    Originating Location (pt. Location): Home    Distant Location (provider location):  [facility]     Platform used for Video Visit: Doximity    Consult Notes on Referred Patient        Dr. Clarissa Brown, APRN [facility]  [address]  [place] [address]       RE: Sarah Duran  : 1965  ANDREI: 2021    HPI:  Sarah Duran is 55 year old with grade 1 endometrial adenocarcinoma.  She is accompanied today by her .  Since her D&C she has had some bleeding but now is doing well.  Her pap smear from [facility] came back AGC but negative HPV.    Cancer Course:    She reports she began having daily bleeding in .  She notes she had previously gone 15 months with no bleeding.  She reports the bleeding is sometimes heavy like a period but then can also be somewhat light and only like spotting.  She had an attempted EMB with her primary gynecologist, however this was unsuccessful.  She is here today for a second opinion.    21:  US Pelvis:  Uterus: Measures 8.9 x 5.8 x 6.4 cm. The endometrium is thickened at 2.9 cm. The endometrium is heterogeneous in appearance with an irregular, nodular contour of the endometrial surface. There is hypoechoic complex fluid in the endometrial cavity likely containing blood. There is vascularity of the endometrium on color  imaging. The abnormal endometrium occupies the majority of the uterus. Right Ovary: Measures 2.0 x 1.0 x 1.1 cm and appears unremarkable Right Ovary Blood Flow: Present. Left Ovary: Not seen. Free Fluid: No significant free fluid.There are no suspicious adnexal masses.    2/10/21:  Hysteroscopy, D&C   Pathology:  Grade 1 endometrial adenocarcinoma, MMR intact, p53 wild type, ER/ID +    Review of Systems:  Systemic           no weight changes; no fever; no chills; no night sweats; no appetite changes  Skin           no rashes, or lesions  Eye           no irritation; no changes in vision  Alise-Laryngeal           no dysphagia; no hoarseness   Pulmonary    no cough; no shortness of breath  Cardiovascular    no chest pain; no palpitations  Gastrointestinal    no diarrhea; no constipation; no abdominal pain; no changes in bowel  habits; no blood in stool  Genitourinary   no urinary frequency; no urinary urgency; no dysuria; no pain; no abnormal vaginal discharge; + abnormal vaginal bleeding  Breast    no breast discharge; no breast changes; no breast pain  Musculoskeletal    no myalgias; no arthralgias; no back pain  Psychiatric           no depressed mood; no anxiety    Hematologic            no tender lymph nodes; no noticeable swellings or lumps   Endocrine    no hot flashes; no heat/cold intolerance         Neurological   no tremor; no numbness and tingling; no headaches; no difficulty sleeping    Obstetrics and Gynecology History:  G0  no HRT use      Past Medical History:  Past Medical History:   Diagnosis Date     Acute deep vein thrombosis (DVT) of left lower extremity (H)      Asthma      Atypical ductal hyperplasia of right breast 10/2016    fibrocystic changes, PASH and atypical ductal hyperplasia on needle biopsy, s/p R excisional biopsy with radioactive seed localization     Endometrial cancer (H) 2/15/2021     Factor 5 Leiden mutation, heterozygous (H)     factor 5     Familial adenomatous polyposis 1988     clinical presentation; s/p total abdominal colectomy with TRACEY; confirmed APC+ 2018     GERD (gastroesophageal reflux disease)      Migraine      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    [id number]     PONV (postoperative nausea and vomiting)        Past Surgical History:  Past Surgical History:   Procedure Laterality Date     BIOPSY BREAST SEED LOCALIZATION Right 11/30/2016    Procedure: BIOPSY BREAST SEED LOCALIZATION;  Surgeon: Jessica Rasheed MD;  Location: [facility] OR     C LAP,SURG,COLECTOMY,W/ANAST      for colon cancer     COLECTOMY      subtotal     COMBINED ESOPHAGOSCOPY, GASTROSCOPY, DUODENOSCOPY (EGD) WITH ARGON PLASMA COAGULATOR (APC) N/A 4/12/2018    Procedure: COMBINED ESOPHAGOSCOPY, GASTROSCOPY, DUODENOSCOPY (EGD) WITH ARGON PLASMA COAGULATOR (APC);  Esophagogastroduodenoscopy with polypectomy and polyp ablation;  Surgeon: Stephen Gasca MD;  Location: [facility]     DILATION AND CURETTAGE, HYSTEROSCOPY DIAGNOSTIC, COMBINED N/A 2/10/2021    Procedure: HYSTEROSCOPY, DIAGNOSTIC, WITH DILATION AND CURETTAGE OF UTERUS;  Surgeon: Sandie Samaniego MD;  Location: [facility]     ENDOSCOPIC RETROGRADE CHOLANGIOPANCREATOGRAPHY       ENDOSCOPIC ULTRASOUND UPPER GASTROINTESTINAL TRACT (GI) N/A 7/25/2019    Procedure: Endoscopic Ultrasound;  Surgeon: Stephen Gasca MD;  [facility]     ESOPHAGOSCOPY, GASTROSCOPY, DUODENOSCOPY (EGD), COMBINED  11/5/2012    Procedure: COMBINED ESOPHAGOSCOPY, GASTROSCOPY, DUODENOSCOPY (EGD), BIOPSY SINGLE OR MULTIPLE;;  Surgeon: Angélica Garcia MD;  Location: [facility]     ESOPHAGOSCOPY, GASTROSCOPY, DUODENOSCOPY (EGD), COMBINED  12/3/2013    Procedure: COMBINED ESOPHAGOSCOPY, GASTROSCOPY, DUODENOSCOPY (EGD);;  Surgeon: Angélica Garcia MD;  [facility]     ESOPHAGOSCOPY, GASTROSCOPY, DUODENOSCOPY (EGD), COMBINED N/A 3/28/2018    Procedure: COMBINED ESOPHAGOSCOPY, GASTROSCOPY, DUODENOSCOPY (EGD);;  Surgeon: Stephen Gasca MD;   [facility]     ESOPHAGOSCOPY, GASTROSCOPY, DUODENOSCOPY (EGD), COMBINED N/A 5/30/2019    Procedure: Esophagogastroduodenoscopy with biopsy x2 APC;  Surgeon: Stephen Gasca MD;  Location[facility]     ESOPHAGOSCOPY, GASTROSCOPY, DUODENOSCOPY (EGD), COMBINED N/A 7/30/2020    Procedure: ESOPHAGOGASTRODUODENOSCOPY (EGD) with polypectomy by hot snare; argonplasma coaglution ablation of polyps;  Surgeon: Stephen Gasca MD;  Location: [facility]     ESOPHAGOSCOPY, GASTROSCOPY, DUODENOSCOPY (EGD), RESECT MUCOSA, COMBINED N/A 7/25/2019    Procedure: Esophagogastroduodenoscopy with gastric mucosal resection and clip application;  Surgeon: Stephen Gasca MD;  Location: [facility]     EXCISE POLYP RECTUM       LAPAROSCOPIC LYSIS ADHESIONS       SIGMOIDOSCOPY FLEXIBLE       SIGMOIDOSCOPY FLEXIBLE N/A 12/7/2015    Procedure: SIGMOIDOSCOPY FLEXIBLE;  Surgeon: Mariela Bearden MD;  Location: [facility]     SIGMOIDOSCOPY FLEXIBLE N/A 3/28/2018    Procedure: SIGMOIDOSCOPY FLEXIBLE;  EGD/Flex Sig;  Surgeon: Stephen Gasca MD;  [facility]     SIGMOIDOSCOPY FLEXIBLE N/A 4/12/2018    Procedure: SIGMOIDOSCOPY FLEXIBLE;  Flexible Sigmoidoscopy with polyp ablation;  Surgeon: Stephen Gasca MD;  [facility]     SIGMOIDOSCOPY FLEXIBLE N/A 5/30/2019    Procedure: Flexible Sigmoidoscopy with polypectomy;  Surgeon: Stephen Gasca MD;  [facility]     SIGMOIDOSCOPY FLEXIBLE N/A 7/30/2020    Procedure: SIGMOIDOSCOPY, FLEXIBLE with Ablation of Polyps;  Surgeon: Stephen Gasca MD;  Location: [facility]       Health Maintenance:  Last Pap Smear: 1/25/21            Result: AGC, HPV negative  She has not had a history of abnormal Pap smears.    Last Mammogram: 12/20/19              Result: normal    Last MRI 2/3/21  Result:  Normal     She has a history of abnormal ductal hyperplasia    Last Colonoscopy: 7/30/20              Result: normal-repeat yearly due to FAP      Current Medications:   has a current  medication list which includes the following prescription(s): acetaminophen, albuterol, calcium-magnesium, celecoxib, docusate sodium, fexofenadine, ibuprofen, melatonin, multivitamin w/minerals, omega-3 fatty acids, ondansetron, triamcinolone, and zolmitriptan, and the following Facility-Administered Medications: ondansetron.     Allergies:   Corn-related products, Cipro [ciprofloxacin], Flagyl [metronidazole], Garlic, Keflex [cephalosporins], Omeprazole, Prilosec otc [omeprazole magnesium], Cephalexin, Doxycycline, Erythromycin, and Sulfa drugs      Social History:  Social History     Socioeconomic History     Marital status:      Spouse name: Not on file     Number of children: 2     Years of education: Not on file     Highest education level: Not on file   Occupational History     Occupation:      Employer: [employer]   Social Needs     Financial resource strain: Not on file     Food insecurity     Worry: Not on file     Inability: Not on file     Transportation needs     Medical: Not on file     Non-medical: Not on file   Tobacco Use     Smoking status: Never Smoker     Smokeless tobacco: Never Used   Substance and Sexual Activity     Alcohol use: Yes     Comment: 1 or 2 drinks per year     Drug use: No     Sexual activity: Not on file     Comment: denies pregnancy   Lifestyle     Physical activity     Days per week: Not on file     Minutes per session: Not on file     Stress: Not on file   Relationships     Social connections     Talks on phone: Not on file     Gets together: Not on file     Attends Anabaptism service: Not on file     Active member of club or organization: Not on file     Attends meetings of clubs or organizations: Not on file     Relationship status: Not on file     Intimate partner violence     Fear of current or ex partner: Not on file     Emotionally abused: Not on file     Physically abused: Not on file     Forced sexual activity: Not on file   Other Topics Concern      Parent/sibling w/ CABG, MI or angioplasty before 65F 55M? Not Asked   Social History Narrative    Two adopted daughters, ages 13 and 15       Lives with spouse and children, feels safe at home.  Works as an .  Enjoys working out, spending time with children, shopping.  Does not have an advanced directive on file and would like her  to be her POA.  Full code.    Family History:   The patient's family history is significant for.  Family History   Problem Relation Age of Onset     Hyperlipidemia Mother      Cerebrovascular Disease Mother      Cerebrovascular Disease Father      Colon Cancer Father 57         at 63     Prostate Cancer Brother 53     Prostate Cancer Maternal Uncle 65         Physical Exam:   GENERAL: Healthy, alert and no distress  EYES: Eyes grossly normal to inspection.  No discharge or erythema, or obvious scleral/conjunctival abnormalities.  HENT: Normal cephalic/atraumatic.  External ears, nose and mouth without ulcers or lesions.  No nasal drainage visible.  NECK: No asymmetry, visible masses or scars  RESP: No audible wheeze, cough, or visible cyanosis.  No visible retractions or increased work of breathing.    MS: No gross musculoskeletal defects noted.  Normal range of motion.  No visible edema.  SKIN: Visible skin clear. No significant rash, abnormal pigmentation or lesions.  NEURO: Cranial nerves grossly intact.  Mentation and speech appropriate for age.  PSYCH: Mentation appears normal, affect normal/bright, judgement and insight intact, normal speech and appearance well-groomed.       Assessment:    Sarah Duran is a 55 year old woman with grade 1 endometrial adenocarcinoma.     A total of 30 minutes was spent on patient care.      Plan:     1.)    Grade 1 endometrial adenocarcinoma.  Discussed natural history and progression of disease.  Discussed standard surgical staging procedure.  Discussed the role of sentinel lymph node dissection and that if sentinel lymph  node could not be identified on one or both sides then we would use frozen section analysis to determine if full lymph node dissection was necessary.  Discussed role of frozen section analysis and that further surgical decisions would be based on these findings.  Discussed the small possibility of adjuvant post-operative therapy. We also reviewed the higher chance of conversion to an open procedure given her significant surgical history and that this may also interfere with our ability to identify sentinel lymph nodes. Discussed her AGV pap with negative HPV, is likely due to her endometrial cancer so she does not need further work up for this.  Risks, benefits, indications and alternatives discussed with the patient.  All her questions were answered and consents were signed for laparoscopic removal of uterus, cervix, both ovaries and fallopian tubes, sentinel lymph node dissection, possible full lymph node dissection, possible open, cystoscopy on 2/25.      2.) Genetic risk factors were assessed and the patient is POSITIVE for a APC mutation.  Specifically her mutation is called [id number],  consistent with a diagnosis of familial adenomatous polyposis (FAP) or attenuated FAP (AFAP)    3.) Labs and/or tests ordered include:  CBC, CMP, T/S     4.) Health maintenance issues addressed today include pt is up to date.    5.) Pre-op teaching was completed today.        Thank you for allowing us to participate in the care of your patient.         Sincerely,    Sandie Euceda MD  Gynecologic Oncology  [facility]       MYCHAL [facility]ZON         Again, thank you for allowing me to participate in the care of your patient.        Sincerely,        Teja Euceda MD

## 2021-02-16 NOTE — PATIENT INSTRUCTIONS
You have been scheduled for surgery on: 2/25    Diagnosis:  Endometrial cancer    The surgical procedure is:  laparoscopic removal of uterus, cervix, both ovaries and fallopian tubes, sentinel lymph node dissection, possible full lymph node dissection, possible open, cystoscopy    Anticipated length of surgery: 2-3 hrs.  You will be in the recovery room for approximately 2-3 hrs after surgery.  Your family will not be able to see you until after you leave the recovery room.    Length of hospital stay:  This is an outpatient, extended stay surgery.  You will be discharged same day if you meet criteria for discharge.  If you have a larger surgical incision, you will need to be in the hospital 2-3 days.  ______________________________________________________________________    Preparation for Surgery:    To Schedule   1.  Labs prior to surgery   2.  Virtual visit with me 1-2 weeks following surgery      Postoperative Restrictions:  No heavy lifting >20lbs for six weeks, nothing in the vagina (no tampons, no intercourse, no douching) for eight weeks.     Postoperative visit:  Virtual visit 1-2 weeks after surgery for post operative visit.      Please contact the clinic with any questions or concerns.    Sandie Euceda MD  Gynecologic Oncology  [facility]

## 2021-02-16 NOTE — LETTER
2021         RE: Sarah Duran  [address] [address]        Dear Colleague,    Thank you for referring your patient, Sarah Duran, to the [facility]. Please see a copy of my visit note below.    Sarah is a 55 year old who is being evaluated via a billable video visit.      How would you like to obtain your AVS? MyChart  If the video visit is dropped, the invitation should be resent by: Text to cell phone: [phone]  Will anyone else be joining your video visit? No      Video-Visit Details    Type of service:  Video Visit    Originating Location (pt. Location): Home    Distant Location (provider location):  [facility]     Platform used for Video Visit: Doximity    Consult Notes on Referred Patient        Dr. Clarissa Brown, APRN [facility]  [address]  [place] [address]       RE: Sarah Duran  : 1965  ANDREI: 2021    HPI:  Sarah Duran is 55 year old with grade 1 endometrial adenocarcinoma.  She is accompanied today by her .  Since her D&C she has had some bleeding but now is doing well.  Her pap smear from [facility] came back AGC but negative HPV.    Cancer Course:    She reports she began having daily bleeding in .  She notes she had previously gone 15 months with no bleeding.  She reports the bleeding is sometimes heavy like a period but then can also be somewhat light and only like spotting.  She had an attempted EMB with her primary gynecologist, however this was unsuccessful.  She is here today for a second opinion.    21:  US Pelvis:  Uterus: Measures 8.9 x 5.8 x 6.4 cm. The endometrium is thickened at 2.9 cm. The endometrium is heterogeneous in appearance with an irregular, nodular contour of the endometrial surface. There is hypoechoic complex fluid in the endometrial cavity likely containing blood. There is vascularity of the endometrium on color  imaging. The abnormal endometrium occupies the majority of the uterus. Right Ovary: Measures 2.0 x 1.0 x 1.1 cm and appears unremarkable Right Ovary Blood Flow: Present. Left Ovary: Not seen. Free Fluid: No significant free fluid.There are no suspicious adnexal masses.    2/10/21:  Hysteroscopy, D&C   Pathology:  Grade 1 endometrial adenocarcinoma, MMR intact, p53 wild type, ER/DE +    Review of Systems:  Systemic           no weight changes; no fever; no chills; no night sweats; no appetite changes  Skin           no rashes, or lesions  Eye           no irritation; no changes in vision  Alise-Laryngeal           no dysphagia; no hoarseness   Pulmonary    no cough; no shortness of breath  Cardiovascular    no chest pain; no palpitations  Gastrointestinal    no diarrhea; no constipation; no abdominal pain; no changes in bowel  habits; no blood in stool  Genitourinary   no urinary frequency; no urinary urgency; no dysuria; no pain; no abnormal vaginal discharge; + abnormal vaginal bleeding  Breast    no breast discharge; no breast changes; no breast pain  Musculoskeletal    no myalgias; no arthralgias; no back pain  Psychiatric           no depressed mood; no anxiety    Hematologic            no tender lymph nodes; no noticeable swellings or lumps   Endocrine    no hot flashes; no heat/cold intolerance         Neurological   no tremor; no numbness and tingling; no headaches; no difficulty sleeping    Obstetrics and Gynecology History:  G0  no HRT use      Past Medical History:  Past Medical History:   Diagnosis Date     Acute deep vein thrombosis (DVT) of left lower extremity (H)      Asthma      Atypical ductal hyperplasia of right breast 10/2016    fibrocystic changes, PASH and atypical ductal hyperplasia on needle biopsy, s/p R excisional biopsy with radioactive seed localization     Endometrial cancer (H) 2/15/2021     Factor 5 Leiden mutation, heterozygous (H)     factor 5     Familial adenomatous polyposis 1988     clinical presentation; s/p total abdominal colectomy with TRACEY; confirmed APC+ 2018     GERD (gastroesophageal reflux disease)      Migraine      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    [id number]     PONV (postoperative nausea and vomiting)        Past Surgical History:  Past Surgical History:   Procedure Laterality Date     BIOPSY BREAST SEED LOCALIZATION Right 11/30/2016    Procedure: BIOPSY BREAST SEED LOCALIZATION;  Surgeon: Jessica Rasheed MD;  Location: [facility] OR     C LAP,SURG,COLECTOMY,W/ANAST      for colon cancer     COLECTOMY      subtotal     COMBINED ESOPHAGOSCOPY, GASTROSCOPY, DUODENOSCOPY (EGD) WITH ARGON PLASMA COAGULATOR (APC) N/A 4/12/2018    Procedure: COMBINED ESOPHAGOSCOPY, GASTROSCOPY, DUODENOSCOPY (EGD) WITH ARGON PLASMA COAGULATOR (APC);  Esophagogastroduodenoscopy with polypectomy and polyp ablation;  Surgeon: Stephen Gasca MD;  Location: [facility] OR     DILATION AND CURETTAGE, HYSTEROSCOPY DIAGNOSTIC, COMBINED N/A 2/10/2021    Procedure: HYSTEROSCOPY, DIAGNOSTIC, WITH DILATION AND CURETTAGE OF UTERUS;  Surgeon: Sandie Samaniego MD;  Location: [facility] OR     ENDOSCOPIC RETROGRADE CHOLANGIOPANCREATOGRAPHY       ENDOSCOPIC ULTRASOUND UPPER GASTROINTESTINAL TRACT (GI) N/A 7/25/2019    Procedure: Endoscopic Ultrasound;  Surgeon: Stephen Gasca MD;  [facility]     ESOPHAGOSCOPY, GASTROSCOPY, DUODENOSCOPY (EGD), COMBINED  11/5/2012    Procedure: COMBINED ESOPHAGOSCOPY, GASTROSCOPY, DUODENOSCOPY (EGD), BIOPSY SINGLE OR MULTIPLE;;  Surgeon: Angélica Garcia MD;  [facility] GI     ESOPHAGOSCOPY, GASTROSCOPY, DUODENOSCOPY (EGD), COMBINED  12/3/2013    Procedure: COMBINED ESOPHAGOSCOPY, GASTROSCOPY, DUODENOSCOPY (EGD);;  Surgeon: Angélica Garcia MD;  [facility]     ESOPHAGOSCOPY, GASTROSCOPY, DUODENOSCOPY (EGD), COMBINED N/A 3/28/2018    Procedure: COMBINED ESOPHAGOSCOPY, GASTROSCOPY, DUODENOSCOPY (EGD);;  Surgeon: Stephen Gasca MD;   [facility]     ESOPHAGOSCOPY, GASTROSCOPY, DUODENOSCOPY (EGD), COMBINED N/A 5/30/2019    Procedure: Esophagogastroduodenoscopy with biopsy x2 APC;  Surgeon: Stephen Gasca MD;  [facility]     ESOPHAGOSCOPY, GASTROSCOPY, DUODENOSCOPY (EGD), COMBINED N/A 7/30/2020    Procedure: ESOPHAGOGASTRODUODENOSCOPY (EGD) with polypectomy by hot snare; argonplasma coaglution ablation of polyps;  Surgeon: Stephen Gasca MD;  [facility]     ESOPHAGOSCOPY, GASTROSCOPY, DUODENOSCOPY (EGD), RESECT MUCOSA, COMBINED N/A 7/25/2019    Procedure: Esophagogastroduodenoscopy with gastric mucosal resection and clip application;  Surgeon: Stephen Gasca MD;  Location[facility] OR     EXCISE POLYP RECTUM       LAPAROSCOPIC LYSIS ADHESIONS       SIGMOIDOSCOPY FLEXIBLE       SIGMOIDOSCOPY FLEXIBLE N/A 12/7/2015    Procedure: SIGMOIDOSCOPY FLEXIBLE;  Surgeon: Mariela Bearden MD;  [facility]     SIGMOIDOSCOPY FLEXIBLE N/A 3/28/2018    Procedure: SIGMOIDOSCOPY FLEXIBLE;  EGD/Flex Sig;  Surgeon: Stephen Gasca MD;  [facility]     SIGMOIDOSCOPY FLEXIBLE N/A 4/12/2018    Procedure: SIGMOIDOSCOPY FLEXIBLE;  Flexible Sigmoidoscopy with polyp ablation;  Surgeon: Stephen Gasca MD;  [facility]     SIGMOIDOSCOPY FLEXIBLE N/A 5/30/2019    Procedure: Flexible Sigmoidoscopy with polypectomy;  Surgeon: Stephen Gasca MD;  [facility]     SIGMOIDOSCOPY FLEXIBLE N/A 7/30/2020    Procedure: SIGMOIDOSCOPY, FLEXIBLE with Ablation of Polyps;  Surgeon: Stephen Gasca MD;  [facility]       Health Maintenance:  Last Pap Smear: 1/25/21            Result: AGC, HPV negative  She has not had a history of abnormal Pap smears.    Last Mammogram: 12/20/19              Result: normal    Last MRI 2/3/21  Result:  Normal     She has a history of abnormal ductal hyperplasia    Last Colonoscopy: 7/30/20              Result: normal-repeat yearly due to FAP      Current Medications:   has a current  medication list which includes the following prescription(s): acetaminophen, albuterol, calcium-magnesium, celecoxib, docusate sodium, fexofenadine, ibuprofen, melatonin, multivitamin w/minerals, omega-3 fatty acids, ondansetron, triamcinolone, and zolmitriptan, and the following Facility-Administered Medications: ondansetron.     Allergies:   Corn-related products, Cipro [ciprofloxacin], Flagyl [metronidazole], Garlic, Keflex [cephalosporins], Omeprazole, Prilosec otc [omeprazole magnesium], Cephalexin, Doxycycline, Erythromycin, and Sulfa drugs      Social History:  Social History     Socioeconomic History     Marital status:      Spouse name: Not on file     Number of children: 2     Years of education: Not on file     Highest education level: Not on file   Occupational History     Occupation:      Employer: [employer]   Social Needs     Financial resource strain: Not on file     Food insecurity     Worry: Not on file     Inability: Not on file     Transportation needs     Medical: Not on file     Non-medical: Not on file   Tobacco Use     Smoking status: Never Smoker     Smokeless tobacco: Never Used   Substance and Sexual Activity     Alcohol use: Yes     Comment: 1 or 2 drinks per year     Drug use: No     Sexual activity: Not on file     Comment: denies pregnancy   Lifestyle     Physical activity     Days per week: Not on file     Minutes per session: Not on file     Stress: Not on file   Relationships     Social connections     Talks on phone: Not on file     Gets together: Not on file     Attends Restorationist service: Not on file     Active member of club or organization: Not on file     Attends meetings of clubs or organizations: Not on file     Relationship status: Not on file     Intimate partner violence     Fear of current or ex partner: Not on file     Emotionally abused: Not on file     Physically abused: Not on file     Forced sexual activity: Not on file   Other Topics Concern      Parent/sibling w/ CABG, MI or angioplasty before 65F 55M? Not Asked   Social History Narrative    Two adopted daughters, ages 13 and 15       Lives with spouse and children, feels safe at home.  Works as an .  Enjoys working out, spending time with children, shopping.  Does not have an advanced directive on file and would like her  to be her POA.  Full code.    Family History:   The patient's family history is significant for.  Family History   Problem Relation Age of Onset     Hyperlipidemia Mother      Cerebrovascular Disease Mother      Cerebrovascular Disease Father      Colon Cancer Father 57         at 63     Prostate Cancer Brother 53     Prostate Cancer Maternal Uncle 65         Physical Exam:   GENERAL: Healthy, alert and no distress  EYES: Eyes grossly normal to inspection.  No discharge or erythema, or obvious scleral/conjunctival abnormalities.  HENT: Normal cephalic/atraumatic.  External ears, nose and mouth without ulcers or lesions.  No nasal drainage visible.  NECK: No asymmetry, visible masses or scars  RESP: No audible wheeze, cough, or visible cyanosis.  No visible retractions or increased work of breathing.    MS: No gross musculoskeletal defects noted.  Normal range of motion.  No visible edema.  SKIN: Visible skin clear. No significant rash, abnormal pigmentation or lesions.  NEURO: Cranial nerves grossly intact.  Mentation and speech appropriate for age.  PSYCH: Mentation appears normal, affect normal/bright, judgement and insight intact, normal speech and appearance well-groomed.       Assessment:    Sarah Duran is a 55 year old woman with grade 1 endometrial adenocarcinoma.     A total of 30 minutes was spent on patient care.      Plan:     1.)    Grade 1 endometrial adenocarcinoma.  Discussed natural history and progression of disease.  Discussed standard surgical staging procedure.  Discussed the role of sentinel lymph node dissection and that if sentinel lymph  node could not be identified on one or both sides then we would use frozen section analysis to determine if full lymph node dissection was necessary.  Discussed role of frozen section analysis and that further surgical decisions would be based on these findings.  Discussed the small possibility of adjuvant post-operative therapy. We also reviewed the higher chance of conversion to an open procedure given her significant surgical history and that this may also interfere with our ability to identify sentinel lymph nodes. Discussed her AGV pap with negative HPV, is likely due to her endometrial cancer so she does not need further work up for this.  Risks, benefits, indications and alternatives discussed with the patient.  All her questions were answered and consents were signed for laparoscopic removal of uterus, cervix, both ovaries and fallopian tubes, sentinel lymph node dissection, possible full lymph node dissection, possible open, cystoscopy on 2/25.      2.) Genetic risk factors were assessed and the patient is POSITIVE for a APC mutation.  Specifically her mutation is called [id number],  consistent with a diagnosis of familial adenomatous polyposis (FAP) or attenuated FAP (AFAP)    3.) Labs and/or tests ordered include:  CBC, CMP, T/S     4.) Health maintenance issues addressed today include pt is up to date.    5.) Pre-op teaching was completed today.        Thank you for allowing us to participate in the care of your patient.         Sincerely,    Sandie Euceda MD  Gynecologic Oncology  [facility]       MYCHAL PINZON         Again, thank you for allowing me to participate in the care of your patient.        Sincerely,        Teja Euceda MD

## 2021-02-17 ENCOUNTER — PATIENT OUTREACH (OUTPATIENT)
Dept: ONCOLOGY | Facility: CLINIC | Age: 56
End: 2021-02-17

## 2021-02-17 NOTE — PROGRESS NOTES
Pt called to review surgical instructions, scheduled for Laparoscopic removal of uterus, cervix, both ovaries and fallopian tubes, sentinel lymph node dissection, possible full lymph node dissection, possible with Dr Euceda on 2/25/2021 at [facility].  Emailed all pre and post op instructions and received per pt.  Pt verbalized understanding to all instructions.  See pt education for detailed note.    Maria De Jesus Evans, RN, BSN, OCN

## 2021-02-18 ENCOUNTER — TELEPHONE (OUTPATIENT)
Dept: GASTROENTEROLOGY | Facility: CLINIC | Age: 56
End: 2021-02-18

## 2021-02-19 DIAGNOSIS — C54.1 ENDOMETRIAL CANCER (H): ICD-10-CM

## 2021-02-19 LAB
ABO + RH BLD: NORMAL
ABO + RH BLD: NORMAL
BASOPHILS # BLD AUTO: 0 10E9/L (ref 0–0.2)
BASOPHILS NFR BLD AUTO: 0.3 %
BLD GP AB SCN SERPL QL: NORMAL
BLOOD BANK CMNT PATIENT-IMP: NORMAL
DIFFERENTIAL METHOD BLD: NORMAL
EOSINOPHIL # BLD AUTO: 0.2 10E9/L (ref 0–0.7)
EOSINOPHIL NFR BLD AUTO: 3.1 %
ERYTHROCYTE [DISTWIDTH] IN BLOOD BY AUTOMATED COUNT: 13 % (ref 10–15)
HCT VFR BLD AUTO: 41.4 % (ref 35–47)
HGB BLD-MCNC: 13.6 G/DL (ref 11.7–15.7)
LYMPHOCYTES # BLD AUTO: 2.2 10E9/L (ref 0.8–5.3)
LYMPHOCYTES NFR BLD AUTO: 28.9 %
MCH RBC QN AUTO: 29.4 PG (ref 26.5–33)
MCHC RBC AUTO-ENTMCNC: 32.9 G/DL (ref 31.5–36.5)
MCV RBC AUTO: 89 FL (ref 78–100)
MONOCYTES # BLD AUTO: 0.7 10E9/L (ref 0–1.3)
MONOCYTES NFR BLD AUTO: 9.7 %
NEUTROPHILS # BLD AUTO: 4.4 10E9/L (ref 1.6–8.3)
NEUTROPHILS NFR BLD AUTO: 58 %
PLATELET # BLD AUTO: 219 10E9/L (ref 150–450)
RBC # BLD AUTO: 4.63 10E12/L (ref 3.8–5.2)
SPECIMEN EXP DATE BLD: NORMAL
WBC # BLD AUTO: 7.5 10E9/L (ref 4–11)

## 2021-02-19 PROCEDURE — 86900 BLOOD TYPING SEROLOGIC ABO: CPT | Performed by: OBSTETRICS & GYNECOLOGY

## 2021-02-19 PROCEDURE — 85025 COMPLETE CBC W/AUTO DIFF WBC: CPT | Performed by: OBSTETRICS & GYNECOLOGY

## 2021-02-19 PROCEDURE — 86850 RBC ANTIBODY SCREEN: CPT | Performed by: OBSTETRICS & GYNECOLOGY

## 2021-02-19 PROCEDURE — 80053 COMPREHEN METABOLIC PANEL: CPT | Performed by: OBSTETRICS & GYNECOLOGY

## 2021-02-19 PROCEDURE — 86901 BLOOD TYPING SEROLOGIC RH(D): CPT | Performed by: OBSTETRICS & GYNECOLOGY

## 2021-02-19 PROCEDURE — 36415 COLL VENOUS BLD VENIPUNCTURE: CPT | Performed by: OBSTETRICS & GYNECOLOGY

## 2021-02-20 LAB
ALBUMIN SERPL-MCNC: 3.4 G/DL (ref 3.4–5)
ALP SERPL-CCNC: 52 U/L (ref 40–150)
ALT SERPL W P-5'-P-CCNC: 32 U/L (ref 0–50)
ANION GAP SERPL CALCULATED.3IONS-SCNC: 4 MMOL/L (ref 3–14)
AST SERPL W P-5'-P-CCNC: 19 U/L (ref 0–45)
BILIRUB SERPL-MCNC: 0.3 MG/DL (ref 0.2–1.3)
BUN SERPL-MCNC: 26 MG/DL (ref 7–30)
CALCIUM SERPL-MCNC: 9.9 MG/DL (ref 8.5–10.1)
CHLORIDE SERPL-SCNC: 106 MMOL/L (ref 94–109)
CO2 SERPL-SCNC: 29 MMOL/L (ref 20–32)
CREAT SERPL-MCNC: 0.85 MG/DL (ref 0.52–1.04)
GFR SERPL CREATININE-BSD FRML MDRD: 77 ML/MIN/{1.73_M2}
GLUCOSE SERPL-MCNC: 89 MG/DL (ref 70–99)
POTASSIUM SERPL-SCNC: 4.4 MMOL/L (ref 3.4–5.3)
PROT SERPL-MCNC: 7.3 G/DL (ref 6.8–8.8)
SODIUM SERPL-SCNC: 139 MMOL/L (ref 133–144)

## 2021-02-21 DIAGNOSIS — Z11.59 ENCOUNTER FOR SCREENING FOR OTHER VIRAL DISEASES: ICD-10-CM

## 2021-02-21 LAB
SARS-COV-2 RNA RESP QL NAA+PROBE: NORMAL
SPECIMEN SOURCE: NORMAL

## 2021-02-21 PROCEDURE — U0005 INFEC AGEN DETEC AMPLI PROBE: HCPCS | Performed by: OBSTETRICS & GYNECOLOGY

## 2021-02-21 PROCEDURE — U0003 INFECTIOUS AGENT DETECTION BY NUCLEIC ACID (DNA OR RNA); SEVERE ACUTE RESPIRATORY SYNDROME CORONAVIRUS 2 (SARS-COV-2) (CORONAVIRUS DISEASE [COVID-19]), AMPLIFIED PROBE TECHNIQUE, MAKING USE OF HIGH THROUGHPUT TECHNOLOGIES AS DESCRIBED BY CMS-2020-01-R: HCPCS | Performed by: OBSTETRICS & GYNECOLOGY

## 2021-02-24 ENCOUNTER — ANESTHESIA EVENT (OUTPATIENT)
Dept: SURGERY | Facility: CLINIC | Age: 56
End: 2021-02-24
Payer: COMMERCIAL

## 2021-02-24 NOTE — ANESTHESIA PREPROCEDURE EVALUATION
Anesthesia Pre-Procedure Evaluation    Patient: Sarah Duran   MRN: [id number] : 1965        Preoperative Diagnosis: Post-menopausal bleeding [N95.0]   Procedure : Procedure(s):  HYSTEROSCOPY, DIAGNOSTIC, WITH DILATION AND CURETTAGE OF UTERUS     Past Medical History:   Diagnosis Date     Acute deep vein thrombosis (DVT) of left lower extremity (H)      Asthma      Atypical ductal hyperplasia of right breast 10/2016    fibrocystic changes, PASH and atypical ductal hyperplasia on needle biopsy, s/p R excisional biopsy with radioactive seed localization     Coagulation disorder (H)      Endometrial cancer (H) 2/15/2021     Factor 5 Leiden mutation, heterozygous (H)     factor 5     Factor 5 Leiden mutation, heterozygous (H)      Familial adenomatous polyposis     clinical presentation; s/p total abdominal colectomy with TRACEY; confirmed APC+      GERD (gastroesophageal reflux disease)      Migraine      Monoallelic mutation of APC gene 2018    [id number]     PONV (postoperative nausea and vomiting)       Past Surgical History:   Procedure Laterality Date     BIOPSY BREAST SEED LOCALIZATION Right 2016    Procedure: BIOPSY BREAST SEED LOCALIZATION;  Surgeon: Jessica Rasheed MD;  Location:  OR     C LAP,SURG,COLECTOMY,W/ANAST      for colon cancer     COLECTOMY      subtotal     COMBINED ESOPHAGOSCOPY, GASTROSCOPY, DUODENOSCOPY (EGD) WITH ARGON PLASMA COAGULATOR (APC) N/A 2018    Procedure: COMBINED ESOPHAGOSCOPY, GASTROSCOPY, DUODENOSCOPY (EGD) WITH ARGON PLASMA COAGULATOR (APC);  Esophagogastroduodenoscopy with polypectomy and polyp ablation;  Surgeon: Stephen Gasca MD;  [facility]     DILATION AND CURETTAGE, HYSTEROSCOPY DIAGNOSTIC, COMBINED N/A 2/10/2021    Procedure: HYSTEROSCOPY, DIAGNOSTIC, WITH DILATION AND CURETTAGE OF UTERUS;  Surgeon: Sandie Samaniego MD;  Location: [facility] [place]     ENDOSCOPIC RETROGRADE CHOLANGIOPANCREATOGRAPHY       ENDOSCOPIC  ULTRASOUND UPPER GASTROINTESTINAL TRACT (GI) N/A 7/25/2019    Procedure: Endoscopic Ultrasound;  Surgeon: Stephen Gasca MD;  [facility]     ESOPHAGOSCOPY, GASTROSCOPY, DUODENOSCOPY (EGD), COMBINED  11/5/2012    Procedure: COMBINED ESOPHAGOSCOPY, GASTROSCOPY, DUODENOSCOPY (EGD), BIOPSY SINGLE OR MULTIPLE;;  Surgeon: Angélica Garcia MD;  [facility]     ESOPHAGOSCOPY, GASTROSCOPY, DUODENOSCOPY (EGD), COMBINED  12/3/2013    Procedure: COMBINED ESOPHAGOSCOPY, GASTROSCOPY, DUODENOSCOPY (EGD);;  Surgeon: Angélica Garcia MD;  [facility]     ESOPHAGOSCOPY, GASTROSCOPY, DUODENOSCOPY (EGD), COMBINED N/A 3/28/2018    Procedure: COMBINED ESOPHAGOSCOPY, GASTROSCOPY, DUODENOSCOPY (EGD);;  Surgeon: Stephen Gasca MD;  [facility]     ESOPHAGOSCOPY, GASTROSCOPY, DUODENOSCOPY (EGD), COMBINED N/A 5/30/2019    Procedure: Esophagogastroduodenoscopy with biopsy x2 APC;  Surgeon: Stephen Gasca MD;  [facility]     ESOPHAGOSCOPY, GASTROSCOPY, DUODENOSCOPY (EGD), COMBINED N/A 7/30/2020    Procedure: ESOPHAGOGASTRODUODENOSCOPY (EGD) with polypectomy by hot snare; argonplasma coaglution ablation of polyps;  Surgeon: Stephen Gasca MD;  [facility]     ESOPHAGOSCOPY, GASTROSCOPY, DUODENOSCOPY (EGD), RESECT MUCOSA, COMBINED N/A 7/25/2019    Procedure: Esophagogastroduodenoscopy with gastric mucosal resection and clip application;  Surgeon: Stephen Gasca MD;  [facility]: [facility]     EXCISE POLYP RECTUM       LAPAROSCOPIC LYSIS ADHESIONS       SIGMOIDOSCOPY FLEXIBLE       SIGMOIDOSCOPY FLEXIBLE N/A 12/7/2015    Procedure: SIGMOIDOSCOPY FLEXIBLE;  Surgeon: Mariela Bearden MD;  [facility]     SIGMOIDOSCOPY FLEXIBLE N/A 3/28/2018    Procedure: SIGMOIDOSCOPY FLEXIBLE;  EGD/Flex Sig;  Surgeon: Stephen Gasca MD;  [facility]     SIGMOIDOSCOPY FLEXIBLE N/A 4/12/2018    Procedure: SIGMOIDOSCOPY FLEXIBLE;  Flexible Sigmoidoscopy with polyp ablation;  Surgeon:  Stephen Gasca MD;  [facility]     SIGMOIDOSCOPY FLEXIBLE N/A 5/30/2019    Procedure: Flexible Sigmoidoscopy with polypectomy;  Surgeon: Stephen Gasca MD;  [facility]     SIGMOIDOSCOPY FLEXIBLE N/A 7/30/2020    Procedure: SIGMOIDOSCOPY, FLEXIBLE with Ablation of Polyps;  Surgeon: Stephen Gasca MD;  [facility]      Allergies   Allergen Reactions     Corn-Related Products Shortness Of Breath     Avoids food; ok in tiny doses, such as in medicines  Avoids food; ok in tiny doses, such as in medicines     Cipro [Ciprofloxacin]      Reddened skin     Flagyl [Metronidazole] Nausea and Vomiting     Garlic Fatigue     GI distress     Keflex [Cephalosporins] Nausea and Vomiting     Omeprazole      PN: headache     Prilosec Otc [Omeprazole Magnesium]      migraines     Cephalexin Rash     PN: Rash, Generalized     Doxycycline Rash     blisters     Erythromycin Rash     Sulfa Drugs Rash      Social History     Tobacco Use     Smoking status: Never Smoker     Smokeless tobacco: Never Used   Substance Use Topics     Alcohol use: Yes     Comment: 1 or 2 drinks per year      Wt Readings from Last 1 Encounters:   02/10/21 70.3 kg (155 lb)        Anesthesia Evaluation   Pt has had prior anesthetic. Type: General and MAC.    History of anesthetic complications  - PONV.  Requests ondansetron intraop.    ROS/MED HX  ENT/Pulmonary:     (+) Intermittent, asthma Treatment: Inhaler prn,      Neurologic:     (+) migraines,  (-) no CVA and no TIA   Cardiovascular:  - neg cardiovascular ROS   (+) Dyslipidemia ----- (-) hypertension   METS/Exercise Tolerance: >4 METS    Hematologic: Comments: Factor V Leiden mutation, no history of blood clots    (+) History of blood clots (Factor V Leiden deficiency), pt is anticoagulated,     Musculoskeletal:  - neg musculoskeletal ROS  (-) arthritis   GI/Hepatic: Comment: Familail adenomatous polyposis s/p total abdominal colectomy    (+) GERD (Occasional GERD. No  symptoms today),     Renal/Genitourinary:  - neg Renal ROS  (-) renal disease   Endo:    (-) Type II DM, thyroid disease, chronic steroid usage and obesity   Psychiatric/Substance Use:  - neg psychiatric ROS     Infectious Disease: Comment: COVID NEGATIVE 2/21/21   (-) HIV   Malignancy:   (+) Malignancy, History of Other.Other CA endometrial cancer Active status post.    Other:  - neg other ROS   (-) Any chance pregnant       Physical Exam    Airway        Mallampati: II   TM distance: > 3 FB   Neck ROM: full   Mouth opening: > 3 cm    Respiratory Devices and Support         Dental           Cardiovascular          Rhythm and rate: regular and normal     Pulmonary           breath sounds clear to auscultation           OUTSIDE LABS:  CBC:   Lab Results   Component Value Date    WBC 7.5 02/19/2021    WBC 4.4 07/25/2019    HGB 13.6 02/19/2021    HGB 13.6 07/25/2019    HCT 41.4 02/19/2021    HCT 43.8 07/25/2019     02/19/2021     07/25/2019     BMP:   Lab Results   Component Value Date     02/19/2021     03/20/2019    POTASSIUM 4.4 02/19/2021    POTASSIUM 4.0 03/20/2019    CHLORIDE 106 02/19/2021    CHLORIDE 110 (H) 03/20/2019    CO2 29 02/19/2021    CO2 24 03/20/2019    BUN 26 02/19/2021    BUN 16 07/25/2019    CR 0.85 02/19/2021    CR 0.90 03/20/2019    GLC 89 02/19/2021    GLC 97 03/20/2019     COAGS:   Lab Results   Component Value Date    PTT 26 05/07/2009    INR 0.99 07/25/2019     POC:   Lab Results   Component Value Date     (H) 07/30/2020    HCG Negative 07/25/2019     HEPATIC:   Lab Results   Component Value Date    ALBUMIN 3.4 02/19/2021    PROTTOTAL 7.3 02/19/2021    ALT 32 02/19/2021    AST 19 02/19/2021    ALKPHOS 52 02/19/2021    BILITOTAL 0.3 02/19/2021     OTHER:   Lab Results   Component Value Date    WOLF 9.9 02/19/2021    AMYLASE 103 11/16/2006    TSH 0.88 03/05/2019       Anesthesia Plan    ASA Status:  2   NPO Status:  NPO Appropriate    Anesthesia Type: General.      - Airway: ETT   Induction: Intravenous.   Maintenance: Balanced.   Techniques and Equipment:     - Lines/Monitors: 2nd IV, BIS     Consents    Anesthesia Plan(s) and associated risks, benefits, and realistic alternatives discussed. Questions answered and patient/representative(s) expressed understanding.     - Discussed with:  Patient      - Extended Intubation/Ventilatory Support Discussed: no Extended Intubation.         Postoperative Care    Pain management: IV analgesics.   PONV prophylaxis: Ondansetron (or other 5HT-3), Dexamethasone or Solumedrol, Scopolamine patch     Comments:                    Bro Slater MD

## 2021-02-25 ENCOUNTER — ANESTHESIA (OUTPATIENT)
Dept: SURGERY | Facility: CLINIC | Age: 56
End: 2021-02-25
Payer: COMMERCIAL

## 2021-02-25 ENCOUNTER — PATIENT OUTREACH (OUTPATIENT)
Dept: CARE COORDINATION | Facility: CLINIC | Age: 56
End: 2021-02-25

## 2021-02-25 ENCOUNTER — HOSPITAL ENCOUNTER (OUTPATIENT)
Facility: CLINIC | Age: 56
Discharge: HOME | End: 2021-02-25
Attending: OBSTETRICS & GYNECOLOGY | Admitting: OBSTETRICS & GYNECOLOGY
Payer: COMMERCIAL

## 2021-02-25 VITALS
BODY MASS INDEX: 29.13 KG/M2 | SYSTOLIC BLOOD PRESSURE: 134 MMHG | TEMPERATURE: 98 F | HEART RATE: 74 BPM | HEIGHT: 62 IN | RESPIRATION RATE: 14 BRPM | OXYGEN SATURATION: 97 % | DIASTOLIC BLOOD PRESSURE: 83 MMHG | WEIGHT: 158.29 LBS

## 2021-02-25 DIAGNOSIS — N95.0 POST-MENOPAUSAL BLEEDING: ICD-10-CM

## 2021-02-25 DIAGNOSIS — Z90.721 S/P HYSTERECTOMY WITH OOPHORECTOMY: Primary | ICD-10-CM

## 2021-02-25 DIAGNOSIS — C54.1 ENDOMETRIAL CANCER (H): ICD-10-CM

## 2021-02-25 DIAGNOSIS — Z90.710 S/P HYSTERECTOMY WITH OOPHORECTOMY: Primary | ICD-10-CM

## 2021-02-25 LAB
ABO + RH BLD: NORMAL
ABO + RH BLD: NORMAL
BLD GP AB SCN SERPL QL: NORMAL
BLOOD BANK CMNT PATIENT-IMP: NORMAL
GLUCOSE BLDC GLUCOMTR-MCNC: 103 MG/DL (ref 70–99)
SPECIMEN EXP DATE BLD: NORMAL

## 2021-02-25 PROCEDURE — 250N000009 HC RX 250: Performed by: STUDENT IN AN ORGANIZED HEALTH CARE EDUCATION/TRAINING PROGRAM

## 2021-02-25 PROCEDURE — 88309 TISSUE EXAM BY PATHOLOGIST: CPT | Mod: 26 | Performed by: PATHOLOGY

## 2021-02-25 PROCEDURE — 88305 TISSUE EXAM BY PATHOLOGIST: CPT | Mod: TC | Performed by: OBSTETRICS & GYNECOLOGY

## 2021-02-25 PROCEDURE — 88309 TISSUE EXAM BY PATHOLOGIST: CPT | Mod: TC | Performed by: OBSTETRICS & GYNECOLOGY

## 2021-02-25 PROCEDURE — 710N000010 HC RECOVERY PHASE 1, LEVEL 2, PER MIN: Performed by: OBSTETRICS & GYNECOLOGY

## 2021-02-25 PROCEDURE — 88307 TISSUE EXAM BY PATHOLOGIST: CPT | Mod: TC | Performed by: OBSTETRICS & GYNECOLOGY

## 2021-02-25 PROCEDURE — 999N001018 HC STATISTIC H-CELL BLOCK W/STAIN: Performed by: OBSTETRICS & GYNECOLOGY

## 2021-02-25 PROCEDURE — 250N000013 HC RX MED GY IP 250 OP 250 PS 637: Performed by: ANESTHESIOLOGY

## 2021-02-25 PROCEDURE — 250N000011 HC RX IP 250 OP 636: Performed by: ANESTHESIOLOGY

## 2021-02-25 PROCEDURE — 88112 CYTOPATH CELL ENHANCE TECH: CPT | Mod: TC | Performed by: OBSTETRICS & GYNECOLOGY

## 2021-02-25 PROCEDURE — 250N000011 HC RX IP 250 OP 636: Performed by: INTERNAL MEDICINE

## 2021-02-25 PROCEDURE — 999N001017 HC STATISTIC GLUCOSE BY METER IP

## 2021-02-25 PROCEDURE — 88342 IMHCHEM/IMCYTCHM 1ST ANTB: CPT | Mod: TC | Performed by: OBSTETRICS & GYNECOLOGY

## 2021-02-25 PROCEDURE — 370N000017 HC ANESTHESIA TECHNICAL FEE, PER MIN: Performed by: OBSTETRICS & GYNECOLOGY

## 2021-02-25 PROCEDURE — 250N000011 HC RX IP 250 OP 636: Performed by: STUDENT IN AN ORGANIZED HEALTH CARE EDUCATION/TRAINING PROGRAM

## 2021-02-25 PROCEDURE — 258N000003 HC RX IP 258 OP 636: Performed by: ANESTHESIOLOGY

## 2021-02-25 PROCEDURE — 88341 IMHCHEM/IMCYTCHM EA ADD ANTB: CPT | Mod: 26 | Performed by: PATHOLOGY

## 2021-02-25 PROCEDURE — 999N000141 HC STATISTIC PRE-PROCEDURE NURSING ASSESSMENT: Performed by: OBSTETRICS & GYNECOLOGY

## 2021-02-25 PROCEDURE — 86850 RBC ANTIBODY SCREEN: CPT | Performed by: OBSTETRICS & GYNECOLOGY

## 2021-02-25 PROCEDURE — 88307 TISSUE EXAM BY PATHOLOGIST: CPT | Mod: 26 | Performed by: PATHOLOGY

## 2021-02-25 PROCEDURE — 250N000025 HC SEVOFLURANE, PER MIN: Performed by: OBSTETRICS & GYNECOLOGY

## 2021-02-25 PROCEDURE — 272N000001 HC OR GENERAL SUPPLY STERILE: Performed by: OBSTETRICS & GYNECOLOGY

## 2021-02-25 PROCEDURE — 250N000011 HC RX IP 250 OP 636: Performed by: OBSTETRICS & GYNECOLOGY

## 2021-02-25 PROCEDURE — 250N000009 HC RX 250: Performed by: OBSTETRICS & GYNECOLOGY

## 2021-02-25 PROCEDURE — 88305 TISSUE EXAM BY PATHOLOGIST: CPT | Mod: 26 | Performed by: PATHOLOGY

## 2021-02-25 PROCEDURE — 710N000012 HC RECOVERY PHASE 2, PER MINUTE: Performed by: OBSTETRICS & GYNECOLOGY

## 2021-02-25 PROCEDURE — 88112 CYTOPATH CELL ENHANCE TECH: CPT | Mod: 26 | Performed by: PATHOLOGY

## 2021-02-25 PROCEDURE — 88341 IMHCHEM/IMCYTCHM EA ADD ANTB: CPT | Mod: TC | Performed by: OBSTETRICS & GYNECOLOGY

## 2021-02-25 PROCEDURE — 88342 IMHCHEM/IMCYTCHM 1ST ANTB: CPT | Mod: 26 | Performed by: PATHOLOGY

## 2021-02-25 PROCEDURE — 86900 BLOOD TYPING SEROLOGIC ABO: CPT | Performed by: OBSTETRICS & GYNECOLOGY

## 2021-02-25 PROCEDURE — 86901 BLOOD TYPING SEROLOGIC RH(D): CPT | Performed by: OBSTETRICS & GYNECOLOGY

## 2021-02-25 PROCEDURE — 360N000077 HC SURGERY LEVEL 4, PER MIN: Performed by: OBSTETRICS & GYNECOLOGY

## 2021-02-25 RX ORDER — PROPOFOL 10 MG/ML
INJECTION, EMULSION INTRAVENOUS PRN
Status: DISCONTINUED | OUTPATIENT
Start: 2021-02-25 | End: 2021-02-25

## 2021-02-25 RX ORDER — HEPARIN SODIUM 5000 [USP'U]/.5ML
5000 INJECTION, SOLUTION INTRAVENOUS; SUBCUTANEOUS
Status: COMPLETED | OUTPATIENT
Start: 2021-02-25 | End: 2021-02-25

## 2021-02-25 RX ORDER — EPHEDRINE SULFATE 50 MG/ML
INJECTION, SOLUTION INTRAMUSCULAR; INTRAVENOUS; SUBCUTANEOUS PRN
Status: DISCONTINUED | OUTPATIENT
Start: 2021-02-25 | End: 2021-02-25

## 2021-02-25 RX ORDER — NALOXONE HYDROCHLORIDE 0.4 MG/ML
0.2 INJECTION, SOLUTION INTRAMUSCULAR; INTRAVENOUS; SUBCUTANEOUS
Status: DISCONTINUED | OUTPATIENT
Start: 2021-02-25 | End: 2021-02-25 | Stop reason: HOSPADM

## 2021-02-25 RX ORDER — DEXAMETHASONE SODIUM PHOSPHATE 4 MG/ML
INJECTION, SOLUTION INTRA-ARTICULAR; INTRALESIONAL; INTRAMUSCULAR; INTRAVENOUS; SOFT TISSUE PRN
Status: DISCONTINUED | OUTPATIENT
Start: 2021-02-25 | End: 2021-02-25

## 2021-02-25 RX ORDER — SODIUM CHLORIDE, SODIUM LACTATE, POTASSIUM CHLORIDE, CALCIUM CHLORIDE 600; 310; 30; 20 MG/100ML; MG/100ML; MG/100ML; MG/100ML
INJECTION, SOLUTION INTRAVENOUS CONTINUOUS
Status: DISCONTINUED | OUTPATIENT
Start: 2021-02-25 | End: 2021-02-25 | Stop reason: HOSPADM

## 2021-02-25 RX ORDER — IBUPROFEN 600 MG/1
600 TABLET, FILM COATED ORAL EVERY 6 HOURS PRN
Qty: 15 TABLET | Refills: 0 | Status: SHIPPED | OUTPATIENT
Start: 2021-02-25 | End: 2021-06-03

## 2021-02-25 RX ORDER — ACETAMINOPHEN 325 MG/1
650 TABLET ORAL EVERY 6 HOURS PRN
Qty: 30 TABLET | Refills: 0 | COMMUNITY
Start: 2021-02-25

## 2021-02-25 RX ORDER — ACETAMINOPHEN 325 MG/1
975 TABLET ORAL ONCE
Status: DISCONTINUED | OUTPATIENT
Start: 2021-02-25 | End: 2021-02-25 | Stop reason: HOSPADM

## 2021-02-25 RX ORDER — PROPOFOL 10 MG/ML
INJECTION, EMULSION INTRAVENOUS CONTINUOUS PRN
Status: DISCONTINUED | OUTPATIENT
Start: 2021-02-25 | End: 2021-02-25

## 2021-02-25 RX ORDER — INDOCYANINE GREEN AND WATER 25 MG
KIT INJECTION PRN
Status: DISCONTINUED | OUTPATIENT
Start: 2021-02-25 | End: 2021-02-25 | Stop reason: HOSPADM

## 2021-02-25 RX ORDER — ONDANSETRON 2 MG/ML
4 INJECTION INTRAMUSCULAR; INTRAVENOUS EVERY 30 MIN PRN
Status: DISCONTINUED | OUTPATIENT
Start: 2021-02-25 | End: 2021-02-25 | Stop reason: HOSPADM

## 2021-02-25 RX ORDER — FENTANYL CITRATE 50 UG/ML
25-50 INJECTION, SOLUTION INTRAMUSCULAR; INTRAVENOUS
Status: DISCONTINUED | OUTPATIENT
Start: 2021-02-25 | End: 2021-02-25 | Stop reason: HOSPADM

## 2021-02-25 RX ORDER — SCOLOPAMINE TRANSDERMAL SYSTEM 1 MG/1
1 PATCH, EXTENDED RELEASE TRANSDERMAL
Status: DISCONTINUED | OUTPATIENT
Start: 2021-02-25 | End: 2021-02-25 | Stop reason: HOSPADM

## 2021-02-25 RX ORDER — LIDOCAINE 40 MG/G
CREAM TOPICAL
Status: DISCONTINUED | OUTPATIENT
Start: 2021-02-25 | End: 2021-02-25 | Stop reason: HOSPADM

## 2021-02-25 RX ORDER — CEFAZOLIN SODIUM 2 G/100ML
2 INJECTION, SOLUTION INTRAVENOUS
Status: COMPLETED | OUTPATIENT
Start: 2021-02-25 | End: 2021-02-25

## 2021-02-25 RX ORDER — LIDOCAINE HYDROCHLORIDE 20 MG/ML
INJECTION, SOLUTION INFILTRATION; PERINEURAL PRN
Status: DISCONTINUED | OUTPATIENT
Start: 2021-02-25 | End: 2021-02-25

## 2021-02-25 RX ORDER — PHENAZOPYRIDINE HYDROCHLORIDE 200 MG/1
200 TABLET, FILM COATED ORAL ONCE
Status: DISCONTINUED | OUTPATIENT
Start: 2021-02-25 | End: 2021-02-25 | Stop reason: HOSPADM

## 2021-02-25 RX ORDER — ONDANSETRON 4 MG/1
4 TABLET, ORALLY DISINTEGRATING ORAL EVERY 30 MIN PRN
Status: DISCONTINUED | OUTPATIENT
Start: 2021-02-25 | End: 2021-02-25 | Stop reason: HOSPADM

## 2021-02-25 RX ORDER — OXYCODONE HYDROCHLORIDE 5 MG/1
5 TABLET ORAL
Status: DISCONTINUED | OUTPATIENT
Start: 2021-02-25 | End: 2021-02-25 | Stop reason: HOSPADM

## 2021-02-25 RX ORDER — MEPERIDINE HYDROCHLORIDE 25 MG/ML
12.5 INJECTION INTRAMUSCULAR; INTRAVENOUS; SUBCUTANEOUS
Status: DISCONTINUED | OUTPATIENT
Start: 2021-02-25 | End: 2021-02-25 | Stop reason: HOSPADM

## 2021-02-25 RX ORDER — IBUPROFEN 200 MG
800 TABLET ORAL ONCE
Status: DISCONTINUED | OUTPATIENT
Start: 2021-02-25 | End: 2021-02-25 | Stop reason: HOSPADM

## 2021-02-25 RX ORDER — OXYCODONE HYDROCHLORIDE 5 MG/1
5 TABLET ORAL EVERY 6 HOURS PRN
Qty: 8 TABLET | Refills: 0 | Status: SHIPPED | OUTPATIENT
Start: 2021-02-25 | End: 2021-03-09

## 2021-02-25 RX ORDER — NALOXONE HYDROCHLORIDE 0.4 MG/ML
0.4 INJECTION, SOLUTION INTRAMUSCULAR; INTRAVENOUS; SUBCUTANEOUS
Status: DISCONTINUED | OUTPATIENT
Start: 2021-02-25 | End: 2021-02-25 | Stop reason: HOSPADM

## 2021-02-25 RX ORDER — KETOROLAC TROMETHAMINE 30 MG/ML
30 INJECTION, SOLUTION INTRAMUSCULAR; INTRAVENOUS EVERY 6 HOURS PRN
Status: DISCONTINUED | OUTPATIENT
Start: 2021-02-25 | End: 2021-02-25 | Stop reason: HOSPADM

## 2021-02-25 RX ORDER — FENTANYL CITRATE 50 UG/ML
INJECTION, SOLUTION INTRAMUSCULAR; INTRAVENOUS PRN
Status: DISCONTINUED | OUTPATIENT
Start: 2021-02-25 | End: 2021-02-25

## 2021-02-25 RX ORDER — OXYCODONE HCL 5 MG/5 ML
5 SOLUTION, ORAL ORAL EVERY 4 HOURS PRN
Status: DISCONTINUED | OUTPATIENT
Start: 2021-02-25 | End: 2021-02-25 | Stop reason: HOSPADM

## 2021-02-25 RX ORDER — CEFAZOLIN SODIUM 1 G/3ML
1 INJECTION, POWDER, FOR SOLUTION INTRAMUSCULAR; INTRAVENOUS SEE ADMIN INSTRUCTIONS
Status: DISCONTINUED | OUTPATIENT
Start: 2021-02-25 | End: 2021-02-25 | Stop reason: HOSPADM

## 2021-02-25 RX ORDER — HYDROMORPHONE HYDROCHLORIDE 1 MG/ML
.3-.5 INJECTION, SOLUTION INTRAMUSCULAR; INTRAVENOUS; SUBCUTANEOUS EVERY 10 MIN PRN
Status: DISCONTINUED | OUTPATIENT
Start: 2021-02-25 | End: 2021-02-25 | Stop reason: HOSPADM

## 2021-02-25 RX ADMIN — FENTANYL CITRATE 50 MCG: 50 INJECTION, SOLUTION INTRAMUSCULAR; INTRAVENOUS at 08:25

## 2021-02-25 RX ADMIN — SODIUM CHLORIDE, POTASSIUM CHLORIDE, SODIUM LACTATE AND CALCIUM CHLORIDE: 600; 310; 30; 20 INJECTION, SOLUTION INTRAVENOUS at 07:48

## 2021-02-25 RX ADMIN — HYDROMORPHONE HYDROCHLORIDE 0.5 MG: 1 INJECTION, SOLUTION INTRAMUSCULAR; INTRAVENOUS; SUBCUTANEOUS at 10:14

## 2021-02-25 RX ADMIN — SUGAMMADEX 200 MG: 100 INJECTION, SOLUTION INTRAVENOUS at 09:35

## 2021-02-25 RX ADMIN — PROPOFOL 130 MG: 10 INJECTION, EMULSION INTRAVENOUS at 07:49

## 2021-02-25 RX ADMIN — MIDAZOLAM 2 MG: 1 INJECTION INTRAMUSCULAR; INTRAVENOUS at 07:45

## 2021-02-25 RX ADMIN — OXYCODONE HYDROCHLORIDE 5 MG: 5 SOLUTION ORAL at 12:39

## 2021-02-25 RX ADMIN — HYDROMORPHONE HYDROCHLORIDE 0.5 MG: 1 INJECTION, SOLUTION INTRAMUSCULAR; INTRAVENOUS; SUBCUTANEOUS at 10:37

## 2021-02-25 RX ADMIN — ONDANSETRON 4 MG: 2 INJECTION INTRAMUSCULAR; INTRAVENOUS at 08:30

## 2021-02-25 RX ADMIN — ROCURONIUM BROMIDE 70 MG: 10 INJECTION INTRAVENOUS at 07:50

## 2021-02-25 RX ADMIN — FENTANYL CITRATE 100 MCG: 50 INJECTION, SOLUTION INTRAMUSCULAR; INTRAVENOUS at 07:50

## 2021-02-25 RX ADMIN — FENTANYL CITRATE 50 MCG: 50 INJECTION, SOLUTION INTRAMUSCULAR; INTRAVENOUS at 10:06

## 2021-02-25 RX ADMIN — SCOPALAMINE 1 PATCH: 1 PATCH, EXTENDED RELEASE TRANSDERMAL at 08:51

## 2021-02-25 RX ADMIN — ONDANSETRON 4 MG: 2 INJECTION INTRAMUSCULAR; INTRAVENOUS at 09:12

## 2021-02-25 RX ADMIN — ONDANSETRON 4 MG: 4 TABLET, ORALLY DISINTEGRATING ORAL at 13:29

## 2021-02-25 RX ADMIN — PROPOFOL 25 MCG/KG/MIN: 10 INJECTION, EMULSION INTRAVENOUS at 07:50

## 2021-02-25 RX ADMIN — KETOROLAC TROMETHAMINE 30 MG: 30 INJECTION, SOLUTION INTRAMUSCULAR; INTRAVENOUS at 10:30

## 2021-02-25 RX ADMIN — Medication 2 G: at 07:54

## 2021-02-25 RX ADMIN — FENTANYL CITRATE 50 MCG: 50 INJECTION, SOLUTION INTRAMUSCULAR; INTRAVENOUS at 09:38

## 2021-02-25 RX ADMIN — LIDOCAINE HYDROCHLORIDE 100 MG: 20 INJECTION, SOLUTION INFILTRATION; PERINEURAL at 07:49

## 2021-02-25 RX ADMIN — DEXAMETHASONE SODIUM PHOSPHATE 10 MG: 4 INJECTION, SOLUTION INTRA-ARTICULAR; INTRALESIONAL; INTRAMUSCULAR; INTRAVENOUS; SOFT TISSUE at 08:06

## 2021-02-25 RX ADMIN — MEPERIDINE HYDROCHLORIDE 12.5 MG: 25 INJECTION INTRAMUSCULAR; INTRAVENOUS; SUBCUTANEOUS at 10:48

## 2021-02-25 RX ADMIN — Medication 5 MG: at 07:55

## 2021-02-25 RX ADMIN — FENTANYL CITRATE 50 MCG: 50 INJECTION, SOLUTION INTRAMUSCULAR; INTRAVENOUS at 09:58

## 2021-02-25 RX ADMIN — DEXAMETHASONE SODIUM PHOSPHATE 4 MG: 4 INJECTION, SOLUTION INTRA-ARTICULAR; INTRALESIONAL; INTRAMUSCULAR; INTRAVENOUS; SOFT TISSUE at 07:50

## 2021-02-25 RX ADMIN — HEPARIN SODIUM 5000 UNITS: 5000 INJECTION, SOLUTION INTRAVENOUS; SUBCUTANEOUS at 07:45

## 2021-02-25 ASSESSMENT — MIFFLIN-ST. JEOR: SCORE: 1266.25

## 2021-02-25 NOTE — BRIEF OP NOTE
[facility]    Brief Operative Note    Pre-operative diagnosis: Endometrial cancer (H) [C54.1]  Post-operative diagnosis Same as pre-operative diagnosis    Procedure: Procedure(s):  Laparoscopic removal of uterus, cervix, both ovaries and fallopian tubes, sentinel lymph node dissection, possible full lymph node dissection, possible open, cystoscopy  Surgeon: Surgeon(s) and Role:     * Sandie Samaniego MD - Primary     * Magen Corado MD - Resident - Assisting     * Franchesca Lr MD - Fellow - Assisting  Anesthesia: General   Estimated blood loss: 50 ml  IVF: 2000 ml  Drains: None  Specimens:   ID Type Source Tests Collected by Time Destination   1 : PELVIC WASHINGS-CYTOLOGY Washings Pelvis CYTOLOGY NON GYN Sandie Samaniego MD 2/25/2021  8:47 AM    A : RIGHT SENTINEL obturator lymph node Tissue Lymph Node, Right Obturator SURGICAL PATHOLOGY EXAM Sandie Samaniego MD 2/25/2021  8:50 AM    B : LEFT EXTERNAL ILIAC SENTINEL LYMPH NODE Tissue Lymph Node, [place] SURGICAL PATHOLOGY EXAM Sandie Samaniego MD 2/25/2021  8:53 AM    C : UTERUS, cervix, bilateral fallopian tubes, bilateral ovaries Tissue Uterus, Cervix and Bilateral Fallopian Tubes SURGICAL PATHOLOGY EXAM Sandie Samaniego MD 2/25/2021  9:10 AM      Findings:   On EUA, small, mobile uterus without adnexal mass or nodularity. On laparoscopy, no evidence of injury upon entry. Omental and small bowel adhesions to the upper abdomen, free of entry site. Ileal-sigmoid anastomosis noted with dilated sigmoid colon and rectum. Minimal bowel adhesions in the pelvis. Endometriotic implants in posterior cul de sac with scarring of the bilateral uteralsacral ligaments and anterior lower uterine segment. Bilateral ovaries and tubes appeared normal. Tracing of ICG bilaterally with bilateral sentinel obturator nodes identified and dissected. Bilateral ureters identified.  Hemostasis of surgical pedicles and vaginal cuff at the end of the case. On cystoscopy, normal bladder mucosa without injury and jets seen from bilateral ureteral orifices. .  Complications: None.  Implants: * No implants in log *     Magen Corado MD, MPH  OBGYN PGY-3  2/25/2021 9:57 AM

## 2021-02-25 NOTE — OP NOTE
Gynecologic Oncology Operative Report    2/25/2021  Sarah Duran  [id number]    PREOPERATIVE DIAGNOSIS: Grade 1 endometrial adenocarcinoma    POSTOPERATIVE DIAGNOSIS: Same, final pathology pending    PROCEDURES: Total laparoscopic hysterectomy, bilateral salpingo-oophorectomy, bilateral sentinel lymph node dissection, cystoscopy    SURGEON: Sandie Euceda MD     ASSISTANTS:  Magen Corado MD, PGY-3.     ANESTHESIA: General endotracheal    ESTIMATED BLOOD LOSS: 50 cc     IV FLUIDS: 2000 cc crystalloid    URINE OUTPUT: See anesthesia record     INDICATIONS: Sarah Duran is a 55 year old who presented with PMB.  She underwent a pelvic US which showed a thickened EMS of 2.9cm.  She underwent a D&C which revealed a grade 1 endometrial adenocarcinoma with intact MMR.  Following a thorough discussion of the risks, benefits, indications and alternatives, she consented to the above procedure.    FINDINGS: On EUA the patient had normal external genitalia and a normal sized, mobile uterus.  On laparoscopy there was one loop of small bowel adherent to the anterior abdominal wall above the umbilicus and the liver was adherent to the anterior abdominal wall on the left side.  The majority of the colon was surgically absent with the exception of the majority of the rectosigmoid colon to the level of the pelvic brim where the ileo-colonic anastomosis was visible.  The rectosigmoid itself was quite dilated.  There were easily identifiable sentinel lymph nodes bilaterally in the obturator space on the right and in the external iliac region on the left.  There was evidence of endometriosis with powder burn lesions throughout the pelvis especially in the cul-de-sac as well as scarring of the uterosacral ligaments with chocolate fluid along the utero-sacral ligaments.  The uterus and the bilateral adnexa were grossly normal in appearance.    SPECIMENS:   1.  Pelvic washings  2.  Right and left sentinel lymph nodes  3.  Uterus,  cervix, bilateral ovaries and fallopian tubes    COMPLICATIONS: None.     CONDITION: Stable to PACU.     PROCEDURE:   Consent was reviewed with the patient in the preoperative setting and confirmed. She received prophylactic antibiotics. In addition, she received heparin for venous thrombosis prevention. The patient was transferred to the operating room and placed in dorsal supine position. General anesthetic was obtained in the usual manner without noted difficulties. The patient was then positioned onto Lamberto stirrups and an exam under anesthesia was performed with findings as described above.  The patient was prepped and draped for the above-mentioned procedure and Larios catheter was then placed under sterile techniques.  Timeout was called at which point the patient's name, procedure and operative site was confirmed by the operative team. Initially, the instruments for the vaginal manipulator were positioned, a speculum was placed in the vagina. The anterior lip of the cervix was grasped, the uterus sounded to 8 cm and cervix was serially dilated. The cervix was injected with 3 cc of ICG at the 3 and 9 o'clock positions.  The [employer] vaginal manipulator was then inserted and the vaginal balloon was then inserted to obtain intraabdominal pressure.     Attention was then turned to the upper abdomen. Initially, an incision was made approximately 3 cm above the umbilicus and the Veress needle introduced through this stab incision. The abdomen was insufflated with an opening pressure of 3 mmHg. The Veress needle was removed. The initial midline 5 mm port was inserted without difficulties and initial survey revealed no damage to underlying structures.  The left lateral 12 mm and right lateral 5 mm ports were then placed under direct visualization without damage to underlying structures.  The camera was then placed in a lateral port and the midline port was visualized and was well away from the adhesions superior to  this. At this point, the patient was placed into steep Trendelenburg. The pelvis was inspected as well as the upper abdomen with findings as noted above. Pelvic washings were obtained and sent for cytology. Bowels were packed up into the upper abdomen with gentle traction.     Attention was then turned to the pelvis. The round ligaments were identified bilaterally. They were divided using cautery and the retroperitoneal space was entered by dissecting the peritoneum lateral and cephalad to the IP ligaments. The ureters were identified and a defect was made underneath the IP ligament and above the ureter. The IP ligament was serially cauterized and then divided sharply. We then opened the spaces of the pelvic lymph node dissection bilaterally and were able to easily identify the sentinel lymph nodes bilaterally.  On the right we carefully dissected out the obturator lymph node taking care to ensure the safety of the obturator nerve, ureter and iliac vessels.  On the left we carefully dissected the sentinel lymph node off the external iliac vessels ensuring the safety of the iliac vessels and the ureter.  The right and left sentinel lymph nodes were removed from the abdomen and sent for frozen section analysis.   The rest of the peritoneum was skeletonized down to the level of the uterine arteries which were then cauterized and divided.  The bladder flap was created using cautery down to just below the level of the uterine manipulator cup. The rest of the parametrial tissue was dissected off of the uterus serially cauterized and divided sharply which did require some lysis of adhesions from the adhesions from her endometriosis. A colpotomy was made on the anterior side and carried around to the posterior side of the uterine manipulator cup using the electrocautery. The uterus was removed through the vagina and sent for pathology.      The vaginal cuff was then closed using 2 overlapping v lock sutures and the  EndoStitch device with excellent reapproximation and hemostasis.   Next, we performed cystoscopy using 30-degree angled scope and noted normal bladder mucosa, no evidence of foreign body and brisk efflux from the bilateral ureteral orifices. At this point, the vaginal balloon was removed from the vagina. We closed the fascia on the 12 mm incision using the John-Fletcher device. All laparoscopic instruments and ports were now removed and CO2 allowed to escape from the abdomen. Skin was reapproximated with 4-0 Vicryl sutures and Dermabond applied. The patient tolerated the procedure well and was taken to the recovery room in stable condition.    Sponge, lap and needle counts were reported as correct x2 and instrument count was correct x1.     Sandie Euceda MD  Gynecologic Oncology  [facility]

## 2021-02-25 NOTE — OR NURSING
Spoke to , Roberto, over the phone. Gave discharge instructions, he verbalized understanding of instructions. Gave in-person instructions to patient, patient verbalized instructions.

## 2021-02-25 NOTE — ANESTHESIA CARE TRANSFER NOTE
Patient: Sarah Duran    Procedure(s):  Laparoscopic removal of uterus, cervix, both ovaries and fallopian tubes, sentinel lymph node dissection, possible full lymph node dissection, possible open, cystoscopy    Diagnosis: Endometrial cancer (H) [C54.1]  Diagnosis Additional Information: No value filed.    Anesthesia Type:   General     Note:    Oropharynx: oropharynx clear of all foreign objects  Level of Consciousness: awake  Oxygen Supplementation: nasal cannula  Level of Supplemental Oxygen (L/min / FiO2): 2  Independent Airway: airway patency satisfactory and stable  Dentition: dentition unchanged  Vital Signs Stable: post-procedure vital signs reviewed and stable  Report to RN Given: handoff report given  Patient transferred to: PACU    Handoff Report: Identifed the Patient, Identified the Reponsible Provider, Reviewed the pertinent medical history, Discussed the surgical course, Reviewed Intra-OP anesthesia mangement and issues during anesthesia, Set expectations for post-procedure period and Allowed opportunity for questions and acknowledgement of understanding      Vitals: (Last set prior to Anesthesia Care Transfer)  CRNA VITALS  2/25/2021 0916 - 2/25/2021 0957      2/25/2021             Resp Rate (observed):  (!) 1        Electronically Signed By: REYNOLD Murrieta CRNA  February 25, 2021  9:57 AM

## 2021-02-25 NOTE — PROGRESS NOTES
Gynecologic Oncology Postoperative Check Note  2/25/2021    S: Patient reports she is doing well postoperatively. Pain is well controlled with oral pain medications. Voiding spontaneously. Scant bleeding noted on peripad when up to BR. Denies nausea or vomiting. Denies chest pain, shortness of breath, dizziness, or other concerns at this time.     O:  Vitals:    02/25/21 1128 02/25/21 1200 02/25/21 1239 02/25/21 1300   BP: 128/86  134/83    BP Location: Right arm      Pulse: 67  74    Resp: 14  14    Temp: 97.7  F (36.5  C)  98  F (36.7  C)    TempSrc: Oral  Oral    SpO2: 94% 95% 95% 97%   Weight:       Height:       O2 RA    Gen: Sitting in chair, no acute distress  Cardio: RRR  Resp: CTAB, no wheezing or crackles  Abdomen: soft, appropriately tender, incisions covered with dermabond    A/P: 55 year old POD#0 s/p TLH, BSO, sLND for endometrial adenocarcinoma. Doing well post op. Feels ready to discharge home.    Dz: Endometrioid adenocarcinoma, FIGO grade 1. H/o DCIS s/p lumpectomy  FEN: Advance as tolerated  Pain: Tylenol, ibuprofen, oxycodone PRN. S/p toradol in PACU. Patient has corn allergy, but has tolerated meds in the past.  GI: Familial adenomatous polyposis, s/p subtotal colectomy with ileal-sigmoid reanastomosis.   : voiding spontaneously  Dispo: To home  Reviewed discharge instructions and when to call the clinic. She will follow up as scheduled or sooner if needed.   Elysia Bueno, ENEIDA, CNP  2/25/2021 1:24 PM

## 2021-02-25 NOTE — DISCHARGE INSTRUCTIONS
[facility]  Same-Day Surgery   Adult Discharge Orders & Instructions     For 24 hours after surgery    1. Get plenty of rest.  A responsible adult must stay with you for at least 24 hours after you leave the hospital.   2. Do not drive or use heavy equipment.  If you have weakness or tingling, don't drive or use heavy equipment until this feeling goes away.  3. Do not drink alcohol.  4. Avoid strenuous or risky activities.  Ask for help when climbing stairs.   5. You may feel lightheaded.  IF so, sit for a few minutes before standing.  Have someone help you get up.   6. If you have nausea (feel sick to your stomach): Drink only clear liquids such as apple juice, ginger ale, broth or 7-Up.  Rest may also help.  Be sure to drink enough fluids.  Move to a regular diet as you feel able.  7. You may have a slight fever. Call the doctor if your fever is over 100 F (37.7 C) (taken under the tongue) or lasts longer than 24 hours.  8. You may have a dry mouth, a sore throat, muscle aches or trouble sleeping.  These should go away after 24 hours.  9. Do not make important or legal decisions.   Call your doctor for any of the followin.  Signs of infection (fever, growing tenderness at the surgery site, a large amount of drainage or bleeding, severe pain, foul-smelling drainage, redness, swelling).    2. It has been over 8 to 10 hours since surgery and you are still not able to urinate (pass water).    3.  Headache for over 24 hours.    4.  Numbness, tingling or weakness the day after surgery (if you had spinal anesthesia).  To contact a doctor, call Dr. Read @ [phone] Gynecology/Oncology Clinic or:        [phone] and ask for the resident on call for   Gyn-Onc (answered 24 hours a day)      Emergency Department:    [facility]: [phone]       (TTY for hearing impaired: [phone])    [facility]: [phone]       (TTY for hearing impaired:  [phone])

## 2021-02-25 NOTE — ANESTHESIA POSTPROCEDURE EVALUATION
Patient: Sarah Duran    Procedure(s):  Laparoscopic removal of uterus, cervix, both ovaries and fallopian tubes, sentinel lymph node dissection, possible full lymph node dissection, possible open, cystoscopy    Diagnosis:Endometrial cancer (H) [C54.1]  Diagnosis Additional Information: No value filed.    Anesthesia Type:  General    Note:  Disposition: Outpatient   Postop Pain Control: Uneventful            Sign Out: Well controlled pain   PONV: No   Neuro/Psych: Uneventful            Sign Out: Acceptable/Baseline neuro status   Airway/Respiratory: Uneventful            Sign Out: Acceptable/Baseline resp. status   CV/Hemodynamics: Uneventful            Sign Out: Acceptable CV status   Other NRE:    DID A NON-ROUTINE EVENT OCCUR?          Last vitals:  Vitals:    02/25/21 1128 02/25/21 1200 02/25/21 1239   BP: 128/86  134/83   Pulse: 67  74   Resp: 14  14   Temp: 36.5  C (97.7  F)  36.7  C (98  F)   SpO2: 94% 95% 95%       Last vitals prior to Anesthesia Care Transfer:  CRNA VITALS  2/25/2021 0916 - 2/25/2021 1016      2/25/2021             NIBP:  135/70    Pulse:  80          Electronically Signed By: Corbin Anthony MD  February 25, 2021  12:45 PM

## 2021-02-26 LAB — COPATH REPORT: NORMAL

## 2021-02-26 NOTE — PROGRESS NOTES
Attempted to contact the patient x2 for post-hospital call without answer. Will close encounter at this time.    Mere Rivas CMA  Post Hospital Discharge Team

## 2021-03-01 ENCOUNTER — PATIENT OUTREACH (OUTPATIENT)
Dept: ONCOLOGY | Facility: CLINIC | Age: 56
End: 2021-03-01

## 2021-03-01 NOTE — PROGRESS NOTES
Pt had hysterectomy and oophorectomy with Dr. Euceda on 2/25/21.  Called patient to do post-surgical follow-up call.  Pt said that she is feeling well since her surgery.  She said her pain is now controlled with tylenol and ibuprofen.  She has been taking stool softeners and has been having regular bowel movements.  She denies nausea/vomiting.  She said she has been able to eat and drink without difficulty.  She has been able to ambulate without trouble.  She said she has experienced a small amount of light brown discharge, but denies bleeding or filling a pad per hour.  Pt denies pain or burning with voiding.  Pt denies any other concerns at time.  Encouraged patient to call us if she has any further questions or concerns.

## 2021-03-05 LAB — COPATH REPORT: NORMAL

## 2021-03-09 ENCOUNTER — VIRTUAL VISIT (OUTPATIENT)
Dept: ONCOLOGY | Facility: CLINIC | Age: 56
End: 2021-03-09
Attending: OBSTETRICS & GYNECOLOGY
Payer: COMMERCIAL

## 2021-03-09 DIAGNOSIS — C54.1 ENDOMETRIAL CANCER (H): Primary | ICD-10-CM

## 2021-03-09 PROCEDURE — 99214 OFFICE O/P EST MOD 30 MIN: CPT | Mod: 95 | Performed by: OBSTETRICS & GYNECOLOGY

## 2021-03-09 PROCEDURE — 999N001193 HC VIDEO/TELEPHONE VISIT; NO CHARGE

## 2021-03-09 NOTE — LETTER
3/9/2021         RE: Sarah Duran  [address] [facility] [address]        Dear Colleague,    Thank you for referring your patient, Sarah Duran, to the [facility]. Please see a copy of my visit note below.    Sarah is a 55 year old who is being evaluated via a billable video visit.      How would you like to obtain your AVS? MyChart  If the video visit is dropped, the invitation should be resent by: Text to cell phone: [phone]  Will anyone else be joining your video visit? No      Video-Visit Details    Type of service:  Video Visit    Originating Location (pt. Location): Home    Distant Location (provider location):  [facility]     Platform used for Video Visit: Doximity     Consult Notes on Referred Patient        Dr. Clarissa Brown, APRN [facility]  [address]  [place] [address]       RE: Sarah Duran  : 1965  ANDREI: Mar 9, 2021    HPI:  Sarah Duran is 55 year old with stage II, grade 1 endometrial adenocarcinoma.  She is accompanied today by her .  She is doing well post-operatively.  Eating and drinking normally.  Normal bowel/bladder function.  Minimal pain and not needing narcotics.  No vaginal bleeding.  No incisional concerns.    Cancer Course:    She reports she began having daily bleeding in .  She notes she had previously gone 15 months with no bleeding.  She reports the bleeding is sometimes heavy like a period but then can also be somewhat light and only like spotting.  She had an attempted EMB with her primary gynecologist, however this was unsuccessful.  She is here today for a second opinion.    21:  US Pelvis:  Uterus: Measures 8.9 x 5.8 x 6.4 cm. The endometrium is thickened at 2.9 cm. The endometrium is heterogeneous in appearance with an irregular, nodular contour of the endometrial surface. There is hypoechoic complex fluid in the endometrial cavity likely  containing blood. There is vascularity of the endometrium on color imaging. The abnormal endometrium occupies the majority of the uterus. Right Ovary: Measures 2.0 x 1.0 x 1.1 cm and appears unremarkable Right Ovary Blood Flow: Present. Left Ovary: Not seen. Free Fluid: No significant free fluid.There are no suspicious adnexal masses.    2/10/21:  Hysteroscopy, D&C   Pathology:  Grade 1 endometrial adenocarcinoma, MMR intact, p53 wild type, ER/CT +    2/25/21:  Total laparoscopic hysterectomy, bilateral salpingo-oophorectomy, bilateral sentinel lymph node dissection, cystoscopy   Pathology:  Stage II, grade 1 endometrial adenocarcinoma, tumor size 5.6 cm, 1/13 mm myometrial invasion, +CASEY, + cervix, 0/2 sentinel LN, no LVSI    Review of Systems:  Systemic           no weight changes; no fever; no chills; no night sweats; no appetite changes  Skin           no rashes, or lesions  Eye           no irritation; no changes in vision  Alise-Laryngeal           no dysphagia; no hoarseness   Pulmonary    no cough; no shortness of breath  Cardiovascular    no chest pain; no palpitations  Gastrointestinal    no diarrhea; no constipation; no abdominal pain; no changes in bowel  habits; no blood in stool  Genitourinary   no urinary frequency; no urinary urgency; no dysuria; no pain; no abnormal vaginal discharge; no abnormal vaginal bleeding  Breast    no breast discharge; no breast changes; no breast pain  Musculoskeletal    no myalgias; no arthralgias; no back pain  Psychiatric           no depressed mood; no anxiety    Hematologic            no tender lymph nodes; no noticeable swellings or lumps   Endocrine    no hot flashes; no heat/cold intolerance         Neurological   no tremor; no numbness and tingling; no headaches; no difficulty sleeping    Obstetrics and Gynecology History:  G0  no HRT use      Past Medical History:  Past Medical History:   Diagnosis Date     Acute deep vein thrombosis (DVT) of left lower extremity  (H)      Asthma      Atypical ductal hyperplasia of right breast 10/2016    fibrocystic changes, PASH and atypical ductal hyperplasia on needle biopsy, s/p R excisional biopsy with radioactive seed localization     Endometrial cancer (H) 02/15/2021     Factor 5 Leiden mutation, heterozygous (H)     factor 5     Familial adenomatous polyposis 1988    clinical presentation; s/p total abdominal colectomy with TRACEY; confirmed APC+ 2018     GERD (gastroesophageal reflux disease)      Migraine      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    [id number]       Past Surgical History:  Past Surgical History:   Procedure Laterality Date     BIOPSY BREAST SEED LOCALIZATION Right 11/30/2016    Procedure: BIOPSY BREAST SEED LOCALIZATION;  Surgeon: Jessica Rasheed MD;  Location:  OR     C LAP,SURG,COLECTOMY,W/ANAST      for colon cancer     COLECTOMY      subtotal     COMBINED ESOPHAGOSCOPY, GASTROSCOPY, DUODENOSCOPY (EGD) WITH ARGON PLASMA COAGULATOR (APC) N/A 4/12/2018    Procedure: COMBINED ESOPHAGOSCOPY, GASTROSCOPY, DUODENOSCOPY (EGD) WITH ARGON PLASMA COAGULATOR (APC);  Esophagogastroduodenoscopy with polypectomy and polyp ablation;  Surgeon: Stephen Gasca MD;  Location: [facility] OR     DILATION AND CURETTAGE, HYSTEROSCOPY DIAGNOSTIC, COMBINED N/A 2/10/2021    Procedure: HYSTEROSCOPY, DIAGNOSTIC, WITH DILATION AND CURETTAGE OF UTERUS;  Surgeon: Sandie Samaniego MD;  Location: [facility] OR     ENDOSCOPIC RETROGRADE CHOLANGIOPANCREATOGRAPHY       ENDOSCOPIC ULTRASOUND UPPER GASTROINTESTINAL TRACT (GI) N/A 7/25/2019    Procedure: Endoscopic Ultrasound;  Surgeon: Stephen Gasca MD;  Location:  OR     ESOPHAGOSCOPY, GASTROSCOPY, DUODENOSCOPY (EGD), COMBINED  11/5/2012    Procedure: COMBINED ESOPHAGOSCOPY, GASTROSCOPY, DUODENOSCOPY (EGD), BIOPSY SINGLE OR MULTIPLE;;  Surgeon: Angélica Garcia MD;  Location:  GI     ESOPHAGOSCOPY, GASTROSCOPY, DUODENOSCOPY (EGD), COMBINED  12/3/2013     Procedure: COMBINED ESOPHAGOSCOPY, GASTROSCOPY, DUODENOSCOPY (EGD);;  Surgeon: Angélica Garcia MD;  Location: [facility] GI     ESOPHAGOSCOPY, GASTROSCOPY, DUODENOSCOPY (EGD), COMBINED N/A 3/28/2018    Procedure: COMBINED ESOPHAGOSCOPY, GASTROSCOPY, DUODENOSCOPY (EGD);;  Surgeon: Stephen Gasca MD;  Location: [facility] GI     ESOPHAGOSCOPY, GASTROSCOPY, DUODENOSCOPY (EGD), COMBINED N/A 5/30/2019    Procedure: Esophagogastroduodenoscopy with biopsy x2 APC;  Surgeon: Stephen Gasca MD;  Location: [facility] OR     ESOPHAGOSCOPY, GASTROSCOPY, DUODENOSCOPY (EGD), COMBINED N/A 7/30/2020    Procedure: ESOPHAGOGASTRODUODENOSCOPY (EGD) with polypectomy by hot snare; argonplasma coaglution ablation of polyps;  Surgeon: Stephen Gasca MD;  Location: [facility] OR     ESOPHAGOSCOPY, GASTROSCOPY, DUODENOSCOPY (EGD), RESECT MUCOSA, COMBINED N/A 7/25/2019    Procedure: Esophagogastroduodenoscopy with gastric mucosal resection and clip application;  Surgeon: Stephen Gasca MD;  Location: [facility] OR     EXCISE POLYP RECTUM       LAPAROSCOPIC HYSTERECTOMY TOTAL, BILATERAL SALPINGO-OOPHORECTOMY, NODE DISSECTION, COMBINED Bilateral 2/25/2021    Procedure: Laparoscopic removal of uterus, cervix, both ovaries and fallopian tubes, sentinel lymph node dissection, cystoscopy;  Surgeon: Sandie Samaniego MD;  Location: [facility] OR     LAPAROSCOPIC LYSIS ADHESIONS       SIGMOIDOSCOPY FLEXIBLE       SIGMOIDOSCOPY FLEXIBLE N/A 12/7/2015    Procedure: SIGMOIDOSCOPY FLEXIBLE;  Surgeon: Mariela Bearden MD;  Location: [facility] GI     SIGMOIDOSCOPY FLEXIBLE N/A 3/28/2018    Procedure: SIGMOIDOSCOPY FLEXIBLE;  EGD/Flex Sig;  Surgeon: Stephen Gasca MD;  Location: [facility] GI     SIGMOIDOSCOPY FLEXIBLE N/A 4/12/2018    Procedure: SIGMOIDOSCOPY FLEXIBLE;  Flexible Sigmoidoscopy with polyp ablation;  Surgeon: Stephen Gasca MD;  Location: [facility] OR     SIGMOIDOSCOPY FLEXIBLE N/A 5/30/2019    Procedure: Flexible Sigmoidoscopy with polypectomy;  Surgeon:  Stephen Gasca MD;  Location: [facility] OR     SIGMOIDOSCOPY FLEXIBLE N/A 7/30/2020    Procedure: SIGMOIDOSCOPY, FLEXIBLE with Ablation of Polyps;  Surgeon: Stephen Gasca MD;  Location:  OR       Health Maintenance:  Last Pap Smear: No need for further pap smear exams  She has not had a history of abnormal Pap smears.    Last Mammogram: 12/20/19              Result: normal    Last MRI 2/3/21  Result:  Normal     She has a history of abnormal ductal hyperplasia    Last Colonoscopy: 7/30/20              Result: normal-repeat yearly due to FAP      Current Medications:   has a current medication list which includes the following prescription(s): acetaminophen, albuterol, calcium-magnesium, celecoxib, docusate sodium, fexofenadine, ibuprofen, melatonin, multivitamin w/minerals, omega-3 fatty acids, ondansetron, triamcinolone, and zolmitriptan, and the following Facility-Administered Medications: ondansetron.     Allergies:   Corn-related products, Cipro [ciprofloxacin], Flagyl [metronidazole], Garlic, Keflex [cephalosporins], Omeprazole, Prilosec otc [omeprazole magnesium], Cephalexin, Doxycycline, Erythromycin, and Sulfa drugs      Social History:  Social History     Socioeconomic History     Marital status:      Spouse name: Not on file     Number of children: 2     Years of education: Not on file     Highest education level: Not on file   Occupational History     Occupation:      Employer: [employer]   Social Needs     Financial resource strain: Not on file     Food insecurity     Worry: Not on file     Inability: Not on file     Transportation needs     Medical: Not on file     Non-medical: Not on file   Tobacco Use     Smoking status: Never Smoker     Smokeless tobacco: Never Used   Substance and Sexual Activity     Alcohol use: Yes     Comment: 1 or 2 drinks per year     Drug use: No     Sexual activity: Not on file     Comment: denies pregnancy   Lifestyle     Physical activity      Days per week: Not on file     Minutes per session: Not on file     Stress: Not on file   Relationships     Social connections     Talks on phone: Not on file     Gets together: Not on file     Attends [employer] service: Not on file     Active member of club or organization: Not on file     Attends meetings of clubs or organizations: Not on file     Relationship status: Not on file     Intimate partner violence     Fear of current or ex partner: Not on file     Emotionally abused: Not on file     Physically abused: Not on file     Forced sexual activity: Not on file   Other Topics Concern     Parent/sibling w/ CABG, MI or angioplasty before 65F 55M? Not Asked   Social History Narrative    Two adopted daughters, ages 13 and 15       Lives with spouse and children, feels safe at home.  Works as an .  Enjoys working out, spending time with children, shopping.  Does not have an advanced directive on file and would like her  to be her POA.  Full code.    Family History:   The patient's family history is significant for.  Family History   Problem Relation Age of Onset     Hyperlipidemia Mother      Cerebrovascular Disease Mother      Cerebrovascular Disease Father      Colon Cancer Father 57         at 63     Prostate Cancer Brother 53     Prostate Cancer Maternal Uncle 65         Physical Exam:   GENERAL: Healthy, alert and no distress  EYES: Eyes grossly normal to inspection.  No discharge or erythema, or obvious scleral/conjunctival abnormalities.  HENT: Normal cephalic/atraumatic.  External ears, nose and mouth without ulcers or lesions.  No nasal drainage visible.  NECK: No asymmetry, visible masses or scars  RESP: No audible wheeze, cough, or visible cyanosis.  No visible retractions or increased work of breathing.    MS: No gross musculoskeletal defects noted.  Normal range of motion.  No visible edema.  SKIN: Visible skin clear. No significant rash, abnormal pigmentation or  lesions.  NEURO: Cranial nerves grossly intact.  Mentation and speech appropriate for age.  PSYCH: Mentation appears normal, affect normal/bright, judgement and insight intact, normal speech and appearance well-groomed.       Assessment:    Sarah Duran is a 55 year old woman with grade 1 endometrial adenocarcinoma.     A total of 30 minutes was spent on patient care.      Plan:     1.)    Stage II, grade 1 endometrial adenocarcinoma.  We reviewed her pathology showing cervical involvement, thus making her stage II.  We reviewed that the NCCN recommendation is for whole pelvic radiation, however in some cases of grade 1 tumors without LVSI and limited myometrial involvement, vaginal brachytherapy may be adequate.  I am also somewhat concerned about whole pelvic radiation given her FAP.  We will review her case at our multidisciplinary tumor board and I will refer her to radiation oncology for further discussion of the risks/benefits of each approach.  She will return 1 month after completion of radiation therapy.     2.) Genetic risk factors were assessed and the patient is POSITIVE for a APC mutation.  Specifically her mutation is called [id number],  consistent with a diagnosis of familial adenomatous polyposis (FAP) or attenuated FAP (AFAP)    3.) Labs and/or tests ordered include:  None     4.) Health maintenance issues addressed today include pt is up to date.          Thank you for allowing us to participate in the care of your patient.         Sincerely,    Sandie Euceda MD  Gynecologic Oncology  [facility]       [facility] [place]         Again, thank you for allowing me to participate in the care of your patient.        Sincerely,        Teja Euceda MD

## 2021-03-09 NOTE — LETTER
3/9/2021         RE: Sarah Duran  [address] [place] [address]        Dear Colleague,    Thank you for referring your patient, Sarah Duran, to the [facility]. Please see a copy of my visit note below.    Sarah is a 55 year old who is being evaluated via a billable video visit.      How would you like to obtain your AVS? MyChart  If the video visit is dropped, the invitation should be resent by: Text to cell phone: [phone]  Will anyone else be joining your video visit? No      Video-Visit Details    Type of service:  Video Visit    Originating Location (pt. Location): Home    Distant Location (provider location):  [facility]     Platform used for Video Visit: Doximity     Consult Notes on Referred Patient        Dr. Clarissa Brown, APRN [facility]  [address]  [place] [address]       RE: Sarah Duran  : 1965  ANDREI: Mar 9, 2021    HPI:  Sarah Duran is 55 year old with stage II, grade 1 endometrial adenocarcinoma.  She is accompanied today by her .  She is doing well post-operatively.  Eating and drinking normally.  Normal bowel/bladder function.  Minimal pain and not needing narcotics.  No vaginal bleeding.  No incisional concerns.    Cancer Course:    She reports she began having daily bleeding in .  She notes she had previously gone 15 months with no bleeding.  She reports the bleeding is sometimes heavy like a period but then can also be somewhat light and only like spotting.  She had an attempted EMB with her primary gynecologist, however this was unsuccessful.  She is here today for a second opinion.    21:  US Pelvis:  Uterus: Measures 8.9 x 5.8 x 6.4 cm. The endometrium is thickened at 2.9 cm. The endometrium is heterogeneous in appearance with an irregular, nodular contour of the endometrial surface. There is hypoechoic complex fluid in the endometrial cavity likely  containing blood. There is vascularity of the endometrium on color imaging. The abnormal endometrium occupies the majority of the uterus. Right Ovary: Measures 2.0 x 1.0 x 1.1 cm and appears unremarkable Right Ovary Blood Flow: Present. Left Ovary: Not seen. Free Fluid: No significant free fluid.There are no suspicious adnexal masses.    2/10/21:  Hysteroscopy, D&C   Pathology:  Grade 1 endometrial adenocarcinoma, MMR intact, p53 wild type, ER/HI +    2/25/21:  Total laparoscopic hysterectomy, bilateral salpingo-oophorectomy, bilateral sentinel lymph node dissection, cystoscopy   Pathology:  Stage II, grade 1 endometrial adenocarcinoma, tumor size 5.6 cm, 1/13 mm myometrial invasion, +CASEY, + cervix, 0/2 sentinel LN, no LVSI    Review of Systems:  Systemic           no weight changes; no fever; no chills; no night sweats; no appetite changes  Skin           no rashes, or lesions  Eye           no irritation; no changes in vision  Alise-Laryngeal           no dysphagia; no hoarseness   Pulmonary    no cough; no shortness of breath  Cardiovascular    no chest pain; no palpitations  Gastrointestinal    no diarrhea; no constipation; no abdominal pain; no changes in bowel  habits; no blood in stool  Genitourinary   no urinary frequency; no urinary urgency; no dysuria; no pain; no abnormal vaginal discharge; no abnormal vaginal bleeding  Breast    no breast discharge; no breast changes; no breast pain  Musculoskeletal    no myalgias; no arthralgias; no back pain  Psychiatric           no depressed mood; no anxiety    Hematologic            no tender lymph nodes; no noticeable swellings or lumps   Endocrine    no hot flashes; no heat/cold intolerance         Neurological   no tremor; no numbness and tingling; no headaches; no difficulty sleeping    Obstetrics and Gynecology History:  G0  no HRT use      Past Medical History:  Past Medical History:   Diagnosis Date     Acute deep vein thrombosis (DVT) of left lower extremity  (H)      Asthma      Atypical ductal hyperplasia of right breast 10/2016    fibrocystic changes, PASH and atypical ductal hyperplasia on needle biopsy, s/p R excisional biopsy with radioactive seed localization     Endometrial cancer (H) 02/15/2021     Factor 5 Leiden mutation, heterozygous (H)     factor 5     Familial adenomatous polyposis 1988    clinical presentation; s/p total abdominal colectomy with TRACEY; confirmed APC+ 2018     GERD (gastroesophageal reflux disease)      Migraine      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    [id number]       Past Surgical History:  Past Surgical History:   Procedure Laterality Date     BIOPSY BREAST SEED LOCALIZATION Right 11/30/2016    Procedure: BIOPSY BREAST SEED LOCALIZATION;  Surgeon: Jessica Rasheed MD;  Location:  OR     C LAP,SURG,COLECTOMY,W/ANAST      for colon cancer     COLECTOMY      subtotal     COMBINED ESOPHAGOSCOPY, GASTROSCOPY, DUODENOSCOPY (EGD) WITH ARGON PLASMA COAGULATOR (APC) N/A 4/12/2018    Procedure: COMBINED ESOPHAGOSCOPY, GASTROSCOPY, DUODENOSCOPY (EGD) WITH ARGON PLASMA COAGULATOR (APC);  Esophagogastroduodenoscopy with polypectomy and polyp ablation;  Surgeon: Stephen Gasca MD;  Location: [facility] OR     DILATION AND CURETTAGE, HYSTEROSCOPY DIAGNOSTIC, COMBINED N/A 2/10/2021    Procedure: HYSTEROSCOPY, DIAGNOSTIC, WITH DILATION AND CURETTAGE OF UTERUS;  Surgeon: Sandie Samaniego MD;  Location: [facility] OR     ENDOSCOPIC RETROGRADE CHOLANGIOPANCREATOGRAPHY       ENDOSCOPIC ULTRASOUND UPPER GASTROINTESTINAL TRACT (GI) N/A 7/25/2019    Procedure: Endoscopic Ultrasound;  Surgeon: Stephen Gasca MD;  Location:  OR     ESOPHAGOSCOPY, GASTROSCOPY, DUODENOSCOPY (EGD), COMBINED  11/5/2012    Procedure: COMBINED ESOPHAGOSCOPY, GASTROSCOPY, DUODENOSCOPY (EGD), BIOPSY SINGLE OR MULTIPLE;;  Surgeon: Angélica Garcia MD;  Location:  GI     ESOPHAGOSCOPY, GASTROSCOPY, DUODENOSCOPY (EGD), COMBINED  12/3/2013     Procedure: COMBINED ESOPHAGOSCOPY, GASTROSCOPY, DUODENOSCOPY (EGD);;  Surgeon: Angélica Garcia MD;  Location: [facility] GI     ESOPHAGOSCOPY, GASTROSCOPY, DUODENOSCOPY (EGD), COMBINED N/A 3/28/2018    Procedure: COMBINED ESOPHAGOSCOPY, GASTROSCOPY, DUODENOSCOPY (EGD);;  Surgeon: Stephen Gasca MD;  Location: [facility] GI     ESOPHAGOSCOPY, GASTROSCOPY, DUODENOSCOPY (EGD), COMBINED N/A 5/30/2019    Procedure: Esophagogastroduodenoscopy with biopsy x2 APC;  Surgeon: Stephen Gasca MD;  Location: [facility] OR     ESOPHAGOSCOPY, GASTROSCOPY, DUODENOSCOPY (EGD), COMBINED N/A 7/30/2020    Procedure: ESOPHAGOGASTRODUODENOSCOPY (EGD) with polypectomy by hot snare; argonplasma coaglution ablation of polyps;  Surgeon: Stephen Gasca MD;  Location: [facility] OR     ESOPHAGOSCOPY, GASTROSCOPY, DUODENOSCOPY (EGD), RESECT MUCOSA, COMBINED N/A 7/25/2019    Procedure: Esophagogastroduodenoscopy with gastric mucosal resection and clip application;  Surgeon: Stephen Gasca MD;  Location: [facility] OR     EXCISE POLYP RECTUM       LAPAROSCOPIC HYSTERECTOMY TOTAL, BILATERAL SALPINGO-OOPHORECTOMY, NODE DISSECTION, COMBINED Bilateral 2/25/2021    Procedure: Laparoscopic removal of uterus, cervix, both ovaries and fallopian tubes, sentinel lymph node dissection, cystoscopy;  Surgeon: Sandie Samaniego MD;  Location: [facility] OR     LAPAROSCOPIC LYSIS ADHESIONS       SIGMOIDOSCOPY FLEXIBLE       SIGMOIDOSCOPY FLEXIBLE N/A 12/7/2015    Procedure: SIGMOIDOSCOPY FLEXIBLE;  Surgeon: Mariela Bearden MD;  Location: [facility] GI     SIGMOIDOSCOPY FLEXIBLE N/A 3/28/2018    Procedure: SIGMOIDOSCOPY FLEXIBLE;  EGD/Flex Sig;  Surgeon: Stephen Gasca MD;  Location: [facility] GI     SIGMOIDOSCOPY FLEXIBLE N/A 4/12/2018    Procedure: SIGMOIDOSCOPY FLEXIBLE;  Flexible Sigmoidoscopy with polyp ablation;  Surgeon: Stepehn Gasca MD;  Location: [facility] OR     SIGMOIDOSCOPY FLEXIBLE N/A 5/30/2019    Procedure: Flexible Sigmoidoscopy with polypectomy;  Surgeon:  Stephen Gasca MD;  Location: [facility] OR     SIGMOIDOSCOPY FLEXIBLE N/A 7/30/2020    Procedure: SIGMOIDOSCOPY, FLEXIBLE with Ablation of Polyps;  Surgeon: Stephen Gasca MD;  Location:  OR       Health Maintenance:  Last Pap Smear: No need for further pap smear exams  She has not had a history of abnormal Pap smears.    Last Mammogram: 12/20/19              Result: normal    Last MRI 2/3/21  Result:  Normal     She has a history of abnormal ductal hyperplasia    Last Colonoscopy: 7/30/20              Result: normal-repeat yearly due to FAP      Current Medications:   has a current medication list which includes the following prescription(s): acetaminophen, albuterol, calcium-magnesium, celecoxib, docusate sodium, fexofenadine, ibuprofen, melatonin, multivitamin w/minerals, omega-3 fatty acids, ondansetron, triamcinolone, and zolmitriptan, and the following Facility-Administered Medications: ondansetron.     Allergies:   Corn-related products, Cipro [ciprofloxacin], Flagyl [metronidazole], Garlic, Keflex [cephalosporins], Omeprazole, Prilosec otc [omeprazole magnesium], Cephalexin, Doxycycline, Erythromycin, and Sulfa drugs      Social History:  Social History     Socioeconomic History     Marital status:      Spouse name: Not on file     Number of children: 2     Years of education: Not on file     Highest education level: Not on file   Occupational History     Occupation:      Employer: [employer]   Social Needs     Financial resource strain: Not on file     Food insecurity     Worry: Not on file     Inability: Not on file     Transportation needs     Medical: Not on file     Non-medical: Not on file   Tobacco Use     Smoking status: Never Smoker     Smokeless tobacco: Never Used   Substance and Sexual Activity     Alcohol use: Yes     Comment: 1 or 2 drinks per year     Drug use: No     Sexual activity: Not on file     Comment: denies pregnancy   Lifestyle     Physical activity      Days per week: Not on file     Minutes per session: Not on file     Stress: Not on file   Relationships     Social connections     Talks on phone: Not on file     Gets together: Not on file     Attends Mu-ism service: Not on file     Active member of club or organization: Not on file     Attends meetings of clubs or organizations: Not on file     Relationship status: Not on file     Intimate partner violence     Fear of current or ex partner: Not on file     Emotionally abused: Not on file     Physically abused: Not on file     Forced sexual activity: Not on file   Other Topics Concern     Parent/sibling w/ CABG, MI or angioplasty before 65F 55M? Not Asked   Social History Narrative    Two adopted daughters, ages 13 and 15       Lives with spouse and children, feels safe at home.  Works as an .  Enjoys working out, spending time with children, shopping.  Does not have an advanced directive on file and would like her  to be her POA.  Full code.    Family History:   The patient's family history is significant for.  Family History   Problem Relation Age of Onset     Hyperlipidemia Mother      Cerebrovascular Disease Mother      Cerebrovascular Disease Father      Colon Cancer Father 57         at 63     Prostate Cancer Brother 53     Prostate Cancer Maternal Uncle 65         Physical Exam:   GENERAL: Healthy, alert and no distress  EYES: Eyes grossly normal to inspection.  No discharge or erythema, or obvious scleral/conjunctival abnormalities.  HENT: Normal cephalic/atraumatic.  External ears, nose and mouth without ulcers or lesions.  No nasal drainage visible.  NECK: No asymmetry, visible masses or scars  RESP: No audible wheeze, cough, or visible cyanosis.  No visible retractions or increased work of breathing.    MS: No gross musculoskeletal defects noted.  Normal range of motion.  No visible edema.  SKIN: Visible skin clear. No significant rash, abnormal pigmentation or  lesions.  NEURO: Cranial nerves grossly intact.  Mentation and speech appropriate for age.  PSYCH: Mentation appears normal, affect normal/bright, judgement and insight intact, normal speech and appearance well-groomed.       Assessment:    Sarah Duran is a 55 year old woman with grade 1 endometrial adenocarcinoma.     A total of 30 minutes was spent on patient care.      Plan:     1.)    Stage II, grade 1 endometrial adenocarcinoma.  We reviewed her pathology showing cervical involvement, thus making her stage II.  We reviewed that the NCCN recommendation is for whole pelvic radiation, however in some cases of grade 1 tumors without LVSI and limited myometrial involvement, vaginal brachytherapy may be adequate.  I am also somewhat concerned about whole pelvic radiation given her FAP.  We will review her case at our multidisciplinary tumor board and I will refer her to radiation oncology for further discussion of the risks/benefits of each approach.  She will return 1 month after completion of radiation therapy.     2.) Genetic risk factors were assessed and the patient is POSITIVE for a APC mutation.  Specifically her mutation is called [id number],  consistent with a diagnosis of familial adenomatous polyposis (FAP) or attenuated FAP (AFAP)    3.) Labs and/or tests ordered include:  None     4.) Health maintenance issues addressed today include pt is up to date.          Thank you for allowing us to participate in the care of your patient.         Sincerely,    Sandie Euceda MD  Gynecologic Oncology  [facility]       [facility] [place]         Again, thank you for allowing me to participate in the care of your patient.        Sincerely,        Teja Euceda MD

## 2021-03-09 NOTE — PROGRESS NOTES
Sarah is a 55 year old who is being evaluated via a billable video visit.      How would you like to obtain your AVS? MyChart  If the video visit is dropped, the invitation should be resent by: Text to cell phone: [phone]  Will anyone else be joining your video visit? No      Video-Visit Details    Type of service:  Video Visit    Originating Location (pt. Location): Home    Distant Location (provider location):  [facility]     Platform used for Video Visit: Doximity     Consult Notes on Referred Patient        Dr. Clarissa Brown, APRN CNS  [address] [facility] [address]  [place] [address]       RE: Sarah Duran  : 1965  ANDREI: Mar 9, 2021    HPI:  Sarah Duran is 55 year old with stage II, grade 1 endometrial adenocarcinoma.  She is accompanied today by her .  She is doing well post-operatively.  Eating and drinking normally.  Normal bowel/bladder function.  Minimal pain and not needing narcotics.  No vaginal bleeding.  No incisional concerns.    Cancer Course:    She reports she began having daily bleeding in .  She notes she had previously gone 15 months with no bleeding.  She reports the bleeding is sometimes heavy like a period but then can also be somewhat light and only like spotting.  She had an attempted EMB with her primary gynecologist, however this was unsuccessful.  She is here today for a second opinion.    21:  US Pelvis:  Uterus: Measures 8.9 x 5.8 x 6.4 cm. The endometrium is thickened at 2.9 cm. The endometrium is heterogeneous in appearance with an irregular, nodular contour of the endometrial surface. There is hypoechoic complex fluid in the endometrial cavity likely containing blood. There is vascularity of the endometrium on color imaging. The abnormal endometrium occupies the majority of the uterus. Right Ovary: Measures 2.0 x 1.0 x 1.1 cm and appears unremarkable Right Ovary Blood Flow: Present. Left Ovary: Not  seen. Free Fluid: No significant free fluid.There are no suspicious adnexal masses.    2/10/21:  Hysteroscopy, D&C   Pathology:  Grade 1 endometrial adenocarcinoma, MMR intact, p53 wild type, ER/MD +    2/25/21:  Total laparoscopic hysterectomy, bilateral salpingo-oophorectomy, bilateral sentinel lymph node dissection, cystoscopy   Pathology:  Stage II, grade 1 endometrial adenocarcinoma, tumor size 5.6 cm, 1/13 mm myometrial invasion, +CASEY, + cervix, 0/2 sentinel LN, no LVSI    Review of Systems:  Systemic           no weight changes; no fever; no chills; no night sweats; no appetite changes  Skin           no rashes, or lesions  Eye           no irritation; no changes in vision  Alise-Laryngeal           no dysphagia; no hoarseness   Pulmonary    no cough; no shortness of breath  Cardiovascular    no chest pain; no palpitations  Gastrointestinal    no diarrhea; no constipation; no abdominal pain; no changes in bowel  habits; no blood in stool  Genitourinary   no urinary frequency; no urinary urgency; no dysuria; no pain; no abnormal vaginal discharge; no abnormal vaginal bleeding  Breast    no breast discharge; no breast changes; no breast pain  Musculoskeletal    no myalgias; no arthralgias; no back pain  Psychiatric           no depressed mood; no anxiety    Hematologic            no tender lymph nodes; no noticeable swellings or lumps   Endocrine    no hot flashes; no heat/cold intolerance         Neurological   no tremor; no numbness and tingling; no headaches; no difficulty sleeping    Obstetrics and Gynecology History:  G0  no HRT use      Past Medical History:  Past Medical History:   Diagnosis Date     Acute deep vein thrombosis (DVT) of left lower extremity (H)      Asthma      Atypical ductal hyperplasia of right breast 10/2016    fibrocystic changes, PASH and atypical ductal hyperplasia on needle biopsy, s/p R excisional biopsy with radioactive seed localization     Endometrial cancer (H) 02/15/2021      Factor 5 Leiden mutation, heterozygous (H)     factor 5     Familial adenomatous polyposis 1988    clinical presentation; s/p total abdominal colectomy with TRACEY; confirmed APC+ 2018     GERD (gastroesophageal reflux disease)      Migraine      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    [id number]       Past Surgical History:  Past Surgical History:   Procedure Laterality Date     BIOPSY BREAST SEED LOCALIZATION Right 11/30/2016    Procedure: BIOPSY BREAST SEED LOCALIZATION;  Surgeon: Jessica Rasheed MD;  Location: [facility] OR     C LAP,SURG,COLECTOMY,W/ANAST      for colon cancer     COLECTOMY      subtotal     COMBINED ESOPHAGOSCOPY, GASTROSCOPY, DUODENOSCOPY (EGD) WITH ARGON PLASMA COAGULATOR (APC) N/A 4/12/2018    Procedure: COMBINED ESOPHAGOSCOPY, GASTROSCOPY, DUODENOSCOPY (EGD) WITH ARGON PLASMA COAGULATOR (APC);  Esophagogastroduodenoscopy with polypectomy and polyp ablation;  Surgeon: Stephen Gasca MD;  Location: [facility] OR     DILATION AND CURETTAGE, HYSTEROSCOPY DIAGNOSTIC, COMBINED N/A 2/10/2021    Procedure: HYSTEROSCOPY, DIAGNOSTIC, WITH DILATION AND CURETTAGE OF UTERUS;  Surgeon: Sandie Samaniego MD;  Location: [facility] OR     ENDOSCOPIC RETROGRADE CHOLANGIOPANCREATOGRAPHY       ENDOSCOPIC ULTRASOUND UPPER GASTROINTESTINAL TRACT (GI) N/A 7/25/2019    Procedure: Endoscopic Ultrasound;  Surgeon: Stephen Gasca MD;  Location: [facility] OR     ESOPHAGOSCOPY, GASTROSCOPY, DUODENOSCOPY (EGD), COMBINED  11/5/2012    Procedure: COMBINED ESOPHAGOSCOPY, GASTROSCOPY, DUODENOSCOPY (EGD), BIOPSY SINGLE OR MULTIPLE;;  Surgeon: Angélica Garcia MD;  Location: [facility] GI     ESOPHAGOSCOPY, GASTROSCOPY, DUODENOSCOPY (EGD), COMBINED  12/3/2013    Procedure: COMBINED ESOPHAGOSCOPY, GASTROSCOPY, DUODENOSCOPY (EGD);;  Surgeon: Angélica Garcia MD;  Location: [facility] GI     ESOPHAGOSCOPY, GASTROSCOPY, DUODENOSCOPY (EGD), COMBINED N/A 3/28/2018    Procedure: COMBINED ESOPHAGOSCOPY, GASTROSCOPY, DUODENOSCOPY  (EGD);;  Surgeon: Stephen Gasca MD;  Location: [facility] GI     ESOPHAGOSCOPY, GASTROSCOPY, DUODENOSCOPY (EGD), COMBINED N/A 5/30/2019    Procedure: Esophagogastroduodenoscopy with biopsy x2 APC;  Surgeon: Stephen Gasca MD;  Location: [facility]     ESOPHAGOSCOPY, GASTROSCOPY, DUODENOSCOPY (EGD), COMBINED N/A 7/30/2020    Procedure: ESOPHAGOGASTRODUODENOSCOPY (EGD) with polypectomy by hot snare; argonplasma coaglution ablation of polyps;  Surgeon: Stephen Gasca MD;  Location: [facility] OR     ESOPHAGOSCOPY, GASTROSCOPY, DUODENOSCOPY (EGD), RESECT MUCOSA, COMBINED N/A 7/25/2019    Procedure: Esophagogastroduodenoscopy with gastric mucosal resection and clip application;  Surgeon: Stephen Gasca MD;  Location: [facility] OR     EXCISE POLYP RECTUM       LAPAROSCOPIC HYSTERECTOMY TOTAL, BILATERAL SALPINGO-OOPHORECTOMY, NODE DISSECTION, COMBINED Bilateral 2/25/2021    Procedure: Laparoscopic removal of uterus, cervix, both ovaries and fallopian tubes, sentinel lymph node dissection, cystoscopy;  Surgeon: Sandie Samaniego MD;  Location: [facility] OR     LAPAROSCOPIC LYSIS ADHESIONS       SIGMOIDOSCOPY FLEXIBLE       SIGMOIDOSCOPY FLEXIBLE N/A 12/7/2015    Procedure: SIGMOIDOSCOPY FLEXIBLE;  Surgeon: Mariela Bearden MD;  Location: [facility] GI     SIGMOIDOSCOPY FLEXIBLE N/A 3/28/2018    Procedure: SIGMOIDOSCOPY FLEXIBLE;  EGD/Flex Sig;  Surgeon: Stephen Gasca MD;  Location: [facility]     SIGMOIDOSCOPY FLEXIBLE N/A 4/12/2018    Procedure: SIGMOIDOSCOPY FLEXIBLE;  Flexible Sigmoidoscopy with polyp ablation;  Surgeon: Stephen Gasca MD;  Location: [facility]     SIGMOIDOSCOPY FLEXIBLE N/A 5/30/2019    Procedure: Flexible Sigmoidoscopy with polypectomy;  Surgeon: Stephen Gasca MD;  Location: [facility]     SIGMOIDOSCOPY FLEXIBLE N/A 7/30/2020    Procedure: SIGMOIDOSCOPY, FLEXIBLE with Ablation of Polyps;  Surgeon: Stephen Gasca MD;  Location: [facility] OR       Health Maintenance:  Last Pap Smear: No need for  further pap smear exams  She has not had a history of abnormal Pap smears.    Last Mammogram: 12/20/19              Result: normal    Last MRI 2/3/21  Result:  Normal     She has a history of abnormal ductal hyperplasia    Last Colonoscopy: 7/30/20              Result: normal-repeat yearly due to FAP      Current Medications:   has a current medication list which includes the following prescription(s): acetaminophen, albuterol, calcium-magnesium, celecoxib, docusate sodium, fexofenadine, ibuprofen, melatonin, multivitamin w/minerals, omega-3 fatty acids, ondansetron, triamcinolone, and zolmitriptan, and the following Facility-Administered Medications: ondansetron.     Allergies:   Corn-related products, Cipro [ciprofloxacin], Flagyl [metronidazole], Garlic, Keflex [cephalosporins], Omeprazole, Prilosec otc [omeprazole magnesium], Cephalexin, Doxycycline, Erythromycin, and Sulfa drugs      Social History:  Social History     Socioeconomic History     Marital status:      Spouse name: Not on file     Number of children: 2     Years of education: Not on file     Highest education level: Not on file   Occupational History     Occupation:      Employer: [employer]   Social Needs     Financial resource strain: Not on file     Food insecurity     Worry: Not on file     Inability: Not on file     Transportation needs     Medical: Not on file     Non-medical: Not on file   Tobacco Use     Smoking status: Never Smoker     Smokeless tobacco: Never Used   Substance and Sexual Activity     Alcohol use: Yes     Comment: 1 or 2 drinks per year     Drug use: No     Sexual activity: Not on file     Comment: denies pregnancy   Lifestyle     Physical activity     Days per week: Not on file     Minutes per session: Not on file     Stress: Not on file   Relationships     Social connections     Talks on phone: Not on file     Gets together: Not on file     Attends [employer] service: Not on file     Active member  of club or organization: Not on file     Attends meetings of clubs or organizations: Not on file     Relationship status: Not on file     Intimate partner violence     Fear of current or ex partner: Not on file     Emotionally abused: Not on file     Physically abused: Not on file     Forced sexual activity: Not on file   Other Topics Concern     Parent/sibling w/ CABG, MI or angioplasty before 65F 55M? Not Asked   Social History Narrative    Two adopted daughters, ages 13 and 15       Lives with spouse and children, feels safe at home.  Works as an .  Enjoys working out, spending time with children, shopping.  Does not have an advanced directive on file and would like her  to be her POA.  Full code.    Family History:   The patient's family history is significant for.  Family History   Problem Relation Age of Onset     Hyperlipidemia Mother      Cerebrovascular Disease Mother      Cerebrovascular Disease Father      Colon Cancer Father 57         at 63     Prostate Cancer Brother 53     Prostate Cancer Maternal Uncle 65         Physical Exam:   GENERAL: Healthy, alert and no distress  EYES: Eyes grossly normal to inspection.  No discharge or erythema, or obvious scleral/conjunctival abnormalities.  HENT: Normal cephalic/atraumatic.  External ears, nose and mouth without ulcers or lesions.  No nasal drainage visible.  NECK: No asymmetry, visible masses or scars  RESP: No audible wheeze, cough, or visible cyanosis.  No visible retractions or increased work of breathing.    MS: No gross musculoskeletal defects noted.  Normal range of motion.  No visible edema.  SKIN: Visible skin clear. No significant rash, abnormal pigmentation or lesions.  NEURO: Cranial nerves grossly intact.  Mentation and speech appropriate for age.  PSYCH: Mentation appears normal, affect normal/bright, judgement and insight intact, normal speech and appearance well-groomed.       Assessment:    Sarah Duran is a 55 year  old woman with grade 1 endometrial adenocarcinoma.     A total of 30 minutes was spent on patient care.      Plan:     1.)    Stage II, grade 1 endometrial adenocarcinoma.  We reviewed her pathology showing cervical involvement, thus making her stage II.  We reviewed that the NCCN recommendation is for whole pelvic radiation, however in some cases of grade 1 tumors without LVSI and limited myometrial involvement, vaginal brachytherapy may be adequate.  I am also somewhat concerned about whole pelvic radiation given her FAP.  We will review her case at our multidisciplinary tumor board and I will refer her to radiation oncology for further discussion of the risks/benefits of each approach.  She will return 1 month after completion of radiation therapy.     2.) Genetic risk factors were assessed and the patient is POSITIVE for a APC mutation.  Specifically her mutation is called [id number],  consistent with a diagnosis of familial adenomatous polyposis (FAP) or attenuated FAP (AFAP)    3.) Labs and/or tests ordered include:  None     4.) Health maintenance issues addressed today include pt is up to date.          Thank you for allowing us to participate in the care of your patient.         Sincerely,    Sandie Euceda MD  Gynecologic Oncology  [facility]       MYCHAL PINZON

## 2021-03-09 NOTE — PATIENT INSTRUCTIONS
Radiation therapy visit at the [facility]    Next virtual visit with Dr Euceda 1 month after completion of radiation therapy.

## 2021-03-12 NOTE — PROGRESS NOTES
Department of Radiation Oncology                   [place] [address]  [address] [place]  [address]  Office:  [phone]  Fax:  [phone]   Radiation Oncology Clinic  [address] [place] [address]  Phone:  [phone]  Fax:  [phone]     RE: Sarah Duran : 1965   MRN: [id number] ANDREI: 3/18/2021     OUTPATIENT VISIT NOTE       PROBLEM: endometrial cancer, grade 1, FIGO stage II.     was seen for initial consultation in the Dept of Radiation Oncology on 3/18/2021 at the request of Dr. Ok Lezama of Gynecology Oncology    HISTORY OF PRESENT ILLNESS:     Ms. Duran is a 55-year-old female with a known APC mutation consistent with a diagnosis of familial adenomatous polyposis, s/p subtotal colectomy with ileal-sigmoid re-anastomosis in . She is now diagnosed with an endometrial cancer.    In late 2020, she presented to her PCP after two months of post-menopausal bleeding. Her last pap smear was done in 2019 and was normal. Pelvic ultrasound on 21 showed a 8.9 x 5.8 x 6.4 cm uterus with thickening of endometrium at 2.9 cm, which also appeared irregular and nodular. There was no suspicious adnexal masses. She had an attempted endometrial biopsy with her primary gynecologist, which was unsuccessful. Pap smear was normal.    She then saw Dr. Ok Lezama of Gynecology Oncology on 2021. On speculum exam, her cervix was normal in appearance; bimanual exam found no discrete mass, nodularity or fullness, but noted a possible cervical fibroid obstructing the os. Endometrial biopsy was not successful due to cervical stenosis and the patient underwent hysteroscopy and endometrial curettage on 2/10/21. Multiple large polyps were noted under hsteroscopic view, somewhat concerning for malignancy.  Curettage performed with large amount of polypoid tissue obtained ([id number]), which was consistent with endometrioid adenocarcinoma, FIGO grade 1, MMR intact/p53  wild-type.    On 21, the patient underwent TLH-BSO, bilateral sentinel lymph node dissection, and cystoscopy. Intraoperatively, omental and small bowel adhesions to the upper abdomen were noted with minimal bowel adhesions in the pelvis. There were also endometriotic implants in posterior cul de sac with scarring of the bilateral uteralsacral ligaments and anterior lower uterine segment. Surgical pathology ([id number]) reported a 5.6 cm endometrioid adenocarcinoma, involving 1/13 mm (8%) of the myometrium. There was lower uterine segment and cervical stromal involvement (limited to the inner half of the cervical wall, 7 mm from paracervical margins), but no LVSI.  0/2 (1 Rt obturator, 1 Lt external illiac) sentinel nodes was found involved. The ovaries and fallopian tubes were benign, and cytology from peritoneal washing was negative. Her disease was thus staged at pT2N0, FIGO stage II.     Ms. Duran presents today for consultation of her adjuvant radiation options. She is accompanied by her . She is well-healed from her recent surgery, and has no residual pain/discomfort. She denies vaginal discharge/bleeding, and her bowel and urinary habits are at her baseline.                PAST MEDICAL/SURGICAL HISTORY:   Familial adenomatous polyposis, confirmed APC+ in , s/p subtotal colectomy with ileal-sigmoid re-anastomosis in ; undergoing regular GI, breast and thyroid screening annually.    Atypical ductal hyperplasia of right breast, s/p excisional biopsy in .  Factor V Leiden  Asthma  Restless leg syndrome    CHEMOTHERAPY HISTORY: no    PAST RADIATION THERAPY HISTORY: no    REPRODUCTIVE HISTORY: , no history of HRT use.    IMPLANTED DEVICES: no    AUTOIMMUNE/CONNECTIVE TISSUE DISORDERS: no    PREGNANCY RISK: post-menopausal    MEDICATIONS: reviewed  Use Nasacort and Proventil inhailor, and use PO antihistamine for asthma.  Takes Celecoxib and multivitamin.    ALLERGIES:  is allergic  to corn-related products; cipro [ciprofloxacin]; flagyl [metronidazole]; garlic; keflex [cephalosporins]; omeprazole; prilosec otc [omeprazole magnesium]; cephalexin; doxycycline; erythromycin; and sulfa drugs.    SOCIAL HISTORY:   She has no history of smoking or tobacco use, she does not drink alcohol.  She in an , and lives with her spouse and her children in [place].      FAMILY HISTORY:  Father diagnosed with colon cancer at age 57, passed away at age 63  Brother diagnosed with prostate cancer at age 53  Maternal uncle was diagnosed with prostate cancer at age 65    REVIEW OF SYMPTOMS:  A full 14-point review of systems was performed.     PHYSICAL EXAMINATION:    LMP 05/01/2019     Skin: No rashes or lesions appreciated  HEENT: Normocephalic atraumatic, extraocular muscles intact  Neck: Supple, full range of motion, no cervical lymphadenopathy  Heart: Warm and well perfused  Lungs: Breathing comfortably on room air  Abd: Nondistended  /Rectal: Deferred  Ext: Atraumatic, grossly intact strength  Neuro: Cranial nerves grossly intact, normal gait    ECOG PS: 0    ASSESSMENT:     Ms. Duran  is a 55 year-old female with with a known APC mutation consistent with a diagnosis of familial adenomatous polyposis, s/p subtotal colectomy with ileal-sigmoid re-anastomosis in 1988. She is newly diagnosed with endometrial cancer and is s/p TLH-BSO and bilateral pelvic sentinel node dissection. Surgical pathology showed resection of an exophytic cancer, grade 1, with minimal myometrial invasion (1/3 mm, 8%), CASEY and cervical stromal involvement. There was no evidence of LVSI and 0/2 sampled lymph nodes was involved. Her disease is FIGO stage II. Of note, on the initial clinical exam, there was no evidence for visible disease involvement of her cervix.         RECOMMENDATIONS:     We reviewed the natural history of her disease in details, and discussed that in general adjuvant radiotherapy is expected to improve  locoregional disease control but does not have an impact on survival. Given the low grade histology and minimal myometrial invasion, she would have been a candidate for adjuvant brachytherapy alone as supported by randomized control trial data. With cervical stromal involvement, whole pelvic radiotherapy would typically be our recommendation. However, we voiced our concern over the added radiation spillage to the remainder of her bowel causing late side effects, especially in light of the bowel adhesions found in her recent surgery. Furthermore, we also think that there is a theoretical increase in risk of secondary malignancy related to her APC mutation. More recently, there have been retrospective reports suggesting that disease with limited cervical stromal invasion alone can be treated with brachytherapy alone to achieve good control. We therefore think it is reasonable to offer this as an option for her. With good understanding, Ms. Duran opted for brachytherapy alone.     We then went over the technical aspects and the logistics of brachytherapy to be delivered via cylinder applicator to treat the vaginal cuff and the upper vaginal cavity. We recommended a total of 5 once-every-other day fractions, and reviewed the expected side effects associated with her treatment. We discussed the risk of vaginal cuff dehiscence, and our plan to wait till 7-8 weeks out after surgery before starting her on treatment. We explained the utility vaginal dilator after completion of treatment to mitigate vaginal stenosis. Informed consent was obtained, and we will plan to see her back for planning and treatment in mid to late 04/2021.        The patient was seen and discussed with staff, Dr. Burger.    Dashawn Morse MD  Resident, PGY-3  Department of Radiation Oncology  [facility]

## 2021-03-18 ENCOUNTER — ALLIED HEALTH/NURSE VISIT (OUTPATIENT)
Dept: RADIATION ONCOLOGY | Facility: CLINIC | Age: 56
End: 2021-03-18
Attending: RADIOLOGY
Payer: COMMERCIAL

## 2021-03-18 ENCOUNTER — OFFICE VISIT (OUTPATIENT)
Dept: RADIATION ONCOLOGY | Facility: CLINIC | Age: 56
End: 2021-03-18
Attending: OBSTETRICS & GYNECOLOGY
Payer: COMMERCIAL

## 2021-03-18 VITALS
BODY MASS INDEX: 29.32 KG/M2 | OXYGEN SATURATION: 99 % | SYSTOLIC BLOOD PRESSURE: 136 MMHG | HEART RATE: 65 BPM | WEIGHT: 160.3 LBS | DIASTOLIC BLOOD PRESSURE: 84 MMHG

## 2021-03-18 DIAGNOSIS — C54.1 ENDOMETRIAL CANCER (H): ICD-10-CM

## 2021-03-18 DIAGNOSIS — C54.1 ENDOMETRIAL CANCER (H): Primary | ICD-10-CM

## 2021-03-18 PROCEDURE — G0463 HOSPITAL OUTPT CLINIC VISIT: HCPCS | Performed by: RADIOLOGY

## 2021-03-18 ASSESSMENT — ENCOUNTER SYMPTOMS
COUGH: 0
WEIGHT LOSS: 0
DIZZINESS: 0
DIARRHEA: 0
SHORTNESS OF BREATH: 0
HEADACHES: 0
CONSTIPATION: 0
BLOOD IN STOOL: 0
FREQUENCY: 0
CHILLS: 0
DEPRESSION: 0
SEIZURES: 0
TINGLING: 0
VOMITING: 0
INSOMNIA: 0
DOUBLE VISION: 0
BRUISES/BLEEDS EASILY: 0
NERVOUS/ANXIOUS: 1
FALLS: 0
DIAPHORESIS: 0
NECK PAIN: 0
EYE PAIN: 0
BLURRED VISION: 0
NAUSEA: 0
FEVER: 0
HEMATURIA: 0
BACK PAIN: 0
DYSURIA: 0

## 2021-03-18 NOTE — PROGRESS NOTES
HPI    INITIAL PATIENT ASSESSMENT    Diagnosis: Endometrial cancer    Prior radiation therapy: None    Prior chemotherapy: None    Prior hormonal therapy:No    Pain Eval:  Denies    Psychosocial  Living arrangements:   Fall Risk: independent  Falls Risk Screening Questions - radiation consult    1. Have you fallen in the past one week?  No.  2. Have you felt unsteady when walking or standing in the past one week?  No.     referral needs: Not needed    Advanced Directive: No  Implantable Cardiac Device? No    Onset of menarche: @ age 12-13  LMP: Patient's last menstrual period was 05/01/2019.  Onset of menopause: menopause for 1.5 years  Abnormal vaginal bleeding/discharge: No  Are you pregnant? No  Reproductive note: 2 children    Review of Systems   Constitutional: Positive for malaise/fatigue (Post surgery). Negative for chills, diaphoresis, fever and weight loss.   HENT: Negative for ear pain and nosebleeds.    Eyes: Negative for blurred vision, double vision and pain.   Respiratory: Negative for cough and shortness of breath.    Cardiovascular: Negative for chest pain and leg swelling.   Gastrointestinal: Negative for blood in stool, constipation, diarrhea, nausea and vomiting.   Genitourinary: Negative for dysuria, frequency, hematuria and urgency.   Musculoskeletal: Negative for back pain, falls, joint pain and neck pain.   Skin: Negative for rash.   Neurological: Negative for dizziness, tingling, seizures and headaches.   Endo/Heme/Allergies: Does not bruise/bleed easily.   Psychiatric/Behavioral: Negative for depression. The patient is nervous/anxious (With appointments and the unknown). The patient does not have insomnia.           Nurse face-to-face time: Level 4:  15 min face to face time

## 2021-03-20 ENCOUNTER — IMMUNIZATION (OUTPATIENT)
Dept: NURSING | Facility: CLINIC | Age: 56
End: 2021-03-20
Payer: COMMERCIAL

## 2021-03-20 PROCEDURE — 91300 PR COVID VAC PFIZER DIL RECON 30 MCG/0.3 ML IM: CPT

## 2021-03-20 PROCEDURE — 0001A PR COVID VAC PFIZER DIL RECON 30 MCG/0.3 ML IM: CPT

## 2021-03-20 NOTE — PROGRESS NOTES
Department of Radiation Oncology                   [address]  [address] [place]  [address]  Office:  [phone]  Fax:  [phone]   Radiation Oncology Clinic  [address] [place] [address]  Phone:  [phone]  Fax:  [phone]     RE: Sarah Duran : 1965   MRN: [id number] ANDREI: 3/18/2021     OUTPATIENT VISIT NOTE       PROBLEM: Endometrial cancer, endometrioid type, grade 1, FIGO stage II.     was seen for initial consultation in the Dept of Radiation Oncology on 3/18/2021 at the request of Dr. Ok Lezama of Gynecology Oncology    HISTORY OF PRESENT ILLNESS:     Ms. Duran is a 55-year-old female with a known APC mutation consistent with a diagnosis of familial adenomatous polyposis, s/p subtotal colectomy with ileal-sigmoid re-anastomosis in . She is now diagnosed with an endometrial cancer.    In late 2020, she presented to her PCP after two months of post-menopausal bleeding. Her last pap smear was done in 2019 and was normal. Pelvic ultrasound on 21 showed a 8.9 x 5.8 x 6.4 cm uterus with thickening of endometrium at 2.9 cm, which also appeared irregular and nodular. There was no suspicious adnexal masses. She had an attempted endometrial biopsy with her primary gynecologist, which was unsuccessful. Pap smear was normal.    She then saw Dr. Ok Lezama of Gynecology Oncology on 2021. On speculum exam, her cervix was normal in appearance; bimanual exam found no discrete mass, nodularity or fullness, but noted a possible cervical fibroid obstructing the os. Endometrial biopsy was not successful due to cervical stenosis and the patient underwent hysteroscopy and endometrial curettage on 2/10/21. Multiple large polyps were noted under hsteroscopic view, somewhat concerning for malignancy.  Curettage performed with large amount of polypoid tissue obtained ([id number]), which was consistent with endometrioid adenocarcinoma, FIGO grade 1,  MMR intact/p53 wild-type.    On 21, the patient underwent TLH-BSO, bilateral sentinel lymph node dissection, and cystoscopy. Intraoperatively, omental and small bowel adhesions to the upper abdomen were noted with minimal bowel adhesions in the pelvis. There were also endometriotic implants in posterior cul de sac with scarring of the bilateral uteralsacral ligaments and anterior lower uterine segment. Surgical pathology ([id number]) reported a 5.6 cm endometrioid adenocarcinoma, involving 1/13 mm (8%) of the myometrium. There was lower uterine segment and cervical stromal involvement (limited to the inner half of the cervical wall, 7 mm from paracervical margins), but no LVSI.  0/2 (1 Rt obturator, 1 Lt external illiac) sentinel nodes was found involved. The ovaries and fallopian tubes were benign, and cytology from peritoneal washing was negative. Her disease was thus staged at pT2N0, FIGO stage II.     Ms. Duran presents today for consultation of her adjuvant radiation options. She is accompanied by her . She is well-healed from her recent surgery, and has no residual pain/discomfort. She denies vaginal discharge/bleeding, and her bowel and urinary habits are at her baseline.                PAST MEDICAL/SURGICAL HISTORY:   Familial adenomatous polyposis, confirmed APC+ in , s/p subtotal colectomy with ileal-sigmoid re-anastomosis in ; undergoing regular GI, breast and thyroid screening annually.    Atypical ductal hyperplasia of right breast, s/p excisional biopsy in .  Factor V Leiden  Asthma  Restless leg syndrome    CHEMOTHERAPY HISTORY: no    PAST RADIATION THERAPY HISTORY: no    REPRODUCTIVE HISTORY: , no history of HRT use.    IMPLANTED DEVICES: no    AUTOIMMUNE/CONNECTIVE TISSUE DISORDERS: no    PREGNANCY RISK: post-menopausal    MEDICATIONS: reviewed  Use Nasacort and Proventil inhailor, and use PO antihistamine for asthma.  Takes Celecoxib and multivitamin.    ALLERGIES:   is allergic to corn-related products; cipro [ciprofloxacin]; flagyl [metronidazole]; garlic; keflex [cephalosporins]; omeprazole; prilosec otc [omeprazole magnesium]; cephalexin; doxycycline; erythromycin; and sulfa drugs.    SOCIAL HISTORY:   She has no history of smoking or tobacco use, she does not drink alcohol.  She in an , and lives with her spouse and her children in [place].      FAMILY HISTORY:  Father diagnosed with colon cancer at age 57, passed away at age 63  Brother diagnosed with prostate cancer at age 53  Maternal uncle was diagnosed with prostate cancer at age 65    REVIEW OF SYMPTOMS:  A full 14-point review of systems was performed.     PHYSICAL EXAMINATION:    LMP 05/01/2019     Skin: No rashes or lesions appreciated  HEENT: Normocephalic atraumatic, extraocular muscles intact  Neck: Supple, full range of motion, no cervical lymphadenopathy  Heart: Warm and well perfused  Lungs: Breathing comfortably on room air  Abd: Nondistended  /Rectal: Deferred  Ext: Atraumatic, grossly intact strength  Neuro: Cranial nerves grossly intact, normal gait    ECOG PS: 0    ASSESSMENT:     Ms. Duran  is a 55 year-old female with with a known APC mutation consistent with a diagnosis of familial adenomatous polyposis, s/p subtotal colectomy with ileal-sigmoid re-anastomosis in 1988. She is newly diagnosed with endometrial cancer and is s/p TLH-BSO and bilateral pelvic sentinel node dissection. Surgical pathology was significant for 5.6 cm grade 1 endometrioid adenocarcinoma, with minimal myometrial invasion (1/3 mm, 8%), no LVSI.  However, there was CASEY and cervical stromal involvement. 0/2 sampled lymph nodes was involved. Her disease is FIGO stage II. Of note, on the initial clinical exam, there was no evidence for visible disease involvement of her cervix.         RECOMMENDATIONS:     We reviewed the natural history of her disease in details, and discussed that in general adjuvant  radiotherapy for early stage endometrial cancer is expected to improve locoregional disease control but does not have an impact on survival.    We discussed that traditionally adjuvant pelvic external beam radiotherapy is recommended for stage II disease. This is because cervical stromal involvement increases the risk of parametrial disease spread, which is not addressed by vaginal brachytherapy alone. Retrospective studies, however, have shown low recurrence rate with adjuvant brachytherapy in stage II patients, especially when cervical involvement is minimal.  In her case, cervical stromal involvement was limited to the inner half, though the exact extent was not reported.  This, coupled with overall favorable risk factors, such as superficial myometrial invasion, low garde, absence of LVSI, does make an argument for vaginal brachytherapy instead of external beam radiotherapy.      Ms. Duran has APC mutation s/p colectomy. Intraoperatively she was also noted to have endometriosis.  These factors could potentially place her at a higher risk of developing toxicities of radiation therapy including radiation-induced GI malignancies and bowel adhesions.      Following an extensive discussion, we ultimately recommended adjuvant vaginal brachytherapy alone. We then went over the technical aspects and the logistics of brachytherapy to be delivered via cylinder applicator to treat the vaginal cuff and the upper vaginal cavity. We recommended a total of 5 once-every-other day fractions, and reviewed the expected side effects associated with her treatment. We discussed the risk of vaginal cuff dehiscence, and our plan to wait till 7-8 weeks out after surgery before starting her on treatment. We explained the utility vaginal dilator after completion of treatment to mitigate vaginal stenosis. Informed consent was obtained, and we will plan to see her back for planning and treatment in mid to late 04/2021.        The patient  was seen and discussed with staff, Dr. Burger.    Dashawn Morse MD  Resident, PGY-3  Department of Radiation Oncology  [facility]         I saw and examined the patient with the resident.  I have reviewed and edited the resident's note and agree with the plan of care.      I reviewed patient's chart, internal/external medical records, imaging studies (including actual images), labs and pathology reports.  I interviewed and counseled the patient face to face.  I additionally discussed the case with patient's referring physicians and care team (Dr. Euceda).      Keila Burger MD      Patient Care Team:  Ida Sandoval MD as PCP - General (Family Practice)  Stephen Gasca MD as MD (Gastroenterology)  Treasure Cleveland GC as Genetic Counselor (Genetic )  Pancho Samson MD as MD (Colon and Rectal Surgery)  Sandie Bacon MD as Assigned Cancer Care Provider  Maria De Jesus Evans, RN as Specialty Care Coordinator (Oncology)  SANDIE BACON

## 2021-03-29 ENCOUNTER — TELEPHONE (OUTPATIENT)
Dept: RADIATION ONCOLOGY | Facility: CLINIC | Age: 56
End: 2021-03-29

## 2021-03-29 NOTE — TELEPHONE ENCOUNTER
Called to review schedule for Radiation treatments.     Sarah called back, we discussed the schedule and prep for HDR. She verbalized no questions.

## 2021-04-09 ENCOUNTER — PREP FOR PROCEDURE (OUTPATIENT)
Dept: RADIATION ONCOLOGY | Facility: CLINIC | Age: 56
End: 2021-04-09

## 2021-04-09 DIAGNOSIS — C54.1 ENDOMETRIAL CANCER (H): Primary | ICD-10-CM

## 2021-04-10 ENCOUNTER — IMMUNIZATION (OUTPATIENT)
Dept: NURSING | Facility: CLINIC | Age: 56
End: 2021-04-10
Attending: INTERNAL MEDICINE
Payer: COMMERCIAL

## 2021-04-10 PROCEDURE — 91300 PR COVID VAC PFIZER DIL RECON 30 MCG/0.3 ML IM: CPT

## 2021-04-10 PROCEDURE — 0002A PR COVID VAC PFIZER DIL RECON 30 MCG/0.3 ML IM: CPT

## 2021-04-14 ENCOUNTER — OFFICE VISIT (OUTPATIENT)
Dept: RADIATION ONCOLOGY | Facility: CLINIC | Age: 56
End: 2021-04-14
Attending: RADIOLOGY
Payer: COMMERCIAL

## 2021-04-14 VITALS — SYSTOLIC BLOOD PRESSURE: 135 MMHG | OXYGEN SATURATION: 96 % | DIASTOLIC BLOOD PRESSURE: 76 MMHG | HEART RATE: 71 BPM

## 2021-04-14 DIAGNOSIS — C54.1 ENDOMETRIAL CANCER (H): Primary | ICD-10-CM

## 2021-04-14 PROCEDURE — 77770 HDR RDNCL NTRSTL/ICAV BRCHTX: CPT | Performed by: RADIOLOGY

## 2021-04-14 PROCEDURE — 77332 RADIATION TREATMENT AID(S): CPT | Performed by: RADIOLOGY

## 2021-04-14 PROCEDURE — 77770 HDR RDNCL NTRSTL/ICAV BRCHTX: CPT | Mod: 26 | Performed by: RADIOLOGY

## 2021-04-14 PROCEDURE — 271N000005 HC IMPLANT RADIATION BRIEF: Performed by: RADIOLOGY

## 2021-04-14 PROCEDURE — 77470 SPECIAL RADIATION TREATMENT: CPT | Performed by: RADIOLOGY

## 2021-04-14 PROCEDURE — 77295 3-D RADIOTHERAPY PLAN: CPT | Performed by: RADIOLOGY

## 2021-04-14 PROCEDURE — 77295 3-D RADIOTHERAPY PLAN: CPT | Mod: 26 | Performed by: RADIOLOGY

## 2021-04-14 PROCEDURE — 57156 INS VAG BRACHYTX DEVICE: CPT | Performed by: RADIOLOGY

## 2021-04-14 PROCEDURE — 77470 SPECIAL RADIATION TREATMENT: CPT | Mod: 26 | Performed by: RADIOLOGY

## 2021-04-14 PROCEDURE — C1717 BRACHYTX, NON-STR,HDR IR-192: HCPCS | Performed by: RADIOLOGY

## 2021-04-14 PROCEDURE — 77332 RADIATION TREATMENT AID(S): CPT | Mod: 26 | Performed by: RADIOLOGY

## 2021-04-14 NOTE — LETTER
4/14/2021         RE: Sarah Duran  [address] [facility] [address]        Dear Colleague,    Thank you for referring your patient, Sarah Duran, to the [facility] RADIATION ONCOLOGY. Please see a copy of my visit note below.    Opened in error      Again, thank you for allowing me to participate in the care of your patient.        Sincerely,        Keila Burger MD

## 2021-04-14 NOTE — NURSING NOTE
Sarah Duran is here for scheduled HDR brachytherapy    HDR Single Channel Cylinder  1    Pre-procedure-  Patient identified, arm band applied, signed consent available   Medications taken by patient prior to procedure, none  Pain assessment: 0/10  /76   Pulse 71   LMP 05/01/2019   SpO2 96%      Waiting-  Pt resting quietly,  at her side, bed low and locked.    Post-procedure-  Pain assessment: 0/10   Device removed without difficulty, Education for possible side effects and management, Discharge teaching reviewed, questions answered and Discharged to home with .

## 2021-04-14 NOTE — LETTER
4/14/2021      RE: Sarah Duran  [address] [facility] [address]       Opened in error      Keila Burger MD

## 2021-04-16 ENCOUNTER — OFFICE VISIT (OUTPATIENT)
Dept: RADIATION ONCOLOGY | Facility: CLINIC | Age: 56
End: 2021-04-16
Attending: RADIOLOGY
Payer: COMMERCIAL

## 2021-04-16 ENCOUNTER — DOCUMENTATION ONLY (OUTPATIENT)
Dept: UROLOGY | Facility: CLINIC | Age: 56
End: 2021-04-16

## 2021-04-16 VITALS — DIASTOLIC BLOOD PRESSURE: 93 MMHG | HEART RATE: 95 BPM | SYSTOLIC BLOOD PRESSURE: 149 MMHG | OXYGEN SATURATION: 98 %

## 2021-04-16 DIAGNOSIS — C54.1 ENDOMETRIAL CANCER (H): Primary | ICD-10-CM

## 2021-04-16 DIAGNOSIS — N30.01 ACUTE CYSTITIS WITH HEMATURIA: ICD-10-CM

## 2021-04-16 LAB
ALBUMIN UR-MCNC: 100 MG/DL
APPEARANCE UR: ABNORMAL
BILIRUB UR QL STRIP: NEGATIVE
COLOR UR AUTO: ABNORMAL
GLUCOSE UR STRIP-MCNC: NEGATIVE MG/DL
HGB UR QL STRIP: ABNORMAL
KETONES UR STRIP-MCNC: NEGATIVE MG/DL
LEUKOCYTE ESTERASE UR QL STRIP: ABNORMAL
NITRATE UR QL: NEGATIVE
PH UR STRIP: 6.5 PH (ref 5–7)
RBC #/AREA URNS AUTO: >182 /HPF (ref 0–2)
SOURCE: ABNORMAL
SP GR UR STRIP: 1.02 (ref 1–1.03)
SQUAMOUS #/AREA URNS AUTO: 0 /HPF (ref 0–1)
UROBILINOGEN UR STRIP-MCNC: NORMAL MG/DL (ref 0–2)
WBC #/AREA URNS AUTO: 743 /HPF (ref 0–5)
WBC CLUMPS #/AREA URNS HPF: PRESENT /HPF

## 2021-04-16 PROCEDURE — 87088 URINE BACTERIA CULTURE: CPT | Performed by: RADIOLOGY

## 2021-04-16 PROCEDURE — 77280 THER RAD SIMULAJ FIELD SMPL: CPT | Performed by: RADIOLOGY

## 2021-04-16 PROCEDURE — 77770 HDR RDNCL NTRSTL/ICAV BRCHTX: CPT | Mod: 26 | Performed by: RADIOLOGY

## 2021-04-16 PROCEDURE — 87186 SC STD MICRODIL/AGAR DIL: CPT | Performed by: RADIOLOGY

## 2021-04-16 PROCEDURE — C1717 BRACHYTX, NON-STR,HDR IR-192: HCPCS | Performed by: RADIOLOGY

## 2021-04-16 PROCEDURE — 81001 URINALYSIS AUTO W/SCOPE: CPT | Performed by: RADIOLOGY

## 2021-04-16 PROCEDURE — 57156 INS VAG BRACHYTX DEVICE: CPT | Performed by: RADIOLOGY

## 2021-04-16 PROCEDURE — 77770 HDR RDNCL NTRSTL/ICAV BRCHTX: CPT | Performed by: RADIOLOGY

## 2021-04-16 PROCEDURE — 271N000005 HC IMPLANT RADIATION BRIEF: Performed by: RADIOLOGY

## 2021-04-16 PROCEDURE — 77280 THER RAD SIMULAJ FIELD SMPL: CPT | Mod: 26 | Performed by: RADIOLOGY

## 2021-04-16 PROCEDURE — 77332 RADIATION TREATMENT AID(S): CPT | Mod: 26 | Performed by: RADIOLOGY

## 2021-04-16 PROCEDURE — 77332 RADIATION TREATMENT AID(S): CPT | Performed by: RADIOLOGY

## 2021-04-16 PROCEDURE — 87086 URINE CULTURE/COLONY COUNT: CPT | Performed by: RADIOLOGY

## 2021-04-16 RX ORDER — AMOXICILLIN AND CLAVULANATE POTASSIUM 500; 125 MG/1; MG/1
1 TABLET, FILM COATED ORAL 2 TIMES DAILY
Qty: 14 TABLET | Refills: 0 | Status: SHIPPED | OUTPATIENT
Start: 2021-04-16 | End: 2021-06-03

## 2021-04-16 NOTE — PROGRESS NOTES
TELEPHONE NOTE    Was contacted by Dr. Burger (radiation oncology) today to discuss Ms. Duran's new onset gross hematuria.      I then called Ms. Duran to get more information.    Patient has h/o endometrial cancer s/p surgery and now has just begun some adjuvant radiation.  At 3am she awakened with gross hematuria as well as urinary frequency, mild dysuria and an uncomfortable sensation in her bladder.  No fevers although maybe feels a bit chilly.  No flank pain.  Does have some on and off low back pains.  No nausea, emesis.  Never gets UTIs.  Although does have somewhat chronic diarrhea with watery stools, last episode yesterday.     There is a UA from today showing , RBC >182 neg nitrites.      RECS:  Agree with treating for cystitis - difficult to find an antibiotic for Ms. Duran based on her allergy list.  Tried to RX macrobid but it supposedly contains corn and patient has allergy (SOB) with corn consumption.  Ultimately, in disussions with Dr. Burger, we opted to use augmentin x 7 days.  The patient has a keflex allergy (N/V and rash) but thinks she has taken penicillins in the past without difficulty.      The patient was informed that there can be a cross reactivity between keflex and penicillins - she will watch for signs of allergy and use benedryl or go to ED PRN    Will push fluids  Will go to ED for fevers, chills, thick red urine, large clots, inability to urinate, worsening.     Sarah will make an appt next week with PCP to re-evaluate.      Hopefully this will turn out to be a simple cystitis.  If her culture is positive and hematuria resolves with antibiotics, then we can attribute hematuria to a UTI.      If hematuria persists or the culture is negative, then Ms. Duran will need a REFERRAL TO Urology for evaluation of hematuria (cystoscopy and CT urogram to rule out pathology).     Pt is in agreement with this plan that was developed over the phone, therefore limited by inability to examine  Ms. Duran.     [facility]  [phone]

## 2021-04-16 NOTE — NURSING NOTE
Sarah Duran is here for scheduled HDR brachytherapy    HDR Single Channel Cylinder  1    Pre-procedure-  Patient identified, arm band applied, signed consent available   Medications taken by patient prior to procedure, none  Pain assessment: 0/10  BP (!) 149/93   Pulse 95   LMP 05/01/2019   SpO2 98%    Pt reports some blood in her urine, UA/UC sent per orders.    Post-procedure-  Pain assessment: 0/10  Device removed without difficulty, Education for possible side effects and management, Discharge teaching reviewed, questions answered and Discharged to home.

## 2021-04-16 NOTE — LETTER
4/16/2021         RE: Sarah Duran  [address] [facility] [address]        Dear Colleague,    Thank you for referring your patient, Sarah Duran, to the [facility] RADIATION ONCOLOGY. Please see a copy of my visit note below.    Ms. Duran presents today for fraction 2 of high-dose-rate intracavitary brachytherapy. She says she had an episode diarrhea yesterday, and earlier this morning she had an episode gross hematuria. She has some burning sensation with urination, and her pelvis feels heavy. She has no fever or chill. The episode of diarrhea is not unexpected given previous reported episodes at baseline, but her urinary symptoms are concerning.    Her first cylinder insertion on 4/14/21 was non-traumatic. Upon review of her radiation plan, we do not have concern that her symptoms are related to radiation. A urinalysis is collected to direct her care and found signs suggestive of urinary tract infection. A reflex culture is in process.  Given her known allergy to many of the commonly used antibiotics, we reach out to urology and infectious disease for recommendation. We will prescribe antibiotics and will assess her symptoms before her next scheduled treatment on 4/19/2021. Patient was updated.    The patient was seen and discussed with staff, Dr. Burger.    Dashawn Morse MD  Resident, PGY-3  Department of Radiation Oncology  [facility]      UA reviewed.  Given the presence of gross hematuria, WBC and protein, I consulted ID and Urology service.  Both favor this being UTI.  Sarah has multiple allergies.  Initially Macrobid was recommended.  However, she develops SOB to corn-related products.  Thus, we decided to prescribe Augmentin.  She may develop a rash and she will monitor.      Depending on her symptoms, she likely will also see Urology for outpatient follow up.    Appreciate Urology and ID input.        Keila Burger MD

## 2021-04-16 NOTE — PROGRESS NOTES
Ms. Duran presents today for fraction 2 of high-dose-rate intracavitary brachytherapy. She says she had an episode diarrhea yesterday, and earlier this morning she had an episode gross hematuria. She has some burning sensation with urination, and her pelvis feels heavy. She has no fever or chill. The episode of diarrhea is not unexpected given previous reported episodes at baseline, but her urinary symptoms are concerning.    Her first cylinder insertion on 4/14/21 was non-traumatic. Upon review of her radiation plan, we do not have concern that her symptoms are related to radiation. A urinalysis is collected to direct her care and found signs suggestive of urinary tract infection. A reflex culture is in process.  Given her known allergy to many of the commonly used antibiotics, we reach out to urology and infectious disease for recommendation. We will prescribe antibiotics and will assess her symptoms before her next scheduled treatment on 4/19/2021. Patient was updated.    The patient was seen and discussed with staff, Dr. Burger.    Dashawn Morse MD  Resident, PGY-3  Department of Radiation Oncology  [facility]      UA reviewed.  Given the presence of gross hematuria, WBC and protein, I consulted ID and Urology service.  Both favor this being UTI.  Sarah has multiple allergies.  Initially Macrobid was recommended.  However, she develops SOB to corn-related products.  Thus, we decided to prescribe Augmentin.  She may develop a rash and she will monitor.      Depending on her symptoms, she likely will also see Urology for outpatient follow up.    Appreciate Urology and ID input.        Keila Burger MD

## 2021-04-17 LAB
BACTERIA SPEC CULT: ABNORMAL
Lab: ABNORMAL
SPECIMEN SOURCE: ABNORMAL

## 2021-04-19 ENCOUNTER — OFFICE VISIT (OUTPATIENT)
Dept: RADIATION ONCOLOGY | Facility: CLINIC | Age: 56
End: 2021-04-19
Attending: RADIOLOGY
Payer: COMMERCIAL

## 2021-04-19 VITALS — OXYGEN SATURATION: 98 % | DIASTOLIC BLOOD PRESSURE: 86 MMHG | HEART RATE: 74 BPM | SYSTOLIC BLOOD PRESSURE: 133 MMHG

## 2021-04-19 DIAGNOSIS — C54.1 ENDOMETRIAL CANCER (H): Primary | ICD-10-CM

## 2021-04-19 PROCEDURE — 77770 HDR RDNCL NTRSTL/ICAV BRCHTX: CPT | Performed by: RADIOLOGY

## 2021-04-19 PROCEDURE — 77280 THER RAD SIMULAJ FIELD SMPL: CPT | Mod: 26 | Performed by: RADIOLOGY

## 2021-04-19 PROCEDURE — 77332 RADIATION TREATMENT AID(S): CPT | Mod: 26 | Performed by: RADIOLOGY

## 2021-04-19 PROCEDURE — 271N000005 HC IMPLANT RADIATION BRIEF: Performed by: RADIOLOGY

## 2021-04-19 PROCEDURE — 57156 INS VAG BRACHYTX DEVICE: CPT | Performed by: RADIOLOGY

## 2021-04-19 PROCEDURE — 77332 RADIATION TREATMENT AID(S): CPT | Performed by: RADIOLOGY

## 2021-04-19 PROCEDURE — 77280 THER RAD SIMULAJ FIELD SMPL: CPT | Performed by: RADIOLOGY

## 2021-04-19 PROCEDURE — C1717 BRACHYTX, NON-STR,HDR IR-192: HCPCS | Performed by: RADIOLOGY

## 2021-04-19 PROCEDURE — 77770 HDR RDNCL NTRSTL/ICAV BRCHTX: CPT | Mod: 26 | Performed by: RADIOLOGY

## 2021-04-19 NOTE — NURSING NOTE
Sarah Duran is here for scheduled HDR brachytherapy    HDR Single Channel Cylinder  1    Pre-procedure-  Patient identified, arm band applied, signed consent available   Medications taken by patient prior to procedure, none  Pain assessment: 0/10  /86   Pulse 74   LMP 05/01/2019   SpO2 98%    Pt has no complaints with urination. Is taking the antibiotic as directed.    Post-procedure-  Pain assessment: 0/10  Device removed without difficulty, Education for possible side effects and management, Discharge teaching reviewed, questions answered and Discharged to home.

## 2021-04-19 NOTE — LETTER
4/19/2021         RE: Sarah Duran  [address] [facility] [address]        Dear Colleague,    Thank you for referring your patient, Sarah Duran, to the [facility] RADIATION ONCOLOGY. Please see a copy of my visit note below.    Vaginal brachytherapy #3 out of 5.    UTI symptoms improved with Augmentin.  No side effects of rash.     Keila Burger MD      Again, thank you for allowing me to participate in the care of your patient.        Sincerely,        Keila Burger MD

## 2021-04-20 NOTE — PROGRESS NOTES
Vaginal brachytherapy #3 out of 5.    UTI symptoms improved with Augmentin.  No side effects of rash.     Keila Burger MD

## 2021-04-21 ENCOUNTER — OFFICE VISIT (OUTPATIENT)
Dept: RADIATION ONCOLOGY | Facility: CLINIC | Age: 56
End: 2021-04-21
Attending: RADIOLOGY
Payer: COMMERCIAL

## 2021-04-21 VITALS — HEART RATE: 77 BPM | DIASTOLIC BLOOD PRESSURE: 77 MMHG | SYSTOLIC BLOOD PRESSURE: 129 MMHG | OXYGEN SATURATION: 95 %

## 2021-04-21 DIAGNOSIS — C54.1 ENDOMETRIAL CANCER (H): Primary | ICD-10-CM

## 2021-04-21 PROCEDURE — 77770 HDR RDNCL NTRSTL/ICAV BRCHTX: CPT | Mod: 26 | Performed by: RADIOLOGY

## 2021-04-21 PROCEDURE — 57156 INS VAG BRACHYTX DEVICE: CPT | Performed by: RADIOLOGY

## 2021-04-21 PROCEDURE — C1717 BRACHYTX, NON-STR,HDR IR-192: HCPCS | Performed by: RADIOLOGY

## 2021-04-21 PROCEDURE — 77280 THER RAD SIMULAJ FIELD SMPL: CPT | Mod: 26 | Performed by: RADIOLOGY

## 2021-04-21 PROCEDURE — 77280 THER RAD SIMULAJ FIELD SMPL: CPT | Performed by: RADIOLOGY

## 2021-04-21 PROCEDURE — 77332 RADIATION TREATMENT AID(S): CPT | Performed by: RADIOLOGY

## 2021-04-21 PROCEDURE — 77332 RADIATION TREATMENT AID(S): CPT | Mod: 26 | Performed by: RADIOLOGY

## 2021-04-21 PROCEDURE — 271N000005 HC IMPLANT RADIATION BRIEF: Performed by: RADIOLOGY

## 2021-04-21 PROCEDURE — 77770 HDR RDNCL NTRSTL/ICAV BRCHTX: CPT | Performed by: RADIOLOGY

## 2021-04-21 NOTE — NURSING NOTE
Sarah Duran is here for scheduled HDR brachytherapy    HDR Single Channel Cylinder  4    Pre-procedure-  Patient identified, arm band applied, signed consent available   Medications taken by patient prior to procedure, none  Pain assessment: 0/10  /77   Pulse 77   LMP 05/01/2019   SpO2 95%     Post-procedure-  Pain assessment: 0/10  Device removed without difficulty, Education for possible side effects and management, Discharge teaching reviewed, questions answered and Discharged to home. Appointment Friday 4/23/21 moved to Monday 4/26/21 per pt request. Pt aware of appointment time.

## 2021-04-21 NOTE — LETTER
4/21/2021         RE: Sarah Duran  [address] [facility] [address]        Dear Colleague,    Thank you for referring your patient, Sarah Duran, to the [facility] RADIATION ONCOLOGY. Please see a copy of my visit note below.    Opened in error      Again, thank you for allowing me to participate in the care of your patient.        Sincerely,        Keila Burger MD

## 2021-04-26 ENCOUNTER — OFFICE VISIT (OUTPATIENT)
Dept: RADIATION ONCOLOGY | Facility: CLINIC | Age: 56
End: 2021-04-26
Attending: RADIOLOGY
Payer: COMMERCIAL

## 2021-04-26 VITALS — HEART RATE: 70 BPM | DIASTOLIC BLOOD PRESSURE: 80 MMHG | OXYGEN SATURATION: 98 % | SYSTOLIC BLOOD PRESSURE: 128 MMHG

## 2021-04-26 DIAGNOSIS — C54.1 ENDOMETRIAL CANCER (H): Primary | ICD-10-CM

## 2021-04-26 PROCEDURE — 77770 HDR RDNCL NTRSTL/ICAV BRCHTX: CPT | Performed by: RADIOLOGY

## 2021-04-26 PROCEDURE — 77332 RADIATION TREATMENT AID(S): CPT | Performed by: RADIOLOGY

## 2021-04-26 PROCEDURE — 77280 THER RAD SIMULAJ FIELD SMPL: CPT | Performed by: RADIOLOGY

## 2021-04-26 PROCEDURE — 271N000005 HC IMPLANT RADIATION BRIEF: Performed by: RADIOLOGY

## 2021-04-26 PROCEDURE — 57156 INS VAG BRACHYTX DEVICE: CPT | Performed by: RADIOLOGY

## 2021-04-26 PROCEDURE — 77332 RADIATION TREATMENT AID(S): CPT | Mod: 26 | Performed by: RADIOLOGY

## 2021-04-26 PROCEDURE — C1717 BRACHYTX, NON-STR,HDR IR-192: HCPCS | Performed by: RADIOLOGY

## 2021-04-26 PROCEDURE — 77770 HDR RDNCL NTRSTL/ICAV BRCHTX: CPT | Mod: 26 | Performed by: RADIOLOGY

## 2021-04-26 PROCEDURE — 77336 RADIATION PHYSICS CONSULT: CPT | Performed by: RADIOLOGY

## 2021-04-26 PROCEDURE — 77280 THER RAD SIMULAJ FIELD SMPL: CPT | Mod: 26 | Performed by: RADIOLOGY

## 2021-04-26 NOTE — LETTER
4/26/2021         RE: Sarah Duran  [address] [facility] [address]        Dear Colleague,    Thank you for referring your patient, Sarah Duran, to the [facility] RADIATION ONCOLOGY. Please see a copy of my visit note below.    Opened in error      Again, thank you for allowing me to participate in the care of your patient.        Sincerely,        Keila Burger MD

## 2021-04-26 NOTE — NURSING NOTE
Sarah Duran is here for scheduled HDR brachytherapy    HDR Single Channel Cylinder  5    Pre-procedure-  Patient identified, arm band applied, signed consent available   Medications taken by patient prior to procedure, none  Pain assessment: 0/10  /80   Pulse 70   LMP 05/01/2019   SpO2 98%     Post-procedure-  Pain assessment: 0/10  Device removed without difficulty, Education for possible side effects and management, Discharge teaching reviewed, questions answered, Vaginal dilator use reviewed (sm + Med), Follow-up scheduled and Discharged to home.

## 2021-04-30 NOTE — PROCEDURES
Radiotherapy Treatment Summary          Date of Report: 2021     PATIENT: TRE DURAN  MEDICAL RECORD NO: [id number]  : 1965     DIAGNOSIS: C54.1 Malignant neoplasm of endometrium  INTENT OF RADIOTHERAPY: Cure  PATHOLOGY: Endometrioid adenocarcinoma, FIGO grade 1                                  STAGE: II  CONCURRENT SYSTEMIC THERAPY: No                   Details of the treatments summarized below are found in records kept in the Department of Radiation Oncology at [facility].     Treatment Summary:  Radiation Oncology - Course: 1 Protocol:   Treatment Site Dose Modality From To Days Fx.  1 vaginal cuff  3,000 cGy   2021  12  5          Dose per Fraction: 600 cGy       Total Dose: 3,000 cGy             COMMENTS:                         Ms. Duran is a 55-year-old female who is diagnosed with FIGO stage II endometrial cancer.     She initially presented with 2 months of post-menopausal bleeding, and on pelvic ultrasound her uterus was   enlarged at 8.9 x 5.8 x 6.4 cm with thickening of endometrium at 2.9 cm. Clinic biopsy attempts were not successful   due to cervical stenosis so she underwent hysteroscopy and endometrial curettage on 2/10/21, which confirmed her   cancer diagnosis.     She underwent TLH-BSO, bilateral sentinel lymph node dissection, and cystoscopy on 21. Surgical pathology   ([id number]) reported a 5.6 cm endometrioid adenocarcinoma, involving 1/13 mm (8%) of the myometrium.   There was lower uterine segment and cervical stromal involvement (limited to the inner half of the cervical wall, 7 mm   from paracervical margins), but no LVSI.  0/2 (1 Rt obturator, 1 Lt external illiac) sentinel nodes was found involved. The   ovaries and fallopian tubes were benign, and cytology from peritoneal washing was negative.      She had a known APC mutation, and previously underwent subtotal colectomy with ileal-sigmoid re-  anastomosis in . During her hysterectomy,  she was noted to bowel adhesions and endometriosis. These factors   could potentially place her at a higher risk of developing toxicities of radiation therapy. Given her disease's grade 1   histology, minimal myometrial invasion, limited cervical stromal involvement, and absence of LVSI, we felt it was   reasonable to offer brachytherapy alone      Her treatment is outlined above, and she tolerated it well. Prior to the 2nd fraction of brachytherapy, she reported an   episode of gross hematuria, associated with pelvic pain and dysuria. Urine culture grew Klebsiella. She was treated with  a   course of antibiotics for UTI.      ED visits/hospitalizations: No     Missed treatments: No     Acute Toxicity Profile by CTC v5.0: None     PAIN MANAGEMENT: Not indicated                             FOLLOW UP PLAN:      1. Follow up in 1 month.                        2. Follow up with Dr. Euceda on 5/25/2021 as scheduled.     Resident Physician: Dashawn Morse M.D.   Staff Physician: Keila Burger M.D.  Physicist: Bon Ferrell, PhD     CC:   Sandie Lezama MD                                     Radiation Oncology:  [facility] [address] [place] [address]

## 2021-05-02 ENCOUNTER — OFFICE VISIT (OUTPATIENT)
Dept: URGENT CARE | Facility: URGENT CARE | Age: 56
End: 2021-05-02
Payer: COMMERCIAL

## 2021-05-02 ENCOUNTER — NURSE TRIAGE (OUTPATIENT)
Dept: NURSING | Facility: CLINIC | Age: 56
End: 2021-05-02

## 2021-05-02 VITALS
HEART RATE: 75 BPM | WEIGHT: 160.8 LBS | RESPIRATION RATE: 16 BRPM | BODY MASS INDEX: 29.41 KG/M2 | DIASTOLIC BLOOD PRESSURE: 78 MMHG | SYSTOLIC BLOOD PRESSURE: 130 MMHG | OXYGEN SATURATION: 99 % | TEMPERATURE: 98.5 F

## 2021-05-02 DIAGNOSIS — R82.90 NONSPECIFIC FINDING ON EXAMINATION OF URINE: ICD-10-CM

## 2021-05-02 DIAGNOSIS — L25.5 CONTACT DERMATITIS DUE TO PLANT: ICD-10-CM

## 2021-05-02 DIAGNOSIS — N30.01 ACUTE CYSTITIS WITH HEMATURIA: ICD-10-CM

## 2021-05-02 DIAGNOSIS — R30.0 DYSURIA: Primary | ICD-10-CM

## 2021-05-02 LAB
ALBUMIN UR-MCNC: ABNORMAL MG/DL
APPEARANCE UR: CLEAR
BACTERIA #/AREA URNS HPF: ABNORMAL /HPF
BILIRUB UR QL STRIP: NEGATIVE
COLOR UR AUTO: YELLOW
GLUCOSE UR STRIP-MCNC: NEGATIVE MG/DL
HGB UR QL STRIP: ABNORMAL
KETONES UR STRIP-MCNC: NEGATIVE MG/DL
LEUKOCYTE ESTERASE UR QL STRIP: ABNORMAL
NITRATE UR QL: NEGATIVE
PH UR STRIP: 5.5 PH (ref 5–7)
RBC #/AREA URNS AUTO: ABNORMAL /HPF
SOURCE: ABNORMAL
SP GR UR STRIP: 1.01 (ref 1–1.03)
UROBILINOGEN UR STRIP-ACNC: 0.2 EU/DL (ref 0.2–1)
WBC #/AREA URNS AUTO: ABNORMAL /HPF

## 2021-05-02 PROCEDURE — 87088 URINE BACTERIA CULTURE: CPT | Performed by: FAMILY MEDICINE

## 2021-05-02 PROCEDURE — 87086 URINE CULTURE/COLONY COUNT: CPT | Performed by: FAMILY MEDICINE

## 2021-05-02 PROCEDURE — 87186 SC STD MICRODIL/AGAR DIL: CPT | Performed by: FAMILY MEDICINE

## 2021-05-02 PROCEDURE — 81001 URINALYSIS AUTO W/SCOPE: CPT | Performed by: FAMILY MEDICINE

## 2021-05-02 PROCEDURE — 99203 OFFICE O/P NEW LOW 30 MIN: CPT | Performed by: FAMILY MEDICINE

## 2021-05-02 RX ORDER — SULFAMETHOXAZOLE/TRIMETHOPRIM 800-160 MG
1 TABLET ORAL 2 TIMES DAILY
Qty: 10 TABLET | Refills: 0 | Status: SHIPPED | OUTPATIENT
Start: 2021-05-02 | End: 2021-06-03

## 2021-05-02 NOTE — TELEPHONE ENCOUNTER
Patient reports that she was recently treated with Augmentin for a UTI. Antibiotics were finished on 4/23/21. Patient reports that today she is not having symptoms again of a UTI. Patient reports having frequency, pain with urination, and urine is cloudy. Patient denies any fever or lower back pain. Patient has had a small amount of blood in the urine, this was noticed with the recent UTI.     Patient is advised on evaluation in the next 24 hours per protocol. Patient verbalizes understanding and denies further questions at this time.    Ashlee Nichols RN  [facility]        Additional Information    Negative: Shock suspected (e.g., cold/pale/clammy skin, too weak to stand, low BP, rapid pulse)    Negative: Sounds like a life-threatening emergency to the triager    Negative: Followed a genital area injury    Negative: Taking antibiotic for urinary tract infection (UTI)    Negative: Pregnant    Negative: Postpartum (from 0 to 6 weeks after delivery)    Negative: [1] Unable to urinate (or only a few drops) > 4 hours AND [2] bladder feels very full (e.g., palpable bladder or strong urge to urinate)    Negative: Vomiting    Negative: Patient sounds very sick or weak to the triager    Blood in urine (red, pink, or tea-colored)    Negative: [1] SEVERE pain with urination  (e.g., excruciating) AND [2] not improved after 2 hours of pain medicine and Sitz bath    Negative: Fever > 100.5 F (38.1 C)    Negative: Side (flank) or lower back pain present    Negative: Diabetes mellitus or weak immune system (e.g., HIV positive, cancer chemo, splenectomy, organ transplant, chronic steroids)    Negative: Bedridden (e.g., nursing home patient, CVA, chronic illness, recovering from surgery)    Negative: Artificial heart valve or artificial joint    Negative: Unusual vaginal discharge (e.g., bad smelling, yellow, green, or foamy-white)    Negative: Patient is worried about sexually transmitted disease (STD)     Negative: Possibility of pregnancy    Protocols used: URINATION PAIN - FEMALE-A-AH  COVID 19 Nurse Triage Plan/Patient Instructions    Please be aware that novel coronavirus (COVID-19) may be circulating in the community. If you develop symptoms such as fever, cough, or SOB or if you have concerns about the presence of another infection including coronavirus (COVID-19), please contact your health care provider or visit [email or web address].     Disposition/Instructions    In-Person Visit with provider recommended. Reference Visit Selection Guide.    Thank you for taking steps to prevent the spread of this virus.  o Limit your contact with others.  o Wear a simple mask to cover your cough.  o Wash your hands well and often.    Resources    [facility]: About COVID-19: [email or web address]    CDC: What to Do If You're Sick: www.cdc.gov/coronavirus/2019-ncov/about/steps-when-sick.html    CDC: Ending Home Isolation: www.cdc.gov/coronavirus/2019-ncov/hcp/disposition-in-home-patients.html     CDC: Caring for Someone: www.cdc.gov/coronavirus/2019-ncov/if-you-are-sick/care-for-someone.html     [facility]: Interim Guidance for Hospital Discharge to Home: www.health.[facility].mn.us/diseases/coronavirus/hcp/hospdischarge.pdf    [facility] clinical trials (COVID-19 research studies): [email or web address]-clinical-trials     Below are the COVID-19 hotlines at the [facility] ([facility]). Interpreters are available.   o For health questions: Call 189-360-7402 or 1-294.922.1959 (7 a.m. to 7 p.m.)  o For questions about schools and childcare: Call 236-102-4613 or 1-578.815.6114 (7 a.m. to 7 p.m.)

## 2021-05-02 NOTE — PATIENT INSTRUCTIONS

## 2021-05-02 NOTE — PROGRESS NOTES
ASSESSMENT/  PLAN:   Dysuria     - *UA reflex to Microscopic and Culture ([place] [facility] (except [place] and [place])  - Urine Microscopic    Nonspecific finding on examination of urine     - Urine Culture Aerobic Bacterial    Acute cystitis with hematuria     - sulfamethoxazole-trimethoprim (BACTRIM DS) 800-160 MG tablet; Take 1 tablet by mouth 2 times daily    She has multiple antibiotic allergies- I do not want to repeat augmentin due to likely resistance  Discussed with her prescribing Bactrim, though she has had past rash-  She agreed     Drink plenty of fluids.  Prevention and treatment of UTI's discussed.Signs and symptoms of pyelonephritis mentioned.  Follow up with primary care physician if not improving    We discussed that a urine culture is being performed and that if we find that if there is a bacteria in the urine that is resistant to the prescribed antibiotic he/she will receive call to   change the antibiotic to better cover the infection.       Contact dermatitis due to plant     Suspect she has mild irritant reaction of right ankle to exposure to plants/ grasses/ leaves  Keep area clean - may apply OTC hydrocortisone    -------------------------------------------------------------------------------------------    SUBJECTIVE:  Chief Complaint   Patient presents with     UTI     56 yo F presents with the following dysuria, blood in urine, burning during urination last antibiotic taken 1 week ago sx's returned     Derm Problem     rash on left calf/ankle        Sarah Duran is a 55 year old female who  presents today for a possible UTI. Symptoms of dysuria, frequency and burning have been going on for 5day(s).  Hematuria yes little.  sudden onset, gradual onset, still present and constantand moderate.  There is no history of fever, chills, nausea or vomiting.  No history of vaginal  Discharge.  She was treated with augmentin for UTI 3 weeks ago,  Symptoms improved , then came back  after finishing meds.  Urine culture shows resistance to cephalosporins, but susceptible to pip/ tazo This patient does not have a history of urinary tract infections, but she has been under treatment for endometrial cancer in the past months. Patient denies long duration, rigors, flank pain, temperature > 101 degrees F. and Vomiting, significant nausea or diarrhea or vaginal discharge, vaginal odor and vaginal itching     Past Medical History:   Diagnosis Date     Acute deep vein thrombosis (DVT) of left lower extremity (H)      Asthma      Atypical ductal hyperplasia of right breast 10/2016    fibrocystic changes, PASH and atypical ductal hyperplasia on needle biopsy, s/p R excisional biopsy with radioactive seed localization     Endometrial cancer (H) 02/15/2021     Factor 5 Leiden mutation, heterozygous (H)     factor 5     Familial adenomatous polyposis 1988    clinical presentation; s/p total abdominal colectomy with TRACEY; confirmed APC+ 2018     GERD (gastroesophageal reflux disease)      Migraine      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    [id number]     Patient Active Problem List   Diagnosis     Familial adenomatous polyposis, confirmed with APC+ genetic testing      Atypical hyperplasia of breast     Acne     Allergic rhinitis     Asthma     Benign neoplasm of colon     Combined pyramidal-extrapyramidal syndrome     Congenital deficiency of clotting factors (H)     Infertility, female     Migraine without aura     FAP (familial adenomatous polyposis)     Post-menopausal bleeding     Endometrial cancer (H)       ALLERGIES:  Corn-related products, Cipro [ciprofloxacin], Flagyl [metronidazole], Garlic, Keflex [cephalosporins], Omeprazole, Prilosec otc [omeprazole magnesium], Cephalexin, Doxycycline, Erythromycin, and Sulfa drugs    MEDs  acetaminophen (TYLENOL) 325 MG tablet, Take 2 tablets (650 mg) by mouth every 6 hours as needed for mild pain  albuterol 90 MCG/ACT inhaler, Inhale 2 puffs  into the lungs every 6 hours as needed.  calcium-magnesium (CALMAG) 500-250 MG TABS, Take 1 tablet by mouth daily  celecoxib (CELEBREX) 100 MG capsule, Take 1 capsule (100 mg) by mouth daily  docusate sodium (COLACE) 100 MG capsule, Take 100 mg by mouth daily  fexofenadine (ALLEGRA) 180 MG tablet, Take 180 mg by mouth daily.  ibuprofen (ADVIL/MOTRIN) 600 MG tablet, Take 1 tablet (600 mg) by mouth every 6 hours as needed for moderate pain  MELATONIN PO, Take 2 mg by mouth nightly as needed   multivitamin, therapeutic with minerals (THERA-VIT-M) TABS, Take 3 tablets by mouth 2 times daily   Omega-3 Fatty Acids (OMEGA-3 FISH OIL PO), Take 2 g by mouth daily   ondansetron (ZOFRAN) 4 MG tablet, Take 1 tablet (4 mg) by mouth every 8 hours as needed for nausea  triamcinolone (NASACORT) 55 MCG/ACT Inhaler, Spray 2 sprays into both nostrils daily  ZOLMitriptan (ZOMIG) 5 MG tablet, Take 1 tablet (5 mg) by mouth at onset of headache for migraine  amoxicillin-clavulanate (AUGMENTIN) 500-125 MG tablet, Take 1 tablet by mouth 2 times daily (Patient not taking: Reported on 2021)    ondansetron (ZOFRAN) injection        Social History     Tobacco Use     Smoking status: Never Smoker     Smokeless tobacco: Never Used   Substance Use Topics     Alcohol use: Yes     Comment: 1 or 2 drinks per year       Family History   Problem Relation Age of Onset     Hyperlipidemia Mother      Cerebrovascular Disease Mother      Cerebrovascular Disease Father      Colon Cancer Father 57         at 63     Prostate Cancer Brother 53     Prostate Cancer Maternal Uncle 65       ROS:   CONSTITUTIONAL:NEGATIVE for fever, chills, change in weight  INTEGUMENTARY/SKIN: NEGATIVE for worrisome rashes, moles or lesions-  She developed little ankle rash in yard yesterday  EYES: NEGATIVE for vision changes or irritation  ENT/MOUTH: NEGATIVE for ear, mouth and throat problems  RESP:NEGATIVE for significant cough or SOB    OBJECTIVE:  /78   Pulse 75    Temp 98.5  F (36.9  C)   Resp 16   Wt 72.9 kg (160 lb 12.8 oz)   LMP 05/01/2019   SpO2 99%   BMI 29.41 kg/m    GENERAL APPEARANCE: healthy, alert and no distress  RESP: lungs clear to auscultation - no rales, rhonchi or wheezes  CV: regular rates and rhythm, normal S1 S2, no murmur noted  ABDOMEN:  soft, nontender, no HSM or masses and bowel sounds normal  BACK: No CVA tenderness  SKIN: no suspicious lesions or rashes-  Patch of erythema right ankle

## 2021-05-03 LAB
BACTERIA SPEC CULT: ABNORMAL
Lab: ABNORMAL
SPECIMEN SOURCE: ABNORMAL

## 2021-05-25 ENCOUNTER — VIRTUAL VISIT (OUTPATIENT)
Dept: ONCOLOGY | Facility: CLINIC | Age: 56
End: 2021-05-25
Attending: OBSTETRICS & GYNECOLOGY
Payer: COMMERCIAL

## 2021-05-25 DIAGNOSIS — C54.1 ENDOMETRIAL CANCER (H): Primary | ICD-10-CM

## 2021-05-25 PROCEDURE — 99213 OFFICE O/P EST LOW 20 MIN: CPT | Mod: 24 | Performed by: OBSTETRICS & GYNECOLOGY

## 2021-05-25 NOTE — LETTER
2021         RE: Sarah Duran  [address] [address]        Dear Colleague,    Thank you for referring your patient, Sarah Duran, to the [facility]. Please see a copy of my visit note below.    Sarah is a 55 year old who is being evaluated via a billable video visit.      How would you like to obtain your AVS? MyChart  If the video visit is dropped, the invitation should be resent by: Text to cell phone: [phone]  Will anyone else be joining your video visit? Jocelynn Clifford, PASCUAL on 2021 at 2:17 PM      Video-Visit Details    Type of service:  Video Visit    Originating Location (pt. Location): Home    Distant Location (provider location):  [facility]     Platform used for Video Visit: unable to complete, did via telephone    Consult Notes on Referred Patient        Dr. Clarissa Brown, APRN [facility]  [address]  [place] [address]       RE: Sarah Duran  : 1965  ANDREI: May 25, 2021    HPI:  Sarah Duran is 55 year old with stage II, grade 1 endometrial adenocarcinoma.  She is unaccompanied today.  She is doing well post- radiation therapy.  She was diagnosed with a UTI which was not adequately treated with her first course of antibiotics but now following her second dose has improved.  She is no longer having urinary symptoms.  When she had her UTI and was on antibiotics, she did have some GI symptoms, however these have resolved as well.  She notes she has had a recent worsening of her chronic hip pain which was present prior to her surgery/diagnosis.        Cancer Course:    She reports she began having daily bleeding in .  She notes she had previously gone 15 months with no bleeding.  She reports the bleeding is sometimes heavy like a period but then can also be somewhat light and only like spotting.  She had an attempted EMB with her primary gynecologist,  however this was unsuccessful.  She is here today for a second opinion.    1/4/21:  US Pelvis:  Uterus: Measures 8.9 x 5.8 x 6.4 cm. The endometrium is thickened at 2.9 cm. The endometrium is heterogeneous in appearance with an irregular, nodular contour of the endometrial surface. There is hypoechoic complex fluid in the endometrial cavity likely containing blood. There is vascularity of the endometrium on color imaging. The abnormal endometrium occupies the majority of the uterus. Right Ovary: Measures 2.0 x 1.0 x 1.1 cm and appears unremarkable Right Ovary Blood Flow: Present. Left Ovary: Not seen. Free Fluid: No significant free fluid.There are no suspicious adnexal masses.    2/10/21:  Hysteroscopy, D&C   Pathology:  Grade 1 endometrial adenocarcinoma, MMR intact, p53 wild type, ER/MO +    2/25/21:  Total laparoscopic hysterectomy, bilateral salpingo-oophorectomy, bilateral sentinel lymph node dissection, cystoscopy   Pathology:  Stage II, grade 1 endometrial adenocarcinoma, tumor size 5.6 cm, 1/13 mm myometrial invasion, +CASEY, + cervix, 0/2 sentinel LN, no LVSI    4/26/21:  Completed brachytherapy to 3000cGy in 5 Fx to the vagina      Obstetrics and Gynecology History:  G0  no HRT use      Past Medical History:  Past Medical History:   Diagnosis Date     Acute deep vein thrombosis (DVT) of left lower extremity (H)      Asthma      Atypical ductal hyperplasia of right breast 10/2016    fibrocystic changes, PASH and atypical ductal hyperplasia on needle biopsy, s/p R excisional biopsy with radioactive seed localization     Endometrial cancer (H) 02/15/2021     Factor 5 Leiden mutation, heterozygous (H)     factor 5     Familial adenomatous polyposis 1988    clinical presentation; s/p total abdominal colectomy with TRACEY; confirmed APC+ 2018     GERD (gastroesophageal reflux disease)      Migraine      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    [id number]       Past Surgical History:  Past Surgical  History:   Procedure Laterality Date     BIOPSY BREAST SEED LOCALIZATION Right 11/30/2016    Procedure: BIOPSY BREAST SEED LOCALIZATION;  Surgeon: Jessica Rasheed MD;  Location: [facility] OR     C LAP,SURG,COLECTOMY,W/ANAST      for colon cancer     COLECTOMY      subtotal     COMBINED ESOPHAGOSCOPY, GASTROSCOPY, DUODENOSCOPY (EGD) WITH ARGON PLASMA COAGULATOR (APC) N/A 4/12/2018    Procedure: COMBINED ESOPHAGOSCOPY, GASTROSCOPY, DUODENOSCOPY (EGD) WITH ARGON PLASMA COAGULATOR (APC);  Esophagogastroduodenoscopy with polypectomy and polyp ablation;  Surgeon: Stephen Gasca MD;  Location: [facility] OR     DILATION AND CURETTAGE, HYSTEROSCOPY DIAGNOSTIC, COMBINED N/A 2/10/2021    Procedure: HYSTEROSCOPY, DIAGNOSTIC, WITH DILATION AND CURETTAGE OF UTERUS;  Surgeon: Sandie Samaniego MD;  Location: [facility] OR     ENDOSCOPIC RETROGRADE CHOLANGIOPANCREATOGRAPHY       ENDOSCOPIC ULTRASOUND UPPER GASTROINTESTINAL TRACT (GI) N/A 7/25/2019    Procedure: Endoscopic Ultrasound;  Surgeon: Stephen Gasca MD;  Location: [facility]     ESOPHAGOSCOPY, GASTROSCOPY, DUODENOSCOPY (EGD), COMBINED  11/5/2012    Procedure: COMBINED ESOPHAGOSCOPY, GASTROSCOPY, DUODENOSCOPY (EGD), BIOPSY SINGLE OR MULTIPLE;;  Surgeon: Angélica Garcia MD;  Location: [facility] GI     ESOPHAGOSCOPY, GASTROSCOPY, DUODENOSCOPY (EGD), COMBINED  12/3/2013    Procedure: COMBINED ESOPHAGOSCOPY, GASTROSCOPY, DUODENOSCOPY (EGD);;  Surgeon: Angélica Garcia MD;  Location: [facility]     ESOPHAGOSCOPY, GASTROSCOPY, DUODENOSCOPY (EGD), COMBINED N/A 3/28/2018    Procedure: COMBINED ESOPHAGOSCOPY, GASTROSCOPY, DUODENOSCOPY (EGD);;  Surgeon: Stephen Gasca MD;  Location: [facility]     ESOPHAGOSCOPY, GASTROSCOPY, DUODENOSCOPY (EGD), COMBINED N/A 5/30/2019    Procedure: Esophagogastroduodenoscopy with biopsy x2 APC;  Surgeon: Stephen Gasca MD;  Location: [facility]     ESOPHAGOSCOPY, GASTROSCOPY, DUODENOSCOPY (EGD), COMBINED N/A 7/30/2020    Procedure:  ESOPHAGOGASTRODUODENOSCOPY (EGD) with polypectomy by hot snare; argonplasma coaglution ablation of polyps;  Surgeon: Stephen Gasca MD;  Location: [facility]     ESOPHAGOSCOPY, GASTROSCOPY, DUODENOSCOPY (EGD), RESECT MUCOSA, COMBINED N/A 7/25/2019    Procedure: Esophagogastroduodenoscopy with gastric mucosal resection and clip application;  Surgeon: Stephen Gasca MD;  Location: [facility] OR     EXCISE POLYP RECTUM       LAPAROSCOPIC HYSTERECTOMY TOTAL, BILATERAL SALPINGO-OOPHORECTOMY, NODE DISSECTION, COMBINED Bilateral 2/25/2021    Procedure: Laparoscopic removal of uterus, cervix, both ovaries and fallopian tubes, sentinel lymph node dissection, cystoscopy;  Surgeon: Sandie Samaniego MD;  Location: [facility]     LAPAROSCOPIC LYSIS ADHESIONS       SIGMOIDOSCOPY FLEXIBLE       SIGMOIDOSCOPY FLEXIBLE N/A 12/7/2015    Procedure: SIGMOIDOSCOPY FLEXIBLE;  Surgeon: Mariela Bearden MD;  Location: [facility]     SIGMOIDOSCOPY FLEXIBLE N/A 3/28/2018    Procedure: SIGMOIDOSCOPY FLEXIBLE;  EGD/Flex Sig;  Surgeon: Stephen Gasca MD;  Location: [facility]     SIGMOIDOSCOPY FLEXIBLE N/A 4/12/2018    Procedure: SIGMOIDOSCOPY FLEXIBLE;  Flexible Sigmoidoscopy with polyp ablation;  Surgeon: Stephen Gasca MD;  Location: [facility]     SIGMOIDOSCOPY FLEXIBLE N/A 5/30/2019    Procedure: Flexible Sigmoidoscopy with polypectomy;  Surgeon: Stephen Gasca MD;  Location: [facility]     SIGMOIDOSCOPY FLEXIBLE N/A 7/30/2020    Procedure: SIGMOIDOSCOPY, FLEXIBLE with Ablation of Polyps;  Surgeon: Stephen Gasca MD;  Location: [facility]       Health Maintenance:  Last Pap Smear: No need for further pap smear exams  She has not had a history of abnormal Pap smears.    Last Mammogram: 12/20/19              Result: normal    Last MRI 2/3/21  Result:  Normal     She has a history of abnormal ductal hyperplasia    Last Colonoscopy: 7/30/20              Result: normal-repeat yearly due to FAP       Social History:  Lives with spouse  and children, feels safe at home.  Works as an .  Enjoys working out, spending time with children, shopping.  Does not have an advanced directive on file and would like her  to be her POA.  Full code.    Family History:   The patient's family history is significant for.  Family History   Problem Relation Age of Onset     Hyperlipidemia Mother      Cerebrovascular Disease Mother      Cerebrovascular Disease Father      Colon Cancer Father 57         at 63     Prostate Cancer Brother 53     Prostate Cancer Maternal Uncle 65         Physical Exam:   None      Assessment:    Sarah Duran is a 55 year old woman with grade 1 endometrial adenocarcinoma.     A total of 20 minutes was spent on patient care.      Plan:     1.)    Stage II, grade 1 endometrial adenocarcinoma.  HAROON s/p brachytherapy.  Discussed no need for further therapy.  Discussed signs and symptoms of recurrence and to call if these should occur.  Discussed role of surveillance with history and physical exam and that labs/imaging would only be done based on symptoms but not routinely.  Discussed no need for further pap smear testing.  Discussed visits every 3 months for the first two years () then every 6 months for up to 5 years () then annually thereafter.  She will return in 3 months with NP and return to me if concerns arise.      2.) Genetic risk factors were assessed and the patient is POSITIVE for a APC mutation.  Specifically her mutation is called [id number],  consistent with a diagnosis of familial adenomatous polyposis (FAP) or attenuated FAP (AFAP)    3.) Labs and/or tests ordered include:  None     4.) Health maintenance issues addressed today include pt is up to date.          Thank you for allowing us to participate in the care of your patient.         Sincerely,    Sandie Euceda MD  Gynecologic Oncology  [facility]       [facility] [place]ZON         Again, thank you  for allowing me to participate in the care of your patient.        Sincerely,        Teja Euceda MD

## 2021-05-25 NOTE — PROGRESS NOTES
Consult Notes on Referred Patient        Dr. Clarissa Brown, APRN CNS  [address] [facility] [address]  [place] [address]       RE: Sarah Duran  : 1965  ANDREI: May 25, 2021    HPI:  Sarah Duran is 55 year old with stage II, grade 1 endometrial adenocarcinoma.  She is unaccompanied today.  She is doing well post- radiation therapy.  She was diagnosed with a UTI which was not adequately treated with her first course of antibiotics but now following her second dose has improved.  She is no longer having urinary symptoms.  When she had her UTI and was on antibiotics, she did have some GI symptoms, however these have resolved as well.  She notes she has had a recent worsening of her chronic hip pain which was present prior to her surgery/diagnosis.        Cancer Course:    She reports she began having daily bleeding in .  She notes she had previously gone 15 months with no bleeding.  She reports the bleeding is sometimes heavy like a period but then can also be somewhat light and only like spotting.  She had an attempted EMB with her primary gynecologist, however this was unsuccessful.  She is here today for a second opinion.    21:  US Pelvis:  Uterus: Measures 8.9 x 5.8 x 6.4 cm. The endometrium is thickened at 2.9 cm. The endometrium is heterogeneous in appearance with an irregular, nodular contour of the endometrial surface. There is hypoechoic complex fluid in the endometrial cavity likely containing blood. There is vascularity of the endometrium on color imaging. The abnormal endometrium occupies the majority of the uterus. Right Ovary: Measures 2.0 x 1.0 x 1.1 cm and appears unremarkable Right Ovary Blood Flow: Present. Left Ovary: Not seen. Free Fluid: No significant free fluid.There are no suspicious adnexal masses.    2/10/21:  Hysteroscopy, D&C   Pathology:  Grade 1 endometrial adenocarcinoma, MMR intact, p53 wild type, ER/[place] +    21:  Total laparoscopic hysterectomy,  bilateral salpingo-oophorectomy, bilateral sentinel lymph node dissection, cystoscopy   Pathology:  Stage II, grade 1 endometrial adenocarcinoma, tumor size 5.6 cm, 1/13 mm myometrial invasion, +CASEY, + cervix, 0/2 sentinel LN, no LVSI    4/26/21:  Completed brachytherapy to 3000cGy in 5 Fx to the vagina      Obstetrics and Gynecology History:  G0  no HRT use      Past Medical History:  Past Medical History:   Diagnosis Date     Acute deep vein thrombosis (DVT) of left lower extremity (H)      Asthma      Atypical ductal hyperplasia of right breast 10/2016    fibrocystic changes, PASH and atypical ductal hyperplasia on needle biopsy, s/p R excisional biopsy with radioactive seed localization     Endometrial cancer (H) 02/15/2021     Factor 5 Leiden mutation, heterozygous (H)     factor 5     Familial adenomatous polyposis 1988    clinical presentation; s/p total abdominal colectomy with TRACEY; confirmed APC+ 2018     GERD (gastroesophageal reflux disease)      Migraine      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    [id number]       Past Surgical History:  Past Surgical History:   Procedure Laterality Date     BIOPSY BREAST SEED LOCALIZATION Right 11/30/2016    Procedure: BIOPSY BREAST SEED LOCALIZATION;  Surgeon: Jessica Rasheed MD;  Location:  OR     C LAP,SURG,COLECTOMY,W/ANAST      for colon cancer     COLECTOMY      subtotal     COMBINED ESOPHAGOSCOPY, GASTROSCOPY, DUODENOSCOPY (EGD) WITH ARGON PLASMA COAGULATOR (APC) N/A 4/12/2018    Procedure: COMBINED ESOPHAGOSCOPY, GASTROSCOPY, DUODENOSCOPY (EGD) WITH ARGON PLASMA COAGULATOR (APC);  Esophagogastroduodenoscopy with polypectomy and polyp ablation;  Surgeon: Stephen Gasca MD;  [facility]     DILATION AND CURETTAGE, HYSTEROSCOPY DIAGNOSTIC, COMBINED N/A 2/10/2021    Procedure: HYSTEROSCOPY, DIAGNOSTIC, WITH DILATION AND CURETTAGE OF UTERUS;  Surgeon: Sandie Samaniego MD;  Location: [facility]     ENDOSCOPIC RETROGRADE  CHOLANGIOPANCREATOGRAPHY       ENDOSCOPIC ULTRASOUND UPPER GASTROINTESTINAL TRACT (GI) N/A 7/25/2019    Procedure: Endoscopic Ultrasound;  Surgeon: Stephen Gasca MD;  [facility]     ESOPHAGOSCOPY, GASTROSCOPY, DUODENOSCOPY (EGD), COMBINED  11/5/2012    Procedure: COMBINED ESOPHAGOSCOPY, GASTROSCOPY, DUODENOSCOPY (EGD), BIOPSY SINGLE OR MULTIPLE;;  Surgeon: Angélica Garcia MD;  [facility] GI     ESOPHAGOSCOPY, GASTROSCOPY, DUODENOSCOPY (EGD), COMBINED  12/3/2013    Procedure: COMBINED ESOPHAGOSCOPY, GASTROSCOPY, DUODENOSCOPY (EGD);;  Surgeon: Angélica Garcia MD;  [facility]     ESOPHAGOSCOPY, GASTROSCOPY, DUODENOSCOPY (EGD), COMBINED N/A 3/28/2018    Procedure: COMBINED ESOPHAGOSCOPY, GASTROSCOPY, DUODENOSCOPY (EGD);;  Surgeon: Stephen Gasca MD;  [facility]     ESOPHAGOSCOPY, GASTROSCOPY, DUODENOSCOPY (EGD), COMBINED N/A 5/30/2019    Procedure: Esophagogastroduodenoscopy with biopsy x2 APC;  Surgeon: Stephen Gasca MD;  [facility]     ESOPHAGOSCOPY, GASTROSCOPY, DUODENOSCOPY (EGD), COMBINED N/A 7/30/2020    Procedure: ESOPHAGOGASTRODUODENOSCOPY (EGD) with polypectomy by hot snare; argonplasma coaglution ablation of polyps;  Surgeon: Stephen Gasca MD;  [facility]     ESOPHAGOSCOPY, GASTROSCOPY, DUODENOSCOPY (EGD), RESECT MUCOSA, COMBINED N/A 7/25/2019    Procedure: Esophagogastroduodenoscopy with gastric mucosal resection and clip application;  Surgeon: Stephen Gasca MD;  [facility]     EXCISE POLYP RECTUM       LAPAROSCOPIC HYSTERECTOMY TOTAL, BILATERAL SALPINGO-OOPHORECTOMY, NODE DISSECTION, COMBINED Bilateral 2/25/2021    Procedure: Laparoscopic removal of uterus, cervix, both ovaries and fallopian tubes, sentinel lymph node dissection, cystoscopy;  Surgeon: Sandie Samaniego MD;  [facility]     LAPAROSCOPIC LYSIS ADHESIONS       SIGMOIDOSCOPY FLEXIBLE       SIGMOIDOSCOPY FLEXIBLE N/A 12/7/2015    Procedure: SIGMOIDOSCOPY  FLEXIBLE;  Surgeon: Mariela Bearden MD;  [facility]     SIGMOIDOSCOPY FLEXIBLE N/A 3/28/2018    Procedure: SIGMOIDOSCOPY FLEXIBLE;  EGD/Flex Sig;  Surgeon: Stephen Gasca MD;  [facility]     SIGMOIDOSCOPY FLEXIBLE N/A 2018    Procedure: SIGMOIDOSCOPY FLEXIBLE;  Flexible Sigmoidoscopy with polyp ablation;  Surgeon: Stephen Gasca MD;  [facility]     SIGMOIDOSCOPY FLEXIBLE N/A 2019    Procedure: Flexible Sigmoidoscopy with polypectomy;  Surgeon: Stephen Gasca MD;  [facility]     SIGMOIDOSCOPY FLEXIBLE N/A 2020    Procedure: SIGMOIDOSCOPY, FLEXIBLE with Ablation of Polyps;  Surgeon: Stephen Gasca MD;  Location:  OR       Health Maintenance:  Last Pap Smear: No need for further pap smear exams  She has not had a history of abnormal Pap smears.    Last Mammogram: 19              Result: normal    Last MRI 2/3/21  Result:  Normal     She has a history of abnormal ductal hyperplasia    Last Colonoscopy: 20              Result: normal-repeat yearly due to FAP       Social History:  Lives with spouse and children, feels safe at home.  Works as an .  Enjoys working out, spending time with children, shopping.  Does not have an advanced directive on file and would like her  to be her POA.  Full code.    Family History:   The patient's family history is significant for.  Family History   Problem Relation Age of Onset     Hyperlipidemia Mother      Cerebrovascular Disease Mother      Cerebrovascular Disease Father      Colon Cancer Father 57         at 63     Prostate Cancer Brother 53     Prostate Cancer Maternal Uncle 65         Physical Exam:   None      Assessment:    Sarah Duran is a 55 year old woman with grade 1 endometrial adenocarcinoma.     A total of 20 minutes was spent on patient care.      Plan:     1.)    Stage II, grade 1 endometrial adenocarcinoma.  HAROON s/p brachytherapy.  Discussed no need for further therapy.   Discussed signs and symptoms of recurrence and to call if these should occur.  Discussed role of surveillance with history and physical exam and that labs/imaging would only be done based on symptoms but not routinely.  Discussed no need for further pap smear testing.  Discussed visits every 3 months for the first two years (5/23) then every 6 months for up to 5 years (5/26) then annually thereafter.  She will return in 3 months with NP and return to me if concerns arise.      2.) Genetic risk factors were assessed and the patient is POSITIVE for a APC mutation.  Specifically her mutation is called [id number],  consistent with a diagnosis of familial adenomatous polyposis (FAP) or attenuated FAP (AFAP)    3.) Labs and/or tests ordered include:  None     4.) Health maintenance issues addressed today include pt is up to date.          Thank you for allowing us to participate in the care of your patient.         Sincerely,    Sandie Euceda MD  Gynecologic Oncology  [facility]       JULIETH RAMIREZ, MYCHAL ZAMORA

## 2021-05-25 NOTE — LETTER
2021         RE: Sarah Duran  [address] [address]        Dear Colleague,    Thank you for referring your patient, Sarah Duran, to the [facility]. Please see a copy of my visit note below.    Sarah is a 55 year old who is being evaluated via a billable video visit.      How would you like to obtain your AVS? MyChart  If the video visit is dropped, the invitation should be resent by: Text to cell phone: [phone]  Will anyone else be joining your video visit? Jocelynn Clifford, PASCUAL on 2021 at 2:17 PM      Video-Visit Details    Type of service:  Video Visit    Originating Location (pt. Location): Home    Distant Location (provider location):  [facility]     Platform used for Video Visit: unable to complete, did via telephone    Consult Notes on Referred Patient        Dr. Clarissa Brown, APRN [facility]  [address]  [place] [address]       RE: Sarah Duran  : 1965  ANDREI: May 25, 2021    HPI:  Sarah Duran is 55 year old with stage II, grade 1 endometrial adenocarcinoma.  She is unaccompanied today.  She is doing well post- radiation therapy.  She was diagnosed with a UTI which was not adequately treated with her first course of antibiotics but now following her second dose has improved.  She is no longer having urinary symptoms.  When she had her UTI and was on antibiotics, she did have some GI symptoms, however these have resolved as well.  She notes she has had a recent worsening of her chronic hip pain which was present prior to her surgery/diagnosis.        Cancer Course:    She reports she began having daily bleeding in .  She notes she had previously gone 15 months with no bleeding.  She reports the bleeding is sometimes heavy like a period but then can also be somewhat light and only like spotting.  She had an attempted EMB with her primary gynecologist,  however this was unsuccessful.  She is here today for a second opinion.    1/4/21:  US Pelvis:  Uterus: Measures 8.9 x 5.8 x 6.4 cm. The endometrium is thickened at 2.9 cm. The endometrium is heterogeneous in appearance with an irregular, nodular contour of the endometrial surface. There is hypoechoic complex fluid in the endometrial cavity likely containing blood. There is vascularity of the endometrium on color imaging. The abnormal endometrium occupies the majority of the uterus. Right Ovary: Measures 2.0 x 1.0 x 1.1 cm and appears unremarkable Right Ovary Blood Flow: Present. Left Ovary: Not seen. Free Fluid: No significant free fluid.There are no suspicious adnexal masses.    2/10/21:  Hysteroscopy, D&C   Pathology:  Grade 1 endometrial adenocarcinoma, MMR intact, p53 wild type, ER/[place] +    2/25/21:  Total laparoscopic hysterectomy, bilateral salpingo-oophorectomy, bilateral sentinel lymph node dissection, cystoscopy   Pathology:  Stage II, grade 1 endometrial adenocarcinoma, tumor size 5.6 cm, 1/13 mm myometrial invasion, +CASEY, + cervix, 0/2 sentinel LN, no LVSI    4/26/21:  Completed brachytherapy to 3000cGy in 5 Fx to the vagina      Obstetrics and Gynecology History:  G0  no HRT use      Past Medical History:  Past Medical History:   Diagnosis Date     Acute deep vein thrombosis (DVT) of left lower extremity (H)      Asthma      Atypical ductal hyperplasia of right breast 10/2016    fibrocystic changes, PASH and atypical ductal hyperplasia on needle biopsy, s/p R excisional biopsy with radioactive seed localization     Endometrial cancer (H) 02/15/2021     Factor 5 Leiden mutation, heterozygous (H)     factor 5     Familial adenomatous polyposis 1988    clinical presentation; s/p total abdominal colectomy with TRACEY; confirmed APC+ 2018     GERD (gastroesophageal reflux disease)      Migraine      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    [id number]       Past Surgical History:  Past Surgical  History:   Procedure Laterality Date     BIOPSY BREAST SEED LOCALIZATION Right 11/30/2016    Procedure: BIOPSY BREAST SEED LOCALIZATION;  Surgeon: Jessica Rasheed MD;  Location: [facility] OR     C LAP,SURG,COLECTOMY,W/ANAST      for colon cancer     COLECTOMY      subtotal     COMBINED ESOPHAGOSCOPY, GASTROSCOPY, DUODENOSCOPY (EGD) WITH ARGON PLASMA COAGULATOR (APC) N/A 4/12/2018    Procedure: COMBINED ESOPHAGOSCOPY, GASTROSCOPY, DUODENOSCOPY (EGD) WITH ARGON PLASMA COAGULATOR (APC);  Esophagogastroduodenoscopy with polypectomy and polyp ablation;  Surgeon: Stephen Gasca MD;  [facility]     DILATION AND CURETTAGE, HYSTEROSCOPY DIAGNOSTIC, COMBINED N/A 2/10/2021    Procedure: HYSTEROSCOPY, DIAGNOSTIC, WITH DILATION AND CURETTAGE OF UTERUS;  Surgeon: Sandie Samaniego MD;  Location: [facility] OR     ENDOSCOPIC RETROGRADE CHOLANGIOPANCREATOGRAPHY       ENDOSCOPIC ULTRASOUND UPPER GASTROINTESTINAL TRACT (GI) N/A 7/25/2019    Procedure: Endoscopic Ultrasound;  Surgeon: Stephen Gasca MD;  [facility]     ESOPHAGOSCOPY, GASTROSCOPY, DUODENOSCOPY (EGD), COMBINED  11/5/2012    Procedure: COMBINED ESOPHAGOSCOPY, GASTROSCOPY, DUODENOSCOPY (EGD), BIOPSY SINGLE OR MULTIPLE;;  Surgeon: Angélica Garcia MD;  [facility]     ESOPHAGOSCOPY, GASTROSCOPY, DUODENOSCOPY (EGD), COMBINED  12/3/2013    Procedure: COMBINED ESOPHAGOSCOPY, GASTROSCOPY, DUODENOSCOPY (EGD);;  Surgeon: Angélica Garcia MD;  [facility]     ESOPHAGOSCOPY, GASTROSCOPY, DUODENOSCOPY (EGD), COMBINED N/A 3/28/2018    Procedure: COMBINED ESOPHAGOSCOPY, GASTROSCOPY, DUODENOSCOPY (EGD);;  Surgeon: Stephen Gasca MD;  [facility]     ESOPHAGOSCOPY, GASTROSCOPY, DUODENOSCOPY (EGD), COMBINED N/A 5/30/2019    Procedure: Esophagogastroduodenoscopy with biopsy x2 APC;  Surgeon: Stephen Gasca MD;  [facility]     ESOPHAGOSCOPY, GASTROSCOPY, DUODENOSCOPY (EGD), COMBINED N/A 7/30/2020    Procedure:  ESOPHAGOGASTRODUODENOSCOPY (EGD) with polypectomy by hot snare; argonplasma coaglution ablation of polyps;  Surgeon: Stephen Gasca MD;  [facility]     ESOPHAGOSCOPY, GASTROSCOPY, DUODENOSCOPY (EGD), RESECT MUCOSA, COMBINED N/A 7/25/2019    Procedure: Esophagogastroduodenoscopy with gastric mucosal resection and clip application;  Surgeon: Stephen Gasca MD;  [facility]     EXCISE POLYP RECTUM       LAPAROSCOPIC HYSTERECTOMY TOTAL, BILATERAL SALPINGO-OOPHORECTOMY, NODE DISSECTION, COMBINED Bilateral 2/25/2021    Procedure: Laparoscopic removal of uterus, cervix, both ovaries and fallopian tubes, sentinel lymph node dissection, cystoscopy;  Surgeon: Sandie Samaniego MD;  [facility]     LAPAROSCOPIC LYSIS ADHESIONS       SIGMOIDOSCOPY FLEXIBLE       SIGMOIDOSCOPY FLEXIBLE N/A 12/7/2015    Procedure: SIGMOIDOSCOPY FLEXIBLE;  Surgeon: Mariela Bearden MD;  [facility]     SIGMOIDOSCOPY FLEXIBLE N/A 3/28/2018    Procedure: SIGMOIDOSCOPY FLEXIBLE;  EGD/Flex Sig;  Surgeon: Stephen Gasca MD;  [facility]     SIGMOIDOSCOPY FLEXIBLE N/A 4/12/2018    Procedure: SIGMOIDOSCOPY FLEXIBLE;  Flexible Sigmoidoscopy with polyp ablation;  Surgeon: Stephen Gasca MD;  [facility]     SIGMOIDOSCOPY FLEXIBLE N/A 5/30/2019    Procedure: Flexible Sigmoidoscopy with polypectomy;  Surgeon: Stephen Gasca MD;  [facility]     SIGMOIDOSCOPY FLEXIBLE N/A 7/30/2020    Procedure: SIGMOIDOSCOPY, FLEXIBLE with Ablation of Polyps;  Surgeon: Stephen Gasca MD;  [facility]       Health Maintenance:  Last Pap Smear: No need for further pap smear exams  She has not had a history of abnormal Pap smears.    Last Mammogram: 12/20/19              Result: normal    Last MRI 2/3/21  Result:  Normal     She has a history of abnormal ductal hyperplasia    Last Colonoscopy: 7/30/20              Result: normal-repeat yearly due to FAP       Social History:  Lives with spouse  and children, feels safe at home.  Works as an .  Enjoys working out, spending time with children, shopping.  Does not have an advanced directive on file and would like her  to be her POA.  Full code.    Family History:   The patient's family history is significant for.  Family History   Problem Relation Age of Onset     Hyperlipidemia Mother      Cerebrovascular Disease Mother      Cerebrovascular Disease Father      Colon Cancer Father 57         at 63     Prostate Cancer Brother 53     Prostate Cancer Maternal Uncle 65         Physical Exam:   None      Assessment:    Sarah Duran is a 55 year old woman with grade 1 endometrial adenocarcinoma.     A total of 20 minutes was spent on patient care.      Plan:     1.)    Stage II, grade 1 endometrial adenocarcinoma.  HAROON s/p brachytherapy.  Discussed no need for further therapy.  Discussed signs and symptoms of recurrence and to call if these should occur.  Discussed role of surveillance with history and physical exam and that labs/imaging would only be done based on symptoms but not routinely.  Discussed no need for further pap smear testing.  Discussed visits every 3 months for the first two years () then every 6 months for up to 5 years () then annually thereafter.  She will return in 3 months with NP and return to me if concerns arise.      2.) Genetic risk factors were assessed and the patient is POSITIVE for a APC mutation.  Specifically her mutation is called [id number],  consistent with a diagnosis of familial adenomatous polyposis (FAP) or attenuated FAP (AFAP)    3.) Labs and/or tests ordered include:  None     4.) Health maintenance issues addressed today include pt is up to date.          Thank you for allowing us to participate in the care of your patient.         Sincerely,    Sandie Euceda MD  Gynecologic Oncology  [facility]       MYCHAL PINZON         Again, thank you  for allowing me to participate in the care of your patient.        Sincerely,        Teja Euceda MD

## 2021-05-25 NOTE — PROGRESS NOTES
Sarah is a 55 year old who is being evaluated via a billable video visit.      How would you like to obtain your AVS? MyChart  If the video visit is dropped, the invitation should be resent by: Text to cell phone: [phone]  Will anyone else be joining your video visit? Jocelynn Clifford CMA on 5/25/2021 at 2:17 PM      Video-Visit Details    Type of service:  Video Visit    Originating Location (pt. Location): Home    Distant Location (provider location):  [facility]     Platform used for Video Visit: unable to complete, did via telephone

## 2021-06-01 NOTE — PROGRESS NOTES
Department of Therapeutic Radiology--Radiation Oncology                   [address]  [address] [place]  [address]  Office:  [phone]  Fax:  [phone]   Radiation Oncology Clinic  [address] [place] [id number]  Phone:  [phone]  Fax:  [phone]     RE: Sarah Duran : 1965   MRN: [id number] ANDREI: 6/3/2021     OUTPATIENT VISIT NOTE       DIAGNOSIS: Adenocarcinoma of the endometrium, FIGO stage II, grade 1    AREAS TREATED: Vaginal cuff    DOSE:  3000 cGy in 6 fractions prescribed to the vaginal surface.     TYPES OF RADIATION GIVEN: Brachytherapy    INTERVAL SINCE COMPLETION OF RADIATION THERAPY:  ~ 5 weeks.  (She completed treatment on 2021)    SUBJECTIVE: Ms. Duran is a 55-year-old female with a FIGO stage II endometrial cancer.     She initially presented with 2 months of post-menopausal bleeding, and on pelvic ultrasound her uterus was enlarged at 8.9 x 5.8 x 6.4 cm with thickening of endometrium at 2.9 cm. Clinic biopsy attempts were not successful due to cervical stenosis so she underwent hysteroscopy and endometrial curettage on 2/10/21, which confirmed her cancer diagnosis.     She underwent TLH-BSO, bilateral sentinel lymph node dissection, and cystoscopy on 21. Surgical pathology ([id number]) reported a 5.6 cm endometrioid adenocarcinoma, involving 1/13 mm (8%) of the myometrium. There was lower uterine segment and cervical stromal involvement (limited to the inner half of the cervical wall, 7 mm from paracervical margins), but no LVSI.  0/2 (1 Rt obturator, 1 Lt external illiac) sentinel nodes was found involved. The ovaries and fallopian tubes were benign, and cytology from peritoneal washing was negative.      She had a known APC mutation, and previously underwent subtotal colectomy with ileal-sigmoid re-  anastomosis in . During her hysterectomy, she was noted to bowel adhesions and endometriosis. These factors could potentially  place her at a higher risk of developing toxicities of radiation therapy. Given her disease's grade 1 histology, minimal myometrial invasion, limited cervical stromal involvement, and absence of LVSI, we felt it was reasonable to offer brachytherapy alone.      She tolerated the treatment well. Prior to the 2nd fraction of brachytherapy, she reported an episode of gross hematuria, associated with pelvic pain and dysuria. Urine culture grew Klebsiella. She was treated with antibiotics.     She recently saw Dr. Euceda on 5/26 and surveillance was discussed.     Ms. Duran is here today for a routine follow up visit. She is feeling much better now that her UTI is gone.  She has been using vaginal dilator, initially small size, now a medium size.  She notes some tightness if she doesn't use the dilator for a few days, and she does stick with an every other day schedule. She denies any change in bowel habits.       OBJECTIVE:   /76   Pulse 81   Wt 73.3 kg (161 lb 8 oz)   LMP 05/01/2019   SpO2 94%   BMI 29.54 kg/m     Gen: appears well no acute distress.  Resp: breathing comfortably on room air  CV: well perfused.  Pelvic: deferred    ASSESSMENT AND PLAN: In summary, Ms. Duran is a 54 yo female with a FIGO stage II, grade 1 adenocarcinoma of the endometrium.  She is 1 month status post adjuvant vaginal brachytherapy.  She is doing well with no side effects. She is using dilator diligently.     Ms. Duran will be discharged from our clinic and is instructed to call us with any questions or concerns. She will continue her surveillance with Gyn Onc.        Keila Burger M.D./Ph.D.  Radiation Oncologist   Department of Therapeutic Radiology   [facility]  Phone: [phone]         30 minutes were spent on the date of the encounter doing chart review, history and exam, documentation and further activities as noted above.           Keila Burger MD

## 2021-06-03 ENCOUNTER — OFFICE VISIT (OUTPATIENT)
Dept: RADIATION ONCOLOGY | Facility: CLINIC | Age: 56
End: 2021-06-03
Attending: RADIOLOGY
Payer: COMMERCIAL

## 2021-06-03 VITALS
WEIGHT: 161.5 LBS | HEART RATE: 81 BPM | BODY MASS INDEX: 29.54 KG/M2 | DIASTOLIC BLOOD PRESSURE: 76 MMHG | OXYGEN SATURATION: 94 % | SYSTOLIC BLOOD PRESSURE: 132 MMHG

## 2021-06-03 DIAGNOSIS — C54.1 ENDOMETRIAL CANCER (H): Primary | ICD-10-CM

## 2021-06-03 PROCEDURE — G0463 HOSPITAL OUTPT CLINIC VISIT: HCPCS | Performed by: RADIOLOGY

## 2021-06-03 NOTE — LETTER
6/3/2021         RE: Sarah Duran  [address] [facility] [address]        Dear Colleague,    Thank you for referring your patient, Sarah Duran, to the [facility] RADIATION ONCOLOGY. Please see a copy of my visit note below.    Department of Therapeutic Radiology--Radiation Oncology                   [place] [address]  [address] [place]  [address]  Office:  [phone]  Fax:  [phone]   Radiation Oncology Clinic  [address] [place] [address]  Phone:  [phone]  Fax:  [phone]     RE: Sarah Duran : 1965   MRN: [id number] ANDREI: 6/3/2021     OUTPATIENT VISIT NOTE       DIAGNOSIS: Adenocarcinoma of the endometrium, FIGO stage II, grade 1    AREAS TREATED: Vaginal cuff    DOSE:  3000 cGy in 6 fractions prescribed to the vaginal surface.     TYPES OF RADIATION GIVEN: Brachytherapy    INTERVAL SINCE COMPLETION OF RADIATION THERAPY:  ~ 5 weeks.  (She completed treatment on 2021)    SUBJECTIVE: Ms. Duran is a 55-year-old female with a FIGO stage II endometrial cancer.     She initially presented with 2 months of post-menopausal bleeding, and on pelvic ultrasound her uterus was enlarged at 8.9 x 5.8 x 6.4 cm with thickening of endometrium at 2.9 cm. Clinic biopsy attempts were not successful due to cervical stenosis so she underwent hysteroscopy and endometrial curettage on 2/10/21, which confirmed her cancer diagnosis.     She underwent TLH-BSO, bilateral sentinel lymph node dissection, and cystoscopy on 21. Surgical pathology ([id number]) reported a 5.6 cm endometrioid adenocarcinoma, involving 1/13 mm (8%) of the myometrium. There was lower uterine segment and cervical stromal involvement (limited to the inner half of the cervical wall, 7 mm from paracervical margins), but no LVSI.  0/2 (1 Rt obturator, 1 Lt external illiac) sentinel nodes was found involved. The ovaries and fallopian tubes were benign, and cytology from peritoneal  washing was negative.      She had a known APC mutation, and previously underwent subtotal colectomy with ileal-sigmoid re-  anastomosis in 1988. During her hysterectomy, she was noted to bowel adhesions and endometriosis. These factors could potentially place her at a higher risk of developing toxicities of radiation therapy. Given her disease's grade 1 histology, minimal myometrial invasion, limited cervical stromal involvement, and absence of LVSI, we felt it was reasonable to offer brachytherapy alone.      She tolerated the treatment well. Prior to the 2nd fraction of brachytherapy, she reported an episode of gross hematuria, associated with pelvic pain and dysuria. Urine culture grew Klebsiella. She was treated with antibiotics.     She recently saw Dr. Euceda on 5/26 and surveillance was discussed.     Ms. Duran is here today for a routine follow up visit. She is feeling much better now that her UTI is gone.  She has been using vaginal dilator, initially small size, now a medium size.  She notes some tightness if she doesn't use the dilator for a few days, and she does stick with an every other day schedule. She denies any change in bowel habits.       OBJECTIVE:   /76   Pulse 81   Wt 73.3 kg (161 lb 8 oz)   LMP 05/01/2019   SpO2 94%   BMI 29.54 kg/m     Gen: appears well no acute distress.  Resp: breathing comfortably on room air  CV: well perfused.  Pelvic: deferred    ASSESSMENT AND PLAN: In summary, Ms. Duran is a 56 yo female with a FIGO stage II, grade 1 adenocarcinoma of the endometrium.  She is 1 month status post adjuvant vaginal brachytherapy.  She is doing well with no side effects. She is using dilator diligently.     Ms. Duran will be discharged from our clinic and is instructed to call us with any questions or concerns. She will continue her surveillance with Gyn Onc.        Keila Burger M.D./Ph.D.  Radiation Oncologist   Department of Therapeutic Radiology   [facility]  [facility]  Phone: [phone]         30 minutes were spent on the date of the encounter doing chart review, history and exam, documentation and further activities as noted above.     Keila Burger MD

## 2021-06-03 NOTE — NURSING NOTE
FOLLOW-UP VISIT    Patient Name: Sarah Duran      : 1965     Age: 55 year old        ______________________________________________________________________________     Chief Complaint   Patient presents with     Cancer     Radiation follow up     /76   Pulse 81   Wt 73.3 kg (161 lb 8 oz)   LMP 2019   SpO2 94%   BMI 29.54 kg/m       Date Radiation Completed: Endometrial Cancer:Vaginal cuff 3000 cGy completed 21    Pain  Denies    Labs  Other Labs: No    Imaging  None    Diarrhea: 0- None    Constipation: 1- Occassional or intermittent s/s; occasional use of stool softners, laxatives, enema. Uses colace daily.    Nausea: 0- None    Vomitin- No vomiting    Genitourinary system: 0- Normal    Dysuria: 0- None    Vaginal dilator use: daily, no complaints. Denies bloody discharge.    Other Appointments: No    MD Name:  Appointment Date:    MD Name: Appointment Date:   MD Name: Appointment Date:   Other Appointment Notes:     Residual Radiation side effect: Pt has no complaints r/t radiation. Feeling well.      Additional Instructions:     Nurse face-to-face time: Level 3:  10 min face to face time

## 2021-06-11 ENCOUNTER — PATIENT OUTREACH (OUTPATIENT)
Dept: ONCOLOGY | Facility: CLINIC | Age: 56
End: 2021-06-11

## 2021-06-11 NOTE — PROGRESS NOTES
Received call from pt who notes irritation in umbilicus this week.  Pt s/p Total laparoscopic hysterectomy 2/25/2021.  Pt states she is unsure if she has remaining glue in umbilicus from surgery but area inside is red, itchy and irritated.  Denies fevers or drainage.      Dr Euceda notified and recommends NP visit next week.  Pt called back and offered appt on 6/14 but declined due to distance but appt provided for 6/15/21 at 9:20 am with Ciara Garcia NP at [facility].  Pt instructed to continue to keep the area clean and dry, may wash with mild soap and water.  Pt instructed to go to Urgent care over the weekend if needed.  Pt verbalized understanding of plan and no further questions at this time.    Maria De Jesus Evans RN, BSN, OCN

## 2021-06-14 NOTE — PROGRESS NOTES
Follow Up Notes on Referred Patient    Date: 6/15/2021       RE: Sarah Duran  : 1965  ANDREI: 6/15/2021      Sarah Duran is a 55 year old woman with a diagnosis of stage II, grade 1 endometrial adenocarcinoma. She is here today for an acute visit.     Cancer Course:     She reports she began having daily bleeding in .  She notes she had previously gone 15 months with no bleeding.  She reports the bleeding is sometimes heavy like a period but then can also be somewhat light and only like spotting.  She had an attempted EMB with her primary gynecologist, however this was unsuccessful.  She is here today for a second opinion.     21:  US Pelvis:  Uterus: Measures 8.9 x 5.8 x 6.4 cm. The endometrium is thickened at 2.9 cm. The endometrium is heterogeneous in appearance with an irregular, nodular contour of the endometrial surface. There is hypoechoic complex fluid in the endometrial cavity likely containing blood. There is vascularity of the endometrium on color imaging. The abnormal endometrium occupies the majority of the uterus. Right Ovary: Measures 2.0 x 1.0 x 1.1 cm and appears unremarkable Right Ovary Blood Flow: Present. Left Ovary: Not seen. Free Fluid: No significant free fluid.There are no suspicious adnexal masses.     2/10/21:  Hysteroscopy, D&C                 Pathology:  Grade 1 endometrial adenocarcinoma, MMR intact, p53 wild type, ER/CT +     21:  Total laparoscopic hysterectomy, bilateral salpingo-oophorectomy, bilateral sentinel lymph node dissection, cystoscopy                 Pathology:  Stage II, grade 1 endometrial adenocarcinoma, tumor size 5.6 cm, 1/13 mm myometrial invasion, +CASEY, + cervix, 0/2 sentinel LN, no LVSI     21:  Completed brachytherapy to 3000cGy in 5 Fx to the vagina        Today Sarah comes to the clinic with recent irritation around her prior umbilical incision. She does state that the umbulical site has improved after cleaning  with water and rubbing alcohol application with a q-tip. Not feeling irritated now. She noticed irritation last Tuesday and cleaned it Friday. She states that this small area inside umbilicus was erythremic without discharge. She states that now it is greatly improved without irritation or erythema, no drainage, no pain. She denies any vaginal bleeding, no changes in her bowel or bladder habits, no nausea/emesis, no lower extremity edema, and no difficulties eating or sleeping. She denies any abdominal discomfort/bloating, no fevers or chills, and no chest pain or shortness of breath. She continues to use her dilator 5 times a week in shower without vaignal bleeding or pain. She is not sexually active right now. She states that her hot flashes are minimal and rare now. She continues to work on building up her strength and stamina with [employer] cycling classes and CrossFit.           Review of Systems:    Systemic           no weight changes; no fever; no chills; no night sweats; no appetite changes, +mild hot flashes   Skin           no rashes, or lesions  Eye           no irritation; no changes in vision  Alise-Laryngeal           no dysphagia; no hoarseness   Pulmonary    no cough; no shortness of breath  Cardiovascular    no chest pain; no palpitations  Gastrointestinal    no diarrhea; no constipation; no abdominal pain; no changes in bowel  habits; no blood in stool  Genitourinary   no urinary frequency; no urinary urgency; no dysuria; no pain; no abnormal vaginal discharge; no abnormal vaginal bleeding  Breast    no breast discharge; no breast changes; no breast pain  Musculoskeletal    no myalgias; no arthralgias; no back pain  Psychiatric           no depressed mood; no anxiety    Hematologic           no tender lymph nodes; no noticeable swellings or lumps   Endocrine    no hot flashes; no heat/cold intolerance         Neurological   no tremor; no numbness and tingling; no headaches; no difficulty   sleeping      Past Medical History:    Past Medical History:   Diagnosis Date     Acute deep vein thrombosis (DVT) of left lower extremity (H)      Asthma      Atypical ductal hyperplasia of right breast 10/2016    fibrocystic changes, PASH and atypical ductal hyperplasia on needle biopsy, s/p R excisional biopsy with radioactive seed localization     Endometrial cancer (H) 02/15/2021     Factor 5 Leiden mutation, heterozygous (H)     factor 5     Familial adenomatous polyposis 1988    clinical presentation; s/p total abdominal colectomy with TRACEY; confirmed APC+ 2018     GERD (gastroesophageal reflux disease)      Migraine      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    [id number]         Past Surgical History:    Past Surgical History:   Procedure Laterality Date     BIOPSY BREAST SEED LOCALIZATION Right 11/30/2016    Procedure: BIOPSY BREAST SEED LOCALIZATION;  Surgeon: Jessica Rasheed MD;  Location:  OR     C LAP,SURG,COLECTOMY,W/ANAST      for colon cancer     COLECTOMY      subtotal     COMBINED ESOPHAGOSCOPY, GASTROSCOPY, DUODENOSCOPY (EGD) WITH ARGON PLASMA COAGULATOR (APC) N/A 4/12/2018    Procedure: COMBINED ESOPHAGOSCOPY, GASTROSCOPY, DUODENOSCOPY (EGD) WITH ARGON PLASMA COAGULATOR (APC);  Esophagogastroduodenoscopy with polypectomy and polyp ablation;  Surgeon: Stephen Gasca MD;  Location: [facility] OR     DILATION AND CURETTAGE, HYSTEROSCOPY DIAGNOSTIC, COMBINED N/A 2/10/2021    Procedure: HYSTEROSCOPY, DIAGNOSTIC, WITH DILATION AND CURETTAGE OF UTERUS;  Surgeon: Sandie Samaniego MD;  Location[place] [facility] [place]     ENDOSCOPIC RETROGRADE CHOLANGIOPANCREATOGRAPHY       ENDOSCOPIC ULTRASOUND UPPER GASTROINTESTINAL TRACT (GI) N/A 7/25/2019    Procedure: Endoscopic Ultrasound;  Surgeon: Stephen Gasca MD;  Location: [facility] OR     ESOPHAGOSCOPY, GASTROSCOPY, DUODENOSCOPY (EGD), COMBINED  11/5/2012    Procedure: COMBINED ESOPHAGOSCOPY, GASTROSCOPY, DUODENOSCOPY (EGD), BIOPSY SINGLE OR  MULTIPLE;;  Surgeon: Angélica Garcia MD;  Location: [facility] GI     ESOPHAGOSCOPY, GASTROSCOPY, DUODENOSCOPY (EGD), COMBINED  12/3/2013    Procedure: COMBINED ESOPHAGOSCOPY, GASTROSCOPY, DUODENOSCOPY (EGD);;  Surgeon: Angélica Garcia MD;  Location: [facility] GI     ESOPHAGOSCOPY, GASTROSCOPY, DUODENOSCOPY (EGD), COMBINED N/A 3/28/2018    Procedure: COMBINED ESOPHAGOSCOPY, GASTROSCOPY, DUODENOSCOPY (EGD);;  Surgeon: Stephen Gasca MD;  Location: [facility] GI     ESOPHAGOSCOPY, GASTROSCOPY, DUODENOSCOPY (EGD), COMBINED N/A 5/30/2019    Procedure: Esophagogastroduodenoscopy with biopsy x2 APC;  Surgeon: Stephen Gasca MD;  Location: [facility] OR     ESOPHAGOSCOPY, GASTROSCOPY, DUODENOSCOPY (EGD), COMBINED N/A 7/30/2020    Procedure: ESOPHAGOGASTRODUODENOSCOPY (EGD) with polypectomy by hot snare; argonplasma coaglution ablation of polyps;  Surgeon: Stephen Gasca MD;  Location: [facility] OR     ESOPHAGOSCOPY, GASTROSCOPY, DUODENOSCOPY (EGD), RESECT MUCOSA, COMBINED N/A 7/25/2019    Procedure: Esophagogastroduodenoscopy with gastric mucosal resection and clip application;  Surgeon: Stephen Gasca MD;  Location: [facility] OR     EXCISE POLYP RECTUM       LAPAROSCOPIC HYSTERECTOMY TOTAL, BILATERAL SALPINGO-OOPHORECTOMY, NODE DISSECTION, COMBINED Bilateral 2/25/2021    Procedure: Laparoscopic removal of uterus, cervix, both ovaries and fallopian tubes, sentinel lymph node dissection, cystoscopy;  Surgeon: Sandie Samaniego MD;  Location: [facility] OR     LAPAROSCOPIC LYSIS ADHESIONS       SIGMOIDOSCOPY FLEXIBLE       SIGMOIDOSCOPY FLEXIBLE N/A 12/7/2015    Procedure: SIGMOIDOSCOPY FLEXIBLE;  Surgeon: Mariela Bearden MD;  Location: [facility] GI     SIGMOIDOSCOPY FLEXIBLE N/A 3/28/2018    Procedure: SIGMOIDOSCOPY FLEXIBLE;  EGD/Flex Sig;  Surgeon: Stephen Gasca MD;  Location: [facility] GI     SIGMOIDOSCOPY FLEXIBLE N/A 4/12/2018    Procedure: SIGMOIDOSCOPY FLEXIBLE;  Flexible Sigmoidoscopy with polyp ablation;  Surgeon: Campos  Stephen Cui MD;  Location: [facility] OR     SIGMOIDOSCOPY FLEXIBLE N/A 5/30/2019    Procedure: Flexible Sigmoidoscopy with polypectomy;  Surgeon: Stephen Gasca MD;  Location: [facility]     SIGMOIDOSCOPY FLEXIBLE N/A 7/30/2020    Procedure: SIGMOIDOSCOPY, FLEXIBLE with Ablation of Polyps;  Surgeon: Stephen Gasca MD;  Location: [facility] OR         Health Maintenance Due   Topic Date Due     ASTHMA ACTION PLAN  Never done     ASTHMA CONTROL TEST  Never done     ADVANCE CARE PLANNING  Never done     ZOSTER IMMUNIZATION (1 of 2) Never done     PREVENTIVE CARE VISIT  03/05/2020     COLORECTAL CANCER SCREENING  07/31/2020     PHQ-2  01/01/2021       Current Medications:     Current Outpatient Medications   Medication Sig Dispense Refill     acetaminophen (TYLENOL) 325 MG tablet Take 2 tablets (650 mg) by mouth every 6 hours as needed for mild pain 30 tablet 0     albuterol 90 MCG/ACT inhaler Inhale 2 puffs into the lungs every 6 hours as needed.       calcium-magnesium (CALMAG) 500-250 MG TABS Take 1 tablet by mouth daily       celecoxib (CELEBREX) 100 MG capsule Take 1 capsule (100 mg) by mouth daily 90 capsule 3     docusate sodium (COLACE) 100 MG capsule Take 100 mg by mouth daily       fexofenadine (ALLEGRA) 180 MG tablet Take 180 mg by mouth daily.       MELATONIN PO Take 2 mg by mouth nightly as needed        multivitamin, therapeutic with minerals (THERA-VIT-M) TABS Take 3 tablets by mouth 2 times daily        Omega-3 Fatty Acids (OMEGA-3 FISH OIL PO) Take 2 g by mouth daily        ondansetron (ZOFRAN) 4 MG tablet Take 1 tablet (4 mg) by mouth every 8 hours as needed for nausea 60 tablet 1     triamcinolone (NASACORT) 55 MCG/ACT Inhaler Spray 2 sprays into both nostrils daily       ZOLMitriptan (ZOMIG) 5 MG tablet Take 1 tablet (5 mg) by mouth at onset of headache for migraine 90 tablet 3         Allergies:        Allergies   Allergen Reactions     Corn-Related Products Shortness Of Breath     Avoids food; ok in  tiny doses, such as in medicines  Avoids food; ok in tiny doses, such as in medicines     Cipro [Ciprofloxacin]      Reddened skin     Flagyl [Metronidazole] Nausea and Vomiting     Garlic Fatigue     GI distress     Keflex [Cephalosporins] Nausea and Vomiting     Omeprazole      PN: headache     Prilosec Otc [Omeprazole Magnesium]      migraines     Cephalexin Rash     PN: Rash, Generalized     Doxycycline Rash     blisters     Erythromycin Rash     Sulfa Drugs Rash        Social History:     Social History     Tobacco Use     Smoking status: Never Smoker     Smokeless tobacco: Never Used   Substance Use Topics     Alcohol use: Yes     Comment: 1 or 2 drinks per year       History   Drug Use No         Family History:     The patient's family history is notable for     Family History   Problem Relation Age of Onset     Hyperlipidemia Mother      Cerebrovascular Disease Mother      Cerebrovascular Disease Father      Colon Cancer Father 57         at 63     Prostate Cancer Brother 53     Prostate Cancer Maternal Uncle 65         Physical Exam:     LMP 2019   There is no height or weight on file to calculate BMI.    General Appearance: healthy and alert, no distress     HEENT: no thyromegaly, no palpable nodules or masses        Cardiovascular: regular rate and rhythm, no gallops, rubs or murmurs     Respiratory: lungs clear, no rales, rhonchi or wheezes    Musculoskeletal: extremities non tender and without edema    Skin: no lesions or rashes     Neurological: normal gait, no gross defects     Psychiatric: appropriate mood and affect                               Hematological: normal cervical, supraclavicular and inguinal lymph nodes     Gastrointestinal:       abdomen soft, non-tender, non-distended, no organomegaly or masses; laparoscopic incisions well healed with umbilical incision site with granulation tissue protruding ~1-2 mm. Thumb forceps used to easily remove small amount of granulation tissue.  No bleeding, erythema, pain during or after removal.     Genitourinary: deferred       Assessment:    Sarah Duran is a 55 year old woman with a diagnosis of stage II, grade 1 endometrial adenocarcinoma. She is here today for an acute visit.     20 minutes spent on the date of the encounter doing chart review, history and exam, documentation, and further activities as noted above.        Plan:     1.)        Stage II, grade 1 endometrial adenocarcinoma. Small amount of granulation tissue removed from umbilical laparoscopic site. Patient tolerated well. No s/s of infection. Reviewed signs and symptoms for when she should contact the clinic or seek additional care.  Discussed signs and symptoms of recurrence and to call if these should occur. Patient to contact the clinic with any questions or concerns in the interim. Patient will return for surveillance visit in August 2021 with me per her request at the [facility]. Request sent to scheduling to change upcoming appt. Discussed no need for further pap smear testing.  Discussed visits every 3 months for the first two years (5/23) then every 6 months for up to 5 years (5/26) then annually thereafter. Continue to use dilator for 3-5 minutes a multiple times per week preferred lying down to be able to reach full depth of vagina with water based lubricant.      2.) Genetic risk factors were assessed and the patient is POSITIVE for a APC mutation.  Specifically her mutation is called [id number],  consistent with a diagnosis of familial adenomatous polyposis (FAP) or attenuated FAP (AFAP)     3.) Labs and/or tests ordered include: none.     4.) Health maintenance issues addressed today include annual health maintenance and non-gynecologic issues with PCP.      REYNOLD Sorto, NP-BC  Women's Health Nurse Practitioner  Division of Gynecologic Oncology  [facility]      CC  Patient Care Team:  Ida Sandoval,  MD as PCP - General (Family Practice)  Stephen Gasca MD as MD (Gastroenterology)  Treasure Cleveland GC as Genetic Counselor (Genetic )  Pancho Samson MD as MD (Colon and Rectal Surgery)  Maria De Jesus Evans, RN as Specialty Care Coordinator (Oncology)  Keila Burger MD as Assigned Cancer Care Provider

## 2021-06-15 ENCOUNTER — ONCOLOGY VISIT (OUTPATIENT)
Dept: ONCOLOGY | Facility: CLINIC | Age: 56
End: 2021-06-15
Attending: OBSTETRICS & GYNECOLOGY
Payer: COMMERCIAL

## 2021-06-15 VITALS
RESPIRATION RATE: 18 BRPM | DIASTOLIC BLOOD PRESSURE: 74 MMHG | WEIGHT: 161.4 LBS | HEART RATE: 74 BPM | SYSTOLIC BLOOD PRESSURE: 114 MMHG | BODY MASS INDEX: 29.52 KG/M2 | TEMPERATURE: 98.1 F | OXYGEN SATURATION: 97 %

## 2021-06-15 DIAGNOSIS — C54.1 ENDOMETRIAL CANCER (H): Primary | ICD-10-CM

## 2021-06-15 PROCEDURE — G0463 HOSPITAL OUTPT CLINIC VISIT: HCPCS

## 2021-06-15 PROCEDURE — 99213 OFFICE O/P EST LOW 20 MIN: CPT | Performed by: OBSTETRICS & GYNECOLOGY

## 2021-06-15 ASSESSMENT — PAIN SCALES - GENERAL: PAINLEVEL: NO PAIN (0)

## 2021-06-15 NOTE — LETTER
6/15/2021         RE: Sarah Duran  [address] [facility] [address]        Dear Colleague,    Thank you for referring your patient, Sarah Duran, to the [facility]. Please see a copy of my visit note below.                Follow Up Notes on Referred Patient    Date: 6/15/2021       RE: Sarah Duran  : 1965  ANDREI: 6/15/2021      Sarah Duran is a 55 year old woman with a diagnosis of stage II, grade 1 endometrial adenocarcinoma. She is here today for an acute visit.     Cancer Course:     She reports she began having daily bleeding in .  She notes she had previously gone 15 months with no bleeding.  She reports the bleeding is sometimes heavy like a period but then can also be somewhat light and only like spotting.  She had an attempted EMB with her primary gynecologist, however this was unsuccessful.  She is here today for a second opinion.     21:  US Pelvis:  Uterus: Measures 8.9 x 5.8 x 6.4 cm. The endometrium is thickened at 2.9 cm. The endometrium is heterogeneous in appearance with an irregular, nodular contour of the endometrial surface. There is hypoechoic complex fluid in the endometrial cavity likely containing blood. There is vascularity of the endometrium on color imaging. The abnormal endometrium occupies the majority of the uterus. Right Ovary: Measures 2.0 x 1.0 x 1.1 cm and appears unremarkable Right Ovary Blood Flow: Present. Left Ovary: Not seen. Free Fluid: No significant free fluid.There are no suspicious adnexal masses.     2/10/21:  Hysteroscopy, D&C                 Pathology:  Grade 1 endometrial adenocarcinoma, MMR intact, p53 wild type, ER/IL +     21:  Total laparoscopic hysterectomy, bilateral salpingo-oophorectomy, bilateral sentinel lymph node dissection, cystoscopy                 Pathology:  Stage II, grade 1 endometrial adenocarcinoma, tumor size 5.6 cm, 1/13 mm myometrial invasion, +CASEY, + cervix, 0/2 sentinel  LN, no LVSI     4/26/21:  Completed brachytherapy to 3000cGy in 5 Fx to the vagina        Today Sarah comes to the clinic with recent irritation around her prior umbilical incision. She does state that the umbulical site has improved after cleaning with water and rubbing alcohol application with a q-tip. Not feeling irritated now. She noticed irritation last Tuesday and cleaned it Friday. She states that this small area inside umbilicus was erythremic without discharge. She states that now it is greatly improved without irritation or erythema, no drainage, no pain. She denies any vaginal bleeding, no changes in her bowel or bladder habits, no nausea/emesis, no lower extremity edema, and no difficulties eating or sleeping. She denies any abdominal discomfort/bloating, no fevers or chills, and no chest pain or shortness of breath. She continues to use her dilator 5 times a week in shower without vaignal bleeding or pain. She is not sexually active right now. She states that her hot flashes are minimal and rare now. She continues to work on building up her strength and stamina with [employer] cycling classes and [employer].           Review of Systems:    Systemic           no weight changes; no fever; no chills; no night sweats; no appetite changes, +mild hot flashes   Skin           no rashes, or lesions  Eye           no irritation; no changes in vision  Alise-Laryngeal           no dysphagia; no hoarseness   Pulmonary    no cough; no shortness of breath  Cardiovascular    no chest pain; no palpitations  Gastrointestinal    no diarrhea; no constipation; no abdominal pain; no changes in bowel  habits; no blood in stool  Genitourinary   no urinary frequency; no urinary urgency; no dysuria; no pain; no abnormal vaginal discharge; no abnormal vaginal bleeding  Breast    no breast discharge; no breast changes; no breast pain  Musculoskeletal    no myalgias; no arthralgias; no back pain  Psychiatric           no depressed mood;  no anxiety    Hematologic           no tender lymph nodes; no noticeable swellings or lumps   Endocrine    no hot flashes; no heat/cold intolerance         Neurological   no tremor; no numbness and tingling; no headaches; no difficulty  sleeping      Past Medical History:    Past Medical History:   Diagnosis Date     Acute deep vein thrombosis (DVT) of left lower extremity (H)      Asthma      Atypical ductal hyperplasia of right breast 10/2016    fibrocystic changes, PASH and atypical ductal hyperplasia on needle biopsy, s/p R excisional biopsy with radioactive seed localization     Endometrial cancer (H) 02/15/2021     Factor 5 Leiden mutation, heterozygous (H)     factor 5     Familial adenomatous polyposis 1988    clinical presentation; s/p total abdominal colectomy with TRACEY; confirmed APC+ 2018     GERD (gastroesophageal reflux disease)      Migraine      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    [id number]         Past Surgical History:    Past Surgical History:   Procedure Laterality Date     BIOPSY BREAST SEED LOCALIZATION Right 11/30/2016    Procedure: BIOPSY BREAST SEED LOCALIZATION;  Surgeon: Jessica Rasheed MD;  Location:  OR     C LAP,SURG,COLECTOMY,W/ANAST      for colon cancer     COLECTOMY      subtotal     COMBINED ESOPHAGOSCOPY, GASTROSCOPY, DUODENOSCOPY (EGD) WITH ARGON PLASMA COAGULATOR (APC) N/A 4/12/2018    Procedure: COMBINED ESOPHAGOSCOPY, GASTROSCOPY, DUODENOSCOPY (EGD) WITH ARGON PLASMA COAGULATOR (APC);  Esophagogastroduodenoscopy with polypectomy and polyp ablation;  Surgeon: Stephen Gasca MD;  [facility]     DILATION AND CURETTAGE, HYSTEROSCOPY DIAGNOSTIC, COMBINED N/A 2/10/2021    Procedure: HYSTEROSCOPY, DIAGNOSTIC, WITH DILATION AND CURETTAGE OF UTERUS;  Surgeon: Sandie Samaniego MD;  [place] [facility] [place]     ENDOSCOPIC RETROGRADE CHOLANGIOPANCREATOGRAPHY       ENDOSCOPIC ULTRASOUND UPPER GASTROINTESTINAL TRACT (GI) N/A 7/25/2019    Procedure:  Endoscopic Ultrasound;  Surgeon: Stephen Gasca MD;  [facility]     ESOPHAGOSCOPY, GASTROSCOPY, DUODENOSCOPY (EGD), COMBINED  11/5/2012    Procedure: COMBINED ESOPHAGOSCOPY, GASTROSCOPY, DUODENOSCOPY (EGD), BIOPSY SINGLE OR MULTIPLE;;  Surgeon: Angélica Garcia MD;  [facility] GI     ESOPHAGOSCOPY, GASTROSCOPY, DUODENOSCOPY (EGD), COMBINED  12/3/2013    Procedure: COMBINED ESOPHAGOSCOPY, GASTROSCOPY, DUODENOSCOPY (EGD);;  Surgeon: Angélica Garcia MD;  [facility]     ESOPHAGOSCOPY, GASTROSCOPY, DUODENOSCOPY (EGD), COMBINED N/A 3/28/2018    Procedure: COMBINED ESOPHAGOSCOPY, GASTROSCOPY, DUODENOSCOPY (EGD);;  Surgeon: Stephen Gasca MD;  [facility]     ESOPHAGOSCOPY, GASTROSCOPY, DUODENOSCOPY (EGD), COMBINED N/A 5/30/2019    Procedure: Esophagogastroduodenoscopy with biopsy x2 APC;  Surgeon: Stephen Gasca MD;  [facility]     ESOPHAGOSCOPY, GASTROSCOPY, DUODENOSCOPY (EGD), COMBINED N/A 7/30/2020    Procedure: ESOPHAGOGASTRODUODENOSCOPY (EGD) with polypectomy by hot snare; argonplasma coaglution ablation of polyps;  Surgeon: Stephen Gasca MD;  [facility]     ESOPHAGOSCOPY, GASTROSCOPY, DUODENOSCOPY (EGD), RESECT MUCOSA, COMBINED N/A 7/25/2019    Procedure: Esophagogastroduodenoscopy with gastric mucosal resection and clip application;  Surgeon: Stephen Gasca MD;  [facility]     EXCISE POLYP RECTUM       LAPAROSCOPIC HYSTERECTOMY TOTAL, BILATERAL SALPINGO-OOPHORECTOMY, NODE DISSECTION, COMBINED Bilateral 2/25/2021    Procedure: Laparoscopic removal of uterus, cervix, both ovaries and fallopian tubes, sentinel lymph node dissection, cystoscopy;  Surgeon: Sandie Samaniego MD;  [facility]     LAPAROSCOPIC LYSIS ADHESIONS       SIGMOIDOSCOPY FLEXIBLE       SIGMOIDOSCOPY FLEXIBLE N/A 12/7/2015    Procedure: SIGMOIDOSCOPY FLEXIBLE;  Surgeon: Mariela Bearden MD;  [facility]     SIGMOIDOSCOPY FLEXIBLE N/A 3/28/2018    Procedure:  SIGMOIDOSCOPY FLEXIBLE;  EGD/Flex Sig;  Surgeon: Stephen Gasca MD;  [facility]     SIGMOIDOSCOPY FLEXIBLE N/A 4/12/2018    Procedure: SIGMOIDOSCOPY FLEXIBLE;  Flexible Sigmoidoscopy with polyp ablation;  Surgeon: Stephen Gasca MD;  [facility]     SIGMOIDOSCOPY FLEXIBLE N/A 5/30/2019    Procedure: Flexible Sigmoidoscopy with polypectomy;  Surgeon: Stephen Gasca MD;  [facility]     SIGMOIDOSCOPY FLEXIBLE N/A 7/30/2020    Procedure: SIGMOIDOSCOPY, FLEXIBLE with Ablation of Polyps;  Surgeon: Stephen Gasca MD;  [facility]         Health Maintenance Due   Topic Date Due     ASTHMA ACTION PLAN  Never done     ASTHMA CONTROL TEST  Never done     ADVANCE CARE PLANNING  Never done     ZOSTER IMMUNIZATION (1 of 2) Never done     PREVENTIVE CARE VISIT  03/05/2020     COLORECTAL CANCER SCREENING  07/31/2020     PHQ-2  01/01/2021       Current Medications:     Current Outpatient Medications   Medication Sig Dispense Refill     acetaminophen (TYLENOL) 325 MG tablet Take 2 tablets (650 mg) by mouth every 6 hours as needed for mild pain 30 tablet 0     albuterol 90 MCG/ACT inhaler Inhale 2 puffs into the lungs every 6 hours as needed.       calcium-magnesium (CALMAG) 500-250 MG TABS Take 1 tablet by mouth daily       celecoxib (CELEBREX) 100 MG capsule Take 1 capsule (100 mg) by mouth daily 90 capsule 3     docusate sodium (COLACE) 100 MG capsule Take 100 mg by mouth daily       fexofenadine (ALLEGRA) 180 MG tablet Take 180 mg by mouth daily.       MELATONIN PO Take 2 mg by mouth nightly as needed        multivitamin, therapeutic with minerals (THERA-VIT-M) TABS Take 3 tablets by mouth 2 times daily        Omega-3 Fatty Acids (OMEGA-3 FISH OIL PO) Take 2 g by mouth daily        ondansetron (ZOFRAN) 4 MG tablet Take 1 tablet (4 mg) by mouth every 8 hours as needed for nausea 60 tablet 1     triamcinolone (NASACORT) 55 MCG/ACT Inhaler Spray 2 sprays into both nostrils daily        ZOLMitriptan (ZOMIG) 5 MG tablet Take 1 tablet (5 mg) by mouth at onset of headache for migraine 90 tablet 3         Allergies:        Allergies   Allergen Reactions     Corn-Related Products Shortness Of Breath     Avoids food; ok in tiny doses, such as in medicines  Avoids food; ok in tiny doses, such as in medicines     Cipro [Ciprofloxacin]      Reddened skin     Flagyl [Metronidazole] Nausea and Vomiting     Garlic Fatigue     GI distress     Keflex [Cephalosporins] Nausea and Vomiting     Omeprazole      PN: headache     Prilosec Otc [Omeprazole Magnesium]      migraines     Cephalexin Rash     PN: Rash, Generalized     Doxycycline Rash     blisters     Erythromycin Rash     Sulfa Drugs Rash        Social History:     Social History     Tobacco Use     Smoking status: Never Smoker     Smokeless tobacco: Never Used   Substance Use Topics     Alcohol use: Yes     Comment: 1 or 2 drinks per year       History   Drug Use No         Family History:     The patient's family history is notable for     Family History   Problem Relation Age of Onset     Hyperlipidemia Mother      Cerebrovascular Disease Mother      Cerebrovascular Disease Father      Colon Cancer Father 57         at 63     Prostate Cancer Brother 53     Prostate Cancer Maternal Uncle 65         Physical Exam:     LMP 2019   There is no height or weight on file to calculate BMI.    General Appearance: healthy and alert, no distress     HEENT: no thyromegaly, no palpable nodules or masses        Cardiovascular: regular rate and rhythm, no gallops, rubs or murmurs     Respiratory: lungs clear, no rales, rhonchi or wheezes    Musculoskeletal: extremities non tender and without edema    Skin: no lesions or rashes     Neurological: normal gait, no gross defects     Psychiatric: appropriate mood and affect                               Hematological: normal cervical, supraclavicular and inguinal lymph nodes     Gastrointestinal:       abdomen soft,  non-tender, non-distended, no organomegaly or masses; laparoscopic incisions well healed with umbilical incision site with granulation tissue protruding ~1-2 mm. Thumb forceps used to easily remove small amount of granulation tissue. No bleeding, erythema, pain during or after removal.     Genitourinary: deferred       Assessment:    Sarah Duran is a 55 year old woman with a diagnosis of stage II, grade 1 endometrial adenocarcinoma. She is here today for an acute visit.     20 minutes spent on the date of the encounter doing chart review, history and exam, documentation, and further activities as noted above.        Plan:     1.)        Stage II, grade 1 endometrial adenocarcinoma. Small amount of granulation tissue removed from umbilical laparoscopic site. Patient tolerated well. No s/s of infection. Reviewed signs and symptoms for when she should contact the clinic or seek additional care.  Discussed signs and symptoms of recurrence and to call if these should occur. Patient to contact the clinic with any questions or concerns in the interim. Patient will return for surveillance visit in August 2021 with me per her request at the [facility]. Request sent to scheduling to change upcoming appt. Discussed no need for further pap smear testing.  Discussed visits every 3 months for the first two years (5/23) then every 6 months for up to 5 years (5/26) then annually thereafter.      2.) Genetic risk factors were assessed and the patient is POSITIVE for a APC mutation.  Specifically her mutation is called [id number],  consistent with a diagnosis of familial adenomatous polyposis (FAP) or attenuated FAP (AFAP)     3.) Labs and/or tests ordered include: none.     4.) Health maintenance issues addressed today include annual health maintenance and non-gynecologic issues with PCP.      REYNOLD Sorto, [facility]-BC  Women's Health Nurse Practitioner  Division of Gynecologic  "Oncology  [facility]      CC  Patient Care Team:  Ida Sandoval MD as PCP - General (Family Practice)  Stephen Gasca MD as MD (Gastroenterology)  Treasure Cleveland GC as Genetic Counselor (Genetic )  Pancho Samson MD as MD (Colon and Rectal Surgery)  Maria De Jesus Evans, RN as Specialty Care Coordinator (Oncology)  Keila Burger MD as Assigned Cancer Care Provider      Oncology Rooming Note    Pam 15, 2021 9:28 AM   Sarah Duran is a 55 year old female who presents for:    Chief Complaint   Patient presents with     Oncology Clinic Visit     Endometrial cancer (H)     Initial Vitals: /74 (BP Location: Right arm, Patient Position: Sitting, Cuff Size: Adult Large)   Pulse 74   Temp 98.1  F (36.7  C) (Oral)   Resp 18   Wt 73.2 kg (161 lb 6.4 oz)   LMP 05/01/2019   SpO2 97%   BMI 29.52 kg/m   Estimated body mass index is 29.52 kg/m  as calculated from the following:    Height as of 2/25/21: 1.575 m (5' 2\").    Weight as of this encounter: 73.2 kg (161 lb 6.4 oz). Body surface area is 1.79 meters squared.  No Pain (0) Comment: Data Unavailable   Patient's last menstrual period was 05/01/2019.  Allergies reviewed: Yes  Medications reviewed: Yes    Medications: Medication refills not needed today.  Pharmacy name entered into Voxie:    [facility] [place], [place] - [address]  [facility] - USE FOR MAILING ONLY - [place]  [facility]  FOR [facility], [place] - [address]  [facility] [place], [place] - [address] [facility]CIAL[facility] - [place], [place] - [address]  [facility] [place], [place] - [address]  [facility] [place], [place] - [address] PHARMACY [place], [place] - [phone]    Clinical concerns: no     Ema Delgado CMA                  Again, thank you " for allowing me to participate in the care of your patient.        Sincerely,        REYNOLD oMore CNP

## 2021-06-15 NOTE — PROGRESS NOTES
"Oncology Rooming Note    Pam 15, 2021 9:28 AM   Sarah Duran is a 55 year old female who presents for:    Chief Complaint   Patient presents with     Oncology Clinic Visit     Endometrial cancer (H)     Initial Vitals: /74 (BP Location: Right arm, Patient Position: Sitting, Cuff Size: Adult Large)   Pulse 74   Temp 98.1  F (36.7  C) (Oral)   Resp 18   Wt 73.2 kg (161 lb 6.4 oz)   LMP 05/01/2019   SpO2 97%   BMI 29.52 kg/m   Estimated body mass index is 29.52 kg/m  as calculated from the following:    Height as of 2/25/21: 1.575 m (5' 2\").    Weight as of this encounter: 73.2 kg (161 lb 6.4 oz). Body surface area is 1.79 meters squared.  No Pain (0) Comment: Data Unavailable   Patient's last menstrual period was 05/01/2019.  Allergies reviewed: Yes  Medications reviewed: Yes    Medications: Medication refills not needed today.  Pharmacy name entered into Tekora:    [facility] - [place], [place] - [address]  [facility] [place]  [facility]  FOR [facility], [place] - [address]  [facility] [place], [place] - [address] [facility]/SPECIALTY [facility]ARMACY - [place] - [address] [facility] [place] - [address]  [facility] [place], [place] - [address] PHARMACY [place], [place] - [address] [facility] [phone]    Clinical concerns: no     Ema Delgado CMA              "

## 2021-07-01 ENCOUNTER — PATIENT OUTREACH (OUTPATIENT)
Dept: GASTROENTEROLOGY | Facility: CLINIC | Age: 56
End: 2021-07-01

## 2021-07-01 ENCOUNTER — PREP FOR PROCEDURE (OUTPATIENT)
Dept: GASTROENTEROLOGY | Facility: CLINIC | Age: 56
End: 2021-07-01

## 2021-07-01 DIAGNOSIS — D13.91 FAP (FAMILIAL ADENOMATOUS POLYPOSIS): Primary | ICD-10-CM

## 2021-07-01 NOTE — TELEPHONE ENCOUNTER
Patient due for repeat procedure with Luisa  (EGD, flex sig, ERCP w/ Pradeep)    Please assist in scheduling:     Procedure/Imaging/Clinic: EGD, flex sig, ERCP  Physician: Luisa  Timing: August  Procedure length:90 min  Anesthesia:general  Dx: FAP  Tier:3  [facility]     Called patient, left message. Orders placed.     ML

## 2021-07-29 ENCOUNTER — TELEPHONE (OUTPATIENT)
Dept: GASTROENTEROLOGY | Facility: CLINIC | Age: 56
End: 2021-07-29

## 2021-07-29 DIAGNOSIS — Z11.59 ENCOUNTER FOR SCREENING FOR OTHER VIRAL DISEASES: ICD-10-CM

## 2021-07-29 NOTE — TELEPHONE ENCOUNTER
Spoke to patient in regards to scheduling procedure. Informed patient she is scheduled with Dr. Feránndez and Dr. Gasca on 8/19 per her availability. Informed patient she will need an updated pre-op physical within 30 days of her procedure and a COVID-19 test within 96-72 hours of procedure date. Patient stated she is going to have this done locally. Informed patient she will need a  and someone to monitor her for 24 hours after the procedure. Informed patient all scheduling details will be sent to the address listed on Epic. Address confirmed on this call. Patient had questions about which prep to do for her flex sig. Message sent to RNCC to follow up.

## 2021-07-29 NOTE — TELEPHONE ENCOUNTER
LVM for patient in regards to scheduling procedure with Ricardo. Left direct line for patient to call to go over scheduling details.

## 2021-08-06 ENCOUNTER — HOSPITAL ENCOUNTER (OUTPATIENT)
Dept: ULTRASOUND IMAGING | Facility: CLINIC | Age: 56
Discharge: HOME | End: 2021-08-06
Attending: CLINICAL NURSE SPECIALIST | Admitting: CLINICAL NURSE SPECIALIST
Payer: COMMERCIAL

## 2021-08-06 DIAGNOSIS — D13.91 FAP (FAMILIAL ADENOMATOUS POLYPOSIS): ICD-10-CM

## 2021-08-06 DIAGNOSIS — E04.1 THYROID NODULE: ICD-10-CM

## 2021-08-06 PROCEDURE — 76536 US EXAM OF HEAD AND NECK: CPT

## 2021-08-07 ENCOUNTER — E-VISIT (OUTPATIENT)
Dept: URGENT CARE | Facility: CLINIC | Age: 56
End: 2021-08-07
Payer: COMMERCIAL

## 2021-08-07 DIAGNOSIS — Z20.822 CLOSE EXPOSURE TO 2019 NOVEL CORONAVIRUS: Primary | ICD-10-CM

## 2021-08-07 PROCEDURE — 99421 OL DIG E/M SVC 5-10 MIN: CPT | Performed by: PHYSICIAN ASSISTANT

## 2021-08-08 NOTE — PATIENT INSTRUCTIONS
"  Dear Sarah Duran,    Based on your exposure to COVID-19 (coronavirus), we would like to test you for this virus. I have placed an order for this test.The best time for testing is 5-7 days after the exposure.    How to schedule:  Go to your [facility] home page and scroll down to the section that says  You have an appointment that needs to be scheduled  and click the large green button that says  Schedule Now  and follow the steps to find the next available opening.     If you are unable to complete these [facility] scheduling steps, please call [phone] to schedule your testing.     Return to work/school/ guidance:   For people with high risk exposures outside the home    Please let your workplace manager and staffing office know when your quarantine ends.     We can not give you an exact date as it depends on the information below. You can calculate this on your own or work with your manager/staffing office to calculate this. (For example if you were exposed on 10/4, you would have to quarantine for 14 full days. That would be through 10/18. You could return on 10/19.)    Quarantine Guidelines:  Patients (\"contacts\") who have been in close prolonged contact of an infected person(s) (within six feet for at least 15 minutes within a 24 hour period), and remain asymptomatic should enter quarantine based on the following options:    14-day quarantine period (this remains the CDC recommendation for the greatest protection against spread of COVID-19) OR    Minimum 7-day quarantine with negative RT-PCR test collected on day 5 or later OR    10-day quarantine with no test  Quarantine Guideline exceptions are as follows:    People who have been fully vaccinated do not need to quarantine if the exposure was at least 2 weeks after the last vaccination. This includes vaccinated health care workers.    Not fully vaccinated and unvaccinated Individuals who work in health care, congregate care, or congregate living " should be off work for 14 days from their last date of exposure. Community activities for this group can be resumed based on options above. Fully vaccinated individuals in this group do not need to quarantine from work after exposure.    Not fully vaccinated and unvaccinated people whose high-risk exposure was a household member should always quarantine for 14 days from their last date of exposure. Fully vaccinated people in this category do not need to quarantine.    Not fully vaccinated or unvaccinated residents of congregate care and congregate living settings should always quarantine for 14 days from their last date of exposure. Fully vaccinated residents do not need to quarantine.  Note: If you have ongoing exposure to the covid positive person, this quarantine period may be more than 14 days. (For example, if you are continued to be exposed to your child who tested positive and cannot isolate from them, then the quarantine of 7-14 days can't start until your child is no longer contagious. This is typically 10 days from onset of the child's symptoms. So the total duration may be 17-24 days in this case.)    You should continue symptom monitoring until day 14 post-exposure. If you develop signs or symptoms of COVID-19, isolate and get tested (even if you have been tested already).    How to quarantine:   Stay home and away from others. Don't go to school or anywhere else. Generally quarantine means staying home from work but there are some exceptions to this. Please contact your workplace.  No hugging, kissing or shaking hands.  Don't let anyone visit.  Cover your mouth and nose with a mask, tissue or washcloth to avoid spreading germs.  Wash your hands and face often. Use soap and water.    What are the symptoms of COVID-19?  The most common symptoms are cough, fever and trouble breathing. Less common symptoms include headache, body aches, fatigue (feeling very tired), chills, sore throat, stuffy or runny nose,  diarrhea (loose poop), loss of taste or smell, belly pain, and nausea or vomiting (feeling sick to your stomach or throwing up).  After 14 days, if you have still don't have symptoms, you likely don't have this virus.  If you develop symptoms, follow these guidelines.  If you're normally healthy: Please start another eVisit.  If you have a serious health problem (like cancer, heart failure, an organ transplant or kidney disease): Call your specialty clinic. Let them know that you might have COVID-19.    Where can I get more information?  [facility] - About COVID-19: [email or web address]  CDC - What to Do If You're Sick: www.cdc.gov/coronavirus/2019-ncov/about/steps-when-sick.html  CDC - Ending Home Isolation: www.cdc.gov/coronavirus/2019-ncov/hcp/disposition-in-home-patients.html  CDC - Caring for Someone: www.cdc.gov/coronavirus/2019-ncov/if-you-are-sick/care-for-someone.html  [facility] clinical trials (COVID-19 research studies): clinicalaffairs.[facility]-clinical-trials  Below are the COVID-19 hotlines at the [place] Department of [facility] ([facility]). Interpreters are available.  For health questions: Call 228-281-1210 or 1-622.343.8171 (7 a.m. to 7 p.m.)  For questions about schools and childcare: Call 102-524-1713 or 1-380.551.8534 (7 a.m. to 7 p.m.)

## 2021-08-10 ENCOUNTER — PATIENT OUTREACH (OUTPATIENT)
Dept: GASTROENTEROLOGY | Facility: CLINIC | Age: 56
End: 2021-08-10

## 2021-08-10 DIAGNOSIS — Z11.59 ENCOUNTER FOR SCREENING FOR OTHER VIRAL DISEASES: Primary | ICD-10-CM

## 2021-08-10 NOTE — PROGRESS NOTES
Called patient to update that her  tested positive for COVID on 8/7, he was symptomatic prior to that on 8/4. Sarah has tested negative since then and will test again within 4 days of procedure (8/19 procedure date)  Sarah has her pre op exam tomorrow, will call if she needs to reschedule this as well.    Also discussed her prep, states that in the past she has had to do a golytely prep for a flex sigmoidoscopy. Stated she would prefer to do the miralax prep. As It Is message sent with prep instructions, will not do the enema prep as previously instructed    Radha Gamble, RN, BSN,   [facility]  Care coordinator

## 2021-08-11 ENCOUNTER — TELEPHONE (OUTPATIENT)
Dept: GASTROENTEROLOGY | Facility: CLINIC | Age: 56
End: 2021-08-11

## 2021-08-11 ENCOUNTER — PATIENT OUTREACH (OUTPATIENT)
Dept: GASTROENTEROLOGY | Facility: CLINIC | Age: 56
End: 2021-08-11

## 2021-08-11 NOTE — TELEPHONE ENCOUNTER
Patients PCP called- patients spouse tested positive for covid on Sunday- they cannot isolate from each other at home. While she'll get tested prior to procedure, she'll have ongoing exposure to covid so its best for her to reschedule 2 weeks per PCP. Message sent to scheduling.    ML

## 2021-08-11 NOTE — TELEPHONE ENCOUNTER
Spoke to patient in regards to rescheduling procedure. Informed patient she is scheduled with Dr. Fernández and Dr. Gasca on 9/2/2021. Informed patient she will need an updated pre-op physical within 30 days of her procedure and a COVID-19 test within 96-72 hours of procedure date. Patient stated she is going to have this done locally. Informed patient she will need a  and someone to monitor her for 24 hours after the procedure. Informed patient all scheduling details will be sent to the address listed on Epic. Address confirmed on this call.

## 2021-08-12 ENCOUNTER — VIRTUAL VISIT (OUTPATIENT)
Dept: ONCOLOGY | Facility: CLINIC | Age: 56
End: 2021-08-12
Attending: CLINICAL NURSE SPECIALIST
Payer: COMMERCIAL

## 2021-08-12 DIAGNOSIS — D13.91 FAP (FAMILIAL ADENOMATOUS POLYPOSIS): ICD-10-CM

## 2021-08-12 PROCEDURE — 99215 OFFICE O/P EST HI 40 MIN: CPT | Mod: 95 | Performed by: CLINICAL NURSE SPECIALIST

## 2021-08-12 PROCEDURE — 999N001193 HC VIDEO/TELEPHONE VISIT; NO CHARGE

## 2021-08-12 RX ORDER — CELECOXIB 100 MG/1
100 CAPSULE ORAL DAILY
Qty: 90 CAPSULE | Refills: 3 | Status: SHIPPED | OUTPATIENT
Start: 2021-08-12 | End: 2022-08-24

## 2021-08-12 NOTE — PROGRESS NOTES
"Sarah is a 55 year old who is being evaluated via a billable video visit.      How would you like to obtain your AVS? MyChart  If the video visit is dropped, the invitation should be resent by: Text to cell phone: [phone]  Will anyone else be joining your video visit? No     Vitals - Patient Reported  Weight (Patient Reported): 68 kg (150 lb)  Height (Patient Reported): 158.8 cm (5' 2.5\")  BMI (Based on Pt Reported Ht/Wt): 27  Pain Score: No Pain (0)    Christina SANTIZO      Video Start Time: 0930  Video-Visit Details    Type of service:  Video Visit    Video End Time:0952    Originating Location (pt. Location): Home    Distant Location (provider location):  [facility]     Platform used for Video Visit: Jay  "

## 2021-08-12 NOTE — PROGRESS NOTES
Oncology Risk Management Consultation:  Date on this visit: 2021    Sarah Duran returns to the Cancer Risk Management Program today to follow up on her cancer screening and surveillance plan. She requires high risk screening and surveillance to reduce her risk of cancer secondary to  a history of atypical ductal hyperplasia (ADH). She is considered to be at high risk for breast cancer, with a risk of up to 35%.     She has a history of Stage II, Grade I endometrial adenocarcinoma, diagnosed in 2020; followed by Dr. Sandie Lezama.    She also carries an APC+ genetic mutation, consistent with a diagnosis of Familial Adenomatous Polyposis (FAP) or attenuated FAP (AFAP) with a history of a thyroid nodule and gastric polyps.     Primary Physician: Ida Sandoval MD     History Of Present Illness:  Ms. Duran is a very pleasant  55 year old female who presents with a personal history of ADH and a likely attenuated version of Familial Adenomatous Polyposis. She is s/p right excisional breast biopsy on 2016. She is also s/p ileocolonic anastomosis in .     Genetic Testin2018: POSITIVE for a APC mutation.  Specifically her mutation is called [id number],  consistent with a diagnosis of familial adenomatous polyposis (FAP) or attenuated FAP (AFAP). The mutation was identified using [place] [employer] testing through [employer].  The testing was done because of her personal history of colonic polyposis and family history of colon cancer.     Of note, Sarah tested negative for mutations in the following genes: BMPR1A, CDH1, CHEK2, MLH1, MSH2, MSH6, MUTYH, PMS2, POLD1, POLE, PTEN, SMAD4, STK11 and TP53 (sequencing and deletion/duplication); EPCAM and GREM1 (deletion/duplication only).        Pertinent history:  Attenuated Familial adenomatous polyposis, s/p ileal rectal anastomosis in May 1988. Confirmed with APC testing (see below)  Subsequent laparotomy in  for bowel  obstruction from adhesions.  Taking rabeprazole 20 mg EC BID for gastric polyps.  Menarche at 12.  Nulliparous.  Menopause at 50.  Breast density: scattered fibroglandular densities  Hx of THBSO for Stage II, Grade I  endometrial cancer (see details below)    21:  US Pelvis:  Uterus: Measures 8.9 x 5.8 x 6.4 cm. The endometrium is thickened at 2.9 cm. The endometrium is heterogeneous in appearance with an irregular, nodular contour of the endometrial surface. There is hypoechoic complex fluid in the endometrial cavity likely containing blood. There is vascularity of the endometrium on color imaging. The abnormal endometrium occupies the majority of the uterus. Right Ovary: Measures 2.0 x 1.0 x 1.1 cm and appears unremarkable Right Ovary Blood Flow: Present. Left Ovary: Not seen. Free Fluid: No significant free fluid.There are no suspicious adnexal masses.     2/10/21:  Hysteroscopy, D&C                 Pathology:  Grade 1 endometrial adenocarcinoma, MMR intact, p53 wild type, ER/MS +     21:  Total laparoscopic hysterectomy, bilateral salpingo-oophorectomy, bilateral sentinel lymph node dissection, cystoscopy                 Pathology:  Stage II, grade 1 endometrial adenocarcinoma, tumor size 5.6 cm, 1/13 mm myometrial invasion, +CASEY, + cervix, 0/2 sentinel LN,        21:  Completed brachytherapy to 3000cGy in 5 Fx to the vagina     Thyroid Screenin2019 - FNA biopsy of thyroid, for solid appearing nodule in isthmus, biopsy non diagnostic; cyst fluid only.   2020- US of the thyroid: Previously biopsied isthmus nodule is no longer present. There are numerous additional colloid cysts and a benign complex cyst   left lobe, not appreciably changed from previous.   2021- US of thyroid; no change in nodules     GI screening history (see chart for further details)  2005 - Small bowel enteroscopy, one tubular adenoma removed from partial anal verge.  2005 - adenomatous polyp removed  from rectum  2/28/2005 - tubular adenoma, duodenum.  11/16/2011 - fundic gland polyps in stomach, tubular adenoma removed from rectum.  11/5/2012 - 1 tubulovillous adenoma, rectum  7/8/2013 - 1 tubulovillous adenoma, rectum  12/3/2013 - 1 tubulovillous adenoma, rectum  7/21/2014 - 1 tubular adenoma and hyperplastic polyp, rectum  1/20/2015 - 1 tubular adenoma and hyperplastic polyp, rectum  12/7/2015 - 1 tubular adenoma, rectum  6/6/2016 - 1 fundic gland polyp with focal adenomatous changes  3/20/2017 - 2 tubular adenomas removed from rectum  4/12/2017 - colonoscopy and EGD, one fundic gland gastric polyp with focal low grade dysplasia removed, multiple duodenal polyps and multiple rectal polyps.  7/25/2019 - EGD: Two previously reported polyps (6.2 mm and 5.3mm) in the antrum were evaluate by EUS, which did not demonstrate deep layer involvement, and then they were  removed by EMR by using a band ligator and a snare. The defect was then closed with hemoclips. Innumerous fundic gland polyps in the gastric body,  fundus, and cardia. Consistent with diagnosis of FAP. A large fundic gland polyp in the gastric body, which has been previously resected and recent biopsied. Recommendation: Use Aciphex (rabeprazole) 20 mg PO BID for 1 month.  Repeat the upper endoscopy in 1 year for surveillance.      7/30/2020- EGD and flexible sigmoidoscopy:  15-20 duodenal polyps, measuring 1-3 mm, all of which were ablated using argon plasma coagulation.    - Innumerable fundic gland polyps in a confluent carpet-like distribution throughout the cardia, fundus, and gastric body. Antral-sparing present. A large 2-3   semi-pedunculated polyp in the proximal gastric body  (along the lesser curvature) was resected.  - Suspected very small adenomas flanking the pancreaticobiliary orifices at the prior ampullectomy site.  - Normal esophagus. Pathology: Fundic gland polyp with adenomatous change (low grade dysplasia). Negative for intestinal  metaplasia, high-grade dysplasia or malignancy. Recommendation: Schedule for flexible sigmoidoscopy (along with EGD and ERCP) with Dr. Fernández in 1-year.      Breast Screening history:   11/28/2016 - R excisional breast biopsy with seed localization, Fibrocystic tissue with ADH on pathology.  4/24/2017 - R breast mammogram, [email or web address].  5/25/2018 - Breast MRI, BiRads2.  11/8/2018 - Screening tomosynthesis mammogram, BiRads1  6/10/2019 - Breast MRI, BiRads2.  12/20/2019- Screening tomosynthesis mammogram, BiRads1  2/3/2021- Breast MRI, BiRads1     At this visit, she denies asymmetry, lumps, masses, thickening, pain, nipple discharge and skin changes in her breasts. She acknowledges fatigue, which she says makes it harder for her to achieve her prior exercise goals.  She denies vaginal bleeding.  She denies nausea, vomiting, discomfort after eating, abdominal pain, bloating, heartburn, constipation,  diarrhea and early satiety.      Past Medical/Surgical History:  Past Medical History:   Diagnosis Date     Acute deep vein thrombosis (DVT) of left lower extremity (H)      Asthma      Atypical ductal hyperplasia of right breast 10/2016    fibrocystic changes, PASH and atypical ductal hyperplasia on needle biopsy, s/p R excisional biopsy with radioactive seed localization     Endometrial cancer (H) 02/15/2021     Factor 5 Leiden mutation, heterozygous (H)     factor 5     Familial adenomatous polyposis 1988    clinical presentation; s/p total abdominal colectomy with TRACEY; confirmed APC+ 2018     GERD (gastroesophageal reflux disease)      Migraine      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    [id number]     Past Surgical History:   Procedure Laterality Date     BIOPSY BREAST SEED LOCALIZATION Right 11/30/2016    Procedure: BIOPSY BREAST SEED LOCALIZATION;  Surgeon: Jessica Rasheed MD;  Location: [facility] OR     C LAP,SURG,COLECTOMY,W/ANAST      for colon cancer     COLECTOMY      subtotal     COMBINED  ESOPHAGOSCOPY, GASTROSCOPY, DUODENOSCOPY (EGD) WITH ARGON PLASMA COAGULATOR (APC) N/A 4/12/2018    Procedure: COMBINED ESOPHAGOSCOPY, GASTROSCOPY, DUODENOSCOPY (EGD) WITH ARGON PLASMA COAGULATOR (APC);  Esophagogastroduodenoscopy with polypectomy and polyp ablation;  Surgeon: Stephen Gasca MD;  Location: [facility] OR     DILATION AND CURETTAGE, HYSTEROSCOPY DIAGNOSTIC, COMBINED N/A 2/10/2021    Procedure: HYSTEROSCOPY, DIAGNOSTIC, WITH DILATION AND CURETTAGE OF UTERUS;  Surgeon: Sandie Samaniego MD;  Location: [facility] OR     ENDOSCOPIC RETROGRADE CHOLANGIOPANCREATOGRAPHY       ENDOSCOPIC ULTRASOUND UPPER GASTROINTESTINAL TRACT (GI) N/A 7/25/2019    Procedure: Endoscopic Ultrasound;  Surgeon: Stephen Gasca MD;  Location: [facility]     ESOPHAGOSCOPY, GASTROSCOPY, DUODENOSCOPY (EGD), COMBINED  11/5/2012    Procedure: COMBINED ESOPHAGOSCOPY, GASTROSCOPY, DUODENOSCOPY (EGD), BIOPSY SINGLE OR MULTIPLE;;  Surgeon: Angélica Garcia MD;  Location: [facility] GI     ESOPHAGOSCOPY, GASTROSCOPY, DUODENOSCOPY (EGD), COMBINED  12/3/2013    Procedure: COMBINED ESOPHAGOSCOPY, GASTROSCOPY, DUODENOSCOPY (EGD);;  Surgeon: Angélica Garcia MD;  Location: [facility]     ESOPHAGOSCOPY, GASTROSCOPY, DUODENOSCOPY (EGD), COMBINED N/A 3/28/2018    Procedure: COMBINED ESOPHAGOSCOPY, GASTROSCOPY, DUODENOSCOPY (EGD);;  Surgeon: Stephen Gasca MD;  Location: [facility]     ESOPHAGOSCOPY, GASTROSCOPY, DUODENOSCOPY (EGD), COMBINED N/A 5/30/2019    Procedure: Esophagogastroduodenoscopy with biopsy x2 APC;  Surgeon: Stephen Gasca MD;  Location: [facility]     ESOPHAGOSCOPY, GASTROSCOPY, DUODENOSCOPY (EGD), COMBINED N/A 7/30/2020    Procedure: ESOPHAGOGASTRODUODENOSCOPY (EGD) with polypectomy by hot snare; argonplasma coaglution ablation of polyps;  Surgeon: Stephen Gasca MD;  Location: [facility]     ESOPHAGOSCOPY, GASTROSCOPY, DUODENOSCOPY (EGD), RESECT MUCOSA, COMBINED N/A 7/25/2019    Procedure: Esophagogastroduodenoscopy with  gastric mucosal resection and clip application;  Surgeon: Stephen Gasca MD;  Location: [facility]     EXCISE POLYP RECTUM       LAPAROSCOPIC HYSTERECTOMY TOTAL, BILATERAL SALPINGO-OOPHORECTOMY, NODE DISSECTION, COMBINED Bilateral 2/25/2021    Procedure: Laparoscopic removal of uterus, cervix, both ovaries and fallopian tubes, sentinel lymph node dissection, cystoscopy;  Surgeon: Sandie Samaniego MD;  Location: [facility]     LAPAROSCOPIC LYSIS ADHESIONS       SIGMOIDOSCOPY FLEXIBLE       SIGMOIDOSCOPY FLEXIBLE N/A 12/7/2015    Procedure: SIGMOIDOSCOPY FLEXIBLE;  Surgeon: Mariela Bearden MD;  Location: [facility]     SIGMOIDOSCOPY FLEXIBLE N/A 3/28/2018    Procedure: SIGMOIDOSCOPY FLEXIBLE;  EGD/Flex Sig;  Surgeon: Stephen Gasca MD;  Location: [facility]     SIGMOIDOSCOPY FLEXIBLE N/A 4/12/2018    Procedure: SIGMOIDOSCOPY FLEXIBLE;  Flexible Sigmoidoscopy with polyp ablation;  Surgeon: Stephen Gasca MD;  Location: [facility]     SIGMOIDOSCOPY FLEXIBLE N/A 5/30/2019    Procedure: Flexible Sigmoidoscopy with polypectomy;  Surgeon: Stephen Gasca MD;  Location: [facility]     SIGMOIDOSCOPY FLEXIBLE N/A 7/30/2020    Procedure: SIGMOIDOSCOPY, FLEXIBLE with Ablation of Polyps;  Surgeon: Stephen Gasca MD;  Location: [facility]       Allergies:  Allergies as of 08/12/2021 - Reviewed 06/15/2021   Allergen Reaction Noted     Corn-related products Shortness Of Breath 07/29/2020     Cipro [ciprofloxacin]  02/24/2005     Flagyl [metronidazole] Nausea and Vomiting 02/24/2005     Garlic Fatigue 07/29/2020     Keflex [cephalosporins] Nausea and Vomiting 02/24/2005     Omeprazole  04/29/2010     Prilosec otc [omeprazole magnesium]  01/28/2012     Cephalexin Rash 06/14/2004     Doxycycline Rash 04/12/2018     Erythromycin Rash 02/24/2005     Sulfa drugs Rash 02/24/2005       Current Medications:  Current Outpatient Medications   Medication Sig Dispense Refill     acetaminophen (TYLENOL) 325 MG tablet Take 2 tablets (650  mg) by mouth every 6 hours as needed for mild pain (Patient not taking: Reported on 6/15/2021) 30 tablet 0     albuterol 90 MCG/ACT inhaler Inhale 2 puffs into the lungs every 6 hours as needed.       calcium-magnesium (CALMAG) 500-250 MG TABS Take 1 tablet by mouth daily       celecoxib (CELEBREX) 100 MG capsule Take 1 capsule (100 mg) by mouth daily 90 capsule 3     docusate sodium (COLACE) 100 MG capsule Take 100 mg by mouth daily       fexofenadine (ALLEGRA) 180 MG tablet Take 180 mg by mouth daily.       MELATONIN PO Take 2 mg by mouth nightly as needed        multivitamin, therapeutic with minerals (THERA-VIT-M) TABS Take 3 tablets by mouth 2 times daily        Omega-3 Fatty Acids (OMEGA-3 FISH OIL PO) Take 2 g by mouth daily        ondansetron (ZOFRAN) 4 MG tablet Take 1 tablet (4 mg) by mouth every 8 hours as needed for nausea (Patient not taking: Reported on 6/15/2021) 60 tablet 1     triamcinolone (NASACORT) 55 MCG/ACT Inhaler Spray 2 sprays into both nostrils daily       ZOLMitriptan (ZOMIG) 5 MG tablet Take 1 tablet (5 mg) by mouth at onset of headache for migraine 90 tablet 3        Family History:  Family History   Problem Relation Age of Onset     Hyperlipidemia Mother      Cerebrovascular Disease Mother      Cerebrovascular Disease Father      Colon Cancer Father 57         at 63     Prostate Cancer Brother 53     Prostate Cancer Maternal Uncle 65       Social History:  Social History     Socioeconomic History     Marital status:      Spouse name: Not on file     Number of children: 2     Years of education: Not on file     Highest education level: Not on file   Occupational History     Occupation:      Employer: [employer]   Tobacco Use     Smoking status: Never Smoker     Smokeless tobacco: Never Used   Substance and Sexual Activity     Alcohol use: Yes     Comment: 1 or 2 drinks per year     Drug use: No     Sexual activity: Not on file     Comment: denies pregnancy    Other Topics Concern     Parent/sibling w/ CABG, MI or angioplasty before 65F 55M? Not Asked   Social History Narrative    Two adopted daughters, ages 13 and 15     Social Determinants of Health     Financial Resource Strain:      Difficulty of Paying Living Expenses:    Food Insecurity:      Worried About Running Out of Food in the Last Year:      Ran Out of Food in the Last Year:    Transportation Needs:      Lack of Transportation (Medical):      Lack of Transportation (Non-Medical):    Physical Activity:      Days of Exercise per Week:      Minutes of Exercise per Session:    Stress:      Feeling of Stress :    Social Connections:      Frequency of Communication with Friends and Family:      Frequency of Social Gatherings with Friends and Family:      Attends [employer] Services:      Active Member of Clubs or Organizations:      Attends Club or Organization Meetings:      Marital Status:    Intimate Partner Violence:      Fear of Current or Ex-Partner:      Emotionally Abused:      Physically Abused:      Sexually Abused:        Physical Exam:  [facility] 05/01/2019   GENERAL: Healthy, alert and no distress  EYES: Eyes grossly normal to inspection.  No discharge or erythema, or obvious scleral/conjunctival abnormalities.  RESP: No audible wheeze, cough, or visible cyanosis.  No visible retractions or increased work of breathing.    SKIN: Visible skin clear. No significant rash, abnormal pigmentation or lesions.  NEURO: Cranial nerves grossly intact.  Mentation and speech appropriate for age.  PSYCH: Mentation appears normal, affect normal/bright, judgement and insight intact, normal speech and appearance well-groomed.    Laboratory/Imaging Studies  No results found for any visits on 08/12/21.    ASSESSMENT  We reviewed her interval history; her  has been ill with CoVid, even though both he and Sarah are vaccinated.  Thus far, she has tested negative for CoVid.  She is wondering about her inability to achieve  her athletic goals since her treatment and surgery. We discussed that her surgery was just completed in April and that she has the stress of her 's illness and concerns around CoVid.  She has been counseled that it can take a year to fully recover from cancer treatment. We discussed her continued physical therapy with the dilator and the importance of being diligent about using it in order to improve her recovery and avoid longer term complications.  She states she has been using it in the shower but is going to try to use it at other times.     We also discussed that she is continuing on Celebrex rather than sulindac (for regression of polyps) per Dr. Gasca's recommendation. I have renewed her prescription today.  She has no breast or GI complaints today: we will proceed with her plan as follows.    Individualized Surveillance Plan for women  With 20% or greater lifetime risk of breast cancer   Per NCCN Breast Cancer Screening and Diagnosis Guidelines Version 1.2021   Recommended screening Test or procedure Last done Next Scheduled    Clinical encounter Clinical exam every 6-12 months.   Refer to genetic counseling if not already done.  Consider risk reduction strategies.   Exam deferred   February 11, 2022   However, some family histories with breast cancers at a very young age, may warrant screening starting earlier.    *May begin at age 40 if breast cancers in the family occur at later ages.    Annual mammogram beginning 10 years younger than the earliest breast cancer in the family but not prior to age 30.    Recommend annual breast MRI to begin 10 years younger than the earliest breast cancer in the family but not prior to age 25.    Breast MRIs are preferably done on day 7-15 of the menstrual cycle in premenopausal women.   See below   See below   Breast screening for patients at high risk due to thoracic radiation between the ages of 10-30   Annual clinical exam beginning 8 years after radiation  therapy.    Annual screening mammogram beginning at age 30 or 8 years after radiation therapy    Annual breast MRI, beginning at age 25 or 8 years after radiation therapy.       NA     NA   Women who have a lifetime risk of >20% based on history of LCIS or ADH/ALH Annual screening mammogram beginning at age of LCIS or ADH/ALH but not prior to age 30.    Consider annual MRI to begin at age of diagnosis of LCIS or ADH/ALH but not prior to age 25.    Consider risk reducing strategies. 12/20/2019- Screening tomosynthesis mammogram, BiRads1    2/3/2021- Breast MRI, BiRads1 9/10/2021   Mammogram    Breast MRI February 2022 after exam.     Recommend risk reducing strategies for women with 1.7% 5 year risk of breast cancer.       Individualized Surveillance Plan for Familial Adenomatous Polyposis                                        Based on NCCN Guidelines Version 1.2021   Type of Screening  Recommendation  Last Done  Next Due    Recommendations for Children    Colon Cancer Screening  Flexible sigmoidoscopy or colonoscopy every 12 months beginning at age 10-15.       See below   See below   Hepatoblastoma  Consider liver palpation, abdominal ultrasound, and measurement of AFP every 3-6 months, during first 5 years of life.    NA   NA   Thyroid Cancer Screening  Ultrasound at baseline starting in teenage years.  If normal, consider repeating every 2-5 years and if abnormal, refer to thyroid specialist. [place] interval screenings for families with thyroid cancer. 8/6/2021- US of thyroid; no change in nodules   August 2022   Recommendations for Adults    Colon Cancer Screening  Colectomy with ileorectal anastomosis (TRACEY)  recommended, then endoscopic evaluation of the rectum every 6-12 months, depending on the polyp burden.     If ileo proctocolectomy with ileal pouch anal anastomosis, endoscopic surveillance of ileal pouch every 1-3 years depending on polyp burden.    Surveillance frequency should be increased to every 6  months for large, flat polyps with villous histology and/or high grade dysplasia    Complete in 1988 7/30/2020- Flex Sig: Approximately 10 diminutive sessile polyps visualized                        throughout the rectum, all of which were ablated using                        argon plasma coagulation.   Flex Sig set for 9/2/2021 with Dr. Gasca (at same visit as ERCP with Dr. Fernández)   Duodenal or periampullary cancer screening and gastric cancer screening  Baseline upper endoscopy including side-viewing examination. Start between 20-25. Consider earlier screening if family history warrants it. Base subsequent screenings on findings (see below).    Manage non-fundic gland polyps endoscopically if possible.      These occur in the majority of FAP patients and may have focal low grade dysplasia but typically are non progressive.Special screening or surgery should only be considered in the presence of high grade dysplasia.     7/30/2020 - see note above (gastric fundic polyps, all benign)     9/2/2021 with Dr. Fernández   Thyroid Cancer Screening    Baseline ultrasound.    If normal, consider repeating every 2-5 years and if abnormal, refer to thyroid specialist.     [place] interval screenings for families with thyroid cancer.     CNS Cancer  Annual physical examination.  Review - 8/12/2021 August 2022   Intra-abdominal desmoids  Annual abdominal palpation.     If family history of symptomatic desmoids, consider abdominal MRI or CT 1-3 years post colectomy and then every 5-10 years.     No known desmoids    Pancreatic screen Consider pancreatic screening if family history of pancreatic cancer.     Small bowel polyps and cancer  Consider adding small bowel visualization to CT or MRI for desmoids, especially if duodenal polyposis is advanced.      Duodenal findings:  Stage 0 (no polyposis) - repeat endoscopy every 4 years.  Stage 1 (minimal polyposis 1-4 tubular adenomas, 1-4mm each) - repeat EGD every 2-3  years.  Stage 2 (mild polyposis - 5-9 tubular adenomas, 5-9 mm each) - repeat EGD every 1-3 years.  Stage 3 (moderate polyposis - >20 lesions or size >1 cm) - repeat EGD every 6-12 months  Stage 4 (dense polyposis or high grade dysplasia) - surgical evaluation, with expert surveillance every 3-6 months.  Due to limited data, no screening recommendation is made for pancreatic cancer at this time.        I spent a total of 40 minutes on the day of the visit. Please see the note for further information on patient assessment and treatment.    Clarissa Brown, REYNOLD-CNS, OCN, AGN-BC  Clinical Nurse Specialist  Cancer Risk Management Program  [facility] [address]  [place] [address]    phone:  [phone]  Pager: [phone]  fax: [phone]    CC: MD FATMATA Isabel MD Martin Freeman, MD Colleen Rivard Hunt, MD

## 2021-08-12 NOTE — LETTER
2021         RE: Sarah Duran  [address] [facility] [address]        Dear Colleague,    Thank you for referring your patient, Sarah Duran, to the [facility]. Please see a copy of my visit note below.    Oncology Risk Management Consultation:  Date on this visit: 2021    Sarah Duran returns to the Cancer Risk Management Program today to follow up on her cancer screening and surveillance plan. She requires high risk screening and surveillance to reduce her risk of cancer secondary to  a history of atypical ductal hyperplasia (ADH). She is considered to be at high risk for breast cancer, with a risk of up to 35%.     She has a history of Stage II, Grade I endometrial adenocarcinoma, diagnosed in 2020; followed by Dr. Sandie Lezama.    She also carries an APC+ genetic mutation, consistent with a diagnosis of Familial Adenomatous Polyposis (FAP) or attenuated FAP (AFAP) with a history of a thyroid nodule and gastric polyps.     Primary Physician: Ida Sandoval MD     History Of Present Illness:  Ms. Duran is a very pleasant  55 year old female who presents with a personal history of ADH and a likely attenuated version of Familial Adenomatous Polyposis. She is s/p right excisional breast biopsy on 2016. She is also s/p ileocolonic anastomosis in .     Genetic Testin2018: POSITIVE for a APC mutation.  Specifically her mutation is called [id number],  consistent with a diagnosis of familial adenomatous polyposis (FAP) or attenuated FAP (AFAP). The mutation was identified using [employer] testing through [employer].  The testing was done because of her personal history of colonic polyposis and family history of colon cancer.     Of note, Sarah tested negative for mutations in the following genes: BMPR1A, CDH1, CHEK2, MLH1, MSH2, MSH6, MUTYH, PMS2, POLD1, POLE, PTEN, SMAD4, STK11 and TP53 (sequencing and  deletion/duplication); EPCAM and GREM1 (deletion/duplication only).        Pertinent history:  Attenuated Familial adenomatous polyposis, s/p ileal rectal anastomosis in May 1988. Confirmed with APC testing (see below)  Subsequent laparotomy in  for bowel obstruction from adhesions.  Taking rabeprazole 20 mg EC BID for gastric polyps.  Menarche at 12.  Nulliparous.  Menopause at 50.  Breast density: scattered fibroglandular densities  Hx of THBSO for Stage II, Grade I  endometrial cancer (see details below)    21:  US Pelvis:  Uterus: Measures 8.9 x 5.8 x 6.4 cm. The endometrium is thickened at 2.9 cm. The endometrium is heterogeneous in appearance with an irregular, nodular contour of the endometrial surface. There is hypoechoic complex fluid in the endometrial cavity likely containing blood. There is vascularity of the endometrium on color imaging. The abnormal endometrium occupies the majority of the uterus. Right Ovary: Measures 2.0 x 1.0 x 1.1 cm and appears unremarkable Right Ovary Blood Flow: Present. Left Ovary: Not seen. Free Fluid: No significant free fluid.There are no suspicious adnexal masses.     2/10/21:  Hysteroscopy, D&C                 Pathology:  Grade 1 endometrial adenocarcinoma, MMR intact, p53 wild type, ER/OR +     21:  Total laparoscopic hysterectomy, bilateral salpingo-oophorectomy, bilateral sentinel lymph node dissection, cystoscopy                 Pathology:  Stage II, grade 1 endometrial adenocarcinoma, tumor size 5.6 cm, 1/13 mm myometrial invasion, +CASEY, + cervix, 0/2 sentinel LN,        21:  Completed brachytherapy to 3000cGy in 5 Fx to the vagina     Thyroid Screenin2019 - FNA biopsy of thyroid, for solid appearing nodule in isthmus, biopsy non diagnostic; cyst fluid only.   2020- US of the thyroid: Previously biopsied isthmus nodule is no longer present. There are numerous additional colloid cysts and a benign complex cyst   left lobe, not appreciably  changed from previous.   8/6/2021- US of thyroid; no change in nodules     GI screening history (see chart for further details)  11/7/2005 - Small bowel enteroscopy, one tubular adenoma removed from partial anal verge.  12/7/2005 - adenomatous polyp removed from rectum  2/28/2005 - tubular adenoma, duodenum.  11/16/2011 - fundic gland polyps in stomach, tubular adenoma removed from rectum.  11/5/2012 - 1 tubulovillous adenoma, rectum  7/8/2013 - 1 tubulovillous adenoma, rectum  12/3/2013 - 1 tubulovillous adenoma, rectum  7/21/2014 - 1 tubular adenoma and hyperplastic polyp, rectum  1/20/2015 - 1 tubular adenoma and hyperplastic polyp, rectum  12/7/2015 - 1 tubular adenoma, rectum  6/6/2016 - 1 fundic gland polyp with focal adenomatous changes  3/20/2017 - 2 tubular adenomas removed from rectum  4/12/2017 - colonoscopy and EGD, one fundic gland gastric polyp with focal low grade dysplasia removed, multiple duodenal polyps and multiple rectal polyps.  7/25/2019 - EGD: Two previously reported polyps (6.2 mm and 5.3mm) in the antrum were evaluate by EUS, which did not demonstrate deep layer involvement, and then they were  removed by EMR by using a band ligator and a snare. The defect was then closed with hemoclips. Innumerous fundic gland polyps in the gastric body,  fundus, and cardia. Consistent with diagnosis of FAP. A large fundic gland polyp in the gastric body, which has been previously resected and recent biopsied. Recommendation: Use Aciphex (rabeprazole) 20 mg PO BID for 1 month.  Repeat the upper endoscopy in 1 year for surveillance.      7/30/2020- EGD and flexible sigmoidoscopy:  15-20 duodenal polyps, measuring 1-3 mm, all of which were ablated using argon plasma coagulation.    - Innumerable fundic gland polyps in a confluent carpet-like distribution throughout the cardia, fundus, and gastric body. Antral-sparing present. A large 2-3   semi-pedunculated polyp in the proximal gastric body  (along the  lesser curvature) was resected.  - Suspected very small adenomas flanking the pancreaticobiliary orifices at the prior ampullectomy site.  - Normal esophagus. Pathology: Fundic gland polyp with adenomatous change (low grade dysplasia). Negative for intestinal metaplasia, high-grade dysplasia or malignancy. Recommendation: Schedule for flexible sigmoidoscopy (along with EGD and ERCP) with Dr. Fernández in 1-year.      Breast Screening history:   11/28/2016 - R excisional breast biopsy with seed localization, Fibrocystic tissue with ADH on pathology.  4/24/2017 - R breast mammogram, BiRads2.  5/25/2018 - Breast MRI, BiRads2.  11/8/2018 - Screening tomosynthesis mammogram, BiRads1  6/10/2019 - Breast MRI, BiRads2.  12/20/2019- Screening tomosynthesis mammogram, BiRads1  2/3/2021- Breast MRI, BiRads1     At this visit, she denies asymmetry, lumps, masses, thickening, pain, nipple discharge and skin changes in her breasts. She acknowledges fatigue, which she says makes it harder for her to achieve her prior exercise goals.  She denies vaginal bleeding.  She denies nausea, vomiting, discomfort after eating, abdominal pain, bloating, heartburn, constipation,  diarrhea and early satiety.      Past Medical/Surgical History:  Past Medical History:   Diagnosis Date     Acute deep vein thrombosis (DVT) of left lower extremity (H)      Asthma      Atypical ductal hyperplasia of right breast 10/2016    fibrocystic changes, PASH and atypical ductal hyperplasia on needle biopsy, s/p R excisional biopsy with radioactive seed localization     Endometrial cancer (H) 02/15/2021     Factor 5 Leiden mutation, heterozygous (H)     factor 5     Familial adenomatous polyposis 1988    clinical presentation; s/p total abdominal colectomy with TRACEY; confirmed APC+ 2018     GERD (gastroesophageal reflux disease)      Migraine      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    [id number]     Past Surgical History:   Procedure Laterality  Date     BIOPSY BREAST SEED LOCALIZATION Right 11/30/2016    Procedure: BIOPSY BREAST SEED LOCALIZATION;  Surgeon: Jessica Rasheed MD;  Location: [facility] OR     C LAP,SURG,COLECTOMY,W/ANAST      for colon cancer     COLECTOMY      subtotal     COMBINED ESOPHAGOSCOPY, GASTROSCOPY, DUODENOSCOPY (EGD) WITH ARGON PLASMA COAGULATOR (APC) N/A 4/12/2018    Procedure: COMBINED ESOPHAGOSCOPY, GASTROSCOPY, DUODENOSCOPY (EGD) WITH ARGON PLASMA COAGULATOR (APC);  Esophagogastroduodenoscopy with polypectomy and polyp ablation;  Surgeon: Stephen Gasca MD;  Location: [facility] OR     DILATION AND CURETTAGE, HYSTEROSCOPY DIAGNOSTIC, COMBINED N/A 2/10/2021    Procedure: HYSTEROSCOPY, DIAGNOSTIC, WITH DILATION AND CURETTAGE OF UTERUS;  Surgeon: Sandie Samaniego MD;  Location: [facility] OR     ENDOSCOPIC RETROGRADE CHOLANGIOPANCREATOGRAPHY       ENDOSCOPIC ULTRASOUND UPPER GASTROINTESTINAL TRACT (GI) N/A 7/25/2019    Procedure: Endoscopic Ultrasound;  Surgeon: Stephen Gasca MD;  Location: [facility] OR     ESOPHAGOSCOPY, GASTROSCOPY, DUODENOSCOPY (EGD), COMBINED  11/5/2012    Procedure: COMBINED ESOPHAGOSCOPY, GASTROSCOPY, DUODENOSCOPY (EGD), BIOPSY SINGLE OR MULTIPLE;;  Surgeon: Angélica Garcia MD;  Location: [facility] GI     ESOPHAGOSCOPY, GASTROSCOPY, DUODENOSCOPY (EGD), COMBINED  12/3/2013    Procedure: COMBINED ESOPHAGOSCOPY, GASTROSCOPY, DUODENOSCOPY (EGD);;  Surgeon: Angélica Garcia MD;  Location: [facility] GI     ESOPHAGOSCOPY, GASTROSCOPY, DUODENOSCOPY (EGD), COMBINED N/A 3/28/2018    Procedure: COMBINED ESOPHAGOSCOPY, GASTROSCOPY, DUODENOSCOPY (EGD);;  Surgeon: Stephen Gasca MD;  Location: [facility]     ESOPHAGOSCOPY, GASTROSCOPY, DUODENOSCOPY (EGD), COMBINED N/A 5/30/2019    Procedure: Esophagogastroduodenoscopy with biopsy x2 APC;  Surgeon: Stephen Gasca MD;  Location: [facility] OR     ESOPHAGOSCOPY, GASTROSCOPY, DUODENOSCOPY (EGD), COMBINED N/A 7/30/2020    Procedure: ESOPHAGOGASTRODUODENOSCOPY (EGD) with  polypectomy by hot snare; argonplasma coaglution ablation of polyps;  Surgeon: Stephen Gasca MD;  Location: [facility] OR     ESOPHAGOSCOPY, GASTROSCOPY, DUODENOSCOPY (EGD), RESECT MUCOSA, COMBINED N/A 7/25/2019    Procedure: Esophagogastroduodenoscopy with gastric mucosal resection and clip application;  Surgeon: Stephen Gasca MD;  Location: [facility] OR     EXCISE POLYP RECTUM       LAPAROSCOPIC HYSTERECTOMY TOTAL, BILATERAL SALPINGO-OOPHORECTOMY, NODE DISSECTION, COMBINED Bilateral 2/25/2021    Procedure: Laparoscopic removal of uterus, cervix, both ovaries and fallopian tubes, sentinel lymph node dissection, cystoscopy;  Surgeon: Sandie Samaniego MD;  Location: [facility]     LAPAROSCOPIC LYSIS ADHESIONS       SIGMOIDOSCOPY FLEXIBLE       SIGMOIDOSCOPY FLEXIBLE N/A 12/7/2015    Procedure: SIGMOIDOSCOPY FLEXIBLE;  Surgeon: Mariela Beraden MD;  Location: [facility]     SIGMOIDOSCOPY FLEXIBLE N/A 3/28/2018    Procedure: SIGMOIDOSCOPY FLEXIBLE;  EGD/Flex Sig;  Surgeon: Stephen Gasca MD;  Location: [facility]     SIGMOIDOSCOPY FLEXIBLE N/A 4/12/2018    Procedure: SIGMOIDOSCOPY FLEXIBLE;  Flexible Sigmoidoscopy with polyp ablation;  Surgeon: Stephen Gasca MD;  Location: [facility]     SIGMOIDOSCOPY FLEXIBLE N/A 5/30/2019    Procedure: Flexible Sigmoidoscopy with polypectomy;  Surgeon: Stephen Gasca MD;  Location: [facility]     SIGMOIDOSCOPY FLEXIBLE N/A 7/30/2020    Procedure: SIGMOIDOSCOPY, FLEXIBLE with Ablation of Polyps;  Surgeon: Stephen Gasca MD;  Location: [facility]       Allergies:  Allergies as of 08/12/2021 - Reviewed 06/15/2021   Allergen Reaction Noted     Corn-related products Shortness Of Breath 07/29/2020     Cipro [ciprofloxacin]  02/24/2005     Flagyl [metronidazole] Nausea and Vomiting 02/24/2005     Garlic Fatigue 07/29/2020     Keflex [cephalosporins] Nausea and Vomiting 02/24/2005     Omeprazole  04/29/2010     Prilosec otc [omeprazole magnesium]  01/28/2012     Cephalexin Rash  2004     Doxycycline Rash 2018     Erythromycin Rash 2005     Sulfa drugs Rash 2005       Current Medications:  Current Outpatient Medications   Medication Sig Dispense Refill     acetaminophen (TYLENOL) 325 MG tablet Take 2 tablets (650 mg) by mouth every 6 hours as needed for mild pain (Patient not taking: Reported on 6/15/2021) 30 tablet 0     albuterol 90 MCG/ACT inhaler Inhale 2 puffs into the lungs every 6 hours as needed.       calcium-magnesium (CALMAG) 500-250 MG TABS Take 1 tablet by mouth daily       celecoxib (CELEBREX) 100 MG capsule Take 1 capsule (100 mg) by mouth daily 90 capsule 3     docusate sodium (COLACE) 100 MG capsule Take 100 mg by mouth daily       fexofenadine (ALLEGRA) 180 MG tablet Take 180 mg by mouth daily.       MELATONIN PO Take 2 mg by mouth nightly as needed        multivitamin, therapeutic with minerals (THERA-VIT-M) TABS Take 3 tablets by mouth 2 times daily        Omega-3 Fatty Acids (OMEGA-3 FISH OIL PO) Take 2 g by mouth daily        ondansetron (ZOFRAN) 4 MG tablet Take 1 tablet (4 mg) by mouth every 8 hours as needed for nausea (Patient not taking: Reported on 6/15/2021) 60 tablet 1     triamcinolone (NASACORT) 55 MCG/ACT Inhaler Spray 2 sprays into both nostrils daily       ZOLMitriptan (ZOMIG) 5 MG tablet Take 1 tablet (5 mg) by mouth at onset of headache for migraine 90 tablet 3        Family History:  Family History   Problem Relation Age of Onset     Hyperlipidemia Mother      Cerebrovascular Disease Mother      Cerebrovascular Disease Father      Colon Cancer Father 57         at 63     Prostate Cancer Brother 53     Prostate Cancer Maternal Uncle 65       Social History:  Social History     Socioeconomic History     Marital status:      Spouse name: Not on file     Number of children: 2     Years of education: Not on file     Highest education level: Not on file   Occupational History     Occupation:      Employer: [employer]   Tobacco Use     Smoking status: Never Smoker     Smokeless tobacco: Never Used   Substance and Sexual Activity     Alcohol use: Yes     Comment: 1 or 2 drinks per year     Drug use: No     Sexual activity: Not on file     Comment: denies pregnancy   Other Topics Concern     Parent/sibling w/ CABG, MI or angioplasty before 65F 55M? Not Asked   Social History Narrative    Two adopted daughters, ages 13 and 15     Social Determinants of Health     Financial Resource Strain:      Difficulty of Paying Living Expenses:    Food Insecurity:      Worried About Running Out of Food in the Last Year:      Ran Out of Food in the Last Year:    Transportation Needs:      Lack of Transportation (Medical):      Lack of Transportation (Non-Medical):    Physical Activity:      Days of Exercise per Week:      Minutes of Exercise per Session:    Stress:      Feeling of Stress :    Social Connections:      Frequency of Communication with Friends and Family:      Frequency of Social Gatherings with Friends and Family:      Attends Buddhism Services:      Active Member of Clubs or Organizations:      Attends Club or Organization Meetings:      Marital Status:    Intimate Partner Violence:      Fear of Current or Ex-Partner:      Emotionally Abused:      Physically Abused:      Sexually Abused:        Physical Exam:  [facility] 05/01/2019   GENERAL: Healthy, alert and no distress  EYES: Eyes grossly normal to inspection.  No discharge or erythema, or obvious scleral/conjunctival abnormalities.  RESP: No audible wheeze, cough, or visible cyanosis.  No visible retractions or increased work of breathing.    SKIN: Visible skin clear. No significant rash, abnormal pigmentation or lesions.  NEURO: Cranial nerves grossly intact.  Mentation and speech appropriate for age.  PSYCH: Mentation appears normal, affect normal/bright, judgement and insight intact, normal speech and appearance well-groomed.    Laboratory/Imaging Studies  No results  found for any visits on 08/12/21.    ASSESSMENT  We reviewed her interval history; her  has been ill with CoVid, even though both he and Sarah are vaccinated.  Thus far, she has tested negative for CoVid.  She is wondering about her inability to achieve her athletic goals since her treatment and surgery. We discussed that her surgery was just completed in April and that she has the stress of her 's illness and concerns around CoVid.  She has been counseled that it can take a year to fully recover from cancer treatment. We discussed her continued physical therapy with the dilator and the importance of being diligent about using it in order to improve her recovery and avoid longer term complications.  She states she has been using it in the shower but is going to try to use it at other times.     We also discussed that she is continuing on Celebrex rather than sulindac (for regression of polyps) per Dr. Gasca's recommendation. I have renewed her prescription today.  She has no breast or GI complaints today: we will proceed with her plan as follows.    Individualized Surveillance Plan for women  With 20% or greater lifetime risk of breast cancer   Per NCCN Breast Cancer Screening and Diagnosis Guidelines Version 1.2021   Recommended screening Test or procedure Last done Next Scheduled    Clinical encounter Clinical exam every 6-12 months.   Refer to genetic counseling if not already done.  Consider risk reduction strategies.   Exam deferred   February 11, 2022   However, some family histories with breast cancers at a very young age, may warrant screening starting earlier.    *May begin at age 40 if breast cancers in the family occur at later ages.    Annual mammogram beginning 10 years younger than the earliest breast cancer in the family but not prior to age 30.    Recommend annual breast MRI to begin 10 years younger than the earliest breast cancer in the family but not prior to age 25.    Breast  MRIs are preferably done on day 7-15 of the menstrual cycle in premenopausal women.   See below   See below   Breast screening for patients at high risk due to thoracic radiation between the ages of 10-30   Annual clinical exam beginning 8 years after radiation therapy.    Annual screening mammogram beginning at age 30 or 8 years after radiation therapy    Annual breast MRI, beginning at age 25 or 8 years after radiation therapy.       NA     NA   Women who have a lifetime risk of >20% based on history of LCIS or ADH/ALH Annual screening mammogram beginning at age of LCIS or ADH/ALH but not prior to age 30.    Consider annual MRI to begin at age of diagnosis of LCIS or ADH/ALH but not prior to age 25.    Consider risk reducing strategies. 12/20/2019- Screening tomosynthesis mammogram, BiRads1    2/3/2021- Breast MRI, BiRads1 9/10/2021   Mammogram    Breast MRI February 2022 after exam.     Recommend risk reducing strategies for women with 1.7% 5 year risk of breast cancer.       Individualized Surveillance Plan for Familial Adenomatous Polyposis                                        Based on NCCN Guidelines Version 1.2021   Type of Screening  Recommendation  Last Done  Next Due    Recommendations for Children    Colon Cancer Screening  Flexible sigmoidoscopy or colonoscopy every 12 months beginning at age 10-15.       See below   See below   Hepatoblastoma  Consider liver palpation, abdominal ultrasound, and measurement of AFP every 3-6 months, during first 5 years of life.    NA   NA   Thyroid Cancer Screening  Ultrasound at baseline starting in teenage years.  If normal, consider repeating every 2-5 years and if abnormal, refer to thyroid specialist. [place] interval screenings for families with thyroid cancer. 8/6/2021- US of thyroid; no change in nodules   August 2022   Recommendations for Adults    Colon Cancer Screening  Colectomy with ileorectal anastomosis (TRACEY)  recommended, then endoscopic evaluation of  the rectum every 6-12 months, depending on the polyp burden.     If ileo proctocolectomy with ileal pouch anal anastomosis, endoscopic surveillance of ileal pouch every 1-3 years depending on polyp burden.    Surveillance frequency should be increased to every 6 months for large, flat polyps with villous histology and/or high grade dysplasia    Complete in 1988 7/30/2020- Flex Sig: Approximately 10 diminutive sessile polyps visualized                        throughout the rectum, all of which were ablated using                        argon plasma coagulation.   Flex Sig set for 9/2/2021 with Dr. Gasca (at same visit as ERCP with Dr. Fernández)   Duodenal or periampullary cancer screening and gastric cancer screening  Baseline upper endoscopy including side-viewing examination. Start between 20-25. Consider earlier screening if family history warrants it. Base subsequent screenings on findings (see below).    Manage non-fundic gland polyps endoscopically if possible.      These occur in the majority of FAP patients and may have focal low grade dysplasia but typically are non progressive.Special screening or surgery should only be considered in the presence of high grade dysplasia.     7/30/2020 - see note above (gastric fundic polyps, all benign)     9/2/2021 with Dr. Fernández   Thyroid Cancer Screening    Baseline ultrasound.    If normal, consider repeating every 2-5 years and if abnormal, refer to thyroid specialist.     [place] interval screenings for families with thyroid cancer.     CNS Cancer  Annual physical examination.  Review - 8/12/2021 August 2022   Intra-abdominal desmoids  Annual abdominal palpation.     If family history of symptomatic desmoids, consider abdominal MRI or CT 1-3 years post colectomy and then every 5-10 years.     No known desmoids    Pancreatic screen Consider pancreatic screening if family history of pancreatic cancer.     Small bowel polyps and cancer  Consider adding small bowel  "visualization to CT or MRI for desmoids, especially if duodenal polyposis is advanced.      Duodenal findings:  Stage 0 (no polyposis) - repeat endoscopy every 4 years.  Stage 1 (minimal polyposis 1-4 tubular adenomas, 1-4mm each) - repeat EGD every 2-3 years.  Stage 2 (mild polyposis - 5-9 tubular adenomas, 5-9 mm each) - repeat EGD every 1-3 years.  Stage 3 (moderate polyposis - >20 lesions or size >1 cm) - repeat EGD every 6-12 months  Stage 4 (dense polyposis or high grade dysplasia) - surgical evaluation, with expert surveillance every 3-6 months.  Due to limited data, no screening recommendation is made for pancreatic cancer at this time.        I spent a total of 40 minutes on the day of the visit. Please see the note for further information on patient assessment and treatment.    REYNOLD March-CNS, OCN, AGN-BC  Clinical Nurse Specialist  Cancer Risk Management Program  [facility] [address]  [place] [address]    phone:  [phone]  Pager: [phone]  fax: [phone]    CC: MD FATMATA Isabel MD Martin Freeman, MD Colleen Rivard Hunt, MD                Sarah is a 55 year old who is being evaluated via a billable video visit.      How would you like to obtain your AVS? MyChart  If the video visit is dropped, the invitation should be resent by: Text to cell phone: [phone]  Will anyone else be joining your video visit? No     Vitals - Patient Reported  Weight (Patient Reported): 68 kg (150 lb)  Height (Patient Reported): 158.8 cm (5' 2.5\")  BMI (Based on Pt Reported Ht/Wt): 27  Pain Score: No Pain (0)    Christina SANTIZO      Video Start Time: 0930  Video-Visit Details    Type of service:  Video Visit    Video End Time:0952    Originating Location (pt. Location): Home    Distant Location (provider location):  [facility]     Platform used for Video Visit: AmWell      Again, thank you for " allowing me to participate in the care of your patient.        Sincerely,        REYNOLD March CNS

## 2021-08-15 DIAGNOSIS — Z11.59 ENCOUNTER FOR SCREENING FOR OTHER VIRAL DISEASES: ICD-10-CM

## 2021-08-19 ENCOUNTER — TELEPHONE (OUTPATIENT)
Dept: GASTROENTEROLOGY | Facility: CLINIC | Age: 56
End: 2021-08-19

## 2021-08-19 NOTE — TELEPHONE ENCOUNTER
LVM for patient in regards to rescheduling procedure with Dr. Gasca/Dr. Fernández. Left direct line for patient to call to go over scheduling details.

## 2021-09-02 NOTE — PROGRESS NOTES
Follow Up Notes on Referred Patient    Date: 9/3/2021       RE: Sarah Duran  : 1965  ANDREI: 9/3/2021      Sarah Duran is a 56 year old woman with a diagnosis of stage II, grade 1 endometrial adenocarcinoma. She is here today for follow up and surveillance.     Cancer Course:     She reports she began having daily bleeding in .  She notes she had previously gone 15 months with no bleeding.  She reports the bleeding is sometimes heavy like a period but then can also be somewhat light and only like spotting.  She had an attempted EMB with her primary gynecologist, however this was unsuccessful.  She is here today for a second opinion.     21:  US Pelvis:  Uterus: Measures 8.9 x 5.8 x 6.4 cm. The endometrium is thickened at 2.9 cm. The endometrium is heterogeneous in appearance with an irregular, nodular contour of the endometrial surface. There is hypoechoic complex fluid in the endometrial cavity likely containing blood. There is vascularity of the endometrium on color imaging. The abnormal endometrium occupies the majority of the uterus. Right Ovary: Measures 2.0 x 1.0 x 1.1 cm and appears unremarkable Right Ovary Blood Flow: Present. Left Ovary: Not seen. Free Fluid: No significant free fluid.There are no suspicious adnexal masses.     2/10/21:  Hysteroscopy, D&C                 Pathology:  Grade 1 endometrial adenocarcinoma, MMR intact, p53 wild type, ER/GA +     21:  Total laparoscopic hysterectomy, bilateral salpingo-oophorectomy, bilateral sentinel lymph node dissection, cystoscopy                 Pathology:  Stage II, grade 1 endometrial adenocarcinoma, tumor size 5.6 cm, 1/13 mm myometrial invasion, +CASEY, + cervix, 0/2 sentinel LN, no LVSI     21:  Completed brachytherapy to 3000cGy in 5 Fx to the vagina        Today Sarah comes to the clinic overall feeling well without any gynecological concerns. She is using the dilator every other day. She is not  sexually active. She denies any vaginal bleeding, no changes in her bowel or bladder habits, no nausea/emesis, no lower extremity edema, and no difficulties eating or sleeping. She denies any abdominal discomfort/bloating, no fevers or chills, and no chest pain or shortness of breath. She states that her hot flashes are minimal and rare now. She continues to work on building up her strength and stamina with [employer] cycling classes and [employer].       Health Maintenance  Colonoscopy- 9/23/21 scheduled   Mammogram- 9/10/21 scheduled   Annual physical- due for this       Review of Systems:    Systemic           no weight changes; no fever; no chills; no night sweats; no appetite changes, +mild hot flashes   Skin           no rashes, or lesions  Eye           no irritation; no changes in vision  Alise-Laryngeal           no dysphagia; no hoarseness   Pulmonary    no cough; no shortness of breath  Cardiovascular    no chest pain; no palpitations  Gastrointestinal    no diarrhea; no constipation; no abdominal pain; no changes in bowel  habits; no blood in stool  Genitourinary   no urinary frequency; no urinary urgency; no dysuria; no pain; no abnormal vaginal discharge; no abnormal vaginal bleeding  Breast    no breast discharge; no breast changes; no breast pain  Musculoskeletal    no myalgias; no arthralgias; no back pain  Psychiatric           no depressed mood; no anxiety    Hematologic           no tender lymph nodes; no noticeable swellings or lumps   Endocrine    no hot flashes; no heat/cold intolerance         Neurological   no tremor; no numbness and tingling; no headaches; no difficulty  sleeping      Past Medical History:    Past Medical History:   Diagnosis Date     Acute deep vein thrombosis (DVT) of left lower extremity (H)      Asthma      Atypical ductal hyperplasia of right breast 10/2016    fibrocystic changes, PASH and atypical ductal hyperplasia on needle biopsy, s/p R excisional biopsy with radioactive  seed localization     Endometrial cancer (H) 02/15/2021     Factor 5 Leiden mutation, heterozygous (H)     factor 5     Familial adenomatous polyposis 1988    clinical presentation; s/p total abdominal colectomy with TRACEY; confirmed APC+ 2018     GERD (gastroesophageal reflux disease)      Migraine      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    [id number]         Past Surgical History:    Past Surgical History:   Procedure Laterality Date     BIOPSY BREAST SEED LOCALIZATION Right 11/30/2016    Procedure: BIOPSY BREAST SEED LOCALIZATION;  Surgeon: Jessica Rasheed MD;  Location: [facility] OR     C LAP,SURG,COLECTOMY,W/ANAST      for colon cancer     COLECTOMY      subtotal     COMBINED ESOPHAGOSCOPY, GASTROSCOPY, DUODENOSCOPY (EGD) WITH ARGON PLASMA COAGULATOR (APC) N/A 4/12/2018    Procedure: COMBINED ESOPHAGOSCOPY, GASTROSCOPY, DUODENOSCOPY (EGD) WITH ARGON PLASMA COAGULATOR (APC);  Esophagogastroduodenoscopy with polypectomy and polyp ablation;  Surgeon: Stephen Gasca MD;  Location: [facility] OR     DILATION AND CURETTAGE, HYSTEROSCOPY DIAGNOSTIC, COMBINED N/A 2/10/2021    Procedure: HYSTEROSCOPY, DIAGNOSTIC, WITH DILATION AND CURETTAGE OF UTERUS;  Surgeon: Sandie Samaniego MD;  Location: [facility] OR     ENDOSCOPIC RETROGRADE CHOLANGIOPANCREATOGRAPHY       ENDOSCOPIC ULTRASOUND UPPER GASTROINTESTINAL TRACT (GI) N/A 7/25/2019    Procedure: Endoscopic Ultrasound;  Surgeon: Stephen Gasca MD;  Location: [facility]     ESOPHAGOSCOPY, GASTROSCOPY, DUODENOSCOPY (EGD), COMBINED  11/5/2012    Procedure: COMBINED ESOPHAGOSCOPY, GASTROSCOPY, DUODENOSCOPY (EGD), BIOPSY SINGLE OR MULTIPLE;;  Surgeon: Angélica Garcia MD;  Location: [facility] GI     ESOPHAGOSCOPY, GASTROSCOPY, DUODENOSCOPY (EGD), COMBINED  12/3/2013    Procedure: COMBINED ESOPHAGOSCOPY, GASTROSCOPY, DUODENOSCOPY (EGD);;  Surgeon: Angélica Garcia MD;  Location: [facility]     ESOPHAGOSCOPY, GASTROSCOPY, DUODENOSCOPY (EGD), COMBINED N/A 3/28/2018     Procedure: COMBINED ESOPHAGOSCOPY, GASTROSCOPY, DUODENOSCOPY (EGD);;  Surgeon: Stephen Gasca MD;  Location: [facility]     ESOPHAGOSCOPY, GASTROSCOPY, DUODENOSCOPY (EGD), COMBINED N/A 5/30/2019    Procedure: Esophagogastroduodenoscopy with biopsy x2 APC;  Surgeon: Stephen Gasca MD;  Location: [facility]     ESOPHAGOSCOPY, GASTROSCOPY, DUODENOSCOPY (EGD), COMBINED N/A 7/30/2020    Procedure: ESOPHAGOGASTRODUODENOSCOPY (EGD) with polypectomy by hot snare; argonplasma coaglution ablation of polyps;  Surgeon: Stephen Gasca MD;  Location: [facility]     ESOPHAGOSCOPY, GASTROSCOPY, DUODENOSCOPY (EGD), RESECT MUCOSA, COMBINED N/A 7/25/2019    Procedure: Esophagogastroduodenoscopy with gastric mucosal resection and clip application;  Surgeon: Stephen Gasca MD;  Location: [facility]     EXCISE POLYP RECTUM       LAPAROSCOPIC HYSTERECTOMY TOTAL, BILATERAL SALPINGO-OOPHORECTOMY, NODE DISSECTION, COMBINED Bilateral 2/25/2021    Procedure: Laparoscopic removal of uterus, cervix, both ovaries and fallopian tubes, sentinel lymph node dissection, cystoscopy;  Surgeon: Sandie Samaniego MD;  Location: [facility]     LAPAROSCOPIC LYSIS ADHESIONS       SIGMOIDOSCOPY FLEXIBLE       SIGMOIDOSCOPY FLEXIBLE N/A 12/7/2015    Procedure: SIGMOIDOSCOPY FLEXIBLE;  Surgeon: Mariela Bearden MD;  Location: [facility]     SIGMOIDOSCOPY FLEXIBLE N/A 3/28/2018    Procedure: SIGMOIDOSCOPY FLEXIBLE;  EGD/Flex Sig;  Surgeon: Stephen Gasca MD;  Location: [facility]     SIGMOIDOSCOPY FLEXIBLE N/A 4/12/2018    Procedure: SIGMOIDOSCOPY FLEXIBLE;  Flexible Sigmoidoscopy with polyp ablation;  Surgeon: Stephen Gasca MD;  Location: [facility]     SIGMOIDOSCOPY FLEXIBLE N/A 5/30/2019    Procedure: Flexible Sigmoidoscopy with polypectomy;  Surgeon: Stephen Gasca MD;  Location[facility]     SIGMOIDOSCOPY FLEXIBLE N/A 7/30/2020    Procedure: SIGMOIDOSCOPY, FLEXIBLE with Ablation of Polyps;  Surgeon: Stephen Gasca MD;  Location[facility]  OR         Health Maintenance Due   Topic Date Due     ASTHMA ACTION PLAN  Never done     ASTHMA CONTROL TEST  Never done     ADVANCE CARE PLANNING  Never done     ZOSTER IMMUNIZATION (1 of 2) Never done     PREVENTIVE CARE VISIT  03/05/2020     COLORECTAL CANCER SCREENING  07/31/2020     PHQ-2  01/01/2021     MAMMO DIAGNOSTIC  08/03/2021     INFLUENZA VACCINE (1) 09/01/2021       Current Medications:     Current Outpatient Medications   Medication Sig Dispense Refill     albuterol 90 MCG/ACT inhaler Inhale 2 puffs into the lungs every 6 hours as needed.       calcium-magnesium (CALMAG) 500-250 MG TABS Take 1 tablet by mouth daily       celecoxib (CELEBREX) 100 MG capsule Take 1 capsule (100 mg) by mouth daily 90 capsule 3     docusate sodium (COLACE) 100 MG capsule Take 100 mg by mouth daily       fexofenadine (ALLEGRA) 180 MG tablet Take 180 mg by mouth daily.       MELATONIN PO Take 2 mg by mouth nightly as needed        multivitamin, therapeutic with minerals (THERA-VIT-M) TABS Take 3 tablets by mouth 2 times daily        Omega-3 Fatty Acids (OMEGA-3 FISH OIL PO) Take 2 g by mouth daily        triamcinolone (NASACORT) 55 MCG/ACT Inhaler Spray 2 sprays into both nostrils daily       ZOLMitriptan (ZOMIG) 5 MG tablet Take 1 tablet (5 mg) by mouth at onset of headache for migraine 90 tablet 3     acetaminophen (TYLENOL) 325 MG tablet Take 2 tablets (650 mg) by mouth every 6 hours as needed for mild pain (Patient not taking: Reported on 6/15/2021) 30 tablet 0     ondansetron (ZOFRAN) 4 MG tablet Take 1 tablet (4 mg) by mouth every 8 hours as needed for nausea (Patient not taking: Reported on 6/15/2021) 60 tablet 1         Allergies:        Allergies   Allergen Reactions     Corn-Related Products Shortness Of Breath     Avoids food; ok in tiny doses, such as in medicines  Avoids food; ok in tiny doses, such as in medicines     Cipro [Ciprofloxacin]      Reddened skin     Flagyl [Metronidazole] Nausea and Vomiting      "Garlic Fatigue     GI distress     Keflex [Cephalosporins] Nausea and Vomiting     Omeprazole      PN: headache     Prilosec Otc [Omeprazole Magnesium]      migraines     Cephalexin Rash     PN: Rash, Generalized     Doxycycline Rash     blisters     Erythromycin Rash     Sulfa Drugs Rash        Social History:     Social History     Tobacco Use     Smoking status: Never Smoker     Smokeless tobacco: Never Used   Substance Use Topics     Alcohol use: Yes     Comment: 1 or 2 drinks per year       History   Drug Use No         Family History:     The patient's family history is notable for     Family History   Problem Relation Age of Onset     Hyperlipidemia Mother      Cerebrovascular Disease Mother      Cerebrovascular Disease Father      Colon Cancer Father 57         at 63     Prostate Cancer Brother 53     Prostate Cancer Maternal Uncle 65         Physical Exam:     /80   Pulse 67   Temp 98.5  F (36.9  C)   Resp 16   Ht 1.575 m (5' 2\")   Wt 72.6 kg (160 lb)   LMP 2019   SpO2 98%   BMI 29.26 kg/m    Body mass index is 29.26 kg/m .    General Appearance: healthy and alert, no distress     HEENT: no thyromegaly, no palpable nodules or masses        Cardiovascular: regular rate and rhythm, no gallops, rubs or murmurs     Respiratory: lungs clear, no rales, rhonchi or wheezes    Musculoskeletal: extremities non tender and without edema    Skin: no lesions or rashes     Neurological: normal gait, no gross defects     Psychiatric: appropriate mood and affect                               Hematological: normal cervical, supraclavicular and inguinal lymph nodes     Gastrointestinal:       abdomen soft, non-tender, non-distended, no organomegaly or masses; laparoscopic incisions well healed.     Genitourinary: External genitalia and urethral meatus appears normal.  Vagina is smooth without nodularity or masses. Cervix surgically absent.  Bimanual exam reveal no masses, nodularity or fullness.  " Recto-vaginal exam confirms these findings.      Assessment:    Sarah Duran is a 56 year old woman with a diagnosis of stage II, grade 1 endometrial adenocarcinoma. She is here today for an acute visit.     30 minutes spent on the date of the encounter doing chart review, history and exam, documentation, and further activities as noted above.        Plan:     1.)        Stage II, grade 1 endometrial adenocarcinoma. Reviewed signs and symptoms for when she should contact the clinic or seek additional care.  Discussed signs and symptoms of recurrence and to call if these should occur. Patient to contact the clinic with any questions or concerns in the interim. Discussed no need for further pap smear testing.  Discussed visits every 3 months for the first two years (5/23) then every 6 months for up to 5 years (5/26) then annually thereafter. Continue to use dilator for 3-5 minutes a multiple times per week as she has been.     2.) Genetic risk factors were assessed and the patient is POSITIVE for a APC mutation.  Specifically her mutation is called [id number],  consistent with a diagnosis of familial adenomatous polyposis (FAP) or attenuated FAP (AFAP). She follows with Clarissa Brown with the [facility] clinic.     3.) Labs and/or tests ordered include: none.     4.) Health maintenance issues addressed today include annual health maintenance and non-gynecologic issues with PCP. Encouraged follow up with PCP regarding ongoing mild fatigue - encouraged lab workup with PCP.         REYNOLD Sorto, NP-BC  Women's Health Nurse Practitioner  Division of Gynecologic Oncology  [facility]      CC  Patient Care Team:  Ida Sandoval MD as PCP - General (Family Practice)  Stephen Gasca MD as MD (Gastroenterology)  Treasure Cleveland GC as Genetic Counselor (Genetic )  Pancho Samson MD as MD (Colon and Rectal Surgery)  Maria De Jesus Evans, PAYAL as  Specialty Care Coordinator (Oncology)  Keila Burger MD as Assigned Cancer Care Provider

## 2021-09-03 ENCOUNTER — ONCOLOGY VISIT (OUTPATIENT)
Dept: ONCOLOGY | Facility: CLINIC | Age: 56
End: 2021-09-03
Attending: OBSTETRICS & GYNECOLOGY
Payer: COMMERCIAL

## 2021-09-03 VITALS
HEIGHT: 62 IN | BODY MASS INDEX: 29.44 KG/M2 | TEMPERATURE: 98.5 F | RESPIRATION RATE: 16 BRPM | OXYGEN SATURATION: 98 % | DIASTOLIC BLOOD PRESSURE: 80 MMHG | HEART RATE: 67 BPM | SYSTOLIC BLOOD PRESSURE: 120 MMHG | WEIGHT: 160 LBS

## 2021-09-03 DIAGNOSIS — C54.1 ENDOMETRIAL CANCER (H): Primary | ICD-10-CM

## 2021-09-03 PROCEDURE — G0463 HOSPITAL OUTPT CLINIC VISIT: HCPCS

## 2021-09-03 PROCEDURE — 99214 OFFICE O/P EST MOD 30 MIN: CPT | Performed by: OBSTETRICS & GYNECOLOGY

## 2021-09-03 ASSESSMENT — PAIN SCALES - GENERAL: PAINLEVEL: NO PAIN (0)

## 2021-09-03 ASSESSMENT — MIFFLIN-ST. JEOR: SCORE: 1269.01

## 2021-09-03 NOTE — PROGRESS NOTES
"Oncology Rooming Note    September 3, 2021 2:26 PM   Sarah Duran is a 56 year old female who presents for:    Chief Complaint   Patient presents with     Oncology Clinic Visit     Initial Vitals: LMP 05/01/2019  Estimated body mass index is 29.52 kg/m  as calculated from the following:    Height as of 2/25/21: 1.575 m (5' 2\").    Weight as of 6/15/21: 73.2 kg (161 lb 6.4 oz). There is no height or weight on file to calculate BSA.  Data Unavailable Comment: Data Unavailable   Patient's last menstrual period was 05/01/2019.  Allergies reviewed: Yes  Medications reviewed: Yes    Medications: Medication refills not needed today.  Pharmacy name entered into Cr[employer] on original products:    [facility] - [place], [place] - [address], [address]  [facility] - [place], [place]  [facility] - [place], [place] - [address] [place] [address]  [facility] - [place], [place] - [address] [facility] - [place], [place] - [address] [facility] [place] [address]  [facility] [place], [place] - [address] [facility] [place], [place] - [address] [facility] [phone]    Clinical concerns:  NP was notified.      Leena El MA            "

## 2021-09-03 NOTE — LETTER
9/3/2021         RE: Sraah Duran  [address] [facility] [address]        Dear Colleague,    Thank you for referring your patient, Sarah Duran, to the [facility]. Please see a copy of my visit note below.                     Follow Up Notes on Referred Patient    Date: 9/3/2021       RE: Sraah Duran  : 1965  ANDREI: 9/3/2021      Sarah Duran is a 56 year old woman with a diagnosis of stage II, grade 1 endometrial adenocarcinoma. She is here today for follow up and surveillance.     Cancer Course:     She reports she began having daily bleeding in .  She notes she had previously gone 15 months with no bleeding.  She reports the bleeding is sometimes heavy like a period but then can also be somewhat light and only like spotting.  She had an attempted EMB with her primary gynecologist, however this was unsuccessful.  She is here today for a second opinion.     21:  US Pelvis:  Uterus: Measures 8.9 x 5.8 x 6.4 cm. The endometrium is thickened at 2.9 cm. The endometrium is heterogeneous in appearance with an irregular, nodular contour of the endometrial surface. There is hypoechoic complex fluid in the endometrial cavity likely containing blood. There is vascularity of the endometrium on color imaging. The abnormal endometrium occupies the majority of the uterus. Right Ovary: Measures 2.0 x 1.0 x 1.1 cm and appears unremarkable Right Ovary Blood Flow: Present. Left Ovary: Not seen. Free Fluid: No significant free fluid.There are no suspicious adnexal masses.     2/10/21:  Hysteroscopy, D&C                 Pathology:  Grade 1 endometrial adenocarcinoma, MMR intact, p53 wild type, ER/CO +     21:  Total laparoscopic hysterectomy, bilateral salpingo-oophorectomy, bilateral sentinel lymph node dissection, cystoscopy                 Pathology:  Stage II, grade 1 endometrial adenocarcinoma, tumor size 5.6 cm, 1/13 mm myometrial invasion, +CASEY, +  cervix, 0/2 sentinel LN, no LVSI     4/26/21:  Completed brachytherapy to 3000cGy in 5 Fx to the vagina        Today Sarah comes to the clinic overall feeling well without any gynecological concerns. She is using the dilator every other day. She is not sexually active. She denies any vaginal bleeding, no changes in her bowel or bladder habits, no nausea/emesis, no lower extremity edema, and no difficulties eating or sleeping. She denies any abdominal discomfort/bloating, no fevers or chills, and no chest pain or shortness of breath. She states that her hot flashes are minimal and rare now. She continues to work on building up her strength and stamina with [employer] cycling classes and CrossFit.       Health Maintenance  Colonoscopy- 9/23/21 scheduled   Mammogram- 9/10/21 scheduled   Annual physical- due for this       Review of Systems:    Systemic           no weight changes; no fever; no chills; no night sweats; no appetite changes, +mild hot flashes   Skin           no rashes, or lesions  Eye           no irritation; no changes in vision  Alise-Laryngeal           no dysphagia; no hoarseness   Pulmonary    no cough; no shortness of breath  Cardiovascular    no chest pain; no palpitations  Gastrointestinal    no diarrhea; no constipation; no abdominal pain; no changes in bowel  habits; no blood in stool  Genitourinary   no urinary frequency; no urinary urgency; no dysuria; no pain; no abnormal vaginal discharge; no abnormal vaginal bleeding  Breast    no breast discharge; no breast changes; no breast pain  Musculoskeletal    no myalgias; no arthralgias; no back pain  Psychiatric           no depressed mood; no anxiety    Hematologic           no tender lymph nodes; no noticeable swellings or lumps   Endocrine    no hot flashes; no heat/cold intolerance         Neurological   no tremor; no numbness and tingling; no headaches; no difficulty  sleeping      Past Medical History:    Past Medical History:   Diagnosis Date      Acute deep vein thrombosis (DVT) of left lower extremity (H)      Asthma      Atypical ductal hyperplasia of right breast 10/2016    fibrocystic changes, PASH and atypical ductal hyperplasia on needle biopsy, s/p R excisional biopsy with radioactive seed localization     Endometrial cancer (H) 02/15/2021     Factor 5 Leiden mutation, heterozygous (H)     factor 5     Familial adenomatous polyposis 1988    clinical presentation; s/p total abdominal colectomy with TRACEY; confirmed APC+ 2018     GERD (gastroesophageal reflux disease)      Migraine      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    [id number]         Past Surgical History:    Past Surgical History:   Procedure Laterality Date     BIOPSY BREAST SEED LOCALIZATION Right 11/30/2016    Procedure: BIOPSY BREAST SEED LOCALIZATION;  Surgeon: Jessica Rasheed MD;  Location:  OR     C LAP,SURG,COLECTOMY,W/ANAST      for colon cancer     COLECTOMY      subtotal     COMBINED ESOPHAGOSCOPY, GASTROSCOPY, DUODENOSCOPY (EGD) WITH ARGON PLASMA COAGULATOR (APC) N/A 4/12/2018    Procedure: COMBINED ESOPHAGOSCOPY, GASTROSCOPY, DUODENOSCOPY (EGD) WITH ARGON PLASMA COAGULATOR (APC);  Esophagogastroduodenoscopy with polypectomy and polyp ablation;  Surgeon: Stephen Gasca MD;  [facility]     DILATION AND CURETTAGE, HYSTEROSCOPY DIAGNOSTIC, COMBINED N/A 2/10/2021    Procedure: HYSTEROSCOPY, DIAGNOSTIC, WITH DILATION AND CURETTAGE OF UTERUS;  Surgeon: Sandie Samaniego MD;  Location: [facility] OR     ENDOSCOPIC RETROGRADE CHOLANGIOPANCREATOGRAPHY       ENDOSCOPIC ULTRASOUND UPPER GASTROINTESTINAL TRACT (GI) N/A 7/25/2019    Procedure: Endoscopic Ultrasound;  Surgeon: Stephen Gasca MD;  [facility]     ESOPHAGOSCOPY, GASTROSCOPY, DUODENOSCOPY (EGD), COMBINED  11/5/2012    Procedure: COMBINED ESOPHAGOSCOPY, GASTROSCOPY, DUODENOSCOPY (EGD), BIOPSY SINGLE OR MULTIPLE;;  Surgeon: Angélica Garcia MD;  [facility] GI      ESOPHAGOSCOPY, GASTROSCOPY, DUODENOSCOPY (EGD), COMBINED  12/3/2013    Procedure: COMBINED ESOPHAGOSCOPY, GASTROSCOPY, DUODENOSCOPY (EGD);;  Surgeon: Angélica Garcia MD;  [facility]     ESOPHAGOSCOPY, GASTROSCOPY, DUODENOSCOPY (EGD), COMBINED N/A 3/28/2018    Procedure: COMBINED ESOPHAGOSCOPY, GASTROSCOPY, DUODENOSCOPY (EGD);;  Surgeon: Stephen Gasca MD;  [facility]     ESOPHAGOSCOPY, GASTROSCOPY, DUODENOSCOPY (EGD), COMBINED N/A 5/30/2019    Procedure: Esophagogastroduodenoscopy with biopsy x2 APC;  Surgeon: Stephen Gasca MD;  [facility]     ESOPHAGOSCOPY, GASTROSCOPY, DUODENOSCOPY (EGD), COMBINED N/A 7/30/2020    Procedure: ESOPHAGOGASTRODUODENOSCOPY (EGD) with polypectomy by hot snare; argonplasma coaglution ablation of polyps;  Surgeon: Stephen Gasca MD;  [facility]     ESOPHAGOSCOPY, GASTROSCOPY, DUODENOSCOPY (EGD), RESECT MUCOSA, COMBINED N/A 7/25/2019    Procedure: Esophagogastroduodenoscopy with gastric mucosal resection and clip application;  Surgeon: Stephen Gasca MD;  [facility]     EXCISE POLYP RECTUM       LAPAROSCOPIC HYSTERECTOMY TOTAL, BILATERAL SALPINGO-OOPHORECTOMY, NODE DISSECTION, COMBINED Bilateral 2/25/2021    Procedure: Laparoscopic removal of uterus, cervix, both ovaries and fallopian tubes, sentinel lymph node dissection, cystoscopy;  Surgeon: Sandie Samaniego MD;  [facility]     LAPAROSCOPIC LYSIS ADHESIONS       SIGMOIDOSCOPY FLEXIBLE       SIGMOIDOSCOPY FLEXIBLE N/A 12/7/2015    Procedure: SIGMOIDOSCOPY FLEXIBLE;  Surgeon: Mariela Bearden MD;  [facility]     SIGMOIDOSCOPY FLEXIBLE N/A 3/28/2018    Procedure: SIGMOIDOSCOPY FLEXIBLE;  EGD/Flex Sig;  Surgeon: Stephen Gasca MD;  [facility]     SIGMOIDOSCOPY FLEXIBLE N/A 4/12/2018    Procedure: SIGMOIDOSCOPY FLEXIBLE;  Flexible Sigmoidoscopy with polyp ablation;  Surgeon: Stephen Gasca MD;  [facility]     SIGMOIDOSCOPY FLEXIBLE N/A  5/30/2019    Procedure: Flexible Sigmoidoscopy with polypectomy;  Surgeon: Stephen Gasca MD;  [facility]     SIGMOIDOSCOPY FLEXIBLE N/A 7/30/2020    Procedure: SIGMOIDOSCOPY, FLEXIBLE with Ablation of Polyps;  Surgeon: Stephen Gasca MD;  [facility]         Health Maintenance Due   Topic Date Due     ASTHMA ACTION PLAN  Never done     ASTHMA CONTROL TEST  Never done     ADVANCE CARE PLANNING  Never done     ZOSTER IMMUNIZATION (1 of 2) Never done     PREVENTIVE CARE VISIT  03/05/2020     COLORECTAL CANCER SCREENING  07/31/2020     PHQ-2  01/01/2021     MAMMO DIAGNOSTIC  08/03/2021     INFLUENZA VACCINE (1) 09/01/2021       Current Medications:     Current Outpatient Medications   Medication Sig Dispense Refill     albuterol 90 MCG/ACT inhaler Inhale 2 puffs into the lungs every 6 hours as needed.       calcium-magnesium (CALMAG) 500-250 MG TABS Take 1 tablet by mouth daily       celecoxib (CELEBREX) 100 MG capsule Take 1 capsule (100 mg) by mouth daily 90 capsule 3     docusate sodium (COLACE) 100 MG capsule Take 100 mg by mouth daily       fexofenadine (ALLEGRA) 180 MG tablet Take 180 mg by mouth daily.       MELATONIN PO Take 2 mg by mouth nightly as needed        multivitamin, therapeutic with minerals (THERA-VIT-M) TABS Take 3 tablets by mouth 2 times daily        Omega-3 Fatty Acids (OMEGA-3 FISH OIL PO) Take 2 g by mouth daily        triamcinolone (NASACORT) 55 MCG/ACT Inhaler Spray 2 sprays into both nostrils daily       ZOLMitriptan (ZOMIG) 5 MG tablet Take 1 tablet (5 mg) by mouth at onset of headache for migraine 90 tablet 3     acetaminophen (TYLENOL) 325 MG tablet Take 2 tablets (650 mg) by mouth every 6 hours as needed for mild pain (Patient not taking: Reported on 6/15/2021) 30 tablet 0     ondansetron (ZOFRAN) 4 MG tablet Take 1 tablet (4 mg) by mouth every 8 hours as needed for nausea (Patient not taking: Reported on 6/15/2021) 60 tablet 1         Allergies:        Allergies  "  Allergen Reactions     Corn-Related Products Shortness Of Breath     Avoids food; ok in tiny doses, such as in medicines  Avoids food; ok in tiny doses, such as in medicines     Cipro [Ciprofloxacin]      Reddened skin     Flagyl [Metronidazole] Nausea and Vomiting     Garlic Fatigue     GI distress     Keflex [Cephalosporins] Nausea and Vomiting     Omeprazole      PN: headache     Prilosec Otc [Omeprazole Magnesium]      migraines     Cephalexin Rash     PN: Rash, Generalized     Doxycycline Rash     blisters     Erythromycin Rash     Sulfa Drugs Rash        Social History:     Social History     Tobacco Use     Smoking status: Never Smoker     Smokeless tobacco: Never Used   Substance Use Topics     Alcohol use: Yes     Comment: 1 or 2 drinks per year       History   Drug Use No         Family History:     The patient's family history is notable for     Family History   Problem Relation Age of Onset     Hyperlipidemia Mother      Cerebrovascular Disease Mother      Cerebrovascular Disease Father      Colon Cancer Father 57         at 63     Prostate Cancer Brother 53     Prostate Cancer Maternal Uncle 65         Physical Exam:     /80   Pulse 67   Temp 98.5  F (36.9  C)   Resp 16   Ht 1.575 m (5' 2\")   Wt 72.6 kg (160 lb)   LMP 2019   SpO2 98%   BMI 29.26 kg/m    Body mass index is 29.26 kg/m .    General Appearance: healthy and alert, no distress     HEENT: no thyromegaly, no palpable nodules or masses        Cardiovascular: regular rate and rhythm, no gallops, rubs or murmurs     Respiratory: lungs clear, no rales, rhonchi or wheezes    Musculoskeletal: extremities non tender and without edema    Skin: no lesions or rashes     Neurological: normal gait, no gross defects     Psychiatric: appropriate mood and affect                               Hematological: normal cervical, supraclavicular and inguinal lymph nodes     Gastrointestinal:       abdomen soft, non-tender, non-distended, no " organomegaly or masses; laparoscopic incisions well healed.     Genitourinary: External genitalia and urethral meatus appears normal.  Vagina is smooth without nodularity or masses. Cervix surgically absent.  Bimanual exam reveal no masses, nodularity or fullness.  Recto-vaginal exam confirms these findings.      Assessment:    Sarah Duran is a 56 year old woman with a diagnosis of stage II, grade 1 endometrial adenocarcinoma. She is here today for an acute visit.     30 minutes spent on the date of the encounter doing chart review, history and exam, documentation, and further activities as noted above.        Plan:     1.)        Stage II, grade 1 endometrial adenocarcinoma. Reviewed signs and symptoms for when she should contact the clinic or seek additional care.  Discussed signs and symptoms of recurrence and to call if these should occur. Patient to contact the clinic with any questions or concerns in the interim. Discussed no need for further pap smear testing.  Discussed visits every 3 months for the first two years (5/23) then every 6 months for up to 5 years (5/26) then annually thereafter. Continue to use dilator for 3-5 minutes a multiple times per week as she has been.     2.) Genetic risk factors were assessed and the patient is POSITIVE for a APC mutation.  Specifically her mutation is called [id number],  consistent with a diagnosis of familial adenomatous polyposis (FAP) or attenuated FAP (AFAP). She follows with Clarissa Brown with the Oncology [facility] clinic.     3.) Labs and/or tests ordered include: none.     4.) Health maintenance issues addressed today include annual health maintenance and non-gynecologic issues with PCP. Encouraged follow up with PCP regarding ongoing mild fatigue - encouraged lab workup with PCP.         REYNOLD Sorto, NP-BC  Women's Health Nurse Practitioner  Division of Gynecologic Oncology  [facility]      CC  Patient  "Care Team:  Ida Sandoval MD as PCP - General (Family Practice)  Stephen Gasca MD as MD (Gastroenterology)  Treasure Cleveland GC as Genetic Counselor (Genetic )  Pancho Samson MD as MD (Colon and Rectal Surgery)  Maria De Jesus Evans, RN as Specialty Care Coordinator (Oncology)  Keila Burger MD as Assigned Cancer Care Provider      Oncology Rooming Note    September 3, 2021 2:26 PM   Sarah Duran is a 56 year old female who presents for:    Chief Complaint   Patient presents with     Oncology Clinic Visit     Initial Vitals: LMP 05/01/2019  Estimated body mass index is 29.52 kg/m  as calculated from the following:    Height as of 2/25/21: 1.575 m (5' 2\").    Weight as of 6/15/21: 73.2 kg (161 lb 6.4 oz). There is no height or weight on file to calculate BSA.  Data Unavailable Comment: Data Unavailable   Patient's last menstrual period was 05/01/2019.  Allergies reviewed: Yes  Medications reviewed: Yes    Medications: Medication refills not needed today.  Pharmacy name entered into [employer]:    [facility] [place], [place] - [address]  [facility] FOR [facility] ONLY - [place], [place]  [facility] [place], [place] - [address]  [facility] [place], [place] - [address] [facility] [place] - [address]   [place] [address]  [facility] [place], [place] - [address] [facility] [place], [place] - [id number]    Clinical concerns:  NP was notified.      Leena El MA                Again, thank you for allowing me to participate in the care of your patient.        Sincerely,        REYNOLD Moore CNP    "

## 2021-09-04 ENCOUNTER — HEALTH MAINTENANCE LETTER (OUTPATIENT)
Age: 56
End: 2021-09-04

## 2021-09-16 RX ORDER — LIDOCAINE 40 MG/G
CREAM TOPICAL
Status: CANCELLED | OUTPATIENT
Start: 2021-09-16

## 2021-09-19 ENCOUNTER — LAB (OUTPATIENT)
Dept: URGENT CARE | Facility: URGENT CARE | Age: 56
End: 2021-09-19
Attending: INTERNAL MEDICINE
Payer: COMMERCIAL

## 2021-09-19 DIAGNOSIS — Z11.59 ENCOUNTER FOR SCREENING FOR OTHER VIRAL DISEASES: ICD-10-CM

## 2021-09-19 PROCEDURE — U0005 INFEC AGEN DETEC AMPLI PROBE: HCPCS

## 2021-09-19 PROCEDURE — U0003 INFECTIOUS AGENT DETECTION BY NUCLEIC ACID (DNA OR RNA); SEVERE ACUTE RESPIRATORY SYNDROME CORONAVIRUS 2 (SARS-COV-2) (CORONAVIRUS DISEASE [COVID-19]), AMPLIFIED PROBE TECHNIQUE, MAKING USE OF HIGH THROUGHPUT TECHNOLOGIES AS DESCRIBED BY CMS-2020-01-R: HCPCS

## 2021-09-19 NOTE — BRIEF OP NOTE
[facility] Brief Operative Note    Pre-operative diagnosis: FAP (familial adenomatous polyposis) [D12.6]   Post-operative diagnosis As prior    Procedure: Procedure(s):  ENDOSCOPIC RETROGRADE CHOLANGIOPANCREATOGRAPHY  ESOPHAGOGASTRODUODENOSCOPY (EGD)  SIGMOIDOSCOPY, FLEXIBLE   Surgeon(s): Surgeon(s) and Role:  Panel 1:     * Shahab Fernández MD - Primary  Panel 2:     * Stephen Gasca MD - Primary   Estimated blood loss: 55 mL    Specimens: * No specimens in log *   Findings:      Flexible sigmoidoscopy  - Ten 2 to 4 mm polyps in the rectum, removed with a cold biopsy forceps.  Resected and retrieved.   - Patent ileo-rectal anastomosis and normal distal ileum.      - Discharge patient to home (ambulatory).   - Repeat flexible sigmoidoscopy in 1 year for surveillance (along with upper endoscopy).   - The findings and recommendations were discussed with the patient and their family.

## 2021-09-20 LAB — SARS-COV-2 RNA RESP QL NAA+PROBE: NEGATIVE

## 2021-09-20 RX ORDER — PYRIDOXINE HCL (VITAMIN B6) 100 MG
1 TABLET ORAL DAILY
COMMUNITY

## 2021-09-23 ENCOUNTER — ANESTHESIA EVENT (OUTPATIENT)
Dept: SURGERY | Facility: CLINIC | Age: 56
End: 2021-09-23
Payer: COMMERCIAL

## 2021-09-23 ENCOUNTER — HOSPITAL ENCOUNTER (OUTPATIENT)
Facility: CLINIC | Age: 56
Discharge: HOME | End: 2021-09-23
Attending: INTERNAL MEDICINE | Admitting: INTERNAL MEDICINE
Payer: COMMERCIAL

## 2021-09-23 ENCOUNTER — APPOINTMENT (OUTPATIENT)
Dept: GENERAL RADIOLOGY | Facility: CLINIC | Age: 56
End: 2021-09-23
Attending: INTERNAL MEDICINE
Payer: COMMERCIAL

## 2021-09-23 ENCOUNTER — ANESTHESIA (OUTPATIENT)
Dept: SURGERY | Facility: CLINIC | Age: 56
End: 2021-09-23
Payer: COMMERCIAL

## 2021-09-23 VITALS
RESPIRATION RATE: 16 BRPM | WEIGHT: 154.1 LBS | HEIGHT: 62 IN | BODY MASS INDEX: 28.36 KG/M2 | DIASTOLIC BLOOD PRESSURE: 71 MMHG | HEART RATE: 54 BPM | TEMPERATURE: 98.1 F | OXYGEN SATURATION: 96 % | SYSTOLIC BLOOD PRESSURE: 124 MMHG

## 2021-09-23 DIAGNOSIS — D13.91 FAP (FAMILIAL ADENOMATOUS POLYPOSIS): ICD-10-CM

## 2021-09-23 LAB
ALBUMIN SERPL-MCNC: 3.7 G/DL (ref 3.4–5)
ALP SERPL-CCNC: 46 U/L (ref 40–150)
ALT SERPL W P-5'-P-CCNC: 38 U/L (ref 0–50)
AMYLASE SERPL-CCNC: 135 U/L (ref 30–110)
ANION GAP SERPL CALCULATED.3IONS-SCNC: 6 MMOL/L (ref 3–14)
AST SERPL W P-5'-P-CCNC: 18 U/L (ref 0–45)
BILIRUB SERPL-MCNC: 0.8 MG/DL (ref 0.2–1.3)
BUN SERPL-MCNC: 14 MG/DL (ref 7–30)
CALCIUM SERPL-MCNC: 9.5 MG/DL (ref 8.5–10.1)
CHLORIDE BLD-SCNC: 109 MMOL/L (ref 94–109)
CO2 SERPL-SCNC: 23 MMOL/L (ref 20–32)
CREAT SERPL-MCNC: 0.83 MG/DL (ref 0.52–1.04)
ERCP: NORMAL
ERYTHROCYTE [DISTWIDTH] IN BLOOD BY AUTOMATED COUNT: 13 % (ref 10–15)
FLEXIBLE SIGMOIDOSCOPY: NORMAL
GFR SERPL CREATININE-BSD FRML MDRD: 79 ML/MIN/1.73M2
GLUCOSE BLD-MCNC: 84 MG/DL (ref 70–99)
GLUCOSE BLDC GLUCOMTR-MCNC: 89 MG/DL (ref 70–99)
HCT VFR BLD AUTO: 45 % (ref 35–47)
HGB BLD-MCNC: 14.9 G/DL (ref 11.7–15.7)
INR PPP: 1.02 (ref 0.85–1.15)
LIPASE SERPL-CCNC: 705 U/L (ref 73–393)
MCH RBC QN AUTO: 29.7 PG (ref 26.5–33)
MCHC RBC AUTO-ENTMCNC: 33.1 G/DL (ref 31.5–36.5)
MCV RBC AUTO: 90 FL (ref 78–100)
PLATELET # BLD AUTO: 194 10E3/UL (ref 150–450)
POTASSIUM BLD-SCNC: 3.8 MMOL/L (ref 3.4–5.3)
PROT SERPL-MCNC: 8 G/DL (ref 6.8–8.8)
RBC # BLD AUTO: 5.01 10E6/UL (ref 3.8–5.2)
SODIUM SERPL-SCNC: 138 MMOL/L (ref 133–144)
WBC # BLD AUTO: 4 10E3/UL (ref 4–11)

## 2021-09-23 PROCEDURE — 255N000002 HC RX 255 OP 636: Performed by: INTERNAL MEDICINE

## 2021-09-23 PROCEDURE — 250N000011 HC RX IP 250 OP 636: Performed by: NURSE ANESTHETIST, CERTIFIED REGISTERED

## 2021-09-23 PROCEDURE — 250N000025 HC SEVOFLURANE, PER MIN: Performed by: INTERNAL MEDICINE

## 2021-09-23 PROCEDURE — 710N000012 HC RECOVERY PHASE 2, PER MINUTE: Performed by: INTERNAL MEDICINE

## 2021-09-23 PROCEDURE — 250N000009 HC RX 250: Performed by: INTERNAL MEDICINE

## 2021-09-23 PROCEDURE — 272N000001 HC OR GENERAL SUPPLY STERILE: Performed by: INTERNAL MEDICINE

## 2021-09-23 PROCEDURE — 85018 HEMOGLOBIN: CPT | Performed by: INTERNAL MEDICINE

## 2021-09-23 PROCEDURE — 83690 ASSAY OF LIPASE: CPT | Performed by: INTERNAL MEDICINE

## 2021-09-23 PROCEDURE — 250N000009 HC RX 250: Performed by: NURSE ANESTHETIST, CERTIFIED REGISTERED

## 2021-09-23 PROCEDURE — 36415 COLL VENOUS BLD VENIPUNCTURE: CPT | Performed by: INTERNAL MEDICINE

## 2021-09-23 PROCEDURE — C1769 GUIDE WIRE: HCPCS | Performed by: INTERNAL MEDICINE

## 2021-09-23 PROCEDURE — 88305 TISSUE EXAM BY PATHOLOGIST: CPT | Mod: 26 | Performed by: PATHOLOGY

## 2021-09-23 PROCEDURE — 999N000179 XR SURGERY CARM FLUORO LESS THAN 5 MIN W STILLS: Mod: TC

## 2021-09-23 PROCEDURE — 82040 ASSAY OF SERUM ALBUMIN: CPT | Performed by: INTERNAL MEDICINE

## 2021-09-23 PROCEDURE — 82150 ASSAY OF AMYLASE: CPT | Performed by: INTERNAL MEDICINE

## 2021-09-23 PROCEDURE — 999N000141 HC STATISTIC PRE-PROCEDURE NURSING ASSESSMENT: Performed by: INTERNAL MEDICINE

## 2021-09-23 PROCEDURE — 360N000082 HC SURGERY LEVEL 2 W/ FLUORO, PER MIN: Performed by: INTERNAL MEDICINE

## 2021-09-23 PROCEDURE — 250N000011 HC RX IP 250 OP 636: Performed by: INTERNAL MEDICINE

## 2021-09-23 PROCEDURE — C1877 STENT, NON-COAT/COV W/O DEL: HCPCS | Performed by: INTERNAL MEDICINE

## 2021-09-23 PROCEDURE — 710N000010 HC RECOVERY PHASE 1, LEVEL 2, PER MIN: Performed by: INTERNAL MEDICINE

## 2021-09-23 PROCEDURE — 360N000083 HC SURGERY LEVEL 3 W/ FLUORO, PER MIN: Performed by: INTERNAL MEDICINE

## 2021-09-23 PROCEDURE — 258N000003 HC RX IP 258 OP 636: Performed by: NURSE ANESTHETIST, CERTIFIED REGISTERED

## 2021-09-23 PROCEDURE — 85610 PROTHROMBIN TIME: CPT | Performed by: INTERNAL MEDICINE

## 2021-09-23 PROCEDURE — 88305 TISSUE EXAM BY PATHOLOGIST: CPT | Mod: TC | Performed by: INTERNAL MEDICINE

## 2021-09-23 PROCEDURE — 370N000017 HC ANESTHESIA TECHNICAL FEE, PER MIN: Performed by: INTERNAL MEDICINE

## 2021-09-23 DEVICE — STENT FREEMAN PANCREA FLEX 4FRX11CM W/O FLANGE SGL PIGTAIL: Type: IMPLANTABLE DEVICE | Site: PANCREATIC DUCT | Status: FUNCTIONAL

## 2021-09-23 RX ORDER — NALOXONE HYDROCHLORIDE 0.4 MG/ML
0.4 INJECTION, SOLUTION INTRAMUSCULAR; INTRAVENOUS; SUBCUTANEOUS
Status: DISCONTINUED | OUTPATIENT
Start: 2021-09-23 | End: 2021-09-23 | Stop reason: HOSPADM

## 2021-09-23 RX ORDER — ONDANSETRON 2 MG/ML
4 INJECTION INTRAMUSCULAR; INTRAVENOUS EVERY 6 HOURS PRN
Status: DISCONTINUED | OUTPATIENT
Start: 2021-09-23 | End: 2021-09-23 | Stop reason: HOSPADM

## 2021-09-23 RX ORDER — ONDANSETRON 2 MG/ML
4 INJECTION INTRAMUSCULAR; INTRAVENOUS EVERY 30 MIN PRN
Status: DISCONTINUED | OUTPATIENT
Start: 2021-09-23 | End: 2021-09-23 | Stop reason: HOSPADM

## 2021-09-23 RX ORDER — LIDOCAINE 40 MG/G
CREAM TOPICAL
Status: DISCONTINUED | OUTPATIENT
Start: 2021-09-23 | End: 2021-09-23 | Stop reason: HOSPADM

## 2021-09-23 RX ORDER — ONDANSETRON 4 MG/1
4 TABLET, ORALLY DISINTEGRATING ORAL EVERY 6 HOURS PRN
Status: DISCONTINUED | OUTPATIENT
Start: 2021-09-23 | End: 2021-09-23 | Stop reason: HOSPADM

## 2021-09-23 RX ORDER — INDOMETHACIN 50 MG/1
SUPPOSITORY RECTAL PRN
Status: DISCONTINUED | OUTPATIENT
Start: 2021-09-23 | End: 2021-09-23 | Stop reason: HOSPADM

## 2021-09-23 RX ORDER — EPHEDRINE SULFATE 50 MG/ML
INJECTION, SOLUTION INTRAMUSCULAR; INTRAVENOUS; SUBCUTANEOUS PRN
Status: DISCONTINUED | OUTPATIENT
Start: 2021-09-23 | End: 2021-09-23

## 2021-09-23 RX ORDER — NALOXONE HYDROCHLORIDE 0.4 MG/ML
0.2 INJECTION, SOLUTION INTRAMUSCULAR; INTRAVENOUS; SUBCUTANEOUS
Status: DISCONTINUED | OUTPATIENT
Start: 2021-09-23 | End: 2021-09-23 | Stop reason: HOSPADM

## 2021-09-23 RX ORDER — HYDROMORPHONE HCL IN WATER/PF 6 MG/30 ML
0.4 PATIENT CONTROLLED ANALGESIA SYRINGE INTRAVENOUS EVERY 5 MIN PRN
Status: DISCONTINUED | OUTPATIENT
Start: 2021-09-23 | End: 2021-09-23 | Stop reason: HOSPADM

## 2021-09-23 RX ORDER — LIDOCAINE HYDROCHLORIDE 20 MG/ML
INJECTION, SOLUTION INFILTRATION; PERINEURAL PRN
Status: DISCONTINUED | OUTPATIENT
Start: 2021-09-23 | End: 2021-09-23

## 2021-09-23 RX ORDER — SODIUM CHLORIDE, SODIUM LACTATE, POTASSIUM CHLORIDE, CALCIUM CHLORIDE 600; 310; 30; 20 MG/100ML; MG/100ML; MG/100ML; MG/100ML
INJECTION, SOLUTION INTRAVENOUS CONTINUOUS
Status: DISCONTINUED | OUTPATIENT
Start: 2021-09-23 | End: 2021-09-23 | Stop reason: HOSPADM

## 2021-09-23 RX ORDER — DEXAMETHASONE SODIUM PHOSPHATE 4 MG/ML
INJECTION, SOLUTION INTRA-ARTICULAR; INTRALESIONAL; INTRAMUSCULAR; INTRAVENOUS; SOFT TISSUE PRN
Status: DISCONTINUED | OUTPATIENT
Start: 2021-09-23 | End: 2021-09-23

## 2021-09-23 RX ORDER — ONDANSETRON 4 MG/1
4 TABLET, ORALLY DISINTEGRATING ORAL EVERY 30 MIN PRN
Status: DISCONTINUED | OUTPATIENT
Start: 2021-09-23 | End: 2021-09-23 | Stop reason: HOSPADM

## 2021-09-23 RX ORDER — IOPAMIDOL 510 MG/ML
INJECTION, SOLUTION INTRAVASCULAR PRN
Status: DISCONTINUED | OUTPATIENT
Start: 2021-09-23 | End: 2021-09-23 | Stop reason: HOSPADM

## 2021-09-23 RX ORDER — FLUMAZENIL 0.1 MG/ML
0.2 INJECTION, SOLUTION INTRAVENOUS
Status: DISCONTINUED | OUTPATIENT
Start: 2021-09-23 | End: 2021-09-23 | Stop reason: HOSPADM

## 2021-09-23 RX ORDER — SODIUM CHLORIDE, SODIUM LACTATE, POTASSIUM CHLORIDE, CALCIUM CHLORIDE 600; 310; 30; 20 MG/100ML; MG/100ML; MG/100ML; MG/100ML
INJECTION, SOLUTION INTRAVENOUS CONTINUOUS PRN
Status: DISCONTINUED | OUTPATIENT
Start: 2021-09-23 | End: 2021-09-23

## 2021-09-23 RX ORDER — FENTANYL CITRATE 50 UG/ML
INJECTION, SOLUTION INTRAMUSCULAR; INTRAVENOUS PRN
Status: DISCONTINUED | OUTPATIENT
Start: 2021-09-23 | End: 2021-09-23

## 2021-09-23 RX ORDER — FENTANYL CITRATE 50 UG/ML
50 INJECTION, SOLUTION INTRAMUSCULAR; INTRAVENOUS EVERY 5 MIN PRN
Status: DISCONTINUED | OUTPATIENT
Start: 2021-09-23 | End: 2021-09-23 | Stop reason: HOSPADM

## 2021-09-23 RX ORDER — ONDANSETRON 2 MG/ML
INJECTION INTRAMUSCULAR; INTRAVENOUS PRN
Status: DISCONTINUED | OUTPATIENT
Start: 2021-09-23 | End: 2021-09-23

## 2021-09-23 RX ORDER — PROPOFOL 10 MG/ML
INJECTION, EMULSION INTRAVENOUS PRN
Status: DISCONTINUED | OUTPATIENT
Start: 2021-09-23 | End: 2021-09-23

## 2021-09-23 RX ORDER — OXYCODONE HYDROCHLORIDE 5 MG/1
5 TABLET ORAL EVERY 4 HOURS PRN
Status: DISCONTINUED | OUTPATIENT
Start: 2021-09-23 | End: 2021-09-23 | Stop reason: HOSPADM

## 2021-09-23 RX ORDER — MEPERIDINE HYDROCHLORIDE 25 MG/ML
12.5 INJECTION INTRAMUSCULAR; INTRAVENOUS; SUBCUTANEOUS
Status: DISCONTINUED | OUTPATIENT
Start: 2021-09-23 | End: 2021-09-23 | Stop reason: HOSPADM

## 2021-09-23 RX ADMIN — FENTANYL CITRATE 100 MCG: 50 INJECTION, SOLUTION INTRAMUSCULAR; INTRAVENOUS at 10:53

## 2021-09-23 RX ADMIN — SODIUM CHLORIDE, POTASSIUM CHLORIDE, SODIUM LACTATE AND CALCIUM CHLORIDE: 600; 310; 30; 20 INJECTION, SOLUTION INTRAVENOUS at 12:22

## 2021-09-23 RX ADMIN — PHENYLEPHRINE HYDROCHLORIDE 200 MCG: 10 INJECTION INTRAVENOUS at 11:49

## 2021-09-23 RX ADMIN — PHENYLEPHRINE HYDROCHLORIDE 200 MCG: 10 INJECTION INTRAVENOUS at 11:22

## 2021-09-23 RX ADMIN — ROCURONIUM BROMIDE 50 MG: 10 INJECTION INTRAVENOUS at 10:57

## 2021-09-23 RX ADMIN — MIDAZOLAM 2 MG: 1 INJECTION INTRAMUSCULAR; INTRAVENOUS at 10:47

## 2021-09-23 RX ADMIN — LIDOCAINE HYDROCHLORIDE 100 MG: 20 INJECTION, SOLUTION INFILTRATION; PERINEURAL at 10:55

## 2021-09-23 RX ADMIN — FENTANYL CITRATE 50 MCG: 50 INJECTION, SOLUTION INTRAMUSCULAR; INTRAVENOUS at 11:29

## 2021-09-23 RX ADMIN — GLUCAGON HYDROCHLORIDE 0.4 MG: KIT at 11:34

## 2021-09-23 RX ADMIN — PROPOFOL 110 MG: 10 INJECTION, EMULSION INTRAVENOUS at 10:55

## 2021-09-23 RX ADMIN — PHENYLEPHRINE HYDROCHLORIDE 100 MCG: 10 INJECTION INTRAVENOUS at 11:17

## 2021-09-23 RX ADMIN — DEXAMETHASONE SODIUM PHOSPHATE 8 MG: 4 INJECTION, SOLUTION INTRA-ARTICULAR; INTRALESIONAL; INTRAMUSCULAR; INTRAVENOUS; SOFT TISSUE at 10:55

## 2021-09-23 RX ADMIN — ONDANSETRON 4 MG: 2 INJECTION INTRAMUSCULAR; INTRAVENOUS at 14:57

## 2021-09-23 RX ADMIN — SUGAMMADEX 200 MG: 100 INJECTION, SOLUTION INTRAVENOUS at 12:15

## 2021-09-23 RX ADMIN — Medication 10 MG: at 12:05

## 2021-09-23 RX ADMIN — ONDANSETRON 4 MG: 2 INJECTION INTRAMUSCULAR; INTRAVENOUS at 12:05

## 2021-09-23 RX ADMIN — PHENYLEPHRINE HYDROCHLORIDE 100 MCG: 10 INJECTION INTRAVENOUS at 11:12

## 2021-09-23 RX ADMIN — SODIUM CHLORIDE, POTASSIUM CHLORIDE, SODIUM LACTATE AND CALCIUM CHLORIDE: 600; 310; 30; 20 INJECTION, SOLUTION INTRAVENOUS at 10:52

## 2021-09-23 ASSESSMENT — MIFFLIN-ST. JEOR: SCORE: 1242.25

## 2021-09-23 NOTE — DISCHARGE INSTRUCTIONS
[facility] [place]  Same-Day Surgery ERCP Procedure   Adult Discharge Orders & Instructions     You had a procedure known as an Endoscopic Retrograde Cholangiopancreatography (ERCP). Your healthcare provider performed the ERCP to look at your bile or pancreatic ducts, and to locate and/or treat blockages (dilation, stenting, removal, etc.) in these ducts. Often biopsies, small samples of tissue, are taken to help diagnose and/or classify stages of disease growth. This procedure is used to diagnose diseases of the pancreas, bile ducts, pancreatic duct, liver, and gallbladder.     After your procedure   1. Make sure to clarify with your healthcare provider any diet restrictions (For example, clear liquid, low fat, no caffeine, etc.)   2. Do NOT take aspirin containing medications or any other blood-thinning medicines (anticoagulants) until your healthcare provider says it's OK.   3. You MAY be prescribed antibiotics, depending on what was done and/or found during your EUS, make sure to take antibiotics as prescribed by your healthcare provider    For 24 hours after surgery  1. Get plenty of rest.  A responsible adult must stay with you for at least 24 hours after you leave the hospital.   2. Do not drive or use heavy equipment.  If you have weakness or tingling, don't drive or use heavy equipment until this feeling goes away.  3. Do not drink alcohol.  4. Avoid strenuous or risky activities (gym, yoga, cycling, etc.).  Ask for help when climbing stairs.   5. You may feel lightheaded.  IF so, sit for a few minutes before standing.  Have someone help you get up.   6. If you have nausea (feel sick to your stomach): Drink only clear liquids such as apple juice, ginger ale, broth or 7-Up.  Rest may also help.  Be sure to drink enough fluids.  Move to a regular diet as you feel able.  7. If you feel bloated or have too much gas, use a heating pad on your belly to help reduce the discomfort.  This should help you feel better.   8. You may have a slight fever. This is normal for the first 24 hours.   9. You may have a dry mouth, a sore throat, muscle aches or trouble sleeping.  These are normal and will go away after 24 hours. A sore throat is most common. Use lozenges or gargle with salt water to ease the discomfort.   10. Do not make important or legal decisions.      Call your doctor for any of the followin. Chest pain, and/or shortness of breath  2. Abdominal  pain, bloating or cramping that has not improved or does not respond to pain reliving medications (Tylenol or narcotics if prescribed)   3. Difficulty swallowing or feeling as though food or liquids are stuck in your throat   4. Sore throat lasting more than 2 days or pain that has worsened over time   5. Black or tarry stools   6. Nausea and/or vomiting that is not resolving or has not responded to anti-nausea medications prescribed to you   7. It has been over 8 to 10 hours since surgery and you are still not able to urinate (pass water)   8. Headache for over 24 hours   9. Fever over 100.5 F (38 C) lasting more than 24 hours after the procedure   10. Signs of jaundice or blockage (fever, chills, abdominal pain, yellowing of the whites of your eyes, yellowing of your skin, and/or passing darker than normal urine)     To contact a doctor, call:   [ ] Dr Fernández's clinic at [phone] (Monday thru Friday 8:00am to 4:30pm)   [ ] [phone] and ask for the GI resident on call (answered 24 hours a day)   [ ] Emergency Department: [facility]: [phone]     Take it easy when you get home.  Remember, same day surgery DOES NOT MEAN SAME DAY RECOVERY!  Healing is a gradual process.  You will need some time to recover - you may be more tired than you realize at first.  Rest and relax for at least the first 24 hours at home.  You'll feel better and heal faster if you take good care of yourself.

## 2021-09-23 NOTE — ANESTHESIA PREPROCEDURE EVALUATION
Anesthesia Pre-Procedure Evaluation    Patient: Sarah Duran   MRN: [id number] : 1965        Preoperative Diagnosis: FAP (familial adenomatous polyposis) [D12.6]   Procedure : Procedure(s):  ENDOSCOPIC RETROGRADE CHOLANGIOPANCREATOGRAPHY  ESOPHAGOGASTRODUODENOSCOPY (EGD)  SIGMOIDOSCOPY, FLEXIBLE     Past Medical History:   Diagnosis Date     Acute deep vein thrombosis (DVT) of left lower extremity (H)      Asthma      Atypical ductal hyperplasia of right breast 10/2016    fibrocystic changes, PASH and atypical ductal hyperplasia on needle biopsy, s/p R excisional biopsy with radioactive seed localization     Endometrial cancer (H) 02/15/2021     Factor 5 Leiden mutation, heterozygous (H)     factor 5     Familial adenomatous polyposis     clinical presentation; s/p total abdominal colectomy with TRACEY; confirmed APC+      GERD (gastroesophageal reflux disease)      Migraine      Monoallelic mutation of APC gene 2018    [id number]      Past Surgical History:   Procedure Laterality Date     BIOPSY BREAST SEED LOCALIZATION Right 2016    Procedure: BIOPSY BREAST SEED LOCALIZATION;  Surgeon: Jessica Rasheed MD;  Location:  OR     C LAP,SURG,COLECTOMY,W/ANAST      for colon cancer     COLECTOMY      subtotal     COMBINED ESOPHAGOSCOPY, GASTROSCOPY, DUODENOSCOPY (EGD) WITH ARGON PLASMA COAGULATOR (APC) N/A 2018    Procedure: COMBINED ESOPHAGOSCOPY, GASTROSCOPY, DUODENOSCOPY (EGD) WITH ARGON PLASMA COAGULATOR (APC);  Esophagogastroduodenoscopy with polypectomy and polyp ablation;  Surgeon: Stephen Gasca MD;  Location: [facility] OR     DILATION AND CURETTAGE, HYSTEROSCOPY DIAGNOSTIC, COMBINED N/A 2/10/2021    Procedure: HYSTEROSCOPY, DIAGNOSTIC, WITH DILATION AND CURETTAGE OF UTERUS;  Surgeon: Sandie Samaniego MD;  Location: [facility] OR     ENDOSCOPIC RETROGRADE CHOLANGIOPANCREATOGRAPHY       ENDOSCOPIC ULTRASOUND UPPER GASTROINTESTINAL TRACT (GI) N/A 2019     Procedure: Endoscopic Ultrasound;  Surgeon: Stephen Gasca MD;  Location: [facility] OR     ESOPHAGOSCOPY, GASTROSCOPY, DUODENOSCOPY (EGD), COMBINED  11/5/2012    Procedure: COMBINED ESOPHAGOSCOPY, GASTROSCOPY, DUODENOSCOPY (EGD), BIOPSY SINGLE OR MULTIPLE;;  Surgeon: Angélica Garcia MD;  Location: [facility] GI     ESOPHAGOSCOPY, GASTROSCOPY, DUODENOSCOPY (EGD), COMBINED  12/3/2013    Procedure: COMBINED ESOPHAGOSCOPY, GASTROSCOPY, DUODENOSCOPY (EGD);;  Surgeon: Angélica Garcia MD;  Location: [facility] GI     ESOPHAGOSCOPY, GASTROSCOPY, DUODENOSCOPY (EGD), COMBINED N/A 3/28/2018    Procedure: COMBINED ESOPHAGOSCOPY, GASTROSCOPY, DUODENOSCOPY (EGD);;  Surgeon: Stephen Gasca MD;  Location:  GI     ESOPHAGOSCOPY, GASTROSCOPY, DUODENOSCOPY (EGD), COMBINED N/A 5/30/2019    Procedure: Esophagogastroduodenoscopy with biopsy x2 APC;  Surgeon: Stephen Gasca MD;  Location: [facility] OR     ESOPHAGOSCOPY, GASTROSCOPY, DUODENOSCOPY (EGD), COMBINED N/A 7/30/2020    Procedure: ESOPHAGOGASTRODUODENOSCOPY (EGD) with polypectomy by hot snare; argonplasma coaglution ablation of polyps;  Surgeon: Stephen Gasca MD;  Location: [facility] OR     ESOPHAGOSCOPY, GASTROSCOPY, DUODENOSCOPY (EGD), RESECT MUCOSA, COMBINED N/A 7/25/2019    Procedure: Esophagogastroduodenoscopy with gastric mucosal resection and clip application;  Surgeon: Stephen Gasca MD;  Location: [facility] OR     EXCISE POLYP RECTUM       LAPAROSCOPIC HYSTERECTOMY TOTAL, BILATERAL SALPINGO-OOPHORECTOMY, NODE DISSECTION, COMBINED Bilateral 2/25/2021    Procedure: Laparoscopic removal of uterus, cervix, both ovaries and fallopian tubes, sentinel lymph node dissection, cystoscopy;  Surgeon: Sandie Samaniego MD;  Location: [facility] OR     LAPAROSCOPIC LYSIS ADHESIONS       SIGMOIDOSCOPY FLEXIBLE       SIGMOIDOSCOPY FLEXIBLE N/A 12/7/2015    Procedure: SIGMOIDOSCOPY FLEXIBLE;  Surgeon: Mariela Bearden MD;  Location: [facility] GI     SIGMOIDOSCOPY FLEXIBLE N/A 3/28/2018     Procedure: SIGMOIDOSCOPY FLEXIBLE;  EGD/Flex Sig;  Surgeon: Stephen Gasca MD;  Location: [facility] GI     SIGMOIDOSCOPY FLEXIBLE N/A 4/12/2018    Procedure: SIGMOIDOSCOPY FLEXIBLE;  Flexible Sigmoidoscopy with polyp ablation;  Surgeon: Stephen Gasca MD;  Location: [facility] OR     SIGMOIDOSCOPY FLEXIBLE N/A 5/30/2019    Procedure: Flexible Sigmoidoscopy with polypectomy;  Surgeon: Stephen Gasca MD;  Location: [facility] OR     SIGMOIDOSCOPY FLEXIBLE N/A 7/30/2020    Procedure: SIGMOIDOSCOPY, FLEXIBLE with Ablation of Polyps;  Surgeon: Stephen Gasca MD;  Location: [facility] OR      Allergies   Allergen Reactions     Corn-Related Products Shortness Of Breath     Avoids food; ok in tiny doses, such as in medicines  Avoids food; ok in tiny doses, such as in medicines     Cipro [Ciprofloxacin]      Reddened skin     Flagyl [Metronidazole] Nausea and Vomiting     Garlic Fatigue     GI distress     Keflex [Cephalosporins] Nausea and Vomiting     Omeprazole      PN: headache     Prilosec Otc [Omeprazole Magnesium]      migraines     Cephalexin Rash     PN: Rash, Generalized     Doxycycline Rash     blisters     Erythromycin Rash     Sulfa Drugs Rash     Ok to use       Social History     Tobacco Use     Smoking status: Never Smoker     Smokeless tobacco: Never Used   Substance Use Topics     Alcohol use: Not Currently     Comment: 1 or 2 drinks per year      Wt Readings from Last 1 Encounters:   09/03/21 72.6 kg (160 lb)        Anesthesia Evaluation   Pt has had prior anesthetic. Type: General and MAC.    History of anesthetic complications  - PONV.  Requests ondansetron intraop.    ROS/MED HX  ENT/Pulmonary:     (+) Intermittent, asthma Treatment: Inhaler prn,      Neurologic:     (+) migraines,  (-) no CVA and no TIA   Cardiovascular:  - neg cardiovascular ROS   (+) Dyslipidemia ----- (-) hypertension   METS/Exercise Tolerance: >4 METS    Hematologic: Comments: Factor V Leiden mutation, no history of blood clots    (+)  History of blood clots (Factor V Leiden deficiency), pt is anticoagulated,     Musculoskeletal:  - neg musculoskeletal ROS  (-) arthritis   GI/Hepatic: Comment: Familail adenomatous polyposis s/p total abdominal colectomy    (+) GERD,     Renal/Genitourinary:  - neg Renal ROS  (-) renal disease   Endo:    (-) Type II DM, thyroid disease, chronic steroid usage and obesity   Psychiatric/Substance Use:  - neg psychiatric ROS     Infectious Disease: Comment: COVID NEGATIVE 2/21/21   (-) HIV   Malignancy:   (+) Malignancy, History of Other.Other CA endometrial cancer Active status post.    Other:  - neg other ROS   (-) Any chance pregnant       Physical Exam    Airway        Mallampati: II   TM distance: > 3 FB   Neck ROM: full   Mouth opening: > 3 cm    Respiratory Devices and Support         Dental  no notable dental history         Cardiovascular   cardiovascular exam normal       Rhythm and rate: regular and normal     Pulmonary   pulmonary exam normal        breath sounds clear to auscultation           OUTSIDE LABS:  CBC:   Lab Results   Component Value Date    WBC 7.5 02/19/2021    WBC 4.4 07/25/2019    HGB 13.6 02/19/2021    HGB 13.6 07/25/2019    HCT 41.4 02/19/2021    HCT 43.8 07/25/2019     02/19/2021     07/25/2019     BMP:   Lab Results   Component Value Date     02/19/2021     03/20/2019    POTASSIUM 4.4 02/19/2021    POTASSIUM 4.0 03/20/2019    CHLORIDE 106 02/19/2021    CHLORIDE 110 (H) 03/20/2019    CO2 29 02/19/2021    CO2 24 03/20/2019    BUN 26 02/19/2021    BUN 16 07/25/2019    CR 0.85 02/19/2021    CR 0.90 03/20/2019    GLC 89 02/19/2021    GLC 97 03/20/2019     COAGS:   Lab Results   Component Value Date    PTT 26 05/07/2009    INR 0.99 07/25/2019     POC:   Lab Results   Component Value Date     (H) 02/25/2021    HCG Negative 07/25/2019     HEPATIC:   Lab Results   Component Value Date    ALBUMIN 3.4 02/19/2021    PROTTOTAL 7.3 02/19/2021    ALT 32 02/19/2021     AST 19 02/19/2021    ALKPHOS 52 02/19/2021    BILITOTAL 0.3 02/19/2021     OTHER:   Lab Results   Component Value Date    WOLF 9.9 02/19/2021    AMYLASE 103 11/16/2006    TSH 0.88 03/05/2019       Anesthesia Plan    ASA Status:  2   NPO Status:  NPO Appropriate    Anesthesia Type: General.     - Airway: ETT   Induction: Intravenous, Propofol.   Maintenance: Balanced.   Techniques and Equipment:     - Lines/Monitors: 2nd IV, BIS     Consents    Anesthesia Plan(s) and associated risks, benefits, and realistic alternatives discussed. Questions answered and patient/representative(s) expressed understanding.     - Discussed with:  Patient      - Extended Intubation/Ventilatory Support Discussed: No.      - Patient is DNR/DNI Status: No    Use of blood products discussed: No .     Postoperative Care    Pain management: IV analgesics.   PONV prophylaxis: Ondansetron (or other 5HT-3), Dexamethasone or Solumedrol, Scopolamine patch     Comments:                Dexter Schofield MD

## 2021-09-23 NOTE — ANESTHESIA PROCEDURE NOTES
Airway       Patient location during procedure: OR       Procedure Start/Stop Times: 9/23/2021 11:02 AM  Staff -        CRNA: Radha Suarez APRN CRNA       Performed By: CRNA  Consent for Airway        Urgency: elective  Indications and Patient Condition       Indications for airway management: nadeem-procedural       Induction type:intravenous       Mask difficulty assessment: 1 - vent by mask    Final Airway Details       Final airway type: endotracheal airway       Successful airway: ETT - single  Endotracheal Airway Details        ETT size (mm): 7.0       Cuffed: yes       Successful intubation technique: direct laryngoscopy       DL Blade Type: Blunt 2       Grade View of Cords: 1       Adjucts: stylet       Position: Right       Measured from: gums/teeth       Secured at (cm): 21       Bite block used: None    Post intubation assessment        Placement verified by: capnometry, equal breath sounds and chest rise        Number of attempts at approach: 1       Secured with: silk tape       Ease of procedure: easy       Dentition: Intact and Unchanged

## 2021-09-23 NOTE — OR NURSING
Dr Mcneal states that Dr Fernández and Dr Gasca do not need to see pt prior to d/c, reviewed amylase and lipase results and these are good, patient good to go home.

## 2021-09-23 NOTE — ANESTHESIA CARE TRANSFER NOTE
Patient: Sarah Duran    Procedure(s):  ENDOSCOPIC RETROGRADE CHOLANGIOPANCREATOGRAPHY WITH PANCREATIC STENT PLACEMENT AND POLYP ABLATION with Argon Plasma Coagulation  FLEXIBLE SIGMOIDOSCOPY WITH POLYPECTOMY    Diagnosis: FAP (familial adenomatous polyposis) [D12.6]  Diagnosis Additional Information: No value filed.    Anesthesia Type:   General     Note:    Oropharynx: oropharynx clear of all foreign objects and spontaneously breathing  Level of Consciousness: awake  Oxygen Supplementation: face mask  Level of Supplemental Oxygen (L/min / FiO2): 10  Independent Airway: airway patency satisfactory and stable  Dentition: dentition unchanged  Vital Signs Stable: post-procedure vital signs reviewed and stable  Report to RN Given: handoff report given  Patient transferred to: PACU    Handoff Report: Identifed the Patient, Identified the Reponsible Provider, Reviewed the pertinent medical history, Discussed the surgical course, Reviewed Intra-OP anesthesia mangement and issues during anesthesia, Set expectations for post-procedure period and Allowed opportunity for questions and acknowledgement of understanding      Vitals:  Vitals Value Taken Time   /79 09/23/21 1221   Temp     Pulse 74 09/23/21 1222   Resp     SpO2 100 % 09/23/21 1222   Vitals shown include unvalidated device data.    Electronically Signed By: Aline Mason CRNA, APRN CRNA  September 23, 2021  12:23 PM

## 2021-09-23 NOTE — ANESTHESIA POSTPROCEDURE EVALUATION
Patient: Sarah Duran    Procedure(s):  ENDOSCOPIC RETROGRADE CHOLANGIOPANCREATOGRAPHY WITH PANCREATIC STENT PLACEMENT AND POLYP ABLATION with Argon Plasma Coagulation  FLEXIBLE SIGMOIDOSCOPY WITH POLYPECTOMY    Diagnosis:FAP (familial adenomatous polyposis) [D12.6]  Diagnosis Additional Information: No value filed.    Anesthesia Type:  General    Note:  Disposition: Outpatient   Postop Pain Control: Uneventful            Sign Out: Well controlled pain   PONV: No   Neuro/Psych: Uneventful            Sign Out: Acceptable/Baseline neuro status   Airway/Respiratory: Uneventful            Sign Out: Acceptable/Baseline resp. status   CV/Hemodynamics: Uneventful            Sign Out: Acceptable CV status; No obvious hypovolemia; No obvious fluid overload   Other NRE: NONE   DID A NON-ROUTINE EVENT OCCUR? No           Last vitals:  Vitals Value Taken Time   /79 09/23/21 1230   Temp     Pulse 56 09/23/21 1231   Resp 11 09/23/21 1231   SpO2 100 % 09/23/21 1231   Vitals shown include unvalidated device data.    Electronically Signed By: Dexter Schofield MD  September 23, 2021  12:32 PM   Problem: Patient Care Overview (Adult)  Goal: Plan of Care Review  Outcome: Ongoing (interventions implemented as appropriate)    07/10/17 [id number]   Coping/Psychosocial Response Interventions   Plan Of Care Reviewed With patient   Patient Care Overview   Progress improving   Outcome Evaluation   Outcome Summary/Follow up Plan Pt increasing with bed mobility and transfer safety and I to RWX

## 2021-09-28 ENCOUNTER — PATIENT OUTREACH (OUTPATIENT)
Dept: GASTROENTEROLOGY | Facility: CLINIC | Age: 56
End: 2021-09-28

## 2021-09-28 DIAGNOSIS — Z46.89 ENCOUNTER FOR REMOVAL OF PANCREATIC STENT: Primary | ICD-10-CM

## 2021-09-28 LAB
PATH REPORT.COMMENTS IMP SPEC: NORMAL
PATH REPORT.COMMENTS IMP SPEC: NORMAL
PATH REPORT.FINAL DX SPEC: NORMAL
PATH REPORT.GROSS SPEC: NORMAL
PATH REPORT.MICROSCOPIC SPEC OTHER STN: NORMAL
PATH REPORT.RELEVANT HX SPEC: NORMAL
PHOTO IMAGE: NORMAL

## 2021-09-28 NOTE — TELEPHONE ENCOUNTER
Post ERCP (9/22/21) with Dr. Fernández: Follow-up    Post procedure recommendations:   Confirm spontaneous stent passage by performing a KUB  x-ray in 4 weeks.    - Repeat ERCP + flex sig w/ Mallery in 1 year for surveillance.                                                                          Orders placed: KUB    Patient states: left message, MyCHart sent    Clinic contact and scheduling numbers verified for future questions/concerns.    Crystal Martínez, RN Care Coordinator

## 2021-10-08 ENCOUNTER — HOSPITAL ENCOUNTER (OUTPATIENT)
Dept: MAMMOGRAPHY | Facility: CLINIC | Age: 56
Discharge: HOME | End: 2021-10-08
Attending: CLINICAL NURSE SPECIALIST | Admitting: CLINICAL NURSE SPECIALIST
Payer: COMMERCIAL

## 2021-10-08 DIAGNOSIS — R92.30 DENSE BREAST: ICD-10-CM

## 2021-10-08 DIAGNOSIS — Z12.39 BREAST CANCER SCREENING, HIGH RISK PATIENT: ICD-10-CM

## 2021-10-08 DIAGNOSIS — N60.91 ATYPICAL DUCTAL HYPERPLASIA OF RIGHT BREAST: ICD-10-CM

## 2021-10-08 PROCEDURE — 77063 BREAST TOMOSYNTHESIS BI: CPT

## 2021-10-22 ENCOUNTER — ANCILLARY PROCEDURE (OUTPATIENT)
Dept: GENERAL RADIOLOGY | Facility: CLINIC | Age: 56
End: 2021-10-22
Attending: INTERNAL MEDICINE
Payer: COMMERCIAL

## 2021-10-22 DIAGNOSIS — Z46.89 ENCOUNTER FOR REMOVAL OF PANCREATIC STENT: ICD-10-CM

## 2021-10-22 PROCEDURE — 74019 RADEX ABDOMEN 2 VIEWS: CPT | Performed by: RADIOLOGY

## 2021-11-04 ENCOUNTER — CARE COORDINATION (OUTPATIENT)
Dept: GASTROENTEROLOGY | Facility: CLINIC | Age: 56
End: 2021-11-04

## 2021-12-14 NOTE — PROGRESS NOTES
Follow Up Notes on Referred Patient    Date: 2021       RE: Sarah Duran  : 1965  ANDREI: 2021      Sarah Duran is a 56 year old woman with a diagnosis of stage II, grade 1 endometrial adenocarcinoma. She is here today for follow up and surveillance.     Cancer Course:     She reports she began having daily bleeding in .  She notes she had previously gone 15 months with no bleeding.  She reports the bleeding is sometimes heavy like a period but then can also be somewhat light and only like spotting.  She had an attempted EMB with her primary gynecologist, however this was unsuccessful.  She is here today for a second opinion.     21:  US Pelvis:  Uterus: Measures 8.9 x 5.8 x 6.4 cm. The endometrium is thickened at 2.9 cm. The endometrium is heterogeneous in appearance with an irregular, nodular contour of the endometrial surface. There is hypoechoic complex fluid in the endometrial cavity likely containing blood. There is vascularity of the endometrium on color imaging. The abnormal endometrium occupies the majority of the uterus. Right Ovary: Measures 2.0 x 1.0 x 1.1 cm and appears unremarkable Right Ovary Blood Flow: Present. Left Ovary: Not seen. Free Fluid: No significant free fluid.There are no suspicious adnexal masses.     2/10/21:  Hysteroscopy, D&C                 Pathology:  Grade 1 endometrial adenocarcinoma, MMR intact, p53 wild type, ER/GA +     21:  Total laparoscopic hysterectomy, bilateral salpingo-oophorectomy, bilateral sentinel lymph node dissection, cystoscopy                 Pathology:  Stage II, grade 1 endometrial adenocarcinoma, tumor size 5.6 cm, 1/13 mm myometrial invasion, +CASEY, + cervix, 0/2 sentinel LN, no LVSI     21:  Completed brachytherapy to 3000cGy in 5 Fx to the vagina        Today Sarah comes to the clinic overall feeling well without any gynecological concerns. She is using the dilator every other day. She is not  sexually active. She denies any vaginal bleeding, no changes in her bowel or bladder habits, no nausea/emesis, no lower extremity edema, and no difficulties eating or sleeping. She denies any abdominal discomfort/bloating, no fevers or chills, and no chest pain or shortness of breath. She continues to work on building up her strength and stamina with [employer] cycling classes.      Health Maintenance  Colonoscopy- 9/23/21, repeat in 1 year   Mammogram- 10/8/21, negative    Annual physical- 9/2021      Review of Systems:    Systemic           no weight changes; no fever; no chills; no night sweats; no appetite changes, +mild hot flashes   Skin           no rashes, or lesions  Eye           no irritation; no changes in vision  Alise-Laryngeal           no dysphagia; no hoarseness   Pulmonary    no cough; no shortness of breath  Cardiovascular    no chest pain; no palpitations  Gastrointestinal    no diarrhea; no constipation; no abdominal pain; no changes in bowel  habits; no blood in stool  Genitourinary   no urinary frequency; no urinary urgency; no dysuria; no pain; no abnormal vaginal discharge; no abnormal vaginal bleeding  Breast    no breast discharge; no breast changes; no breast pain  Musculoskeletal    no myalgias; no arthralgias; no back pain  Psychiatric           no depressed mood; no anxiety    Hematologic           no tender lymph nodes; no noticeable swellings or lumps   Endocrine    no hot flashes; no heat/cold intolerance         Neurological   no tremor; no numbness and tingling; no headaches; no difficulty  sleeping      Past Medical History:    Past Medical History:   Diagnosis Date     Acute deep vein thrombosis (DVT) of left lower extremity (H)      Asthma      Atypical ductal hyperplasia of right breast 10/2016    fibrocystic changes, PASH and atypical ductal hyperplasia on needle biopsy, s/p R excisional biopsy with radioactive seed localization     Endometrial cancer (H) 02/15/2021     Factor 5  Leiden mutation, heterozygous (H)     factor 5     Familial adenomatous polyposis 1988    clinical presentation; s/p total abdominal colectomy with TRACEY; confirmed APC+ 2018     GERD (gastroesophageal reflux disease)      Migraine      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    [id number]         Past Surgical History:    Past Surgical History:   Procedure Laterality Date     BIOPSY BREAST SEED LOCALIZATION Right 11/30/2016    Procedure: BIOPSY BREAST SEED LOCALIZATION;  Surgeon: Jessica Rasheed MD;  Location: [facility] OR     C LAP,SURG,COLECTOMY,W/ANAST      for colon cancer     COLECTOMY      subtotal     COMBINED ESOPHAGOSCOPY, GASTROSCOPY, DUODENOSCOPY (EGD) WITH ARGON PLASMA COAGULATOR (APC) N/A 4/12/2018    Procedure: COMBINED ESOPHAGOSCOPY, GASTROSCOPY, DUODENOSCOPY (EGD) WITH ARGON PLASMA COAGULATOR (APC);  Esophagogastroduodenoscopy with polypectomy and polyp ablation;  Surgeon: Stephen Gasca MD;  Location: [facility] OR     DILATION AND CURETTAGE, HYSTEROSCOPY DIAGNOSTIC, COMBINED N/A 2/10/2021    Procedure: HYSTEROSCOPY, DIAGNOSTIC, WITH DILATION AND CURETTAGE OF UTERUS;  Surgeon: Sandie Samaniego MD;  Location: [facility] OR     ENDOSCOPIC RETROGRADE CHOLANGIOPANCREATOGRAM N/A 9/23/2021    Procedure: ENDOSCOPIC RETROGRADE CHOLANGIOPANCREATOGRAPHY WITH PANCREATIC STENT PLACEMENT AND POLYP ABLATION with Argon Plasma Coagulation;  Surgeon: Shahab Fernández MD;  Location: [facility]     ENDOSCOPIC RETROGRADE CHOLANGIOPANCREATOGRAPHY       ENDOSCOPIC ULTRASOUND UPPER GASTROINTESTINAL TRACT (GI) N/A 7/25/2019    Procedure: Endoscopic Ultrasound;  Surgeon: Stephen Gasca MD;  Location: [facility]     ESOPHAGOSCOPY, GASTROSCOPY, DUODENOSCOPY (EGD), COMBINED  11/5/2012    Procedure: COMBINED ESOPHAGOSCOPY, GASTROSCOPY, DUODENOSCOPY (EGD), BIOPSY SINGLE OR MULTIPLE;;  Surgeon: Angélica Garcia MD;  Location: [facility]     ESOPHAGOSCOPY, GASTROSCOPY, DUODENOSCOPY (EGD), COMBINED  12/3/2013     Procedure: COMBINED ESOPHAGOSCOPY, GASTROSCOPY, DUODENOSCOPY (EGD);;  Surgeon: Angélica Garcia MD;  Location: [facility]     ESOPHAGOSCOPY, GASTROSCOPY, DUODENOSCOPY (EGD), COMBINED N/A 3/28/2018    Procedure: COMBINED ESOPHAGOSCOPY, GASTROSCOPY, DUODENOSCOPY (EGD);;  Surgeon: Stephen Gasca MD;  Location: [facility]     ESOPHAGOSCOPY, GASTROSCOPY, DUODENOSCOPY (EGD), COMBINED N/A 5/30/2019    Procedure: Esophagogastroduodenoscopy with biopsy x2 APC;  Surgeon: Stephen Gasca MD;  Location: [facility]     ESOPHAGOSCOPY, GASTROSCOPY, DUODENOSCOPY (EGD), COMBINED N/A 7/30/2020    Procedure: ESOPHAGOGASTRODUODENOSCOPY (EGD) with polypectomy by hot snare; argonplasma coaglution ablation of polyps;  Surgeon: Stephen Gasca MD;  Location: [facility]     ESOPHAGOSCOPY, GASTROSCOPY, DUODENOSCOPY (EGD), RESECT MUCOSA, COMBINED N/A 7/25/2019    Procedure: Esophagogastroduodenoscopy with gastric mucosal resection and clip application;  Surgeon: Stephen Gasca MD;  Location: [facility]     EXCISE POLYP RECTUM       LAPAROSCOPIC HYSTERECTOMY TOTAL, BILATERAL SALPINGO-OOPHORECTOMY, NODE DISSECTION, COMBINED Bilateral 2/25/2021    Procedure: Laparoscopic removal of uterus, cervix, both ovaries and fallopian tubes, sentinel lymph node dissection, cystoscopy;  Surgeon: Sandie Samaniego MD;  Location: [facility]     LAPAROSCOPIC LYSIS ADHESIONS       SIGMOIDOSCOPY FLEXIBLE       SIGMOIDOSCOPY FLEXIBLE N/A 12/7/2015    Procedure: SIGMOIDOSCOPY FLEXIBLE;  Surgeon: Mariela Bearden MD;  Location: [facility]     SIGMOIDOSCOPY FLEXIBLE N/A 3/28/2018    Procedure: SIGMOIDOSCOPY FLEXIBLE;  EGD/Flex Sig;  Surgeon: Stephen Gasca MD;  Location: [facility]     SIGMOIDOSCOPY FLEXIBLE N/A 4/12/2018    Procedure: SIGMOIDOSCOPY FLEXIBLE;  Flexible Sigmoidoscopy with polyp ablation;  Surgeon: Stephen Gasca MD;  Location: [facility]     SIGMOIDOSCOPY FLEXIBLE N/A 5/30/2019    Procedure: Flexible Sigmoidoscopy with polypectomy;  Surgeon:  Stephen Gasca MD;  Location: [facility]     SIGMOIDOSCOPY FLEXIBLE N/A 7/30/2020    Procedure: SIGMOIDOSCOPY, FLEXIBLE with Ablation of Polyps;  Surgeon: Stephen Gasca MD;  Location: [facility]     SIGMOIDOSCOPY FLEXIBLE N/A 9/23/2021    Procedure: FLEXIBLE SIGMOIDOSCOPY WITH POLYPECTOMY;  Surgeon: Stephen Gasca MD;  Location: [facility]         Health Maintenance Due   Topic Date Due     ASTHMA ACTION PLAN  Never done     ASTHMA CONTROL TEST  Never done     ADVANCE CARE PLANNING  Never done     ZOSTER IMMUNIZATION (1 of 2) Never done     PREVENTIVE CARE VISIT  03/05/2020     PHQ-2  01/01/2021     COLORECTAL CANCER SCREENING  09/24/2021     COVID-19 Vaccine (3 - Booster for Pfizer series) 10/10/2021     DTAP/TDAP/TD IMMUNIZATION (3 - Td or Tdap) 10/12/2021       Current Medications:     Current Outpatient Medications   Medication Sig Dispense Refill     acetaminophen (TYLENOL) 325 MG tablet Take 2 tablets (650 mg) by mouth every 6 hours as needed for mild pain 30 tablet 0     albuterol 90 MCG/ACT inhaler Inhale 2 puffs into the lungs every 6 hours as needed.       calcium-magnesium (CALMAG) 500-250 MG TABS Take 1 tablet by mouth daily       celecoxib (CELEBREX) 100 MG capsule Take 1 capsule (100 mg) by mouth daily 90 capsule 3     Cranberry 500 MG CAPS Take 1 capsule by mouth daily       docusate sodium (COLACE) 100 MG capsule Take 100 mg by mouth daily       fexofenadine (ALLEGRA) 180 MG tablet Take 180 mg by mouth daily.       Lactobacillus (PROBIOTIC ACIDOPHILUS PO) Take 100 mg by mouth daily       MELATONIN PO Take 2 mg by mouth nightly as needed        multivitamin, therapeutic with minerals (THERA-VIT-M) TABS Take 3 tablets by mouth 2 times daily        Omega-3 Fatty Acids (OMEGA-3 FISH OIL PO) Take 2 g by mouth daily        ondansetron (ZOFRAN) 4 MG tablet Take 1 tablet (4 mg) by mouth every 8 hours as needed for nausea 60 tablet 1     triamcinolone (NASACORT) 55 MCG/ACT Inhaler Spray 2 sprays into  both nostrils daily       ZOLMitriptan (ZOMIG) 5 MG tablet Take 1 tablet (5 mg) by mouth at onset of headache for migraine 90 tablet 3         Allergies:        Allergies   Allergen Reactions     Corn-Related Products Shortness Of Breath     Avoids food; ok in tiny doses, such as in medicines  Avoids food; ok in tiny doses, such as in medicines     Cipro [Ciprofloxacin]      Reddened skin     Flagyl [Metronidazole] Nausea and Vomiting     Garlic Fatigue     GI distress     Keflex [Cephalosporins] Nausea and Vomiting     Omeprazole      PN: headache     Prilosec Otc [Omeprazole Magnesium]      migraines     Cephalexin Rash     PN: Rash, Generalized     Doxycycline Rash     blisters     Erythromycin Rash     Sulfa Drugs Rash     Ok to use         Social History:     Social History     Tobacco Use     Smoking status: Never Smoker     Smokeless tobacco: Never Used   Substance Use Topics     Alcohol use: Not Currently     Comment: 1 or 2 drinks per year       History   Drug Use No         Family History:     The patient's family history is notable for     Family History   Problem Relation Age of Onset     Hyperlipidemia Mother      Cerebrovascular Disease Mother      Cerebrovascular Disease Father      Colon Cancer Father 57         at 63     Prostate Cancer Brother 53     Prostate Cancer Maternal Uncle 65         Physical Exam:     /81 (BP Location: Left arm, Patient Position: Sitting, Cuff Size: Adult Regular)   Pulse 58   Temp 97.4  F (36.3  C) (Oral)   Resp 18   Wt 72.8 kg (160 lb 6.4 oz)   LMP 2019   SpO2 100%   BMI 29.34 kg/m    Body mass index is 29.34 kg/m .    General Appearance: healthy and alert, no distress     HEENT: no thyromegaly, no palpable nodules or masses        Cardiovascular: regular rate and rhythm, no gallops, rubs or murmurs     Respiratory: lungs clear, no rales, rhonchi or wheezes    Musculoskeletal: extremities non tender and without edema    Skin: no lesions or rashes      Neurological: normal gait, no gross defects     Psychiatric: appropriate mood and affect                               Hematological: normal cervical, supraclavicular and inguinal lymph nodes     Gastrointestinal:       abdomen soft, non-tender, non-distended, no organomegaly or masses; laparoscopic incisions well healed.     Genitourinary: External genitalia and urethral meatus appears normal.  Vagina is smooth without nodularity or masses. Cervix surgically absent.  Bimanual exam reveal no masses, nodularity or fullness.  Recto-vaginal exam confirms these findings. Small external hemorrhoid soft.       Assessment:    Sarah Duran is a 56 year old woman with a diagnosis of stage II, grade 1 endometrial adenocarcinoma. She is here today for an acute visit.     20 minutes spent on the date of the encounter doing chart review, history and exam, documentation, and further activities as noted above.        Plan:     1.)        Stage II, grade 1 endometrial adenocarcinoma. HAROON on exam. Reviewed signs and symptoms for when she should contact the clinic or seek additional care.  Discussed signs and symptoms of recurrence and to call if these should occur. Patient to contact the clinic with any questions or concerns in the interim. Discussed no need for further pap smear testing.  Discussed visits every 3 months for the first two years (5/23) then every 6 months for up to 5 years (5/26) then annually thereafter. Continue to use dilator for 3-5 minutes a multiple times per week as she has been.     2.) Genetic risk factors were assessed and the patient is POSITIVE for a APC mutation.  Specifically her mutation is called [id number],  consistent with a diagnosis of familial adenomatous polyposis (FAP) or attenuated FAP (AFAP). She follows with Clarissa Brown with the [facility].     3.) Labs and/or tests ordered include: none.     4.) Health maintenance issues addressed today include annual  health maintenance and non-gynecologic issues with PCP.          REYNOLD Sorto, WHNP-BC  Women's Health Nurse Practitioner  Division of Gynecologic Oncology  [facility]      CC  Patient Care Team:  Ida Sandoval MD as PCP - General (Family Practice)  Stephen Gasca MD as MD (Gastroenterology)  Treasure Cleveland GC as Genetic Counselor (Genetic )  Pancho Samson MD as MD (Colon and Rectal Surgery)  Maria De Jesus Evans RN as Specialty Care Coordinator (Oncology)  Keila Burger MD as Assigned Cancer Care Provider

## 2021-12-17 ENCOUNTER — ONCOLOGY VISIT (OUTPATIENT)
Dept: ONCOLOGY | Facility: CLINIC | Age: 56
End: 2021-12-17
Attending: OBSTETRICS & GYNECOLOGY
Payer: COMMERCIAL

## 2021-12-17 VITALS
HEART RATE: 58 BPM | OXYGEN SATURATION: 100 % | DIASTOLIC BLOOD PRESSURE: 81 MMHG | RESPIRATION RATE: 18 BRPM | BODY MASS INDEX: 29.34 KG/M2 | WEIGHT: 160.4 LBS | SYSTOLIC BLOOD PRESSURE: 128 MMHG | TEMPERATURE: 97.4 F

## 2021-12-17 DIAGNOSIS — C54.1 ENDOMETRIAL CANCER (H): Primary | ICD-10-CM

## 2021-12-17 PROCEDURE — G0463 HOSPITAL OUTPT CLINIC VISIT: HCPCS

## 2021-12-17 PROCEDURE — 99213 OFFICE O/P EST LOW 20 MIN: CPT | Performed by: OBSTETRICS & GYNECOLOGY

## 2021-12-17 ASSESSMENT — PAIN SCALES - GENERAL: PAINLEVEL: NO PAIN (0)

## 2021-12-17 NOTE — LETTER
2021         RE: Sarah Duran  [address] [facility] [address]        Dear Colleague,    Thank you for referring your patient, Sarah Duran, to the [facility]. Please see a copy of my visit note below.                     Follow Up Notes on Referred Patient    Date: 2021       RE: Sarah Duran  : 1965  ANDREI: 2021      Sarah Duran is a 56 year old woman with a diagnosis of stage II, grade 1 endometrial adenocarcinoma. She is here today for follow up and surveillance.     Cancer Course:     She reports she began having daily bleeding in .  She notes she had previously gone 15 months with no bleeding.  She reports the bleeding is sometimes heavy like a period but then can also be somewhat light and only like spotting.  She had an attempted EMB with her primary gynecologist, however this was unsuccessful.  She is here today for a second opinion.     21:  US Pelvis:  Uterus: Measures 8.9 x 5.8 x 6.4 cm. The endometrium is thickened at 2.9 cm. The endometrium is heterogeneous in appearance with an irregular, nodular contour of the endometrial surface. There is hypoechoic complex fluid in the endometrial cavity likely containing blood. There is vascularity of the endometrium on color imaging. The abnormal endometrium occupies the majority of the uterus. Right Ovary: Measures 2.0 x 1.0 x 1.1 cm and appears unremarkable Right Ovary Blood Flow: Present. Left Ovary: Not seen. Free Fluid: No significant free fluid.There are no suspicious adnexal masses.     2/10/21:  Hysteroscopy, D&C                 Pathology:  Grade 1 endometrial adenocarcinoma, MMR intact, p53 wild type, ER/RI +     21:  Total laparoscopic hysterectomy, bilateral salpingo-oophorectomy, bilateral sentinel lymph node dissection, cystoscopy                 Pathology:  Stage II, grade 1 endometrial adenocarcinoma, tumor size 5.6 cm, 1/13 mm myometrial invasion, +CASEY, +  cervix, 0/2 sentinel LN, no LVSI     4/26/21:  Completed brachytherapy to 3000cGy in 5 Fx to the vagina        Today Sarah comes to the clinic overall feeling well without any gynecological concerns. She is using the dilator every other day. She is not sexually active. She denies any vaginal bleeding, no changes in her bowel or bladder habits, no nausea/emesis, no lower extremity edema, and no difficulties eating or sleeping. She denies any abdominal discomfort/bloating, no fevers or chills, and no chest pain or shortness of breath. She continues to work on building up her strength and stamina with [email or web address] classes.      Health Maintenance  Colonoscopy- 9/23/21, repeat in 1 year   Mammogram- 10/8/21, negative    Annual physical- 9/2021      Review of Systems:    Systemic           no weight changes; no fever; no chills; no night sweats; no appetite changes, +mild hot flashes   Skin           no rashes, or lesions  Eye           no irritation; no changes in vision  Alise-Laryngeal           no dysphagia; no hoarseness   Pulmonary    no cough; no shortness of breath  Cardiovascular    no chest pain; no palpitations  Gastrointestinal    no diarrhea; no constipation; no abdominal pain; no changes in bowel  habits; no blood in stool  Genitourinary   no urinary frequency; no urinary urgency; no dysuria; no pain; no abnormal vaginal discharge; no abnormal vaginal bleeding  Breast    no breast discharge; no breast changes; no breast pain  Musculoskeletal    no myalgias; no arthralgias; no back pain  Psychiatric           no depressed mood; no anxiety    Hematologic           no tender lymph nodes; no noticeable swellings or lumps   Endocrine    no hot flashes; no heat/cold intolerance         Neurological   no tremor; no numbness and tingling; no headaches; no difficulty  sleeping      Past Medical History:    Past Medical History:   Diagnosis Date     Acute deep vein thrombosis (DVT) of left lower extremity (H)       Asthma      Atypical ductal hyperplasia of right breast 10/2016    fibrocystic changes, PASH and atypical ductal hyperplasia on needle biopsy, s/p R excisional biopsy with radioactive seed localization     Endometrial cancer (H) 02/15/2021     Factor 5 Leiden mutation, heterozygous (H)     factor 5     Familial adenomatous polyposis 1988    clinical presentation; s/p total abdominal colectomy with TRACEY; confirmed APC+ 2018     GERD (gastroesophageal reflux disease)      Migraine      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    [id number]         Past Surgical History:    Past Surgical History:   Procedure Laterality Date     BIOPSY BREAST SEED LOCALIZATION Right 11/30/2016    Procedure: BIOPSY BREAST SEED LOCALIZATION;  Surgeon: Jessica Rasheed MD;  Location: [facility] OR     C LAP,SURG,COLECTOMY,W/ANAST      for colon cancer     COLECTOMY      subtotal     COMBINED ESOPHAGOSCOPY, GASTROSCOPY, DUODENOSCOPY (EGD) WITH ARGON PLASMA COAGULATOR (APC) N/A 4/12/2018    Procedure: COMBINED ESOPHAGOSCOPY, GASTROSCOPY, DUODENOSCOPY (EGD) WITH ARGON PLASMA COAGULATOR (APC);  Esophagogastroduodenoscopy with polypectomy and polyp ablation;  Surgeon: Stephen Gasca MD;  Location: [facility]     DILATION AND CURETTAGE, HYSTEROSCOPY DIAGNOSTIC, COMBINED N/A 2/10/2021    Procedure: HYSTEROSCOPY, DIAGNOSTIC, WITH DILATION AND CURETTAGE OF UTERUS;  Surgeon: Sandie Samaniego MD;  Location: [facility] OR     ENDOSCOPIC RETROGRADE CHOLANGIOPANCREATOGRAM N/A 9/23/2021    Procedure: ENDOSCOPIC RETROGRADE CHOLANGIOPANCREATOGRAPHY WITH PANCREATIC STENT PLACEMENT AND POLYP ABLATION with Argon Plasma Coagulation;  Surgeon: Shahab Fernández MD;  Location: [facility] OR     ENDOSCOPIC RETROGRADE CHOLANGIOPANCREATOGRAPHY       ENDOSCOPIC ULTRASOUND UPPER GASTROINTESTINAL TRACT (GI) N/A 7/25/2019    Procedure: Endoscopic Ultrasound;  Surgeon: Stephen Gasca MD;  [facility] OR     ESOPHAGOSCOPY, GASTROSCOPY, DUODENOSCOPY  (EGD), COMBINED  11/5/2012    Procedure: COMBINED ESOPHAGOSCOPY, GASTROSCOPY, DUODENOSCOPY (EGD), BIOPSY SINGLE OR MULTIPLE;;  Surgeon: Angélica Garcia MD;  Location:  GI     ESOPHAGOSCOPY, GASTROSCOPY, DUODENOSCOPY (EGD), COMBINED  12/3/2013    Procedure: COMBINED ESOPHAGOSCOPY, GASTROSCOPY, DUODENOSCOPY (EGD);;  Surgeon: Angélica Garcia MD;  Location:  GI     ESOPHAGOSCOPY, GASTROSCOPY, DUODENOSCOPY (EGD), COMBINED N/A 3/28/2018    Procedure: COMBINED ESOPHAGOSCOPY, GASTROSCOPY, DUODENOSCOPY (EGD);;  Surgeon: Stephen Gasca MD;  Location:  GI     ESOPHAGOSCOPY, GASTROSCOPY, DUODENOSCOPY (EGD), COMBINED N/A 5/30/2019    Procedure: Esophagogastroduodenoscopy with biopsy x2 APC;  Surgeon: Stephen Gasca MD;  Location:  OR     ESOPHAGOSCOPY, GASTROSCOPY, DUODENOSCOPY (EGD), COMBINED N/A 7/30/2020    Procedure: ESOPHAGOGASTRODUODENOSCOPY (EGD) with polypectomy by hot snare; argonplasma coaglution ablation of polyps;  Surgeon: Stephen Gasca MD;  [facility]     ESOPHAGOSCOPY, GASTROSCOPY, DUODENOSCOPY (EGD), RESECT MUCOSA, COMBINED N/A 7/25/2019    Procedure: Esophagogastroduodenoscopy with gastric mucosal resection and clip application;  Surgeon: Stephen Gasca MD;  [facility]     EXCISE POLYP RECTUM       LAPAROSCOPIC HYSTERECTOMY TOTAL, BILATERAL SALPINGO-OOPHORECTOMY, NODE DISSECTION, COMBINED Bilateral 2/25/2021    Procedure: Laparoscopic removal of uterus, cervix, both ovaries and fallopian tubes, sentinel lymph node dissection, cystoscopy;  Surgeon: Sandie Samaniego MD;  [facility]     LAPAROSCOPIC LYSIS ADHESIONS       SIGMOIDOSCOPY FLEXIBLE       SIGMOIDOSCOPY FLEXIBLE N/A 12/7/2015    Procedure: SIGMOIDOSCOPY FLEXIBLE;  Surgeon: Mariela Bearden MD;  Location:  GI     SIGMOIDOSCOPY FLEXIBLE N/A 3/28/2018    Procedure: SIGMOIDOSCOPY FLEXIBLE;  EGD/Flex Sig;  Surgeon: Stephen Gasca MD;  [facility]: [facility] GI     SIGMOIDOSCOPY FLEXIBLE N/A  4/12/2018    Procedure: SIGMOIDOSCOPY FLEXIBLE;  Flexible Sigmoidoscopy with polyp ablation;  Surgeon: Stephen Gasca MD;  [facility]     SIGMOIDOSCOPY FLEXIBLE N/A 5/30/2019    Procedure: Flexible Sigmoidoscopy with polypectomy;  Surgeon: Stephen Gasca MD;  [facility]     SIGMOIDOSCOPY FLEXIBLE N/A 7/30/2020    Procedure: SIGMOIDOSCOPY, FLEXIBLE with Ablation of Polyps;  Surgeon: Stephen Gasca MD;  [facility]     SIGMOIDOSCOPY FLEXIBLE N/A 9/23/2021    Procedure: FLEXIBLE SIGMOIDOSCOPY WITH POLYPECTOMY;  Surgeon: Stephen Gasca MD;  Location[facility]         Health Maintenance Due   Topic Date Due     ASTHMA ACTION PLAN  Never done     ASTHMA CONTROL TEST  Never done     ADVANCE CARE PLANNING  Never done     ZOSTER IMMUNIZATION (1 of 2) Never done     PREVENTIVE CARE VISIT  03/05/2020     PHQ-2  01/01/2021     COLORECTAL CANCER SCREENING  09/24/2021     COVID-19 Vaccine (3 - Booster for Pfizer series) 10/10/2021     DTAP/TDAP/TD IMMUNIZATION (3 - Td or Tdap) 10/12/2021       Current Medications:     Current Outpatient Medications   Medication Sig Dispense Refill     acetaminophen (TYLENOL) 325 MG tablet Take 2 tablets (650 mg) by mouth every 6 hours as needed for mild pain 30 tablet 0     albuterol 90 MCG/ACT inhaler Inhale 2 puffs into the lungs every 6 hours as needed.       calcium-magnesium (CALMAG) 500-250 MG TABS Take 1 tablet by mouth daily       celecoxib (CELEBREX) 100 MG capsule Take 1 capsule (100 mg) by mouth daily 90 capsule 3     Cranberry 500 MG CAPS Take 1 capsule by mouth daily       docusate sodium (COLACE) 100 MG capsule Take 100 mg by mouth daily       fexofenadine (ALLEGRA) 180 MG tablet Take 180 mg by mouth daily.       Lactobacillus (PROBIOTIC ACIDOPHILUS PO) Take 100 mg by mouth daily       MELATONIN PO Take 2 mg by mouth nightly as needed        multivitamin, therapeutic with minerals (THERA-VIT-M) TABS Take 3 tablets by mouth 2 times daily         Omega-3 Fatty Acids (OMEGA-3 FISH OIL PO) Take 2 g by mouth daily        ondansetron (ZOFRAN) 4 MG tablet Take 1 tablet (4 mg) by mouth every 8 hours as needed for nausea 60 tablet 1     triamcinolone (NASACORT) 55 MCG/ACT Inhaler Spray 2 sprays into both nostrils daily       ZOLMitriptan (ZOMIG) 5 MG tablet Take 1 tablet (5 mg) by mouth at onset of headache for migraine 90 tablet 3         Allergies:        Allergies   Allergen Reactions     Corn-Related Products Shortness Of Breath     Avoids food; ok in tiny doses, such as in medicines  Avoids food; ok in tiny doses, such as in medicines     Cipro [Ciprofloxacin]      Reddened skin     Flagyl [Metronidazole] Nausea and Vomiting     Garlic Fatigue     GI distress     Keflex [Cephalosporins] Nausea and Vomiting     Omeprazole      PN: headache     Prilosec Otc [Omeprazole Magnesium]      migraines     Cephalexin Rash     PN: Rash, Generalized     Doxycycline Rash     blisters     Erythromycin Rash     Sulfa Drugs Rash     Ok to use         Social History:     Social History     Tobacco Use     Smoking status: Never Smoker     Smokeless tobacco: Never Used   Substance Use Topics     Alcohol use: Not Currently     Comment: 1 or 2 drinks per year       History   Drug Use No         Family History:     The patient's family history is notable for     Family History   Problem Relation Age of Onset     Hyperlipidemia Mother      Cerebrovascular Disease Mother      Cerebrovascular Disease Father      Colon Cancer Father 57         at 63     Prostate Cancer Brother 53     Prostate Cancer Maternal Uncle 65         Physical Exam:     /81 (BP Location: Left arm, Patient Position: Sitting, Cuff Size: Adult Regular)   Pulse 58   Temp 97.4  F (36.3  C) (Oral)   Resp 18   Wt 72.8 kg (160 lb 6.4 oz)   LMP 2019   SpO2 100%   BMI 29.34 kg/m    Body mass index is 29.34 kg/m .    General Appearance: healthy and alert, no distress     HEENT: no thyromegaly, no  palpable nodules or masses        Cardiovascular: regular rate and rhythm, no gallops, rubs or murmurs     Respiratory: lungs clear, no rales, rhonchi or wheezes    Musculoskeletal: extremities non tender and without edema    Skin: no lesions or rashes     Neurological: normal gait, no gross defects     Psychiatric: appropriate mood and affect                               Hematological: normal cervical, supraclavicular and inguinal lymph nodes     Gastrointestinal:       abdomen soft, non-tender, non-distended, no organomegaly or masses; laparoscopic incisions well healed.     Genitourinary: External genitalia and urethral meatus appears normal.  Vagina is smooth without nodularity or masses. Cervix surgically absent.  Bimanual exam reveal no masses, nodularity or fullness.  Recto-vaginal exam confirms these findings. Small external hemorrhoid soft.       Assessment:    Sarah Duran is a 56 year old woman with a diagnosis of stage II, grade 1 endometrial adenocarcinoma. She is here today for an acute visit.     20 minutes spent on the date of the encounter doing chart review, history and exam, documentation, and further activities as noted above.        Plan:     1.)        Stage II, grade 1 endometrial adenocarcinoma. HAROON on exam. Reviewed signs and symptoms for when she should contact the clinic or seek additional care.  Discussed signs and symptoms of recurrence and to call if these should occur. Patient to contact the clinic with any questions or concerns in the interim. Discussed no need for further pap smear testing.  Discussed visits every 3 months for the first two years (5/23) then every 6 months for up to 5 years (5/26) then annually thereafter. Continue to use dilator for 3-5 minutes a multiple times per week as she has been.     2.) Genetic risk factors were assessed and the patient is POSITIVE for a APC mutation.  Specifically her mutation is called [id number],  consistent with a diagnosis  "of familial adenomatous polyposis (FAP) or attenuated FAP (AFAP). She follows with Clarissa Brown with the Oncology Risk Management clinic.     3.) Labs and/or tests ordered include: none.     4.) Health maintenance issues addressed today include annual health maintenance and non-gynecologic issues with PCP.          REYNOLD Sorto, NP-BC  Women's Health Nurse Practitioner  Division of Gynecologic Oncology  [facility]      CC  Patient Care Team:  Ida Sandoval MD as PCP - General (Family Practice)  Stephen Gasca MD as MD (Gastroenterology)  Treasure Cleveland GC as Genetic Counselor (Genetic )  Pancho Samson MD as MD (Colon and Rectal Surgery)  Maria De Jesus Evans RN as Specialty Care Coordinator (Oncology)  Keila Burger MD as Assigned Cancer Care Provider      Oncology Rooming Note    December 17, 2021 2:28 PM   Sarah Duran is a 56 year old female who presents for:    Chief Complaint   Patient presents with     Oncology Clinic Visit     Endometrial cancer (H)     Initial Vitals: /81 (BP Location: Left arm, Patient Position: Sitting, Cuff Size: Adult Regular)   Pulse 58   Temp 97.4  F (36.3  C) (Oral)   Resp 18   Wt 72.8 kg (160 lb 6.4 oz)   LMP 05/01/2019   SpO2 100%   BMI 29.34 kg/m   Estimated body mass index is 29.34 kg/m  as calculated from the following:    Height as of 9/23/21: 1.575 m (5' 2\").    Weight as of this encounter: 72.8 kg (160 lb 6.4 oz). Body surface area is 1.78 meters squared.  No Pain (0) Comment: Data Unavailable   Patient's last menstrual period was 05/01/2019.  Allergies reviewed: Yes  Medications reviewed: Yes    Medications: Medication refills not needed today.  Pharmacy name entered into [email or web address]:    [facility] - [place], [place] - [address]  [facility] FOR [facility] ONLY - [place], [place]  [facility], [place] - [address]  [facility] - [place], [place] - [address] [facility] - [place], [place] - [address] [facility] [place], [place] - [address]  [facility] [place], [place] - [address] [facility]ACY [place], [place] - [address] [facility] [id number]    Clinical concerns: NO    Ema Delgado CMA                  Again, thank you for allowing me to participate in the care of your patient.        Sincerely,        REYNOLD Moore CNP

## 2021-12-17 NOTE — PROGRESS NOTES
"Oncology Rooming Note    December 17, 2021 2:28 PM   Sarah Duran is a 56 year old female who presents for:    Chief Complaint   Patient presents with     Oncology Clinic Visit     Endometrial cancer (H)     Initial Vitals: /81 (BP Location: Left arm, Patient Position: Sitting, Cuff Size: Adult Regular)   Pulse 58   Temp 97.4  F (36.3  C) (Oral)   Resp 18   Wt 72.8 kg (160 lb 6.4 oz)   LMP 05/01/2019   SpO2 100%   BMI 29.34 kg/m   Estimated body mass index is 29.34 kg/m  as calculated from the following:    Height as of 9/23/21: 1.575 m (5' 2\").    Weight as of this encounter: 72.8 kg (160 lb 6.4 oz). Body surface area is 1.78 meters squared.  No Pain (0) Comment: Data Unavailable   Patient's last menstrual period was 05/01/2019.  Allergies reviewed: Yes  Medications reviewed: Yes    Medications: Medication refills not needed today.  Pharmacy name entered into [employer]:    [facility] [place], [place] - [address]  [facility] FOR [facility] ONLY - [employer] [place]  [facility] [place] - [address] [facility] [address]  [facility] [place], [place] - [address] [facility] - [place], [place] - [address] [facility] [place] - [address]  [facility] [place], [place] - [address] [facility] [place], [place] - [address] [facility] [phone]    Clinical concerns: NO    Ema Delgado CMA              "

## 2022-02-19 ENCOUNTER — HEALTH MAINTENANCE LETTER (OUTPATIENT)
Age: 57
End: 2022-02-19

## 2022-02-24 NOTE — PROGRESS NOTES
Sarah is a 56 year old who is being evaluated via a billable video visit.      How would you like to obtain your AVS? MyChart  If the video visit is dropped, the invitation should be resent by: Send to e-mail at: [email or web address]  Will anyone else be joining your video visit? No  Ciara Arreola VF    Video Start Time: 1045  Video-Visit Details    Type of service:  Video Visit    Video End Time:1107    Originating Location (pt. Location): Home    Distant Location (provider location):  [facility]     Platform used for Video Visit: [facility]    Oncology Risk Management Consultation:  Date on this visit: 2022    Sarah Duran  returns to the Cancer Risk Management Program today to follow up on her cancer screening and surveillance plan via  a billable video visit.   She requires high risk screening and surveillance to reduce her risk of cancer secondary to  a history of atypical ductal hyperplasia (ADH). She is considered to be at high risk for breast cancer, with a risk of up to 35%. She has a history of Stage II, Grade I endometrial adenocarcinoma, diagnosed in 2020; followed by Dr. Sandie Lezama.She also carries an APC+ genetic mutation, consistent with a diagnosis of Familial Adenomatous Polyposis (FAP) or attenuated FAP (AFAP) with a history of a thyroid nodule and gastric polyps.     Primary Physician: Ida Sandoval MD     History Of Present Illness:  Ms. Duran is a very pleasant  56 year old female who presents with a personal history of ADH and a likely attenuated version of Familial Adenomatous Polyposis. She is s/p right excisional breast biopsy on 2016. She is also s/p ileocolonic anastomosis in .     Genetic Testin2018: POSITIVE for a APC mutation.  Specifically her mutation is called [id number],  consistent with a diagnosis of familial adenomatous polyposis (FAP) or attenuated FAP (AFAP). The mutation was identified  using [place] [employer] testing through [employer].  The testing was done because of her personal history of colonic polyposis and family history of colon cancer.     Of note, Sarah tested negative for mutations in the following genes: BMPR1A, CDH1, CHEK2, MLH1, MSH2, MSH6, MUTYH, PMS2, POLD1, POLE, PTEN, SMAD4, STK11 and TP53 (sequencing and deletion/duplication); EPCAM and GREM1 (deletion/duplication only).        Pertinent history:  Attenuated Familial adenomatous polyposis, s/p ileal rectal anastomosis in May 1988. Confirmed with APC testing (see below)  Subsequent laparotomy in 1990s for bowel obstruction from adhesions.  Taking rabeprazole 20 mg EC BID for gastric polyps.  Menarche at 12.  Nulliparous.  Menopause at 50.  Breast density: scattered fibroglandular densities  Hx of THBSO for Stage II, Grade I  endometrial cancer (see details below)     1/4/21:  US Pelvis:  Uterus: Measures 8.9 x 5.8 x 6.4 cm. The endometrium is thickened at 2.9 cm. The endometrium is heterogeneous in appearance with an irregular, nodular contour of the endometrial surface. There is hypoechoic complex fluid in the endometrial cavity likely containing blood. There is vascularity of the endometrium on color imaging. The abnormal endometrium occupies the majority of the uterus. Right Ovary: Measures 2.0 x 1.0 x 1.1 cm and appears unremarkable Right Ovary Blood Flow: Present. Left Ovary: Not seen. Free Fluid: No significant free fluid.There are no suspicious adnexal masses.     2/10/21:  Hysteroscopy, D&C                 Pathology:  Grade 1 endometrial adenocarcinoma, MMR intact, p53 wild type, ER/VT +     2/25/21:  Total laparoscopic hysterectomy, bilateral salpingo-oophorectomy, bilateral sentinel lymph node dissection, cystoscopy                 Pathology:  Stage II, grade 1 endometrial adenocarcinoma, tumor size 5.6 cm, 1/13 mm myometrial invasion, +CASEY, + cervix, 0/2 sentinel LN,         4/26/21:  Completed brachytherapy to 3000cGy in  5 Fx to the vagina     Thyroid Screenin2019 - FNA biopsy of thyroid, for solid appearing nodule in isthmus, biopsy non diagnostic; cyst fluid only.   2020- US of the thyroid: Previously biopsied isthmus nodule is no longer present. There are numerous additional colloid cysts and a benign complex cyst   left lobe, not appreciably changed from previous.   2021- US of thyroid; no change in nodules     GI screening history (see chart for further details)  2005 - Small bowel enteroscopy, one tubular adenoma removed from partial anal verge.  2005 - adenomatous polyp removed from rectum  2005 - tubular adenoma, duodenum.  2011 - fundic gland polyps in stomach, tubular adenoma removed from rectum.  2012 - 1 tubulovillous adenoma, rectum  2013 - 1 tubulovillous adenoma, rectum  12/3/2013 - 1 tubulovillous adenoma, rectum  2014 - 1 tubular adenoma and hyperplastic polyp, rectum  2015 - 1 tubular adenoma and hyperplastic polyp, rectum  2015 - 1 tubular adenoma, rectum  2016 - 1 fundic gland polyp with focal adenomatous changes  3/20/2017 - 2 tubular adenomas removed from rectum  2017 - colonoscopy and EGD, one fundic gland gastric polyp with focal low grade dysplasia removed, multiple duodenal polyps and multiple rectal polyps.  2019 - EGD: Two previously reported polyps (6.2 mm and 5.3mm) in the antrum were evaluate by EUS, which did not demonstrate deep layer involvement, and then they were  removed by EMR by using a band ligator and a snare. The defect was then closed with hemoclips. Innumerous fundic gland polyps in the gastric body,  fundus, and cardia. Consistent with diagnosis of FAP. A large fundic gland polyp in the gastric body, which has been previously resected and recent biopsied. Recommendation: Use Aciphex (rabeprazole) 20 mg PO BID for 1 month.  Repeat the upper endoscopy in 1 year for surveillance.      2020- EGD and flexible  sigmoidoscopy:  15-20 duodenal polyps, measuring 1-3 mm, all of which were ablated using argon plasma coagulation.    - Innumerable fundic gland polyps in a confluent carpet-like distribution throughout the cardia, fundus, and gastric body. Antral-sparing present. A large 2-3   semi-pedunculated polyp in the proximal gastric body  (along the lesser curvature) was resected.  - Suspected very small adenomas flanking the pancreaticobiliary orifices at the prior ampullectomy site.  - Normal esophagus. Pathology: Fundic gland polyp with adenomatous change (low grade dysplasia). Negative for intestinal metaplasia, high-grade dysplasia or malignancy. Recommendation: Schedule for flexible sigmoidoscopy (along with EGD and ERCP) with Dr. Fernández in 1-year.     9/23/2021- ENDOSCOPIC RETROGRADE CHOLANGIOPANCREATOGRAPHY ESOPHAGOGASTRODUODENOSCOPY (EGD), SIGMOIDOSCOPY, FLEXIBLE (Dr. Shahab Fernández): Results: Prior biliary sphincterotomy appeared open.                        - Prior pancreatic sphincterotomy appeared open.                        - Two small button like polyps to left of pancreatic orifice                        - Attempted cold snare polypectomy but slid off both time                        --Afrter pancreatic stent placed, entire area next to orifice ablated w R colon setting pulse APC.                        - DUODENUM examined, no other polyps to D3                        - No antral polyps, but entire gasrtic body replaced w  multiple fundic gland polyps, not biopsied   Recommendation:       Confirm spontaneous stent passage by performing a KUB x-ray in 4 weeks. Repeat ERCP in 1 year for surveillance.      Flexiile sigmoidoscopy (Dr. Gasca) Ten 2 to 4 mm tubular adenomas in rectum                       - Patent ileo-rectal anastomosis and normal distal ileum.   Recommendation:     Repeat flexible sigmoidoscopy in 1 year for surveillance (along with upper endoscopy).     Breast Screening  history:   11/28/2016 - R excisional breast biopsy with seed localization, Fibrocystic tissue with ADH on pathology.  4/24/2017 - R breast mammogram, [email or web address].  5/25/2018 - Breast MRI, [email or web address].  11/8/2018 - Screening tomosynthesis mammogram, BiRads1  6/10/2019 - Breast MRI, BiRads2.  12/20/2019- Screening tomosynthesis mammogram, BiRads1  2/3/2021- Breast MRI, [email or web address]  10/8/2021- Screening tomosynthesis mammogram, [email or web address]     At this visit, she denies asymmetry, lumps, masses, thickening, pain, nipple discharge and skin changes in her breasts. She acknowledges fatigue, which she says makes it harder for her to achieve her prior exercise goals.  She denies vaginal bleeding.  She denies nausea, vomiting, discomfort after eating, abdominal pain, bloating, heartburn, constipation,  diarrhea and early satiety.     Past Medical/Surgical History:  Past Medical History:   Diagnosis Date     Acute deep vein thrombosis (DVT) of left lower extremity (H)      Asthma      Atypical ductal hyperplasia of right breast 10/2016    fibrocystic changes, PASH and atypical ductal hyperplasia on needle biopsy, s/p R excisional biopsy with radioactive seed localization     Endometrial cancer (H) 02/15/2021     Factor 5 Leiden mutation, heterozygous (H)     factor 5     Familial adenomatous polyposis 1988    clinical presentation; s/p total abdominal colectomy with TRACEY; confirmed APC+ 2018     GERD (gastroesophageal reflux disease)      Migraine      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    [id number]     Past Surgical History:   Procedure Laterality Date     BIOPSY BREAST SEED LOCALIZATION Right 11/30/2016    Procedure: BIOPSY BREAST SEED LOCALIZATION;  Surgeon: Jessica Rasheed MD;  Location: [facility] OR     COLECTOMY      subtotal     COMBINED ESOPHAGOSCOPY, GASTROSCOPY, DUODENOSCOPY (EGD) WITH ARGON PLASMA COAGULATOR (APC) N/A 4/12/2018    Procedure: COMBINED ESOPHAGOSCOPY, GASTROSCOPY, DUODENOSCOPY (EGD) WITH ARGON PLASMA  COAGULATOR (APC);  Esophagogastroduodenoscopy with polypectomy and polyp ablation;  Surgeon: Stephen Gasca MD;  Location: [facility]     DILATION AND CURETTAGE, HYSTEROSCOPY DIAGNOSTIC, COMBINED N/A 2/10/2021    Procedure: HYSTEROSCOPY, DIAGNOSTIC, WITH DILATION AND CURETTAGE OF UTERUS;  Surgeon: Sandie Samaniego MD;  Location: [facility] OR     ENDOSCOPIC RETROGRADE CHOLANGIOPANCREATOGRAM N/A 9/23/2021    Procedure: ENDOSCOPIC RETROGRADE CHOLANGIOPANCREATOGRAPHY WITH PANCREATIC STENT PLACEMENT AND POLYP ABLATION with Argon Plasma Coagulation;  Surgeon: Shahab Fernández MD;  Location: [facility]     ENDOSCOPIC RETROGRADE CHOLANGIOPANCREATOGRAPHY       ENDOSCOPIC ULTRASOUND UPPER GASTROINTESTINAL TRACT (GI) N/A 7/25/2019    Procedure: Endoscopic Ultrasound;  Surgeon: Stephen Gasca MD;  [facility]     ESOPHAGOSCOPY, GASTROSCOPY, DUODENOSCOPY (EGD), COMBINED  11/5/2012    Procedure: COMBINED ESOPHAGOSCOPY, GASTROSCOPY, DUODENOSCOPY (EGD), BIOPSY SINGLE OR MULTIPLE;;  Surgeon: Angélica Garcia MD;  [facility]     ESOPHAGOSCOPY, GASTROSCOPY, DUODENOSCOPY (EGD), COMBINED  12/3/2013    Procedure: COMBINED ESOPHAGOSCOPY, GASTROSCOPY, DUODENOSCOPY (EGD);;  Surgeon: Angélica Garcia MD;  [facility]     ESOPHAGOSCOPY, GASTROSCOPY, DUODENOSCOPY (EGD), COMBINED N/A 3/28/2018    Procedure: COMBINED ESOPHAGOSCOPY, GASTROSCOPY, DUODENOSCOPY (EGD);;  Surgeon: Stephen Gasca MD;  [facility]     ESOPHAGOSCOPY, GASTROSCOPY, DUODENOSCOPY (EGD), COMBINED N/A 5/30/2019    Procedure: Esophagogastroduodenoscopy with biopsy x2 APC;  Surgeon: Stephen Gasca MD;  [facility]     ESOPHAGOSCOPY, GASTROSCOPY, DUODENOSCOPY (EGD), COMBINED N/A 7/30/2020    Procedure: ESOPHAGOGASTRODUODENOSCOPY (EGD) with polypectomy by hot snare; argonplasma coaglution ablation of polyps;  Surgeon: Stephen Gasca MD;  [facility]     ESOPHAGOSCOPY, GASTROSCOPY, DUODENOSCOPY (EGD), RESECT  MUCOSA, COMBINED N/A 7/25/2019    Procedure: Esophagogastroduodenoscopy with gastric mucosal resection and clip application;  Surgeon: Stephen Gasca MD;  [facility]     EXCISE POLYP RECTUM       LAPAROSCOPIC HYSTERECTOMY TOTAL, BILATERAL SALPINGO-OOPHORECTOMY, NODE DISSECTION, COMBINED Bilateral 2/25/2021    Procedure: Laparoscopic removal of uterus, cervix, both ovaries and fallopian tubes, sentinel lymph node dissection, cystoscopy;  Surgeon: Sandie Samaniego MD;  [facility]     LAPAROSCOPIC LYSIS ADHESIONS       SIGMOIDOSCOPY FLEXIBLE       SIGMOIDOSCOPY FLEXIBLE N/A 12/7/2015    Procedure: SIGMOIDOSCOPY FLEXIBLE;  Surgeon: Mariela Bearden MD;  [facility]     SIGMOIDOSCOPY FLEXIBLE N/A 3/28/2018    Procedure: SIGMOIDOSCOPY FLEXIBLE;  EGD/Flex Sig;  Surgeon: Stephen Gasca MD;  [facility]     SIGMOIDOSCOPY FLEXIBLE N/A 4/12/2018    Procedure: SIGMOIDOSCOPY FLEXIBLE;  Flexible Sigmoidoscopy with polyp ablation;  Surgeon: Stephen Gasca MD;  [facility]     SIGMOIDOSCOPY FLEXIBLE N/A 5/30/2019    Procedure: Flexible Sigmoidoscopy with polypectomy;  Surgeon: Stephen Gasca MD;  [facility]     SIGMOIDOSCOPY FLEXIBLE N/A 7/30/2020    Procedure: SIGMOIDOSCOPY, FLEXIBLE with Ablation of Polyps;  Surgeon: Stephen Gasca MD;  [facility]     SIGMOIDOSCOPY FLEXIBLE N/A 9/23/2021    Procedure: FLEXIBLE SIGMOIDOSCOPY WITH POLYPECTOMY;  Surgeon: Stephen Gasca MD;  [facility]     [facility] LAP,SURG,COLECTOMY,W/ANAST      for colon cancer       Allergies:  Allergies as of 02/25/2022 - Reviewed 02/25/2022   Allergen Reaction Noted     Corn-related products Shortness Of Breath 07/29/2020     Cipro [ciprofloxacin]  02/24/2005     Flagyl [metronidazole] Nausea and Vomiting 02/24/2005     Garlic Fatigue 07/29/2020     Keflex [cephalosporins] Nausea and Vomiting 02/24/2005     Omeprazole  04/29/2010     Prilosec otc [omeprazole magnesium]   2012     Cephalexin Rash 2004     Doxycycline Rash 2018     Erythromycin Rash 2005     Sulfa drugs Rash 2005       Current Medications:  Current Outpatient Medications   Medication Sig Dispense Refill     acetaminophen (TYLENOL) 325 MG tablet Take 2 tablets (650 mg) by mouth every 6 hours as needed for mild pain 30 tablet 0     albuterol 90 MCG/ACT inhaler Inhale 2 puffs into the lungs every 6 hours as needed.       calcium-magnesium (CALMAG) 500-250 MG TABS Take 1 tablet by mouth daily       celecoxib (CELEBREX) 100 MG capsule Take 1 capsule (100 mg) by mouth daily 90 capsule 3     Cranberry 500 MG CAPS Take 1 capsule by mouth daily       docusate sodium (COLACE) 100 MG capsule Take 100 mg by mouth daily       fexofenadine (ALLEGRA) 180 MG tablet Take 180 mg by mouth daily.       Lactobacillus (PROBIOTIC ACIDOPHILUS PO) Take 100 mg by mouth daily       MELATONIN PO Take 2 mg by mouth nightly as needed        multivitamin, therapeutic with minerals (THERA-VIT-M) TABS Take 3 tablets by mouth 2 times daily        Omega-3 Fatty Acids (OMEGA-3 FISH OIL PO) Take 2 g by mouth daily        ondansetron (ZOFRAN) 4 MG tablet Take 1 tablet (4 mg) by mouth every 8 hours as needed for nausea 60 tablet 1     triamcinolone (NASACORT) 55 MCG/ACT Inhaler Spray 2 sprays into both nostrils daily       ZOLMitriptan (ZOMIG) 5 MG tablet Take 1 tablet (5 mg) by mouth at onset of headache for migraine 90 tablet 3        Family History:  Family History   Problem Relation Age of Onset     Hyperlipidemia Mother      Cerebrovascular Disease Mother      Cerebrovascular Disease Father      Colon Cancer Father 57         at 63     Prostate Cancer Brother 53        recurrence at 65     Prostate Cancer Maternal Uncle 65       Social History:  Social History     Socioeconomic History     Marital status:      Spouse name: Roberto     Number of children: 2     Years of education: Not on file     Highest education  level: Not on file   Occupational History     Occupation:      Employer: [employer]   Tobacco Use     Smoking status: Never Smoker     Smokeless tobacco: Never Used   Substance and Sexual Activity     Alcohol use: Not Currently     Comment: 1 or 2 drinks per year     Drug use: No     Sexual activity: Not Currently     Partners: Male     Birth control/protection: Surgical     Comment: THBSO for endometrial cancer and postmenopausal prior to surgery   Other Topics Concern     Parent/sibling w/ CABG, MI or angioplasty before 65F 55M? Not Asked   Social History Narrative    Two adopted daughters, ages 13 and 15     Social Determinants of Health     Financial Resource Strain: Not on file   Food Insecurity: Not on file   Transportation Needs: Not on file   Physical Activity: Not on file   Stress: Not on file   Social Connections: Not on file   Intimate Partner Violence: Not on file   Housing Stability: Not on file       Physical Exam:  LMP 05/01/2019   GENERAL: Healthy, alert and appropriately tearful when discussing her brothers cancer  EYES: Eyes grossly normal to inspection.  No discharge or erythema, or obvious scleral/conjunctival abnormalities.  RESP: No audible wheeze, cough, or visible cyanosis.  No visible retractions or increased work of breathing.    SKIN: Visible skin clear. No significant rash, abnormal pigmentation or lesions.  NEURO: Cranial nerves grossly intact.  Mentation and speech appropriate for age.  PSYCH: Mentation appears normal, affect normal/bright, judgement and insight intact, normal speech and appearance well-groomed.    Laboratory/Imaging Studies  No results found for any visits on 02/25/22.    ASSESSMENT  We reviewed her interval history.  She is tearful when describing that her brothers prostate cancer has returned about 7 years after initially presented.  He has not had genetic testing to her knowledge.  She continues to work on preserving her own health and is very  active using her [employer] routinely lifting weights and wants to continue making strides to achieve better physical conditioning.  She does not feel like she has had the same level of energy since she was diagnosed with cancer.  We discussed that she does not have any evidence of bleeding or treatment related issues at this point in time but that it does take at least a year to recover from treatment including chemotherapy and surgery.  We discussed her diet; she feels that she could do better with this.  She may benefit from a  or a functional nutritionist in order to discover what the composition of her diet is and what might work best for her considering the amount of exercise that she does.  I gave her the names of 3 functional nutritionist that I would recommend.  She also may want to look into a .  We also discussed sleep.  She sleeps about 6 hours per night, but she is making a concerted effort to go to bed earlier.  We discussed that most people do need at least 7 to 7-1/2 hours a night to recover and she may need more than that due to the amount of exercise that she does.    We also discussed her breast screening plan below and reviewed signs and symptoms that she should be aware of.  She denies all symptoms today.  She is aware that atypical hyperplasias (ADH and ALH), especially multifocal lesions, confer a substantial increase in the risk of subsequent breast cancer (relative risk [RR] 3.7 to 5.3)  Atypia is associated with an increased risk of both ipsilateral and contralateral breast cancer and thus provides evidence of underlying breast abnormalities that predispose to breast cancer.   She has previously declined tamoxifen and aromatase inhibitors that might be useful in reducing her risk for breast cancer.    Individualized Surveillance Plan for Attenuated version of Familial Adenomatous Polyposis                 for Children and Adults Based on NCCN Guidelines  Version 1.2020   Type of Screening  Recommendation  Last Done  Next Due    Recommendations for Age <21   Colon Cancer Screening     Small Adenoma/polyp burden (fewer than 20 adenomas, all <1 cm in diameter, none with advanced histology)     Colonoscopy and polypectomy every 1-2 years    Surgical evaluation and counseling if appropriate       9/23/2021- ENDOSCOPIC RETROGRADE CHOLANGIOPANCREATOGRAPHY  ESOPHAGOGASTRODUODENOSCOPY (EGD)   (Dr. Shahab Fernández): Results: Prior biliary sphincterotomy appeared open.   Prior pancreatic sphincterotomy appeared open.     Two small button like polyps to left of pancreatic orifice   Attempted cold snare polypectomy but slid off both time Afrter pancreatic stent placed, entire area next to orifice ablated w R colon setting pulse APC.   DUODENUM examined, no other polyps to D3   No antral polyps, but entire gasrtic body replaced w multiple fundic gland polyps, not biopsied             Sept. 2022 ERCP         Recommendations for Ages >21     Colon Cancer Screening     Small Adenoma/polyp burden (fewer than 20 adenomas, all <1 cm in diameter, none with advanced histology) Colonoscopy and polypectomy every 1-2 years    Colectomy with ileorectal anastomosis (TRACEY) may be considered. Flexiile sigmoidosscopy (Dr. Gasca) Ten 2 to 4 mm tubular adenomas in rectum.                   Sept. 2022      If adenoma burden cannot be handled endoscopically    Colectomy with TRACEY (preferred in most cases). Complete Complete NA         Thyroid Cancer Screening    Baseline ultrasound of thyroid starting in teenage years.    If normal, consider repeating every 2-5 years.    If abnormal, refer to Endocrinology.   8/6/2021- US of thyroid; no change in nodules   2022 -  October with mammogram   General Annual physical examination.  2/25/2022 - exam deferred Feb. 2023         Duodenal (small bowel) screening Upper endoscopy (including complete visualization of ampulla of Vater) starting around age 20-25.    Consider baseline upper endoscopy earlier if colectomy is prior to age 20.   See above     See above   Duodenal findings:  Stage 0 (no polyposis) - repeat endoscopy every 4 years.  Stage 1 (minimal polyposis 1-4 tubular adenomas, 1-4 mm each) - repeat EGD every 2-3 years.  Stage 2 (mild polyposis - 5-9 tubular adenomas, 5-9 mm each) - repeat EGD every 1-3 years.  Stage 3 (moderate polyposis - >20 lesions or size >1 cm) - repeat EGD every 6-12 months  Stage 4 (dense polyposis or high grade dysplasia) - surgical evaluation, with expert surveillance every 3-6 months.  Due to limited data, no screening recommendation is made for pancreatic cancer at this time.          Individualized Surveillance Plan for women  With 20% or greater lifetime risk of breast cancer   Per NCCN Breast Cancer Screening and Diagnosis Guidelines Version 1.2021   Recommended screening Test or procedure Last done Next Scheduled    Clinical encounter Clinical exam every 6-12 months.   Refer to genetic counseling if not already done.  Consider risk reduction strategies.   2/25/2022 October 2022   However, some family histories with breast cancers at a very young age, may warrant screening starting earlier.    *May begin at age 40 if breast cancers in the family occur at later ages.    Annual mammogram beginning 10 years younger than the earliest breast cancer in the family but not prior to age 30.    Recommend annual breast MRI to begin 10 years younger than the earliest breast cancer in the family but not prior to age 25.    Breast MRIs are preferably done on day 7-15 of the menstrual cycle in premenopausal women.   See below   See below   Breast screening for patients at high risk due to thoracic radiation between the ages of 10-30   Annual clinical exam beginning 8 years after radiation therapy.    Annual screening mammogram beginning at age 30 or 8 years after radiation therapy    Annual breast MRI, beginning at age 25 or 8 years after  radiation therapy.     See below   See below   Women who have a lifetime risk of >20% based on history of LCIS or ADH/ALH Annual screening mammogram beginning at age of LCIS or ADH/ALH but not prior to age 30.    Consider annual MRI to begin at age of diagnosis of LCIS or ADH/ALH but not prior to age 25.    Consider risk reducing strategies. 2/3/2021- Breast MRI, [email or web address]    10/8/2021- Screening tomosynthesis mammogram, [email or web address] Breast MRI in April    Next exam: October 2022 followed by mammogram    Recommend risk reducing strategies for women with 1.7% 5 year risk of breast cancer. Declines tamoxifen and aromatase inhibitors      I spent a total of 48 minutes on the day of the visit. Please see the note for further information on patient assessment and treatment.    REYNOLD March-CNS, OCN, AGN-BC  Clinical Nurse Specialist  Cancer Risk Management Program  [facility]  phone:  [phone]  fax: [phone]

## 2022-02-25 ENCOUNTER — VIRTUAL VISIT (OUTPATIENT)
Dept: ONCOLOGY | Facility: CLINIC | Age: 57
End: 2022-02-25
Attending: CLINICAL NURSE SPECIALIST
Payer: COMMERCIAL

## 2022-02-25 DIAGNOSIS — N60.91 ATYPICAL DUCTAL HYPERPLASIA OF RIGHT BREAST: ICD-10-CM

## 2022-02-25 DIAGNOSIS — R92.30 DENSE BREAST: ICD-10-CM

## 2022-02-25 DIAGNOSIS — D13.91 FAP (FAMILIAL ADENOMATOUS POLYPOSIS): Primary | ICD-10-CM

## 2022-02-25 DIAGNOSIS — E04.1 THYROID NODULE: ICD-10-CM

## 2022-02-25 DIAGNOSIS — Z12.39 BREAST CANCER SCREENING, HIGH RISK PATIENT: ICD-10-CM

## 2022-02-25 PROBLEM — F41.9 ANXIETY DISORDER, UNSPECIFIED: Status: ACTIVE | Noted: 2021-10-15

## 2022-02-25 PROCEDURE — 99215 OFFICE O/P EST HI 40 MIN: CPT | Mod: 95 | Performed by: CLINICAL NURSE SPECIALIST

## 2022-02-25 NOTE — LETTER
2022         RE: Sarah Duran  [address] [facility] [address]        Dear Colleague,    Thank you for referring your patient, Sarah Duran, to the [facility]. Please see a copy of my visit note below.      Oncology Risk Management Consultation:  Date on this visit: 2022    Sarah Duran  returns to the Cancer Risk Management Program today to follow up on her cancer screening and surveillance plan via  a billable video visit.   She requires high risk screening and surveillance to reduce her risk of cancer secondary to  a history of atypical ductal hyperplasia (ADH). She is considered to be at high risk for breast cancer, with a risk of up to 35%. She has a history of Stage II, Grade I endometrial adenocarcinoma, diagnosed in 2020; followed by Dr. Sandie Lezama.She also carries an APC+ genetic mutation, consistent with a diagnosis of Familial Adenomatous Polyposis (FAP) or attenuated FAP (AFAP) with a history of a thyroid nodule and gastric polyps.     Primary Physician: Ida Sandoval MD     History Of Present Illness:  Ms. Duran is a very pleasant  56 year old female who presents with a personal history of ADH and a likely attenuated version of Familial Adenomatous Polyposis. She is s/p right excisional breast biopsy on 2016. She is also s/p ileocolonic anastomosis in .     Genetic Testin2018: POSITIVE for a APC mutation.  Specifically her mutation is called [id number],  consistent with a diagnosis of familial adenomatous polyposis (FAP) or attenuated FAP (AFAP). The mutation was identified using [facility] testing through [employer].  The testing was done because of her personal history of colonic polyposis and family history of colon cancer.     Of note, Sarah tested negative for mutations in the following genes: BMPR1A, CDH1, CHEK2, MLH1, MSH2, MSH6, MUTYH, PMS2, POLD1, POLE, PTEN, SMAD4, STK11 and TP53  (sequencing and deletion/duplication); EPCAM and GREM1 (deletion/duplication only).        Pertinent history:  Attenuated Familial adenomatous polyposis, s/p ileal rectal anastomosis in May 1988. Confirmed with APC testing (see below)  Subsequent laparotomy in  for bowel obstruction from adhesions.  Taking rabeprazole 20 mg EC BID for gastric polyps.  Menarche at 12.  Nulliparous.  Menopause at 50.  Breast density: scattered fibroglandular densities  Hx of THBSO for Stage II, Grade I  endometrial cancer (see details below)     21:  US Pelvis:  Uterus: Measures 8.9 x 5.8 x 6.4 cm. The endometrium is thickened at 2.9 cm. The endometrium is heterogeneous in appearance with an irregular, nodular contour of the endometrial surface. There is hypoechoic complex fluid in the endometrial cavity likely containing blood. There is vascularity of the endometrium on color imaging. The abnormal endometrium occupies the majority of the uterus. Right Ovary: Measures 2.0 x 1.0 x 1.1 cm and appears unremarkable Right Ovary Blood Flow: Present. Left Ovary: Not seen. Free Fluid: No significant free fluid.There are no suspicious adnexal masses.     2/10/21:  Hysteroscopy, D&C                 Pathology:  Grade 1 endometrial adenocarcinoma, MMR intact, p53 wild type, ER/KY +     21:  Total laparoscopic hysterectomy, bilateral salpingo-oophorectomy, bilateral sentinel lymph node dissection, cystoscopy                 Pathology:  Stage II, grade 1 endometrial adenocarcinoma, tumor size 5.6 cm, 1/13 mm myometrial invasion, +CASEY, + cervix, 0/2 sentinel LN,         21:  Completed brachytherapy to 3000cGy in 5 Fx to the vagina     Thyroid Screenin2019 - FNA biopsy of thyroid, for solid appearing nodule in isthmus, biopsy non diagnostic; cyst fluid only.   2020- US of the thyroid: Previously biopsied isthmus nodule is no longer present. There are numerous additional colloid cysts and a benign complex cyst   left  lobe, not appreciably changed from previous.   8/6/2021- US of thyroid; no change in nodules     GI screening history (see chart for further details)  11/7/2005 - Small bowel enteroscopy, one tubular adenoma removed from partial anal verge.  12/7/2005 - adenomatous polyp removed from rectum  2/28/2005 - tubular adenoma, duodenum.  11/16/2011 - fundic gland polyps in stomach, tubular adenoma removed from rectum.  11/5/2012 - 1 tubulovillous adenoma, rectum  7/8/2013 - 1 tubulovillous adenoma, rectum  12/3/2013 - 1 tubulovillous adenoma, rectum  7/21/2014 - 1 tubular adenoma and hyperplastic polyp, rectum  1/20/2015 - 1 tubular adenoma and hyperplastic polyp, rectum  12/7/2015 - 1 tubular adenoma, rectum  6/6/2016 - 1 fundic gland polyp with focal adenomatous changes  3/20/2017 - 2 tubular adenomas removed from rectum  4/12/2017 - colonoscopy and EGD, one fundic gland gastric polyp with focal low grade dysplasia removed, multiple duodenal polyps and multiple rectal polyps.  7/25/2019 - EGD: Two previously reported polyps (6.2 mm and 5.3mm) in the antrum were evaluate by EUS, which did not demonstrate deep layer involvement, and then they were  removed by EMR by using a band ligator and a snare. The defect was then closed with hemoclips. Innumerous fundic gland polyps in the gastric body,  fundus, and cardia. Consistent with diagnosis of FAP. A large fundic gland polyp in the gastric body, which has been previously resected and recent biopsied. Recommendation: Use Aciphex (rabeprazole) 20 mg PO BID for 1 month.  Repeat the upper endoscopy in 1 year for surveillance.      7/30/2020- EGD and flexible sigmoidoscopy:  15-20 duodenal polyps, measuring 1-3 mm, all of which were ablated using argon plasma coagulation.    - Innumerable fundic gland polyps in a confluent carpet-like distribution throughout the cardia, fundus, and gastric body. Antral-sparing present. A large 2-3   semi-pedunculated polyp in the proximal gastric  body  (along the lesser curvature) was resected.  - Suspected very small adenomas flanking the pancreaticobiliary orifices at the prior ampullectomy site.  - Normal esophagus. Pathology: Fundic gland polyp with adenomatous change (low grade dysplasia). Negative for intestinal metaplasia, high-grade dysplasia or malignancy. Recommendation: Schedule for flexible sigmoidoscopy (along with EGD and ERCP) with Dr. Fernández in 1-year.     9/23/2021- ENDOSCOPIC RETROGRADE CHOLANGIOPANCREATOGRAPHY ESOPHAGOGASTRODUODENOSCOPY (EGD), SIGMOIDOSCOPY, FLEXIBLE (Dr. Shahab Fernández): Results: Prior biliary sphincterotomy appeared open.                        - Prior pancreatic sphincterotomy appeared open.                        - Two small button like polyps to left of pancreatic orifice                        - Attempted cold snare polypectomy but slid off both time                        --Afrter pancreatic stent placed, entire area next to orifice ablated w R colon setting pulse APC.                        - DUODENUM examined, no other polyps to D3                        - No antral polyps, but entire gasrtic body replaced w  multiple fundic gland polyps, not biopsied   Recommendation:       Confirm spontaneous stent passage by performing a KUB x-ray in 4 weeks. Repeat ERCP in 1 year for surveillance.      Flexiile sigmoidoscopy (Dr. Gasca) Ten 2 to 4 mm tubular adenomas in rectum                       - Patent ileo-rectal anastomosis and normal distal ileum.   Recommendation:     Repeat flexible sigmoidoscopy in 1 year for surveillance (along with upper endoscopy).     Breast Screening history:   11/28/2016 - R excisional breast biopsy with seed localization, Fibrocystic tissue with ADH on pathology.  4/24/2017 - R breast mammogram, [email or web address].  5/25/2018 - Breast MRI, [email or web address].  11/8/2018 - Screening tomosynthesis mammogram, [email or web address]  6/10/2019 - Breast MRI, BiRads2.  12/20/2019- Screening tomosynthesis mammogram,  BiRads1  2/3/2021- Breast MRI, [email or web address]  10/8/2021- Screening tomosynthesis mammogram, [email or web address]     At this visit, she denies asymmetry, lumps, masses, thickening, pain, nipple discharge and skin changes in her breasts. She acknowledges fatigue, which she says makes it harder for her to achieve her prior exercise goals.  She denies vaginal bleeding.  She denies nausea, vomiting, discomfort after eating, abdominal pain, bloating, heartburn, constipation,  diarrhea and early satiety.     Past Medical/Surgical History:  Past Medical History:   Diagnosis Date     Acute deep vein thrombosis (DVT) of left lower extremity (H)      Asthma      Atypical ductal hyperplasia of right breast 10/2016    fibrocystic changes, PASH and atypical ductal hyperplasia on needle biopsy, s/p R excisional biopsy with radioactive seed localization     Endometrial cancer (H) 02/15/2021     Factor 5 Leiden mutation, heterozygous (H)     factor 5     Familial adenomatous polyposis 1988    clinical presentation; s/p total abdominal colectomy with TRACEY; confirmed APC+ 2018     GERD (gastroesophageal reflux disease)      Migraine      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    [id number]     Past Surgical History:   Procedure Laterality Date     BIOPSY BREAST SEED LOCALIZATION Right 11/30/2016    Procedure: BIOPSY BREAST SEED LOCALIZATION;  Surgeon: Jessica Rasheed MD;  Location: [facility] OR     COLECTOMY      subtotal     COMBINED ESOPHAGOSCOPY, GASTROSCOPY, DUODENOSCOPY (EGD) WITH ARGON PLASMA COAGULATOR (APC) N/A 4/12/2018    Procedure: COMBINED ESOPHAGOSCOPY, GASTROSCOPY, DUODENOSCOPY (EGD) WITH ARGON PLASMA COAGULATOR (APC);  Esophagogastroduodenoscopy with polypectomy and polyp ablation;  Surgeon: Stephen Gasca MD;  Location: [facility] OR     DILATION AND CURETTAGE, HYSTEROSCOPY DIAGNOSTIC, COMBINED N/A 2/10/2021    Procedure: HYSTEROSCOPY, DIAGNOSTIC, WITH DILATION AND CURETTAGE OF UTERUS;  Surgeon: Sandie Samaniego MD;   Location: [facility] OR     ENDOSCOPIC RETROGRADE CHOLANGIOPANCREATOGRAM N/A 9/23/2021    Procedure: ENDOSCOPIC RETROGRADE CHOLANGIOPANCREATOGRAPHY WITH PANCREATIC STENT PLACEMENT AND POLYP ABLATION with Argon Plasma Coagulation;  Surgeon: Shahab Fernández MD;  Location: [facility] OR     ENDOSCOPIC RETROGRADE CHOLANGIOPANCREATOGRAPHY       ENDOSCOPIC ULTRASOUND UPPER GASTROINTESTINAL TRACT (GI) N/A 7/25/2019    Procedure: Endoscopic Ultrasound;  Surgeon: Stephen Gasca MD;  Location: [facility]     ESOPHAGOSCOPY, GASTROSCOPY, DUODENOSCOPY (EGD), COMBINED  11/5/2012    Procedure: COMBINED ESOPHAGOSCOPY, GASTROSCOPY, DUODENOSCOPY (EGD), BIOPSY SINGLE OR MULTIPLE;;  Surgeon: Angélica Garcia MD;  Location: [facility]     ESOPHAGOSCOPY, GASTROSCOPY, DUODENOSCOPY (EGD), COMBINED  12/3/2013    Procedure: COMBINED ESOPHAGOSCOPY, GASTROSCOPY, DUODENOSCOPY (EGD);;  Surgeon: Angélica Garcia MD;  Location: [facility]     ESOPHAGOSCOPY, GASTROSCOPY, DUODENOSCOPY (EGD), COMBINED N/A 3/28/2018    Procedure: COMBINED ESOPHAGOSCOPY, GASTROSCOPY, DUODENOSCOPY (EGD);;  Surgeon: Stephen Gasca MD;  Location: [facility]     ESOPHAGOSCOPY, GASTROSCOPY, DUODENOSCOPY (EGD), COMBINED N/A 5/30/2019    Procedure: Esophagogastroduodenoscopy with biopsy x2 APC;  Surgeon: Stephen Gasca MD;  Location: [facility]     ESOPHAGOSCOPY, GASTROSCOPY, DUODENOSCOPY (EGD), COMBINED N/A 7/30/2020    Procedure: ESOPHAGOGASTRODUODENOSCOPY (EGD) with polypectomy by hot snare; argonplasma coaglution ablation of polyps;  Surgeon: Stephen Gasca MD;  Location: [facility]     ESOPHAGOSCOPY, GASTROSCOPY, DUODENOSCOPY (EGD), RESECT MUCOSA, COMBINED N/A 7/25/2019    Procedure: Esophagogastroduodenoscopy with gastric mucosal resection and clip application;  Surgeon: Stephen Gasca MD;  Location: [facility]     EXCISE POLYP RECTUM       LAPAROSCOPIC HYSTERECTOMY TOTAL, BILATERAL SALPINGO-OOPHORECTOMY, NODE DISSECTION, COMBINED Bilateral 2/25/2021    Procedure:  Laparoscopic removal of uterus, cervix, both ovaries and fallopian tubes, sentinel lymph node dissection, cystoscopy;  Surgeon: Sandie Samaniego MD;  Location: [facility]     LAPAROSCOPIC LYSIS ADHESIONS       SIGMOIDOSCOPY FLEXIBLE       SIGMOIDOSCOPY FLEXIBLE N/A 12/7/2015    Procedure: SIGMOIDOSCOPY FLEXIBLE;  Surgeon: Mariela Bearden MD;  Location: [facility]     SIGMOIDOSCOPY FLEXIBLE N/A 3/28/2018    Procedure: SIGMOIDOSCOPY FLEXIBLE;  EGD/Flex Sig;  Surgeon: Stephen Gasca MD;  Location: [facility]     SIGMOIDOSCOPY FLEXIBLE N/A 4/12/2018    Procedure: SIGMOIDOSCOPY FLEXIBLE;  Flexible Sigmoidoscopy with polyp ablation;  Surgeon: Stephen Gasca MD;  Location: [facility]     SIGMOIDOSCOPY FLEXIBLE N/A 5/30/2019    Procedure: Flexible Sigmoidoscopy with polypectomy;  Surgeon: Stephen Gasca MD;  Location: [facility]     SIGMOIDOSCOPY FLEXIBLE N/A 7/30/2020    Procedure: SIGMOIDOSCOPY, FLEXIBLE with Ablation of Polyps;  Surgeon: Stephen Gasca MD;  Location: [facility]     SIGMOIDOSCOPY FLEXIBLE N/A 9/23/2021    Procedure: FLEXIBLE SIGMOIDOSCOPY WITH POLYPECTOMY;  Surgeon: Stephen Gasca MD;  Location: [facility]     ZZC LAP,SURG,COLECTOMY,W/ANAST      for colon cancer       Allergies:  Allergies as of 02/25/2022 - Reviewed 02/25/2022   Allergen Reaction Noted     Corn-related products Shortness Of Breath 07/29/2020     Cipro [ciprofloxacin]  02/24/2005     Flagyl [metronidazole] Nausea and Vomiting 02/24/2005     Garlic Fatigue 07/29/2020     Keflex [cephalosporins] Nausea and Vomiting 02/24/2005     Omeprazole  04/29/2010     Prilosec otc [omeprazole magnesium]  01/28/2012     Cephalexin Rash 06/14/2004     Doxycycline Rash 04/12/2018     Erythromycin Rash 02/24/2005     Sulfa drugs Rash 02/24/2005       Current Medications:  Current Outpatient Medications   Medication Sig Dispense Refill     acetaminophen (TYLENOL) 325 MG tablet Take 2 tablets (650 mg) by mouth every 6 hours as needed for mild pain  30 tablet 0     albuterol 90 MCG/ACT inhaler Inhale 2 puffs into the lungs every 6 hours as needed.       calcium-magnesium (CALMAG) 500-250 MG TABS Take 1 tablet by mouth daily       celecoxib (CELEBREX) 100 MG capsule Take 1 capsule (100 mg) by mouth daily 90 capsule 3     Cranberry 500 MG CAPS Take 1 capsule by mouth daily       docusate sodium (COLACE) 100 MG capsule Take 100 mg by mouth daily       fexofenadine (ALLEGRA) 180 MG tablet Take 180 mg by mouth daily.       Lactobacillus (PROBIOTIC ACIDOPHILUS PO) Take 100 mg by mouth daily       MELATONIN PO Take 2 mg by mouth nightly as needed        multivitamin, therapeutic with minerals (THERA-VIT-M) TABS Take 3 tablets by mouth 2 times daily        Omega-3 Fatty Acids (OMEGA-3 FISH OIL PO) Take 2 g by mouth daily        ondansetron (ZOFRAN) 4 MG tablet Take 1 tablet (4 mg) by mouth every 8 hours as needed for nausea 60 tablet 1     triamcinolone (NASACORT) 55 MCG/ACT Inhaler Spray 2 sprays into both nostrils daily       ZOLMitriptan (ZOMIG) 5 MG tablet Take 1 tablet (5 mg) by mouth at onset of headache for migraine 90 tablet 3        Family History:  Family History   Problem Relation Age of Onset     Hyperlipidemia Mother      Cerebrovascular Disease Mother      Cerebrovascular Disease Father      Colon Cancer Father 57         at 63     Prostate Cancer Brother 53        recurrence at 65     Prostate Cancer Maternal Uncle 65       Social History:  Social History     Socioeconomic History     Marital status:      Spouse name: Roberto     Number of children: 2     Years of education: Not on file     Highest education level: Not on file   Occupational History     Occupation:      Employer: [employer]   Tobacco Use     Smoking status: Never Smoker     Smokeless tobacco: Never Used   Substance and Sexual Activity     Alcohol use: Not Currently     Comment: 1 or 2 drinks per year     Drug use: No     Sexual activity: Not Currently      Partners: Male     Birth control/protection: Surgical     Comment: THBSO for endometrial cancer and postmenopausal prior to surgery   Other Topics Concern     Parent/sibling w/ CABG, MI or angioplasty before 65F 55M? Not Asked   Social History Narrative    Two adopted daughters, ages 13 and 15     Social Determinants of Health     Financial Resource Strain: Not on file   Food Insecurity: Not on file   Transportation Needs: Not on file   Physical Activity: Not on file   Stress: Not on file   Social Connections: Not on file   Intimate Partner Violence: Not on file   Housing Stability: Not on file       Physical Exam:  LMP 05/01/2019   GENERAL: Healthy, alert and appropriately tearful when discussing her brothers cancer  EYES: Eyes grossly normal to inspection.  No discharge or erythema, or obvious scleral/conjunctival abnormalities.  RESP: No audible wheeze, cough, or visible cyanosis.  No visible retractions or increased work of breathing.    SKIN: Visible skin clear. No significant rash, abnormal pigmentation or lesions.  NEURO: Cranial nerves grossly intact.  Mentation and speech appropriate for age.  PSYCH: Mentation appears normal, affect normal/bright, judgement and insight intact, normal speech and appearance well-groomed.    Laboratory/Imaging Studies  No results found for any visits on 02/25/22.    ASSESSMENT  We reviewed her interval history.  She is tearful when describing that her brothers prostate cancer has returned about 7 years after initially presented.  He has not had genetic testing to her knowledge.  She continues to work on preserving her own health and is very active using her [employer] routinely lifting weights and wants to continue making strides to achieve better physical conditioning.  She does not feel like she has had the same level of energy since she was diagnosed with cancer.  We discussed that she does not have any evidence of bleeding or treatment related issues at this point in time but  that it does take at least a year to recover from treatment including chemotherapy and surgery.  We discussed her diet; she feels that she could do better with this.  She may benefit from a  or a functional nutritionist in order to discover what the composition of her diet is and what might work best for her considering the amount of exercise that she does.  I gave her the names of 3 functional nutritionist that I would recommend.  She also may want to look into a .  We also discussed sleep.  She sleeps about 6 hours per night, but she is making a concerted effort to go to bed earlier.  We discussed that most people do need at least 7 to 7-1/2 hours a night to recover and she may need more than that due to the amount of exercise that she does.    We also discussed her breast screening plan below and reviewed signs and symptoms that she should be aware of.  She denies all symptoms today.  She is aware that atypical hyperplasias (ADH and ALH), especially multifocal lesions, confer a substantial increase in the risk of subsequent breast cancer (relative risk [RR] 3.7 to 5.3)  Atypia is associated with an increased risk of both ipsilateral and contralateral breast cancer and thus provides evidence of underlying breast abnormalities that predispose to breast cancer.   She has previously declined tamoxifen and aromatase inhibitors that might be useful in reducing her risk for breast cancer.    Individualized Surveillance Plan for Attenuated version of Familial Adenomatous Polyposis                 for Children and Adults Based on NCCN Guidelines Version 1.2020   Type of Screening  Recommendation  Last Done  Next Due    Recommendations for Age <21   Colon Cancer Screening     Small Adenoma/polyp burden (fewer than 20 adenomas, all <1 cm in diameter, none with advanced histology)     Colonoscopy and polypectomy every 1-2 years    Surgical evaluation and counseling if appropriate        9/23/2021- ENDOSCOPIC RETROGRADE CHOLANGIOPANCREATOGRAPHY  ESOPHAGOGASTRODUODENOSCOPY (EGD)   (Dr. Shahab Fernández): Results: Prior biliary sphincterotomy appeared open.   Prior pancreatic sphincterotomy appeared open.     Two small button like polyps to left of pancreatic orifice   Attempted cold snare polypectomy but slid off both time Afrter pancreatic stent placed, entire area next to orifice ablated w R colon setting pulse APC.   DUODENUM examined, no other polyps to D3   No antral polyps, but entire gasrtic body replaced w multiple fundic gland polyps, not biopsied             Sept. 2022 ERCP         Recommendations for Ages >21     Colon Cancer Screening     Small Adenoma/polyp burden (fewer than 20 adenomas, all <1 cm in diameter, none with advanced histology) Colonoscopy and polypectomy every 1-2 years    Colectomy with ileorectal anastomosis (TRACEY) may be considered. Flexiile sigmoidosscopy (Dr. Gasca) Ten 2 to 4 mm tubular adenomas in rectum.                   Sept. 2022      If adenoma burden cannot be handled endoscopically    Colectomy with TRACEY (preferred in most cases). Complete Complete NA         Thyroid Cancer Screening    Baseline ultrasound of thyroid starting in teenage years.    If normal, consider repeating every 2-5 years.    If abnormal, refer to Endocrinology.   8/6/2021- US of thyroid; no change in nodules   2022 -  October with mammogram   General Annual physical examination.  2/25/2022 - exam deferred Feb. 2023         Duodenal (small bowel) screening Upper endoscopy (including complete visualization of ampulla of Vater) starting around age 20-25.   Consider baseline upper endoscopy earlier if colectomy is prior to age 20.   See above     See above   Duodenal findings:  Stage 0 (no polyposis) - repeat endoscopy every 4 years.  Stage 1 (minimal polyposis 1-4 tubular adenomas, 1-4 mm each) - repeat EGD every 2-3 years.  Stage 2 (mild polyposis - 5-9 tubular adenomas, 5-9 mm each) - repeat  EGD every 1-3 years.  Stage 3 (moderate polyposis - >20 lesions or size >1 cm) - repeat EGD every 6-12 months  Stage 4 (dense polyposis or high grade dysplasia) - surgical evaluation, with expert surveillance every 3-6 months.  Due to limited data, no screening recommendation is made for pancreatic cancer at this time.          Individualized Surveillance Plan for women  With 20% or greater lifetime risk of breast cancer   Per NCCN Breast Cancer Screening and Diagnosis Guidelines Version 1.2021   Recommended screening Test or procedure Last done Next Scheduled    Clinical encounter Clinical exam every 6-12 months.   Refer to genetic counseling if not already done.  Consider risk reduction strategies.   2/25/2022 October 2022   However, some family histories with breast cancers at a very young age, may warrant screening starting earlier.    *May begin at age 40 if breast cancers in the family occur at later ages.    Annual mammogram beginning 10 years younger than the earliest breast cancer in the family but not prior to age 30.    Recommend annual breast MRI to begin 10 years younger than the earliest breast cancer in the family but not prior to age 25.    Breast MRIs are preferably done on day 7-15 of the menstrual cycle in premenopausal women.   See below   See below   Breast screening for patients at high risk due to thoracic radiation between the ages of 10-30   Annual clinical exam beginning 8 years after radiation therapy.    Annual screening mammogram beginning at age 30 or 8 years after radiation therapy    Annual breast MRI, beginning at age 25 or 8 years after radiation therapy.     See below   See below   Women who have a lifetime risk of >20% based on history of LCIS or ADH/ALH Annual screening mammogram beginning at age of LCIS or ADH/ALH but not prior to age 30.    Consider annual MRI to begin at age of diagnosis of LCIS or ADH/ALH but not prior to age 25.    Consider risk reducing strategies.  2/3/2021- Breast MRI, [email or web address]    10/8/2021- Screening tomosynthesis mammogram, [email or web address] Breast MRI in April    Next exam: October 2022 followed by mammogram    Recommend risk reducing strategies for women with 1.7% 5 year risk of breast cancer. Declines tamoxifen and aromatase inhibitors      I spent a total of 48 minutes on the day of the visit. Please see the note for further information on patient assessment and treatment.    REYNOLD March-CNS, OCN, AGN-BC  Clinical Nurse Specialist  Cancer Risk Management Program  [facility]  phone:  [phone]  fax: [phone]

## 2022-02-25 NOTE — PATIENT INSTRUCTIONS
Individualized Surveillance Plan for Attenuated version of Familial Adenomatous Polyposis                 for Children and Adults Based on NCCN Guidelines Version 1.2020   Type of Screening  Recommendation  Last Done  Next Due    Recommendations for Age <21   Colon Cancer Screening     Small Adenoma/polyp burden (fewer than 20 adenomas, all <1 cm in diameter, none with advanced histology)     Colonoscopy and polypectomy every 1-2 years    Surgical evaluation and counseling if appropriate       9/23/2021- ENDOSCOPIC RETROGRADE CHOLANGIOPANCREATOGRAPHY  ESOPHAGOGASTRODUODENOSCOPY (EGD)   (Dr. Shahab Fernández): Results: Prior biliary sphincterotomy appeared open.   Prior pancreatic sphincterotomy appeared open.     Two small button like polyps to left of pancreatic orifice   Attempted cold snare polypectomy but slid off both time Afrter pancreatic stent placed, entire area next to orifice ablated w R colon setting pulse APC.   DUODENUM examined, no other polyps to D3   No antral polyps, but entire gasrtic body replaced w multiple fundic gland polyps, not biopsied             Sept. 2022 ERCP         Recommendations for Ages >21     Colon Cancer Screening     Small Adenoma/polyp burden (fewer than 20 adenomas, all <1 cm in diameter, none with advanced histology) Colonoscopy and polypectomy every 1-2 years    Colectomy with ileorectal anastomosis (TARCEY) may be considered. Flexiile sigmoidosscopy (Dr. Gasca) Ten 2 to 4 mm tubular adenomas in rectum.                   Sept. 2022      If adenoma burden cannot be handled endoscopically    Colectomy with TRACEY (preferred in most cases). Complete Complete NA         Thyroid Cancer Screening    Baseline ultrasound of thyroid starting in teenage years.    If normal, consider repeating every 2-5 years.    If abnormal, refer to Endocrinology.   8/6/2021- US of thyroid; no change in nodules   2022 -  October with mammogram   General Annual physical examination.  2/25/2022 - exam  deferred Feb. 2023         Duodenal (small bowel) screening Upper endoscopy (including complete visualization of ampulla of Vater) starting around age 20-25.   Consider baseline upper endoscopy earlier if colectomy is prior to age 20.   See above     See above   Duodenal findings:  Stage 0 (no polyposis) - repeat endoscopy every 4 years.  Stage 1 (minimal polyposis 1-4 tubular adenomas, 1-4 mm each) - repeat EGD every 2-3 years.  Stage 2 (mild polyposis - 5-9 tubular adenomas, 5-9 mm each) - repeat EGD every 1-3 years.  Stage 3 (moderate polyposis - >20 lesions or size >1 cm) - repeat EGD every 6-12 months  Stage 4 (dense polyposis or high grade dysplasia) - surgical evaluation, with expert surveillance every 3-6 months.  Due to limited data, no screening recommendation is made for pancreatic cancer at this time.          Individualized Surveillance Plan for women  With 20% or greater lifetime risk of breast cancer   Per NCCN Breast Cancer Screening and Diagnosis Guidelines Version 1.2021   Recommended screening Test or procedure Last done Next Scheduled    Clinical encounter Clinical exam every 6-12 months.   Refer to genetic counseling if not already done.  Consider risk reduction strategies.   2/25/2022 October 2022   However, some family histories with breast cancers at a very young age, may warrant screening starting earlier.    *May begin at age 40 if breast cancers in the family occur at later ages.    Annual mammogram beginning 10 years younger than the earliest breast cancer in the family but not prior to age 30.    Recommend annual breast MRI to begin 10 years younger than the earliest breast cancer in the family but not prior to age 25.    Breast MRIs are preferably done on day 7-15 of the menstrual cycle in premenopausal women.   See below   See below   Breast screening for patients at high risk due to thoracic radiation between the ages of 10-30   Annual clinical exam beginning 8 years after radiation  therapy.    Annual screening mammogram beginning at age 30 or 8 years after radiation therapy    Annual breast MRI, beginning at age 25 or 8 years after radiation therapy.     See below   See below   Women who have a lifetime risk of >20% based on history of LCIS or ADH/ALH Annual screening mammogram beginning at age of LCIS or ADH/ALH but not prior to age 30.    Consider annual MRI to begin at age of diagnosis of LCIS or ADH/ALH but not prior to age 25.    Consider risk reducing strategies. 2/3/2021- Breast MRI, [email or web address]    10/8/2021- Screening tomosynthesis mammogram, [email or web address] Breast MRI in April    Next exam: October 2022 followed by mammogram    Recommend risk reducing strategies for women with 1.7% 5 year risk of breast cancer. Declines tamoxifen and aromatase inhibitors

## 2022-03-30 NOTE — PROGRESS NOTES
Follow Up Notes on Referred Patient    Date: 2022       RE: Sarah Duran  : 1965  ANDREI: 2022      Sarah Duran is a 56 year old woman with a diagnosis of stage II, grade 1 endometrial adenocarcinoma. She is here today for follow up and surveillance.     Cancer Course:     She reports she began having daily bleeding in .  She notes she had previously gone 15 months with no bleeding.  She reports the bleeding is sometimes heavy like a period but then can also be somewhat light and only like spotting.  She had an attempted EMB with her primary gynecologist, however this was unsuccessful.  She is here today for a second opinion.     21:  US Pelvis:  Uterus: Measures 8.9 x 5.8 x 6.4 cm. The endometrium is thickened at 2.9 cm. The endometrium is heterogeneous in appearance with an irregular, nodular contour of the endometrial surface. There is hypoechoic complex fluid in the endometrial cavity likely containing blood. There is vascularity of the endometrium on color imaging. The abnormal endometrium occupies the majority of the uterus. Right Ovary: Measures 2.0 x 1.0 x 1.1 cm and appears unremarkable Right Ovary Blood Flow: Present. Left Ovary: Not seen. Free Fluid: No significant free fluid.There are no suspicious adnexal masses.     2/10/21:  Hysteroscopy, D&C                 Pathology:  Grade 1 endometrial adenocarcinoma, MMR intact, p53 wild type, ER/IN +     21:  Total laparoscopic hysterectomy, bilateral salpingo-oophorectomy, bilateral sentinel lymph node dissection, cystoscopy                 Pathology:  Stage II, grade 1 endometrial adenocarcinoma, tumor size 5.6 cm, 1/13 mm myometrial invasion, +CASEY, + cervix, 0/2 sentinel LN, no LVSI     21:  Completed brachytherapy to 3000cGy in 5 Fx to the vagina        Today Sarah comes to the clinic overall feeling well without any gynecological concerns. She is using the vaginal dilator every couple days  without spotting in the shower. She is not sexually active. She denies any vaginal bleeding, no changes in her bowel or bladder habits, no nausea/emesis, no lower extremity edema, and no difficulties eating or sleeping. She denies any abdominal discomfort/bloating, no fevers or chills, and no chest pain or shortness of breath. She continues to work on building up her strength and stamina with [employer] cycling classes. She reports a small hemorrhoid that is mildly uncomfortable, no bleeding or constipation with bowel movements.       Health Maintenance  Colonoscopy- 9/23/21, repeat in 1 year   Mammogram- 10/8/21, negative    Annual physical- 9/2021      Review of Systems:    Systemic           no weight changes; no fever; no chills; no night sweats; no appetite changes, +mild hot flashes   Skin           no rashes, or lesions  Eye           no irritation; no changes in vision  Alise-Laryngeal           no dysphagia; no hoarseness   Pulmonary    no cough; no shortness of breath  Cardiovascular    no chest pain; no palpitations  Gastrointestinal    no diarrhea; no constipation; no abdominal pain; no changes in bowel  habits; no blood in stool  Genitourinary   no urinary frequency; no urinary urgency; no dysuria; no pain; no abnormal vaginal discharge; no abnormal vaginal bleeding  Breast    no breast discharge; no breast changes; no breast pain  Musculoskeletal    no myalgias; no arthralgias; no back pain  Psychiatric           no depressed mood; no anxiety    Hematologic           no tender lymph nodes; no noticeable swellings or lumps   Endocrine    no hot flashes; no heat/cold intolerance         Neurological   no tremor; no numbness and tingling; no headaches; no difficulty  sleeping      Past Medical History:    Past Medical History:   Diagnosis Date     Acute deep vein thrombosis (DVT) of left lower extremity (H)      Asthma      Atypical ductal hyperplasia of right breast 10/2016    fibrocystic changes, PASH and  atypical ductal hyperplasia on needle biopsy, s/p R excisional biopsy with radioactive seed localization     Endometrial cancer (H) 02/15/2021     Factor 5 Leiden mutation, heterozygous (H)     factor 5     Familial adenomatous polyposis 1988    clinical presentation; s/p total abdominal colectomy with TRACEY; confirmed APC+ 2018     GERD (gastroesophageal reflux disease)      Migraine      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    [id number]         Past Surgical History:    Past Surgical History:   Procedure Laterality Date     BIOPSY BREAST SEED LOCALIZATION Right 11/30/2016    Procedure: BIOPSY BREAST SEED LOCALIZATION;  Surgeon: Jessica Rasheed MD;  Location: [facility]     COLECTOMY      subtotal     COMBINED ESOPHAGOSCOPY, GASTROSCOPY, DUODENOSCOPY (EGD) WITH ARGON PLASMA COAGULATOR (APC) N/A 4/12/2018    Procedure: COMBINED ESOPHAGOSCOPY, GASTROSCOPY, DUODENOSCOPY (EGD) WITH ARGON PLASMA COAGULATOR (APC);  Esophagogastroduodenoscopy with polypectomy and polyp ablation;  Surgeon: Stephen Gasca MD;  Location: [facility]     DILATION AND CURETTAGE, HYSTEROSCOPY DIAGNOSTIC, COMBINED N/A 2/10/2021    Procedure: HYSTEROSCOPY, DIAGNOSTIC, WITH DILATION AND CURETTAGE OF UTERUS;  Surgeon: Sandie Samaniego MD;  Location: [facility] OR     ENDOSCOPIC RETROGRADE CHOLANGIOPANCREATOGRAM N/A 9/23/2021    Procedure: ENDOSCOPIC RETROGRADE CHOLANGIOPANCREATOGRAPHY WITH PANCREATIC STENT PLACEMENT AND POLYP ABLATION with Argon Plasma Coagulation;  Surgeon: Shahab Fernández MD;  Location: [facility]     ENDOSCOPIC RETROGRADE CHOLANGIOPANCREATOGRAPHY       ENDOSCOPIC ULTRASOUND UPPER GASTROINTESTINAL TRACT (GI) N/A 7/25/2019    Procedure: Endoscopic Ultrasound;  Surgeon: Stephen Gasca MD;  Location: [facility]     ESOPHAGOSCOPY, GASTROSCOPY, DUODENOSCOPY (EGD), COMBINED  11/5/2012    Procedure: COMBINED ESOPHAGOSCOPY, GASTROSCOPY, DUODENOSCOPY (EGD), BIOPSY SINGLE OR MULTIPLE;;  Surgeon: Angélica Garcia,  MD;  Location: [facility]     ESOPHAGOSCOPY, GASTROSCOPY, DUODENOSCOPY (EGD), COMBINED  12/3/2013    Procedure: COMBINED ESOPHAGOSCOPY, GASTROSCOPY, DUODENOSCOPY (EGD);;  Surgeon: Angélica Garcia MD;  Location: [facility]     ESOPHAGOSCOPY, GASTROSCOPY, DUODENOSCOPY (EGD), COMBINED N/A 3/28/2018    Procedure: COMBINED ESOPHAGOSCOPY, GASTROSCOPY, DUODENOSCOPY (EGD);;  Surgeon: Stephen Gasca MD;  [facility]     ESOPHAGOSCOPY, GASTROSCOPY, DUODENOSCOPY (EGD), COMBINED N/A 5/30/2019    Procedure: Esophagogastroduodenoscopy with biopsy x2 APC;  Surgeon: Stephen Gasca MD;  [facility]     ESOPHAGOSCOPY, GASTROSCOPY, DUODENOSCOPY (EGD), COMBINED N/A 7/30/2020    Procedure: ESOPHAGOGASTRODUODENOSCOPY (EGD) with polypectomy by hot snare; argonplasma coaglution ablation of polyps;  Surgeon: Stephen Gasca MD;  [facility]     ESOPHAGOSCOPY, GASTROSCOPY, DUODENOSCOPY (EGD), RESECT MUCOSA, COMBINED N/A 7/25/2019    Procedure: Esophagogastroduodenoscopy with gastric mucosal resection and clip application;  Surgeon: Stephen Gasca MD;  [facility]     EXCISE POLYP RECTUM       LAPAROSCOPIC HYSTERECTOMY TOTAL, BILATERAL SALPINGO-OOPHORECTOMY, NODE DISSECTION, COMBINED Bilateral 2/25/2021    Procedure: Laparoscopic removal of uterus, cervix, both ovaries and fallopian tubes, sentinel lymph node dissection, cystoscopy;  Surgeon: Sandie Samaniego MD;  [facility]     LAPAROSCOPIC LYSIS ADHESIONS       SIGMOIDOSCOPY FLEXIBLE       SIGMOIDOSCOPY FLEXIBLE N/A 12/7/2015    Procedure: SIGMOIDOSCOPY FLEXIBLE;  Surgeon: Mariela Bearden MD;  [facility]: [facility]     SIGMOIDOSCOPY FLEXIBLE N/A 3/28/2018    Procedure: SIGMOIDOSCOPY FLEXIBLE;  EGD/Flex Sig;  Surgeon: Stephen Gasca MD;  [facility]     SIGMOIDOSCOPY FLEXIBLE N/A 4/12/2018    Procedure: SIGMOIDOSCOPY FLEXIBLE;  Flexible Sigmoidoscopy with polyp ablation;  Surgeon: Stephen Gasca MD;  [facility]      SIGMOIDOSCOPY FLEXIBLE N/A 5/30/2019    Procedure: Flexible Sigmoidoscopy with polypectomy;  Surgeon: Stephen Gasca MD;  [facility]     SIGMOIDOSCOPY FLEXIBLE N/A 7/30/2020    Procedure: SIGMOIDOSCOPY, FLEXIBLE with Ablation of Polyps;  Surgeon: Stephen Gasca MD;  [facility]     SIGMOIDOSCOPY FLEXIBLE N/A 9/23/2021    Procedure: FLEXIBLE SIGMOIDOSCOPY WITH POLYPECTOMY;  Surgeon: Stephen Gasca MD;  Location: [facility]     [facility] LAP,SURG,COLECTOMY,W/ANAST      for colon cancer         Health Maintenance Due   Topic Date Due     ASTHMA ACTION PLAN  Never done     ASTHMA CONTROL TEST  Never done     ADVANCE CARE PLANNING  Never done     ZOSTER IMMUNIZATION (1 of 2) Never done     PREVENTIVE CARE VISIT  03/05/2020     COVID-19 Vaccine (3 - Booster for Pfizer series) 09/10/2021     COLORECTAL CANCER SCREENING  09/24/2021     DTAP/TDAP/TD IMMUNIZATION (3 - Td or Tdap) 10/12/2021     PHQ-2 (once per calendar year)  01/01/2022     MAMMO DIAGNOSTIC  04/08/2022       Current Medications:     Current Outpatient Medications   Medication Sig Dispense Refill     acetaminophen (TYLENOL) 325 MG tablet Take 2 tablets (650 mg) by mouth every 6 hours as needed for mild pain 30 tablet 0     albuterol 90 MCG/ACT inhaler Inhale 2 puffs into the lungs every 6 hours as needed.       calcium-magnesium (CALMAG) 500-250 MG TABS Take 1 tablet by mouth daily       celecoxib (CELEBREX) 100 MG capsule Take 1 capsule (100 mg) by mouth daily 90 capsule 3     Cranberry 500 MG CAPS Take 1 capsule by mouth daily       docusate sodium (COLACE) 100 MG capsule Take 100 mg by mouth daily       fexofenadine (ALLEGRA) 180 MG tablet Take 180 mg by mouth daily.       Lactobacillus (PROBIOTIC ACIDOPHILUS PO) Take 100 mg by mouth daily       MELATONIN PO Take 2 mg by mouth nightly as needed        multivitamin, therapeutic with minerals (THERA-VIT-M) TABS Take 3 tablets by mouth 2 times daily        Omega-3 Fatty Acids (OMEGA-3 FISH  OIL PO) Take 2 g by mouth daily        ondansetron (ZOFRAN) 4 MG tablet Take 1 tablet (4 mg) by mouth every 8 hours as needed for nausea 60 tablet 1     ZOLMitriptan (ZOMIG) 5 MG tablet Take 1 tablet (5 mg) by mouth at onset of headache for migraine 90 tablet 3     triamcinolone (NASACORT) 55 MCG/ACT Inhaler Spray 2 sprays into both nostrils daily (Patient not taking: Reported on 2022)           Allergies:        Allergies   Allergen Reactions     Corn-Related Products Shortness Of Breath     Avoids food; ok in tiny doses, such as in medicines  Avoids food; ok in tiny doses, such as in medicines     Cipro [Ciprofloxacin]      Reddened skin     Flagyl [Metronidazole] Nausea and Vomiting     Garlic Fatigue     GI distress     Keflex [Cephalosporins] Nausea and Vomiting     Omeprazole      PN: headache     Prilosec Otc [Omeprazole Magnesium]      migraines     Cephalexin Rash     PN: Rash, Generalized     Doxycycline Rash     blisters     Erythromycin Rash     Sulfa Drugs Rash     Ok to use         Social History:     Social History     Tobacco Use     Smoking status: Never Smoker     Smokeless tobacco: Never Used   Substance Use Topics     Alcohol use: Not Currently     Comment: 1 or 2 drinks per year       History   Drug Use No         Family History:     The patient's family history is notable for     Family History   Problem Relation Age of Onset     Hyperlipidemia Mother      Cerebrovascular Disease Mother      Cerebrovascular Disease Father      Colon Cancer Father 57         at 63     Prostate Cancer Brother 53        recurrence at 65     Prostate Cancer Maternal Uncle 65         Physical Exam:     /82 (BP Location: Right arm, Patient Position: Sitting, Cuff Size: Adult Regular)   Pulse 86   Temp 97.7  F (36.5  C) (Oral)   Resp 20   Wt 75.5 kg (166 lb 8 oz)   LMP 2019   SpO2 96%   BMI 30.45 kg/m    Body mass index is 30.45 kg/m .    General Appearance: healthy and alert, no distress      HEENT: no thyromegaly, no palpable nodules or masses        Cardiovascular: regular rate and rhythm, no gallops, rubs or murmurs     Respiratory: lungs clear, no rales, rhonchi or wheezes    Musculoskeletal: extremities non tender and without edema    Skin: no lesions or rashes     Neurological: normal gait, no gross defects     Psychiatric: appropriate mood and affect                               Hematological: normal cervical, supraclavicular and inguinal lymph nodes     Gastrointestinal:       abdomen soft, non-tender, non-distended, no organomegaly or masses; laparoscopic incisions well healed.     Genitourinary: External genitalia and urethral meatus appears normal.  Vagina is smooth without nodularity or masses. Cervix surgically absent.  Bimanual exam reveal no masses, nodularity or fullness.  Recto-vaginal exam confirms these findings. Small external hemorrhoid soft.       Assessment:    Sarah Duran is a 56 year old woman with a diagnosis of stage II, grade 1 endometrial adenocarcinoma. She is here today for an acute visit.     20 minutes spent on the date of the encounter doing chart review, history and exam, documentation, and further activities as noted above.        Plan:     1.)        Stage II, grade 1 endometrial adenocarcinoma. HAROON on exam. Reviewed signs and symptoms for when she should contact the clinic or seek additional care.  Discussed signs and symptoms of recurrence and to call if these should occur. Patient to contact the clinic with any questions or concerns in the interim. Discussed no need for further pap smear testing. Can use otc prep H for hemorrhoid if needed. Discussed visits every 3 months for the first two years (5/23) then every 6 months for up to 5 years (5/26) then annually thereafter. Continue to use dilator for 3-5 minutes a multiple times per week as she has been.     2.) Genetic risk factors were assessed and the patient is POSITIVE for a APC mutation.  Specifically  her mutation is called [id number],  consistent with a diagnosis of familial adenomatous polyposis (FAP) or attenuated FAP (AFAP). She follows with Clarissa Brown with the [facility] clinic.     3.) Labs and/or tests ordered include: none.     4.) Health maintenance issues addressed today include annual health maintenance and non-gynecologic issues with PCP.          REYNOLD Sorto, NP-BC  Women's Health Nurse Practitioner  Division of Gynecologic Oncology  [facility]      CC  Patient Care Team:  Ida Sandoval MD as PCP - General (Family Practice)  Stephen Gasca MD as MD (Gastroenterology)  Treasure Cleveland GC as Genetic Counselor (Genetic )  Pancho Samson MD as MD (Colon and Rectal Surgery)  Maria De Jesus Evans, RN as Specialty Care Coordinator (Oncology)  Keila Burger MD as Assigned Cancer Care Provider

## 2022-04-01 ENCOUNTER — ONCOLOGY VISIT (OUTPATIENT)
Dept: ONCOLOGY | Facility: CLINIC | Age: 57
End: 2022-04-01
Attending: OBSTETRICS & GYNECOLOGY
Payer: COMMERCIAL

## 2022-04-01 VITALS
OXYGEN SATURATION: 96 % | RESPIRATION RATE: 20 BRPM | HEART RATE: 86 BPM | WEIGHT: 166.5 LBS | DIASTOLIC BLOOD PRESSURE: 82 MMHG | SYSTOLIC BLOOD PRESSURE: 133 MMHG | TEMPERATURE: 97.7 F | BODY MASS INDEX: 30.45 KG/M2

## 2022-04-01 DIAGNOSIS — C54.1 ENDOMETRIAL CANCER (H): Primary | ICD-10-CM

## 2022-04-01 PROCEDURE — G0463 HOSPITAL OUTPT CLINIC VISIT: HCPCS

## 2022-04-01 PROCEDURE — 99213 OFFICE O/P EST LOW 20 MIN: CPT | Performed by: OBSTETRICS & GYNECOLOGY

## 2022-04-01 ASSESSMENT — PAIN SCALES - GENERAL: PAINLEVEL: NO PAIN (0)

## 2022-04-01 NOTE — LETTER
2022         RE: Sarah Duran  [address] [facility] [address]        Dear Colleague,    Thank you for referring your patient, Sarah Duran, to the [facility]. Please see a copy of my visit note below.                     Follow Up Notes on Referred Patient    Date: 2022       RE: Sarah Duran  : 1965  ANDREI: 2022      Sarah Duran is a 56 year old woman with a diagnosis of stage II, grade 1 endometrial adenocarcinoma. She is here today for follow up and surveillance.     Cancer Course:     She reports she began having daily bleeding in .  She notes she had previously gone 15 months with no bleeding.  She reports the bleeding is sometimes heavy like a period but then can also be somewhat light and only like spotting.  She had an attempted EMB with her primary gynecologist, however this was unsuccessful.  She is here today for a second opinion.     21:  US Pelvis:  Uterus: Measures 8.9 x 5.8 x 6.4 cm. The endometrium is thickened at 2.9 cm. The endometrium is heterogeneous in appearance with an irregular, nodular contour of the endometrial surface. There is hypoechoic complex fluid in the endometrial cavity likely containing blood. There is vascularity of the endometrium on color imaging. The abnormal endometrium occupies the majority of the uterus. Right Ovary: Measures 2.0 x 1.0 x 1.1 cm and appears unremarkable Right Ovary Blood Flow: Present. Left Ovary: Not seen. Free Fluid: No significant free fluid.There are no suspicious adnexal masses.     2/10/21:  Hysteroscopy, D&C                 Pathology:  Grade 1 endometrial adenocarcinoma, MMR intact, p53 wild type, ER/MN +     21:  Total laparoscopic hysterectomy, bilateral salpingo-oophorectomy, bilateral sentinel lymph node dissection, cystoscopy                 Pathology:  Stage II, grade 1 endometrial adenocarcinoma, tumor size 5.6 cm, 1/13 mm myometrial invasion, +CASEY, +  cervix, 0/2 sentinel LN, no LVSI     4/26/21:  Completed brachytherapy to 3000cGy in 5 Fx to the vagina        Today Sarah comes to the clinic overall feeling well without any gynecological concerns. She is using the vaginal dilator every couple days without spotting in the shower. She is not sexually active. She denies any vaginal bleeding, no changes in her bowel or bladder habits, no nausea/emesis, no lower extremity edema, and no difficulties eating or sleeping. She denies any abdominal discomfort/bloating, no fevers or chills, and no chest pain or shortness of breath. She continues to work on building up her strength and stamina with [employer] cycling classes. She reports a small hemorrhoid that is mildly uncomfortable, no bleeding or constipation with bowel movements.       Health Maintenance  Colonoscopy- 9/23/21, repeat in 1 year   Mammogram- 10/8/21, negative    Annual physical- 9/2021      Review of Systems:    Systemic           no weight changes; no fever; no chills; no night sweats; no appetite changes, +mild hot flashes   Skin           no rashes, or lesions  Eye           no irritation; no changes in vision  Alise-Laryngeal           no dysphagia; no hoarseness   Pulmonary    no cough; no shortness of breath  Cardiovascular    no chest pain; no palpitations  Gastrointestinal    no diarrhea; no constipation; no abdominal pain; no changes in bowel  habits; no blood in stool  Genitourinary   no urinary frequency; no urinary urgency; no dysuria; no pain; no abnormal vaginal discharge; no abnormal vaginal bleeding  Breast    no breast discharge; no breast changes; no breast pain  Musculoskeletal    no myalgias; no arthralgias; no back pain  Psychiatric           no depressed mood; no anxiety    Hematologic           no tender lymph nodes; no noticeable swellings or lumps   Endocrine    no hot flashes; no heat/cold intolerance         Neurological   no tremor; no numbness and tingling; no headaches; no  difficulty  sleeping      Past Medical History:    Past Medical History:   Diagnosis Date     Acute deep vein thrombosis (DVT) of left lower extremity (H)      Asthma      Atypical ductal hyperplasia of right breast 10/2016    fibrocystic changes, PASH and atypical ductal hyperplasia on needle biopsy, s/p R excisional biopsy with radioactive seed localization     Endometrial cancer (H) 02/15/2021     Factor 5 Leiden mutation, heterozygous (H)     factor 5     Familial adenomatous polyposis 1988    clinical presentation; s/p total abdominal colectomy with TRACEY; confirmed APC+ 2018     GERD (gastroesophageal reflux disease)      Migraine      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    [id number]         Past Surgical History:    Past Surgical History:   Procedure Laterality Date     BIOPSY BREAST SEED LOCALIZATION Right 11/30/2016    Procedure: BIOPSY BREAST SEED LOCALIZATION;  Surgeon: Jessica Rasheed MD;  Location: [facility] OR     COLECTOMY      subtotal     COMBINED ESOPHAGOSCOPY, GASTROSCOPY, DUODENOSCOPY (EGD) WITH ARGON PLASMA COAGULATOR (APC) N/A 4/12/2018    Procedure: COMBINED ESOPHAGOSCOPY, GASTROSCOPY, DUODENOSCOPY (EGD) WITH ARGON PLASMA COAGULATOR (APC);  Esophagogastroduodenoscopy with polypectomy and polyp ablation;  Surgeon: Stephen Gasca MD;  Location: [facility] OR     DILATION AND CURETTAGE, HYSTEROSCOPY DIAGNOSTIC, COMBINED N/A 2/10/2021    Procedure: HYSTEROSCOPY, DIAGNOSTIC, WITH DILATION AND CURETTAGE OF UTERUS;  Surgeon: Sandie Samaniego MD;  Location: [facility] OR     ENDOSCOPIC RETROGRADE CHOLANGIOPANCREATOGRAM N/A 9/23/2021    Procedure: ENDOSCOPIC RETROGRADE CHOLANGIOPANCREATOGRAPHY WITH PANCREATIC STENT PLACEMENT AND POLYP ABLATION with Argon Plasma Coagulation;  Surgeon: Shahab Fernández MD;  Location: [facility] OR     ENDOSCOPIC RETROGRADE CHOLANGIOPANCREATOGRAPHY       ENDOSCOPIC ULTRASOUND UPPER GASTROINTESTINAL TRACT (GI) N/A 7/25/2019    Procedure: Endoscopic  Ultrasound;  Surgeon: Stephen Gasca MD;  Location: [facility] OR     ESOPHAGOSCOPY, GASTROSCOPY, DUODENOSCOPY (EGD), COMBINED  11/5/2012    Procedure: COMBINED ESOPHAGOSCOPY, GASTROSCOPY, DUODENOSCOPY (EGD), BIOPSY SINGLE OR MULTIPLE;;  Surgeon: Angélica Garcia MD;  Location: [facility] GI     ESOPHAGOSCOPY, GASTROSCOPY, DUODENOSCOPY (EGD), COMBINED  12/3/2013    Procedure: COMBINED ESOPHAGOSCOPY, GASTROSCOPY, DUODENOSCOPY (EGD);;  Surgeon: Angélica Garcia MD;  Location: [facility] GI     ESOPHAGOSCOPY, GASTROSCOPY, DUODENOSCOPY (EGD), COMBINED N/A 3/28/2018    Procedure: COMBINED ESOPHAGOSCOPY, GASTROSCOPY, DUODENOSCOPY (EGD);;  Surgeon: Stephen Gasca MD;  Location: [facility] GI     ESOPHAGOSCOPY, GASTROSCOPY, DUODENOSCOPY (EGD), COMBINED N/A 5/30/2019    Procedure: Esophagogastroduodenoscopy with biopsy x2 APC;  Surgeon: Stephen Gasca MD;  Location: [facility] OR     ESOPHAGOSCOPY, GASTROSCOPY, DUODENOSCOPY (EGD), COMBINED N/A 7/30/2020    Procedure: ESOPHAGOGASTRODUODENOSCOPY (EGD) with polypectomy by hot snare; argonplasma coaglution ablation of polyps;  Surgeon: Stephen Gasca MD;  Location: [facility] OR     ESOPHAGOSCOPY, GASTROSCOPY, DUODENOSCOPY (EGD), RESECT MUCOSA, COMBINED N/A 7/25/2019    Procedure: Esophagogastroduodenoscopy with gastric mucosal resection and clip application;  Surgeon: Stephen Gasca MD;  Location: [facility] OR     EXCISE POLYP RECTUM       LAPAROSCOPIC HYSTERECTOMY TOTAL, BILATERAL SALPINGO-OOPHORECTOMY, NODE DISSECTION, COMBINED Bilateral 2/25/2021    Procedure: Laparoscopic removal of uterus, cervix, both ovaries and fallopian tubes, sentinel lymph node dissection, cystoscopy;  Surgeon: Sandie Samaniego MD;  Location: [facility] OR     LAPAROSCOPIC LYSIS ADHESIONS       SIGMOIDOSCOPY FLEXIBLE       SIGMOIDOSCOPY FLEXIBLE N/A 12/7/2015    Procedure: SIGMOIDOSCOPY FLEXIBLE;  Surgeon: Mariela Bearden MD;  Location: [facility] GI     SIGMOIDOSCOPY FLEXIBLE N/A 3/28/2018    Procedure:  SIGMOIDOSCOPY FLEXIBLE;  EGD/Flex Sig;  Surgeon: Stephen Gasca MD;  Location: [facility] GI     SIGMOIDOSCOPY FLEXIBLE N/A 4/12/2018    Procedure: SIGMOIDOSCOPY FLEXIBLE;  Flexible Sigmoidoscopy with polyp ablation;  Surgeon: Stephen Gasca MD;  Location: [facility] OR     SIGMOIDOSCOPY FLEXIBLE N/A 5/30/2019    Procedure: Flexible Sigmoidoscopy with polypectomy;  Surgeon: Stephen Gasca MD;  Location: [facility]     SIGMOIDOSCOPY FLEXIBLE N/A 7/30/2020    Procedure: SIGMOIDOSCOPY, FLEXIBLE with Ablation of Polyps;  Surgeon: Stephen Gasca MD;  Location: [facility] OR     SIGMOIDOSCOPY FLEXIBLE N/A 9/23/2021    Procedure: FLEXIBLE SIGMOIDOSCOPY WITH POLYPECTOMY;  Surgeon: Stephen Gasca MD;  Location:  OR     [facility] LAP,SURG,COLECTOMY,W/ANAST      for colon cancer         Health Maintenance Due   Topic Date Due     ASTHMA ACTION PLAN  Never done     ASTHMA CONTROL TEST  Never done     ADVANCE CARE PLANNING  Never done     ZOSTER IMMUNIZATION (1 of 2) Never done     PREVENTIVE CARE VISIT  03/05/2020     COVID-19 Vaccine (3 - Booster for Pfizer series) 09/10/2021     COLORECTAL CANCER SCREENING  09/24/2021     DTAP/TDAP/TD IMMUNIZATION (3 - Td or Tdap) 10/12/2021     PHQ-2 (once per calendar year)  01/01/2022     MAMMO DIAGNOSTIC  04/08/2022       Current Medications:     Current Outpatient Medications   Medication Sig Dispense Refill     acetaminophen (TYLENOL) 325 MG tablet Take 2 tablets (650 mg) by mouth every 6 hours as needed for mild pain 30 tablet 0     albuterol 90 MCG/ACT inhaler Inhale 2 puffs into the lungs every 6 hours as needed.       calcium-magnesium (CALMAG) 500-250 MG TABS Take 1 tablet by mouth daily       celecoxib (CELEBREX) 100 MG capsule Take 1 capsule (100 mg) by mouth daily 90 capsule 3     Cranberry 500 MG CAPS Take 1 capsule by mouth daily       docusate sodium (COLACE) 100 MG capsule Take 100 mg by mouth daily       fexofenadine (ALLEGRA) 180 MG tablet Take 180 mg by mouth daily.        Lactobacillus (PROBIOTIC ACIDOPHILUS PO) Take 100 mg by mouth daily       MELATONIN PO Take 2 mg by mouth nightly as needed        multivitamin, therapeutic with minerals (THERA-VIT-M) TABS Take 3 tablets by mouth 2 times daily        Omega-3 Fatty Acids (OMEGA-3 FISH OIL PO) Take 2 g by mouth daily        ondansetron (ZOFRAN) 4 MG tablet Take 1 tablet (4 mg) by mouth every 8 hours as needed for nausea 60 tablet 1     ZOLMitriptan (ZOMIG) 5 MG tablet Take 1 tablet (5 mg) by mouth at onset of headache for migraine 90 tablet 3     triamcinolone (NASACORT) 55 MCG/ACT Inhaler Spray 2 sprays into both nostrils daily (Patient not taking: Reported on 2022)           Allergies:        Allergies   Allergen Reactions     Corn-Related Products Shortness Of Breath     Avoids food; ok in tiny doses, such as in medicines  Avoids food; ok in tiny doses, such as in medicines     Cipro [Ciprofloxacin]      Reddened skin     Flagyl [Metronidazole] Nausea and Vomiting     Garlic Fatigue     GI distress     Keflex [Cephalosporins] Nausea and Vomiting     Omeprazole      PN: headache     Prilosec Otc [Omeprazole Magnesium]      migraines     Cephalexin Rash     PN: Rash, Generalized     Doxycycline Rash     blisters     Erythromycin Rash     Sulfa Drugs Rash     Ok to use         Social History:     Social History     Tobacco Use     Smoking status: Never Smoker     Smokeless tobacco: Never Used   Substance Use Topics     Alcohol use: Not Currently     Comment: 1 or 2 drinks per year       History   Drug Use No         Family History:     The patient's family history is notable for     Family History   Problem Relation Age of Onset     Hyperlipidemia Mother      Cerebrovascular Disease Mother      Cerebrovascular Disease Father      Colon Cancer Father 57         at 63     Prostate Cancer Brother 53        recurrence at 65     Prostate Cancer Maternal Uncle 65         Physical Exam:     /82 (BP Location: Right arm,  Patient Position: Sitting, Cuff Size: Adult Regular)   Pulse 86   Temp 97.7  F (36.5  C) (Oral)   Resp 20   Wt 75.5 kg (166 lb 8 oz)   LMP 05/01/2019   SpO2 96%   BMI 30.45 kg/m    Body mass index is 30.45 kg/m .    General Appearance: healthy and alert, no distress     HEENT: no thyromegaly, no palpable nodules or masses        Cardiovascular: regular rate and rhythm, no gallops, rubs or murmurs     Respiratory: lungs clear, no rales, rhonchi or wheezes    Musculoskeletal: extremities non tender and without edema    Skin: no lesions or rashes     Neurological: normal gait, no gross defects     Psychiatric: appropriate mood and affect                               Hematological: normal cervical, supraclavicular and inguinal lymph nodes     Gastrointestinal:       abdomen soft, non-tender, non-distended, no organomegaly or masses; laparoscopic incisions well healed.     Genitourinary: External genitalia and urethral meatus appears normal.  Vagina is smooth without nodularity or masses. Cervix surgically absent.  Bimanual exam reveal no masses, nodularity or fullness.  Recto-vaginal exam confirms these findings. Small external hemorrhoid soft.       Assessment:    Sarah Duran is a 56 year old woman with a diagnosis of stage II, grade 1 endometrial adenocarcinoma. She is here today for an acute visit.     20 minutes spent on the date of the encounter doing chart review, history and exam, documentation, and further activities as noted above.        Plan:     1.)        Stage II, grade 1 endometrial adenocarcinoma. HAROON on exam. Reviewed signs and symptoms for when she should contact the clinic or seek additional care.  Discussed signs and symptoms of recurrence and to call if these should occur. Patient to contact the clinic with any questions or concerns in the interim. Discussed no need for further pap smear testing. Can use otc prep H for hemorrhoid if needed. Discussed visits every 3 months for the first  "two years (5/23) then every 6 months for up to 5 years (5/26) then annually thereafter. Continue to use dilator for 3-5 minutes a multiple times per week as she has been.     2.) Genetic risk factors were assessed and the patient is POSITIVE for a APC mutation.  Specifically her mutation is called [id number],  consistent with a diagnosis of familial adenomatous polyposis (FAP) or attenuated FAP (AFAP). She follows with Clarissa Brown with the Oncology Risk Management clinic.     3.) Labs and/or tests ordered include: none.     4.) Health maintenance issues addressed today include annual health maintenance and non-gynecologic issues with PCP.          REYNOLD Sorto, NP-BC  Women's Health Nurse Practitioner  Division of Gynecologic Oncology  [facility]      CC  Patient Care Team:  Ida Sandoval MD as PCP - General (Family Practice)  Stephen Gasca MD as MD (Gastroenterology)  Treasure Cleveland GC as Genetic Counselor (Genetic )  Pancho Samson MD as MD (Colon and Rectal Surgery)  Maria De Jesus Evans, RN as Specialty Care Coordinator (Oncology)  Keila Burger MD as Assigned Cancer Care Provider      Oncology Rooming Note    April 1, 2022 9:35 AM   Sarah Duran is a 56 year old female who presents for:    Chief Complaint   Patient presents with     Oncology Clinic Visit     Endometrial cancer (H)     Initial Vitals: /82 (BP Location: Right arm, Patient Position: Sitting, Cuff Size: Adult Regular)   Pulse 86   Temp 97.7  F (36.5  C) (Oral)   Resp 20   Wt 75.5 kg (166 lb 8 oz)   LMP 05/01/2019   SpO2 96%   BMI 30.45 kg/m   Estimated body mass index is 30.45 kg/m  as calculated from the following:    Height as of 9/23/21: 1.575 m (5' 2\").    Weight as of this encounter: 75.5 kg (166 lb 8 oz). Body surface area is 1.82 meters squared.  No Pain (0) Comment: Data Unavailable   Patient's last menstrual period was 05/01/2019.  Allergies reviewed: " Yes  Medications reviewed: Yes    Medications: Medication refills not needed today.  Pharmacy name entered into EPIC:    [facility] [place], [place] - [address]  [facility] [place] - [address] [facility] [place], [place] [address] [facility] [place] - [address]  [place] - [address]  [facility] [place], [place] - [address] [facility] [place], [place] - [address] [facility] [phone]    Clinical concerns: no      Ema Delgado CMA                  Again, thank you for allowing me to participate in the care of your patient.        Sincerely,        REYNOLD Moore CNP

## 2022-04-01 NOTE — PROGRESS NOTES
"Oncology Rooming Note    April 1, 2022 9:35 AM   Sarah Duran is a 56 year old female who presents for:    Chief Complaint   Patient presents with     Oncology Clinic Visit     Endometrial cancer (H)     Initial Vitals: /82 (BP Location: Right arm, Patient Position: Sitting, Cuff Size: Adult Regular)   Pulse 86   Temp 97.7  F (36.5  C) (Oral)   Resp 20   Wt 75.5 kg (166 lb 8 oz)   LMP 05/01/2019   SpO2 96%   BMI 30.45 kg/m   Estimated body mass index is 30.45 kg/m  as calculated from the following:    Height as of 9/23/21: 1.575 m (5' 2\").    Weight as of this encounter: 75.5 kg (166 lb 8 oz). Body surface area is 1.82 meters squared.  No Pain (0) Comment: Data Unavailable   Patient's last menstrual period was 05/01/2019.  Allergies reviewed: Yes  Medications reviewed: Yes    Medications: Medication refills not needed today.  Pharmacy name entered into [employer]:    [facility] [place], [place] - [address]  [facility], [place] - [address] [facility] [place] [address] [facility] [place] - [address]  [place] - [address]  [facility] [place], [place] - [address] [facility] [place], [place] - [address] [facility] [phone]    Clinical concerns: no      Ema Delgado CMA              "

## 2022-04-07 NOTE — LETTER
3/18/2021         RE: Sarah Duran  [address] [facility] [place] [address]        Dear Colleague,    Thank you for referring your patient, Sarah Duran, to the [facility] RADIATION ONCOLOGY. Please see a copy of my visit note below.    Department of Radiation Oncology                   [place] Mail [address]  [address] [place]  [address]  Office:  [phone]  Fax:  [phone]   Radiation Oncology Clinic  [address] [place] [address]  Phone:  [phone]  Fax:  [phone]     RE: Sarah Duran : 1965   MRN: [id number] ANDREI: 3/18/2021     OUTPATIENT VISIT NOTE       PROBLEM: endometrial cancer, grade 1, FIGO stage II.     was seen for initial consultation in the Dept of Radiation Oncology on 3/18/2021 at the request of Dr. Ok Lezama of Gynecology Oncology    HISTORY OF PRESENT ILLNESS:     Ms. Duran is a 55-year-old female with a known APC mutation consistent with a diagnosis of familial adenomatous polyposis, s/p subtotal colectomy with ileal-sigmoid re-anastomosis in . She is now diagnosed with an endometrial cancer.    In late 2020, she presented to her PCP after two months of post-menopausal bleeding. Her last pap smear was done in 2019 and was normal. Pelvic ultrasound on 21 showed a 8.9 x 5.8 x 6.4 cm uterus with thickening of endometrium at 2.9 cm, which also appeared irregular and nodular. There was no suspicious adnexal masses. She had an attempted endometrial biopsy with her primary gynecologist, which was unsuccessful. Pap smear was normal.    She then saw Dr. Ok Lezama of Gynecology Oncology on 2021. On speculum exam, her cervix was normal in appearance; bimanual exam found no discrete mass, nodularity or fullness, but noted a possible cervical fibroid obstructing the os. Endometrial biopsy was not successful due to cervical stenosis and the patient underwent hysteroscopy and endometrial curettage on 2/10/21.  Multiple large polyps were noted under hsteroscopic view, somewhat concerning for malignancy.  Curettage performed with large amount of polypoid tissue obtained ([id number]), which was consistent with endometrioid adenocarcinoma, FIGO grade 1, MMR intact/p53 wild-type.    On 21, the patient underwent TLH-BSO, bilateral sentinel lymph node dissection, and cystoscopy. Intraoperatively, omental and small bowel adhesions to the upper abdomen were noted with minimal bowel adhesions in the pelvis. There were also endometriotic implants in posterior cul de sac with scarring of the bilateral uteralsacral ligaments and anterior lower uterine segment. Surgical pathology ([id number]) reported a 5.6 cm endometrioid adenocarcinoma, involving 1/13 mm (8%) of the myometrium. There was lower uterine segment and cervical stromal involvement (limited to the inner half of the cervical wall, 7 mm from paracervical margins), but no LVSI.  0/2 (1 Rt obturator, 1 Lt external illiac) sentinel nodes was found involved. The ovaries and fallopian tubes were benign, and cytology from peritoneal washing was negative. Her disease was thus staged at pT2N0, FIGO stage II.     Ms. Duran presents today for consultation of her adjuvant radiation options. She is accompanied by her . She is well-healed from her recent surgery, and has no residual pain/discomfort. She denies vaginal discharge/bleeding, and her bowel and urinary habits are at her baseline.                PAST MEDICAL/SURGICAL HISTORY:   Familial adenomatous polyposis, confirmed APC+ in , s/p subtotal colectomy with ileal-sigmoid re-anastomosis in ; undergoing regular GI, breast and thyroid screening annually.    Atypical ductal hyperplasia of right breast, s/p excisional biopsy in .  Factor V Leiden  Asthma  Restless leg syndrome    CHEMOTHERAPY HISTORY: no    PAST RADIATION THERAPY HISTORY: no    REPRODUCTIVE HISTORY: , no history of HRT use.    IMPLANTED  DEVICES: no    AUTOIMMUNE/CONNECTIVE TISSUE DISORDERS: no    PREGNANCY RISK: post-menopausal    MEDICATIONS: reviewed  Use Nasacort and Proventil inhailor, and use PO antihistamine for asthma.  Takes Celecoxib and multivitamin.    ALLERGIES:  is allergic to corn-related products; cipro [ciprofloxacin]; flagyl [metronidazole]; garlic; keflex [cephalosporins]; omeprazole; prilosec otc [omeprazole magnesium]; cephalexin; doxycycline; erythromycin; and sulfa drugs.    SOCIAL HISTORY:   She has no history of smoking or tobacco use, she does not drink alcohol.  She in an , and lives with her spouse and her children in [place].      FAMILY HISTORY:  Father diagnosed with colon cancer at age 57, passed away at age 63  Brother diagnosed with prostate cancer at age 53  Maternal uncle was diagnosed with prostate cancer at age 65    REVIEW OF SYMPTOMS:  A full 14-point review of systems was performed.     PHYSICAL EXAMINATION:    LMP 05/01/2019     Skin: No rashes or lesions appreciated  HEENT: Normocephalic atraumatic, extraocular muscles intact  Neck: Supple, full range of motion, no cervical lymphadenopathy  Heart: Warm and well perfused  Lungs: Breathing comfortably on room air  Abd: Nondistended  /Rectal: Deferred  Ext: Atraumatic, grossly intact strength  Neuro: Cranial nerves grossly intact, normal gait    ECOG PS: 0    ASSESSMENT:     Ms. Duran  is a 55 year-old female with with a known APC mutation consistent with a diagnosis of familial adenomatous polyposis, s/p subtotal colectomy with ileal-sigmoid re-anastomosis in 1988. She is newly diagnosed with endometrial cancer and is s/p TLH-BSO and bilateral pelvic sentinel node dissection. Surgical pathology showed resection of an exophytic cancer, grade 1, with minimal myometrial invasion (1/3 mm, 8%), CASEY and cervical stromal involvement. There was no evidence of LVSI and 0/2 sampled lymph nodes was involved. Her disease is FIGO stage II. Of  note, on the initial clinical exam, there was no evidence for visible disease involvement of her cervix.         RECOMMENDATIONS:     We reviewed the natural history of her disease in details, and discussed that in general adjuvant radiotherapy is expected to improve locoregional disease control but does not have an impact on survival. Given the low grade histology and minimal myometrial invasion, she would have been a candidate for adjuvant brachytherapy alone as supported by randomized control trial data. With cervical stromal involvement, whole pelvic radiotherapy would typically be our recommendation. However, we voiced our concern over the added radiation spillage to the remainder of her bowel causing late side effects, especially in light of the bowel adhesions found in her recent surgery. Furthermore, we also think that there is a theoretical increase in risk of secondary malignancy related to her APC mutation. More recently, there have been retrospective reports suggesting that disease with limited cervical stromal invasion alone can be treated with brachytherapy alone to achieve good control. We therefore think it is reasonable to offer this as an option for her. With good understanding, Ms. Duran opted for brachytherapy alone.     We then went over the technical aspects and the logistics of brachytherapy to be delivered via cylinder applicator to treat the vaginal cuff and the upper vaginal cavity. We recommended a total of 5 once-every-other day fractions, and reviewed the expected side effects associated with her treatment. We discussed the risk of vaginal cuff dehiscence, and our plan to wait till 7-8 weeks out after surgery before starting her on treatment. We explained the utility vaginal dilator after completion of treatment to mitigate vaginal stenosis. Informed consent was obtained, and we will plan to see her back for planning and treatment in mid to late 04/2021.        The patient was seen and  discussed with staff, Dr. Burger.    Dashawn Morse MD  Resident, PGY-3  Department of Radiation Oncology  [facility]                   Department of Radiation Oncology                   [place] Mail [address]  [address] [place]  [address]  Office:  [phone]  Fax:  [phone]   Radiation Oncology Clinic  [address] [place] [id number]  Phone:  [phone]  Fax:  [phone]     RE: Sarah Duran : 1965   MRN: [id number] ANDREI: 3/18/2021     OUTPATIENT VISIT NOTE       PROBLEM: Endometrial cancer, endometrioid type, grade 1, FIGO stage II.     was seen for initial consultation in the Dept of Radiation Oncology on 3/18/2021 at the request of Dr. Ok Lezama of Gynecology Oncology    HISTORY OF PRESENT ILLNESS:     Ms. Duran is a 55-year-old female with a known APC mutation consistent with a diagnosis of familial adenomatous polyposis, s/p subtotal colectomy with ileal-sigmoid re-anastomosis in . She is now diagnosed with an endometrial cancer.    In late 2020, she presented to her PCP after two months of post-menopausal bleeding. Her last pap smear was done in 2019 and was normal. Pelvic ultrasound on 21 showed a 8.9 x 5.8 x 6.4 cm uterus with thickening of endometrium at 2.9 cm, which also appeared irregular and nodular. There was no suspicious adnexal masses. She had an attempted endometrial biopsy with her primary gynecologist, which was unsuccessful. Pap smear was normal.    She then saw Dr. Ok Lezama of Gynecology Oncology on 2021. On speculum exam, her cervix was normal in appearance; bimanual exam found no discrete mass, nodularity or fullness, but noted a possible cervical fibroid obstructing the os. Endometrial biopsy was not successful due to cervical stenosis and the patient underwent hysteroscopy and endometrial curettage on 2/10/21. Multiple large polyps were noted under hsteroscopic view, somewhat concerning for malignancy.   Curettage performed with large amount of polypoid tissue obtained ([id number]), which was consistent with endometrioid adenocarcinoma, FIGO grade 1, MMR intact/p53 wild-type.    On 21, the patient underwent TLH-BSO, bilateral sentinel lymph node dissection, and cystoscopy. Intraoperatively, omental and small bowel adhesions to the upper abdomen were noted with minimal bowel adhesions in the pelvis. There were also endometriotic implants in posterior cul de sac with scarring of the bilateral uteralsacral ligaments and anterior lower uterine segment. Surgical pathology ([id number]) reported a 5.6 cm endometrioid adenocarcinoma, involving 1/13 mm (8%) of the myometrium. There was lower uterine segment and cervical stromal involvement (limited to the inner half of the cervical wall, 7 mm from paracervical margins), but no LVSI.  0/2 (1 Rt obturator, 1 Lt external illiac) sentinel nodes was found involved. The ovaries and fallopian tubes were benign, and cytology from peritoneal washing was negative. Her disease was thus staged at pT2N0, FIGO stage II.     Ms. Duran presents today for consultation of her adjuvant radiation options. She is accompanied by her . She is well-healed from her recent surgery, and has no residual pain/discomfort. She denies vaginal discharge/bleeding, and her bowel and urinary habits are at her baseline.                PAST MEDICAL/SURGICAL HISTORY:   Familial adenomatous polyposis, confirmed APC+ in , s/p subtotal colectomy with ileal-sigmoid re-anastomosis in ; undergoing regular GI, breast and thyroid screening annually.    Atypical ductal hyperplasia of right breast, s/p excisional biopsy in .  Factor V Leiden  Asthma  Restless leg syndrome    CHEMOTHERAPY HISTORY: no    PAST RADIATION THERAPY HISTORY: no    REPRODUCTIVE HISTORY: , no history of HRT use.    IMPLANTED DEVICES: no    AUTOIMMUNE/CONNECTIVE TISSUE DISORDERS: no    PREGNANCY RISK:  post-menopausal    MEDICATIONS: reviewed  Use Nasacort and Proventil inhailor, and use PO antihistamine for asthma.  Takes Celecoxib and multivitamin.    ALLERGIES:  is allergic to corn-related products; cipro [ciprofloxacin]; flagyl [metronidazole]; garlic; keflex [cephalosporins]; omeprazole; prilosec otc [omeprazole magnesium]; cephalexin; doxycycline; erythromycin; and sulfa drugs.    SOCIAL HISTORY:   She has no history of smoking or tobacco use, she does not drink alcohol.  She in an , and lives with her spouse and her children in [place].      FAMILY HISTORY:  Father diagnosed with colon cancer at age 57, passed away at age 63  Brother diagnosed with prostate cancer at age 53  Maternal uncle was diagnosed with prostate cancer at age 65    REVIEW OF SYMPTOMS:  A full 14-point review of systems was performed.     PHYSICAL EXAMINATION:    LMP 05/01/2019     Skin: No rashes or lesions appreciated  HEENT: Normocephalic atraumatic, extraocular muscles intact  Neck: Supple, full range of motion, no cervical lymphadenopathy  Heart: Warm and well perfused  Lungs: Breathing comfortably on room air  Abd: Nondistended  /Rectal: Deferred  Ext: Atraumatic, grossly intact strength  Neuro: Cranial nerves grossly intact, normal gait    ECOG PS: 0    ASSESSMENT:     Ms. Duran  is a 55 year-old female with with a known APC mutation consistent with a diagnosis of familial adenomatous polyposis, s/p subtotal colectomy with ileal-sigmoid re-anastomosis in 1988. She is newly diagnosed with endometrial cancer and is s/p TLH-BSO and bilateral pelvic sentinel node dissection. Surgical pathology was significant for 5.6 cm grade 1 endometrioid adenocarcinoma, with minimal myometrial invasion (1/3 mm, 8%), no LVSI.  However, there was CASEY and cervical stromal involvement. 0/2 sampled lymph nodes was involved. Her disease is FIGO stage II. Of note, on the initial clinical exam, there was no evidence for visible  disease involvement of her cervix.         RECOMMENDATIONS:     We reviewed the natural history of her disease in details, and discussed that in general adjuvant radiotherapy for early stage endometrial cancer is expected to improve locoregional disease control but does not have an impact on survival.    We discussed that traditionally adjuvant pelvic external beam radiotherapy is recommended for stage II disease. This is because cervical stromal involvement increases the risk of parametrial disease spread, which is not addressed by vaginal brachytherapy alone. Retrospective studies, however, have shown low recurrence rate with adjuvant brachytherapy in stage II patients, especially when cervical involvement is minimal.  In her case, cervical stromal involvement was limited to the inner half, though the exact extent was not reported.  This, coupled with overall favorable risk factors, such as superficial myometrial invasion, low garde, absence of LVSI, does make an argument for vaginal brachytherapy instead of external beam radiotherapy.      Ms. Duran has APC mutation s/p colectomy. Intraoperatively she was also noted to have endometriosis.  These factors could potentially place her at a higher risk of developing toxicities of radiation therapy including radiation-induced GI malignancies and bowel adhesions.      Following an extensive discussion, we ultimately recommended adjuvant vaginal brachytherapy alone. We then went over the technical aspects and the logistics of brachytherapy to be delivered via cylinder applicator to treat the vaginal cuff and the upper vaginal cavity. We recommended a total of 5 once-every-other day fractions, and reviewed the expected side effects associated with her treatment. We discussed the risk of vaginal cuff dehiscence, and our plan to wait till 7-8 weeks out after surgery before starting her on treatment. We explained the utility vaginal dilator after completion of treatment to  mitigate vaginal stenosis. Informed consent was obtained, and we will plan to see her back for planning and treatment in mid to late 04/2021.        The patient was seen and discussed with staff, Dr. Burger.    Dashawn Morse MD  Resident, PGY-3  Department of Radiation Oncology  [facility]         I saw and examined the patient with the resident.  I have reviewed and edited the resident's note and agree with the plan of care.      I reviewed patient's chart, internal/external medical records, imaging studies (including actual images), labs and pathology reports.  I interviewed and counseled the patient face to face.  I additionally discussed the case with patient's referring physicians and care team (Dr. Euceda).      Keila Burger MD      Patient Care Team:  Ida Sandoval MD as PCP - General (Family Practice)  Stephen Gasca MD as MD (Gastroenterology)  Treasure Cleveland GC as Genetic Counselor (Genetic )  Pancho Samson MD as MD (Colon and Rectal Surgery)  Sandie Samaniego MD as Assigned Cancer Care Provider  Maria De Jesus Evans RN as Specialty Care Coordinator (Oncology)         07-Apr-2022

## 2022-06-11 ENCOUNTER — HEALTH MAINTENANCE LETTER (OUTPATIENT)
Age: 57
End: 2022-06-11

## 2022-06-22 ENCOUNTER — HOSPITAL ENCOUNTER (OUTPATIENT)
Dept: MRI IMAGING | Facility: CLINIC | Age: 57
Discharge: HOME | End: 2022-06-22
Attending: CLINICAL NURSE SPECIALIST | Admitting: CLINICAL NURSE SPECIALIST
Payer: COMMERCIAL

## 2022-06-22 DIAGNOSIS — R92.30 DENSE BREAST: ICD-10-CM

## 2022-06-22 DIAGNOSIS — N60.91 ATYPICAL DUCTAL HYPERPLASIA OF RIGHT BREAST: ICD-10-CM

## 2022-06-22 DIAGNOSIS — Z12.39 BREAST CANCER SCREENING, HIGH RISK PATIENT: ICD-10-CM

## 2022-06-22 PROCEDURE — 77049 MRI BREAST C-+ W/CAD BI: CPT

## 2022-06-22 PROCEDURE — 255N000002 HC RX 255 OP 636: Performed by: CLINICAL NURSE SPECIALIST

## 2022-06-22 PROCEDURE — A9585 GADOBUTROL INJECTION: HCPCS | Performed by: CLINICAL NURSE SPECIALIST

## 2022-06-22 RX ORDER — GADOBUTROL 604.72 MG/ML
7 INJECTION INTRAVENOUS ONCE
Status: COMPLETED | OUTPATIENT
Start: 2022-06-22 | End: 2022-06-22

## 2022-06-22 RX ADMIN — GADOBUTROL 7 ML: 604.72 INJECTION INTRAVENOUS at 09:27

## 2022-06-29 NOTE — PROGRESS NOTES
Follow Up Notes on Referred Patient    Date: 2022       RE: Sarah Duran  : 1965  ANDREI: 2022      Sarah Duran is a 56 year old woman with a diagnosis of stage II, grade 1 endometrial adenocarcinoma. She is here today for follow up and surveillance.     Cancer Course:     She reports she began having daily bleeding in .  She notes she had previously gone 15 months with no bleeding.  She reports the bleeding is sometimes heavy like a period but then can also be somewhat light and only like spotting.  She had an attempted EMB with her primary gynecologist, however this was unsuccessful.  She is here today for a second opinion.     21:  US Pelvis:  Uterus: Measures 8.9 x 5.8 x 6.4 cm. The endometrium is thickened at 2.9 cm. The endometrium is heterogeneous in appearance with an irregular, nodular contour of the endometrial surface. There is hypoechoic complex fluid in the endometrial cavity likely containing blood. There is vascularity of the endometrium on color imaging. The abnormal endometrium occupies the majority of the uterus. Right Ovary: Measures 2.0 x 1.0 x 1.1 cm and appears unremarkable Right Ovary Blood Flow: Present. Left Ovary: Not seen. Free Fluid: No significant free fluid.There are no suspicious adnexal masses.     2/10/21:  Hysteroscopy, D&C                 Pathology:  Grade 1 endometrial adenocarcinoma, MMR intact, p53 wild type, ER/ND +     21:  Total laparoscopic hysterectomy, bilateral salpingo-oophorectomy, bilateral sentinel lymph node dissection, cystoscopy                 Pathology:  Stage II, grade 1 endometrial adenocarcinoma, tumor size 5.6 cm, 1/13 mm myometrial invasion, +CASEY, + cervix, 0/2 sentinel LN, no LVSI     21:  Completed brachytherapy to 3000cGy in 5 Fx to the vagina              Today Sarah comes to the clinic overall feeling well without any gynecological concerns. She is using the vaginal dilator every couple days  "without spotting in the shower. She is not sexually active. She denies any vaginal bleeding, no changes in her bowel or bladder habits, no nausea/emesis, no lower extremity edema, and no difficulties eating or sleeping. She denies any abdominal discomfort/bloating, no fevers or chills, and no chest pain or shortness of breath. Pt has decreased sweets and works out regularly and reports weight loss has been intentional. Rare nerve type \"twinge\" pain at right abdomen mild and quickly resolves. Occurs 1-2 times per month. This has been mild and ongoing since surgery. No changes or increase in symptoms/severity overtime.       Health Maintenance  Colonoscopy- 9/23/21, repeat in 1 year   Mammogram- 10/8/21, negative    Annual physical- 9/2021      Review of Systems:    Systemic           no weight changes; no fever; no chills; no night sweats; no appetite changes, +mild hot flashes   Skin           no rashes, or lesions  Eye           no irritation; no changes in vision  Alise-Laryngeal           no dysphagia; no hoarseness   Pulmonary    no cough; no shortness of breath  Cardiovascular    no chest pain; no palpitations  Gastrointestinal    no diarrhea; no constipation; no abdominal pain; no changes in bowel  habits; no blood in stool  Genitourinary   no urinary frequency; no urinary urgency; no dysuria; no pain; no abnormal vaginal discharge; no abnormal vaginal bleeding  Breast    no breast discharge; no breast changes; no breast pain  Musculoskeletal    no myalgias; no arthralgias; no back pain  Psychiatric           no depressed mood; no anxiety    Hematologic           no tender lymph nodes; no noticeable swellings or lumps   Endocrine    no hot flashes; no heat/cold intolerance         Neurological   no tremor; no numbness and tingling; no headaches; no difficulty  sleeping      Past Medical History:    Past Medical History:   Diagnosis Date     Acute deep vein thrombosis (DVT) of left lower extremity (H)      Asthma "      Atypical ductal hyperplasia of right breast 10/2016    fibrocystic changes, PASH and atypical ductal hyperplasia on needle biopsy, s/p R excisional biopsy with radioactive seed localization     Endometrial cancer (H) 02/15/2021     Factor 5 Leiden mutation, heterozygous (H)     factor 5     Familial adenomatous polyposis 1988    clinical presentation; s/p total abdominal colectomy with TRACEY; confirmed APC+ 2018     GERD (gastroesophageal reflux disease)      Migraine      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    [id number]         Past Surgical History:    Past Surgical History:   Procedure Laterality Date     BIOPSY BREAST SEED LOCALIZATION Right 11/30/2016    Procedure: BIOPSY BREAST SEED LOCALIZATION;  Surgeon: Jessica Rasheed MD;  Location: [facility]     COLECTOMY      subtotal     COMBINED ESOPHAGOSCOPY, GASTROSCOPY, DUODENOSCOPY (EGD) WITH ARGON PLASMA COAGULATOR (APC) N/A 4/12/2018    Procedure: COMBINED ESOPHAGOSCOPY, GASTROSCOPY, DUODENOSCOPY (EGD) WITH ARGON PLASMA COAGULATOR (APC);  Esophagogastroduodenoscopy with polypectomy and polyp ablation;  Surgeon: Stephen Gasca MD;  Location: [facility]     DILATION AND CURETTAGE, HYSTEROSCOPY DIAGNOSTIC, COMBINED N/A 2/10/2021    Procedure: HYSTEROSCOPY, DIAGNOSTIC, WITH DILATION AND CURETTAGE OF UTERUS;  Surgeon: Sandie Samaniego MD;  Location: [facility] OR     ENDOSCOPIC RETROGRADE CHOLANGIOPANCREATOGRAM N/A 9/23/2021    Procedure: ENDOSCOPIC RETROGRADE CHOLANGIOPANCREATOGRAPHY WITH PANCREATIC STENT PLACEMENT AND POLYP ABLATION with Argon Plasma Coagulation;  Surgeon: Shahab Fernández MD;  Location: [facility]     ENDOSCOPIC RETROGRADE CHOLANGIOPANCREATOGRAPHY       ENDOSCOPIC ULTRASOUND UPPER GASTROINTESTINAL TRACT (GI) N/A 7/25/2019    Procedure: Endoscopic Ultrasound;  Surgeon: Stephen Gasca MD;  Location: [facility]     ESOPHAGOSCOPY, GASTROSCOPY, DUODENOSCOPY (EGD), COMBINED  11/5/2012    Procedure: COMBINED ESOPHAGOSCOPY,  GASTROSCOPY, DUODENOSCOPY (EGD), BIOPSY SINGLE OR MULTIPLE;;  Surgeon: Angélica Garcia MD;  Location: [facility]     ESOPHAGOSCOPY, GASTROSCOPY, DUODENOSCOPY (EGD), COMBINED  12/3/2013    Procedure: COMBINED ESOPHAGOSCOPY, GASTROSCOPY, DUODENOSCOPY (EGD);;  Surgeon: Angélica Garcia MD;  Location: [facility]     ESOPHAGOSCOPY, GASTROSCOPY, DUODENOSCOPY (EGD), COMBINED N/A 3/28/2018    Procedure: COMBINED ESOPHAGOSCOPY, GASTROSCOPY, DUODENOSCOPY (EGD);;  Surgeon: Stephen Gasca MD;  Location: [facility]     ESOPHAGOSCOPY, GASTROSCOPY, DUODENOSCOPY (EGD), COMBINED N/A 5/30/2019    Procedure: Esophagogastroduodenoscopy with biopsy x2 APC;  Surgeon: Stephen Gasca MD;  Location: [facility]     ESOPHAGOSCOPY, GASTROSCOPY, DUODENOSCOPY (EGD), COMBINED N/A 7/30/2020    Procedure: ESOPHAGOGASTRODUODENOSCOPY (EGD) with polypectomy by hot snare; argonplasma coaglution ablation of polyps;  Surgeon: Stephen Gasca MD;  Location: [facility]     ESOPHAGOSCOPY, GASTROSCOPY, DUODENOSCOPY (EGD), RESECT MUCOSA, COMBINED N/A 7/25/2019    Procedure: Esophagogastroduodenoscopy with gastric mucosal resection and clip application;  Surgeon: Stephen Gasca MD;  Location: [facility]     EXCISE POLYP RECTUM       LAPAROSCOPIC HYSTERECTOMY TOTAL, BILATERAL SALPINGO-OOPHORECTOMY, NODE DISSECTION, COMBINED Bilateral 2/25/2021    Procedure: Laparoscopic removal of uterus, cervix, both ovaries and fallopian tubes, sentinel lymph node dissection, cystoscopy;  Surgeon: Sandie Samaniego MD;  Location: [facility]     LAPAROSCOPIC LYSIS ADHESIONS       SIGMOIDOSCOPY FLEXIBLE       SIGMOIDOSCOPY FLEXIBLE N/A 12/7/2015    Procedure: SIGMOIDOSCOPY FLEXIBLE;  Surgeon: Mariela Bearden MD;  Location: [facility]     SIGMOIDOSCOPY FLEXIBLE N/A 3/28/2018    Procedure: SIGMOIDOSCOPY FLEXIBLE;  EGD/Flex Sig;  Surgeon: Stephen Gasca MD;  Location: [facility]     SIGMOIDOSCOPY FLEXIBLE N/A 4/12/2018    Procedure: SIGMOIDOSCOPY FLEXIBLE;  Flexible  Sigmoidoscopy with polyp ablation;  Surgeon: Stephen Gasca MD;  Location: [facility]     SIGMOIDOSCOPY FLEXIBLE N/A 5/30/2019    Procedure: Flexible Sigmoidoscopy with polypectomy;  Surgeon: Stephen Gasca MD;  Location: [facility]     SIGMOIDOSCOPY FLEXIBLE N/A 7/30/2020    Procedure: SIGMOIDOSCOPY, FLEXIBLE with Ablation of Polyps;  Surgeon: Stephen Gasca MD;  [facility]     SIGMOIDOSCOPY FLEXIBLE N/A 9/23/2021    Procedure: FLEXIBLE SIGMOIDOSCOPY WITH POLYPECTOMY;  Surgeon: Stephen Gasca MD;  Location: [facility]     ZZC LAP,SURG,COLECTOMY,W/ANAST      for colon cancer         Health Maintenance Due   Topic Date Due     ASTHMA ACTION PLAN  Never done     ASTHMA CONTROL TEST  Never done     ADVANCE CARE PLANNING  Never done     ZOSTER IMMUNIZATION (1 of 2) Never done     PREVENTIVE CARE VISIT  03/05/2020     COVID-19 Vaccine (3 - Booster for Pfizer series) 09/10/2021     COLORECTAL CANCER SCREENING  09/24/2021     DTAP/TDAP/TD IMMUNIZATION (3 - Td or Tdap) 10/12/2021     PHQ-2 (once per calendar year)  01/01/2022     MAMMO DIAGNOSTIC  04/08/2022       Current Medications:     Current Outpatient Medications   Medication Sig Dispense Refill     acetaminophen (TYLENOL) 325 MG tablet Take 2 tablets (650 mg) by mouth every 6 hours as needed for mild pain 30 tablet 0     albuterol 90 MCG/ACT inhaler Inhale 2 puffs into the lungs every 6 hours as needed.       calcium-magnesium (CALMAG) 500-250 MG TABS Take 1 tablet by mouth daily       celecoxib (CELEBREX) 100 MG capsule Take 1 capsule (100 mg) by mouth daily 90 capsule 3     Cranberry 500 MG CAPS Take 1 capsule by mouth daily       docusate sodium (COLACE) 100 MG capsule Take 100 mg by mouth daily       fexofenadine (ALLEGRA) 180 MG tablet Take 180 mg by mouth daily.       Lactobacillus (PROBIOTIC ACIDOPHILUS PO) Take 100 mg by mouth daily       MELATONIN PO Take 2 mg by mouth nightly as needed        multivitamin, therapeutic with minerals  (THERA-VIT-M) TABS Take 3 tablets by mouth 2 times daily        Omega-3 Fatty Acids (OMEGA-3 FISH OIL PO) Take 2 g by mouth daily        ondansetron (ZOFRAN) 4 MG tablet Take 1 tablet (4 mg) by mouth every 8 hours as needed for nausea 60 tablet 1     triamcinolone (NASACORT) 55 MCG/ACT Inhaler Spray 2 sprays into both nostrils daily       ZOLMitriptan (ZOMIG) 5 MG tablet Take 1 tablet (5 mg) by mouth at onset of headache for migraine 90 tablet 3         Allergies:        Allergies   Allergen Reactions     Corn-Related Products Shortness Of Breath     Avoids food; ok in tiny doses, such as in medicines  Avoids food; ok in tiny doses, such as in medicines     Cipro [Ciprofloxacin]      Reddened skin     Flagyl [Metronidazole] Nausea and Vomiting     Garlic Fatigue     GI distress     Keflex [Cephalosporins] Nausea and Vomiting     Omeprazole      PN: headache     Prilosec Otc [Omeprazole Magnesium]      migraines     Cephalexin Rash     PN: Rash, Generalized     Doxycycline Rash     blisters     Erythromycin Rash     Sulfa Drugs Rash     Ok to use         Social History:     Social History     Tobacco Use     Smoking status: Never Smoker     Smokeless tobacco: Never Used   Substance Use Topics     Alcohol use: Not Currently     Comment: 1 or 2 drinks per year       History   Drug Use No         Family History:     The patient's family history is notable for     Family History   Problem Relation Age of Onset     Hyperlipidemia Mother      Cerebrovascular Disease Mother      Cerebrovascular Disease Father      Colon Cancer Father 57         at 63     Prostate Cancer Brother 53        recurrence at 65     Prostate Cancer Maternal Uncle 65         Physical Exam:     /80   Pulse 72   Resp 16   Wt 70.4 kg (155 lb 3.2 oz)   LMP 2019   SpO2 98%   BMI 28.39 kg/m    Body mass index is 28.39 kg/m .    General Appearance: healthy and alert, no distress     HEENT: no thyromegaly, no palpable nodules or masses         Cardiovascular: regular rate and rhythm, no gallops, rubs or murmurs     Respiratory: lungs clear, no rales, rhonchi or wheezes    Musculoskeletal: extremities non tender and without edema    Skin: no lesions or rashes     Neurological: normal gait, no gross defects     Psychiatric: appropriate mood and affect                               Hematological: normal cervical, supraclavicular and inguinal lymph nodes     Gastrointestinal:       abdomen soft, non-tender, non-distended, no organomegaly or masses; laparoscopic incisions well healed.     Genitourinary: External genitalia and urethral meatus appears normal.  Vagina is smooth without nodularity or masses. Cervix surgically absent.  Bimanual exam reveal no masses, nodularity or fullness.  Recto-vaginal exam confirms these findings. Small external hemorrhoid soft.       Assessment:    Sarah Duran is a 56 year old woman with a diagnosis of stage II, grade 1 endometrial adenocarcinoma. She is here today for an acute visit.     20 minutes spent on the date of the encounter doing chart review, history and exam, documentation, and further activities as noted above.        Plan:     1.)        Stage II, grade 1 endometrial adenocarcinoma. HAROON on exam. Reviewed signs and symptoms for when she should contact the clinic or seek additional care.  Discussed signs and symptoms of recurrence and to call if these should occur. Patient to contact the clinic with any questions or concerns in the interim. Discussed no need for further pap smear testing. Can use otc prep H for hemorrhoid if needed. Rare mild intermittent abdominal discomfort likely nerve/musculoskeltal related. Pt will let us know if this worsens or increases in frequency. Discussed visits every 3 months for the first two years (5/23) then every 6 months for up to 5 years (5/26) then annually thereafter. Continue to use dilator for 3-5 minutes a multiple times per week as she has been.     2.) Genetic  risk factors were assessed and the patient is POSITIVE for a APC mutation.  Specifically her mutation is called [id number],  consistent with a diagnosis of familial adenomatous polyposis (FAP) or attenuated FAP (AFAP). She follows with Clarissa Brown with the [facility].     3.) Labs and/or tests ordered include: none.     4.) Health maintenance issues addressed today include annual health maintenance and non-gynecologic issues with PCP.          REYNOLD Sorto, NP-BC  Women's Health Nurse Practitioner  Division of Gynecologic Oncology  [facility]      CC  Patient Care Team:  Ida Sandoval MD as PCP - General (Family Practice)  Stephen Gasca MD as MD (Gastroenterology)  Treasure Cleveland GC as Genetic Counselor (Genetic )  Pancho Samson MD as MD (Colon and Rectal Surgery)  Maria De Jesus Evans, RN as Specialty Care Coordinator (Oncology)  Keila Burger MD as Assigned Cancer Care Provider

## 2022-07-01 ENCOUNTER — ONCOLOGY VISIT (OUTPATIENT)
Dept: ONCOLOGY | Facility: CLINIC | Age: 57
End: 2022-07-01
Attending: OBSTETRICS & GYNECOLOGY
Payer: COMMERCIAL

## 2022-07-01 VITALS
OXYGEN SATURATION: 98 % | DIASTOLIC BLOOD PRESSURE: 80 MMHG | HEART RATE: 72 BPM | WEIGHT: 155.2 LBS | SYSTOLIC BLOOD PRESSURE: 124 MMHG | RESPIRATION RATE: 16 BRPM | BODY MASS INDEX: 28.39 KG/M2

## 2022-07-01 DIAGNOSIS — C54.1 ENDOMETRIAL CANCER (H): Primary | ICD-10-CM

## 2022-07-01 PROCEDURE — 99213 OFFICE O/P EST LOW 20 MIN: CPT | Performed by: OBSTETRICS & GYNECOLOGY

## 2022-07-01 PROCEDURE — G0463 HOSPITAL OUTPT CLINIC VISIT: HCPCS

## 2022-07-01 ASSESSMENT — PAIN SCALES - GENERAL: PAINLEVEL: NO PAIN (0)

## 2022-07-01 NOTE — LETTER
2022         RE: Sarah Duran  [address] [facility] [address]        Dear Colleague,    Thank you for referring your patient, Sarah Duran, to the [facility]. Please see a copy of my visit note below.                     Follow Up Notes on Referred Patient    Date: 2022       RE: Sarah Duran  : 1965  ANDRIE: 2022      Sarah Duran is a 56 year old woman with a diagnosis of stage II, grade 1 endometrial adenocarcinoma. She is here today for follow up and surveillance.     Cancer Course:     She reports she began having daily bleeding in .  She notes she had previously gone 15 months with no bleeding.  She reports the bleeding is sometimes heavy like a period but then can also be somewhat light and only like spotting.  She had an attempted EMB with her primary gynecologist, however this was unsuccessful.  She is here today for a second opinion.     21:  US Pelvis:  Uterus: Measures 8.9 x 5.8 x 6.4 cm. The endometrium is thickened at 2.9 cm. The endometrium is heterogeneous in appearance with an irregular, nodular contour of the endometrial surface. There is hypoechoic complex fluid in the endometrial cavity likely containing blood. There is vascularity of the endometrium on color imaging. The abnormal endometrium occupies the majority of the uterus. Right Ovary: Measures 2.0 x 1.0 x 1.1 cm and appears unremarkable Right Ovary Blood Flow: Present. Left Ovary: Not seen. Free Fluid: No significant free fluid.There are no suspicious adnexal masses.     2/10/21:  Hysteroscopy, D&C                 Pathology:  Grade 1 endometrial adenocarcinoma, MMR intact, p53 wild type, ER/GA +     21:  Total laparoscopic hysterectomy, bilateral salpingo-oophorectomy, bilateral sentinel lymph node dissection, cystoscopy                 Pathology:  Stage II, grade 1 endometrial adenocarcinoma, tumor size 5.6 cm, 1/13 mm myometrial invasion, +CASEY, +  "cervix, 0/2 sentinel LN, no LVSI     4/26/21:  Completed brachytherapy to 3000cGy in 5 Fx to the vagina              Today Sarah comes to the clinic overall feeling well without any gynecological concerns. She is using the vaginal dilator every couple days without spotting in the shower. She is not sexually active. She denies any vaginal bleeding, no changes in her bowel or bladder habits, no nausea/emesis, no lower extremity edema, and no difficulties eating or sleeping. She denies any abdominal discomfort/bloating, no fevers or chills, and no chest pain or shortness of breath. Pt has decreased sweets and works out regularly and reports weight loss has been intentional. Rare nerve type \"twinge\" pain at right abdomen mild and quickly resolves. Occurs 1-2 times per month. This has been mild and ongoing since surgery. No changes or increase in symptoms/severity overtime.       Health Maintenance  Colonoscopy- 9/23/21, repeat in 1 year   Mammogram- 10/8/21, negative    Annual physical- 9/2021      Review of Systems:    Systemic           no weight changes; no fever; no chills; no night sweats; no appetite changes, +mild hot flashes   Skin           no rashes, or lesions  Eye           no irritation; no changes in vision  Alise-Laryngeal           no dysphagia; no hoarseness   Pulmonary    no cough; no shortness of breath  Cardiovascular    no chest pain; no palpitations  Gastrointestinal    no diarrhea; no constipation; no abdominal pain; no changes in bowel  habits; no blood in stool  Genitourinary   no urinary frequency; no urinary urgency; no dysuria; no pain; no abnormal vaginal discharge; no abnormal vaginal bleeding  Breast    no breast discharge; no breast changes; no breast pain  Musculoskeletal    no myalgias; no arthralgias; no back pain  Psychiatric           no depressed mood; no anxiety    Hematologic           no tender lymph nodes; no noticeable swellings or lumps   Endocrine    no hot flashes; no " heat/cold intolerance         Neurological   no tremor; no numbness and tingling; no headaches; no difficulty  sleeping      Past Medical History:    Past Medical History:   Diagnosis Date     Acute deep vein thrombosis (DVT) of left lower extremity (H)      Asthma      Atypical ductal hyperplasia of right breast 10/2016    fibrocystic changes, PASH and atypical ductal hyperplasia on needle biopsy, s/p R excisional biopsy with radioactive seed localization     Endometrial cancer (H) 02/15/2021     Factor 5 Leiden mutation, heterozygous (H)     factor 5     Familial adenomatous polyposis 1988    clinical presentation; s/p total abdominal colectomy with TRACEY; confirmed APC+ 2018     GERD (gastroesophageal reflux disease)      Migraine      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    [id number]         Past Surgical History:    Past Surgical History:   Procedure Laterality Date     BIOPSY BREAST SEED LOCALIZATION Right 11/30/2016    Procedure: BIOPSY BREAST SEED LOCALIZATION;  Surgeon: Jessica Rasheed MD;  Location: [facility] OR     COLECTOMY      subtotal     COMBINED ESOPHAGOSCOPY, GASTROSCOPY, DUODENOSCOPY (EGD) WITH ARGON PLASMA COAGULATOR (APC) N/A 4/12/2018    Procedure: COMBINED ESOPHAGOSCOPY, GASTROSCOPY, DUODENOSCOPY (EGD) WITH ARGON PLASMA COAGULATOR (APC);  Esophagogastroduodenoscopy with polypectomy and polyp ablation;  Surgeon: Stephen Gasca MD;  Location: [facility] OR     DILATION AND CURETTAGE, HYSTEROSCOPY DIAGNOSTIC, COMBINED N/A 2/10/2021    Procedure: HYSTEROSCOPY, DIAGNOSTIC, WITH DILATION AND CURETTAGE OF UTERUS;  Surgeon: Sandie Samaniego MD;  Location: [facility] OR     ENDOSCOPIC RETROGRADE CHOLANGIOPANCREATOGRAM N/A 9/23/2021    Procedure: ENDOSCOPIC RETROGRADE CHOLANGIOPANCREATOGRAPHY WITH PANCREATIC STENT PLACEMENT AND POLYP ABLATION with Argon Plasma Coagulation;  Surgeon: Shahab Fernández MD;  Location: [facility] OR     ENDOSCOPIC RETROGRADE CHOLANGIOPANCREATOGRAPHY        ENDOSCOPIC ULTRASOUND UPPER GASTROINTESTINAL TRACT (GI) N/A 7/25/2019    Procedure: Endoscopic Ultrasound;  Surgeon: Stephen Gasca MD;  Location: [facility] OR     ESOPHAGOSCOPY, GASTROSCOPY, DUODENOSCOPY (EGD), COMBINED  11/5/2012    Procedure: COMBINED ESOPHAGOSCOPY, GASTROSCOPY, DUODENOSCOPY (EGD), BIOPSY SINGLE OR MULTIPLE;;  Surgeon: Angélica Garcia MD;  Location: [facility] GI     ESOPHAGOSCOPY, GASTROSCOPY, DUODENOSCOPY (EGD), COMBINED  12/3/2013    Procedure: COMBINED ESOPHAGOSCOPY, GASTROSCOPY, DUODENOSCOPY (EGD);;  Surgeon: Angélica Garcia MD;  Location: [facility] GI     ESOPHAGOSCOPY, GASTROSCOPY, DUODENOSCOPY (EGD), COMBINED N/A 3/28/2018    Procedure: COMBINED ESOPHAGOSCOPY, GASTROSCOPY, DUODENOSCOPY (EGD);;  Surgeon: Stephen Gasca MD;  Location: [facility]     ESOPHAGOSCOPY, GASTROSCOPY, DUODENOSCOPY (EGD), COMBINED N/A 5/30/2019    Procedure: Esophagogastroduodenoscopy with biopsy x2 APC;  Surgeon: Stephen Gasca MD;  Location: [facility] OR     ESOPHAGOSCOPY, GASTROSCOPY, DUODENOSCOPY (EGD), COMBINED N/A 7/30/2020    Procedure: ESOPHAGOGASTRODUODENOSCOPY (EGD) with polypectomy by hot snare; argonplasma coaglution ablation of polyps;  Surgeon: Stephen Gasca MD;  Location: [facility] OR     ESOPHAGOSCOPY, GASTROSCOPY, DUODENOSCOPY (EGD), RESECT MUCOSA, COMBINED N/A 7/25/2019    Procedure: Esophagogastroduodenoscopy with gastric mucosal resection and clip application;  Surgeon: Stephen Gasca MD;  Location: [facility] OR     EXCISE POLYP RECTUM       LAPAROSCOPIC HYSTERECTOMY TOTAL, BILATERAL SALPINGO-OOPHORECTOMY, NODE DISSECTION, COMBINED Bilateral 2/25/2021    Procedure: Laparoscopic removal of uterus, cervix, both ovaries and fallopian tubes, sentinel lymph node dissection, cystoscopy;  Surgeon: Sandie Samaniego MD;  Location: [facility]     LAPAROSCOPIC LYSIS ADHESIONS       SIGMOIDOSCOPY FLEXIBLE       SIGMOIDOSCOPY FLEXIBLE N/A 12/7/2015    Procedure: SIGMOIDOSCOPY FLEXIBLE;  Surgeon: Mariela Bearden  MD Ramya;  Location: [facility] GI     SIGMOIDOSCOPY FLEXIBLE N/A 3/28/2018    Procedure: SIGMOIDOSCOPY FLEXIBLE;  EGD/Flex Sig;  Surgeon: Stephen Gasca MD;  Location: [facility]     SIGMOIDOSCOPY FLEXIBLE N/A 4/12/2018    Procedure: SIGMOIDOSCOPY FLEXIBLE;  Flexible Sigmoidoscopy with polyp ablation;  Surgeon: Stephen Gasca MD;  Location: [facility]     SIGMOIDOSCOPY FLEXIBLE N/A 5/30/2019    Procedure: Flexible Sigmoidoscopy with polypectomy;  Surgeon: Stephen Gasca MD;  [facility]     SIGMOIDOSCOPY FLEXIBLE N/A 7/30/2020    Procedure: SIGMOIDOSCOPY, FLEXIBLE with Ablation of Polyps;  Surgeon: Stephen Gasca MD;  [facility]     SIGMOIDOSCOPY FLEXIBLE N/A 9/23/2021    Procedure: FLEXIBLE SIGMOIDOSCOPY WITH POLYPECTOMY;  Surgeon: Stephen Gasca MD;  Location: [facility] OR     ZC LAP,SURG,COLECTOMY,W/ANAST      for colon cancer         Health Maintenance Due   Topic Date Due     ASTHMA ACTION PLAN  Never done     ASTHMA CONTROL TEST  Never done     ADVANCE CARE PLANNING  Never done     ZOSTER IMMUNIZATION (1 of 2) Never done     PREVENTIVE CARE VISIT  03/05/2020     COVID-19 Vaccine (3 - Booster for Pfizer series) 09/10/2021     COLORECTAL CANCER SCREENING  09/24/2021     DTAP/TDAP/TD IMMUNIZATION (3 - Td or Tdap) 10/12/2021     PHQ-2 (once per calendar year)  01/01/2022     MAMMO DIAGNOSTIC  04/08/2022       Current Medications:     Current Outpatient Medications   Medication Sig Dispense Refill     acetaminophen (TYLENOL) 325 MG tablet Take 2 tablets (650 mg) by mouth every 6 hours as needed for mild pain 30 tablet 0     albuterol 90 MCG/ACT inhaler Inhale 2 puffs into the lungs every 6 hours as needed.       calcium-magnesium (CALMAG) 500-250 MG TABS Take 1 tablet by mouth daily       celecoxib (CELEBREX) 100 MG capsule Take 1 capsule (100 mg) by mouth daily 90 capsule 3     Cranberry 500 MG CAPS Take 1 capsule by mouth daily       docusate sodium (COLACE) 100 MG capsule Take 100 mg by  mouth daily       fexofenadine (ALLEGRA) 180 MG tablet Take 180 mg by mouth daily.       Lactobacillus (PROBIOTIC ACIDOPHILUS PO) Take 100 mg by mouth daily       MELATONIN PO Take 2 mg by mouth nightly as needed        multivitamin, therapeutic with minerals (THERA-VIT-M) TABS Take 3 tablets by mouth 2 times daily        Omega-3 Fatty Acids (OMEGA-3 FISH OIL PO) Take 2 g by mouth daily        ondansetron (ZOFRAN) 4 MG tablet Take 1 tablet (4 mg) by mouth every 8 hours as needed for nausea 60 tablet 1     triamcinolone (NASACORT) 55 MCG/ACT Inhaler Spray 2 sprays into both nostrils daily       ZOLMitriptan (ZOMIG) 5 MG tablet Take 1 tablet (5 mg) by mouth at onset of headache for migraine 90 tablet 3         Allergies:        Allergies   Allergen Reactions     Corn-Related Products Shortness Of Breath     Avoids food; ok in tiny doses, such as in medicines  Avoids food; ok in tiny doses, such as in medicines     Cipro [Ciprofloxacin]      Reddened skin     Flagyl [Metronidazole] Nausea and Vomiting     Garlic Fatigue     GI distress     Keflex [Cephalosporins] Nausea and Vomiting     Omeprazole      PN: headache     Prilosec Otc [Omeprazole Magnesium]      migraines     Cephalexin Rash     PN: Rash, Generalized     Doxycycline Rash     blisters     Erythromycin Rash     Sulfa Drugs Rash     Ok to use         Social History:     Social History     Tobacco Use     Smoking status: Never Smoker     Smokeless tobacco: Never Used   Substance Use Topics     Alcohol use: Not Currently     Comment: 1 or 2 drinks per year       History   Drug Use No         Family History:     The patient's family history is notable for     Family History   Problem Relation Age of Onset     Hyperlipidemia Mother      Cerebrovascular Disease Mother      Cerebrovascular Disease Father      Colon Cancer Father 57         at 63     Prostate Cancer Brother 53        recurrence at 65     Prostate Cancer Maternal Uncle 65         Physical Exam:      /80   Pulse 72   Resp 16   Wt 70.4 kg (155 lb 3.2 oz)   LMP 05/01/2019   SpO2 98%   BMI 28.39 kg/m    Body mass index is 28.39 kg/m .    General Appearance: healthy and alert, no distress     HEENT: no thyromegaly, no palpable nodules or masses        Cardiovascular: regular rate and rhythm, no gallops, rubs or murmurs     Respiratory: lungs clear, no rales, rhonchi or wheezes    Musculoskeletal: extremities non tender and without edema    Skin: no lesions or rashes     Neurological: normal gait, no gross defects     Psychiatric: appropriate mood and affect                               Hematological: normal cervical, supraclavicular and inguinal lymph nodes     Gastrointestinal:       abdomen soft, non-tender, non-distended, no organomegaly or masses; laparoscopic incisions well healed.     Genitourinary: External genitalia and urethral meatus appears normal.  Vagina is smooth without nodularity or masses. Cervix surgically absent.  Bimanual exam reveal no masses, nodularity or fullness.  Recto-vaginal exam confirms these findings. Small external hemorrhoid soft.       Assessment:    Sarah Duran is a 56 year old woman with a diagnosis of stage II, grade 1 endometrial adenocarcinoma. She is here today for an acute visit.     20 minutes spent on the date of the encounter doing chart review, history and exam, documentation, and further activities as noted above.        Plan:     1.)        Stage II, grade 1 endometrial adenocarcinoma. HAROON on exam. Reviewed signs and symptoms for when she should contact the clinic or seek additional care.  Discussed signs and symptoms of recurrence and to call if these should occur. Patient to contact the clinic with any questions or concerns in the interim. Discussed no need for further pap smear testing. Can use otc prep H for hemorrhoid if needed. Rare mild intermittent abdominal discomfort likely nerve/musculoskeltal related. Pt will let us know if this worsens  "or increases in frequency. Discussed visits every 3 months for the first two years (5/23) then every 6 months for up to 5 years (5/26) then annually thereafter. Continue to use dilator for 3-5 minutes a multiple times per week as she has been.     2.) Genetic risk factors were assessed and the patient is POSITIVE for a APC mutation.  Specifically her mutation is called [id number],  consistent with a diagnosis of familial adenomatous polyposis (FAP) or attenuated FAP (AFAP). She follows with Clarissa Brown with the [facility] clinic.     3.) Labs and/or tests ordered include: none.     4.) Health maintenance issues addressed today include annual health maintenance and non-gynecologic issues with PCP.          REYNOLD Sorto, NP-BC  Women's Health Nurse Practitioner  Division of Gynecologic Oncology  [facility]      CC  Patient Care Team:  Ida Sandoval MD as PCP - General (Family Practice)  Stephen Gasac MD as MD (Gastroenterology)  Treasure Cleveland GC as Genetic Counselor (Genetic )  Pancho Samson MD as MD (Colon and Rectal Surgery)  Maria De Jesus Evans, RN as Specialty Care Coordinator (Oncology)  Keila Burger MD as Assigned Cancer Care Provider      Oncology Rooming Note    July 1, 2022 1:58 PM   Sarah Duran is a 56 year old female who presents for:    No chief complaint on file.    Initial Vitals: /80   Pulse 72   Resp 16   Wt 70.4 kg (155 lb 3.2 oz)   LMP 05/01/2019   SpO2 98%   BMI 28.39 kg/m   Estimated body mass index is 28.39 kg/m  as calculated from the following:    Height as of 9/23/21: 1.575 m (5' 2\").    Weight as of this encounter: 70.4 kg (155 lb 3.2 oz). Body surface area is 1.75 meters squared.  No Pain (0) Comment: Data Unavailable   Patient's last menstrual period was 05/01/2019.  Allergies reviewed: Yes  Medications reviewed: Yes    Medications: Medication refills not needed today.  Pharmacy name " entered into EPIC:    [facility], [place] - [address]  [facility] [place] - [address] [facility]  [facility] [place], [place] [address] [facility] - [place], [place] - [address]  [facility] [place], [place] - [address]  [facility] [place], [place] - [address] [facility] [place], [place] - [address] [facility] [phone]    Clinical concerns: no      Tori Donovan, CMA                  Again, thank you for allowing me to participate in the care of your patient.        Sincerely,        REYNOLD Moore CNP

## 2022-07-01 NOTE — PROGRESS NOTES
"Oncology Rooming Note    July 1, 2022 1:58 PM   Sarah Duran is a 56 year old female who presents for:    No chief complaint on file.    Initial Vitals: /80   Pulse 72   Resp 16   Wt 70.4 kg (155 lb 3.2 oz)   LMP 05/01/2019   SpO2 98%   BMI 28.39 kg/m   Estimated body mass index is 28.39 kg/m  as calculated from the following:    Height as of 9/23/21: 1.575 m (5' 2\").    Weight as of this encounter: 70.4 kg (155 lb 3.2 oz). Body surface area is 1.75 meters squared.  No Pain (0) Comment: Data Unavailable   Patient's last menstrual period was 05/01/2019.  Allergies reviewed: Yes  Medications reviewed: Yes    Medications: Medication refills not needed today.  Pharmacy name entered into EPIC:    [facility] [place], [place] - [address]  [facility], [place] - [address] [place] [address] [address] [facility] [place] [address] [facility] [place] - [address]  [facility] [place], [place] - [address] [facility] [place], [place] - [address] [facility] [phone]    Clinical concerns: no      Tori Donovan CMA              "

## 2022-08-24 DIAGNOSIS — D13.91 FAP (FAMILIAL ADENOMATOUS POLYPOSIS): ICD-10-CM

## 2022-08-24 RX ORDER — CELECOXIB 100 MG/1
100 CAPSULE ORAL DAILY
Qty: 90 CAPSULE | Refills: 3 | Status: SHIPPED | OUTPATIENT
Start: 2022-08-24 | End: 2023-08-21

## 2022-09-23 ENCOUNTER — PATIENT OUTREACH (OUTPATIENT)
Dept: GASTROENTEROLOGY | Facility: CLINIC | Age: 57
End: 2022-09-23

## 2022-09-23 ENCOUNTER — PREP FOR PROCEDURE (OUTPATIENT)
Dept: GASTROENTEROLOGY | Facility: CLINIC | Age: 57
End: 2022-09-23

## 2022-09-23 DIAGNOSIS — D13.91 FAP (FAMILIAL ADENOMATOUS POLYPOSIS): Primary | ICD-10-CM

## 2022-09-23 DIAGNOSIS — D13.5 AMPULLARY ADENOMA: ICD-10-CM

## 2022-09-23 NOTE — PROGRESS NOTES
"Called pt to discuss follow up procedure, left VM    Procedure/Imaging/Clinic: EGD/flex sig and ERCP  Physician: Campos/Pradeep  Timing: next available  Procedure length:  per MD average  Anesthesia: general  Dx: ampullary polyp, FAP surveillance  Tier: 2  Location: [facility] OR    November 10th procedure date agreed upon, pt would prefer not to be the first case of the day if possible.    Explained they will need a , someone to stay with them for 24 hours and should stay in town for 24 hours (within 45 min of Hospital) post procedure    Patient needs to get pre-op physical completed. If outside [facility] system will need physical faxed to number [phone]   If you do not get a preop physical, your procedure could be cancelled, patient voiced understanding*    Preop Plan: needed within 30 days of procedure, pt will make with PCP Dr Jaime Zee Review    Blood thinner -  none  ASA - none  Diabetic - none    COVID test discussed: yes, at home test discussed, pt 1-2 days before procedure    Patient Education r/t procedure: mychart education    Does patient have any history of gastric bypass/gastric surgery/altered panc/bili anatomy? none    A pre-op nurse will call 1-2 days prior to the procedure.    NPO/Prep: Per note in chart from previous flex sig \"pt will need full colonoscopy prep for the sigmoidoscopy. Previous preps for flex sig have been inadequate\".    Golytely prep ordered and sent to pharmacy of patient's choosing    Verbalized understanding of all instructions. All questions answered.     Procedure order placed, message routed to OR       Radha Gamble, RN, BSN,   [facility]  Care coordinator                  "

## 2022-09-26 RX ORDER — BISACODYL 5 MG
TABLET, DELAYED RELEASE (ENTERIC COATED) ORAL
Qty: 4 TABLET | Refills: 0 | Status: SHIPPED | OUTPATIENT
Start: 2022-09-26

## 2022-10-07 ENCOUNTER — HOSPITAL ENCOUNTER (OUTPATIENT)
Dept: ULTRASOUND IMAGING | Facility: CLINIC | Age: 57
Discharge: HOME | End: 2022-10-07
Attending: CLINICAL NURSE SPECIALIST | Admitting: CLINICAL NURSE SPECIALIST
Payer: COMMERCIAL

## 2022-10-07 DIAGNOSIS — E04.1 THYROID NODULE: ICD-10-CM

## 2022-10-07 DIAGNOSIS — D13.91 FAP (FAMILIAL ADENOMATOUS POLYPOSIS): ICD-10-CM

## 2022-10-07 PROCEDURE — 76536 US EXAM OF HEAD AND NECK: CPT

## 2022-10-18 ENCOUNTER — TELEPHONE (OUTPATIENT)
Dept: ONCOLOGY | Facility: CLINIC | Age: 57
End: 2022-10-18

## 2022-10-18 NOTE — TELEPHONE ENCOUNTER
Patient called to cancel in person visit with Ciara Garcia, LIV for today at Noon. Patient has a migraine headache. She will call back to reschedule. She wants the in person visit to do the physical exam.

## 2022-10-21 ENCOUNTER — ONCOLOGY VISIT (OUTPATIENT)
Dept: ONCOLOGY | Facility: CLINIC | Age: 57
End: 2022-10-21
Attending: CLINICAL NURSE SPECIALIST
Payer: COMMERCIAL

## 2022-10-21 ENCOUNTER — ANCILLARY PROCEDURE (OUTPATIENT)
Dept: MAMMOGRAPHY | Facility: CLINIC | Age: 57
End: 2022-10-21
Attending: CLINICAL NURSE SPECIALIST
Payer: COMMERCIAL

## 2022-10-21 VITALS
SYSTOLIC BLOOD PRESSURE: 118 MMHG | HEART RATE: 61 BPM | TEMPERATURE: 98.3 F | DIASTOLIC BLOOD PRESSURE: 74 MMHG | WEIGHT: 147.8 LBS | BODY MASS INDEX: 27.03 KG/M2 | OXYGEN SATURATION: 99 %

## 2022-10-21 DIAGNOSIS — Z12.39 BREAST CANCER SCREENING, HIGH RISK PATIENT: Primary | ICD-10-CM

## 2022-10-21 DIAGNOSIS — R92.30 DENSE BREAST: ICD-10-CM

## 2022-10-21 DIAGNOSIS — N60.91 ATYPICAL DUCTAL HYPERPLASIA OF RIGHT BREAST: ICD-10-CM

## 2022-10-21 DIAGNOSIS — Z12.39 BREAST CANCER SCREENING, HIGH RISK PATIENT: ICD-10-CM

## 2022-10-21 PROCEDURE — 77067 SCR MAMMO BI INCL CAD: CPT | Performed by: RADIOLOGY

## 2022-10-21 PROCEDURE — 77063 BREAST TOMOSYNTHESIS BI: CPT | Performed by: RADIOLOGY

## 2022-10-21 PROCEDURE — G0463 HOSPITAL OUTPT CLINIC VISIT: HCPCS

## 2022-10-21 PROCEDURE — 99214 OFFICE O/P EST MOD 30 MIN: CPT | Performed by: CLINICAL NURSE SPECIALIST

## 2022-10-21 ASSESSMENT — PAIN SCALES - GENERAL: PAINLEVEL: NO PAIN (0)

## 2022-10-21 NOTE — PROGRESS NOTES
Oncology Risk Management Consultation:  Date on this visit: 10/21/2022    Sarah Duran requires high risk screening and surveillance to reduce her risk of cancer secondary to  a history of atypical ductal hyperplasia (ADH). She is considered to be at high risk for breast cancer, with a risk of up to 35%. She has a history of Stage II, Grade I endometrial adenocarcinoma, diagnosed in 2020; followed by Dr. Sandie Lezama. She also carries an APC+ genetic mutation, consistent with a diagnosis of Familial Adenomatous Polyposis (FAP) or attenuated FAP (AFAP) with a history of a thyroid nodule and gastric polyps.     Primary Physician: Ida Sandoval MD     History Of Present Illness:  Ms. Duran is a very pleasant  57 year old female who presents with a personal history of ADH and a likely attenuated version of Familial Adenomatous Polyposis. She is s/p right excisional breast biopsy on 2016. She is also s/p ileocolonic anastomosis in .     Genetic Testin2018: POSITIVE for a APC mutation.  Specifically her mutation is called [id number],  consistent with a diagnosis of familial adenomatous polyposis (FAP) or attenuated FAP (AFAP). The mutation was identified using [place] [employer] testing through [employer].  The testing was done because of her personal history of colonic polyposis and family history of colon cancer.     Of note, Sarah tested negative for mutations in the following genes: BMPR1A, CDH1, CHEK2, MLH1, MSH2, MSH6, MUTYH, PMS2, POLD1, POLE, PTEN, SMAD4, STK11 and TP53 (sequencing and deletion/duplication); EPCAM and GREM1 (deletion/duplication only).        Pertinent history:  Attenuated Familial adenomatous polyposis, s/p ileal rectal anastomosis in May 1988. Confirmed with APC testing (see below)  Subsequent laparotomy in  for bowel obstruction from adhesions.  Taking rabeprazole 20 mg EC BID for gastric polyps.  Menarche at 12.  Nulliparous.  Menopause at  50.  Breast density: scattered fibroglandular densities  Hx of THBSO for Stage II, Grade I  endometrial cancer (see details below)     21:  US Pelvis:  Uterus: Measures 8.9 x 5.8 x 6.4 cm. The endometrium is thickened at 2.9 cm. The endometrium is heterogeneous in appearance with an irregular, nodular contour of the endometrial surface. There is hypoechoic complex fluid in the endometrial cavity likely containing blood. There is vascularity of the endometrium on color imaging. The abnormal endometrium occupies the majority of the uterus. Right Ovary: Measures 2.0 x 1.0 x 1.1 cm and appears unremarkable Right Ovary Blood Flow: Present. Left Ovary: Not seen. Free Fluid: No significant free fluid.There are no suspicious adnexal masses.     2/10/21:  Hysteroscopy, D&C                 Pathology:  Grade 1 endometrial adenocarcinoma, MMR intact, p53 wild type, ER/ME +     21:  Total laparoscopic hysterectomy, bilateral salpingo-oophorectomy, bilateral sentinel lymph node dissection, cystoscopy                 Pathology:  Stage II, grade 1 endometrial adenocarcinoma, tumor size 5.6 cm, 1/13 mm myometrial invasion, +CASEY, + cervix, 0/2 sentinel LN,         21:  Completed brachytherapy to 3000cGy in 5 Fx to the vagina     Thyroid Screenin2019 - FNA biopsy of thyroid, for solid appearing nodule in isthmus, biopsy non diagnostic; cyst fluid only.   2020- US of the thyroid: Previously biopsied isthmus nodule is no longer present. There are numerous additional colloid cysts and a benign complex cyst   left lobe, not appreciably changed from previous.   2021- US of thyroid; no change in nodules  10/7/2022- US of thyroid: NODULES: There are benign colloid cysts. There is only one partially  solid nodule on today's examination, listed below.   Nodule 1: 5 x 5 x 4 mm nodule, mid left.   Composition: Mixed cystic and solid, 1 point   Echogenicity: Hyperechoic or isoechoic, 1 point   Shape: Wider-than-tall,  0 points   Margin: Smooth, 0 points   Echogenic Foci: None, or large comet-tail artifacts, 0 points   Point Total: 1-2 points. TI-RADS 2. No FNA. IMPRESSION: Benign colloid cysts and benign partially cystic 5 mm     GI screening history (see chart for further details)  11/7/2005 - Small bowel enteroscopy, one tubular adenoma removed from partial anal verge.  12/7/2005 - adenomatous polyp removed from rectum  2/28/2005 - tubular adenoma, duodenum.  11/16/2011 - fundic gland polyps in stomach, tubular adenoma removed from rectum.  11/5/2012 - 1 tubulovillous adenoma, rectum  7/8/2013 - 1 tubulovillous adenoma, rectum  12/3/2013 - 1 tubulovillous adenoma, rectum  7/21/2014 - 1 tubular adenoma and hyperplastic polyp, rectum  1/20/2015 - 1 tubular adenoma and hyperplastic polyp, rectum  12/7/2015 - 1 tubular adenoma, rectum  6/6/2016 - 1 fundic gland polyp with focal adenomatous changes  3/20/2017 - 2 tubular adenomas removed from rectum  4/12/2017 - colonoscopy and EGD, one fundic gland gastric polyp with focal low grade dysplasia removed, multiple duodenal polyps and multiple rectal polyps.  7/25/2019 - EGD: Two previously reported polyps (6.2 mm and 5.3mm) in the antrum were evaluate by EUS, which did not demonstrate deep layer involvement, and then they were  removed by EMR by using a band ligator and a snare. The defect was then closed with hemoclips. Innumerous fundic gland polyps in the gastric body,  fundus, and cardia. Consistent with diagnosis of FAP. A large fundic gland polyp in the gastric body, which has been previously resected and recent biopsied. Recommendation: Use Aciphex (rabeprazole) 20 mg PO BID for 1 month.  Repeat the upper endoscopy in 1 year for surveillance.      7/30/2020- EGD and flexible sigmoidoscopy:  15-20 duodenal polyps, measuring 1-3 mm, all of which were ablated using argon plasma coagulation.    - Innumerable fundic gland polyps in a confluent carpet-like distribution throughout the  cardia, fundus, and gastric body. Antral-sparing present. A large 2-3   semi-pedunculated polyp in the proximal gastric body  (along the lesser curvature) was resected.  - Suspected very small adenomas flanking the pancreaticobiliary orifices at the prior ampullectomy site.  - Normal esophagus. Pathology: Fundic gland polyp with adenomatous change (low grade dysplasia). Negative for intestinal metaplasia, high-grade dysplasia or malignancy. Recommendation: Schedule for flexible sigmoidoscopy (along with EGD and ERCP) with Dr. Fernández in 1-year.      9/23/2021- ENDOSCOPIC RETROGRADE CHOLANGIOPANCREATOGRAPHY ESOPHAGOGASTRODUODENOSCOPY (EGD), SIGMOIDOSCOPY, FLEXIBLE (Dr. Shahab Fernández): Results: Prior biliary sphincterotomy appeared open.                        - Prior pancreatic sphincterotomy appeared open.                        - Two small button like polyps to left of pancreatic orifice                        - Attempted cold snare polypectomy but slid off both time                        --After pancreatic stent placed, entire area next to orifice ablated w R colon setting pulse APC.                        - DUODENUM examined, no other polyps to D3                        - No antral polyps, but entire gastric body replaced w  multiple fundic gland polyps, not biopsied   Recommendation:       Confirm spontaneous stent passage by performing a KUB x-ray in 4 weeks. Repeat ERCP in 1 year for surveillance.      Flexible sigmoidoscopy (Dr. Gasca) Ten 2 to 4 mm tubular adenomas in rectum                       - Patent ileo-rectal anastomosis and normal distal ileum.   Recommendation:     Repeat flexible sigmoidoscopy in 1 year for surveillance (along with upper endoscopy).      Breast Screening history:   11/28/2016 - R excisional breast biopsy with seed localization, Fibrocystic tissue with ADH on pathology.  4/24/2017 - R breast mammogram, [email or web address].  5/25/2018 - Breast MRI, BiRads2.  11/8/2018 - Screening  tomosynthesis mammogram, BiRads1  6/10/2019 - Breast MRI, BiRads2.  12/20/2019- Screening tomosynthesis mammogram, BiRads1  2/3/2021- Breast MRI, BiRads1  10/8/2021- Screening tomosynthesis mammogram, BiRads1  6/22/2022- Breast MRI, BiRads2     At this visit, she denies asymmetry, lumps, masses, thickening, pain, nipple discharge and skin changes in her breasts.  She denies vaginal bleeding.  She denies nausea, vomiting, discomfort after eating, abdominal pain, bloating, heartburn, constipation,  diarrhea and early satiety.      Past Medical/Surgical History:  Past Medical History:   Diagnosis Date     Acute deep vein thrombosis (DVT) of left lower extremity (H)      Asthma      Atypical ductal hyperplasia of right breast 10/2016    fibrocystic changes, PASH and atypical ductal hyperplasia on needle biopsy, s/p R excisional biopsy with radioactive seed localization     Endometrial cancer (H) 02/15/2021     Factor 5 Leiden mutation, heterozygous (H)     factor 5     Familial adenomatous polyposis 1988    clinical presentation; s/p total abdominal colectomy with TRACEY; confirmed APC+ 2018     GERD (gastroesophageal reflux disease)      Migraine      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    [id number]     Past Surgical History:   Procedure Laterality Date     BIOPSY BREAST SEED LOCALIZATION Right 11/30/2016    Procedure: BIOPSY BREAST SEED LOCALIZATION;  Surgeon: Jessica Rasheed MD;  Location: [facility]     COLECTOMY      subtotal     COMBINED ESOPHAGOSCOPY, GASTROSCOPY, DUODENOSCOPY (EGD) WITH ARGON PLASMA COAGULATOR (APC) N/A 4/12/2018    Procedure: COMBINED ESOPHAGOSCOPY, GASTROSCOPY, DUODENOSCOPY (EGD) WITH ARGON PLASMA COAGULATOR (APC);  Esophagogastroduodenoscopy with polypectomy and polyp ablation;  Surgeon: Stephen Gasca MD;  Location[facility]     DILATION AND CURETTAGE, HYSTEROSCOPY DIAGNOSTIC, COMBINED N/A 2/10/2021    Procedure: HYSTEROSCOPY, DIAGNOSTIC, WITH DILATION AND CURETTAGE OF  UTERUS;  Surgeon: Sandie Samaniego MD;  Location: [facility] OR     ENDOSCOPIC RETROGRADE CHOLANGIOPANCREATOGRAM N/A 9/23/2021    Procedure: ENDOSCOPIC RETROGRADE CHOLANGIOPANCREATOGRAPHY WITH PANCREATIC STENT PLACEMENT AND POLYP ABLATION with Argon Plasma Coagulation;  Surgeon: Shahab Fernández MD;  Location[facility]     ENDOSCOPIC RETROGRADE CHOLANGIOPANCREATOGRAPHY       ENDOSCOPIC ULTRASOUND UPPER GASTROINTESTINAL TRACT (GI) N/A 7/25/2019    Procedure: Endoscopic Ultrasound;  Surgeon: Stephen Gasca MD;  [facility]     ESOPHAGOSCOPY, GASTROSCOPY, DUODENOSCOPY (EGD), COMBINED  11/5/2012    Procedure: COMBINED ESOPHAGOSCOPY, GASTROSCOPY, DUODENOSCOPY (EGD), BIOPSY SINGLE OR MULTIPLE;;  Surgeon: Angélica Garcia MD;  [facility]     ESOPHAGOSCOPY, GASTROSCOPY, DUODENOSCOPY (EGD), COMBINED  12/3/2013    Procedure: COMBINED ESOPHAGOSCOPY, GASTROSCOPY, DUODENOSCOPY (EGD);;  Surgeon: Angélica Garcia MD;  [facility]     ESOPHAGOSCOPY, GASTROSCOPY, DUODENOSCOPY (EGD), COMBINED N/A 3/28/2018    Procedure: COMBINED ESOPHAGOSCOPY, GASTROSCOPY, DUODENOSCOPY (EGD);;  Surgeon: Stephen Gasca MD;  [facility]     ESOPHAGOSCOPY, GASTROSCOPY, DUODENOSCOPY (EGD), COMBINED N/A 5/30/2019    Procedure: Esophagogastroduodenoscopy with biopsy x2 APC;  Surgeon: Stephen Gasca MD;  [facility]     ESOPHAGOSCOPY, GASTROSCOPY, DUODENOSCOPY (EGD), COMBINED N/A 7/30/2020    Procedure: ESOPHAGOGASTRODUODENOSCOPY (EGD) with polypectomy by hot snare; argonplasma coaglution ablation of polyps;  Surgeon: Stephen Gasca MD;  [facility]     ESOPHAGOSCOPY, GASTROSCOPY, DUODENOSCOPY (EGD), RESECT MUCOSA, COMBINED N/A 7/25/2019    Procedure: Esophagogastroduodenoscopy with gastric mucosal resection and clip application;  Surgeon: Stephen Gasca MD;  [facility]     EXCISE POLYP RECTUM       LAPAROSCOPIC HYSTERECTOMY TOTAL, BILATERAL SALPINGO-OOPHORECTOMY, NODE  DISSECTION, COMBINED Bilateral 2/25/2021    Procedure: Laparoscopic removal of uterus, cervix, both ovaries and fallopian tubes, sentinel lymph node dissection, cystoscopy;  Surgeon: Sandie Samaniego MD;  [facility]     LAPAROSCOPIC LYSIS ADHESIONS       SIGMOIDOSCOPY FLEXIBLE       SIGMOIDOSCOPY FLEXIBLE N/A 12/7/2015    Procedure: SIGMOIDOSCOPY FLEXIBLE;  Surgeon: Mariela Bearden MD;  [facility]     SIGMOIDOSCOPY FLEXIBLE N/A 3/28/2018    Procedure: SIGMOIDOSCOPY FLEXIBLE;  EGD/Flex Sig;  Surgeon: Stephen Gasca MD;  [facility]     SIGMOIDOSCOPY FLEXIBLE N/A 4/12/2018    Procedure: SIGMOIDOSCOPY FLEXIBLE;  Flexible Sigmoidoscopy with polyp ablation;  Surgeon: Stephen Gasca MD;  [facility]     SIGMOIDOSCOPY FLEXIBLE N/A 5/30/2019    Procedure: Flexible Sigmoidoscopy with polypectomy;  Surgeon: Stephen Gasca MD;  [facility]     SIGMOIDOSCOPY FLEXIBLE N/A 7/30/2020    Procedure: SIGMOIDOSCOPY, FLEXIBLE with Ablation of Polyps;  Surgeon: Stephen Gasca MD;  [facility]     SIGMOIDOSCOPY FLEXIBLE N/A 9/23/2021    Procedure: FLEXIBLE SIGMOIDOSCOPY WITH POLYPECTOMY;  Surgeon: Stephen Gasca MD;  [facility]     [facility] [[email or web address]] LAP,SURG,COLECTOMY,W/ANAST      for colon cancer       Allergies:  Allergies as of 10/21/2022 - Reviewed 10/21/2022   Allergen Reaction Noted     Corn-related products Shortness Of Breath 07/29/2020     Cipro [ciprofloxacin]  02/24/2005     Flagyl [metronidazole] Nausea and Vomiting 02/24/2005     Garlic Fatigue 07/29/2020     Keflex [cephalosporins] Nausea and Vomiting 02/24/2005     Omeprazole  04/29/2010     Prilosec otc [omeprazole magnesium]  01/28/2012     Cephalexin Rash 06/14/2004     Doxycycline Rash 04/12/2018     Erythromycin Rash 02/24/2005     Sulfa drugs Rash 02/24/2005       Current Medications:  Current Outpatient Medications   Medication Sig Dispense Refill     albuterol 90 MCG/ACT inhaler Inhale 2 puffs into the  "lungs every 6 hours as needed.       calcium-magnesium (CALMAG) 500-250 MG TABS Take 1 tablet by mouth daily       celecoxib (CELEBREX) 100 MG capsule Take 1 capsule (100 mg) by mouth daily 90 capsule 3     Cranberry 500 MG CAPS Take 1 capsule by mouth daily       docusate sodium (COLACE) 100 MG capsule Take 100 mg by mouth daily       fexofenadine (ALLEGRA) 180 MG tablet Take 180 mg by mouth daily.       Lactobacillus (PROBIOTIC ACIDOPHILUS PO) Take 100 mg by mouth daily       MELATONIN PO Take 2 mg by mouth nightly as needed        multivitamin, therapeutic with minerals (THERA-VIT-M) TABS Take 3 tablets by mouth 2 times daily        Omega-3 Fatty Acids (OMEGA-3 FISH OIL PO) Take 2 g by mouth daily        ondansetron (ZOFRAN) 4 MG tablet Take 1 tablet (4 mg) by mouth every 8 hours as needed for nausea 60 tablet 1     triamcinolone (NASACORT) 55 MCG/ACT Inhaler Spray 2 sprays into both nostrils daily       ZOLMitriptan (ZOMIG) 5 MG tablet Take 1 tablet (5 mg) by mouth at onset of headache for migraine 90 tablet 3     acetaminophen (TYLENOL) 325 MG tablet Take 2 tablets (650 mg) by mouth every 6 hours as needed for mild pain (Patient not taking: Reported on 10/21/2022) 30 tablet 0     bisacodyl (DULCOLAX) 5 MG EC tablet Refer to \"Getting Ready for a Colonoscopy\" instruction handout (Patient not taking: Reported on 10/21/2022) 4 tablet 0        Family History:  Family History   Problem Relation Age of Onset     Hyperlipidemia Mother      Cerebrovascular Disease Mother      Cerebrovascular Disease Father      Colon Cancer Father 57         at 63     Prostate Cancer Brother 53        recurrence at 65     Prostate Cancer Maternal Uncle 65       Social History:  Social History     Socioeconomic History     Marital status:      Spouse name: Roberto     Number of children: 2     Years of education: Not on file     Highest education level: Not on file   Occupational History     Occupation:      Employer: " [employer]   Tobacco Use     Smoking status: Never     Smokeless tobacco: Never   Substance and Sexual Activity     Alcohol use: Not Currently     Comment: 1 or 2 drinks per year     Drug use: No     Sexual activity: Not Currently     Partners: Male     Birth control/protection: Surgical     Comment: THBSO for endometrial cancer and postmenopausal prior to surgery   Other Topics Concern     Parent/sibling w/ CABG, MI or angioplasty before 65F 55M? Not Asked   Social History Narrative    Two adopted daughters, ages 13 and 15     Social Determinants of Health     Financial Resource Strain: Not on file   Food Insecurity: Not on file   Transportation Needs: Not on file   Physical Activity: Not on file   Stress: Not on file   Social Connections: Not on file   Intimate Partner Violence: Not At Risk     Fear of Current or Ex-Partner: No     Emotionally Abused: No     Physically Abused: No     Sexually Abused: No   Housing Stability: Not on file       Physical Exam:  /74   Pulse 61   Temp 98.3  F (36.8  C) (Oral)   Wt 67 kg (147 lb 12.8 oz)   LMP 05/01/2019   SpO2 99%   BMI 27.03 kg/m        GENERAL APPEARANCE: healthy, alert and no apparent distress     NECK: no adenopathy, no asymmetry or masses     LYMPHATICS: No cervical, supraclavicular or axillary  lymphadenopathy     RESP: lungs clear to auscultation - no rales, rhonchi or wheezes     CARDIOVASCULAR: regular rate and rhythm, normal S1 S2, no S3 or S4 and no murmur.   BREASTS: Examined in a sitting and lying position. Symmetrical without visible distortion, swelling or rashes. No nipple inversion, nipple discharge, breast dimpling or puckering. Breast tissue is heterogenous dense to palpation.  Axillary area without masses or lymphadenopathy.   SKIN: no suspicious lesions or rashes    Laboratory/Imaging Studies  No results found for any visits on 10/21/22.    ASSESSMENT  Sarah is up to date on all of her screenings for both FAP and her breast  surveillance. She is doing well and is getting ready to go to [place] for a conference at work. She doesn't have any complaints today and continues to stay active exercising routinely. There are no changes to her family's medical history.     We discussed that we have been following her thyroid annually since 2019 and it was determined that her thyroid nodule is a colloid nodule. We will re image in 5 years unless she has a status change. She is in agreement with the plans below.    Individualized Surveillance Plan for women  With 20% or greater lifetime risk of breast cancer   Per NCCN Breast Cancer Screening and Diagnosis Guidelines Version 1.2021   Recommended screening Test or procedure Last done Next Scheduled    Clinical encounter Clinical exam every 6-12 months.   Refer to genetic counseling if not already done.  Consider risk reduction strategies. 10/21/2022 December 2023   However, some family histories with breast cancers at a very young age, may warrant screening starting earlier.    *May begin at age 40 if breast cancers in the family occur at later ages.    Annual mammogram beginning 10 years younger than the earliest breast cancer in the family but not prior to age 30.    Recommend annual breast MRI to begin 10 years younger than the earliest breast cancer in the family but not prior to age 25.    Breast MRIs are preferably done on day 7-15 of the menstrual cycle in premenopausal women.   See below   See below     Breast screening for patients at high risk due to thoracic radiation between the ages of 10-30   Annual clinical exam beginning 8 years after radiation therapy.    Annual screening mammogram beginning at age 30 or 8 years after radiation therapy    Annual breast MRI, beginning at age 25 or 8 years after radiation therapy.       NA     NA   Women who have a lifetime risk of >20% based on history of LCIS or ADH/ALH Annual screening mammogram beginning at age of LCIS or ADH/ALH but not prior  to age 30.    Consider annual MRI to begin at age of diagnosis of LCIS or ADH/ALH but not prior to age 25.    Consider risk reducing strategies.   10/8/2021-   Screening tomosynthesis mammogram, BiRads1    6/22/2022- Breast MRI, BiRads2 Mammogram after appt today    Breast MRI in June (6/23 or later)    Next exam: December 2023 followed by mammogram    Recommend risk reducing strategies for women with 1.7% 5 year risk of breast cancer.       Individualized Surveillance Plan for Familial Adenomatous Polyposis    Based on NCCN Guidelines Version 1.2022   Type of Screening  Recommendation  Last Done  Next Due    Recommendations for Children    Colon Cancer Screening  Flexible sigmoidoscopy or colonoscopy every 12 months beginning at age 10-15.       See below   See below   Hepatoblastoma  Consider liver palpation, abdominal ultrasound, and measurement of AFP every 3-6 months, during first 5 years of life.  NA   NA   Thyroid Cancer Screening  Ultrasound at baseline starting in teenage years.  If normal, consider repeating every 2-5 years and if abnormal, refer to thyroid specialist. [place] interval screenings for families with thyroid cancer.     Recommendations for Adults    Colon Cancer Screening  Colectomy with ileorectal anastomosis (TRACEY)  recommended, then endoscopic evaluation of the rectum every 6-12 months, depending on the polyp burden.     If ileo proctocolectomy with ileal pouch anal anastomosis, endoscopic surveillance of ileal pouch every 1-3 years depending on polyp burden.    Surveillance frequency should be increased to every 6 months for large, flat polyps with villous histology and/or high grade dysplasia    Flexible sigmoidoscopy (Dr. Gasca) Ten 2 to 4 mm tubular adenomas in rectum                       - Patent ileo-rectal anastomosis and normal distal ileum.    11/3/2022   Duodenal or periampullary cancer screening and gastric cancer screening  Baseline upper endoscopy including side-viewing  examination. Start between 20-25. Consider earlier screening if family history warrants it. Base subsequent screenings on findings (see below).    Manage non-fundic gland polyps endoscopically if possible.      These occur in the majority of FAP patients and may have focal low grade dysplasia but typically are non progressive.Special screening or surgery should only be considered in the presence of high grade dysplasia.   9/23/2021-ERCP: 9/23/2021- ENDOSCOPIC RETROGRADE CHOLANGIOPANCREATOGRAPHY ESOPHAGOGASTRODUODENOSCOPY (EGD), SIGMOIDOSCOPY, FLEXIBLE (Dr. Shahab Fernández): Results: Prior biliary sphincterotomy appeared open.                        - Prior pancreatic sphincterotomy appeared open.                        - Two small button like polyps to left of pancreatic orifice                        - Attempted cold snare polypectomy but slid off both time                        --After pancreatic stent placed, entire area next to orifice ablated w R colon setting pulse APC.                        - DUODENUM examined, no other polyps to D3                        - No antral polyps, but entire gastric body replaced w  multiple fundic gland polyps, not biopsied   Recommendation:       Confirm spontaneous stent passage by performing a KUB x-ray in 4 weeks. Repeat ERCP in 1 year for surveillance.      11/3/2022   Thyroid Cancer Screening    Baseline ultrasound starting in late teen years.    If normal, consider repeating every 2-5 years and if abnormal, refer to thyroid specialist.     [place] interval screenings for families with thyroid cancer.   11/7/2022-US thyroid: : Benign colloid cysts and benign partially cystic 5 mm   Repeat in 2027   CNS Cancer  Annual physical examination.  10/21/2022 October 2022   Intra-abdominal desmoids  Annual abdominal palpation.     If family history of symptomatic desmoids, consider abdominal MRI or CT 1-3 years post colectomy and then every 5-10 years.       NA     NA   Pancreatic  screen Consider pancreatic screening if family history of pancreatic cancer. ERCP in Sept. 2021 11/3/2022-ERCP   Small bowel polyps and cancer  Consider adding small bowel visualization to CT or MRI for desmoids, especially if duodenal polyposis is advanced.    ERCP ERCP   Duodenal findings:  Stage 0 (no polyposis) - repeat endoscopy every 4 years.  Stage 1 (minimal polyposis 1-4 tubular adenomas, 1-4 mm each) - repeat EGD every 2-3 years.  Stage 2 (mild polyposis - 5-9 tubular adenomas, 5-9 mm each) - repeat EGD every 1-3 years.  Stage 3 (moderate polyposis - >20 lesions or size >1 cm) - repeat EGD every 6-12 months  Stage 4 (dense polyposis or high grade dysplasia) - surgical evaluation, with expert surveillance every 3-6 months.  Due to limited data, no screening recommendation is made for pancreatic cancer at this time.      I spent a total of 37 minutes on the day of the visit. Please see the note for further information on patient assessment and treatment.    Clarissa Brown, APRN-CNS, OCN, AGN-BC  Clinical Nurse Specialist  Cancer Risk Management Program  [facility]    CC: MD Shahab Melton MD Amanda J Christ, MD

## 2022-10-21 NOTE — PATIENT INSTRUCTIONS
Individualized Surveillance Plan for women  With 20% or greater lifetime risk of breast cancer   Per NCCN Breast Cancer Screening and Diagnosis Guidelines Version 1.2021   Recommended screening Test or procedure Last done Next Scheduled    Clinical encounter Clinical exam every 6-12 months.   Refer to genetic counseling if not already done.  Consider risk reduction strategies. 10/21/2022 December 2023   However, some family histories with breast cancers at a very young age, may warrant screening starting earlier.    *May begin at age 40 if breast cancers in the family occur at later ages.    Annual mammogram beginning 10 years younger than the earliest breast cancer in the family but not prior to age 30.    Recommend annual breast MRI to begin 10 years younger than the earliest breast cancer in the family but not prior to age 25.    Breast MRIs are preferably done on day 7-15 of the menstrual cycle in premenopausal women.   See below   See below     Breast screening for patients at high risk due to thoracic radiation between the ages of 10-30   Annual clinical exam beginning 8 years after radiation therapy.    Annual screening mammogram beginning at age 30 or 8 years after radiation therapy    Annual breast MRI, beginning at age 25 or 8 years after radiation therapy.       NA     NA   Women who have a lifetime risk of >20% based on history of LCIS or ADH/ALH Annual screening mammogram beginning at age of LCIS or ADH/ALH but not prior to age 30.    Consider annual MRI to begin at age of diagnosis of LCIS or ADH/ALH but not prior to age 25.    Consider risk reducing strategies.   10/8/2021-   Screening tomosynthesis mammogram, BiRads1    6/22/2022- Breast MRI, BiRads2 Mammogram after appt today    Breast MRI in June (6/23 or later)    Next exam: December 2023 followed by mammogram    Recommend risk reducing strategies for women with 1.7% 5 year risk of breast cancer.       Individualized Surveillance Plan for Familial  Adenomatous Polyposis    Based on NCCN Guidelines Version 1.2022   Type of Screening  Recommendation  Last Done  Next Due    Recommendations for Children    Colon Cancer Screening  Flexible sigmoidoscopy or colonoscopy every 12 months beginning at age 10-15.       See below   See below   Hepatoblastoma  Consider liver palpation, abdominal ultrasound, and measurement of AFP every 3-6 months, during first 5 years of life.  NA   NA   Thyroid Cancer Screening  Ultrasound at baseline starting in teenage years.  If normal, consider repeating every 2-5 years and if abnormal, refer to thyroid specialist. [place] interval screenings for families with thyroid cancer.     See below     See below   Recommendations for Adults    Colon Cancer Screening  Colectomy with ileorectal anastomosis (TRACEY)  recommended, then endoscopic evaluation of the rectum every 6-12 months, depending on the polyp burden.     If ileo proctocolectomy with ileal pouch anal anastomosis, endoscopic surveillance of ileal pouch every 1-3 years depending on polyp burden.    Surveillance frequency should be increased to every 6 months for large, flat polyps with villous histology and/or high grade dysplasia    Flexiile sigmoidoscopy (Dr. Gasca) Ten 2 to 4 mm tubular adenomas in rectum                       - Patent ileo-rectal anastomosis and normal distal ileum.    11/3/2022   Duodenal or periampullary cancer screening and gastric cancer screening  Baseline upper endoscopy including side-viewing examination. Start between 20-25. Consider earlier screening if family history warrants it. Base subsequent screenings on findings (see below).    Manage non-fundic gland polyps endoscopically if possible.      These occur in the majority of FAP patients and may have focal low grade dysplasia but typically are non progressive.Special screening or surgery should only be considered in the presence of high grade dysplasia.   9/23/2021- ENDOSCOPIC RETROGRADE  CHOLANGIOPANCREATOGRAPHY ESOPHAGOGASTRODUODENOSCOPY (EGD), SIGMOIDOSCOPY, FLEXIBLE (Dr. Shahab Fernández): Results: Prior biliary sphincterotomy appeared open.                        - Prior pancreatic sphincterotomy appeared open.                        - Two small button like polyps to left of pancreatic orifice                        - Attempted cold snare polypectomy but slid off both time     --Afrter pancreatic stent placed, entire area next to orifice ablated w R colon setting pulse APC.                        - DUODENUM examined, no other polyps to D3                        - No antral polyps, but entire gasrtic body replaced w  multiple fundic gland polyps, not biopsied   Recommendation:       Confirm spontaneous stent passage by performing a KUB x-ray in 4 weeks. Repeat ERCP in 1 year for surveillance.      11/3/2022   Thyroid Cancer Screening    Baseline ultrasound starting in late teen years.    If normal, consider repeating every 2-5 years and if abnormal, refer to thyroid specialist.     [place] interval screenings for families with thyroid cancer.   11/7/2022-US thyroid: : Benign colloid cysts and benign partially cystic 5 mm   Repeat in 2027   CNS Cancer  Annual physical examination.  10/21/2022 October 2022   Intra-abdominal desmoids  Annual abdominal palpation.     If family history of symptomatic desmoids, consider abdominal MRI or CT 1-3 years post colectomy and then every 5-10 years.       NA     NA   Pancreatic screen Consider pancreatic screening if family history of pancreatic cancer. ERCP in Sept. 2021 11/3/2022-ERCP   Small bowel polyps and cancer  Consider adding small bowel visualization to CT or MRI for desmoids, especially if duodenal polyposis is advanced.    ERCP ERCP   Duodenal findings:  Stage 0 (no polyposis) - repeat endoscopy every 4 years.  Stage 1 (minimal polyposis 1-4 tubular adenomas, 1-4mm each) - repeat EGD every 2-3 years.  Stage 2 (mild polyposis - 5-9 tubular adenomas,  5-9 mm each) - repeat EGD every 1-3 years.  Stage 3 (moderate polyposis - >20 lesions or size >1 cm) - repeat EGD every 6-12 months  Stage 4 (dense polyposis or high grade dysplasia) - surgical evaluation, with expert surveillance every 3-6 months.  Due to limited data, no screening recommendation is made for pancreatic cancer at this time.

## 2022-10-21 NOTE — LETTER
10/21/2022         RE: Sarah Duran  [address] [facility] [address]        Dear Colleague,    Thank you for referring your patient, Sarah Duran, to the [facility]. Please see a copy of my visit note below.    Oncology Risk Management Consultation:  Date on this visit: 10/21/2022    Sarah Duran requires high risk screening and surveillance to reduce her risk of cancer secondary to  a history of atypical ductal hyperplasia (ADH). She is considered to be at high risk for breast cancer, with a risk of up to 35%. She has a history of Stage II, Grade I endometrial adenocarcinoma, diagnosed in 2020; followed by Dr. Sandie Lezama. She also carries an APC+ genetic mutation, consistent with a diagnosis of Familial Adenomatous Polyposis (FAP) or attenuated FAP (AFAP) with a history of a thyroid nodule and gastric polyps.     Primary Physician: Ida Sandoval MD     History Of Present Illness:  Ms. Duran is a very pleasant  57 year old female who presents with a personal history of ADH and a likely attenuated version of Familial Adenomatous Polyposis. She is s/p right excisional breast biopsy on 2016. She is also s/p ileocolonic anastomosis in .     Genetic Testin2018: POSITIVE for a APC mutation.  Specifically her mutation is called [id number],  consistent with a diagnosis of familial adenomatous polyposis (FAP) or attenuated FAP (AFAP). The mutation was identified using [employer] testing through [employer].  The testing was done because of her personal history of colonic polyposis and family history of colon cancer.     Of note, Sarah tested negative for mutations in the following genes: BMPR1A, CDH1, CHEK2, MLH1, MSH2, MSH6, MUTYH, PMS2, POLD1, POLE, PTEN, SMAD4, STK11 and TP53 (sequencing and deletion/duplication); EPCAM and GREM1 (deletion/duplication only).        Pertinent history:  Attenuated Familial adenomatous polyposis,  s/p ileal rectal anastomosis in May 1988. Confirmed with APC testing (see below)  Subsequent laparotomy in  for bowel obstruction from adhesions.  Taking rabeprazole 20 mg EC BID for gastric polyps.  Menarche at 12.  Nulliparous.  Menopause at 50.  Breast density: scattered fibroglandular densities  Hx of THBSO for Stage II, Grade I  endometrial cancer (see details below)     21:  US Pelvis:  Uterus: Measures 8.9 x 5.8 x 6.4 cm. The endometrium is thickened at 2.9 cm. The endometrium is heterogeneous in appearance with an irregular, nodular contour of the endometrial surface. There is hypoechoic complex fluid in the endometrial cavity likely containing blood. There is vascularity of the endometrium on color imaging. The abnormal endometrium occupies the majority of the uterus. Right Ovary: Measures 2.0 x 1.0 x 1.1 cm and appears unremarkable Right Ovary Blood Flow: Present. Left Ovary: Not seen. Free Fluid: No significant free fluid.There are no suspicious adnexal masses.     2/10/21:  Hysteroscopy, D&C                 Pathology:  Grade 1 endometrial adenocarcinoma, MMR intact, p53 wild type, ER/MS +     21:  Total laparoscopic hysterectomy, bilateral salpingo-oophorectomy, bilateral sentinel lymph node dissection, cystoscopy                 Pathology:  Stage II, grade 1 endometrial adenocarcinoma, tumor size 5.6 cm, 1/13 mm myometrial invasion, +CASEY, + cervix, 0/2 sentinel LN,         21:  Completed brachytherapy to 3000cGy in 5 Fx to the vagina     Thyroid Screenin2019 - FNA biopsy of thyroid, for solid appearing nodule in isthmus, biopsy non diagnostic; cyst fluid only.   2020- US of the thyroid: Previously biopsied isthmus nodule is no longer present. There are numerous additional colloid cysts and a benign complex cyst   left lobe, not appreciably changed from previous.   2021- US of thyroid; no change in nodules  10/7/2022- US of thyroid: NODULES: There are benign colloid  cysts. There is only one partially  solid nodule on today's examination, listed below.   Nodule 1: 5 x 5 x 4 mm nodule, mid left.   Composition: Mixed cystic and solid, 1 point   Echogenicity: Hyperechoic or isoechoic, 1 point   Shape: Wider-than-tall, 0 points   Margin: Smooth, 0 points   Echogenic Foci: None, or large comet-tail artifacts, 0 points   Point Total: 1-2 points. TI-RADS 2. No FNA. IMPRESSION: Benign colloid cysts and benign partially cystic 5 mm     GI screening history (see chart for further details)  11/7/2005 - Small bowel enteroscopy, one tubular adenoma removed from partial anal verge.  12/7/2005 - adenomatous polyp removed from rectum  2/28/2005 - tubular adenoma, duodenum.  11/16/2011 - fundic gland polyps in stomach, tubular adenoma removed from rectum.  11/5/2012 - 1 tubulovillous adenoma, rectum  7/8/2013 - 1 tubulovillous adenoma, rectum  12/3/2013 - 1 tubulovillous adenoma, rectum  7/21/2014 - 1 tubular adenoma and hyperplastic polyp, rectum  1/20/2015 - 1 tubular adenoma and hyperplastic polyp, rectum  12/7/2015 - 1 tubular adenoma, rectum  6/6/2016 - 1 fundic gland polyp with focal adenomatous changes  3/20/2017 - 2 tubular adenomas removed from rectum  4/12/2017 - colonoscopy and EGD, one fundic gland gastric polyp with focal low grade dysplasia removed, multiple duodenal polyps and multiple rectal polyps.  7/25/2019 - EGD: Two previously reported polyps (6.2 mm and 5.3mm) in the antrum were evaluate by EUS, which did not demonstrate deep layer involvement, and then they were  removed by EMR by using a band ligator and a snare. The defect was then closed with hemoclips. Innumerous fundic gland polyps in the gastric body,  fundus, and cardia. Consistent with diagnosis of FAP. A large fundic gland polyp in the gastric body, which has been previously resected and recent biopsied. Recommendation: Use Aciphex (rabeprazole) 20 mg PO BID for 1 month.  Repeat the upper endoscopy in 1 year for  surveillance.      7/30/2020- EGD and flexible sigmoidoscopy:  15-20 duodenal polyps, measuring 1-3 mm, all of which were ablated using argon plasma coagulation.    - Innumerable fundic gland polyps in a confluent carpet-like distribution throughout the cardia, fundus, and gastric body. Antral-sparing present. A large 2-3   semi-pedunculated polyp in the proximal gastric body  (along the lesser curvature) was resected.  - Suspected very small adenomas flanking the pancreaticobiliary orifices at the prior ampullectomy site.  - Normal esophagus. Pathology: Fundic gland polyp with adenomatous change (low grade dysplasia). Negative for intestinal metaplasia, high-grade dysplasia or malignancy. Recommendation: Schedule for flexible sigmoidoscopy (along with EGD and ERCP) with Dr. Fernández in 1-year.      9/23/2021- ENDOSCOPIC RETROGRADE CHOLANGIOPANCREATOGRAPHY ESOPHAGOGASTRODUODENOSCOPY (EGD), SIGMOIDOSCOPY, FLEXIBLE (Dr. Shahab Fernández): Results: Prior biliary sphincterotomy appeared open.                        - Prior pancreatic sphincterotomy appeared open.                        - Two small button like polyps to left of pancreatic orifice                        - Attempted cold snare polypectomy but slid off both time                        --After pancreatic stent placed, entire area next to orifice ablated w R colon setting pulse APC.                        - DUODENUM examined, no other polyps to D3                        - No antral polyps, but entire gastric body replaced w  multiple fundic gland polyps, not biopsied   Recommendation:       Confirm spontaneous stent passage by performing a KUB x-ray in 4 weeks. Repeat ERCP in 1 year for surveillance.      Flexible sigmoidoscopy (Dr. Gasca) Ten 2 to 4 mm tubular adenomas in rectum                       - Patent ileo-rectal anastomosis and normal distal ileum.   Recommendation:     Repeat flexible sigmoidoscopy in 1 year for surveillance (along with upper  endoscopy).      Breast Screening history:   11/28/2016 - R excisional breast biopsy with seed localization, Fibrocystic tissue with ADH on pathology.  4/24/2017 - R breast mammogram, BiRads2.  5/25/2018 - Breast MRI, BiRads2.  11/8/2018 - Screening tomosynthesis mammogram, BiRads1  6/10/2019 - Breast MRI, BiRads2.  12/20/2019- Screening tomosynthesis mammogram, BiRads1  2/3/2021- Breast MRI, BiRads1  10/8/2021- Screening tomosynthesis mammogram, BiRads1  6/22/2022- Breast MRI, BiRads2     At this visit, she denies asymmetry, lumps, masses, thickening, pain, nipple discharge and skin changes in her breasts.  She denies vaginal bleeding.  She denies nausea, vomiting, discomfort after eating, abdominal pain, bloating, heartburn, constipation,  diarrhea and early satiety.      Past Medical/Surgical History:  Past Medical History:   Diagnosis Date     Acute deep vein thrombosis (DVT) of left lower extremity (H)      Asthma      Atypical ductal hyperplasia of right breast 10/2016    fibrocystic changes, PASH and atypical ductal hyperplasia on needle biopsy, s/p R excisional biopsy with radioactive seed localization     Endometrial cancer (H) 02/15/2021     Factor 5 Leiden mutation, heterozygous (H)     factor 5     Familial adenomatous polyposis 1988    clinical presentation; s/p total abdominal colectomy with TRACEY; confirmed APC+ 2018     GERD (gastroesophageal reflux disease)      Migraine      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    [id number]     Past Surgical History:   Procedure Laterality Date     BIOPSY BREAST SEED LOCALIZATION Right 11/30/2016    Procedure: BIOPSY BREAST SEED LOCALIZATION;  Surgeon: Jessica Rasheed MD;  Location: [facility] OR     COLECTOMY      subtotal     COMBINED ESOPHAGOSCOPY, GASTROSCOPY, DUODENOSCOPY (EGD) WITH ARGON PLASMA COAGULATOR (APC) N/A 4/12/2018    Procedure: COMBINED ESOPHAGOSCOPY, GASTROSCOPY, DUODENOSCOPY (EGD) WITH ARGON PLASMA COAGULATOR (APC);   Esophagogastroduodenoscopy with polypectomy and polyp ablation;  Surgeon: Stephen Gasca MD;  Location: [facility] OR     DILATION AND CURETTAGE, HYSTEROSCOPY DIAGNOSTIC, COMBINED N/A 2/10/2021    Procedure: HYSTEROSCOPY, DIAGNOSTIC, WITH DILATION AND CURETTAGE OF UTERUS;  Surgeon: Sandie Samaniego MD;  Location: [facility] OR     ENDOSCOPIC RETROGRADE CHOLANGIOPANCREATOGRAM N/A 9/23/2021    Procedure: ENDOSCOPIC RETROGRADE CHOLANGIOPANCREATOGRAPHY WITH PANCREATIC STENT PLACEMENT AND POLYP ABLATION with Argon Plasma Coagulation;  Surgeon: Shahab Fernández MD;  Location: [facility] OR     ENDOSCOPIC RETROGRADE CHOLANGIOPANCREATOGRAPHY       ENDOSCOPIC ULTRASOUND UPPER GASTROINTESTINAL TRACT (GI) N/A 7/25/2019    Procedure: Endoscopic Ultrasound;  Surgeon: Stephen Gasca MD;  Location: [facility]     ESOPHAGOSCOPY, GASTROSCOPY, DUODENOSCOPY (EGD), COMBINED  11/5/2012    Procedure: COMBINED ESOPHAGOSCOPY, GASTROSCOPY, DUODENOSCOPY (EGD), BIOPSY SINGLE OR MULTIPLE;;  Surgeon: Angélica Garcia MD;  Location: [facility]     ESOPHAGOSCOPY, GASTROSCOPY, DUODENOSCOPY (EGD), COMBINED  12/3/2013    Procedure: COMBINED ESOPHAGOSCOPY, GASTROSCOPY, DUODENOSCOPY (EGD);;  Surgeon: Angélica Garica MD;  Location: [facility]     ESOPHAGOSCOPY, GASTROSCOPY, DUODENOSCOPY (EGD), COMBINED N/A 3/28/2018    Procedure: COMBINED ESOPHAGOSCOPY, GASTROSCOPY, DUODENOSCOPY (EGD);;  Surgeon: Stephen Gasca MD;  Location: [facility]     ESOPHAGOSCOPY, GASTROSCOPY, DUODENOSCOPY (EGD), COMBINED N/A 5/30/2019    Procedure: Esophagogastroduodenoscopy with biopsy x2 APC;  Surgeon: Stephen Gasca MD;  Location: [facility]     ESOPHAGOSCOPY, GASTROSCOPY, DUODENOSCOPY (EGD), COMBINED N/A 7/30/2020    Procedure: ESOPHAGOGASTRODUODENOSCOPY (EGD) with polypectomy by hot snare; argonplasma coaglution ablation of polyps;  Surgeon: Stephen Gasca MD;  Location: [facility]     ESOPHAGOSCOPY, GASTROSCOPY, DUODENOSCOPY (EGD), RESECT MUCOSA, COMBINED N/A  7/25/2019    Procedure: Esophagogastroduodenoscopy with gastric mucosal resection and clip application;  Surgeon: Stephen Gasca MD;  Location: [facility]     EXCISE POLYP RECTUM       LAPAROSCOPIC HYSTERECTOMY TOTAL, BILATERAL SALPINGO-OOPHORECTOMY, NODE DISSECTION, COMBINED Bilateral 2/25/2021    Procedure: Laparoscopic removal of uterus, cervix, both ovaries and fallopian tubes, sentinel lymph node dissection, cystoscopy;  Surgeon: Sandie Samaniego MD;  Location: [facility]     LAPAROSCOPIC LYSIS ADHESIONS       SIGMOIDOSCOPY FLEXIBLE       SIGMOIDOSCOPY FLEXIBLE N/A 12/7/2015    Procedure: SIGMOIDOSCOPY FLEXIBLE;  Surgeon: Mariela Bearden MD;  Location: [facility]     SIGMOIDOSCOPY FLEXIBLE N/A 3/28/2018    Procedure: SIGMOIDOSCOPY FLEXIBLE;  EGD/Flex Sig;  Surgeon: Stephen Gasca MD;  Location: [facility]     SIGMOIDOSCOPY FLEXIBLE N/A 4/12/2018    Procedure: SIGMOIDOSCOPY FLEXIBLE;  Flexible Sigmoidoscopy with polyp ablation;  Surgeon: Stephen Gasca MD;  Location: [facility]     SIGMOIDOSCOPY FLEXIBLE N/A 5/30/2019    Procedure: Flexible Sigmoidoscopy with polypectomy;  Surgeon: Stephen Gasca MD;  [facility]     SIGMOIDOSCOPY FLEXIBLE N/A 7/30/2020    Procedure: SIGMOIDOSCOPY, FLEXIBLE with Ablation of Polyps;  Surgeon: Stephen Gasca MD;  [facility]     SIGMOIDOSCOPY FLEXIBLE N/A 9/23/2021    Procedure: FLEXIBLE SIGMOIDOSCOPY WITH POLYPECTOMY;  Surgeon: Stephen Gasca MD;  Location: [facility]     ZZC LAP,SURG,COLECTOMY,W/ANAST      for colon cancer       Allergies:  Allergies as of 10/21/2022 - Reviewed 10/21/2022   Allergen Reaction Noted     Corn-related products Shortness Of Breath 07/29/2020     Cipro [ciprofloxacin]  02/24/2005     Flagyl [metronidazole] Nausea and Vomiting 02/24/2005     Garlic Fatigue 07/29/2020     Keflex [cephalosporins] Nausea and Vomiting 02/24/2005     Omeprazole  04/29/2010     Prilosec otc [omeprazole magnesium]  01/28/2012     Cephalexin Rash  "2004     Doxycycline Rash 2018     Erythromycin Rash 2005     Sulfa drugs Rash 2005       Current Medications:  Current Outpatient Medications   Medication Sig Dispense Refill     albuterol 90 MCG/ACT inhaler Inhale 2 puffs into the lungs every 6 hours as needed.       calcium-magnesium (CALMAG) 500-250 MG TABS Take 1 tablet by mouth daily       celecoxib (CELEBREX) 100 MG capsule Take 1 capsule (100 mg) by mouth daily 90 capsule 3     Cranberry 500 MG CAPS Take 1 capsule by mouth daily       docusate sodium (COLACE) 100 MG capsule Take 100 mg by mouth daily       fexofenadine (ALLEGRA) 180 MG tablet Take 180 mg by mouth daily.       Lactobacillus (PROBIOTIC ACIDOPHILUS PO) Take 100 mg by mouth daily       MELATONIN PO Take 2 mg by mouth nightly as needed        multivitamin, therapeutic with minerals (THERA-VIT-M) TABS Take 3 tablets by mouth 2 times daily        Omega-3 Fatty Acids (OMEGA-3 FISH OIL PO) Take 2 g by mouth daily        ondansetron (ZOFRAN) 4 MG tablet Take 1 tablet (4 mg) by mouth every 8 hours as needed for nausea 60 tablet 1     triamcinolone (NASACORT) 55 MCG/ACT Inhaler Spray 2 sprays into both nostrils daily       ZOLMitriptan (ZOMIG) 5 MG tablet Take 1 tablet (5 mg) by mouth at onset of headache for migraine 90 tablet 3     acetaminophen (TYLENOL) 325 MG tablet Take 2 tablets (650 mg) by mouth every 6 hours as needed for mild pain (Patient not taking: Reported on 10/21/2022) 30 tablet 0     bisacodyl (DULCOLAX) 5 MG EC tablet Refer to \"Getting Ready for a Colonoscopy\" instruction handout (Patient not taking: Reported on 10/21/2022) 4 tablet 0        Family History:  Family History   Problem Relation Age of Onset     Hyperlipidemia Mother      Cerebrovascular Disease Mother      Cerebrovascular Disease Father      Colon Cancer Father 57         at 63     Prostate Cancer Brother 53        recurrence at 65     Prostate Cancer Maternal Uncle 65       Social " History:  Social History     Socioeconomic History     Marital status:      Spouse name: Roberto     Number of children: 2     Years of education: Not on file     Highest education level: Not on file   Occupational History     Occupation:      Employer: [employer]   Tobacco Use     Smoking status: Never     Smokeless tobacco: Never   Substance and Sexual Activity     Alcohol use: Not Currently     Comment: 1 or 2 drinks per year     Drug use: No     Sexual activity: Not Currently     Partners: Male     Birth control/protection: Surgical     Comment: THBSO for endometrial cancer and postmenopausal prior to surgery   Other Topics Concern     Parent/sibling w/ CABG, MI or angioplasty before 65F 55M? Not Asked   Social History Narrative    Two adopted daughters, ages 13 and 15     Social Determinants of Health     Financial Resource Strain: Not on file   Food Insecurity: Not on file   Transportation Needs: Not on file   Physical Activity: Not on file   Stress: Not on file   Social Connections: Not on file   Intimate Partner Violence: Not At Risk     Fear of Current or Ex-Partner: No     Emotionally Abused: No     Physically Abused: No     Sexually Abused: No   Housing Stability: Not on file       Physical Exam:  /74   Pulse 61   Temp 98.3  F (36.8  C) (Oral)   Wt 67 kg (147 lb 12.8 oz)   LMP 05/01/2019   SpO2 99%   BMI 27.03 kg/m        GENERAL APPEARANCE: healthy, alert and no apparent distress     NECK: no adenopathy, no asymmetry or masses     LYMPHATICS: No cervical, supraclavicular or axillary  lymphadenopathy     RESP: lungs clear to auscultation - no rales, rhonchi or wheezes     CARDIOVASCULAR: regular rate and rhythm, normal S1 S2, no S3 or S4 and no murmur.   BREASTS: Examined in a sitting and lying position. Symmetrical without visible distortion, swelling or rashes. No nipple inversion, nipple discharge, breast dimpling or puckering. Breast tissue is heterogenous dense to  palpation.  Axillary area without masses or lymphadenopathy.   SKIN: no suspicious lesions or rashes    Laboratory/Imaging Studies  No results found for any visits on 10/21/22.    ASSESSMENT  Sarah is up to date on all of her screenings for both FAP and her breast surveillance. She is doing well and is getting ready to go to [place] for a conference at work. She doesn't have any complaints today and continues to stay active exercising routinely. There are no changes to her family's medical history.     We discussed that we have been following her thyroid annually since 2019 and it was determined that her thyroid nodule is a colloid nodule. We will re image in 5 years unless she has a status change. She is in agreement with the plans below.    Individualized Surveillance Plan for women  With 20% or greater lifetime risk of breast cancer   Per NCCN Breast Cancer Screening and Diagnosis Guidelines Version 1.2021   Recommended screening Test or procedure Last done Next Scheduled    Clinical encounter Clinical exam every 6-12 months.   Refer to genetic counseling if not already done.  Consider risk reduction strategies. 10/21/2022 December 2023   However, some family histories with breast cancers at a very young age, may warrant screening starting earlier.    *May begin at age 40 if breast cancers in the family occur at later ages.    Annual mammogram beginning 10 years younger than the earliest breast cancer in the family but not prior to age 30.    Recommend annual breast MRI to begin 10 years younger than the earliest breast cancer in the family but not prior to age 25.    Breast MRIs are preferably done on day 7-15 of the menstrual cycle in premenopausal women.   See below   See below     Breast screening for patients at high risk due to thoracic radiation between the ages of 10-30   Annual clinical exam beginning 8 years after radiation therapy.    Annual screening mammogram beginning at age 30 or 8 years after  radiation therapy    Annual breast MRI, beginning at age 25 or 8 years after radiation therapy.       NA     NA   Women who have a lifetime risk of >20% based on history of LCIS or ADH/ALH Annual screening mammogram beginning at age of LCIS or ADH/ALH but not prior to age 30.    Consider annual MRI to begin at age of diagnosis of LCIS or ADH/ALH but not prior to age 25.    Consider risk reducing strategies.   10/8/2021-   Screening tomosynthesis mammogram, BiRads1    6/22/2022- Breast MRI, BiRads2 Mammogram after appt today    Breast MRI in June (6/23 or later)    Next exam: December 2023 followed by mammogram    Recommend risk reducing strategies for women with 1.7% 5 year risk of breast cancer.       Individualized Surveillance Plan for Familial Adenomatous Polyposis    Based on NCCN Guidelines Version 1.2022   Type of Screening  Recommendation  Last Done  Next Due    Recommendations for Children    Colon Cancer Screening  Flexible sigmoidoscopy or colonoscopy every 12 months beginning at age 10-15.       See below   See below   Hepatoblastoma  Consider liver palpation, abdominal ultrasound, and measurement of AFP every 3-6 months, during first 5 years of life.  NA   NA   Thyroid Cancer Screening  Ultrasound at baseline starting in teenage years.  If normal, consider repeating every 2-5 years and if abnormal, refer to thyroid specialist. [place] interval screenings for families with thyroid cancer.     Recommendations for Adults    Colon Cancer Screening  Colectomy with ileorectal anastomosis (TRACEY)  recommended, then endoscopic evaluation of the rectum every 6-12 months, depending on the polyp burden.     If ileo proctocolectomy with ileal pouch anal anastomosis, endoscopic surveillance of ileal pouch every 1-3 years depending on polyp burden.    Surveillance frequency should be increased to every 6 months for large, flat polyps with villous histology and/or high grade dysplasia    Flexible sigmoidoscopy (  [place]) Ten 2 to 4 mm tubular adenomas in rectum                       - Patent ileo-rectal anastomosis and normal distal ileum.    11/3/2022   Duodenal or periampullary cancer screening and gastric cancer screening  Baseline upper endoscopy including side-viewing examination. Start between 20-25. Consider earlier screening if family history warrants it. Base subsequent screenings on findings (see below).    Manage non-fundic gland polyps endoscopically if possible.      These occur in the majority of FAP patients and may have focal low grade dysplasia but typically are non progressive.Special screening or surgery should only be considered in the presence of high grade dysplasia.   9/23/2021-ERCP: 9/23/2021- ENDOSCOPIC RETROGRADE CHOLANGIOPANCREATOGRAPHY ESOPHAGOGASTRODUODENOSCOPY (EGD), SIGMOIDOSCOPY, FLEXIBLE (Dr. Shahab Fernández): Results: Prior biliary sphincterotomy appeared open.                        - Prior pancreatic sphincterotomy appeared open.                        - Two small button like polyps to left of pancreatic orifice                        - Attempted cold snare polypectomy but slid off both time                        --After pancreatic stent placed, entire area next to orifice ablated w R colon setting pulse APC.                        - DUODENUM examined, no other polyps to D3                        - No antral polyps, but entire gastric body replaced w  multiple fundic gland polyps, not biopsied   Recommendation:       Confirm spontaneous stent passage by performing a KUB x-ray in 4 weeks. Repeat ERCP in 1 year for surveillance.      11/3/2022   Thyroid Cancer Screening    Baseline ultrasound starting in late teen years.    If normal, consider repeating every 2-5 years and if abnormal, refer to thyroid specialist.     [place] interval screenings for families with thyroid cancer.   11/7/2022-US thyroid: : Benign colloid cysts and benign partially cystic 5 mm   Repeat in 2027   CNS Cancer   Annual physical examination.  10/21/2022 October 2022   Intra-abdominal desmoids  Annual abdominal palpation.     If family history of symptomatic desmoids, consider abdominal MRI or CT 1-3 years post colectomy and then every 5-10 years.       NA     NA   Pancreatic screen Consider pancreatic screening if family history of pancreatic cancer. ERCP in Sept. 2021 11/3/2022-ERCP   Small bowel polyps and cancer  Consider adding small bowel visualization to CT or MRI for desmoids, especially if duodenal polyposis is advanced.    ERCP ERCP   Duodenal findings:  Stage 0 (no polyposis) - repeat endoscopy every 4 years.  Stage 1 (minimal polyposis 1-4 tubular adenomas, 1-4 mm each) - repeat EGD every 2-3 years.  Stage 2 (mild polyposis - 5-9 tubular adenomas, 5-9 mm each) - repeat EGD every 1-3 years.  Stage 3 (moderate polyposis - >20 lesions or size >1 cm) - repeat EGD every 6-12 months  Stage 4 (dense polyposis or high grade dysplasia) - surgical evaluation, with expert surveillance every 3-6 months.  Due to limited data, no screening recommendation is made for pancreatic cancer at this time.      I spent a total of 37 minutes on the day of the visit. Please see the note for further information on patient assessment and treatment.    Clarissa Brown, REYNOLD-CNS, OCN, AGN-BC  Clinical Nurse Specialist  Cancer Risk Management Program  [facility]    CC: MD Shahab Melton MD Amanda J Christ, MD

## 2022-10-21 NOTE — NURSING NOTE
"Oncology Rooming Note    October 21, 2022 2:31 PM   Sarah Duran is a 57 year old female who presents for:    Chief Complaint   Patient presents with     Oncology Clinic Visit     Breast cancer screening, high risk patient     Initial Vitals: /74   Pulse 61   Temp 98.3  F (36.8  C) (Oral)   Wt 67 kg (147 lb 12.8 oz)   LMP 05/01/2019   SpO2 99%   BMI 27.03 kg/m   Estimated body mass index is 27.03 kg/m  as calculated from the following:    Height as of 9/23/21: 1.575 m (5' 2\").    Weight as of this encounter: 67 kg (147 lb 12.8 oz). Body surface area is 1.71 meters squared.  No Pain (0) Comment: Data Unavailable   Patient's last menstrual period was 05/01/2019.  Allergies reviewed: Yes  Medications reviewed: Yes    Medications: Medication refills not needed today.  Pharmacy name entered into EPIC:    [facility] [place], [place] - [address], [address]  [facility] [place], [place] - [address] [place] [address] [facility] [place] [address] [facility] [place], [place] - [address]  [facility] [place], [place] - [address]  [facility] [place], [place] - [address] [facility] [place], [place] - [address] [facility] [id number]    Clinical concerns: none       Solange Meeks"

## 2022-11-01 RX ORDER — TETRACYCLINE HCL 500 MG
500 CAPSULE ORAL DAILY
COMMUNITY

## 2022-11-02 ENCOUNTER — ANESTHESIA EVENT (OUTPATIENT)
Dept: SURGERY | Facility: CLINIC | Age: 57
End: 2022-11-02
Payer: COMMERCIAL

## 2022-11-02 ENCOUNTER — PATIENT OUTREACH (OUTPATIENT)
Dept: GASTROENTEROLOGY | Facility: CLINIC | Age: 57
End: 2022-11-02

## 2022-11-02 NOTE — PROGRESS NOTES
"Pt called stating she was not feeling quite right when she woke, \"feeling off and tired.\"  Took an at home COVID test and she is positive. Not having fevers or respiratory symptoms.    Discussed rescheduling for 2 weeks out, will check with OR to see if Dr Gasca and Dr Fernández are available. Pre op done on 10/31. Pt does prefer a morning procedure timing.    Message routed to OR  and AE team.    Radha Gamble, RN, BSN,   [facility]  Care coordinator        "

## 2022-11-02 NOTE — ANESTHESIA PREPROCEDURE EVALUATION
Anesthesia Pre-Procedure Evaluation    Patient: Sarah Duran   MRN: [id number] : 1965        Procedure : Procedure(s):  ESOPHAGOGASTRODUODENOSCOPY (EGD)  SIGMOIDOSCOPY, FLEXIBLE  ENDOSCOPIC RETROGRADE CHOLANGIOPANCREATOGRAPHY          Past Medical History:   Diagnosis Date     Acute deep vein thrombosis (DVT) of left lower extremity (H)      Asthma      Atypical ductal hyperplasia of right breast 10/2016    fibrocystic changes, PASH and atypical ductal hyperplasia on needle biopsy, s/p R excisional biopsy with radioactive seed localization     Endometrial cancer (H) 02/15/2021     Factor 5 Leiden mutation, heterozygous (H)     factor 5     Familial adenomatous polyposis     clinical presentation; s/p total abdominal colectomy with TRACEY; confirmed APC+      GERD (gastroesophageal reflux disease)      Migraine      Monoallelic mutation of APC gene 2018    [id number]      Past Surgical History:   Procedure Laterality Date     BIOPSY BREAST SEED LOCALIZATION Right 2016    Procedure: BIOPSY BREAST SEED LOCALIZATION;  Surgeon: Jessica Rasheed MD;  Location:  OR     COLECTOMY      subtotal     COMBINED ESOPHAGOSCOPY, GASTROSCOPY, DUODENOSCOPY (EGD) WITH ARGON PLASMA COAGULATOR (APC) N/A 2018    Procedure: COMBINED ESOPHAGOSCOPY, GASTROSCOPY, DUODENOSCOPY (EGD) WITH ARGON PLASMA COAGULATOR (APC);  Esophagogastroduodenoscopy with polypectomy and polyp ablation;  Surgeon: Stpehen Gasca MD;  Location:  OR     DILATION AND CURETTAGE, HYSTEROSCOPY DIAGNOSTIC, COMBINED N/A 2/10/2021    Procedure: HYSTEROSCOPY, DIAGNOSTIC, WITH DILATION AND CURETTAGE OF UTERUS;  Surgeon: Sandie Samaniego MD;  Location: [facility] OR     ENDOSCOPIC RETROGRADE CHOLANGIOPANCREATOGRAM N/A 2021    Procedure: ENDOSCOPIC RETROGRADE CHOLANGIOPANCREATOGRAPHY WITH PANCREATIC STENT PLACEMENT AND POLYP ABLATION with Argon Plasma Coagulation;  Surgeon: Shahab Fernández MD;  Location:   OR     ENDOSCOPIC RETROGRADE CHOLANGIOPANCREATOGRAPHY       ENDOSCOPIC ULTRASOUND UPPER GASTROINTESTINAL TRACT (GI) N/A 7/25/2019    Procedure: Endoscopic Ultrasound;  Surgeon: Stephen Gasca MD;  Location: [facility] OR     ESOPHAGOSCOPY, GASTROSCOPY, DUODENOSCOPY (EGD), COMBINED  11/5/2012    Procedure: COMBINED ESOPHAGOSCOPY, GASTROSCOPY, DUODENOSCOPY (EGD), BIOPSY SINGLE OR MULTIPLE;;  Surgeon: Angélica Garcia MD;  Location: [facility] GI     ESOPHAGOSCOPY, GASTROSCOPY, DUODENOSCOPY (EGD), COMBINED  12/3/2013    Procedure: COMBINED ESOPHAGOSCOPY, GASTROSCOPY, DUODENOSCOPY (EGD);;  Surgeon: Angélica Garcia MD;  Location: [facility] GI     ESOPHAGOSCOPY, GASTROSCOPY, DUODENOSCOPY (EGD), COMBINED N/A 3/28/2018    Procedure: COMBINED ESOPHAGOSCOPY, GASTROSCOPY, DUODENOSCOPY (EGD);;  Surgeon: Stephen Gasca MD;  Location: [facility] GI     ESOPHAGOSCOPY, GASTROSCOPY, DUODENOSCOPY (EGD), COMBINED N/A 5/30/2019    Procedure: Esophagogastroduodenoscopy with biopsy x2 APC;  Surgeon: Stephen Gasca MD;  Location: [facility] OR     ESOPHAGOSCOPY, GASTROSCOPY, DUODENOSCOPY (EGD), COMBINED N/A 7/30/2020    Procedure: ESOPHAGOGASTRODUODENOSCOPY (EGD) with polypectomy by hot snare; argonplasma coaglution ablation of polyps;  Surgeon: Stephen Gasca MD;  Location: [facility] OR     ESOPHAGOSCOPY, GASTROSCOPY, DUODENOSCOPY (EGD), RESECT MUCOSA, COMBINED N/A 7/25/2019    Procedure: Esophagogastroduodenoscopy with gastric mucosal resection and clip application;  Surgeon: Stephen Gasca MD;  Location: [facility] OR     EXCISE POLYP RECTUM       LAPAROSCOPIC HYSTERECTOMY TOTAL, BILATERAL SALPINGO-OOPHORECTOMY, NODE DISSECTION, COMBINED Bilateral 2/25/2021    Procedure: Laparoscopic removal of uterus, cervix, both ovaries and fallopian tubes, sentinel lymph node dissection, cystoscopy;  Surgeon: Sandie Samaniego MD;  Location: [facility] OR     LAPAROSCOPIC LYSIS ADHESIONS       SIGMOIDOSCOPY FLEXIBLE       SIGMOIDOSCOPY FLEXIBLE N/A 12/7/2015     Procedure: SIGMOIDOSCOPY FLEXIBLE;  Surgeon: Mariela Bearden MD;  Location: [facility] GI     SIGMOIDOSCOPY FLEXIBLE N/A 3/28/2018    Procedure: SIGMOIDOSCOPY FLEXIBLE;  EGD/Flex Sig;  Surgeon: Stephen Gasca MD;  Location: [facility] GI     SIGMOIDOSCOPY FLEXIBLE N/A 4/12/2018    Procedure: SIGMOIDOSCOPY FLEXIBLE;  Flexible Sigmoidoscopy with polyp ablation;  Surgeon: Stephen Gasca MD;  Location: [facility]     SIGMOIDOSCOPY FLEXIBLE N/A 5/30/2019    Procedure: Flexible Sigmoidoscopy with polypectomy;  Surgeon: Stephen Gasca MD;  [facility]     SIGMOIDOSCOPY FLEXIBLE N/A 7/30/2020    Procedure: SIGMOIDOSCOPY, FLEXIBLE with Ablation of Polyps;  Surgeon: Stephen Gasca MD;  [facility]     SIGMOIDOSCOPY FLEXIBLE N/A 9/23/2021    Procedure: FLEXIBLE SIGMOIDOSCOPY WITH POLYPECTOMY;  Surgeon: Stephen Gasca MD;  Location: [facility] OR     ZZC LAP,SURG,COLECTOMY,W/ANAST      for colon cancer      Allergies   Allergen Reactions     Corn-Related Products Shortness Of Breath     Avoids food; ok in tiny doses, such as in medicines  Avoids food; ok in tiny doses, such as in medicines     Cipro [Ciprofloxacin]      Reddened skin     Flagyl [Metronidazole] Nausea and Vomiting     Garlic Fatigue     GI distress     Keflex [Cephalosporins] Nausea and Vomiting     Omeprazole      PN: headache     Prilosec Otc [Omeprazole Magnesium]      migraines     Cephalexin Rash     PN: Rash, Generalized     Doxycycline Rash     blisters     Erythromycin Rash     Sulfa Drugs Rash     Ok to use       Social History     Tobacco Use     Smoking status: Never     Smokeless tobacco: Never   Substance Use Topics     Alcohol use: Not Currently     Comment: 1 or 2 drinks per year      Wt Readings from Last 1 Encounters:   10/21/22 67 kg (147 lb 12.8 oz)        Anesthesia Evaluation   Pt has had prior anesthetic. Type: General and MAC.    History of anesthetic complications  - PONV.  Requests ondansetron intraop.    ROS/MED  HX  ENT/Pulmonary:     (+) Intermittent, asthma Treatment: Inhaler prn,      Neurologic:     (+) migraines,  (-) no CVA and no TIA   Cardiovascular:  - neg cardiovascular ROS   (+) Dyslipidemia ----- (-) hypertension   METS/Exercise Tolerance: >4 METS    Hematologic: Comments: Factor V Leiden mutation, no history of blood clots    (+) History of blood clots (Factor V Leiden deficiency), pt is not anticoagulated,     Musculoskeletal:  - neg musculoskeletal ROS  (-) arthritis   GI/Hepatic: Comment: Familial adenomatous polyposis s/p total abdominal colectomy    (+) GERD,     Renal/Genitourinary:  - neg Renal ROS  (-) renal disease   Endo:    (-) Type II DM, thyroid disease, chronic steroid usage and obesity   Psychiatric/Substance Use:  - neg psychiatric ROS     Infectious Disease: Comment: COVID NEGATIVE 2/21/21   (-) HIV   Malignancy:   (+) Malignancy, History of Other.Other CA endometrial cancer Active status post Surgery.    Other:  - neg other ROS   (-) Any chance pregnant       Physical Exam    Airway  airway exam normal           Respiratory Devices and Support         Dental  no notable dental history         Cardiovascular   cardiovascular exam normal          Pulmonary   pulmonary exam normal                OUTSIDE LABS:  CBC:   Lab Results   Component Value Date    WBC 4.0 09/23/2021    WBC 7.5 02/19/2021    HGB 14.9 09/23/2021    HGB 13.6 02/19/2021    HCT 45.0 09/23/2021    HCT 41.4 02/19/2021     09/23/2021     02/19/2021     BMP:   Lab Results   Component Value Date     09/23/2021     02/19/2021    POTASSIUM 3.8 09/23/2021    POTASSIUM 4.4 02/19/2021    CHLORIDE 109 09/23/2021    CHLORIDE 106 02/19/2021    CO2 23 09/23/2021    CO2 29 02/19/2021    BUN 14 09/23/2021    BUN 26 02/19/2021    CR 0.83 09/23/2021    CR 0.85 02/19/2021    GLC 84 09/23/2021    GLC 89 09/23/2021     COAGS:   Lab Results   Component Value Date    PTT 26 05/07/2009    INR 1.02 09/23/2021     POC:   Lab  Results   Component Value Date     (H) 02/25/2021    HCG Negative 07/25/2019     HEPATIC:   Lab Results   Component Value Date    ALBUMIN 3.7 09/23/2021    PROTTOTAL 8.0 09/23/2021    ALT 38 09/23/2021    AST 18 09/23/2021    ALKPHOS 46 09/23/2021    BILITOTAL 0.8 09/23/2021     OTHER:   Lab Results   Component Value Date    WOLF 9.5 09/23/2021    LIPASE 705 (H) 09/23/2021    AMYLASE 135 (H) 09/23/2021    TSH 0.88 03/05/2019       Anesthesia Plan    ASA Status:  2   NPO Status:  NPO Appropriate    Anesthesia Type: General.     - Airway: ETT   Induction: Intravenous.   Maintenance: Balanced.   Techniques and Equipment:     - Lines/Monitors: 2nd IV     Consents    Anesthesia Plan(s) and associated risks, benefits, and realistic alternatives discussed. Questions answered and patient/representative(s) expressed understanding.    - Discussed:     - Discussed with:  Patient      - Extended Intubation/Ventilatory Support Discussed: No.      - Patient is DNR/DNI Status: No    Use of blood products discussed: No .     Postoperative Care    Pain management: IV analgesics, Oral pain medications, Multi-modal analgesia.   PONV prophylaxis: Ondansetron (or other 5HT-3), Dexamethasone or Solumedrol, Aprepitant, Scopolamine patch, Background Propofol Infusion     Comments:                Handy Spring MD

## 2022-11-03 ENCOUNTER — ANESTHESIA (OUTPATIENT)
Dept: SURGERY | Facility: CLINIC | Age: 57
End: 2022-11-03
Payer: COMMERCIAL

## 2022-12-01 ENCOUNTER — APPOINTMENT (OUTPATIENT)
Dept: GENERAL RADIOLOGY | Facility: CLINIC | Age: 57
End: 2022-12-01
Attending: INTERNAL MEDICINE
Payer: COMMERCIAL

## 2022-12-01 ENCOUNTER — TELEPHONE (OUTPATIENT)
Dept: GASTROENTEROLOGY | Facility: CLINIC | Age: 57
End: 2022-12-01

## 2022-12-01 ENCOUNTER — HOSPITAL ENCOUNTER (OUTPATIENT)
Facility: CLINIC | Age: 57
Discharge: HOME | End: 2022-12-01
Attending: INTERNAL MEDICINE | Admitting: INTERNAL MEDICINE
Payer: COMMERCIAL

## 2022-12-01 VITALS
HEIGHT: 63 IN | OXYGEN SATURATION: 99 % | BODY MASS INDEX: 24.88 KG/M2 | WEIGHT: 140.43 LBS | DIASTOLIC BLOOD PRESSURE: 75 MMHG | SYSTOLIC BLOOD PRESSURE: 118 MMHG | RESPIRATION RATE: 16 BRPM | TEMPERATURE: 97.9 F | HEART RATE: 51 BPM

## 2022-12-01 LAB
ALBUMIN SERPL BCG-MCNC: 4.2 G/DL (ref 3.5–5.2)
ALP SERPL-CCNC: 47 U/L (ref 35–104)
ALT SERPL W P-5'-P-CCNC: 15 U/L (ref 10–35)
AMYLASE SERPL-CCNC: 111 U/L (ref 28–100)
ANION GAP SERPL CALCULATED.3IONS-SCNC: 11 MMOL/L (ref 7–15)
AST SERPL W P-5'-P-CCNC: 24 U/L (ref 10–35)
BILIRUB SERPL-MCNC: 0.6 MG/DL
BUN SERPL-MCNC: 11.4 MG/DL (ref 6–20)
CALCIUM SERPL-MCNC: 9.8 MG/DL (ref 8.6–10)
CHLORIDE SERPL-SCNC: 107 MMOL/L (ref 98–107)
CREAT SERPL-MCNC: 0.84 MG/DL (ref 0.51–0.95)
DEPRECATED HCO3 PLAS-SCNC: 20 MMOL/L (ref 22–29)
ERCP: NORMAL
ERYTHROCYTE [DISTWIDTH] IN BLOOD BY AUTOMATED COUNT: 13.3 % (ref 10–15)
FLEXIBLE SIGMOIDOSCOPY: NORMAL
GFR SERPL CREATININE-BSD FRML MDRD: 81 ML/MIN/1.73M2
GLUCOSE SERPL-MCNC: 97 MG/DL (ref 70–99)
HCT VFR BLD AUTO: 42.4 % (ref 35–47)
HGB BLD-MCNC: 13.9 G/DL (ref 11.7–15.7)
INR PPP: 1.01 (ref 0.85–1.15)
LIPASE SERPL-CCNC: 56 U/L (ref 13–60)
MCH RBC QN AUTO: 29.3 PG (ref 26.5–33)
MCHC RBC AUTO-ENTMCNC: 32.8 G/DL (ref 31.5–36.5)
MCV RBC AUTO: 89 FL (ref 78–100)
PLATELET # BLD AUTO: 171 10E3/UL (ref 150–450)
POTASSIUM SERPL-SCNC: 4.1 MMOL/L (ref 3.4–5.3)
PROT SERPL-MCNC: 7 G/DL (ref 6.4–8.3)
RBC # BLD AUTO: 4.75 10E6/UL (ref 3.8–5.2)
SODIUM SERPL-SCNC: 138 MMOL/L (ref 136–145)
WBC # BLD AUTO: 3.7 10E3/UL (ref 4–11)

## 2022-12-01 PROCEDURE — 250N000011 HC RX IP 250 OP 636: Performed by: NURSE ANESTHETIST, CERTIFIED REGISTERED

## 2022-12-01 PROCEDURE — 250N000009 HC RX 250: Performed by: NURSE ANESTHETIST, CERTIFIED REGISTERED

## 2022-12-01 PROCEDURE — 88305 TISSUE EXAM BY PATHOLOGIST: CPT | Mod: 26 | Performed by: PATHOLOGY

## 2022-12-01 PROCEDURE — 258N000003 HC RX IP 258 OP 636: Performed by: NURSE ANESTHETIST, CERTIFIED REGISTERED

## 2022-12-01 PROCEDURE — 999N000179 XR SURGERY CARM FLUORO LESS THAN 5 MIN W STILLS: Mod: TC

## 2022-12-01 PROCEDURE — 360N000083 HC SURGERY LEVEL 3 W/ FLUORO, PER MIN: Performed by: INTERNAL MEDICINE

## 2022-12-01 PROCEDURE — 710N000012 HC RECOVERY PHASE 2, PER MINUTE: Performed by: INTERNAL MEDICINE

## 2022-12-01 PROCEDURE — 88305 TISSUE EXAM BY PATHOLOGIST: CPT | Mod: TC | Performed by: INTERNAL MEDICINE

## 2022-12-01 PROCEDURE — 250N000025 HC SEVOFLURANE, PER MIN: Performed by: INTERNAL MEDICINE

## 2022-12-01 PROCEDURE — 85610 PROTHROMBIN TIME: CPT | Performed by: INTERNAL MEDICINE

## 2022-12-01 PROCEDURE — 370N000017 HC ANESTHESIA TECHNICAL FEE, PER MIN: Performed by: INTERNAL MEDICINE

## 2022-12-01 PROCEDURE — 272N000001 HC OR GENERAL SUPPLY STERILE: Performed by: INTERNAL MEDICINE

## 2022-12-01 PROCEDURE — 82947 ASSAY GLUCOSE BLOOD QUANT: CPT | Performed by: INTERNAL MEDICINE

## 2022-12-01 PROCEDURE — 250N000012 HC RX MED GY IP 250 OP 636 PS 637: Performed by: STUDENT IN AN ORGANIZED HEALTH CARE EDUCATION/TRAINING PROGRAM

## 2022-12-01 PROCEDURE — 85027 COMPLETE CBC AUTOMATED: CPT | Performed by: INTERNAL MEDICINE

## 2022-12-01 PROCEDURE — 250N000009 HC RX 250: Performed by: STUDENT IN AN ORGANIZED HEALTH CARE EDUCATION/TRAINING PROGRAM

## 2022-12-01 PROCEDURE — 84450 TRANSFERASE (AST) (SGOT): CPT | Performed by: INTERNAL MEDICINE

## 2022-12-01 PROCEDURE — 255N000002 HC RX 255 OP 636: Performed by: INTERNAL MEDICINE

## 2022-12-01 PROCEDURE — 710N000010 HC RECOVERY PHASE 1, LEVEL 2, PER MIN: Performed by: INTERNAL MEDICINE

## 2022-12-01 PROCEDURE — 82150 ASSAY OF AMYLASE: CPT | Performed by: INTERNAL MEDICINE

## 2022-12-01 PROCEDURE — 250N000011 HC RX IP 250 OP 636: Performed by: INTERNAL MEDICINE

## 2022-12-01 PROCEDURE — 83690 ASSAY OF LIPASE: CPT | Performed by: INTERNAL MEDICINE

## 2022-12-01 PROCEDURE — 250N000009 HC RX 250: Performed by: INTERNAL MEDICINE

## 2022-12-01 PROCEDURE — C1769 GUIDE WIRE: HCPCS | Performed by: INTERNAL MEDICINE

## 2022-12-01 PROCEDURE — 999N000141 HC STATISTIC PRE-PROCEDURE NURSING ASSESSMENT: Performed by: INTERNAL MEDICINE

## 2022-12-01 PROCEDURE — 36415 COLL VENOUS BLD VENIPUNCTURE: CPT | Performed by: INTERNAL MEDICINE

## 2022-12-01 RX ORDER — ONDANSETRON 2 MG/ML
4 INJECTION INTRAMUSCULAR; INTRAVENOUS
Status: DISCONTINUED | OUTPATIENT
Start: 2022-12-01 | End: 2022-12-01 | Stop reason: HOSPADM

## 2022-12-01 RX ORDER — SODIUM CHLORIDE, SODIUM LACTATE, POTASSIUM CHLORIDE, CALCIUM CHLORIDE 600; 310; 30; 20 MG/100ML; MG/100ML; MG/100ML; MG/100ML
INJECTION, SOLUTION INTRAVENOUS CONTINUOUS
Status: DISCONTINUED | OUTPATIENT
Start: 2022-12-01 | End: 2022-12-01 | Stop reason: HOSPADM

## 2022-12-01 RX ORDER — ONDANSETRON 4 MG/1
4 TABLET, ORALLY DISINTEGRATING ORAL EVERY 6 HOURS PRN
Status: DISCONTINUED | OUTPATIENT
Start: 2022-12-01 | End: 2022-12-01 | Stop reason: HOSPADM

## 2022-12-01 RX ORDER — ONDANSETRON 4 MG/1
4 TABLET, ORALLY DISINTEGRATING ORAL EVERY 30 MIN PRN
Status: DISCONTINUED | OUTPATIENT
Start: 2022-12-01 | End: 2022-12-01 | Stop reason: HOSPADM

## 2022-12-01 RX ORDER — NALOXONE HYDROCHLORIDE 0.4 MG/ML
0.2 INJECTION, SOLUTION INTRAMUSCULAR; INTRAVENOUS; SUBCUTANEOUS
Status: DISCONTINUED | OUTPATIENT
Start: 2022-12-01 | End: 2022-12-01 | Stop reason: HOSPADM

## 2022-12-01 RX ORDER — SCOLOPAMINE TRANSDERMAL SYSTEM 1 MG/1
1 PATCH, EXTENDED RELEASE TRANSDERMAL ONCE
Status: DISCONTINUED | OUTPATIENT
Start: 2022-12-01 | End: 2022-12-01 | Stop reason: HOSPADM

## 2022-12-01 RX ORDER — PROPOFOL 10 MG/ML
INJECTION, EMULSION INTRAVENOUS PRN
Status: DISCONTINUED | OUTPATIENT
Start: 2022-12-01 | End: 2022-12-01

## 2022-12-01 RX ORDER — APREPITANT 40 MG/1
40 CAPSULE ORAL ONCE
Status: COMPLETED | OUTPATIENT
Start: 2022-12-01 | End: 2022-12-01

## 2022-12-01 RX ORDER — ONDANSETRON 2 MG/ML
4 INJECTION INTRAMUSCULAR; INTRAVENOUS EVERY 30 MIN PRN
Status: DISCONTINUED | OUTPATIENT
Start: 2022-12-01 | End: 2022-12-01 | Stop reason: HOSPADM

## 2022-12-01 RX ORDER — INDOMETHACIN 50 MG/1
SUPPOSITORY RECTAL PRN
Status: DISCONTINUED | OUTPATIENT
Start: 2022-12-01 | End: 2022-12-01 | Stop reason: HOSPADM

## 2022-12-01 RX ORDER — LIDOCAINE 40 MG/G
CREAM TOPICAL
Status: DISCONTINUED | OUTPATIENT
Start: 2022-12-01 | End: 2022-12-01 | Stop reason: HOSPADM

## 2022-12-01 RX ORDER — NALOXONE HYDROCHLORIDE 0.4 MG/ML
0.4 INJECTION, SOLUTION INTRAMUSCULAR; INTRAVENOUS; SUBCUTANEOUS
Status: DISCONTINUED | OUTPATIENT
Start: 2022-12-01 | End: 2022-12-01 | Stop reason: HOSPADM

## 2022-12-01 RX ORDER — ONDANSETRON 2 MG/ML
INJECTION INTRAMUSCULAR; INTRAVENOUS PRN
Status: DISCONTINUED | OUTPATIENT
Start: 2022-12-01 | End: 2022-12-01

## 2022-12-01 RX ORDER — GLYCOPYRROLATE 0.2 MG/ML
INJECTION, SOLUTION INTRAMUSCULAR; INTRAVENOUS PRN
Status: DISCONTINUED | OUTPATIENT
Start: 2022-12-01 | End: 2022-12-01

## 2022-12-01 RX ORDER — PROCHLORPERAZINE MALEATE 5 MG
10 TABLET ORAL EVERY 6 HOURS PRN
Status: DISCONTINUED | OUTPATIENT
Start: 2022-12-01 | End: 2022-12-01 | Stop reason: HOSPADM

## 2022-12-01 RX ORDER — FLUMAZENIL 0.1 MG/ML
0.2 INJECTION, SOLUTION INTRAVENOUS
Status: DISCONTINUED | OUTPATIENT
Start: 2022-12-01 | End: 2022-12-01 | Stop reason: HOSPADM

## 2022-12-01 RX ORDER — LABETALOL HYDROCHLORIDE 5 MG/ML
10 INJECTION, SOLUTION INTRAVENOUS
Status: DISCONTINUED | OUTPATIENT
Start: 2022-12-01 | End: 2022-12-01 | Stop reason: HOSPADM

## 2022-12-01 RX ORDER — EPINEPHRINE IN SOD CHLOR,ISO 1 MG/10 ML
SYRINGE (ML) INTRAVENOUS PRN
Status: DISCONTINUED | OUTPATIENT
Start: 2022-12-01 | End: 2022-12-01 | Stop reason: HOSPADM

## 2022-12-01 RX ORDER — LIDOCAINE HYDROCHLORIDE 20 MG/ML
INJECTION, SOLUTION INFILTRATION; PERINEURAL PRN
Status: DISCONTINUED | OUTPATIENT
Start: 2022-12-01 | End: 2022-12-01

## 2022-12-01 RX ORDER — IOPAMIDOL 510 MG/ML
INJECTION, SOLUTION INTRAVASCULAR PRN
Status: DISCONTINUED | OUTPATIENT
Start: 2022-12-01 | End: 2022-12-01 | Stop reason: HOSPADM

## 2022-12-01 RX ORDER — FENTANYL CITRATE 50 UG/ML
25 INJECTION, SOLUTION INTRAMUSCULAR; INTRAVENOUS EVERY 5 MIN PRN
Status: DISCONTINUED | OUTPATIENT
Start: 2022-12-01 | End: 2022-12-01 | Stop reason: HOSPADM

## 2022-12-01 RX ORDER — OXYCODONE HYDROCHLORIDE 5 MG/1
5 TABLET ORAL EVERY 4 HOURS PRN
Status: DISCONTINUED | OUTPATIENT
Start: 2022-12-01 | End: 2022-12-01 | Stop reason: HOSPADM

## 2022-12-01 RX ORDER — SODIUM CHLORIDE, SODIUM LACTATE, POTASSIUM CHLORIDE, CALCIUM CHLORIDE 600; 310; 30; 20 MG/100ML; MG/100ML; MG/100ML; MG/100ML
INJECTION, SOLUTION INTRAVENOUS CONTINUOUS PRN
Status: DISCONTINUED | OUTPATIENT
Start: 2022-12-01 | End: 2022-12-01

## 2022-12-01 RX ORDER — FENTANYL CITRATE 50 UG/ML
INJECTION, SOLUTION INTRAMUSCULAR; INTRAVENOUS PRN
Status: DISCONTINUED | OUTPATIENT
Start: 2022-12-01 | End: 2022-12-01

## 2022-12-01 RX ORDER — ONDANSETRON 2 MG/ML
4 INJECTION INTRAMUSCULAR; INTRAVENOUS EVERY 6 HOURS PRN
Status: DISCONTINUED | OUTPATIENT
Start: 2022-12-01 | End: 2022-12-01 | Stop reason: HOSPADM

## 2022-12-01 RX ORDER — HYDROMORPHONE HCL IN WATER/PF 6 MG/30 ML
0.2 PATIENT CONTROLLED ANALGESIA SYRINGE INTRAVENOUS EVERY 5 MIN PRN
Status: DISCONTINUED | OUTPATIENT
Start: 2022-12-01 | End: 2022-12-01 | Stop reason: HOSPADM

## 2022-12-01 RX ORDER — DEXAMETHASONE SODIUM PHOSPHATE 4 MG/ML
INJECTION, SOLUTION INTRA-ARTICULAR; INTRALESIONAL; INTRAMUSCULAR; INTRAVENOUS; SOFT TISSUE PRN
Status: DISCONTINUED | OUTPATIENT
Start: 2022-12-01 | End: 2022-12-01

## 2022-12-01 RX ADMIN — ONDANSETRON 4 MG: 2 INJECTION INTRAMUSCULAR; INTRAVENOUS at 09:24

## 2022-12-01 RX ADMIN — GLYCOPYRROLATE 0.1 MG: 0.2 INJECTION, SOLUTION INTRAMUSCULAR; INTRAVENOUS at 08:33

## 2022-12-01 RX ADMIN — MIDAZOLAM 2 MG: 1 INJECTION INTRAMUSCULAR; INTRAVENOUS at 07:48

## 2022-12-01 RX ADMIN — PHENYLEPHRINE HYDROCHLORIDE 100 MCG: 10 INJECTION INTRAVENOUS at 09:23

## 2022-12-01 RX ADMIN — PROPOFOL 150 MG: 10 INJECTION, EMULSION INTRAVENOUS at 08:01

## 2022-12-01 RX ADMIN — PHENYLEPHRINE HYDROCHLORIDE 100 MCG: 10 INJECTION INTRAVENOUS at 08:44

## 2022-12-01 RX ADMIN — LIDOCAINE HYDROCHLORIDE 80 MG: 20 INJECTION, SOLUTION INFILTRATION; PERINEURAL at 08:01

## 2022-12-01 RX ADMIN — PHENYLEPHRINE HYDROCHLORIDE 100 MCG: 10 INJECTION INTRAVENOUS at 08:33

## 2022-12-01 RX ADMIN — SODIUM CHLORIDE, POTASSIUM CHLORIDE, SODIUM LACTATE AND CALCIUM CHLORIDE: 600; 310; 30; 20 INJECTION, SOLUTION INTRAVENOUS at 07:53

## 2022-12-01 RX ADMIN — Medication 10 MG: at 08:36

## 2022-12-01 RX ADMIN — FENTANYL CITRATE 50 MCG: 50 INJECTION, SOLUTION INTRAMUSCULAR; INTRAVENOUS at 08:03

## 2022-12-01 RX ADMIN — PHENYLEPHRINE HYDROCHLORIDE 100 MCG: 10 INJECTION INTRAVENOUS at 08:20

## 2022-12-01 RX ADMIN — Medication 40 MG: at 08:03

## 2022-12-01 RX ADMIN — SUGAMMADEX 200 MG: 100 INJECTION, SOLUTION INTRAVENOUS at 09:26

## 2022-12-01 RX ADMIN — DEXAMETHASONE SODIUM PHOSPHATE 8 MG: 4 INJECTION, SOLUTION INTRA-ARTICULAR; INTRALESIONAL; INTRAMUSCULAR; INTRAVENOUS; SOFT TISSUE at 08:36

## 2022-12-01 RX ADMIN — PHENYLEPHRINE HYDROCHLORIDE 100 MCG: 10 INJECTION INTRAVENOUS at 08:03

## 2022-12-01 RX ADMIN — APREPITANT 40 MG: 40 CAPSULE ORAL at 06:58

## 2022-12-01 RX ADMIN — SCOPALAMINE 1 PATCH: 1 PATCH, EXTENDED RELEASE TRANSDERMAL at 06:59

## 2022-12-01 RX ADMIN — PHENYLEPHRINE HYDROCHLORIDE 100 MCG: 10 INJECTION INTRAVENOUS at 08:25

## 2022-12-01 RX ADMIN — FENTANYL CITRATE 50 MCG: 50 INJECTION, SOLUTION INTRAMUSCULAR; INTRAVENOUS at 08:40

## 2022-12-01 RX ADMIN — FENTANYL CITRATE 50 MCG: 50 INJECTION, SOLUTION INTRAMUSCULAR; INTRAVENOUS at 08:11

## 2022-12-01 RX ADMIN — PHENYLEPHRINE HYDROCHLORIDE 100 MCG: 10 INJECTION INTRAVENOUS at 09:20

## 2022-12-01 RX ADMIN — PROPOFOL 15 MCG/KG/MIN: 10 INJECTION, EMULSION INTRAVENOUS at 09:09

## 2022-12-01 ASSESSMENT — ACTIVITIES OF DAILY LIVING (ADL)
ADLS_ACUITY_SCORE: 35

## 2022-12-01 NOTE — PROGRESS NOTES
Patient is COVID recovered from +COVID test on 11/2. Patient took home COVID test 11/30 which was negative per cell phone picture. Travel screen is negative.

## 2022-12-01 NOTE — DISCHARGE INSTRUCTIONS
[facility], [place]  Same-Day Surgery ERCP Procedure   Adult Discharge Orders & Instructions     You had a procedure known as an Endoscopic Retrograde Cholangiopancreatography (ERCP). Your healthcare provider performed the ERCP to look at your bile or pancreatic ducts, and to locate and/or treat blockages (dilation, stenting, removal, etc.) in these ducts. Often biopsies, small samples of tissue, are taken to help diagnose and/or classify stages of disease growth. This procedure is used to diagnose diseases of the pancreas, bile ducts, pancreatic duct, liver, and gallbladder.     After your procedure   Make sure to clarify with your healthcare provider any diet restrictions (For example, clear liquid, low fat, no caffeine, etc.)   Do NOT take aspirin containing medications or any other blood-thinning medicines (anticoagulants) until your healthcare provider says it's OK.   You MAY be prescribed antibiotics, depending on what was done and/or found during your EUS, make sure to take antibiotics as prescribed by your healthcare provider    For 24 hours after surgery  Get plenty of rest.  A responsible adult must stay with you for at least 24 hours after you leave the hospital.   Do not drive or use heavy equipment.  If you have weakness or tingling, don't drive or use heavy equipment until this feeling goes away.  Do not drink alcohol.  Avoid strenuous or risky activities (gym, yoga, cycling, etc.).  Ask for help when climbing stairs.   You may feel lightheaded.  IF so, sit for a few minutes before standing.  Have someone help you get up.   If you have nausea (feel sick to your stomach): Drink only clear liquids such as apple juice, ginger ale, broth or 7-Up.  Rest may also help.  Be sure to drink enough fluids.  Move to a regular diet as you feel able.  If you feel bloated or have too much gas, use a heating pad on your belly to help reduce the discomfort. This should help you feel  better.   You may have a slight fever. This is normal for the first 24 hours.   You may have a dry mouth, a sore throat, muscle aches or trouble sleeping.  These are normal and will go away after 24 hours. A sore throat is most common. Use lozenges or gargle with salt water to ease the discomfort.   Do not make important or legal decisions.      Call your doctor for any of the following:  Chest pain, and/or shortness of breath  Abdominal  pain, bloating or cramping that has not improved or does not respond to pain reliving medications (Tylenol or narcotics if prescribed)   Difficulty swallowing or feeling as though food or liquids are stuck in your throat   Sore throat lasting more than 2 days or pain that has worsened over time   Black or tarry stools   Nausea and/or vomiting that is not resolving or has not responded to anti-nausea medications prescribed to you   It has been over 8 to 10 hours since surgery and you are still not able to urinate (pass water)   Headache for over 24 hours   Fever over 100.5 F (38 C) lasting more than 24 hours after the procedure   Signs of jaundice or blockage (fever, chills, abdominal pain, yellowing of the whites of your eyes, yellowing of your skin, and/or passing darker than normal urine)     To contact a doctor, call:   [ ] _Dr Gasca at the  GI clinic at [phone] (Monday thru Friday 8:00am to 4:30pm) or Dr Fernández's clinic at [phone]   [ ] [phone] and ask for the GI resident on call (answered 24 hours a day)   [ ] Emergency Department: [facility]: [phone]     Take it easy when you get home.  Remember, same day surgery DOES NOT MEAN SAME DAY RECOVERY!  Healing is a gradual process.  You will need some time to recover - you may be more tired than you realize at first.  Rest and relax for at least the first 24 hours at home.  You'll feel better and heal faster if you take good care of yourself.

## 2022-12-01 NOTE — ANESTHESIA POSTPROCEDURE EVALUATION
Patient: Sarah Duran    Procedure: Procedure(s):  SIGMOIDOSCOPY, FLEXIBLE with polypectomy  diagnostic ENDOSCOPIC RETROGRADE CHOLANGIOPANCREATOGRAPHY, ablation of duodenal polyps, biopsy of gastric polyp       Anesthesia Type:  General    Note:  Disposition: Outpatient   Postop Pain Control: Uneventful            Sign Out: Well controlled pain   PONV: No   Neuro/Psych: Uneventful            Sign Out: Acceptable/Baseline neuro status   Airway/Respiratory: Uneventful            Sign Out: Acceptable/Baseline resp. status   CV/Hemodynamics: Uneventful            Sign Out: Acceptable CV status; No obvious hypovolemia; No obvious fluid overload   Other NRE: NONE   DID A NON-ROUTINE EVENT OCCUR? No           Last vitals:  Vitals Value Taken Time   /77 12/01/22 1030   Temp 36.1  C (97  F) 12/01/22 0940   Pulse 45 12/01/22 1038   Resp 12 12/01/22 1038   SpO2 100 % 12/01/22 1038   Vitals shown include unvalidated device data.    Electronically Signed By: Willi Bennett MD  December 1, 2022  10:40 AM

## 2022-12-01 NOTE — ANESTHESIA PROCEDURE NOTES
Airway       Patient location during procedure: OR       Procedure Start/Stop Times: 12/1/2022 8:08 AM  Staff -        CRNA: Radha Suarez APRN CRNA       Performed By: CRNA  Consent for Airway        Urgency: elective  Indications and Patient Condition       Indications for airway management: nadeem-procedural       Induction type:intravenous       Mask difficulty assessment: 1 - vent by mask    Final Airway Details       Final airway type: endotracheal airway       Successful airway: ETT - single  Endotracheal Airway Details        ETT size (mm): 7.0       Cuffed: yes       Successful intubation technique: direct laryngoscopy       DL Blade Type: Blunt 2       Grade View of Cords: 1       Adjucts: stylet       Position: Right       Measured from: gums/teeth       Secured at (cm): 21       Bite block used: Oral Airway    Post intubation assessment        Placement verified by: capnometry, equal breath sounds and chest rise        Number of attempts at approach: 1       Secured with: silk tape       Ease of procedure: easy       Dentition: Intact and Unchanged    Medication(s) Administered   Medication Administration Time: 12/1/2022 8:08 AM

## 2022-12-01 NOTE — ANESTHESIA CARE TRANSFER NOTE
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    Anesthesia Type:   General     Note:    Oropharynx: oropharynx clear of all foreign objects and spontaneously breathing  Level of Consciousness: awake  Oxygen Supplementation: nasal cannula  Level of Supplemental Oxygen (L/min / FiO2): 3  Independent Airway: airway patency satisfactory and stable  Dentition: dentition unchanged  Vital Signs Stable: post-procedure vital signs reviewed and stable  Report to RN Given: handoff report given  Patient transferred to: PACU    Handoff Report: Identifed the Patient, Identified the Reponsible Provider, Reviewed the pertinent medical history, Discussed the surgical course, Reviewed Intra-OP anesthesia mangement and issues during anesthesia, Set expectations for post-procedure period and Allowed opportunity for questions and acknowledgement of understanding      Vitals:  Vitals Value Taken Time   /71 12/01/22 0945   Temp     Pulse 68 12/01/22 0945   Resp 11 12/01/22 0945   SpO2 99 % 12/01/22 0945   Vitals shown include unvalidated device data.    Electronically Signed By: REYNOLD Sethi CRNA  December 1, 2022  9:46 AM

## 2022-12-01 NOTE — BRIEF OP NOTE
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]  Post-operative diagnosis Same as pre-operative diagnosis    Procedure: Procedure(s):  ESOPHAGOGASTRODUODENOSCOPY (EGD)  SIGMOIDOSCOPY, FLEXIBLE with polypectomy  ENDOSCOPIC RETROGRADE CHOLANGIOPANCREATOGRAPHY  Surgeon: Surgeon(s) and Role:  Panel 1:     * Stephen Gasca MD - Primary  Panel 2:     * Shahab Fernández MD - Primary  Anesthesia: General   Estimated Blood Loss: None    Drains: None  Specimens:   ID Type Source Tests Collected by Time Destination   1 : gastric fundus polyp biopsies Biopsy Stomach, Fundus SURGICAL PATHOLOGY EXAM Shahab Fernández MD 12/1/2022  8:51 AM    2 : rectal sigmoid polypectomies Polyp Large Intestine, Colon, Sigmoid/Rectal SURGICAL PATHOLOGY EXAM Stephen Gasca MD 12/1/2022  9:12 AM      Findings:   EGD/ERCP by Dr. Fernández with ablation of duodenal polyps and biopsy of gastric polyp.    Sigmoidoscopy with polypectomy of 15 small 2-3 mm polyps using cold snare and large forceps.  Complications: None.  Implants: * No implants in log *

## 2022-12-01 NOTE — LETTER
"December 5, 2022    Sarah Duran  [address] [address]        Dear ,    We are writing to inform you of your test results.         Resulted Orders   Surgical Pathology Exam   Result Value Ref Range    Case Report       Surgical Pathology Report                         Case: [id number]                                  Authorizing Provider:  Shahab Fernández MD  Collected:           12/01/2022 08:51 AM          Ordering Location:     [facility]     Received:            12/01/2022 10:45 AM                                 Same Day Surgery [place]                                                   Pathologist:           Yonny Kunz DO                                                            Specimens:   A) - Stomach, Fundus, Gastric fundus polyp biopsies                                                 B) - Large Intestine, Colon, Sigmoid/Rectal, Rectosigmoid polyp                            Final Diagnosis       A. Gastric, fundus polyp, biopsy:  - Fundic gland polyp with low-grade dysplasia; see comment  - No H. pylori-like organisms identified on routine staining  - No intestinal metaplasia identified    B. Rectosigmoid, polyps:  - Tubular adenomas  - No high-grade dysplasia or malignancy identified        Comment       Sections of fundus polyp show fragments of polypoid gastric mucosa with dilatation of oxyntic glands and foveolar hyperplasia with areas of \"low-grade dysplasia\".  This finding is not infrequently seen within fundic gland polyps in patients with familial adenomatous polyposis and has not been shown to carry any risk of progression.      Clinical Information       FAP (familial adenomatous polyposis)      Gross Description       A(1). Stomach, Fundus, Gastric fundus polyp biopsies:  The specimen is received in formalin with proper patient identification, labeled \"gastric fundus polyp biopsies\".  The specimen consists of 4 irregular tan pieces of soft " "tissue ranging from 0.2-0.3 cm in greatest dimension which are entirely submitted in cassette A1.  B(). Large Intestine, Colon, Sigmoid/Rectal, Rectosigmoid polyp:  The specimen is received in formalin with proper patient identification, labeled \"rectosigmoid polyp\".  The specimen consists of multiple irregular tan pieces of soft tissue ranging from 0.2-0.5 cm in greatest dimension which are entirely submitted in cassettes B1-B3.      Performing Labs       The technical component of this testing was completed at [facility]      Case Images         If you have any questions or concerns, please call the clinic at the number listed above.       Sincerely,      Shahab Fernández MD          "

## 2022-12-01 NOTE — TELEPHONE ENCOUNTER
Anette:    Pt will need sigmoidoscopy in 1 yr with some form of upper surveillance. Unclear at present re whether with need ERCP via Dr. Fernández or just EGD.    Anesthesia choice will need to be reviewed prior to scheduling once ? ERCP clarified.    FATMATA Gasca MD  Professor of Medicine  Division of Gastroenterology, Hepatology and Nutrition  [facility]

## 2022-12-04 LAB
PATH REPORT.COMMENTS IMP SPEC: NORMAL
PATH REPORT.FINAL DX SPEC: NORMAL
PATH REPORT.GROSS SPEC: NORMAL
PATH REPORT.RELEVANT HX SPEC: NORMAL
PHOTO IMAGE: NORMAL

## 2022-12-05 NOTE — PROGRESS NOTES
Follow Up Notes on Referred Patient    Date: 2022       RE: Sarah Duran  : 1965  ANDREI: 2022      Sarah Duran is a 57 year old woman with a diagnosis of stage II, grade 1 endometrial adenocarcinoma. She is here today for follow up and surveillance.     Cancer Course:     She reports she began having daily bleeding in .  She notes she had previously gone 15 months with no bleeding.  She reports the bleeding is sometimes heavy like a period but then can also be somewhat light and only like spotting.  She had an attempted EMB with her primary gynecologist, however this was unsuccessful.  She is here today for a second opinion.     21:  US Pelvis:  Uterus: Measures 8.9 x 5.8 x 6.4 cm. The endometrium is thickened at 2.9 cm. The endometrium is heterogeneous in appearance with an irregular, nodular contour of the endometrial surface. There is hypoechoic complex fluid in the endometrial cavity likely containing blood. There is vascularity of the endometrium on color imaging. The abnormal endometrium occupies the majority of the uterus. Right Ovary: Measures 2.0 x 1.0 x 1.1 cm and appears unremarkable Right Ovary Blood Flow: Present. Left Ovary: Not seen. Free Fluid: No significant free fluid.There are no suspicious adnexal masses.     2/10/21:  Hysteroscopy, D&C                 Pathology:  Grade 1 endometrial adenocarcinoma, MMR intact, p53 wild type, ER/UT +     21:  Total laparoscopic hysterectomy, bilateral salpingo-oophorectomy, bilateral sentinel lymph node dissection, cystoscopy                 Pathology:  Stage II, grade 1 endometrial adenocarcinoma, tumor size 5.6 cm, 1/13 mm myometrial invasion, +CASEY, + cervix, 0/2 sentinel LN, no LVSI     21:  Completed brachytherapy to 3000cGy in 5 Fx to the vagina              Today Sarah comes to the clinic overall feeling well without any gynecological concerns. She is using a dilator once per week without  spotting or pain. She is not sexually active. She denies any vaginal bleeding, no changes in her bowel or bladder habits, no nausea/emesis, no lower extremity edema, and no difficulties eating or sleeping. She denies any abdominal discomfort/bloating, no fevers or chills, and no chest pain or shortness of breath.  Previous RLQ abdominal has resolved. New hip arthritis that she will work with PT for along with taking Celebrex to control discomfort. Continues [employer] workouts.       Health Maintenance  Colonoscopy- 12/2022; bx negative   Mammogram- 10/2022 negative   Annual physical- 10/2022      Review of Systems:    Systemic           no weight changes; no fever; no chills; no night sweats; no appetite changes, +mild hot flashes   Skin           no rashes, or lesions  Eye           no irritation; no changes in vision  Alise-Laryngeal           no dysphagia; no hoarseness   Pulmonary    no cough; no shortness of breath  Cardiovascular    no chest pain; no palpitations  Gastrointestinal    no diarrhea; no constipation; no abdominal pain; no changes in bowel  habits; no blood in stool  Genitourinary   no urinary frequency; no urinary urgency; no dysuria; no pain; no abnormal vaginal discharge; no abnormal vaginal bleeding  Breast    no breast discharge; no breast changes; no breast pain  Musculoskeletal    no myalgias; no arthralgias; no back pain  Psychiatric           no depressed mood; no anxiety    Hematologic           no tender lymph nodes; no noticeable swellings or lumps   Endocrine    no hot flashes; no heat/cold intolerance         Neurological   no tremor; no numbness and tingling; no headaches; no difficulty  sleeping      Past Medical History:    Past Medical History:   Diagnosis Date     Acute deep vein thrombosis (DVT) of left lower extremity (H)      Asthma      Atypical ductal hyperplasia of right breast 10/2016    fibrocystic changes, PASH and atypical ductal hyperplasia on needle biopsy, s/p R  excisional biopsy with radioactive seed localization     Endometrial cancer (H) 02/15/2021     Factor 5 Leiden mutation, heterozygous (H)     factor 5     Familial adenomatous polyposis 1988    clinical presentation; s/p total abdominal colectomy with TRACEY; confirmed APC+ 2018     GERD (gastroesophageal reflux disease)      Infection due to 2019 novel coronavirus 11/02/2022     Migraine      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    [id number]         Past Surgical History:    Past Surgical History:   Procedure Laterality Date     BIOPSY BREAST SEED LOCALIZATION Right 11/30/2016    Procedure: BIOPSY BREAST SEED LOCALIZATION;  Surgeon: Jessica Rasheed MD;  Location: [facility]     COLECTOMY      subtotal     COMBINED ESOPHAGOSCOPY, GASTROSCOPY, DUODENOSCOPY (EGD) WITH ARGON PLASMA COAGULATOR (APC) N/A 4/12/2018    Procedure: COMBINED ESOPHAGOSCOPY, GASTROSCOPY, DUODENOSCOPY (EGD) WITH ARGON PLASMA COAGULATOR (APC);  Esophagogastroduodenoscopy with polypectomy and polyp ablation;  Surgeon: Stephen Gasca MD;  Location: [facility]     DILATION AND CURETTAGE, HYSTEROSCOPY DIAGNOSTIC, COMBINED N/A 2/10/2021    Procedure: HYSTEROSCOPY, DIAGNOSTIC, WITH DILATION AND CURETTAGE OF UTERUS;  Surgeon: Sandie Samaniego MD;  Location: [facility] OR     ENDOSCOPIC RETROGRADE CHOLANGIOPANCREATOGRAM N/A 9/23/2021    Procedure: ENDOSCOPIC RETROGRADE CHOLANGIOPANCREATOGRAPHY WITH PANCREATIC STENT PLACEMENT AND POLYP ABLATION with Argon Plasma Coagulation;  Surgeon: Shahab Fernández MD;  [facility]     ENDOSCOPIC RETROGRADE CHOLANGIOPANCREATOGRAM N/A 12/1/2022    Procedure: diagnostic ENDOSCOPIC RETROGRADE CHOLANGIOPANCREATOGRAPHY, ablation of duodenal polyps, biopsy of gastric polyp;  Surgeon: Shahab Fernández MD;  Location: [facility]     ENDOSCOPIC RETROGRADE CHOLANGIOPANCREATOGRAPHY       ENDOSCOPIC ULTRASOUND UPPER GASTROINTESTINAL TRACT (GI) N/A 7/25/2019    Procedure: Endoscopic Ultrasound;   Surgeon: Stephen Gasca MD;  [facility]     ESOPHAGOSCOPY, GASTROSCOPY, DUODENOSCOPY (EGD), COMBINED  11/5/2012    Procedure: COMBINED ESOPHAGOSCOPY, GASTROSCOPY, DUODENOSCOPY (EGD), BIOPSY SINGLE OR MULTIPLE;;  Surgeon: Angélica Garcia MD;  [facility]     ESOPHAGOSCOPY, GASTROSCOPY, DUODENOSCOPY (EGD), COMBINED  12/3/2013    Procedure: COMBINED ESOPHAGOSCOPY, GASTROSCOPY, DUODENOSCOPY (EGD);;  Surgeon: Angélica Garcia MD;  [facility]     ESOPHAGOSCOPY, GASTROSCOPY, DUODENOSCOPY (EGD), COMBINED N/A 3/28/2018    Procedure: COMBINED ESOPHAGOSCOPY, GASTROSCOPY, DUODENOSCOPY (EGD);;  Surgeon: Stephen Gasca MD;  Location:  GI     ESOPHAGOSCOPY, GASTROSCOPY, DUODENOSCOPY (EGD), COMBINED N/A 5/30/2019    Procedure: Esophagogastroduodenoscopy with biopsy x2 APC;  Surgeon: Stephen Gasca MD;  [facility]     ESOPHAGOSCOPY, GASTROSCOPY, DUODENOSCOPY (EGD), COMBINED N/A 7/30/2020    Procedure: ESOPHAGOGASTRODUODENOSCOPY (EGD) with polypectomy by hot snare; argonplasma coaglution ablation of polyps;  Surgeon: Stephen Gasca MD;  [facility]     ESOPHAGOSCOPY, GASTROSCOPY, DUODENOSCOPY (EGD), RESECT MUCOSA, COMBINED N/A 7/25/2019    Procedure: Esophagogastroduodenoscopy with gastric mucosal resection and clip application;  Surgeon: Stephen Gasca MD;  [facility]     EXCISE POLYP RECTUM       LAPAROSCOPIC HYSTERECTOMY TOTAL, BILATERAL SALPINGO-OOPHORECTOMY, NODE DISSECTION, COMBINED Bilateral 2/25/2021    Procedure: Laparoscopic removal of uterus, cervix, both ovaries and fallopian tubes, sentinel lymph node dissection, cystoscopy;  Surgeon: Sandie Samaniego MD;  [facility]     LAPAROSCOPIC LYSIS ADHESIONS       SIGMOIDOSCOPY FLEXIBLE       SIGMOIDOSCOPY FLEXIBLE N/A 12/7/2015    Procedure: SIGMOIDOSCOPY FLEXIBLE;  Surgeon: Mariela Bearden MD;  [facility]     SIGMOIDOSCOPY FLEXIBLE N/A 3/28/2018    Procedure: SIGMOIDOSCOPY FLEXIBLE;   "EGD/Flex Sig;  Surgeon: Stephen Gasca MD;  [facility]     SIGMOIDOSCOPY FLEXIBLE N/A 4/12/2018    Procedure: SIGMOIDOSCOPY FLEXIBLE;  Flexible Sigmoidoscopy with polyp ablation;  Surgeon: Stephen Gasca MD;  [facility]     SIGMOIDOSCOPY FLEXIBLE N/A 5/30/2019    Procedure: Flexible Sigmoidoscopy with polypectomy;  Surgeon: Stephen Gasca MD;  [facility]     SIGMOIDOSCOPY FLEXIBLE N/A 7/30/2020    Procedure: SIGMOIDOSCOPY, FLEXIBLE with Ablation of Polyps;  Surgeon: Stephen Gasca MD;  [facility]     SIGMOIDOSCOPY FLEXIBLE N/A 9/23/2021    Procedure: FLEXIBLE SIGMOIDOSCOPY WITH POLYPECTOMY;  Surgeon: Stephen Gasca MD;  [facility]     SIGMOIDOSCOPY FLEXIBLE N/A 12/1/2022    Procedure: SIGMOIDOSCOPY, FLEXIBLE with polypectomy;  Surgeon: Stephen Gasca MD;  [facility]     ZZC LAP,SURG,COLECTOMY,W/ANAST      for colon cancer         Health Maintenance Due   Topic Date Due     ASTHMA ACTION PLAN  Never done     ASTHMA CONTROL TEST  Never done     ADVANCE CARE PLANNING  Never done     COVID-19 Vaccine (3 - Booster for Pfizer series) 06/05/2021     PHQ-2 (once per calendar year)  01/01/2022     COLORECTAL CANCER SCREENING  12/02/2022       Current Medications:     Current Outpatient Medications   Medication Sig Dispense Refill     acetaminophen (TYLENOL) 325 MG tablet Take 2 tablets (650 mg) by mouth every 6 hours as needed for mild pain 30 tablet 0     albuterol 90 MCG/ACT inhaler Inhale 2 puffs into the lungs every 6 hours as needed.       Apple Cider Vinegar 500 MG TABS Take 500 mg by mouth       ASHWAGANDHA PO Take 150 mg by mouth One gummy per day       bisacodyl (DULCOLAX) 5 MG EC tablet Refer to \"Getting Ready for a Colonoscopy\" instruction handout 4 tablet 0     calcium-magnesium (CALMAG) 500-250 MG TABS Take 1 tablet by mouth daily       celecoxib (CELEBREX) 100 MG capsule Take 1 capsule (100 mg) by mouth daily 90 capsule 3     Cranberry 500 " MG CAPS Take 1 capsule by mouth daily       docusate sodium (COLACE) 100 MG capsule Take 100 mg by mouth daily       fexofenadine (ALLEGRA) 180 MG tablet Take 180 mg by mouth daily.       Lactobacillus (PROBIOTIC ACIDOPHILUS PO) Take 100 mg by mouth daily       MELATONIN PO Take 2 mg by mouth nightly as needed        multivitamin, therapeutic with minerals (THERA-VIT-M) TABS Take 3 tablets by mouth 2 times daily        Omega-3 Fatty Acids (OMEGA-3 FISH OIL PO) Take 2 g by mouth daily        ondansetron (ZOFRAN) 4 MG tablet Take 1 tablet (4 mg) by mouth every 8 hours as needed for nausea 60 tablet 1     triamcinolone (NASACORT) 55 MCG/ACT Inhaler Spray 2 sprays into both nostrils daily       ZOLMitriptan (ZOMIG) 5 MG tablet Take 1 tablet (5 mg) by mouth at onset of headache for migraine 90 tablet 3         Allergies:        Allergies   Allergen Reactions     Corn-Related Products Shortness Of Breath     Avoids food; ok in tiny doses, such as in medicines  Avoids food; ok in tiny doses, such as in medicines     Cipro [Ciprofloxacin]      Reddened skin     Flagyl [Metronidazole] Nausea and Vomiting     Garlic Fatigue     GI distress     Keflex [Cephalosporins] Nausea and Vomiting     Omeprazole      PN: headache     Prilosec Otc [Omeprazole Magnesium]      migraines     Cephalexin Rash     PN: Rash, Generalized     Doxycycline Rash     blisters     Erythromycin Rash     Sulfa Drugs Rash     Ok to use         Social History:     Social History     Tobacco Use     Smoking status: Never     Smokeless tobacco: Never   Substance Use Topics     Alcohol use: Not Currently     Comment: 1 or 2 drinks per year       History   Drug Use No         Family History:     The patient's family history is notable for     Family History   Problem Relation Age of Onset     Hyperlipidemia Mother      Cerebrovascular Disease Mother      Cerebrovascular Disease Father      Colon Cancer Father 57         at 63     Prostate Cancer Brother  53        recurrence at 65     Prostate Cancer Maternal Uncle 65         Physical Exam:     /89 (BP Location: Right arm, Patient Position: Sitting, Cuff Size: Adult Regular)   Pulse 56   Temp 97.5  F (36.4  C) (Oral)   Resp 20   Wt 66.9 kg (147 lb 6.4 oz)   LMP 05/01/2019   SpO2 100%   BMI 26.53 kg/m    Body mass index is 26.53 kg/m .    General Appearance: healthy and alert, no distress     HEENT: no thyromegaly, no palpable nodules or masses        Cardiovascular: regular rate and rhythm, no gallops, rubs or murmurs     Respiratory: lungs clear, no rales, rhonchi or wheezes    Musculoskeletal: extremities non tender and without edema    Skin: no lesions or rashes     Neurological: normal gait, no gross defects     Psychiatric: appropriate mood and affect                               Hematological: normal cervical, supraclavicular and inguinal lymph nodes     Gastrointestinal:       abdomen soft, non-tender, non-distended, no organomegaly or masses; laparoscopic incisions well healed.     Genitourinary: External genitalia and urethral meatus appears normal.  Vagina is smooth without nodularity or masses. Cervix surgically absent.  Bimanual exam reveal no masses, nodularity or fullness.  Recto-vaginal exam confirms these findings. Small external hemorrhoid soft.       Assessment:    Sarah Duran is a 57 year old woman with a diagnosis of stage II, grade 1 endometrial adenocarcinoma. She is here today for an acute visit.     20 minutes spent on the date of the encounter doing chart review, history and exam, documentation, and further activities as noted above.        Plan:     1.)        Stage II, grade 1 endometrial adenocarcinoma. HAROON on exam. Reviewed signs and symptoms for when she should contact the clinic or seek additional care.  Discussed signs and symptoms of recurrence and to call if these should occur. Patient to contact the clinic with any questions or concerns in the interim. Discussed no  need for further pap smear testing. Discussed visits every 3 months for the first two years (5/23) then every 6 months for up to 5 years (5/26) then annually thereafter. Continue to use dilator for 3-5 minutes once per week.     2.) Genetic risk factors were assessed and the patient is POSITIVE for a APC mutation.  Specifically her mutation is called [id number],  consistent with a diagnosis of familial adenomatous polyposis (FAP) or attenuated FAP (AFAP). She follows with Clarissa Brown with the On[facility] clinic.     3.) Labs and/or tests ordered include: none.     4.) Health maintenance issues addressed today include annual health maintenance and non-gynecologic issues with PCP.          REYNOLD Sorto, NP-BC  Women's Health Nurse Practitioner  Division of Gynecologic Oncology  [facility]      CC  Patient Care Team:  Ida Sandoval MD as PCP - General (Family Practice)  Stephen Gasca MD as MD (Gastroenterology)  Treasure Cleveland GC as Genetic Counselor (Genetic )  Pancho Samson MD as MD (Colon and Rectal Surgery)  Ciara Garcia APRN CNP as Assigned Cancer Care Provider

## 2022-12-06 ENCOUNTER — ONCOLOGY VISIT (OUTPATIENT)
Dept: ONCOLOGY | Facility: CLINIC | Age: 57
End: 2022-12-06
Attending: OBSTETRICS & GYNECOLOGY
Payer: COMMERCIAL

## 2022-12-06 ENCOUNTER — PATIENT OUTREACH (OUTPATIENT)
Dept: GASTROENTEROLOGY | Facility: CLINIC | Age: 57
End: 2022-12-06

## 2022-12-06 VITALS
BODY MASS INDEX: 26.53 KG/M2 | SYSTOLIC BLOOD PRESSURE: 133 MMHG | HEART RATE: 56 BPM | OXYGEN SATURATION: 100 % | TEMPERATURE: 97.5 F | RESPIRATION RATE: 20 BRPM | DIASTOLIC BLOOD PRESSURE: 89 MMHG | WEIGHT: 147.4 LBS

## 2022-12-06 DIAGNOSIS — C54.1 ENDOMETRIAL CANCER (H): Primary | ICD-10-CM

## 2022-12-06 PROCEDURE — 99212 OFFICE O/P EST SF 10 MIN: CPT | Performed by: OBSTETRICS & GYNECOLOGY

## 2022-12-06 PROCEDURE — G0463 HOSPITAL OUTPT CLINIC VISIT: HCPCS

## 2022-12-06 PROCEDURE — 99213 OFFICE O/P EST LOW 20 MIN: CPT | Performed by: OBSTETRICS & GYNECOLOGY

## 2022-12-06 ASSESSMENT — PAIN SCALES - GENERAL: PAINLEVEL: NO PAIN (0)

## 2022-12-06 NOTE — TELEPHONE ENCOUNTER
Per Dr. Fernández related to procedure due in December 2023  Had very large gastric polyp among many other polyps - biopsy showed fundic gland polyp w thoughtful comment that it was typical and not suggestive of progressive dysplasia etc.   Could just have EGD/duodenoscopy and flex sig instead of ERCP     Per Dr Gasca:  Based on note from Dr. Fernández we can schedule both the upper EGD and sigmoidoscopy with me with general anesthesia. NO ERCP.     I will remove the large stomach with polypectomy at the next exam.       Noted for future follow up    Crystal Martínez, RN Care Coordinator

## 2022-12-06 NOTE — PROGRESS NOTES
"Oncology Rooming Note    December 6, 2022 2:55 PM   Sarah Duran is a 57 year old female who presents for:    Chief Complaint   Patient presents with     Oncology Clinic Visit     Endometrial cancer (H)       Initial Vitals: /89 (BP Location: Right arm, Patient Position: Sitting, Cuff Size: Adult Regular)   Pulse 56   Temp 97.5  F (36.4  C) (Oral)   Resp 20   Wt 66.9 kg (147 lb 6.4 oz)   LMP 05/01/2019   SpO2 100%   BMI 26.53 kg/m   Estimated body mass index is 26.53 kg/m  as calculated from the following:    Height as of 12/1/22: 1.588 m (5' 2.5\").    Weight as of this encounter: 66.9 kg (147 lb 6.4 oz). Body surface area is 1.72 meters squared.  No Pain (0) Comment: Data Unavailable   Patient's last menstrual period was 05/01/2019.  Allergies reviewed: Yes  Medications reviewed: Yes    Medications: Medication refills not needed today.  Pharmacy name entered into [facility]:    [facility] [place], [place] - [address]  [facility], [place] - [address] [facility] [place] [address] [facility] [place] - [address]  [facility] [place], [place] - [address]  [facility] [place], [place] - [address] [facility] [place], [place] - [address] [facility] [phone]    Clinical concerns: NO     Ema Delgado CMA              "

## 2022-12-06 NOTE — LETTER
2022         RE: Sarah Duran  [address] [facility] [place][note 2] [address]        Dear Colleague,    Thank you for referring your patient, Sarah Duran, to the [facility]. Please see a copy of my visit note below.                     Follow Up Notes on Referred Patient    Date: 2022       RE: Sarah Duran  : 1965  ANDREI: 2022      Sarah Duran is a 57 year old woman with a diagnosis of stage II, grade 1 endometrial adenocarcinoma. She is here today for follow up and surveillance.     Cancer Course:     She reports she began having daily bleeding in .  She notes she had previously gone 15 months with no bleeding.  She reports the bleeding is sometimes heavy like a period but then can also be somewhat light and only like spotting.  She had an attempted EMB with her primary gynecologist, however this was unsuccessful.  She is here today for a second opinion.     21:  US Pelvis:  Uterus: Measures 8.9 x 5.8 x 6.4 cm. The endometrium is thickened at 2.9 cm. The endometrium is heterogeneous in appearance with an irregular, nodular contour of the endometrial surface. There is hypoechoic complex fluid in the endometrial cavity likely containing blood. There is vascularity of the endometrium on color imaging. The abnormal endometrium occupies the majority of the uterus. Right Ovary: Measures 2.0 x 1.0 x 1.1 cm and appears unremarkable Right Ovary Blood Flow: Present. Left Ovary: Not seen. Free Fluid: No significant free fluid.There are no suspicious adnexal masses.     2/10/21:  Hysteroscopy, D&C                 Pathology:  Grade 1 endometrial adenocarcinoma, MMR intact, p53 wild type, ER/KY +     21:  Total laparoscopic hysterectomy, bilateral salpingo-oophorectomy, bilateral sentinel lymph node dissection, cystoscopy                 Pathology:  Stage II, grade 1 endometrial adenocarcinoma, tumor size 5.6 cm, 1/13 mm myometrial invasion, +CASEY, +  cervix, 0/2 sentinel LN, no LVSI     4/26/21:  Completed brachytherapy to 3000cGy in 5 Fx to the vagina              Today Sarah comes to the clinic overall feeling well without any gynecological concerns. She is using a dilator once per week without spotting or pain. She is not sexually active. She denies any vaginal bleeding, no changes in her bowel or bladder habits, no nausea/emesis, no lower extremity edema, and no difficulties eating or sleeping. She denies any abdominal discomfort/bloating, no fevers or chills, and no chest pain or shortness of breath.  Previous RLQ abdominal has resolved. New hip arthritis that she will work with PT for along with taking Celebrex to control discomfort. Continues Peloton workouts.       Health Maintenance  Colonoscopy- 12/2022; bx negative   Mammogram- 10/2022 negative   Annual physical- 10/2022      Review of Systems:    Systemic           no weight changes; no fever; no chills; no night sweats; no appetite changes, +mild hot flashes   Skin           no rashes, or lesions  Eye           no irritation; no changes in vision  Alise-Laryngeal           no dysphagia; no hoarseness   Pulmonary    no cough; no shortness of breath  Cardiovascular    no chest pain; no palpitations  Gastrointestinal    no diarrhea; no constipation; no abdominal pain; no changes in bowel  habits; no blood in stool  Genitourinary   no urinary frequency; no urinary urgency; no dysuria; no pain; no abnormal vaginal discharge; no abnormal vaginal bleeding  Breast    no breast discharge; no breast changes; no breast pain  Musculoskeletal    no myalgias; no arthralgias; no back pain  Psychiatric           no depressed mood; no anxiety    Hematologic           no tender lymph nodes; no noticeable swellings or lumps   Endocrine    no hot flashes; no heat/cold intolerance         Neurological   no tremor; no numbness and tingling; no headaches; no difficulty  sleeping      Past Medical History:    Past Medical  History:   Diagnosis Date     Acute deep vein thrombosis (DVT) of left lower extremity (H)      Asthma      Atypical ductal hyperplasia of right breast 10/2016    fibrocystic changes, PASH and atypical ductal hyperplasia on needle biopsy, s/p R excisional biopsy with radioactive seed localization     Endometrial cancer (H) 02/15/2021     Factor 5 Leiden mutation, heterozygous (H)     factor 5     Familial adenomatous polyposis 1988    clinical presentation; s/p total abdominal colectomy with TRACEY; confirmed APC+ 2018     GERD (gastroesophageal reflux disease)      Infection due to 2019 novel coronavirus 11/02/2022     Migraine      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    [id number]         Past Surgical History:    Past Surgical History:   Procedure Laterality Date     BIOPSY BREAST SEED LOCALIZATION Right 11/30/2016    Procedure: BIOPSY BREAST SEED LOCALIZATION;  Surgeon: Jessica Rasheed MD;  Location: [facility] OR     COLECTOMY      subtotal     COMBINED ESOPHAGOSCOPY, GASTROSCOPY, DUODENOSCOPY (EGD) WITH ARGON PLASMA COAGULATOR (APC) N/A 4/12/2018    Procedure: COMBINED ESOPHAGOSCOPY, GASTROSCOPY, DUODENOSCOPY (EGD) WITH ARGON PLASMA COAGULATOR (APC);  Esophagogastroduodenoscopy with polypectomy and polyp ablation;  Surgeon: Stephen Gasca MD;  Location: [facility]     DILATION AND CURETTAGE, HYSTEROSCOPY DIAGNOSTIC, COMBINED N/A 2/10/2021    Procedure: HYSTEROSCOPY, DIAGNOSTIC, WITH DILATION AND CURETTAGE OF UTERUS;  Surgeon: Sandie Samaniego MD;  [place] [facility] [place]     ENDOSCOPIC RETROGRADE CHOLANGIOPANCREATOGRAM N/A 9/23/2021    Procedure: ENDOSCOPIC RETROGRADE CHOLANGIOPANCREATOGRAPHY WITH PANCREATIC STENT PLACEMENT AND POLYP ABLATION with Argon Plasma Coagulation;  Surgeon: Shahab Fernández MD;  Location: [facility]     ENDOSCOPIC RETROGRADE CHOLANGIOPANCREATOGRAM N/A 12/1/2022    Procedure: diagnostic ENDOSCOPIC RETROGRADE CHOLANGIOPANCREATOGRAPHY, ablation of duodenal polyps, biopsy  of gastric polyp;  Surgeon: Shahab Fernández MD;  Location: [facility]     ENDOSCOPIC RETROGRADE CHOLANGIOPANCREATOGRAPHY       ENDOSCOPIC ULTRASOUND UPPER GASTROINTESTINAL TRACT (GI) N/A 7/25/2019    Procedure: Endoscopic Ultrasound;  Surgeon: Stephen Gasca MD;  [facility]     ESOPHAGOSCOPY, GASTROSCOPY, DUODENOSCOPY (EGD), COMBINED  11/5/2012    Procedure: COMBINED ESOPHAGOSCOPY, GASTROSCOPY, DUODENOSCOPY (EGD), BIOPSY SINGLE OR MULTIPLE;;  Surgeon: Angélica Garcia MD;  Location: [facility]     ESOPHAGOSCOPY, GASTROSCOPY, DUODENOSCOPY (EGD), COMBINED  12/3/2013    Procedure: COMBINED ESOPHAGOSCOPY, GASTROSCOPY, DUODENOSCOPY (EGD);;  Surgeon: Angélica Garcia MD;  [facility]     ESOPHAGOSCOPY, GASTROSCOPY, DUODENOSCOPY (EGD), COMBINED N/A 3/28/2018    Procedure: COMBINED ESOPHAGOSCOPY, GASTROSCOPY, DUODENOSCOPY (EGD);;  Surgeon: Stephen Gasca MD;  [facility]     ESOPHAGOSCOPY, GASTROSCOPY, DUODENOSCOPY (EGD), COMBINED N/A 5/30/2019    Procedure: Esophagogastroduodenoscopy with biopsy x2 APC;  Surgeon: Stephen Gasca MD;  [facility]     ESOPHAGOSCOPY, GASTROSCOPY, DUODENOSCOPY (EGD), COMBINED N/A 7/30/2020    Procedure: ESOPHAGOGASTRODUODENOSCOPY (EGD) with polypectomy by hot snare; argonplasma coaglution ablation of polyps;  Surgeon: Stephen Gasca MD;  Location[facility]     ESOPHAGOSCOPY, GASTROSCOPY, DUODENOSCOPY (EGD), RESECT MUCOSA, COMBINED N/A 7/25/2019    Procedure: Esophagogastroduodenoscopy with gastric mucosal resection and clip application;  Surgeon: Stephen Gasca MD;  [facility]     EXCISE POLYP RECTUM       LAPAROSCOPIC HYSTERECTOMY TOTAL, BILATERAL SALPINGO-OOPHORECTOMY, NODE DISSECTION, COMBINED Bilateral 2/25/2021    Procedure: Laparoscopic removal of uterus, cervix, both ovaries and fallopian tubes, sentinel lymph node dissection, cystoscopy;  Surgeon: Sandie Samaniego MD;  Location[facility]     LAPAROSCOPIC LYSIS ADHESIONS    "    SIGMOIDOSCOPY FLEXIBLE       SIGMOIDOSCOPY FLEXIBLE N/A 12/7/2015    Procedure: SIGMOIDOSCOPY FLEXIBLE;  Surgeon: Mariela Bearden MD;  [facility]     SIGMOIDOSCOPY FLEXIBLE N/A 3/28/2018    Procedure: SIGMOIDOSCOPY FLEXIBLE;  EGD/Flex Sig;  Surgeon: Stephen Gasca MD;  [facility]     SIGMOIDOSCOPY FLEXIBLE N/A 4/12/2018    Procedure: SIGMOIDOSCOPY FLEXIBLE;  Flexible Sigmoidoscopy with polyp ablation;  Surgeon: Stephen Gasca MD;  [facility]     SIGMOIDOSCOPY FLEXIBLE N/A 5/30/2019    Procedure: Flexible Sigmoidoscopy with polypectomy;  Surgeon: Stephen Gasca MD;  [facility]     SIGMOIDOSCOPY FLEXIBLE N/A 7/30/2020    Procedure: SIGMOIDOSCOPY, FLEXIBLE with Ablation of Polyps;  Surgeon: Stephen Gasca MD;  [facility]     SIGMOIDOSCOPY FLEXIBLE N/A 9/23/2021    Procedure: FLEXIBLE SIGMOIDOSCOPY WITH POLYPECTOMY;  Surgeon: Stephen Gasca MD;  [facility]     SIGMOIDOSCOPY FLEXIBLE N/A 12/1/2022    Procedure: SIGMOIDOSCOPY, FLEXIBLE with polypectomy;  Surgeon: Stephen Gasca MD;  [facility]     ZZC LAP,SURG,COLECTOMY,W/ANAST      for colon cancer         Health Maintenance Due   Topic Date Due     ASTHMA ACTION PLAN  Never done     ASTHMA CONTROL TEST  Never done     ADVANCE CARE PLANNING  Never done     COVID-19 Vaccine (3 - Booster for Pfizer series) 06/05/2021     PHQ-2 (once per calendar year)  01/01/2022     COLORECTAL CANCER SCREENING  12/02/2022       Current Medications:     Current Outpatient Medications   Medication Sig Dispense Refill     acetaminophen (TYLENOL) 325 MG tablet Take 2 tablets (650 mg) by mouth every 6 hours as needed for mild pain 30 tablet 0     albuterol 90 MCG/ACT inhaler Inhale 2 puffs into the lungs every 6 hours as needed.       Apple Cider Vinegar 500 MG TABS Take 500 mg by mouth       ASHWAGANDHA PO Take 150 mg by mouth One gummy per day       bisacodyl (DULCOLAX) 5 MG EC tablet Refer to \"Getting Ready " "for a Colonoscopy\" instruction handout 4 tablet 0     calcium-magnesium (CALMAG) 500-250 MG TABS Take 1 tablet by mouth daily       celecoxib (CELEBREX) 100 MG capsule Take 1 capsule (100 mg) by mouth daily 90 capsule 3     Cranberry 500 MG CAPS Take 1 capsule by mouth daily       docusate sodium (COLACE) 100 MG capsule Take 100 mg by mouth daily       fexofenadine (ALLEGRA) 180 MG tablet Take 180 mg by mouth daily.       Lactobacillus (PROBIOTIC ACIDOPHILUS PO) Take 100 mg by mouth daily       MELATONIN PO Take 2 mg by mouth nightly as needed        multivitamin, therapeutic with minerals (THERA-VIT-M) TABS Take 3 tablets by mouth 2 times daily        Omega-3 Fatty Acids (OMEGA-3 FISH OIL PO) Take 2 g by mouth daily        ondansetron (ZOFRAN) 4 MG tablet Take 1 tablet (4 mg) by mouth every 8 hours as needed for nausea 60 tablet 1     triamcinolone (NASACORT) 55 MCG/ACT Inhaler Spray 2 sprays into both nostrils daily       ZOLMitriptan (ZOMIG) 5 MG tablet Take 1 tablet (5 mg) by mouth at onset of headache for migraine 90 tablet 3         Allergies:        Allergies   Allergen Reactions     Corn-Related Products Shortness Of Breath     Avoids food; ok in tiny doses, such as in medicines  Avoids food; ok in tiny doses, such as in medicines     Cipro [Ciprofloxacin]      Reddened skin     Flagyl [Metronidazole] Nausea and Vomiting     Garlic Fatigue     GI distress     Keflex [Cephalosporins] Nausea and Vomiting     Omeprazole      PN: headache     Prilosec Otc [Omeprazole Magnesium]      migraines     Cephalexin Rash     PN: Rash, Generalized     Doxycycline Rash     blisters     Erythromycin Rash     Sulfa Drugs Rash     Ok to use         Social History:     Social History     Tobacco Use     Smoking status: Never     Smokeless tobacco: Never   Substance Use Topics     Alcohol use: Not Currently     Comment: 1 or 2 drinks per year       History   Drug Use No         Family History:     The patient's family history " is notable for     Family History   Problem Relation Age of Onset     Hyperlipidemia Mother      Cerebrovascular Disease Mother      Cerebrovascular Disease Father      Colon Cancer Father 57         at 63     Prostate Cancer Brother 53        recurrence at 65     Prostate Cancer Maternal Uncle 65         Physical Exam:     /89 (BP Location: Right arm, Patient Position: Sitting, Cuff Size: Adult Regular)   Pulse 56   Temp 97.5  F (36.4  C) (Oral)   Resp 20   Wt 66.9 kg (147 lb 6.4 oz)   LMP 2019   SpO2 100%   BMI 26.53 kg/m    Body mass index is 26.53 kg/m .    General Appearance: healthy and alert, no distress     HEENT: no thyromegaly, no palpable nodules or masses        Cardiovascular: regular rate and rhythm, no gallops, rubs or murmurs     Respiratory: lungs clear, no rales, rhonchi or wheezes    Musculoskeletal: extremities non tender and without edema    Skin: no lesions or rashes     Neurological: normal gait, no gross defects     Psychiatric: appropriate mood and affect                               Hematological: normal cervical, supraclavicular and inguinal lymph nodes     Gastrointestinal:       abdomen soft, non-tender, non-distended, no organomegaly or masses; laparoscopic incisions well healed.     Genitourinary: External genitalia and urethral meatus appears normal.  Vagina is smooth without nodularity or masses. Cervix surgically absent.  Bimanual exam reveal no masses, nodularity or fullness.  Recto-vaginal exam confirms these findings. Small external hemorrhoid soft.       Assessment:    Sarah Duran is a 57 year old woman with a diagnosis of stage II, grade 1 endometrial adenocarcinoma. She is here today for an acute visit.     20 minutes spent on the date of the encounter doing chart review, history and exam, documentation, and further activities as noted above.        Plan:     1.)        Stage II, grade 1 endometrial adenocarcinoma. HAROON on exam. Reviewed signs and  symptoms for when she should contact the clinic or seek additional care.  Discussed signs and symptoms of recurrence and to call if these should occur. Patient to contact the clinic with any questions or concerns in the interim. Discussed no need for further pap smear testing. Discussed visits every 3 months for the first two years (5/23) then every 6 months for up to 5 years (5/26) then annually thereafter. Continue to use dilator for 3-5 minutes once per week.     2.) Genetic risk factors were assessed and the patient is POSITIVE for a APC mutation.  Specifically her mutation is called [id number],  consistent with a diagnosis of familial adenomatous polyposis (FAP) or attenuated FAP (AFAP). She follows with Clarissa Brown with the Oncology Risk Management clinic.     3.) Labs and/or tests ordered include: none.     4.) Health maintenance issues addressed today include annual health maintenance and non-gynecologic issues with PCP.          REYNOLD Sorto, NP-BC  Women's Health Nurse Practitioner  Division of Gynecologic Oncology  [facility]      CC  Patient Care Team:  Ida Sandoval MD as PCP - General (Family Practice)  Stephen Gasca MD as MD (Gastroenterology)  Treasure Cleveland GC as Genetic Counselor (Genetic )  Pancho Samson MD as MD (Colon and Rectal Surgery)  Ciara Garcia APRN CNP as Assigned Cancer Care Provider      Oncology Rooming Note    December 6, 2022 2:55 PM   Sarah Duran is a 57 year old female who presents for:    Chief Complaint   Patient presents with     Oncology Clinic Visit     Endometrial cancer (H)       Initial Vitals: /89 (BP Location: Right arm, Patient Position: Sitting, Cuff Size: Adult Regular)   Pulse 56   Temp 97.5  F (36.4  C) (Oral)   Resp 20   Wt 66.9 kg (147 lb 6.4 oz)   LMP 05/01/2019   SpO2 100%   BMI 26.53 kg/m   Estimated body mass index is 26.53 kg/m  as calculated from the  "following:    Height as of 12/1/22: 1.588 m (5' 2.5\").    Weight as of this encounter: 66.9 kg (147 lb 6.4 oz). Body surface area is 1.72 meters squared.  No Pain (0) Comment: Data Unavailable   Patient's last menstrual period was 05/01/2019.  Allergies reviewed: Yes  Medications reviewed: Yes    Medications: Medication refills not needed today.  Pharmacy name entered into [facility]:    [facility] [place], [place] - [address]  [facility] [place], [place] - [address] [place] [address]  [facility] [place] [address] [facility] [place] [address]  [place] [address]  [facility] [place], [place] - [address] [facility] [place], [place] - [address] [facility] [phone]    Clinical concerns: NO     Ema Delgado CMA                  Again, thank you for allowing me to participate in the care of your patient.        Sincerely,        REYNOLD Moore CNP    "

## 2023-03-09 NOTE — PROGRESS NOTES
Follow Up Notes on Referred Patient    Date: 3/10/2023       RE: Sarah Duran  : 1965  ANDREI: 3/10/2023        Sarah Duran is a 57 year old woman with a diagnosis of stage II, grade 1 endometrial adenocarcinoma. She is here today for follow up and surveillance.     Cancer Course:     She reports she began having daily bleeding in .  She notes she had previously gone 15 months with no bleeding.  She reports the bleeding is sometimes heavy like a period but then can also be somewhat light and only like spotting.  She had an attempted EMB with her primary gynecologist, however this was unsuccessful.  She is here today for a second opinion.     21:  US Pelvis:  Uterus: Measures 8.9 x 5.8 x 6.4 cm. The endometrium is thickened at 2.9 cm. The endometrium is heterogeneous in appearance with an irregular, nodular contour of the endometrial surface. There is hypoechoic complex fluid in the endometrial cavity likely containing blood. There is vascularity of the endometrium on color imaging. The abnormal endometrium occupies the majority of the uterus. Right Ovary: Measures 2.0 x 1.0 x 1.1 cm and appears unremarkable Right Ovary Blood Flow: Present. Left Ovary: Not seen. Free Fluid: No significant free fluid.There are no suspicious adnexal masses.     2/10/21:  Hysteroscopy, D&C                 Pathology:  Grade 1 endometrial adenocarcinoma, MMR intact, p53 wild type, ER/DC +     21:  Total laparoscopic hysterectomy, bilateral salpingo-oophorectomy, bilateral sentinel lymph node dissection, cystoscopy                 Pathology:  Stage II, grade 1 endometrial adenocarcinoma, tumor size 5.6 cm, 1/13 mm myometrial invasion, +CASEY, + cervix, 0/2 sentinel LN, no LVSI     21:  Completed brachytherapy to 3000cGy in 5 Fx to the vagina          Today Sarah comes to the clinic overall feeling well without any gynecological concerns. She is using a dilator once per week without  spotting or pain. One episode of right lower abdominal cramping that resolved quickly.     She denies any vaginal bleeding, no changes in her bowel or bladder habits, no nausea/emesis, no lower extremity edema, and no difficulties eating or sleeping. She denies any abdominal discomfort/bloating, no fevers or chills, and no chest pain or shortness of breath.     Hip arthritis continues that she will work with PT for along with taking Celebrex to control discomfort. Trying out dry needling with PT which is helping her hip pain. Continues Peloton workouts.         Health Maintenance  Colonoscopy- 12/2022; bx negative   Mammogram- 10/2022 negative   Annual physical- 10/2022      Review of Systems:    Systemic           no weight changes; no fever; no chills; no night sweats; no appetite changes, +mild hot flashes   Skin           no rashes, or lesions  Eye           no irritation; no changes in vision  Alise-Laryngeal           no dysphagia; no hoarseness   Pulmonary    no cough; no shortness of breath  Cardiovascular    no chest pain; no palpitations  Gastrointestinal    no diarrhea; no constipation; no abdominal pain; no changes in bowel  habits; no blood in stool  Genitourinary   no urinary frequency; no urinary urgency; no dysuria; no pain; no abnormal vaginal discharge; no abnormal vaginal bleeding  Breast    no breast discharge; no breast changes; no breast pain  Musculoskeletal    no myalgias; no arthralgias; no back pain  Psychiatric           no depressed mood; no anxiety    Hematologic           no tender lymph nodes; no noticeable swellings or lumps   Endocrine    no hot flashes; no heat/cold intolerance         Neurological   no tremor; no numbness and tingling; no headaches; no difficulty  sleeping      Past Medical History:    Past Medical History:   Diagnosis Date     Acute deep vein thrombosis (DVT) of left lower extremity (H)      Asthma      Atypical ductal hyperplasia of right breast 10/2016     fibrocystic changes, PASH and atypical ductal hyperplasia on needle biopsy, s/p R excisional biopsy with radioactive seed localization     Endometrial cancer (H) 02/15/2021     Factor 5 Leiden mutation, heterozygous (H)     factor 5     Familial adenomatous polyposis 1988    clinical presentation; s/p total abdominal colectomy with TRACEY; confirmed APC+ 2018     GERD (gastroesophageal reflux disease)      Infection due to 2019 novel coronavirus 11/02/2022     Migraine      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    [id number]         Past Surgical History:    Past Surgical History:   Procedure Laterality Date     BIOPSY BREAST SEED LOCALIZATION Right 11/30/2016    Procedure: BIOPSY BREAST SEED LOCALIZATION;  Surgeon: Jessica Rasheed MD;  Location: [facility] OR     COLECTOMY      subtotal     COMBINED ESOPHAGOSCOPY, GASTROSCOPY, DUODENOSCOPY (EGD) WITH ARGON PLASMA COAGULATOR (APC) N/A 4/12/2018    Procedure: COMBINED ESOPHAGOSCOPY, GASTROSCOPY, DUODENOSCOPY (EGD) WITH ARGON PLASMA COAGULATOR (APC);  Esophagogastroduodenoscopy with polypectomy and polyp ablation;  Surgeon: Stephen Gasca MD;  Location: [facility] OR     DILATION AND CURETTAGE, HYSTEROSCOPY DIAGNOSTIC, COMBINED N/A 2/10/2021    Procedure: HYSTEROSCOPY, DIAGNOSTIC, WITH DILATION AND CURETTAGE OF UTERUS;  Surgeon: Sandie Samaniego MD;  [place] [facility] [place]     ENDOSCOPIC RETROGRADE CHOLANGIOPANCREATOGRAM N/A 9/23/2021    Procedure: ENDOSCOPIC RETROGRADE CHOLANGIOPANCREATOGRAPHY WITH PANCREATIC STENT PLACEMENT AND POLYP ABLATION with Argon Plasma Coagulation;  Surgeon: Shahab Fernández MD;  Location: [facility] OR     ENDOSCOPIC RETROGRADE CHOLANGIOPANCREATOGRAM N/A 12/1/2022    Procedure: diagnostic ENDOSCOPIC RETROGRADE CHOLANGIOPANCREATOGRAPHY, ablation of duodenal polyps, biopsy of gastric polyp;  Surgeon: Shahab Fernández MD;  Location: [facility] OR     ENDOSCOPIC RETROGRADE CHOLANGIOPANCREATOGRAPHY       ENDOSCOPIC ULTRASOUND UPPER  GASTROINTESTINAL TRACT (GI) N/A 7/25/2019    Procedure: Endoscopic Ultrasound;  Surgeon: Stephen Gacsa MD;  Location: [facility]     ESOPHAGOSCOPY, GASTROSCOPY, DUODENOSCOPY (EGD), COMBINED  11/5/2012    Procedure: COMBINED ESOPHAGOSCOPY, GASTROSCOPY, DUODENOSCOPY (EGD), BIOPSY SINGLE OR MULTIPLE;;  Surgeon: Angélica Garcia MD;  Location: [facility]     ESOPHAGOSCOPY, GASTROSCOPY, DUODENOSCOPY (EGD), COMBINED  12/3/2013    Procedure: COMBINED ESOPHAGOSCOPY, GASTROSCOPY, DUODENOSCOPY (EGD);;  Surgeon: Angélica Garcia MD;  Location: [facility]     ESOPHAGOSCOPY, GASTROSCOPY, DUODENOSCOPY (EGD), COMBINED N/A 3/28/2018    Procedure: COMBINED ESOPHAGOSCOPY, GASTROSCOPY, DUODENOSCOPY (EGD);;  Surgeon: Stephen Gasca MD;  Location: [facility]     ESOPHAGOSCOPY, GASTROSCOPY, DUODENOSCOPY (EGD), COMBINED N/A 5/30/2019    Procedure: Esophagogastroduodenoscopy with biopsy x2 APC;  Surgeon: Stephen Gasca MD;  Location: [facility]     ESOPHAGOSCOPY, GASTROSCOPY, DUODENOSCOPY (EGD), COMBINED N/A 7/30/2020    Procedure: ESOPHAGOGASTRODUODENOSCOPY (EGD) with polypectomy by hot snare; argonplasma coaglution ablation of polyps;  Surgeon: Stephen Gasca MD;  Location: [facility]     ESOPHAGOSCOPY, GASTROSCOPY, DUODENOSCOPY (EGD), RESECT MUCOSA, COMBINED N/A 7/25/2019    Procedure: Esophagogastroduodenoscopy with gastric mucosal resection and clip application;  Surgeon: Stephen Gasca MD;  Location: [facility]     EXCISE POLYP RECTUM       LAPAROSCOPIC HYSTERECTOMY TOTAL, BILATERAL SALPINGO-OOPHORECTOMY, NODE DISSECTION, COMBINED Bilateral 2/25/2021    Procedure: Laparoscopic removal of uterus, cervix, both ovaries and fallopian tubes, sentinel lymph node dissection, cystoscopy;  Surgeon: Sandie Samaniego MD;  Location: [facility]     LAPAROSCOPIC LYSIS ADHESIONS       SIGMOIDOSCOPY FLEXIBLE       SIGMOIDOSCOPY FLEXIBLE N/A 12/7/2015    Procedure: SIGMOIDOSCOPY FLEXIBLE;  Surgeon: Mariela Bearden MD;  Location:  GI  "    SIGMOIDOSCOPY FLEXIBLE N/A 3/28/2018    Procedure: SIGMOIDOSCOPY FLEXIBLE;  EGD/Flex Sig;  Surgeon: Stephen Gasca MD;  Location: [facility]     SIGMOIDOSCOPY FLEXIBLE N/A 4/12/2018    Procedure: SIGMOIDOSCOPY FLEXIBLE;  Flexible Sigmoidoscopy with polyp ablation;  Surgeon: Stephen Gasca MD;  [facility]     SIGMOIDOSCOPY FLEXIBLE N/A 5/30/2019    Procedure: Flexible Sigmoidoscopy with polypectomy;  Surgeon: Stephen Gasca MD;  [facility]: [facility]     SIGMOIDOSCOPY FLEXIBLE N/A 7/30/2020    Procedure: SIGMOIDOSCOPY, FLEXIBLE with Ablation of Polyps;  Surgeon: Stephen Gasca MD;  [facility]     SIGMOIDOSCOPY FLEXIBLE N/A 9/23/2021    Procedure: FLEXIBLE SIGMOIDOSCOPY WITH POLYPECTOMY;  Surgeon: Stephen Gasca MD;  [facility]     SIGMOIDOSCOPY FLEXIBLE N/A 12/1/2022    Procedure: SIGMOIDOSCOPY, FLEXIBLE with polypectomy;  Surgeon: Stephen Gasca MD;  Location: [facility]     [facility] LAP,SURG,COLECTOMY,W/ANAST      for colon cancer         Health Maintenance Due   Topic Date Due     ASTHMA ACTION PLAN  Never done     ASTHMA CONTROL TEST  Never done     ADVANCE CARE PLANNING  Never done     COVID-19 Vaccine (3 - Booster for Pfizer series) 06/05/2021     COLORECTAL CANCER SCREENING  12/02/2022     PHQ-2 (once per calendar year)  01/01/2023       Current Medications:     Current Outpatient Medications   Medication Sig Dispense Refill     acetaminophen (TYLENOL) 325 MG tablet Take 2 tablets (650 mg) by mouth every 6 hours as needed for mild pain 30 tablet 0     albuterol 90 MCG/ACT inhaler Inhale 2 puffs into the lungs every 6 hours as needed.       Apple Cider Vinegar 500 MG TABS Take 500 mg by mouth       ASHWAGANDHA PO Take 150 mg by mouth One gummy per day       bisacodyl (DULCOLAX) 5 MG EC tablet Refer to \"Getting Ready for a Colonoscopy\" instruction handout 4 tablet 0     calcium-magnesium (CALMAG) 500-250 MG TABS Take 1 tablet by mouth daily       celecoxib (CELEBREX) 100 " MG capsule Take 1 capsule (100 mg) by mouth daily 90 capsule 3     Cranberry 500 MG CAPS Take 1 capsule by mouth daily       docusate sodium (COLACE) 100 MG capsule Take 100 mg by mouth daily       fexofenadine (ALLEGRA) 180 MG tablet Take 180 mg by mouth daily.       Lactobacillus (PROBIOTIC ACIDOPHILUS PO) Take 100 mg by mouth daily       MELATONIN PO Take 2 mg by mouth nightly as needed        multivitamin, therapeutic with minerals (THERA-VIT-M) TABS Take 3 tablets by mouth 2 times daily        Omega-3 Fatty Acids (OMEGA-3 FISH OIL PO) Take 2 g by mouth daily        ondansetron (ZOFRAN) 4 MG tablet Take 1 tablet (4 mg) by mouth every 8 hours as needed for nausea 60 tablet 1     triamcinolone (NASACORT) 55 MCG/ACT Inhaler Spray 2 sprays into both nostrils daily       ZOLMitriptan (ZOMIG) 5 MG tablet Take 1 tablet (5 mg) by mouth at onset of headache for migraine 90 tablet 3         Allergies:        Allergies   Allergen Reactions     Corn-Related Products Shortness Of Breath     Avoids food; ok in tiny doses, such as in medicines  Avoids food; ok in tiny doses, such as in medicines     Cipro [Ciprofloxacin]      Reddened skin     Flagyl [Metronidazole] Nausea and Vomiting     Garlic Fatigue     GI distress     Keflex [Cephalosporins] Nausea and Vomiting     Omeprazole      PN: headache     Prilosec Otc [Omeprazole Magnesium]      migraines     Cephalexin Rash     PN: Rash, Generalized     Doxycycline Rash     blisters     Erythromycin Rash     Sulfa Drugs Rash     Ok to use         Social History:     Social History     Tobacco Use     Smoking status: Never     Smokeless tobacco: Never   Substance Use Topics     Alcohol use: Not Currently     Comment: 1 or 2 drinks per year       History   Drug Use No         Family History:     The patient's family history is notable for     Family History   Problem Relation Age of Onset     Hyperlipidemia Mother      Cerebrovascular Disease Mother      Cerebrovascular Disease  Father      Colon Cancer Father 57         at 63     Prostate Cancer Brother 53        recurrence at 65     Prostate Cancer Maternal Uncle 65         Physical Exam:     /81   Pulse 61   Resp 16   Wt 66.7 kg (147 lb)   LMP 2019   SpO2 100%   BMI 26.46 kg/m    Body mass index is 26.46 kg/m .    General Appearance: healthy and alert, no distress     HEENT: no thyromegaly, no palpable nodules or masses        Cardiovascular: regular rate and rhythm, no gallops, rubs or murmurs     Respiratory: lungs clear, no rales, rhonchi or wheezes    Musculoskeletal: extremities non tender and without edema    Skin: no lesions or rashes     Neurological: normal gait, no gross defects     Psychiatric: appropriate mood and affect                               Hematological: normal cervical, supraclavicular and inguinal lymph nodes     Gastrointestinal:       abdomen soft, non-tender, non-distended, no organomegaly or masses; laparoscopic incisions well healed.     Genitourinary: External genitalia and urethral meatus appears normal.  Vagina is smooth without nodularity or masses. Cervix surgically absent.  Bimanual exam reveal no masses, nodularity or fullness.  Recto-vaginal exam confirms these findings.       Assessment:    Sarah Duran is a 57 year old woman with a diagnosis of stage II, grade 1 endometrial adenocarcinoma. She is here today for an acute visit.     20 minutes spent on the date of the encounter doing chart review, history and exam, documentation, and further activities as noted above.        Plan:     1.)        Stage II, grade 1 endometrial adenocarcinoma. HAROON on exam. One episode of RLQ abdominal cramping reviewed with pt that this could be related to surgical adhesions or bowels. Pt will watch for patterns and let us know if her pain increases or recurs at an increased frequency. Reviewed signs and symptoms for when she should contact the clinic or seek additional care.  Discussed signs and  symptoms of recurrence and to call if these should occur. Patient to contact the clinic with any questions or concerns in the interim. Discussed no need for further pap smear testing. Discussed visits every 3 months for the first two years (5/23) then every 6 months for up to 5 years (5/26) then annually thereafter. Continue to use dilator for 3-5 minutes once per week.     2.) Genetic risk factors were assessed and the patient is POSITIVE for a APC mutation.  Specifically her mutation is called [id number],  consistent with a diagnosis of familial adenomatous polyposis (FAP) or attenuated FAP (AFAP). She follows with Clarissa Brown with the [facility].     3.) Labs and/or tests ordered include: none.     4.) Health maintenance issues addressed today include annual health maintenance and non-gynecologic issues with PCP.          REYNOLD Sorto, NP-BC  Women's Health Nurse Practitioner  Division of Gynecologic Oncology  [facility]      CC  Patient Care Team:  Ida Sandoval MD as PCP - General (Family Practice)  Stephen Gasca MD as MD (Gastroenterology)  Treasure [place] GC as Genetic Counselor (Genetic )  Pancho Samson MD as MD (Colon and Rectal Surgery)  Ciara Garcia APRN CNP as Assigned Cancer Care Provider

## 2023-03-10 ENCOUNTER — ONCOLOGY VISIT (OUTPATIENT)
Dept: ONCOLOGY | Facility: CLINIC | Age: 58
End: 2023-03-10
Attending: OBSTETRICS & GYNECOLOGY
Payer: COMMERCIAL

## 2023-03-10 VITALS
BODY MASS INDEX: 26.46 KG/M2 | DIASTOLIC BLOOD PRESSURE: 81 MMHG | RESPIRATION RATE: 16 BRPM | WEIGHT: 147 LBS | HEART RATE: 61 BPM | OXYGEN SATURATION: 100 % | SYSTOLIC BLOOD PRESSURE: 123 MMHG

## 2023-03-10 DIAGNOSIS — C54.1 ENDOMETRIAL CANCER (H): Primary | ICD-10-CM

## 2023-03-10 PROCEDURE — 99213 OFFICE O/P EST LOW 20 MIN: CPT | Performed by: OBSTETRICS & GYNECOLOGY

## 2023-03-10 PROCEDURE — 99211 OFF/OP EST MAY X REQ PHY/QHP: CPT | Performed by: OBSTETRICS & GYNECOLOGY

## 2023-03-10 ASSESSMENT — PAIN SCALES - GENERAL: PAINLEVEL: NO PAIN (0)

## 2023-03-10 NOTE — LETTER
3/10/2023         RE: Sarah Duran  [address] [facility] [address]        Dear Colleague,    Thank you for referring your patient, Sarah Duran, to the [facility]. Please see a copy of my visit note below.                    Follow Up Notes on Referred Patient    Date: 3/10/2023       RE: Sarah Duran  : 1965  ANDREI: 3/10/2023        Sarah Duran is a 57 year old woman with a diagnosis of stage II, grade 1 endometrial adenocarcinoma. She is here today for follow up and surveillance.     Cancer Course:     She reports she began having daily bleeding in .  She notes she had previously gone 15 months with no bleeding.  She reports the bleeding is sometimes heavy like a period but then can also be somewhat light and only like spotting.  She had an attempted EMB with her primary gynecologist, however this was unsuccessful.  She is here today for a second opinion.     21:  US Pelvis:  Uterus: Measures 8.9 x 5.8 x 6.4 cm. The endometrium is thickened at 2.9 cm. The endometrium is heterogeneous in appearance with an irregular, nodular contour of the endometrial surface. There is hypoechoic complex fluid in the endometrial cavity likely containing blood. There is vascularity of the endometrium on color imaging. The abnormal endometrium occupies the majority of the uterus. Right Ovary: Measures 2.0 x 1.0 x 1.1 cm and appears unremarkable Right Ovary Blood Flow: Present. Left Ovary: Not seen. Free Fluid: No significant free fluid.There are no suspicious adnexal masses.     2/10/21:  Hysteroscopy, D&C                 Pathology:  Grade 1 endometrial adenocarcinoma, MMR intact, p53 wild type, ER/FL +     21:  Total laparoscopic hysterectomy, bilateral salpingo-oophorectomy, bilateral sentinel lymph node dissection, cystoscopy                 Pathology:  Stage II, grade 1 endometrial adenocarcinoma, tumor size 5.6 cm, 1/13 mm myometrial invasion, +CASEY, +  cervix, 0/2 sentinel LN, no LVSI     4/26/21:  Completed brachytherapy to 3000cGy in 5 Fx to the vagina          Today Sarah comes to the clinic overall feeling well without any gynecological concerns. She is using a dilator once per week without spotting or pain. One episode of right lower abdominal cramping that resolved quickly.     She denies any vaginal bleeding, no changes in her bowel or bladder habits, no nausea/emesis, no lower extremity edema, and no difficulties eating or sleeping. She denies any abdominal discomfort/bloating, no fevers or chills, and no chest pain or shortness of breath.     Hip arthritis continues that she will work with PT for along with taking Celebrex to control discomfort. Trying out dry needling with PT which is helping her hip pain. Continues Peloton workouts.         Health Maintenance  Colonoscopy- 12/2022; bx negative   Mammogram- 10/2022 negative   Annual physical- 10/2022      Review of Systems:    Systemic           no weight changes; no fever; no chills; no night sweats; no appetite changes, +mild hot flashes   Skin           no rashes, or lesions  Eye           no irritation; no changes in vision  Alise-Laryngeal           no dysphagia; no hoarseness   Pulmonary    no cough; no shortness of breath  Cardiovascular    no chest pain; no palpitations  Gastrointestinal    no diarrhea; no constipation; no abdominal pain; no changes in bowel  habits; no blood in stool  Genitourinary   no urinary frequency; no urinary urgency; no dysuria; no pain; no abnormal vaginal discharge; no abnormal vaginal bleeding  Breast    no breast discharge; no breast changes; no breast pain  Musculoskeletal    no myalgias; no arthralgias; no back pain  Psychiatric           no depressed mood; no anxiety    Hematologic           no tender lymph nodes; no noticeable swellings or lumps   Endocrine    no hot flashes; no heat/cold intolerance         Neurological   no tremor; no numbness and tingling; no  headaches; no difficulty  sleeping      Past Medical History:    Past Medical History:   Diagnosis Date     Acute deep vein thrombosis (DVT) of left lower extremity (H)      Asthma      Atypical ductal hyperplasia of right breast 10/2016    fibrocystic changes, PASH and atypical ductal hyperplasia on needle biopsy, s/p R excisional biopsy with radioactive seed localization     Endometrial cancer (H) 02/15/2021     Factor 5 Leiden mutation, heterozygous (H)     factor 5     Familial adenomatous polyposis 1988    clinical presentation; s/p total abdominal colectomy with TRACEY; confirmed APC+ 2018     GERD (gastroesophageal reflux disease)      Infection due to 2019 novel coronavirus 11/02/2022     Migraine      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    [id number]         Past Surgical History:    Past Surgical History:   Procedure Laterality Date     BIOPSY BREAST SEED LOCALIZATION Right 11/30/2016    Procedure: BIOPSY BREAST SEED LOCALIZATION;  Surgeon: Jessica Rasheed MD;  Location: [facility] OR     COLECTOMY      subtotal     COMBINED ESOPHAGOSCOPY, GASTROSCOPY, DUODENOSCOPY (EGD) WITH ARGON PLASMA COAGULATOR (APC) N/A 4/12/2018    Procedure: COMBINED ESOPHAGOSCOPY, GASTROSCOPY, DUODENOSCOPY (EGD) WITH ARGON PLASMA COAGULATOR (APC);  Esophagogastroduodenoscopy with polypectomy and polyp ablation;  Surgeon: Stephen Gasca MD;  Location: [facility] OR     DILATION AND CURETTAGE, HYSTEROSCOPY DIAGNOSTIC, COMBINED N/A 2/10/2021    Procedure: HYSTEROSCOPY, DIAGNOSTIC, WITH DILATION AND CURETTAGE OF UTERUS;  Surgeon: Sandie Samaniego MD;  Location: [facility]     ENDOSCOPIC RETROGRADE CHOLANGIOPANCREATOGRAM N/A 9/23/2021    Procedure: ENDOSCOPIC RETROGRADE CHOLANGIOPANCREATOGRAPHY WITH PANCREATIC STENT PLACEMENT AND POLYP ABLATION with Argon Plasma Coagulation;  Surgeon: Shahab Fernández MD;  Location: [facility] OR     ENDOSCOPIC RETROGRADE CHOLANGIOPANCREATOGRAM N/A 12/1/2022    Procedure: diagnostic  ENDOSCOPIC RETROGRADE CHOLANGIOPANCREATOGRAPHY, ablation of duodenal polyps, biopsy of gastric polyp;  Surgeon: Shahab Fernández MD;  Location: [facility] OR     ENDOSCOPIC RETROGRADE CHOLANGIOPANCREATOGRAPHY       ENDOSCOPIC ULTRASOUND UPPER GASTROINTESTINAL TRACT (GI) N/A 7/25/2019    Procedure: Endoscopic Ultrasound;  Surgeon: Stephen Gasca MD;  Location: [facility] OR     ESOPHAGOSCOPY, GASTROSCOPY, DUODENOSCOPY (EGD), COMBINED  11/5/2012    Procedure: COMBINED ESOPHAGOSCOPY, GASTROSCOPY, DUODENOSCOPY (EGD), BIOPSY SINGLE OR MULTIPLE;;  Surgeon: Angélica Garcia MD;  Location: [facility] GI     ESOPHAGOSCOPY, GASTROSCOPY, DUODENOSCOPY (EGD), COMBINED  12/3/2013    Procedure: COMBINED ESOPHAGOSCOPY, GASTROSCOPY, DUODENOSCOPY (EGD);;  Surgeon: Angélica Garcia MD;  [facility]     ESOPHAGOSCOPY, GASTROSCOPY, DUODENOSCOPY (EGD), COMBINED N/A 3/28/2018    Procedure: COMBINED ESOPHAGOSCOPY, GASTROSCOPY, DUODENOSCOPY (EGD);;  Surgeon: Stephen Gasca MD;  [facility]: [facility]     ESOPHAGOSCOPY, GASTROSCOPY, DUODENOSCOPY (EGD), COMBINED N/A 5/30/2019    Procedure: Esophagogastroduodenoscopy with biopsy x2 APC;  Surgeon: Stephen Gasca MD;  Location: [facility]     ESOPHAGOSCOPY, GASTROSCOPY, DUODENOSCOPY (EGD), COMBINED N/A 7/30/2020    Procedure: ESOPHAGOGASTRODUODENOSCOPY (EGD) with polypectomy by hot snare; argonplasma coaglution ablation of polyps;  Surgeon: Stephen Gasca MD;  Location: [facility]     ESOPHAGOSCOPY, GASTROSCOPY, DUODENOSCOPY (EGD), RESECT MUCOSA, COMBINED N/A 7/25/2019    Procedure: Esophagogastroduodenoscopy with gastric mucosal resection and clip application;  Surgeon: Stephen Gasca MD;  [facility]     EXCISE POLYP RECTUM       LAPAROSCOPIC HYSTERECTOMY TOTAL, BILATERAL SALPINGO-OOPHORECTOMY, NODE DISSECTION, COMBINED Bilateral 2/25/2021    Procedure: Laparoscopic removal of uterus, cervix, both ovaries and fallopian tubes, sentinel lymph node dissection, cystoscopy;   Surgeon: Sandie Samaniego MD;  Location: [facility]     LAPAROSCOPIC LYSIS ADHESIONS       SIGMOIDOSCOPY FLEXIBLE       SIGMOIDOSCOPY FLEXIBLE N/A 12/7/2015    Procedure: SIGMOIDOSCOPY FLEXIBLE;  Surgeon: Mariela Bearden MD;  Location: [facility]     SIGMOIDOSCOPY FLEXIBLE N/A 3/28/2018    Procedure: SIGMOIDOSCOPY FLEXIBLE;  EGD/Flex Sig;  Surgeon: Stephen Gasca MD;  [facility]     SIGMOIDOSCOPY FLEXIBLE N/A 4/12/2018    Procedure: SIGMOIDOSCOPY FLEXIBLE;  Flexible Sigmoidoscopy with polyp ablation;  Surgeon: Stephen Gasca MD;  [facility]     SIGMOIDOSCOPY FLEXIBLE N/A 5/30/2019    Procedure: Flexible Sigmoidoscopy with polypectomy;  Surgeon: Stephen Gasca MD;  [facility]     SIGMOIDOSCOPY FLEXIBLE N/A 7/30/2020    Procedure: SIGMOIDOSCOPY, FLEXIBLE with Ablation of Polyps;  Surgeon: Stephen Gasca MD;  [facility]     SIGMOIDOSCOPY FLEXIBLE N/A 9/23/2021    Procedure: FLEXIBLE SIGMOIDOSCOPY WITH POLYPECTOMY;  Surgeon: Stephen Gasca MD;  [facility]     SIGMOIDOSCOPY FLEXIBLE N/A 12/1/2022    Procedure: SIGMOIDOSCOPY, FLEXIBLE with polypectomy;  Surgeon: Stephen Gasca MD;  Location: [facility] OR     [facility] LAP,SURG,COLECTOMY,W/ANAST      for colon cancer         Health Maintenance Due   Topic Date Due     ASTHMA ACTION PLAN  Never done     ASTHMA CONTROL TEST  Never done     ADVANCE CARE PLANNING  Never done     COVID-19 Vaccine (3 - Booster for Pfizer series) 06/05/2021     COLORECTAL CANCER SCREENING  12/02/2022     PHQ-2 (once per calendar year)  01/01/2023       Current Medications:     Current Outpatient Medications   Medication Sig Dispense Refill     acetaminophen (TYLENOL) 325 MG tablet Take 2 tablets (650 mg) by mouth every 6 hours as needed for mild pain 30 tablet 0     albuterol 90 MCG/ACT inhaler Inhale 2 puffs into the lungs every 6 hours as needed.       Apple Cider Vinegar 500 MG TABS Take 500 mg by mouth       ASHWAGANDHA PO Take 150 mg by  "mouth One gummy per day       bisacodyl (DULCOLAX) 5 MG EC tablet Refer to \"Getting Ready for a Colonoscopy\" instruction handout 4 tablet 0     calcium-magnesium (CALMAG) 500-250 MG TABS Take 1 tablet by mouth daily       celecoxib (CELEBREX) 100 MG capsule Take 1 capsule (100 mg) by mouth daily 90 capsule 3     Cranberry 500 MG CAPS Take 1 capsule by mouth daily       docusate sodium (COLACE) 100 MG capsule Take 100 mg by mouth daily       fexofenadine (ALLEGRA) 180 MG tablet Take 180 mg by mouth daily.       Lactobacillus (PROBIOTIC ACIDOPHILUS PO) Take 100 mg by mouth daily       MELATONIN PO Take 2 mg by mouth nightly as needed        multivitamin, therapeutic with minerals (THERA-VIT-M) TABS Take 3 tablets by mouth 2 times daily        Omega-3 Fatty Acids (OMEGA-3 FISH OIL PO) Take 2 g by mouth daily        ondansetron (ZOFRAN) 4 MG tablet Take 1 tablet (4 mg) by mouth every 8 hours as needed for nausea 60 tablet 1     triamcinolone (NASACORT) 55 MCG/ACT Inhaler Spray 2 sprays into both nostrils daily       ZOLMitriptan (ZOMIG) 5 MG tablet Take 1 tablet (5 mg) by mouth at onset of headache for migraine 90 tablet 3         Allergies:        Allergies   Allergen Reactions     Corn-Related Products Shortness Of Breath     Avoids food; ok in tiny doses, such as in medicines  Avoids food; ok in tiny doses, such as in medicines     Cipro [Ciprofloxacin]      Reddened skin     Flagyl [Metronidazole] Nausea and Vomiting     Garlic Fatigue     GI distress     Keflex [Cephalosporins] Nausea and Vomiting     Omeprazole      PN: headache     Prilosec Otc [Omeprazole Magnesium]      migraines     Cephalexin Rash     PN: Rash, Generalized     Doxycycline Rash     blisters     Erythromycin Rash     Sulfa Drugs Rash     Ok to use         Social History:     Social History     Tobacco Use     Smoking status: Never     Smokeless tobacco: Never   Substance Use Topics     Alcohol use: Not Currently     Comment: 1 or 2 drinks per " year       History   Drug Use No         Family History:     The patient's family history is notable for     Family History   Problem Relation Age of Onset     Hyperlipidemia Mother      Cerebrovascular Disease Mother      Cerebrovascular Disease Father      Colon Cancer Father 57         at 63     Prostate Cancer Brother 53        recurrence at 65     Prostate Cancer Maternal Uncle 65         Physical Exam:     /81   Pulse 61   Resp 16   Wt 66.7 kg (147 lb)   LMP 2019   SpO2 100%   BMI 26.46 kg/m    Body mass index is 26.46 kg/m .    General Appearance: healthy and alert, no distress     HEENT: no thyromegaly, no palpable nodules or masses        Cardiovascular: regular rate and rhythm, no gallops, rubs or murmurs     Respiratory: lungs clear, no rales, rhonchi or wheezes    Musculoskeletal: extremities non tender and without edema    Skin: no lesions or rashes     Neurological: normal gait, no gross defects     Psychiatric: appropriate mood and affect                               Hematological: normal cervical, supraclavicular and inguinal lymph nodes     Gastrointestinal:       abdomen soft, non-tender, non-distended, no organomegaly or masses; laparoscopic incisions well healed.     Genitourinary: External genitalia and urethral meatus appears normal.  Vagina is smooth without nodularity or masses. Cervix surgically absent.  Bimanual exam reveal no masses, nodularity or fullness.  Recto-vaginal exam confirms these findings.       Assessment:    Sarah Duran is a 57 year old woman with a diagnosis of stage II, grade 1 endometrial adenocarcinoma. She is here today for an acute visit.     20 minutes spent on the date of the encounter doing chart review, history and exam, documentation, and further activities as noted above.        Plan:     1.)        Stage II, grade 1 endometrial adenocarcinoma. HAROON on exam. One episode of RLQ abdominal cramping reviewed with pt that this could be related  to surgical adhesions or bowels. Pt will watch for patterns and let us know if her pain increases or recurs at an increased frequency. Reviewed signs and symptoms for when she should contact the clinic or seek additional care.  Discussed signs and symptoms of recurrence and to call if these should occur. Patient to contact the clinic with any questions or concerns in the interim. Discussed no need for further pap smear testing. Discussed visits every 3 months for the first two years (5/23) then every 6 months for up to 5 years (5/26) then annually thereafter. Continue to use dilator for 3-5 minutes once per week.     2.) Genetic risk factors were assessed and the patient is POSITIVE for a APC mutation.  Specifically her mutation is called [id number],  consistent with a diagnosis of familial adenomatous polyposis (FAP) or attenuated FAP (AFAP). She follows with Clarissa Brown with the [facility].     3.) Labs and/or tests ordered include: none.     4.) Health maintenance issues addressed today include annual health maintenance and non-gynecologic issues with PCP.          REYNOLD Sorto, NP-BC  Women's Health Nurse Practitioner  Division of Gynecologic Oncology  [facility]      CC  Patient Care Team:  Ida Sandoval MD as PCP - General (Family Practice)  Stephen Gasca MD as MD (Gastroenterology)  Treasure Cleveland GC as Genetic Counselor (Genetic )  Pancho Samson MD as MD (Colon and Rectal Surgery)  Ciara Garcia APRN CNP as Assigned Cancer Care Provider        Again, thank you for allowing me to participate in the care of your patient.        Sincerely,        REYNOLD Moore CNP

## 2023-03-28 ENCOUNTER — TELEPHONE (OUTPATIENT)
Dept: ONCOLOGY | Facility: CLINIC | Age: 58
End: 2023-03-28
Payer: COMMERCIAL

## 2023-03-28 NOTE — TELEPHONE ENCOUNTER
3/28/23 [facility] to schedule follow up with Ciara Garcia for June 2023. shanon

## 2023-06-01 ENCOUNTER — HEALTH MAINTENANCE LETTER (OUTPATIENT)
Age: 58
End: 2023-06-01

## 2023-08-19 DIAGNOSIS — D13.91 FAP (FAMILIAL ADENOMATOUS POLYPOSIS): ICD-10-CM

## 2023-08-21 RX ORDER — CELECOXIB 100 MG/1
100 CAPSULE ORAL DAILY
Qty: 90 CAPSULE | Refills: 3 | Status: SHIPPED | OUTPATIENT
Start: 2023-08-21 | End: 2024-08-09

## 2023-08-27 ENCOUNTER — HEALTH MAINTENANCE LETTER (OUTPATIENT)
Age: 58
End: 2023-08-27

## 2023-09-21 NOTE — TELEPHONE ENCOUNTER
RECORDS STATUS - ALL OTHER DIAGNOSIS      RECORDS RECEIVED FROM: [facility]   DATE RECEIVED: 2/5/2021   NOTES STATUS DETAILS   OFFICE NOTE from referring provider Complete  REYNOLD Kellogg CNS   OFFICE NOTE from medical oncologist Complete 1/31/2021 Orders Only - Clarissa Brown (Vaginal Bleeding)     12/20/2019 Oncology Visit- Clarissa Brown- Genetic Specialist   DISCHARGE SUMMARY from hospital N/A    DISCHARGE REPORT from the ER     OPERATIVE REPORT N/A    MEDICATION LIST Complete [facility]   CLINICAL TRIAL TREATMENTS TO DATE     LABS     PATHOLOGY REPORTS N/A    ANYTHING RELATED TO DIAGNOSIS N/A Labs last updated in Epic on 7/25/2020   GENONOMIC TESTING     TYPE:     IMAGING (NEED IMAGES & REPORT)     CT SCANS     MRI     MAMMO     ULTRASOUND     PET          172.72

## 2023-10-10 DIAGNOSIS — Z12.39 BREAST CANCER SCREENING, HIGH RISK PATIENT: Primary | ICD-10-CM

## 2023-10-10 DIAGNOSIS — N60.91 ATYPICAL DUCTAL HYPERPLASIA OF RIGHT BREAST: ICD-10-CM

## 2023-10-10 DIAGNOSIS — R92.30 DENSE BREAST: ICD-10-CM

## 2023-10-25 ENCOUNTER — PATIENT OUTREACH (OUTPATIENT)
Dept: GASTROENTEROLOGY | Facility: CLINIC | Age: 58
End: 2023-10-25
Payer: COMMERCIAL

## 2023-10-25 NOTE — TELEPHONE ENCOUNTER
Called patient to discuss annual procedure with [facility]    Per Dr. Fernández related to procedure due in December 2023  Had very large gastric polyp among many other polyps - biopsy showed fundic gland polyp w thoughtful comment that it was typical and not suggestive of progressive dysplasia etc.   Could just have EGD/duodenoscopy and flex sig instead of ERCP      Per Dr Gasca:  Based on note from Dr. Fernández we can schedule both the upper EGD and sigmoidoscopy with me with general anesthesia. NO ERCP.     I will remove the large stomach with polypectomy at the next exam.     Left message    ML

## 2023-10-31 NOTE — TELEPHONE ENCOUNTER
"Called pt to discuss procedure with Dr Geno harris in December EGD/duodenoscopy and flex sig , general sedation.    Pt in agreement with 23 procedure date.    Procedure/Imaging/Clinic: EGD, duodenoscopy and flexible sigmoidoscopy  Physician: Campos  Timin year (from 22 procedure date)  Scope time needed: 45 min  Anesthesia:General  Dx: FAP, high risk colon cancer surveillance  Tier:Tier 3 -   Surgeries/procedures that can be delayed  between 30 to 90 days with no significant  morbidity/mortality to patient or impact on  patient/disease outcome.   [facility]: [facility]  Header of letter for pt communication: Upper scope and lower scope for FAP surveillance       Explained they will need a , someone to stay with them for 24 hours and should stay in town for 24 hours (within 45 min of Hospital) post procedure     Patient needs to get pre-op physical completed. If outside  health system will need physical faxed to number [phone]   If you do not get a preop physical, your procedure could be cancelled, patient voiced understanding*     Preop Plan: needed within 30 days of procedure, pt will make with PCP Dr Jaime Zee Review     Blood thinner -  none  ASA - none  Diabetic - none  Weight loss - none     COVID test discussed:  discussed testing if symptomatic within 14 days of procedure date.     Patient Education r/t procedure: mychart education     Does patient have any history of gastric bypass/gastric surgery/altered panc/bili anatomy? none     A pre-op nurse will call 1-2 days prior to the procedure.     NPO/Prep: Per note in chart from previous flex sig \"pt will need full colonoscopy prep for the sigmoidoscopy. Previous preps for flex sig have been inadequate\".     Pt would prefer miralax bowel prep , discussed addition of mag citrate. Pt is going to let me know if she is able to do mag citrate, has an allergy to corn products and thought this was a med she is allergic to.      Verbalized " understanding of all instructions. All questions answered.      Procedure order placed, message routed to OR .    Radha Gamble, RN, BSN,   [facility]  Care coordinator

## 2023-11-01 ENCOUNTER — PREP FOR PROCEDURE (OUTPATIENT)
Dept: GASTROENTEROLOGY | Facility: CLINIC | Age: 58
End: 2023-11-01
Payer: COMMERCIAL

## 2023-11-01 DIAGNOSIS — Z91.89 HIGH RISK FOR COLON CANCER: ICD-10-CM

## 2023-11-01 DIAGNOSIS — D13.91 FAP (FAMILIAL ADENOMATOUS POLYPOSIS): Primary | ICD-10-CM

## 2023-11-03 ENCOUNTER — ONCOLOGY VISIT (OUTPATIENT)
Dept: ONCOLOGY | Facility: CLINIC | Age: 58
End: 2023-11-03
Attending: NURSE PRACTITIONER
Payer: COMMERCIAL

## 2023-11-03 VITALS
RESPIRATION RATE: 16 BRPM | SYSTOLIC BLOOD PRESSURE: 133 MMHG | WEIGHT: 146 LBS | HEART RATE: 69 BPM | BODY MASS INDEX: 26.28 KG/M2 | OXYGEN SATURATION: 99 % | DIASTOLIC BLOOD PRESSURE: 83 MMHG

## 2023-11-03 DIAGNOSIS — Z08 ENCOUNTER FOR FOLLOW-UP SURVEILLANCE OF ENDOMETRIAL CANCER: ICD-10-CM

## 2023-11-03 DIAGNOSIS — C54.1 ENDOMETRIAL CANCER (H): Primary | ICD-10-CM

## 2023-11-03 DIAGNOSIS — Z85.42 ENCOUNTER FOR FOLLOW-UP SURVEILLANCE OF ENDOMETRIAL CANCER: ICD-10-CM

## 2023-11-03 PROCEDURE — 99213 OFFICE O/P EST LOW 20 MIN: CPT | Performed by: NURSE PRACTITIONER

## 2023-11-03 PROCEDURE — 99214 OFFICE O/P EST MOD 30 MIN: CPT | Performed by: NURSE PRACTITIONER

## 2023-11-03 ASSESSMENT — PAIN SCALES - GENERAL: PAINLEVEL: NO PAIN (0)

## 2023-11-03 NOTE — LETTER
11/3/2023         RE: Sarah Duran  [address] [facility] [address]        Dear Colleague,    Thank you for referring your patient, Sarah Duran, to the [facility]. Please see a copy of my visit note below.    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  She completed treatment with brachytherapy 21.  She is here today for a surveillance visit.     Oncology History:  Initially, she reported with daily bleeding in .  She notes she had previously gone 15 months with no bleeding.  She reports the bleeding is sometimes heavy like a period but then can also be somewhat light and only like spotting.  She had an attempted EMB with her primary gynecologist, however this was unsuccessful.  She is here today for a second opinion.     21:  US Pelvis:  Uterus: Measures 8.9 x 5.8 x 6.4 cm. The endometrium is thickened at 2.9 cm. The endometrium is heterogeneous in appearance with an irregular, nodular contour of the endometrial surface. There is hypoechoic complex fluid in the endometrial cavity likely containing blood. There is vascularity of the endometrium on color imaging. The abnormal endometrium occupies the majority of the uterus. Right Ovary: Measures 2.0 x 1.0 x 1.1 cm and appears unremarkable Right Ovary Blood Flow: Present. Left Ovary: Not seen. Free Fluid: No significant free fluid.There are no suspicious adnexal masses.     2/10/21:  Hysteroscopy, D&C                 Pathology:  Grade 1 endometrial adenocarcinoma, MMR intact, p53 wild type, ER/WA +     21:  Total laparoscopic hysterectomy, bilateral salpingo-oophorectomy, bilateral sentinel lymph node dissection, cystoscopy                  Pathology:  Stage II, grade 1 endometrial adenocarcinoma, tumor size 5.6 cm, 1/13 mm myometrial invasion, +CASEY, + cervix, 0/2 sentinel LN, no LVSI     4/26/21:  Completed brachytherapy to 3000cGy in 5 Fx to the vagina              Today she reports;  Vaginal bleeding/discharge: No  Abdominal pain: No  Dilator: Once a week, for a couple minutes   Nausea or vomiting: No  Appetite:  Good  Weight loss: No  Bowel/bladder habits:  Normal  Leg swelling: No            Health Maintenance  Colon cancer screening: Flex sig 12/1/22, repeat in 1 year  Breast cancer screening: Mammogram 10/21/22, following with Clarissa Brown CNS  Annual physical: 10/9/23        Review of Systems:    See HPI.      Past Medical History:    Past Medical History:   Diagnosis Date     Acute deep vein thrombosis (DVT) of left lower extremity (H)      Asthma      Atypical ductal hyperplasia of right breast 10/2016    fibrocystic changes, PASH and atypical ductal hyperplasia on needle biopsy, s/p R excisional biopsy with radioactive seed localization     Endometrial cancer (H) 02/15/2021     Factor 5 Leiden mutation, heterozygous (H24)     factor 5     Familial adenomatous polyposis 1988    clinical presentation; s/p total abdominal colectomy with TRACEY; confirmed APC+ 2018     GERD (gastroesophageal reflux disease)      Infection due to 2019 novel coronavirus 11/02/2022     Migraine      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    [id number]         Past Surgical History:    Past Surgical History:   Procedure Laterality Date     BIOPSY BREAST SEED LOCALIZATION Right 11/30/2016    Procedure: BIOPSY BREAST SEED LOCALIZATION;  Surgeon: Jessica Rasheed MD;  Location: [facility] OR     COLECTOMY      subtotal     COMBINED ESOPHAGOSCOPY, GASTROSCOPY, DUODENOSCOPY (EGD) WITH ARGON PLASMA COAGULATOR (APC) N/A 4/12/2018    Procedure: COMBINED ESOPHAGOSCOPY, GASTROSCOPY, DUODENOSCOPY (EGD) WITH ARGON PLASMA COAGULATOR (APC);  Esophagogastroduodenoscopy  with polypectomy and polyp ablation;  Surgeon: Stephen Gasca MD;  Location: [facility] OR     DILATION AND CURETTAGE, HYSTEROSCOPY DIAGNOSTIC, COMBINED N/A 2/10/2021    Procedure: HYSTEROSCOPY, DIAGNOSTIC, WITH DILATION AND CURETTAGE OF UTERUS;  Surgeon: Sandie Samaniego MD;  Location: [facility] OR     ENDOSCOPIC RETROGRADE CHOLANGIOPANCREATOGRAM N/A 9/23/2021    Procedure: ENDOSCOPIC RETROGRADE CHOLANGIOPANCREATOGRAPHY WITH PANCREATIC STENT PLACEMENT AND POLYP ABLATION with Argon Plasma Coagulation;  Surgeon: Shahab Fernández MD;  Location: [facility] OR     ENDOSCOPIC RETROGRADE CHOLANGIOPANCREATOGRAM N/A 12/1/2022    Procedure: diagnostic ENDOSCOPIC RETROGRADE CHOLANGIOPANCREATOGRAPHY, ablation of duodenal polyps, biopsy of gastric polyp;  Surgeon: Shahab Fernández MD;  Location: [facility] OR     ENDOSCOPIC RETROGRADE CHOLANGIOPANCREATOGRAPHY       ENDOSCOPIC ULTRASOUND UPPER GASTROINTESTINAL TRACT (GI) N/A 7/25/2019    Procedure: Endoscopic Ultrasound;  Surgeon: Stephen Gasca MD;  Location: [facility]     ESOPHAGOSCOPY, GASTROSCOPY, DUODENOSCOPY (EGD), COMBINED  11/5/2012    Procedure: COMBINED ESOPHAGOSCOPY, GASTROSCOPY, DUODENOSCOPY (EGD), BIOPSY SINGLE OR MULTIPLE;;  Surgeon: Angélica Garcia MD;  Location: [facility]     ESOPHAGOSCOPY, GASTROSCOPY, DUODENOSCOPY (EGD), COMBINED  12/3/2013    Procedure: COMBINED ESOPHAGOSCOPY, GASTROSCOPY, DUODENOSCOPY (EGD);;  Surgeon: Angélica Garcia MD;  Location: [facility]     ESOPHAGOSCOPY, GASTROSCOPY, DUODENOSCOPY (EGD), COMBINED N/A 3/28/2018    Procedure: COMBINED ESOPHAGOSCOPY, GASTROSCOPY, DUODENOSCOPY (EGD);;  Surgeon: Stephen Gasca MD;  Location: [facility]     ESOPHAGOSCOPY, GASTROSCOPY, DUODENOSCOPY (EGD), COMBINED N/A 5/30/2019    Procedure: Esophagogastroduodenoscopy with biopsy x2 APC;  Surgeon: Stephen Gasca MD;  Location: [facility]     ESOPHAGOSCOPY, GASTROSCOPY, DUODENOSCOPY (EGD), COMBINED N/A 7/30/2020    Procedure: ESOPHAGOGASTRODUODENOSCOPY  (EGD) with polypectomy by hot snare; argonplasma coaglution ablation of polyps;  Surgeon: Stephen Gasca MD;  Location: [facility]     ESOPHAGOSCOPY, GASTROSCOPY, DUODENOSCOPY (EGD), RESECT MUCOSA, COMBINED N/A 7/25/2019    Procedure: Esophagogastroduodenoscopy with gastric mucosal resection and clip application;  Surgeon: Stephen Gasca MD;  Location: [facility]     EXCISE POLYP RECTUM       LAPAROSCOPIC HYSTERECTOMY TOTAL, BILATERAL SALPINGO-OOPHORECTOMY, NODE DISSECTION, COMBINED Bilateral 2/25/2021    Procedure: Laparoscopic removal of uterus, cervix, both ovaries and fallopian tubes, sentinel lymph node dissection, cystoscopy;  Surgeon: Sandie Samaniego MD;  Location: [facility]     LAPAROSCOPIC LYSIS ADHESIONS       SIGMOIDOSCOPY FLEXIBLE       SIGMOIDOSCOPY FLEXIBLE N/A 12/7/2015    Procedure: SIGMOIDOSCOPY FLEXIBLE;  Surgeon: Mariela Bearden MD;  Location: [facility]     SIGMOIDOSCOPY FLEXIBLE N/A 3/28/2018    Procedure: SIGMOIDOSCOPY FLEXIBLE;  EGD/Flex Sig;  Surgeon: Stephen Gasca MD;  Location: [facility]     SIGMOIDOSCOPY FLEXIBLE N/A 4/12/2018    Procedure: SIGMOIDOSCOPY FLEXIBLE;  Flexible Sigmoidoscopy with polyp ablation;  Surgeon: Stephen Gasca MD;  Location[facility]     SIGMOIDOSCOPY FLEXIBLE N/A 5/30/2019    Procedure: Flexible Sigmoidoscopy with polypectomy;  Surgeon: Stephen Gasca MD;  [facility]     SIGMOIDOSCOPY FLEXIBLE N/A 7/30/2020    Procedure: SIGMOIDOSCOPY, FLEXIBLE with Ablation of Polyps;  Surgeon: Stephen Gasca MD;  [facility]     SIGMOIDOSCOPY FLEXIBLE N/A 9/23/2021    Procedure: FLEXIBLE SIGMOIDOSCOPY WITH POLYPECTOMY;  Surgeon: Stephen Gasca MD;  [facility]     SIGMOIDOSCOPY FLEXIBLE N/A 12/1/2022    Procedure: SIGMOIDOSCOPY, FLEXIBLE with polypectomy;  Surgeon: Stephen Gasca MD;  Location: [facility]     ZC LAP,SURG,COLECTOMY,W/ANAST      for colon cancer         Health Maintenance Due   Topic Date Due     ASTHMA ACTION PLAN  " Never done     ASTHMA CONTROL TEST  Never done     ADVANCE CARE PLANNING  Never done     PHQ-2 (once per calendar year)  01/01/2023     MAMMO DIAGNOSTIC  04/21/2023     COVID-19 Vaccine (3 - 2023-24 season) 09/01/2023     MAMMO SCREENING  10/21/2023     COLORECTAL CANCER SCREENING  12/01/2023       Current Medications:     Current Outpatient Medications   Medication Sig Dispense Refill     acetaminophen (TYLENOL) 325 MG tablet Take 2 tablets (650 mg) by mouth every 6 hours as needed for mild pain 30 tablet 0     albuterol 90 MCG/ACT inhaler Inhale 2 puffs into the lungs every 6 hours as needed.       Apple Cider Vinegar 500 MG TABS Take 500 mg by mouth       ASHWAGANDHA PO Take 150 mg by mouth One gummy per day       bisacodyl (DULCOLAX) 5 MG EC tablet Refer to \"Getting Ready for a Colonoscopy\" instruction handout 4 tablet 0     calcium-magnesium (CALMAG) 500-250 MG TABS Take 1 tablet by mouth daily       celecoxib (CELEBREX) 100 MG capsule TAKE ONE CAPSULE BY MOUTH ONCE DAILY 90 capsule 3     Cranberry 500 MG CAPS Take 1 capsule by mouth daily       docusate sodium (COLACE) 100 MG capsule Take 100 mg by mouth daily       fexofenadine (ALLEGRA) 180 MG tablet Take 180 mg by mouth daily.       Lactobacillus (PROBIOTIC ACIDOPHILUS PO) Take 100 mg by mouth daily       MELATONIN PO Take 2 mg by mouth nightly as needed        multivitamin, therapeutic with minerals (THERA-VIT-M) TABS Take 3 tablets by mouth 2 times daily        Omega-3 Fatty Acids (OMEGA-3 FISH OIL PO) Take 2 g by mouth daily        triamcinolone (NASACORT) 55 MCG/ACT Inhaler Spray 2 sprays into both nostrils daily       ZOLMitriptan (ZOMIG) 5 MG tablet Take 1 tablet (5 mg) by mouth at onset of headache for migraine 90 tablet 3         Allergies:        Allergies   Allergen Reactions     Corn-Containing Products Shortness Of Breath     Avoids food; ok in tiny doses, such as in medicines  Avoids food; ok in tiny doses, such as in medicines     Cipro " [Ciprofloxacin]      Reddened skin     Flagyl [Metronidazole] Nausea and Vomiting     Garlic Fatigue     GI distress     Keflex [Cephalosporins] Nausea and Vomiting     Omeprazole      PN: headache     Prilosec Otc [Omeprazole Magnesium]      migraines     Cephalexin Rash     PN: Rash, Generalized     Doxycycline Rash     blisters     Erythromycin Rash     Sulfa Antibiotics Rash     Ok to use         Social History:     Social History     Tobacco Use     Smoking status: Never     Smokeless tobacco: Never   Substance Use Topics     Alcohol use: Not Currently     Comment: 1 or 2 drinks per year       History   Drug Use No         Family History:     The patient's family history is notable for:    Family History   Problem Relation Age of Onset     Hyperlipidemia Mother      Cerebrovascular Disease Mother      Cerebrovascular Disease Father      Colon Cancer Father 57         at 63     Prostate Cancer Brother 53        recurrence at 65     Prostate Cancer Maternal Uncle 65         Physical Exam:     /83   Pulse 69   Resp 16   Wt 66.2 kg (146 lb)   LMP 2019   SpO2 99%   BMI 26.28 kg/m    Body mass index is 26.28 kg/m .    General Appearance:  alert, no distress     HEENT: no palpable nodules or masses        Cardiovascular: regular rate and rhythm, no gallops, rubs or murmurs     Respiratory: lungs clear, no rales, rhonchi or wheezes    Musculoskeletal: extremities non tender and without edema    Skin: no lesions or rashes     Neurological: normal gait, no gross defects     Psychiatric: appropriate mood and affect                               Hematological: normal cervical, supraclavicular and inguinal lymph nodes     Gastrointestinal:       abdomen soft, non-tender, non-distended, no organomegaly or masses    Genitourinary: External genitalia and urethral meatus appears normal.  Vagina is smooth without nodularity or masses.  Cervix is surgically absent.  Bimanual exam reveal no masses, nodularity  or fullness.  Recto-vaginal exam confirms these findings.      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  She completed treatment with brachytherapy 4/26/21.  She is here today for a surveillance visit.     20 minutes spent on the date of the encounter doing chart review, history and exam, documentation, and further activities as noted above.      Plan:     1.) Endometrial cancer:  HAROON on exam.  RTC in 6 months for next surveillance visit.  Plan for surveillance visits every 6 months until she is 5 years from completion of treatment (4/26/2026), then annually.  Reviewed signs and symptoms for when she should contact the clinic or seek additional care.  Patient to contact the clinic with any questions or concerns in the interim.      Genetic risk factors were assessed and her MMR proteins were intact.  Due to her family history she underwent genetic testing and she is POSITIVE for a APC mutation.  Specifically her mutation is called [id number], consistent with a diagnosis of familial adenomatous polyposis (FAP) or attenuated FAP (AFAP). She follows with Clarissa Brown with the [facility].     Labs and/or tests ordered include:  None.     2.) Health maintenance:  Issues addressed today include following up with PCP for annual health maintenance and non-gynecologic issues. Continue to follow with [facility] for high risk screening.    Radha Mason, DNP, APRN, FNP-C, AOCNP  Oncology Nurse Practitioner  Division of Gynecologic Oncology  Pager: [phone]     CC  Patient Care Team:  Ida Sandoval MD as PCP - General (Family Practice)  Stephen Gasca MD as MD (Gastroenterology)  Treasure Cleveland GC as Genetic Counselor (Genetic )  Pancho Samson MD as MD (Colon and Rectal Surgery)  Ciara Garcia APRN CNP as Assigned Cancer Care Provider        Again, thank you for  allowing me to participate in the care of your patient.        Sincerely,        REYNOLD Crystal CNP

## 2023-11-03 NOTE — PROGRESS NOTES
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  She completed treatment with brachytherapy 21.  She is here today for a surveillance visit.     Oncology History:  Initially, she reported with daily bleeding in .  She notes she had previously gone 15 months with no bleeding.  She reports the bleeding is sometimes heavy like a period but then can also be somewhat light and only like spotting.  She had an attempted EMB with her primary gynecologist, however this was unsuccessful.  She is here today for a second opinion.     21:  US Pelvis:  Uterus: Measures 8.9 x 5.8 x 6.4 cm. The endometrium is thickened at 2.9 cm. The endometrium is heterogeneous in appearance with an irregular, nodular contour of the endometrial surface. There is hypoechoic complex fluid in the endometrial cavity likely containing blood. There is vascularity of the endometrium on color imaging. The abnormal endometrium occupies the majority of the uterus. Right Ovary: Measures 2.0 x 1.0 x 1.1 cm and appears unremarkable Right Ovary Blood Flow: Present. Left Ovary: Not seen. Free Fluid: No significant free fluid.There are no suspicious adnexal masses.     2/10/21:  Hysteroscopy, D&C                 Pathology:  Grade 1 endometrial adenocarcinoma, MMR intact, p53 wild type, ER/IL +     21:  Total laparoscopic hysterectomy, bilateral salpingo-oophorectomy, bilateral sentinel lymph node dissection, cystoscopy                 Pathology:  Stage II, grade 1 endometrial adenocarcinoma, tumor size 5.6 cm, 1/13 mm myometrial invasion, +CASEY, + cervix, 0/2 sentinel LN, no LVSI     21:  Completed brachytherapy to 3000cGy in 5 Fx to the vagina              Today she  reports;  Vaginal bleeding/discharge: No  Abdominal pain: No  Dilator: Once a week, for a couple minutes   Nausea or vomiting: No  Appetite:  Good  Weight loss: No  Bowel/bladder habits:  Normal  Leg swelling: No            Health Maintenance  Colon cancer screening: Flex sig 12/1/22, repeat in 1 year  Breast cancer screening: Mammogram 10/21/22, following with Clarissa Brown CNS  Annual physical: 10/9/23        Review of Systems:    See HPI.      Past Medical History:    Past Medical History:   Diagnosis Date    Acute deep vein thrombosis (DVT) of left lower extremity (H)     Asthma     Atypical ductal hyperplasia of right breast 10/2016    fibrocystic changes, PASH and atypical ductal hyperplasia on needle biopsy, s/p R excisional biopsy with radioactive seed localization    Endometrial cancer (H) 02/15/2021    Factor 5 Leiden mutation, heterozygous (H24)     factor 5    Familial adenomatous polyposis 1988    clinical presentation; s/p total abdominal colectomy with TRACEY; confirmed APC+ 2018    GERD (gastroesophageal reflux disease)     Infection due to 2019 novel coronavirus 11/02/2022    Migraine     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    [id number]         Past Surgical History:    Past Surgical History:   Procedure Laterality Date    BIOPSY BREAST SEED LOCALIZATION Right 11/30/2016    Procedure: BIOPSY BREAST SEED LOCALIZATION;  Surgeon: Jessica Rasheed MD;  Location: [facility]    COLECTOMY      subtotal    COMBINED ESOPHAGOSCOPY, GASTROSCOPY, DUODENOSCOPY (EGD) WITH ARGON PLASMA COAGULATOR (APC) N/A 4/12/2018    Procedure: COMBINED ESOPHAGOSCOPY, GASTROSCOPY, DUODENOSCOPY (EGD) WITH ARGON PLASMA COAGULATOR (APC);  Esophagogastroduodenoscopy with polypectomy and polyp ablation;  Surgeon: Stephen Gasca MD;  Location: [facility]    DILATION AND CURETTAGE, HYSTEROSCOPY DIAGNOSTIC, COMBINED N/A 2/10/2021    Procedure: HYSTEROSCOPY, DIAGNOSTIC, WITH DILATION AND CURETTAGE OF UTERUS;  Surgeon:  Sandie Samaniego MD;  Location: [facility] OR    ENDOSCOPIC RETROGRADE CHOLANGIOPANCREATOGRAM N/A 9/23/2021    Procedure: ENDOSCOPIC RETROGRADE CHOLANGIOPANCREATOGRAPHY WITH PANCREATIC STENT PLACEMENT AND POLYP ABLATION with Argon Plasma Coagulation;  Surgeon: Shahab Fernández MD;  Location: [facility]    ENDOSCOPIC RETROGRADE CHOLANGIOPANCREATOGRAM N/A 12/1/2022    Procedure: diagnostic ENDOSCOPIC RETROGRADE CHOLANGIOPANCREATOGRAPHY, ablation of duodenal polyps, biopsy of gastric polyp;  Surgeon: Shahab Fernández MD;  Location: [facility]    ENDOSCOPIC RETROGRADE CHOLANGIOPANCREATOGRAPHY      ENDOSCOPIC ULTRASOUND UPPER GASTROINTESTINAL TRACT (GI) N/A 7/25/2019    Procedure: Endoscopic Ultrasound;  Surgeon: Stephen Gasca MD;  Location: [facility]    ESOPHAGOSCOPY, GASTROSCOPY, DUODENOSCOPY (EGD), COMBINED  11/5/2012    Procedure: COMBINED ESOPHAGOSCOPY, GASTROSCOPY, DUODENOSCOPY (EGD), BIOPSY SINGLE OR MULTIPLE;;  Surgeon: Angélica Garcia MD;  [facility]    ESOPHAGOSCOPY, GASTROSCOPY, DUODENOSCOPY (EGD), COMBINED  12/3/2013    Procedure: COMBINED ESOPHAGOSCOPY, GASTROSCOPY, DUODENOSCOPY (EGD);;  Surgeon: Angélica Garcia MD;  [facility]    ESOPHAGOSCOPY, GASTROSCOPY, DUODENOSCOPY (EGD), COMBINED N/A 3/28/2018    Procedure: COMBINED ESOPHAGOSCOPY, GASTROSCOPY, DUODENOSCOPY (EGD);;  Surgeon: Stephen Gasca MD;  [facility]: [facility]    ESOPHAGOSCOPY, GASTROSCOPY, DUODENOSCOPY (EGD), COMBINED N/A 5/30/2019    Procedure: Esophagogastroduodenoscopy with biopsy x2 APC;  Surgeon: Stephen Gasca MD;  [facility]    ESOPHAGOSCOPY, GASTROSCOPY, DUODENOSCOPY (EGD), COMBINED N/A 7/30/2020    Procedure: ESOPHAGOGASTRODUODENOSCOPY (EGD) with polypectomy by hot snare; argonplasma coaglution ablation of polyps;  Surgeon: Stephen Gasca MD;  [facility]    ESOPHAGOSCOPY, GASTROSCOPY, DUODENOSCOPY (EGD), RESECT MUCOSA, COMBINED N/A 7/25/2019    Procedure: Esophagogastroduodenoscopy  with gastric mucosal resection and clip application;  Surgeon: Stephen Gasca MD;  [facility]    EXCISE POLYP RECTUM      LAPAROSCOPIC HYSTERECTOMY TOTAL, BILATERAL SALPINGO-OOPHORECTOMY, NODE DISSECTION, COMBINED Bilateral 2/25/2021    Procedure: Laparoscopic removal of uterus, cervix, both ovaries and fallopian tubes, sentinel lymph node dissection, cystoscopy;  Surgeon: Sandie Samaniego MD;  [facility]    LAPAROSCOPIC LYSIS ADHESIONS      SIGMOIDOSCOPY FLEXIBLE      SIGMOIDOSCOPY FLEXIBLE N/A 12/7/2015    Procedure: SIGMOIDOSCOPY FLEXIBLE;  Surgeon: Mariela Bearden MD;  [facility]    SIGMOIDOSCOPY FLEXIBLE N/A 3/28/2018    Procedure: SIGMOIDOSCOPY FLEXIBLE;  EGD/Flex Sig;  Surgeon: Stephen Gasca MD;  [facility]    SIGMOIDOSCOPY FLEXIBLE N/A 4/12/2018    Procedure: SIGMOIDOSCOPY FLEXIBLE;  Flexible Sigmoidoscopy with polyp ablation;  Surgeon: Stephen Gasca MD;  [facility]    SIGMOIDOSCOPY FLEXIBLE N/A 5/30/2019    Procedure: Flexible Sigmoidoscopy with polypectomy;  Surgeon: Stephen Gasca MD;  [facility]    SIGMOIDOSCOPY FLEXIBLE N/A 7/30/2020    Procedure: SIGMOIDOSCOPY, FLEXIBLE with Ablation of Polyps;  Surgeon: Stephen Gasca MD;  [facility]    SIGMOIDOSCOPY FLEXIBLE N/A 9/23/2021    Procedure: FLEXIBLE SIGMOIDOSCOPY WITH POLYPECTOMY;  Surgeon: Stephen Gasca MD;  [facility]    SIGMOIDOSCOPY FLEXIBLE N/A 12/1/2022    Procedure: SIGMOIDOSCOPY, FLEXIBLE with polypectomy;  Surgeon: Stephen Gasca MD;  [facility]    [facility] LAP,SURG,COLECTOMY,W/ANAST      for colon cancer         Health Maintenance Due   Topic Date Due    ASTHMA ACTION PLAN  Never done    ASTHMA CONTROL TEST  Never done    ADVANCE CARE PLANNING  Never done    PHQ-2 (once per calendar year)  01/01/2023    MAMMO DIAGNOSTIC  04/21/2023    COVID-19 Vaccine (3 - 2023-24 season) 09/01/2023    MAMMO SCREENING  10/21/2023    COLORECTAL CANCER SCREENING   "12/01/2023       Current Medications:     Current Outpatient Medications   Medication Sig Dispense Refill    acetaminophen (TYLENOL) 325 MG tablet Take 2 tablets (650 mg) by mouth every 6 hours as needed for mild pain 30 tablet 0    albuterol 90 MCG/ACT inhaler Inhale 2 puffs into the lungs every 6 hours as needed.      Apple Cider Vinegar 500 MG TABS Take 500 mg by mouth      ASHWAGANDHA PO Take 150 mg by mouth One gummy per day      bisacodyl (DULCOLAX) 5 MG EC tablet Refer to \"Getting Ready for a Colonoscopy\" instruction handout 4 tablet 0    calcium-magnesium (CALMAG) 500-250 MG TABS Take 1 tablet by mouth daily      celecoxib (CELEBREX) 100 MG capsule TAKE ONE CAPSULE BY MOUTH ONCE DAILY 90 capsule 3    Cranberry 500 MG CAPS Take 1 capsule by mouth daily      docusate sodium (COLACE) 100 MG capsule Take 100 mg by mouth daily      fexofenadine (ALLEGRA) 180 MG tablet Take 180 mg by mouth daily.      Lactobacillus (PROBIOTIC ACIDOPHILUS PO) Take 100 mg by mouth daily      MELATONIN PO Take 2 mg by mouth nightly as needed       multivitamin, therapeutic with minerals (THERA-VIT-M) TABS Take 3 tablets by mouth 2 times daily       Omega-3 Fatty Acids (OMEGA-3 FISH OIL PO) Take 2 g by mouth daily       triamcinolone (NASACORT) 55 MCG/ACT Inhaler Spray 2 sprays into both nostrils daily      ZOLMitriptan (ZOMIG) 5 MG tablet Take 1 tablet (5 mg) by mouth at onset of headache for migraine 90 tablet 3         Allergies:        Allergies   Allergen Reactions    Corn-Containing Products Shortness Of Breath     Avoids food; ok in tiny doses, such as in medicines  Avoids food; ok in tiny doses, such as in medicines    Cipro [Ciprofloxacin]      Reddened skin    Flagyl [Metronidazole] Nausea and Vomiting    Garlic Fatigue     GI distress    Keflex [Cephalosporins] Nausea and Vomiting    Omeprazole      PN: headache    Prilosec Otc [Omeprazole Magnesium]      migraines    Cephalexin Rash     PN: Rash, Generalized    Doxycycline " Rash     blisters    Erythromycin Rash    Sulfa Antibiotics Rash     Ok to use         Social History:     Social History     Tobacco Use    Smoking status: Never    Smokeless tobacco: Never   Substance Use Topics    Alcohol use: Not Currently     Comment: 1 or 2 drinks per year       History   Drug Use No         Family History:     The patient's family history is notable for:    Family History   Problem Relation Age of Onset    Hyperlipidemia Mother     Cerebrovascular Disease Mother     Cerebrovascular Disease Father     Colon Cancer Father 57         at 63    Prostate Cancer Brother 53        recurrence at 65    Prostate Cancer Maternal Uncle 65         Physical Exam:     /83   Pulse 69   Resp 16   Wt 66.2 kg (146 lb)   LMP 2019   SpO2 99%   BMI 26.28 kg/m    Body mass index is 26.28 kg/m .    General Appearance:  alert, no distress     HEENT: no palpable nodules or masses        Cardiovascular: regular rate and rhythm, no gallops, rubs or murmurs     Respiratory: lungs clear, no rales, rhonchi or wheezes    Musculoskeletal: extremities non tender and without edema    Skin: no lesions or rashes     Neurological: normal gait, no gross defects     Psychiatric: appropriate mood and affect                               Hematological: normal cervical, supraclavicular and inguinal lymph nodes     Gastrointestinal:       abdomen soft, non-tender, non-distended, no organomegaly or masses    Genitourinary: External genitalia and urethral meatus appears normal.  Vagina is smooth without nodularity or masses.  Cervix is surgically absent.  Bimanual exam reveal no masses, nodularity or fullness.  Recto-vaginal exam confirms these findings.      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  She completed treatment with brachytherapy 21.  She is here today for a surveillance visit.     20 minutes spent on the date of  the encounter doing chart review, history and exam, documentation, and further activities as noted above.      Plan:     1.) Endometrial cancer:  HAROON on exam.  RTC in 6 months for next surveillance visit.  Plan for surveillance visits every 6 months until she is 5 years from completion of treatment (4/26/2026), then annually.  Reviewed signs and symptoms for when she should contact the clinic or seek additional care.  Patient to contact the clinic with any questions or concerns in the interim.      Genetic risk factors were assessed and her MMR proteins were intact.  Due to her family history she underwent genetic testing and she is POSITIVE for a APC mutation.  Specifically her mutation is called [id number], consistent with a diagnosis of familial adenomatous polyposis (FAP) or attenuated FAP (AFAP). She follows with Clarissa Brown with the [facility].     Labs and/or tests ordered include:  None.     2.) Health maintenance:  Issues addressed today include following up with PCP for annual health maintenance and non-gynecologic issues. Continue to follow with [facility] for high risk screening.    Radha Mason, ENEIDA, APRN, FNP-C, AOCNP  Oncology Nurse Practitioner  Division of Gynecologic Oncology  Pager: [phone]     CC  Patient Care Team:  Ida Sandoval MD as PCP - General (Family Practice)  Stephen Gasca MD as MD (Gastroenterology)  Treasure Cleveland GC as Genetic Counselor (Genetic )  Pancho Samson MD as MD (Colon and Rectal Surgery)  Ciara Garcia APRN CNP as Assigned Cancer Care Provider

## 2023-11-22 ENCOUNTER — PATIENT OUTREACH (OUTPATIENT)
Dept: GASTROENTEROLOGY | Facility: CLINIC | Age: 58
End: 2023-11-22
Payer: COMMERCIAL

## 2023-11-22 NOTE — TELEPHONE ENCOUNTER
Called pt to discuss the bowel prep for procedure on Dec 7th, left .  Will send mychart with standard miralax bowel prep education for now and if she calls back can modify the plan.  Also stated in VM to get pre op completed. Will include PAC scheduling number in mychart letter.    1320  Pt returned call, would like to do miralax without magnesium citrate. Pre op completed yesterday at , in [place].     Radha Gamble, RN, BSN,   [facility]  Care coordinator

## 2023-12-06 ENCOUNTER — ANESTHESIA EVENT (OUTPATIENT)
Dept: SURGERY | Facility: CLINIC | Age: 58
End: 2023-12-06
Payer: COMMERCIAL

## 2023-12-07 ENCOUNTER — ANESTHESIA (OUTPATIENT)
Dept: SURGERY | Facility: CLINIC | Age: 58
End: 2023-12-07
Payer: COMMERCIAL

## 2023-12-07 ENCOUNTER — HOSPITAL ENCOUNTER (OUTPATIENT)
Facility: CLINIC | Age: 58
Discharge: HOME | End: 2023-12-07
Attending: INTERNAL MEDICINE | Admitting: INTERNAL MEDICINE
Payer: COMMERCIAL

## 2023-12-07 VITALS
BODY MASS INDEX: 25.12 KG/M2 | SYSTOLIC BLOOD PRESSURE: 133 MMHG | TEMPERATURE: 98.5 F | HEIGHT: 63 IN | DIASTOLIC BLOOD PRESSURE: 82 MMHG | HEART RATE: 69 BPM | RESPIRATION RATE: 14 BRPM | WEIGHT: 141.76 LBS | OXYGEN SATURATION: 96 %

## 2023-12-07 LAB
FLEXIBLE SIGMOIDOSCOPY: NORMAL
UPPER GI ENDOSCOPY: NORMAL

## 2023-12-07 PROCEDURE — 999N000141 HC STATISTIC PRE-PROCEDURE NURSING ASSESSMENT: Performed by: INTERNAL MEDICINE

## 2023-12-07 PROCEDURE — 710N000009 HC RECOVERY PHASE 1, LEVEL 1, PER MIN: Performed by: INTERNAL MEDICINE

## 2023-12-07 PROCEDURE — 250N000009 HC RX 250

## 2023-12-07 PROCEDURE — 250N000025 HC SEVOFLURANE, PER MIN: Performed by: INTERNAL MEDICINE

## 2023-12-07 PROCEDURE — 88305 TISSUE EXAM BY PATHOLOGIST: CPT | Mod: TC | Performed by: INTERNAL MEDICINE

## 2023-12-07 PROCEDURE — 250N000011 HC RX IP 250 OP 636

## 2023-12-07 PROCEDURE — 250N000009 HC RX 250: Performed by: INTERNAL MEDICINE

## 2023-12-07 PROCEDURE — 272N000001 HC OR GENERAL SUPPLY STERILE: Performed by: INTERNAL MEDICINE

## 2023-12-07 PROCEDURE — 710N000012 HC RECOVERY PHASE 2, PER MINUTE: Performed by: INTERNAL MEDICINE

## 2023-12-07 PROCEDURE — 370N000017 HC ANESTHESIA TECHNICAL FEE, PER MIN: Performed by: INTERNAL MEDICINE

## 2023-12-07 PROCEDURE — 360N000083 HC SURGERY LEVEL 3 W/ FLUORO, PER MIN: Performed by: INTERNAL MEDICINE

## 2023-12-07 PROCEDURE — 258N000003 HC RX IP 258 OP 636

## 2023-12-07 PROCEDURE — 88305 TISSUE EXAM BY PATHOLOGIST: CPT | Mod: 26 | Performed by: PATHOLOGY

## 2023-12-07 RX ORDER — FENTANYL CITRATE 50 UG/ML
50 INJECTION, SOLUTION INTRAMUSCULAR; INTRAVENOUS EVERY 5 MIN PRN
Status: DISCONTINUED | OUTPATIENT
Start: 2023-12-07 | End: 2023-12-07 | Stop reason: HOSPADM

## 2023-12-07 RX ORDER — ONDANSETRON 2 MG/ML
4 INJECTION INTRAMUSCULAR; INTRAVENOUS EVERY 30 MIN PRN
Status: DISCONTINUED | OUTPATIENT
Start: 2023-12-07 | End: 2023-12-07 | Stop reason: HOSPADM

## 2023-12-07 RX ORDER — SCOLOPAMINE TRANSDERMAL SYSTEM 1 MG/1
1 PATCH, EXTENDED RELEASE TRANSDERMAL ONCE
Status: COMPLETED | OUTPATIENT
Start: 2023-12-07 | End: 2023-12-07

## 2023-12-07 RX ORDER — FENTANYL CITRATE 50 UG/ML
25 INJECTION, SOLUTION INTRAMUSCULAR; INTRAVENOUS EVERY 5 MIN PRN
Status: DISCONTINUED | OUTPATIENT
Start: 2023-12-07 | End: 2023-12-07 | Stop reason: HOSPADM

## 2023-12-07 RX ORDER — HYDROMORPHONE HCL IN WATER/PF 6 MG/30 ML
0.4 PATIENT CONTROLLED ANALGESIA SYRINGE INTRAVENOUS EVERY 5 MIN PRN
Status: DISCONTINUED | OUTPATIENT
Start: 2023-12-07 | End: 2023-12-07 | Stop reason: HOSPADM

## 2023-12-07 RX ORDER — ONDANSETRON 4 MG/1
4 TABLET, ORALLY DISINTEGRATING ORAL EVERY 30 MIN PRN
Status: DISCONTINUED | OUTPATIENT
Start: 2023-12-07 | End: 2023-12-07 | Stop reason: HOSPADM

## 2023-12-07 RX ORDER — LIDOCAINE HYDROCHLORIDE 20 MG/ML
INJECTION, SOLUTION INFILTRATION; PERINEURAL PRN
Status: DISCONTINUED | OUTPATIENT
Start: 2023-12-07 | End: 2023-12-07

## 2023-12-07 RX ORDER — FENTANYL CITRATE 50 UG/ML
25 INJECTION, SOLUTION INTRAMUSCULAR; INTRAVENOUS
Status: DISCONTINUED | OUTPATIENT
Start: 2023-12-07 | End: 2023-12-07 | Stop reason: HOSPADM

## 2023-12-07 RX ORDER — LABETALOL HYDROCHLORIDE 5 MG/ML
10 INJECTION, SOLUTION INTRAVENOUS
Status: DISCONTINUED | OUTPATIENT
Start: 2023-12-07 | End: 2023-12-07 | Stop reason: HOSPADM

## 2023-12-07 RX ORDER — HYDROMORPHONE HCL IN WATER/PF 6 MG/30 ML
0.2 PATIENT CONTROLLED ANALGESIA SYRINGE INTRAVENOUS EVERY 5 MIN PRN
Status: DISCONTINUED | OUTPATIENT
Start: 2023-12-07 | End: 2023-12-07 | Stop reason: HOSPADM

## 2023-12-07 RX ORDER — FENTANYL CITRATE 50 UG/ML
INJECTION, SOLUTION INTRAMUSCULAR; INTRAVENOUS PRN
Status: DISCONTINUED | OUTPATIENT
Start: 2023-12-07 | End: 2023-12-07

## 2023-12-07 RX ORDER — ONDANSETRON 2 MG/ML
4 INJECTION INTRAMUSCULAR; INTRAVENOUS
Status: DISCONTINUED | OUTPATIENT
Start: 2023-12-07 | End: 2023-12-07 | Stop reason: HOSPADM

## 2023-12-07 RX ORDER — DEXAMETHASONE SODIUM PHOSPHATE 4 MG/ML
4 INJECTION, SOLUTION INTRA-ARTICULAR; INTRALESIONAL; INTRAMUSCULAR; INTRAVENOUS; SOFT TISSUE
Status: DISCONTINUED | OUTPATIENT
Start: 2023-12-07 | End: 2023-12-07 | Stop reason: HOSPADM

## 2023-12-07 RX ORDER — LIDOCAINE 40 MG/G
CREAM TOPICAL
Status: DISCONTINUED | OUTPATIENT
Start: 2023-12-07 | End: 2023-12-07 | Stop reason: HOSPADM

## 2023-12-07 RX ORDER — PROPOFOL 10 MG/ML
INJECTION, EMULSION INTRAVENOUS PRN
Status: DISCONTINUED | OUTPATIENT
Start: 2023-12-07 | End: 2023-12-07

## 2023-12-07 RX ORDER — SODIUM CHLORIDE, SODIUM LACTATE, POTASSIUM CHLORIDE, CALCIUM CHLORIDE 600; 310; 30; 20 MG/100ML; MG/100ML; MG/100ML; MG/100ML
INJECTION, SOLUTION INTRAVENOUS CONTINUOUS
Status: DISCONTINUED | OUTPATIENT
Start: 2023-12-07 | End: 2023-12-07 | Stop reason: HOSPADM

## 2023-12-07 RX ORDER — HYDRALAZINE HYDROCHLORIDE 20 MG/ML
2.5-5 INJECTION INTRAMUSCULAR; INTRAVENOUS EVERY 10 MIN PRN
Status: DISCONTINUED | OUTPATIENT
Start: 2023-12-07 | End: 2023-12-07 | Stop reason: HOSPADM

## 2023-12-07 RX ORDER — SCOLOPAMINE TRANSDERMAL SYSTEM 1 MG/1
1 PATCH, EXTENDED RELEASE TRANSDERMAL ONCE
Status: DISCONTINUED | OUTPATIENT
Start: 2023-12-07 | End: 2023-12-07 | Stop reason: HOSPADM

## 2023-12-07 RX ORDER — MEPERIDINE HYDROCHLORIDE 25 MG/ML
12.5 INJECTION INTRAMUSCULAR; INTRAVENOUS; SUBCUTANEOUS EVERY 5 MIN PRN
Status: DISCONTINUED | OUTPATIENT
Start: 2023-12-07 | End: 2023-12-07 | Stop reason: HOSPADM

## 2023-12-07 RX ORDER — SODIUM CHLORIDE, SODIUM LACTATE, POTASSIUM CHLORIDE, CALCIUM CHLORIDE 600; 310; 30; 20 MG/100ML; MG/100ML; MG/100ML; MG/100ML
INJECTION, SOLUTION INTRAVENOUS CONTINUOUS PRN
Status: DISCONTINUED | OUTPATIENT
Start: 2023-12-07 | End: 2023-12-07

## 2023-12-07 RX ORDER — DEXAMETHASONE SODIUM PHOSPHATE 4 MG/ML
INJECTION, SOLUTION INTRA-ARTICULAR; INTRALESIONAL; INTRAMUSCULAR; INTRAVENOUS; SOFT TISSUE PRN
Status: DISCONTINUED | OUTPATIENT
Start: 2023-12-07 | End: 2023-12-07

## 2023-12-07 RX ORDER — ONDANSETRON 2 MG/ML
INJECTION INTRAMUSCULAR; INTRAVENOUS PRN
Status: DISCONTINUED | OUTPATIENT
Start: 2023-12-07 | End: 2023-12-07

## 2023-12-07 RX ADMIN — MIDAZOLAM 2 MG: 1 INJECTION INTRAMUSCULAR; INTRAVENOUS at 07:19

## 2023-12-07 RX ADMIN — Medication 10 MG: at 08:12

## 2023-12-07 RX ADMIN — SUGAMMADEX 200 MG: 100 INJECTION, SOLUTION INTRAVENOUS at 08:41

## 2023-12-07 RX ADMIN — SODIUM CHLORIDE, POTASSIUM CHLORIDE, SODIUM LACTATE AND CALCIUM CHLORIDE: 600; 310; 30; 20 INJECTION, SOLUTION INTRAVENOUS at 07:19

## 2023-12-07 RX ADMIN — GLUCAGON 0.4 MG: KIT at 08:03

## 2023-12-07 RX ADMIN — FENTANYL CITRATE 25 MCG: 50 INJECTION INTRAMUSCULAR; INTRAVENOUS at 07:50

## 2023-12-07 RX ADMIN — PHENYLEPHRINE HYDROCHLORIDE 100 MCG: 10 INJECTION INTRAVENOUS at 07:54

## 2023-12-07 RX ADMIN — PHENYLEPHRINE HYDROCHLORIDE 100 MCG: 10 INJECTION INTRAVENOUS at 08:37

## 2023-12-07 RX ADMIN — PHENYLEPHRINE HYDROCHLORIDE 100 MCG: 10 INJECTION INTRAVENOUS at 07:48

## 2023-12-07 RX ADMIN — FENTANYL CITRATE 50 MCG: 50 INJECTION INTRAMUSCULAR; INTRAVENOUS at 07:31

## 2023-12-07 RX ADMIN — DEXAMETHASONE SODIUM PHOSPHATE 4 MG: 4 INJECTION, SOLUTION INTRA-ARTICULAR; INTRALESIONAL; INTRAMUSCULAR; INTRAVENOUS; SOFT TISSUE at 07:40

## 2023-12-07 RX ADMIN — FENTANYL CITRATE 25 MCG: 50 INJECTION INTRAMUSCULAR; INTRAVENOUS at 08:18

## 2023-12-07 RX ADMIN — ONDANSETRON 4 MG: 2 INJECTION INTRAMUSCULAR; INTRAVENOUS at 08:38

## 2023-12-07 RX ADMIN — Medication 35 MG: at 07:31

## 2023-12-07 RX ADMIN — PROPOFOL 200 MG: 10 INJECTION, EMULSION INTRAVENOUS at 07:31

## 2023-12-07 RX ADMIN — SCOPALAMINE 1 PATCH: 1 PATCH, EXTENDED RELEASE TRANSDERMAL at 07:19

## 2023-12-07 RX ADMIN — LIDOCAINE HYDROCHLORIDE 100 MG: 20 INJECTION, SOLUTION INFILTRATION; PERINEURAL at 07:31

## 2023-12-07 RX ADMIN — PHENYLEPHRINE HYDROCHLORIDE 100 MCG: 10 INJECTION INTRAVENOUS at 08:28

## 2023-12-07 ASSESSMENT — ACTIVITIES OF DAILY LIVING (ADL)
ADLS_ACUITY_SCORE: 35

## 2023-12-07 NOTE — ANESTHESIA POSTPROCEDURE EVALUATION
Patient: Sarah Duran    Procedure: Procedure(s):  Combined Esophagoscopy, Gastroscopy, Duodenoscopy (Egd) With Argon Plasma Coagulator (Apc) ablation, biopsies, polypectomy  COLONOSCOPY, FLEXIBLE, WITH LESION REMOVAL USING SNARE, Argon plasma coagulation ablation       Anesthesia Type:  General    Note:  Disposition: Outpatient   Postop Pain Control: Uneventful            Sign Out: Well controlled pain   PONV: No   Neuro/Psych: Uneventful            Sign Out: Acceptable/Baseline neuro status   Airway/Respiratory: Uneventful            Sign Out: Acceptable/Baseline resp. status   CV/Hemodynamics: Uneventful            Sign Out: Acceptable CV status; No obvious hypovolemia; No obvious fluid overload   Other NRE: NONE   DID A NON-ROUTINE EVENT OCCUR? No           Last vitals:  Vitals Value Taken Time   /75 12/07/23 0945   Temp 36.8  C (98.2  F) 12/07/23 0945   Pulse 65 12/07/23 0949   Resp 14 12/07/23 0949   SpO2 94 % 12/07/23 0949   Vitals shown include unfiled device data.    Electronically Signed By: Handy Spring MD  December 7, 2023  9:50 AM

## 2023-12-07 NOTE — ANESTHESIA PREPROCEDURE EVALUATION
Anesthesia Pre-Procedure Evaluation    Patient: Sarah Duran   MRN: [id number] : 1965        Procedure : Procedure(s):  ESOPHAGOGASTRODUODENOSCOPY  SIGMOIDOSCOPY, FLEXIBLE          Past Medical History:   Diagnosis Date    Acute deep vein thrombosis (DVT) of left lower extremity (H)     Asthma     Atypical ductal hyperplasia of right breast 10/2016    fibrocystic changes, PASH and atypical ductal hyperplasia on needle biopsy, s/p R excisional biopsy with radioactive seed localization    Endometrial cancer (H) 02/15/2021    Factor 5 Leiden mutation, heterozygous (H24)     factor 5    Familial adenomatous polyposis     clinical presentation; s/p total abdominal colectomy with TRACEY; confirmed APC+ 2018    GERD (gastroesophageal reflux disease)     Infection due to 2019 novel coronavirus 2022    Migraine     Monoallelic mutation of APC gene 2018    [id number]    PONV (postoperative nausea and vomiting)       Past Surgical History:   Procedure Laterality Date    BIOPSY BREAST SEED LOCALIZATION Right 2016    Procedure: BIOPSY BREAST SEED LOCALIZATION;  Surgeon: Jessica Rasheed MD;  Location: [facility]    COLECTOMY      subtotal    COMBINED ESOPHAGOSCOPY, GASTROSCOPY, DUODENOSCOPY (EGD) WITH ARGON PLASMA COAGULATOR (APC) N/A 2018    Procedure: COMBINED ESOPHAGOSCOPY, GASTROSCOPY, DUODENOSCOPY (EGD) WITH ARGON PLASMA COAGULATOR (APC);  Esophagogastroduodenoscopy with polypectomy and polyp ablation;  Surgeon: Stephen Gasca MD;  Location: [facility]    DILATION AND CURETTAGE, HYSTEROSCOPY DIAGNOSTIC, COMBINED N/A 2/10/2021    Procedure: HYSTEROSCOPY, DIAGNOSTIC, WITH DILATION AND CURETTAGE OF UTERUS;  Surgeon: Sandie Samaniego MD;  Location: [facility]    ENDOSCOPIC RETROGRADE CHOLANGIOPANCREATOGRAM N/A 2021    Procedure: ENDOSCOPIC RETROGRADE CHOLANGIOPANCREATOGRAPHY WITH PANCREATIC STENT PLACEMENT AND POLYP ABLATION with Argon Plasma Coagulation;  Surgeon: Pradeep  Shahab Quezada MD;  Location: [facility]    ENDOSCOPIC RETROGRADE CHOLANGIOPANCREATOGRAM N/A 12/1/2022    Procedure: diagnostic ENDOSCOPIC RETROGRADE CHOLANGIOPANCREATOGRAPHY, ablation of duodenal polyps, biopsy of gastric polyp;  Surgeon: Shahab Fernández MD;  Location: [facility]    ENDOSCOPIC RETROGRADE CHOLANGIOPANCREATOGRAPHY      ENDOSCOPIC ULTRASOUND UPPER GASTROINTESTINAL TRACT (GI) N/A 7/25/2019    Procedure: Endoscopic Ultrasound;  Surgeon: Stephen Gasca MD;  Location: [facility]    ESOPHAGOSCOPY, GASTROSCOPY, DUODENOSCOPY (EGD), COMBINED  11/5/2012    Procedure: COMBINED ESOPHAGOSCOPY, GASTROSCOPY, DUODENOSCOPY (EGD), BIOPSY SINGLE OR MULTIPLE;;  Surgeon: Angélica Garcia MD;  Location: [facility]    ESOPHAGOSCOPY, GASTROSCOPY, DUODENOSCOPY (EGD), COMBINED  12/3/2013    Procedure: COMBINED ESOPHAGOSCOPY, GASTROSCOPY, DUODENOSCOPY (EGD);;  Surgeon: Angélica Garcia MD;  [facility]    ESOPHAGOSCOPY, GASTROSCOPY, DUODENOSCOPY (EGD), COMBINED N/A 3/28/2018    Procedure: COMBINED ESOPHAGOSCOPY, GASTROSCOPY, DUODENOSCOPY (EGD);;  Surgeon: Stephen Gasca MD;  [facility]    ESOPHAGOSCOPY, GASTROSCOPY, DUODENOSCOPY (EGD), COMBINED N/A 5/30/2019    Procedure: Esophagogastroduodenoscopy with biopsy x2 APC;  Surgeon: Stephen Gasca MD;  [facility]    ESOPHAGOSCOPY, GASTROSCOPY, DUODENOSCOPY (EGD), COMBINED N/A 7/30/2020    Procedure: ESOPHAGOGASTRODUODENOSCOPY (EGD) with polypectomy by hot snare; argonplasma coaglution ablation of polyps;  Surgeon: Stephen Gasca MD;  [facility]    ESOPHAGOSCOPY, GASTROSCOPY, DUODENOSCOPY (EGD), RESECT MUCOSA, COMBINED N/A 7/25/2019    Procedure: Esophagogastroduodenoscopy with gastric mucosal resection and clip application;  Surgeon: Stephen Gasca MD;  [facility]    EXCISE POLYP RECTUM      LAPAROSCOPIC HYSTERECTOMY TOTAL, BILATERAL SALPINGO-OOPHORECTOMY, NODE DISSECTION, COMBINED Bilateral 2/25/2021    Procedure: Laparoscopic  removal of uterus, cervix, both ovaries and fallopian tubes, sentinel lymph node dissection, cystoscopy;  Surgeon: Sandie Samaniego MD;  Location: [facility]    LAPAROSCOPIC LYSIS ADHESIONS      SIGMOIDOSCOPY FLEXIBLE      SIGMOIDOSCOPY FLEXIBLE N/A 12/7/2015    Procedure: SIGMOIDOSCOPY FLEXIBLE;  Surgeon: Mariela Bearden MD;  Location: [facility]    SIGMOIDOSCOPY FLEXIBLE N/A 3/28/2018    Procedure: SIGMOIDOSCOPY FLEXIBLE;  EGD/Flex Sig;  Surgeon: Stephen Gasca MD;  Location: [facility]    SIGMOIDOSCOPY FLEXIBLE N/A 4/12/2018    Procedure: SIGMOIDOSCOPY FLEXIBLE;  Flexible Sigmoidoscopy with polyp ablation;  Surgeon: Stephen Gasca MD;  [facility]    SIGMOIDOSCOPY FLEXIBLE N/A 5/30/2019    Procedure: Flexible Sigmoidoscopy with polypectomy;  Surgeon: Stephen Gasca MD;  [facility]    SIGMOIDOSCOPY FLEXIBLE N/A 7/30/2020    Procedure: SIGMOIDOSCOPY, FLEXIBLE with Ablation of Polyps;  Surgeon: Stephen Gasca MD;  [facility]    SIGMOIDOSCOPY FLEXIBLE N/A 9/23/2021    Procedure: FLEXIBLE SIGMOIDOSCOPY WITH POLYPECTOMY;  Surgeon: Stephen Gasca MD;  [facility]    SIGMOIDOSCOPY FLEXIBLE N/A 12/1/2022    Procedure: SIGMOIDOSCOPY, FLEXIBLE with polypectomy;  Surgeon: Stephen Gasca MD;  Location: [facility] OR    [facility] LAP,SURG,COLECTOMY,W/ANAST      for colon cancer      Allergies   Allergen Reactions    Corn-Containing Products Shortness Of Breath     Avoids food; ok in tiny doses, such as in medicines  Avoids food; ok in tiny doses, such as in medicines    Cipro [Ciprofloxacin]      Reddened skin    Flagyl [Metronidazole] Nausea and Vomiting    Garlic Fatigue     GI distress    Keflex [Cephalosporins] Nausea and Vomiting    Omeprazole      PN: headache    Prilosec Otc [Omeprazole Magnesium]      migraines    Cephalexin Rash     PN: Rash, Generalized    Doxycycline Rash     blisters    Erythromycin Rash    Sulfa Antibiotics Rash     Ok to use       Social History      Tobacco Use    Smoking status: Never    Smokeless tobacco: Never   Substance Use Topics    Alcohol use: Not Currently     Comment: 1 or 2 drinks per year      Wt Readings from Last 1 Encounters:   12/07/23 64.3 kg (141 lb 12.1 oz)        Anesthesia Evaluation    Type: General and MAC.    History of anesthetic complications   Requests ondansetron intraop.    ROS/MED HX  ENT/Pulmonary:     (+)                    Intermittent, asthma  Treatment: Inhaler prn,                 Neurologic:     (+)      migraines,                       (-) no CVA and no TIA   Cardiovascular:  - neg cardiovascular ROS   (+) Dyslipidemia - -   -  - -                                   (-) hypertension   METS/Exercise Tolerance: >4 METS    Hematologic: Comments: Factor V Leiden mutation, no history of blood clots    (+) History of blood clots (Factor V Leiden deficiency),    pt is not anticoagulated,           Musculoskeletal:  - neg musculoskeletal ROS  (-) arthritis   GI/Hepatic: Comment: Familial adenomatous polyposis s/p total abdominal colectomy    (+) GERD,                   Renal/Genitourinary:  - neg Renal ROS  (-) renal disease   Endo:    (-) Type II DM, thyroid disease, chronic steroid usage and obesity   Psychiatric/Substance Use:  - neg psychiatric ROS     Infectious Disease: Comment: COVID NEGATIVE 2/21/21   (-) HIV   Malignancy:   (+) Malignancy, History of Other.Other CA endometrial cancer Active status post Surgery.    Other:  - neg other ROS   (-) Any chance pregnant       Physical Exam    Airway        Mallampati: II   TM distance: > 3 FB   Neck ROM: full   Mouth opening: > 3 cm    Respiratory Devices and Support         Dental           Cardiovascular   cardiovascular exam normal       Rhythm and rate: regular and normal     Pulmonary   pulmonary exam normal        breath sounds clear to auscultation           OUTSIDE LABS:  CBC:   Lab Results   Component Value Date    WBC 3.7 (L) 12/01/2022    WBC 4.0 09/23/2021    HGB  13.9 12/01/2022    HGB 14.9 09/23/2021    HCT 42.4 12/01/2022    HCT 45.0 09/23/2021     12/01/2022     09/23/2021     BMP:   Lab Results   Component Value Date     12/01/2022     09/23/2021    POTASSIUM 4.1 12/01/2022    POTASSIUM 3.8 09/23/2021    CHLORIDE 107 12/01/2022    CHLORIDE 109 09/23/2021    CO2 20 (L) 12/01/2022    CO2 23 09/23/2021    BUN 11.4 12/01/2022    BUN 14 09/23/2021    CR 0.84 12/01/2022    CR 0.83 09/23/2021    GLC 97 12/01/2022    GLC 84 09/23/2021     COAGS:   Lab Results   Component Value Date    PTT 26 05/07/2009    INR 1.01 12/01/2022     POC:   Lab Results   Component Value Date     (H) 02/25/2021    HCG Negative 07/25/2019     HEPATIC:   Lab Results   Component Value Date    ALBUMIN 4.2 12/01/2022    PROTTOTAL 7.0 12/01/2022    ALT 15 12/01/2022    AST 24 12/01/2022    ALKPHOS 47 12/01/2022    BILITOTAL 0.6 12/01/2022     OTHER:   Lab Results   Component Value Date    WOLF 9.8 12/01/2022    LIPASE 56 12/01/2022    AMYLASE 111 (H) 12/01/2022    TSH 0.88 03/05/2019       Anesthesia Plan    ASA Status:  3    NPO Status:  NPO Appropriate    Anesthesia Type: General.     - Airway: ETT   Induction: Intravenous.   Maintenance: Inhalation.        Consents    Anesthesia Plan(s) and associated risks, benefits, and realistic alternatives discussed. Questions answered and patient/representative(s) expressed understanding.     - Discussed: Risks, Benefits and Alternatives for BOTH SEDATION and the PROCEDURE were discussed     - Discussed with:  Patient      - Extended Intubation/Ventilatory Support Discussed: No.      - Patient is DNR/DNI Status: No     Use of blood products discussed: No .     Postoperative Care    Pain management: IV analgesics, Oral pain medications.     - Plan for long acting post-op opioid use   PONV prophylaxis: Ondansetron (or other 5HT-3), Dexamethasone or Solumedrol     Comments:           H&P reviewed: Unable to attach H&P to encounter due  "to EHR limitations. H&P Update: appropriate H&P reviewed, patient examined. No interval changes since H&P (within 30 days).        Tiesha Franklin MD    I have reviewed the pertinent notes and labs in the chart from the past 30 days and (re)examined the patient.  Any updates or changes from those notes are reflected in this note.              # Overweight: Estimated body mass index is 25.51 kg/m  as calculated from the following:    Height as of this encounter: 1.588 m (5' 2.5\").    Weight as of this encounter: 64.3 kg (141 lb 12.1 oz).      "

## 2023-12-07 NOTE — DISCHARGE INSTRUCTIONS
Contacting your Doctor -   To contact a doctor, call Dr. Gasca at the [facility] @ [phone] or:  [phone] and ask for the resident on call for Gastroenterology (answered 24 hours a day)   Emergency Department:  [facility]: [phone] if you are in need of immediate or emergent help

## 2023-12-07 NOTE — ANESTHESIA CARE TRANSFER NOTE
Patient: Sarah Duran    Procedure: Procedure(s):  Combined Esophagoscopy, Gastroscopy, Duodenoscopy (Egd) With Argon Plasma Coagulator (Apc) ablation, biopsies, polypectomy  COLONOSCOPY, FLEXIBLE, WITH LESION REMOVAL USING SNARE, Argon plasma coagulation ablation       Diagnosis: FAP (familial adenomatous polyposis) [D13.91]  High risk for colon cancer [Z91.89]  Diagnosis Additional Information: No value filed.    Anesthesia Type:   General     Note:    Oropharynx: oropharynx clear of all foreign objects and spontaneously breathing  Level of Consciousness: awake  Oxygen Supplementation: nasal cannula  Level of Supplemental Oxygen (L/min / FiO2): 4  Independent Airway: airway patency satisfactory and stable  Dentition: dentition unchanged  Vital Signs Stable: post-procedure vital signs reviewed and stable  Report to RN Given: handoff report given  Patient transferred to: PACU    Handoff Report: Identifed the Patient, Identified the Reponsible Provider, Reviewed the pertinent medical history, Discussed the surgical course, Reviewed Intra-OP anesthesia mangement and issues during anesthesia, Set expectations for post-procedure period and Allowed opportunity for questions and acknowledgement of understanding      Vitals:  Vitals Value Taken Time   /74 12/07/23 0853   Temp     Pulse 80 12/07/23 0856   Resp 13 12/07/23 0856   SpO2 95 % 12/07/23 0856   Vitals shown include unfiled device data.    Electronically Signed By: REYNOLD Jordan CRNA  December 7, 2023  8:57 AM

## 2023-12-07 NOTE — BRIEF OP NOTE
[facility]    Brief Operative Note    Pre-operative diagnosis: FAP (familial adenomatous polyposis) [D13.91]  High risk for colon cancer [Z91.89]  Post-operative diagnosis Numerous gastric fundic polyps. Few duodenal polyps. Several rectal and sigmoid polyps.    Procedure: Combined Esophagoscopy, Gastroscopy, Duodenoscopy (Egd) With Argon Plasma Coagulator (Apc), biopsies, polypectomy, N/A - Esophagus  COLONOSCOPY, FLEXIBLE, WITH LESION REMOVAL USING SNARE, Argon plasma coagulation, N/A - Rectum    Surgeon: Surgeon(s) and Role:     * Stephen Gasca MD - Primary  Anesthesia: General   Estimated Blood Loss: None    Drains: None  Specimens:   ID Type Source Tests Collected by Time Destination   1 : duodenal polyps Polyp Small Intestine, Duodenum SURGICAL PATHOLOGY EXAM Stephen Gasca MD 12/7/2023  7:58 AM    2 : cluster of polyps in the fundus Biopsy Stomach, Fundus SURGICAL PATHOLOGY EXAM Stephen Gasca MD 12/7/2023  8:11 AM    3 : sigmoid polyps Polyp Large Intestine, Colon, Sigmoid SURGICAL PATHOLOGY EXAM Stephen Gasca MD 12/7/2023  8:30 AM    4 : rectal polyps Polyp Rectum SURGICAL PATHOLOGY EXAM Stephen Gasca MD 12/7/2023  8:36 AM      Findings:   EGD showed carpeting of the fundus and body with polyps. There was a large cluster of overlapping polyps in the fundus measuring 4-5 cm in diameter. There was no ulceration. Multiple biopsies were obtained. Probing showed this to be multiple separate polyps.    A few (approximately 20)  small polyps were seen in the duodenum. None were larger than 10 mm. Larger polyps were snared and retrieved. Smaller polyps were ablated with APC at right colon settings.    No polyps were seen at the site of prior ampullectomy.    Sigmoidoscopy showed an anastomosis at 20 cm. Multiple small 2-8 mm sessile polyps were present. Larger polyps were resected with cold snare. Smaller polyps were ablated with  APC at rectal setting.    Retroflex was normal.    All visible polyps were resected or ablated.  Complications: None.  Implants: * No implants in log *    FATMATA Gasac MD  Professor of Medicine  Division of Gastroenterology, Hepatology and Nutrition  [facility]

## 2023-12-07 NOTE — ANESTHESIA PROCEDURE NOTES
Airway       Patient location during procedure: OR       Procedure Start/Stop Times: 12/7/2023 7:36 AM  Staff -        Anesthesiologist:  Tiesha Franklin MD       CRNA: Denzel Nugent APRN CRNA       Performed By: CRNA  Consent for Airway        Urgency: elective  Indications and Patient Condition       Indications for airway management: nadeem-procedural       Induction type:intravenous       Mask difficulty assessment: 1 - vent by mask    Final Airway Details       Final airway type: endotracheal airway       Successful airway: ETT - single and Oral  Endotracheal Airway Details        ETT size (mm): 6.5       Cuffed: yes       Successful intubation technique: direct laryngoscopy       DL Blade Type: MAC 3       Grade View of Cords: 1       Adjucts: stylet       Position: Right       Measured from: gums/teeth       Secured at (cm): 21       Bite block used: None    Post intubation assessment        Placement verified by: capnometry, equal breath sounds and chest rise        Number of attempts at approach: 1       Number of other approaches attempted: 0       Secured with: tape       Ease of procedure: easy       Dentition: Intact    Medication(s) Administered   Medication Administration Time: 12/7/2023 7:36 AM

## 2023-12-14 DIAGNOSIS — D13.91 FAP (FAMILIAL ADENOMATOUS POLYPOSIS): Primary | ICD-10-CM

## 2024-01-14 ENCOUNTER — HEALTH MAINTENANCE LETTER (OUTPATIENT)
Age: 59
End: 2024-01-14

## 2024-01-19 NOTE — PROGRESS NOTES
Oncology Risk Management Consultation:  Date on this visit: 2024  Sarah Duran requires high risk screening and surveillance to reduce her risk of cancer secondary to  a history of atypical ductal hyperplasia (ADH). She is considered to be at high risk for breast cancer, with a risk of up to 35%. She has a history of Stage II, Grade I endometrial adenocarcinoma, diagnosed in 2020; followed by Dr. Sandie Lezama. She also carries an APC+ genetic mutation, consistent with a diagnosis of Familial Adenomatous Polyposis (FAP) or attenuated FAP (AFAP) with a history of a thyroid nodule and gastric polyps.     Primary Physician: Ida Sandoval MD     History Of Present Illness:  Ms. Duran is a very pleasant  58 year old female who presents with a personal history of ADH and a likely attenuated version of Familial Adenomatous Polyposis. She is s/p right excisional breast biopsy on 2016. She is also s/p ileocolonic anastomosis in .     Genetic Testin2018: POSITIVE for a APC mutation.  Specifically her mutation is called [id number],  consistent with a diagnosis of familial adenomatous polyposis (FAP) or attenuated FAP (AFAP). The mutation was identified using [place] [employer] testing through [employer].  The testing was done because of her personal history of colonic polyposis and family history of colon cancer.     Of note, Sarah tested negative for mutations in the following genes: BMPR1A, CDH1, CHEK2, MLH1, MSH2, MSH6, MUTYH, PMS2, POLD1, POLE, PTEN, SMAD4, STK11 and TP53 (sequencing and deletion/duplication); EPCAM and GREM1 (deletion/duplication only).        Pertinent history:  Attenuated Familial adenomatous polyposis, s/p ileal rectal anastomosis in May 1988. Confirmed with APC testing (see below)  Subsequent laparotomy in  for bowel obstruction from adhesions.  Menarche at 12.  Nulliparous.  Menopause at 50.  Breast density: scattered fibroglandular densities  Hx of  BSO for Stage II, Grade I  endometrial cancer (see details below)     21:  US Pelvis:  Uterus: Measures 8.9 x 5.8 x 6.4 cm. The endometrium is thickened at 2.9 cm. The endometrium is heterogeneous in appearance with an irregular, nodular contour of the endometrial surface. There is hypoechoic complex fluid in the endometrial cavity likely containing blood. There is vascularity of the endometrium on color imaging. The abnormal endometrium occupies the majority of the uterus. Right Ovary: Measures 2.0 x 1.0 x 1.1 cm and appears unremarkable Right Ovary Blood Flow: Present. Left Ovary: Not seen. Free Fluid: No significant free fluid.There are no suspicious adnexal masses.     2/10/21:  Hysteroscopy, D&C. Pathology: Grade 1 endometrial adenocarcinoma, MMR intact, p53 wild type, ER/MI +     21:  Total laparoscopic hysterectomy, bilateral salpingo-oophorectomy, bilateral sentinel lymph node dissection, cystoscopy. Pathology:  Stage II, grade 1 endometrial adenocarcinoma, tumor size 5.6 cm, 1/13 mm myometrial invasion, +CASEY, + cervix, 0/2 sentinel LN,         21:  Completed brachytherapy to 3000cGy in 5 Fx to the vagina     Thyroid Screenin2019 - FNA biopsy of thyroid, for solid appearing nodule in isthmus, biopsy non diagnostic; cyst fluid only.   2020- US of the thyroid: Previously biopsied isthmus nodule is no longer present. There are numerous additional colloid cysts and a benign complex cyst   left lobe, not appreciably changed from previous.   2021- US of thyroid; no change in nodules  10/7/2022- US of thyroid: NODULES: There are benign colloid cysts. There is only one partially solid nodule on today's examination, listed below.TI-RADS 2. No FNA. IMPRESSION: Benign colloid cysts and benign partially cystic 5 mm     GI screening history (see chart for further details)  2005 - Small bowel enteroscopy, one tubular adenoma removed from partial anal verge.  2005 - adenomatous  polyp removed from rectum  2/28/2005 - tubular adenoma, duodenum.  11/16/2011 - fundic gland polyps in stomach, tubular adenoma removed from rectum.  11/5/2012 - 1 tubulovillous adenoma, rectum  7/8/2013 - 1 tubulovillous adenoma, rectum  12/3/2013 - 1 tubulovillous adenoma, rectum  7/21/2014 - 1 tubular adenoma and hyperplastic polyp, rectum  1/20/2015 - 1 tubular adenoma and hyperplastic polyp, rectum  12/7/2015 - 1 tubular adenoma, rectum  6/6/2016 - 1 fundic gland polyp with focal adenomatous changes  3/20/2017 - 2 tubular adenomas removed from rectum  4/12/2017 - colonoscopy and EGD, one fundic gland gastric polyp with focal low grade dysplasia removed, multiple duodenal polyps and multiple rectal polyps.  7/25/2019 - EGD: Two previously reported polyps (6.2 mm and 5.3 mm) in the antrum were evaluate by EUS, which did not demonstrate deep layer involvement, and then they were  removed by EMR by using a band ligator and a snare. The defect was then closed with hemoclips. Innumerous fundic gland polyps in the gastric body,  fundus, and cardia. Consistent with diagnosis of FAP. A large fundic gland polyp in the gastric body, which has been previously resected and recent biopsied. Recommendation: Use Aciphex (rabeprazole) 20 mg PO BID for 1 month.  Repeat the upper endoscopy in 1 year for surveillance.      7/30/2020- EGD and flexible sigmoidoscopy:  15-20 duodenal polyps, measuring 1-3 mm, all of which were ablated using argon plasma coagulation. Innumerable fundic gland polyps in a confluent carpet-like distribution throughout the cardia, fundus, and gastric body. Antral-sparing present. A large 2-3 semi-pedunculated polyp in the proximal gastric body  (along the lesser curvature) was resected.  Suspected very small adenomas flanking the pancreaticobiliary orifices at the prior ampullectomy site.  Normal esophagus. Pathology: Fundic gland polyp with adenomatous change (low grade dysplasia). Negative for  intestinal metaplasia, high-grade dysplasia or malignancy. Recommendation: Schedule for flexible sigmoidoscopy (along with EGD and ERCP) with Dr. Fernández in 1-year.      9/23/2021- ENDOSCOPIC RETROGRADE CHOLANGIOPANCREATOGRAPHY ESOPHAGOGASTRODUODENOSCOPY (EGD), SIGMOIDOSCOPY, FLEXIBLE (Dr. Shahab Fernández): Results: Prior biliary sphincterotomy appeared open. Prior pancreatic sphincterotomy appeared open. Two small button like polyps to left of pancreatic orifice. Attempted cold snare polypectomy but slid off both time    After pancreatic stent placed, entire area next to orifice ablated w R colon setting pulse APC. DUODENUM examined, no other polyps to D3. No antral polyps, but entire gastric body replaced w  multiple fundic gland polyps, not biopsied Recommendation:Confirm spontaneous stent passage by performing a KUB x-ray in 4 weeks. Repeat ERCP in 1 year for surveillance.      Flexible sigmoidoscopy (Dr. Gasca) Ten 2 to 4 mm tubular adenomas in rectum. Patent ileo-rectal anastomosis and normal distal ileum.     12/1/2022: ENDOSCOPIC RETROGRADE CHOLANGIOPANCREATOGRAPHY: Multiple gastric polyps likely fundic gland. Very large (4 cm oozing) polyp in gastric fundus, biopsied as may represent adenomatous transformation, vs large fundic gland polyp. Prior endoscopic papillectomy without recurrence A few duodenal polyps ablated. Biopsy was performed largest gastric polyp. Repeat ERCP in one year. Path: Fundic gland polyp with low-grade dysplasia     12/1/2022: Flexible Sigmoidoscopy: Multiple 2 to 3 mm polyps in the rectum, at the recto-sigmoid colon and in the sigmoid colon, removed with a cold snare. Resected and retrieved. Patent end-to-side ileo-colonic anastomosis, characterized by healthy appearing mucosa. Path: Tubular adenomas. Repeat in one year    12/7/2023: Upper GI Endoscopy: Normal esophagus. Carpeting of the proximal stomach with polyps consistent with fundic gland polyps. Large more protuberant cluster  of polyps in the fundus. Previous biopsies by Dr. Fernández 1 yr ago were benign. On probing this appears to be multiple overlapping polyps which were soft with probing. Biopsies again obtained. There was no ulceration. Multiple duodenal polyp. Approximately 20. Large polyps cold snared and retrieves. Smaller polyps ablated with APC. No evidence of residual polyp tissue at the ampullectomy site. Repeat in one year. Path: Duodenal polyps: Duodenal adenoma; negative for dysplasia. Stomach/fundus polyps:  Fundic gland polyps(s) with low-grade dysplasia     12/7/2023: Flexible Sigmoidoscopy: Multiple sigmoid and rectal polyps (approximately 20). Either resected and retrieved or (smaller polyps) ablated with APC. All visible polyps were resected or ablated. Repeat in one year. Path: Colon and Sigmoid polyps: Colonic mucosa with superficial hyperplastic changes No evidence of neoplastic polyp. Rectal polyp: tubular adenoma.     Breast Screening history:   11/28/2016 - R excisional breast biopsy with seed localization, Fibrocystic tissue with ADH on pathology.  4/24/2017 - R breast mammogram, [email or web address].  5/25/2018 - Breast MRI, [email or web address].  11/8/2018 - Screening tomosynthesis mammogram, BiRads1  6/10/2019 - Breast MRI, BiRads2.  12/20/2019- Screening tomosynthesis mammogram, BiRads1  2/3/2021- Breast MRI, BiRads1  10/8/2021- Screening tomosynthesis mammogram, BiRads1  6/22/2022- Breast MRI, BiRads2  10/21/2022- Screening tomosynthesis mammogram, BiRads1     At this visit, she denies asymmetry, lumps, masses, thickening, pain, nipple discharge and skin changes in her breasts.  She denies vaginal bleeding.  She denies nausea, vomiting, discomfort after eating, abdominal pain, bloating, heartburn, constipation,  diarrhea and early satiety.    Past Medical/Surgical History:  Past Medical History:   Diagnosis Date    Acute deep vein thrombosis (DVT) of left lower extremity (H)     Asthma     Atypical ductal hyperplasia of right breast  10/2016    fibrocystic changes, PASH and atypical ductal hyperplasia on needle biopsy, s/p R excisional biopsy with radioactive seed localization    Endometrial cancer (H) 02/15/2021    Factor 5 Leiden mutation, heterozygous (H24)     factor 5    Familial adenomatous polyposis 1988    clinical presentation; s/p total abdominal colectomy with TRACEY; confirmed APC+ 2018    GERD (gastroesophageal reflux disease)     Infection due to 2019 novel coronavirus 11/02/2022    Migraine     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    [id number]    PONV (postoperative nausea and vomiting)      Past Surgical History:   Procedure Laterality Date    BIOPSY BREAST SEED LOCALIZATION Right 11/30/2016    Procedure: BIOPSY BREAST SEED LOCALIZATION;  Surgeon: Jessica Rasheed MD;  Location: [facility] OR    COLECTOMY      subtotal    COLONOSCOPY N/A 12/7/2023    Procedure: COLONOSCOPY, FLEXIBLE, WITH LESION REMOVAL USING SNARE, Argon plasma coagulation ablation;  Surgeon: Stephen Gasca MD;  Location: [facility] OR    COMBINED ESOPHAGOSCOPY, GASTROSCOPY, DUODENOSCOPY (EGD) WITH ARGON PLASMA COAGULATOR (APC) N/A 4/12/2018    Procedure: COMBINED ESOPHAGOSCOPY, GASTROSCOPY, DUODENOSCOPY (EGD) WITH ARGON PLASMA COAGULATOR (APC);  Esophagogastroduodenoscopy with polypectomy and polyp ablation;  Surgeon: Stephen Gasca MD;  Location: [facility]    COMBINED ESOPHAGOSCOPY, GASTROSCOPY, DUODENOSCOPY (EGD) WITH ARGON PLASMA COAGULATOR (APC) N/A 12/7/2023    Procedure: Combined Esophagoscopy, Gastroscopy, Duodenoscopy (Egd) With Argon Plasma Coagulator (Apc) ablation, biopsies, polypectomy;  Surgeon: Stephen Gasca MD;  Location: [facility]    DILATION AND CURETTAGE, HYSTEROSCOPY DIAGNOSTIC, COMBINED N/A 2/10/2021    Procedure: HYSTEROSCOPY, DIAGNOSTIC, WITH DILATION AND CURETTAGE OF UTERUS;  Surgeon: Sandie Samaniego MD;  Location: [facility] OR    ENDOSCOPIC RETROGRADE CHOLANGIOPANCREATOGRAM N/A 9/23/2021    Procedure: ENDOSCOPIC  RETROGRADE CHOLANGIOPANCREATOGRAPHY WITH PANCREATIC STENT PLACEMENT AND POLYP ABLATION with Argon Plasma Coagulation;  Surgeon: Shahab Fernández MD;  Location: [facility]    ENDOSCOPIC RETROGRADE CHOLANGIOPANCREATOGRAM N/A 12/1/2022    Procedure: diagnostic ENDOSCOPIC RETROGRADE CHOLANGIOPANCREATOGRAPHY, ablation of duodenal polyps, biopsy of gastric polyp;  Surgeon: Shahab Fernández MD;  Location: [facility]    ENDOSCOPIC RETROGRADE CHOLANGIOPANCREATOGRAPHY      ENDOSCOPIC ULTRASOUND UPPER GASTROINTESTINAL TRACT (GI) N/A 7/25/2019    Procedure: Endoscopic Ultrasound;  Surgeon: Stephen Gasca MD;  Location: [facility]    ESOPHAGOSCOPY, GASTROSCOPY, DUODENOSCOPY (EGD), COMBINED  11/5/2012    Procedure: COMBINED ESOPHAGOSCOPY, GASTROSCOPY, DUODENOSCOPY (EGD), BIOPSY SINGLE OR MULTIPLE;;  Surgeon: Angélica Garcia MD;  Location: [facility]    ESOPHAGOSCOPY, GASTROSCOPY, DUODENOSCOPY (EGD), COMBINED  12/3/2013    Procedure: COMBINED ESOPHAGOSCOPY, GASTROSCOPY, DUODENOSCOPY (EGD);;  Surgeon: Angélica Garcia MD;  [facility]    ESOPHAGOSCOPY, GASTROSCOPY, DUODENOSCOPY (EGD), COMBINED N/A 3/28/2018    Procedure: COMBINED ESOPHAGOSCOPY, GASTROSCOPY, DUODENOSCOPY (EGD);;  Surgeon: Stephen Gasca MD;  [facility]    ESOPHAGOSCOPY, GASTROSCOPY, DUODENOSCOPY (EGD), COMBINED N/A 5/30/2019    Procedure: Esophagogastroduodenoscopy with biopsy x2 APC;  Surgeon: Stephen Gasca MD;  [facility]    ESOPHAGOSCOPY, GASTROSCOPY, DUODENOSCOPY (EGD), COMBINED N/A 7/30/2020    Procedure: ESOPHAGOGASTRODUODENOSCOPY (EGD) with polypectomy by hot snare; argonplasma coaglution ablation of polyps;  Surgeon: Stephen Gasca MD;  [facility]    ESOPHAGOSCOPY, GASTROSCOPY, DUODENOSCOPY (EGD), RESECT MUCOSA, COMBINED N/A 7/25/2019    Procedure: Esophagogastroduodenoscopy with gastric mucosal resection and clip application;  Surgeon: Stephen Gasca MD;  [facility]    EXCISE POLYP RECTUM       LAPAROSCOPIC HYSTERECTOMY TOTAL, BILATERAL SALPINGO-OOPHORECTOMY, NODE DISSECTION, COMBINED Bilateral 2/25/2021    Procedure: Laparoscopic removal of uterus, cervix, both ovaries and fallopian tubes, sentinel lymph node dissection, cystoscopy;  Surgeon: Sandie Samaniego MD;  Location: [facility]    LAPAROSCOPIC LYSIS ADHESIONS      SIGMOIDOSCOPY FLEXIBLE      SIGMOIDOSCOPY FLEXIBLE N/A 12/7/2015    Procedure: SIGMOIDOSCOPY FLEXIBLE;  Surgeon: Mariela Bearden MD;  Location: [facility]    SIGMOIDOSCOPY FLEXIBLE N/A 3/28/2018    Procedure: SIGMOIDOSCOPY FLEXIBLE;  EGD/Flex Sig;  Surgeon: Stephen Gasca MD;  [facility]: [facility]    SIGMOIDOSCOPY FLEXIBLE N/A 4/12/2018    Procedure: SIGMOIDOSCOPY FLEXIBLE;  Flexible Sigmoidoscopy with polyp ablation;  Surgeon: Stephen Gasca MD;  [facility]    SIGMOIDOSCOPY FLEXIBLE N/A 5/30/2019    Procedure: Flexible Sigmoidoscopy with polypectomy;  Surgeon: Stephen Gasca MD;  [facility]    SIGMOIDOSCOPY FLEXIBLE N/A 7/30/2020    Procedure: SIGMOIDOSCOPY, FLEXIBLE with Ablation of Polyps;  Surgeon: Stephen Gasca MD;  [facility]    SIGMOIDOSCOPY FLEXIBLE N/A 9/23/2021    Procedure: FLEXIBLE SIGMOIDOSCOPY WITH POLYPECTOMY;  Surgeon: Stephen Gasca MD;  [facility]    SIGMOIDOSCOPY FLEXIBLE N/A 12/1/2022    Procedure: SIGMOIDOSCOPY, FLEXIBLE with polypectomy;  Surgeon: Stephen Gasca MD;  [facility]    ZZC LAP,SURG,COLECTOMY,W/ANAST      for colon cancer       Allergies:  Allergies as of 01/23/2024 - Reviewed 01/23/2024   Allergen Reaction Noted    Corn-containing products Shortness Of Breath 07/29/2020    Cipro [ciprofloxacin]  02/24/2005    Flagyl [metronidazole] Nausea and Vomiting 02/24/2005    Garlic Fatigue 07/29/2020    Keflex [cephalosporins] Nausea and Vomiting 02/24/2005    Omeprazole  04/29/2010    Prilosec otc [omeprazole magnesium]  01/28/2012    Cephalexin Rash 06/14/2004    Doxycycline Rash 04/12/2018     "Erythromycin Rash 02/24/2005    Sulfa antibiotics Rash 02/24/2005       Current Medications:  Current Outpatient Medications   Medication Sig Dispense Refill    acetaminophen (TYLENOL) 325 MG tablet Take 2 tablets (650 mg) by mouth every 6 hours as needed for mild pain 30 tablet 0    albuterol 90 MCG/ACT inhaler Inhale 2 puffs into the lungs every 6 hours as needed.      Apple Cider Vinegar 500 MG TABS Take 500 mg by mouth daily      ASHWAGANDHA PO Take 150 mg by mouth One gummy per day      calcium-magnesium (CALMAG) 500-250 MG TABS Take 1 tablet by mouth daily      celecoxib (CELEBREX) 100 MG capsule TAKE ONE CAPSULE BY MOUTH ONCE DAILY 90 capsule 3    Cranberry 500 MG CAPS Take 1 capsule by mouth daily      docusate sodium (COLACE) 100 MG capsule Take 100 mg by mouth daily      fexofenadine (ALLEGRA) 180 MG tablet Take 180 mg by mouth daily.      Lactobacillus (PROBIOTIC ACIDOPHILUS PO) Take 100 mg by mouth daily      MELATONIN PO Take 2 mg by mouth nightly as needed       multivitamin, therapeutic with minerals (THERA-VIT-M) TABS Take 3 tablets by mouth 2 times daily       Omega-3 Fatty Acids (OMEGA-3 FISH OIL PO) Take 2 g by mouth daily       triamcinolone (NASACORT) 55 MCG/ACT Inhaler Spray 2 sprays into both nostrils daily      VENTOLIN  (90 Base) MCG/ACT inhaler       ZOLMitriptan (ZOMIG) 5 MG tablet Take 1 tablet (5 mg) by mouth at onset of headache for migraine 90 tablet 3    bisacodyl (DULCOLAX) 5 MG EC tablet Refer to \"Getting Ready for a Colonoscopy\" instruction handout (Patient not taking: Reported on 1/23/2024) 4 tablet 0    ciclopirox (PENLAC) 8 % external solution Apply topically every evening. For one year (Patient not taking: Reported on 1/23/2024)      terbinafine (LAMISIL) 250 MG tablet Take 250 mg by mouth (Patient not taking: Reported on 1/23/2024)          Family History:  Family History   Problem Relation Age of Onset    Hyperlipidemia Mother     Cerebrovascular Disease Mother     " Cerebrovascular Disease Father     Colon Cancer Father 57         at 63    Prostate Cancer Brother 53        recurrence at 65    Prostate Cancer Maternal Uncle 65       Social History:  Social History     Socioeconomic History    Marital status:      Spouse name: Roberto    Number of children: 2    Years of education: Not on file    Highest education level: Not on file   Occupational History    Occupation:      Employer: [employer]   Tobacco Use    Smoking status: Never    Smokeless tobacco: Never   Vaping Use    Vaping Use: Never used   Substance and Sexual Activity    Alcohol use: Not Currently     Comment: 1 or 2 drinks per year    Drug use: No    Sexual activity: Not Currently     Partners: Male     Birth control/protection: Surgical     Comment: THBSO for endometrial cancer and postmenopausal prior to surgery   Other Topics Concern    Parent/sibling w/ CABG, MI or angioplasty before 65F 55M? Not Asked   Social History Narrative    Two adopted daughters, ages 13 and 15     Social Determinants of Health     Financial Resource Strain: Not on file   Food Insecurity: Not on file   Transportation Needs: Not on file   Physical Activity: Not on file   Stress: Not on file   Social Connections: Not on file   Interpersonal Safety: Not At Risk (10/21/2022)    Humiliation, Afraid, Rape, and Kick questionnaire     Fear of Current or Ex-Partner: No     Emotionally Abused: No     Physically Abused: No     Sexually Abused: No   Housing Stability: Not on file       Physical Exam:  /79 (BP Location: Right arm, Patient Position: Sitting, Cuff Size: Adult Regular)   Pulse 73   Temp 97.9  F (36.6  C) (Oral)   Resp 16   Wt 66.9 kg (147 lb 8 oz)   LMP 2019   SpO2 96%   BMI 26.55 kg/m      NECK: no adenopathy, no asymmetry or masses    GENERAL APPEARANCE: healthy, alert and no apparent distress    BREAST: A multipositional, bilateral breast exam was performed.  Fairly symmetrical. Right  breast: no palpable dominant masses, no nipple discharge, no skin changes.   Right axilla: no palpable adenopathy. Left breast: no palpable dominant masses, no nipple discharge, no skin changes. Left axilla: no palpable adenopathy.       LYMPHATICS: No cervical, supraclavicular, or axillary lymphadenopathy    SKIN: no suspicious lesions or rashes on examined skin    Laboratory/Imaging Studies  No results found for any visits on 01/23/24.    ASSESSMENT  We discussed concerns and symptoms to be watchful for between visits including breast lumps, bumps, nipple discharge, nipple inversion, abdominal bloating, changes in bowel or bladder habits or early satiety. Sarah has no updates to her family history and stated that she has had a healthy year. She has been exercising on the [employer] and lifts weights regularly.      Individualized Surveillance Plan for women  With 20% or greater lifetime risk of breast cancer   Per NCCN Breast Cancer Screening and Diagnosis Guidelines Version 1.2023   Recommended screening Test or procedure Last done Next Scheduled    Clinical encounter Clinical exam every 6-12 months.   Refer to genetic counseling if not already done.  Consider risk reduction strategies.   1/24/2024 January 2025   However, some family histories with breast cancers at a very young age, may warrant screening starting earlier.    *May begin at age 40 if breast cancers in the family occur at later ages.    Annual mammogram beginning 10 years younger than the earliest breast cancer in the family but not prior to age 30.    Recommend annual breast MRI to begin 10 years younger than the earliest breast cancer in the family but not prior to age 25.    Breast MRIs are preferably done on day 7-15 of the menstrual cycle in premenopausal women.   NA   NA   Breast screening for patients at high risk due to thoracic radiation between the ages of 10-30   Annual clinical exam beginning 8 years after radiation therapy.    Annual  screening mammogram beginning at age 30 or 8 years after radiation therapy    Annual breast MRI, beginning at age 25 or 8 years after radiation therapy.       NA     NA   Women who have a lifetime risk of >20% based on history of LCIS or ADH/ALH Annual screening mammogram beginning at age of LCIS or ADH/ALH but not prior to age 30.    Consider annual MRI to begin at age of diagnosis of LCIS or ADH/ALH but not prior to age 25.    Consider risk reducing strategies. Mammogram today Breast MRI in June    Return to clinic in January 2025 with mammogram following the visit    Recommend risk reducing strategies for women with 1.7% 5 year risk of breast cancer.         Individualized Surveillance Plan for Familial Adenomatous Polyposis                                        Based on NCCN Guidelines Version 2.2024   Type of Screening  Recommendation  Last Done  Next Due    Recommendations for Children    Colon Cancer Screening  Flexible sigmoidoscopy or colonoscopy every 12 months beginning at age 10-15.       See Below   See Below   Hepatoblastoma  Consider liver palpation, abdominal ultrasound, and measurement of AFP every 3-6 months, during first 5 years of life.    See Below   See Below   Thyroid Cancer Screening  Ultrasound at baseline starting in teenage years.  If normal, consider repeating every 2-5 years and if abnormal, refer to thyroid specialist. [place] interval screenings for families with thyroid cancer. See Below See Below   Recommendations for Adults    Colon Cancer Screening  Colectomy with ileorectal anastomosis (TRACEY)  recommended, then endoscopic evaluation of the rectum every 6-12 months, depending on the polyp burden.     If ileo proctocolectomy with ileal pouch anal anastomosis, endoscopic surveillance of ileal pouch every 1-3 years depending on polyp burden.    Surveillance frequency should be increased to every 6 months for large, flat polyps with villous histology and/or high grade dysplasia   12/7/2023: Flex sig: multiple polyps. One rectal tubular adenoma Flex sig in December 2024   Duodenal or periampullary cancer screening and gastric cancer screening  Baseline upper endoscopy including side-viewing examination. Start between 20-25. Consider earlier screening if family history warrants it. Base subsequent screenings on findings (see below).    Manage non-fundic gland polyps endoscopically if possible.      These occur in the majority of FAP patients and may have focal low grade dysplasia but typically are non progressive.Special screening or surgery should only be considered in the presence of high grade dysplasia. 12/7/2023: Upper endoscopy. Multiple polyps in duodenum. Upper Endoscopy in December 2024   Thyroid Cancer Screening    Baseline ultrasound starting in late teen years.    If normal, consider repeating every 2-5 years and if abnormal, refer to thyroid specialist.     Trade interval screenings for families with thyroid cancer. 10/7/2022: Thyroid US Benign colloid cysts and benign partially cystic 5 mm  Repeat in 2027   CNS Cancer  Annual physical examination.  1/24/2024 January 2025   Intra-abdominal desmoids  Annual abdominal palpation.     If family history of symptomatic desmoids, consider abdominal MRI or CT 1-3 years post colectomy and then every 5-10 years.     NA   NA   Pancreatic screen Consider pancreatic screening if family history of pancreatic cancer. ERCP in 2022    Small bowel polyps and cancer  Consider adding small bowel visualization to CT or MRI for desmoids, especially if duodenal polyposis is advanced.  Flex sig in Dec. 2023 Repeat in Dec. 2024   Duodenal findings:  Stage 0 (no polyposis) - repeat endoscopy every 4 years.  Stage 1 (minimal polyposis 1-4 tubular adenomas, 1-4 mm each) - repeat EGD every 2-3 years.  Stage 2 (mild polyposis - 5-9 tubular adenomas, 5-9 mm each) - repeat EGD every 1-3 years.  Stage 3 (moderate polyposis - >20 lesions or size >1 cm) - repeat  EGD every 6-12 months  Stage 4 (dense polyposis or high grade dysplasia) - surgical evaluation, with expert surveillance every 3-6 months.  Due to limited data, no screening recommendation is made for pancreatic cancer at this time.          I spent a total of 45 minutes on the day of the visit. Please see the note for further information on patient assessment and treatment.     Munira Palmer, DNP, APRN, AGCNS-BC  Clinical Nurse Specialist  Cancer Risk Management Program  [facility]    The patient was seen in conjunction with me today.  I agree with the exam assessment and  am in agreement with the plan.    Clarissa Brown, APRN-CNS, OCN, AGN-BC  Clinical Nurse Specialist  Cancer Risk Management Program  [facility]      CC:  Ida Sandoval MD

## 2024-01-23 ENCOUNTER — ONCOLOGY VISIT (OUTPATIENT)
Dept: ONCOLOGY | Facility: CLINIC | Age: 59
End: 2024-01-23
Attending: CLINICAL NURSE SPECIALIST
Payer: COMMERCIAL

## 2024-01-23 ENCOUNTER — ANCILLARY PROCEDURE (OUTPATIENT)
Dept: MAMMOGRAPHY | Facility: CLINIC | Age: 59
End: 2024-01-23
Attending: CLINICAL NURSE SPECIALIST
Payer: COMMERCIAL

## 2024-01-23 VITALS
BODY MASS INDEX: 26.55 KG/M2 | DIASTOLIC BLOOD PRESSURE: 79 MMHG | SYSTOLIC BLOOD PRESSURE: 126 MMHG | TEMPERATURE: 97.9 F | OXYGEN SATURATION: 96 % | HEART RATE: 73 BPM | WEIGHT: 147.5 LBS | RESPIRATION RATE: 16 BRPM

## 2024-01-23 DIAGNOSIS — N60.91 ATYPICAL DUCTAL HYPERPLASIA OF RIGHT BREAST: ICD-10-CM

## 2024-01-23 DIAGNOSIS — Z12.39 BREAST CANCER SCREENING, HIGH RISK PATIENT: Primary | ICD-10-CM

## 2024-01-23 DIAGNOSIS — R92.30 DENSE BREAST: ICD-10-CM

## 2024-01-23 DIAGNOSIS — Z12.39 BREAST CANCER SCREENING, HIGH RISK PATIENT: ICD-10-CM

## 2024-01-23 PROBLEM — F41.9 ANXIETY DISORDER, UNSPECIFIED: Status: RESOLVED | Noted: 2021-10-15 | Resolved: 2024-01-23

## 2024-01-23 PROCEDURE — 99213 OFFICE O/P EST LOW 20 MIN: CPT | Performed by: CLINICAL NURSE SPECIALIST

## 2024-01-23 PROCEDURE — 77067 SCR MAMMO BI INCL CAD: CPT | Mod: GC | Performed by: RADIOLOGY

## 2024-01-23 PROCEDURE — 77063 BREAST TOMOSYNTHESIS BI: CPT | Mod: GC | Performed by: RADIOLOGY

## 2024-01-23 PROCEDURE — 99215 OFFICE O/P EST HI 40 MIN: CPT | Performed by: CLINICAL NURSE SPECIALIST

## 2024-01-23 RX ORDER — CICLOPIROX 80 MG/ML
SOLUTION TOPICAL
COMMUNITY
Start: 2023-10-09

## 2024-01-23 RX ORDER — ALBUTEROL SULFATE 90 UG/1
AEROSOL, METERED RESPIRATORY (INHALATION)
COMMUNITY
Start: 2023-11-14

## 2024-01-23 RX ORDER — TERBINAFINE HYDROCHLORIDE 250 MG/1
250 TABLET ORAL
COMMUNITY
Start: 2023-10-09

## 2024-01-23 ASSESSMENT — PAIN SCALES - GENERAL: PAINLEVEL: NO PAIN (0)

## 2024-01-23 NOTE — LETTER
2024         RE: Sarah Duran  [address] [id number]        Dear Colleague,    Thank you for referring your patient, Sarah Duran, to the [facility]. Please see a copy of my visit note below.    Oncology Risk Management Consultation:  Date on this visit: 2024  Sarah Duran requires high risk screening and surveillance to reduce her risk of cancer secondary to  a history of atypical ductal hyperplasia (ADH). She is considered to be at high risk for breast cancer, with a risk of up to 35%. She has a history of Stage II, Grade I endometrial adenocarcinoma, diagnosed in 2020; followed by Dr. Sandie Lezama. She also carries an APC+ genetic mutation, consistent with a diagnosis of Familial Adenomatous Polyposis (FAP) or attenuated FAP (AFAP) with a history of a thyroid nodule and gastric polyps.     Primary Physician: Ida Sandoval MD     History Of Present Illness:  Ms. Duran is a very pleasant  58 year old female who presents with a personal history of ADH and a likely attenuated version of Familial Adenomatous Polyposis. She is s/p right excisional breast biopsy on 2016. She is also s/p ileocolonic anastomosis in .     Genetic Testin2018: POSITIVE for a APC mutation.  Specifically her mutation is called [id number],  consistent with a diagnosis of familial adenomatous polyposis (FAP) or attenuated FAP (AFAP). The mutation was identified using [employer] testing through [employer].  The testing was done because of her personal history of colonic polyposis and family history of colon cancer.     Of note, Sarah tested negative for mutations in the following genes: BMPR1A, CDH1, CHEK2, MLH1, MSH2, MSH6, MUTYH, PMS2, POLD1, POLE, PTEN, SMAD4, STK11 and TP53 (sequencing and deletion/duplication); EPCAM and GREM1 (deletion/duplication only).        Pertinent history:  Attenuated Familial adenomatous polyposis, s/p  ileal rectal anastomosis in May 1988. Confirmed with APC testing (see below)  Subsequent laparotomy in  for bowel obstruction from adhesions.  Menarche at 12.  Nulliparous.  Menopause at 50.  Breast density: scattered fibroglandular densities  Hx of THBSO for Stage II, Grade I  endometrial cancer (see details below)     21:  US Pelvis:  Uterus: Measures 8.9 x 5.8 x 6.4 cm. The endometrium is thickened at 2.9 cm. The endometrium is heterogeneous in appearance with an irregular, nodular contour of the endometrial surface. There is hypoechoic complex fluid in the endometrial cavity likely containing blood. There is vascularity of the endometrium on color imaging. The abnormal endometrium occupies the majority of the uterus. Right Ovary: Measures 2.0 x 1.0 x 1.1 cm and appears unremarkable Right Ovary Blood Flow: Present. Left Ovary: Not seen. Free Fluid: No significant free fluid.There are no suspicious adnexal masses.     2/10/21:  Hysteroscopy, D&C. Pathology: Grade 1 endometrial adenocarcinoma, MMR intact, p53 wild type, ER/WI +     21:  Total laparoscopic hysterectomy, bilateral salpingo-oophorectomy, bilateral sentinel lymph node dissection, cystoscopy. Pathology:  Stage II, grade 1 endometrial adenocarcinoma, tumor size 5.6 cm, 1/13 mm myometrial invasion, +CASEY, + cervix, 0/2 sentinel LN,         21:  Completed brachytherapy to 3000cGy in 5 Fx to the vagina     Thyroid Screenin2019 - FNA biopsy of thyroid, for solid appearing nodule in isthmus, biopsy non diagnostic; cyst fluid only.   2020- US of the thyroid: Previously biopsied isthmus nodule is no longer present. There are numerous additional colloid cysts and a benign complex cyst   left lobe, not appreciably changed from previous.   2021- US of thyroid; no change in nodules  10/7/2022- US of thyroid: NODULES: There are benign colloid cysts. There is only one partially solid nodule on today's examination, listed  below.TI-RADS 2. No FNA. IMPRESSION: Benign colloid cysts and benign partially cystic 5 mm     GI screening history (see chart for further details)  11/7/2005 - Small bowel enteroscopy, one tubular adenoma removed from partial anal verge.  12/7/2005 - adenomatous polyp removed from rectum  2/28/2005 - tubular adenoma, duodenum.  11/16/2011 - fundic gland polyps in stomach, tubular adenoma removed from rectum.  11/5/2012 - 1 tubulovillous adenoma, rectum  7/8/2013 - 1 tubulovillous adenoma, rectum  12/3/2013 - 1 tubulovillous adenoma, rectum  7/21/2014 - 1 tubular adenoma and hyperplastic polyp, rectum  1/20/2015 - 1 tubular adenoma and hyperplastic polyp, rectum  12/7/2015 - 1 tubular adenoma, rectum  6/6/2016 - 1 fundic gland polyp with focal adenomatous changes  3/20/2017 - 2 tubular adenomas removed from rectum  4/12/2017 - colonoscopy and EGD, one fundic gland gastric polyp with focal low grade dysplasia removed, multiple duodenal polyps and multiple rectal polyps.  7/25/2019 - EGD: Two previously reported polyps (6.2 mm and 5.3 mm) in the antrum were evaluate by EUS, which did not demonstrate deep layer involvement, and then they were  removed by EMR by using a band ligator and a snare. The defect was then closed with hemoclips. Innumerous fundic gland polyps in the gastric body,  fundus, and cardia. Consistent with diagnosis of FAP. A large fundic gland polyp in the gastric body, which has been previously resected and recent biopsied. Recommendation: Use Aciphex (rabeprazole) 20 mg PO BID for 1 month.  Repeat the upper endoscopy in 1 year for surveillance.      7/30/2020- EGD and flexible sigmoidoscopy:  15-20 duodenal polyps, measuring 1-3 mm, all of which were ablated using argon plasma coagulation. Innumerable fundic gland polyps in a confluent carpet-like distribution throughout the cardia, fundus, and gastric body. Antral-sparing present. A large 2-3 semi-pedunculated polyp in the proximal gastric body   (along the lesser curvature) was resected.  Suspected very small adenomas flanking the pancreaticobiliary orifices at the prior ampullectomy site.  Normal esophagus. Pathology: Fundic gland polyp with adenomatous change (low grade dysplasia). Negative for intestinal metaplasia, high-grade dysplasia or malignancy. Recommendation: Schedule for flexible sigmoidoscopy (along with EGD and ERCP) with Dr. Fernández in 1-year.      9/23/2021- ENDOSCOPIC RETROGRADE CHOLANGIOPANCREATOGRAPHY ESOPHAGOGASTRODUODENOSCOPY (EGD), SIGMOIDOSCOPY, FLEXIBLE (Dr. Shahab Fernández): Results: Prior biliary sphincterotomy appeared open. Prior pancreatic sphincterotomy appeared open. Two small button like polyps to left of pancreatic orifice. Attempted cold snare polypectomy but slid off both time    After pancreatic stent placed, entire area next to orifice ablated w R colon setting pulse APC. DUODENUM examined, no other polyps to D3. No antral polyps, but entire gastric body replaced w  multiple fundic gland polyps, not biopsied Recommendation:Confirm spontaneous stent passage by performing a KUB x-ray in 4 weeks. Repeat ERCP in 1 year for surveillance.      Flexible sigmoidoscopy (Dr. Gasca) Ten 2 to 4 mm tubular adenomas in rectum. Patent ileo-rectal anastomosis and normal distal ileum.     12/1/2022: ENDOSCOPIC RETROGRADE CHOLANGIOPANCREATOGRAPHY: Multiple gastric polyps likely fundic gland. Very large (4 cm oozing) polyp in gastric fundus, biopsied as may represent adenomatous transformation, vs large fundic gland polyp. Prior endoscopic papillectomy without recurrence A few duodenal polyps ablated. Biopsy was performed largest gastric polyp. Repeat ERCP in one year. Path: Fundic gland polyp with low-grade dysplasia     12/1/2022: Flexible Sigmoidoscopy: Multiple 2 to 3 mm polyps in the rectum, at the recto-sigmoid colon and in the sigmoid colon, removed with a cold snare. Resected and retrieved. Patent end-to-side ileo-colonic  anastomosis, characterized by healthy appearing mucosa. Path: Tubular adenomas. Repeat in one year    12/7/2023: Upper GI Endoscopy: Normal esophagus. Carpeting of the proximal stomach with polyps consistent with fundic gland polyps. Large more protuberant cluster of polyps in the fundus. Previous biopsies by Dr. Fernández 1 yr ago were benign. On probing this appears to be multiple overlapping polyps which were soft with probing. Biopsies again obtained. There was no ulceration. Multiple duodenal polyp. Approximately 20. Large polyps cold snared and retrieves. Smaller polyps ablated with APC. No evidence of residual polyp tissue at the ampullectomy site. Repeat in one year. Path: Duodenal polyps: Duodenal adenoma; negative for dysplasia. Stomach/fundus polyps:  Fundic gland polyps(s) with low-grade dysplasia     12/7/2023: Flexible Sigmoidoscopy: Multiple sigmoid and rectal polyps (approximately 20). Either resected and retrieved or (smaller polyps) ablated with APC. All visible polyps were resected or ablated. Repeat in one year. Path: Colon and Sigmoid polyps: Colonic mucosa with superficial hyperplastic changes No evidence of neoplastic polyp. Rectal polyp: tubular adenoma.     Breast Screening history:   11/28/2016 - R excisional breast biopsy with seed localization, Fibrocystic tissue with ADH on pathology.  4/24/2017 - R breast mammogram, [email or web address].  5/25/2018 - Breast MRI, BiRads2.  11/8/2018 - Screening tomosynthesis mammogram, BiRads1  6/10/2019 - Breast MRI, BiRads2.  12/20/2019- Screening tomosynthesis mammogram, BiRads1  2/3/2021- Breast MRI, BiRads1  10/8/2021- Screening tomosynthesis mammogram, BiRads1  6/22/2022- Breast MRI, BiRads2  10/21/2022- Screening tomosynthesis mammogram, BiRads1     At this visit, she denies asymmetry, lumps, masses, thickening, pain, nipple discharge and skin changes in her breasts.  She denies vaginal bleeding.  She denies nausea, vomiting, discomfort after eating, abdominal  pain, bloating, heartburn, constipation,  diarrhea and early satiety.    Past Medical/Surgical History:  Past Medical History:   Diagnosis Date    Acute deep vein thrombosis (DVT) of left lower extremity (H)     Asthma     Atypical ductal hyperplasia of right breast 10/2016    fibrocystic changes, PASH and atypical ductal hyperplasia on needle biopsy, s/p R excisional biopsy with radioactive seed localization    Endometrial cancer (H) 02/15/2021    Factor 5 Leiden mutation, heterozygous (H24)     factor 5    Familial adenomatous polyposis 1988    clinical presentation; s/p total abdominal colectomy with TRACEY; confirmed APC+ 2018    GERD (gastroesophageal reflux disease)     Infection due to 2019 novel coronavirus 11/02/2022    Migraine     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    [id number]    PONV (postoperative nausea and vomiting)      Past Surgical History:   Procedure Laterality Date    BIOPSY BREAST SEED LOCALIZATION Right 11/30/2016    Procedure: BIOPSY BREAST SEED LOCALIZATION;  Surgeon: Jessica Rasheed MD;  Location: [facility] OR    COLECTOMY      subtotal    COLONOSCOPY N/A 12/7/2023    Procedure: COLONOSCOPY, FLEXIBLE, WITH LESION REMOVAL USING SNARE, Argon plasma coagulation ablation;  Surgeon: Stephen Gasca MD;  Location: [facility] OR    COMBINED ESOPHAGOSCOPY, GASTROSCOPY, DUODENOSCOPY (EGD) WITH ARGON PLASMA COAGULATOR (APC) N/A 4/12/2018    Procedure: COMBINED ESOPHAGOSCOPY, GASTROSCOPY, DUODENOSCOPY (EGD) WITH ARGON PLASMA COAGULATOR (APC);  Esophagogastroduodenoscopy with polypectomy and polyp ablation;  Surgeon: Stephen Gasca MD;  Location: [facility]    COMBINED ESOPHAGOSCOPY, GASTROSCOPY, DUODENOSCOPY (EGD) WITH ARGON PLASMA COAGULATOR (APC) N/A 12/7/2023    Procedure: Combined Esophagoscopy, Gastroscopy, Duodenoscopy (Egd) With Argon Plasma Coagulator (Apc) ablation, biopsies, polypectomy;  Surgeon: Stephen Gasca MD;  Location: [facility]    DILATION AND CURETTAGE,  HYSTEROSCOPY DIAGNOSTIC, COMBINED N/A 2/10/2021    Procedure: HYSTEROSCOPY, DIAGNOSTIC, WITH DILATION AND CURETTAGE OF UTERUS;  Surgeon: Sandie Samaniego MD;  Location: [facility] OR    ENDOSCOPIC RETROGRADE CHOLANGIOPANCREATOGRAM N/A 9/23/2021    Procedure: ENDOSCOPIC RETROGRADE CHOLANGIOPANCREATOGRAPHY WITH PANCREATIC STENT PLACEMENT AND POLYP ABLATION with Argon Plasma Coagulation;  Surgeon: Shahab Fernández MD;  Location: [facility]    ENDOSCOPIC RETROGRADE CHOLANGIOPANCREATOGRAM N/A 12/1/2022    Procedure: diagnostic ENDOSCOPIC RETROGRADE CHOLANGIOPANCREATOGRAPHY, ablation of duodenal polyps, biopsy of gastric polyp;  Surgeon: Shahab Fernández MD;  Location: [facility]    ENDOSCOPIC RETROGRADE CHOLANGIOPANCREATOGRAPHY      ENDOSCOPIC ULTRASOUND UPPER GASTROINTESTINAL TRACT (GI) N/A 7/25/2019    Procedure: Endoscopic Ultrasound;  Surgeon: Stephen Gasca MD;  Location: [facility]    ESOPHAGOSCOPY, GASTROSCOPY, DUODENOSCOPY (EGD), COMBINED  11/5/2012    Procedure: COMBINED ESOPHAGOSCOPY, GASTROSCOPY, DUODENOSCOPY (EGD), BIOPSY SINGLE OR MULTIPLE;;  Surgeon: Angélica Garcia MD;  Location: [facility]    ESOPHAGOSCOPY, GASTROSCOPY, DUODENOSCOPY (EGD), COMBINED  12/3/2013    Procedure: COMBINED ESOPHAGOSCOPY, GASTROSCOPY, DUODENOSCOPY (EGD);;  Surgeon: Angélica Garcia MD;  Location: [facility]    ESOPHAGOSCOPY, GASTROSCOPY, DUODENOSCOPY (EGD), COMBINED N/A 3/28/2018    Procedure: COMBINED ESOPHAGOSCOPY, GASTROSCOPY, DUODENOSCOPY (EGD);;  Surgeon: Stephen Gasca MD;  Location: [facility]    ESOPHAGOSCOPY, GASTROSCOPY, DUODENOSCOPY (EGD), COMBINED N/A 5/30/2019    Procedure: Esophagogastroduodenoscopy with biopsy x2 APC;  Surgeon: Stephen Gasca MD;  [facility]: [facility]    ESOPHAGOSCOPY, GASTROSCOPY, DUODENOSCOPY (EGD), COMBINED N/A 7/30/2020    Procedure: ESOPHAGOGASTRODUODENOSCOPY (EGD) with polypectomy by hot snare; argonplasma coaglution ablation of polyps;  Surgeon: Stephen Gasca MD;  Location:  [facility]    ESOPHAGOSCOPY, GASTROSCOPY, DUODENOSCOPY (EGD), RESECT MUCOSA, COMBINED N/A 7/25/2019    Procedure: Esophagogastroduodenoscopy with gastric mucosal resection and clip application;  Surgeon: Stephen Gasca MD;  Location: [facility]    EXCISE POLYP RECTUM      LAPAROSCOPIC HYSTERECTOMY TOTAL, BILATERAL SALPINGO-OOPHORECTOMY, NODE DISSECTION, COMBINED Bilateral 2/25/2021    Procedure: Laparoscopic removal of uterus, cervix, both ovaries and fallopian tubes, sentinel lymph node dissection, cystoscopy;  Surgeon: Sandie Samaniego MD;  Location: [facility]    LAPAROSCOPIC LYSIS ADHESIONS      SIGMOIDOSCOPY FLEXIBLE      SIGMOIDOSCOPY FLEXIBLE N/A 12/7/2015    Procedure: SIGMOIDOSCOPY FLEXIBLE;  Surgeon: Mariela Bearden MD;  Location: [facility]    SIGMOIDOSCOPY FLEXIBLE N/A 3/28/2018    Procedure: SIGMOIDOSCOPY FLEXIBLE;  EGD/Flex Sig;  Surgeon: Stephen Gasca MD;  Location: [facility]    SIGMOIDOSCOPY FLEXIBLE N/A 4/12/2018    Procedure: SIGMOIDOSCOPY FLEXIBLE;  Flexible Sigmoidoscopy with polyp ablation;  Surgeon: Stephen Gasca MD;  Location: [facility]    SIGMOIDOSCOPY FLEXIBLE N/A 5/30/2019    Procedure: Flexible Sigmoidoscopy with polypectomy;  Surgeon: Stephen Gasca MD;  [facility]    SIGMOIDOSCOPY FLEXIBLE N/A 7/30/2020    Procedure: SIGMOIDOSCOPY, FLEXIBLE with Ablation of Polyps;  Surgeon: Stephen Gasca MD;  [facility]    SIGMOIDOSCOPY FLEXIBLE N/A 9/23/2021    Procedure: FLEXIBLE SIGMOIDOSCOPY WITH POLYPECTOMY;  Surgeon: Stephen Gasca MD;  [facility]    SIGMOIDOSCOPY FLEXIBLE N/A 12/1/2022    Procedure: SIGMOIDOSCOPY, FLEXIBLE with polypectomy;  Surgeon: Stephen Gasca MD;  Location[facility]    ZZC LAP,SURG,COLECTOMY,W/ANAST      for colon cancer       Allergies:  Allergies as of 01/23/2024 - Reviewed 01/23/2024   Allergen Reaction Noted    Corn-containing products Shortness Of Breath 07/29/2020    Cipro [ciprofloxacin]  02/24/2005    Flagyl  "[metronidazole] Nausea and Vomiting 02/24/2005    Garlic Fatigue 07/29/2020    Keflex [cephalosporins] Nausea and Vomiting 02/24/2005    Omeprazole  04/29/2010    Prilosec otc [omeprazole magnesium]  01/28/2012    Cephalexin Rash 06/14/2004    Doxycycline Rash 04/12/2018    Erythromycin Rash 02/24/2005    Sulfa antibiotics Rash 02/24/2005       Current Medications:  Current Outpatient Medications   Medication Sig Dispense Refill    acetaminophen (TYLENOL) 325 MG tablet Take 2 tablets (650 mg) by mouth every 6 hours as needed for mild pain 30 tablet 0    albuterol 90 MCG/ACT inhaler Inhale 2 puffs into the lungs every 6 hours as needed.      Apple Cider Vinegar 500 MG TABS Take 500 mg by mouth daily      ASHWAGANDHA PO Take 150 mg by mouth One gummy per day      calcium-magnesium (CALMAG) 500-250 MG TABS Take 1 tablet by mouth daily      celecoxib (CELEBREX) 100 MG capsule TAKE ONE CAPSULE BY MOUTH ONCE DAILY 90 capsule 3    Cranberry 500 MG CAPS Take 1 capsule by mouth daily      docusate sodium (COLACE) 100 MG capsule Take 100 mg by mouth daily      fexofenadine (ALLEGRA) 180 MG tablet Take 180 mg by mouth daily.      Lactobacillus (PROBIOTIC ACIDOPHILUS PO) Take 100 mg by mouth daily      MELATONIN PO Take 2 mg by mouth nightly as needed       multivitamin, therapeutic with minerals (THERA-VIT-M) TABS Take 3 tablets by mouth 2 times daily       Omega-3 Fatty Acids (OMEGA-3 FISH OIL PO) Take 2 g by mouth daily       triamcinolone (NASACORT) 55 MCG/ACT Inhaler Spray 2 sprays into both nostrils daily      VENTOLIN  (90 Base) MCG/ACT inhaler       ZOLMitriptan (ZOMIG) 5 MG tablet Take 1 tablet (5 mg) by mouth at onset of headache for migraine 90 tablet 3    bisacodyl (DULCOLAX) 5 MG EC tablet Refer to \"Getting Ready for a Colonoscopy\" instruction handout (Patient not taking: Reported on 1/23/2024) 4 tablet 0    ciclopirox (PENLAC) 8 % external solution Apply topically every evening. For one year (Patient not " taking: Reported on 2024)      terbinafine (LAMISIL) 250 MG tablet Take 250 mg by mouth (Patient not taking: Reported on 2024)          Family History:  Family History   Problem Relation Age of Onset    Hyperlipidemia Mother     Cerebrovascular Disease Mother     Cerebrovascular Disease Father     Colon Cancer Father 57         at 63    Prostate Cancer Brother 53        recurrence at 65    Prostate Cancer Maternal Uncle 65       Social History:  Social History     Socioeconomic History    Marital status:      Spouse name: Roberto    Number of children: 2    Years of education: Not on file    Highest education level: Not on file   Occupational History    Occupation:      Employer: [employer]   Tobacco Use    Smoking status: Never    Smokeless tobacco: Never   Vaping Use    Vaping Use: Never used   Substance and Sexual Activity    Alcohol use: Not Currently     Comment: 1 or 2 drinks per year    Drug use: No    Sexual activity: Not Currently     Partners: Male     Birth control/protection: Surgical     Comment: THBSO for endometrial cancer and postmenopausal prior to surgery   Other Topics Concern    Parent/sibling w/ CABG, MI or angioplasty before 65F 55M? Not Asked   Social History Narrative    Two adopted daughters, ages 13 and 15     Social Determinants of Health     Financial Resource Strain: Not on file   Food Insecurity: Not on file   Transportation Needs: Not on file   Physical Activity: Not on file   Stress: Not on file   Social Connections: Not on file   Interpersonal Safety: Not At Risk (10/21/2022)    Humiliation, Afraid, Rape, and Kick questionnaire     Fear of Current or Ex-Partner: No     Emotionally Abused: No     Physically Abused: No     Sexually Abused: No   Housing Stability: Not on file       Physical Exam:  /79 (BP Location: Right arm, Patient Position: Sitting, Cuff Size: Adult Regular)   Pulse 73   Temp 97.9  F (36.6  C) (Oral)   Resp 16   Wt 66.9 kg  (147 lb 8 oz)   LMP 05/01/2019   SpO2 96%   BMI 26.55 kg/m      NECK: no adenopathy, no asymmetry or masses    GENERAL APPEARANCE: healthy, alert and no apparent distress    BREAST: A multipositional, bilateral breast exam was performed.  Fairly symmetrical. Right breast: no palpable dominant masses, no nipple discharge, no skin changes.   Right axilla: no palpable adenopathy. Left breast: no palpable dominant masses, no nipple discharge, no skin changes. Left axilla: no palpable adenopathy.       LYMPHATICS: No cervical, supraclavicular, or axillary lymphadenopathy    SKIN: no suspicious lesions or rashes on examined skin    Laboratory/Imaging Studies  No results found for any visits on 01/23/24.    ASSESSMENT  We discussed concerns and symptoms to be watchful for between visits including breast lumps, bumps, nipple discharge, nipple inversion, abdominal bloating, changes in bowel or bladder habits or early satiety. Sarah has no updates to her family history and stated that she has had a healthy year. She has been exercising on the Pel[employer] and lifts weights regularly.      Individualized Surveillance Plan for women  With 20% or greater lifetime risk of breast cancer   Per NCCN Breast Cancer Screening and Diagnosis Guidelines Version 1.2023   Recommended screening Test or procedure Last done Next Scheduled    Clinical encounter Clinical exam every 6-12 months.   Refer to genetic counseling if not already done.  Consider risk reduction strategies.   1/24/2024 January 2025   However, some family histories with breast cancers at a very young age, may warrant screening starting earlier.    *May begin at age 40 if breast cancers in the family occur at later ages.    Annual mammogram beginning 10 years younger than the earliest breast cancer in the family but not prior to age 30.    Recommend annual breast MRI to begin 10 years younger than the earliest breast cancer in the family but not prior to age 25.    Breast  MRIs are preferably done on day 7-15 of the menstrual cycle in premenopausal women.   NA   NA   Breast screening for patients at high risk due to thoracic radiation between the ages of 10-30   Annual clinical exam beginning 8 years after radiation therapy.    Annual screening mammogram beginning at age 30 or 8 years after radiation therapy    Annual breast MRI, beginning at age 25 or 8 years after radiation therapy.       NA     NA   Women who have a lifetime risk of >20% based on history of LCIS or ADH/ALH Annual screening mammogram beginning at age of LCIS or ADH/ALH but not prior to age 30.    Consider annual MRI to begin at age of diagnosis of LCIS or ADH/ALH but not prior to age 25.    Consider risk reducing strategies. Mammogram today Breast MRI in June    Return to clinic in January 2025 with mammogram following the visit    Recommend risk reducing strategies for women with 1.7% 5 year risk of breast cancer.         Individualized Surveillance Plan for Familial Adenomatous Polyposis                                        Based on NCCN Guidelines Version 2.2024   Type of Screening  Recommendation  Last Done  Next Due    Recommendations for Children    Colon Cancer Screening  Flexible sigmoidoscopy or colonoscopy every 12 months beginning at age 10-15.       See Below   See Below   Hepatoblastoma  Consider liver palpation, abdominal ultrasound, and measurement of AFP every 3-6 months, during first 5 years of life.    See Below   See Below   Thyroid Cancer Screening  Ultrasound at baseline starting in teenage years.  If normal, consider repeating every 2-5 years and if abnormal, refer to thyroid specialist. [place] interval screenings for families with thyroid cancer. See Below See Below   Recommendations for Adults    Colon Cancer Screening  Colectomy with ileorectal anastomosis (TRACEY)  recommended, then endoscopic evaluation of the rectum every 6-12 months, depending on the polyp burden.     If ileo  proctocolectomy with ileal pouch anal anastomosis, endoscopic surveillance of ileal pouch every 1-3 years depending on polyp burden.    Surveillance frequency should be increased to every 6 months for large, flat polyps with villous histology and/or high grade dysplasia  12/7/2023: Flex sig: multiple polyps. One rectal tubular adenoma Flex sig in December 2024   Duodenal or periampullary cancer screening and gastric cancer screening  Baseline upper endoscopy including side-viewing examination. Start between 20-25. Consider earlier screening if family history warrants it. Base subsequent screenings on findings (see below).    Manage non-fundic gland polyps endoscopically if possible.      These occur in the majority of FAP patients and may have focal low grade dysplasia but typically are non progressive.Special screening or surgery should only be considered in the presence of high grade dysplasia. 12/7/2023: Upper endoscopy. Multiple polyps in duodenum. Upper Endoscopy in December 2024   Thyroid Cancer Screening    Baseline ultrasound starting in late teen years.    If normal, consider repeating every 2-5 years and if abnormal, refer to thyroid specialist.     [place] interval screenings for families with thyroid cancer. 10/7/2022: Thyroid US Benign colloid cysts and benign partially cystic 5 mm  Repeat in 2027   CNS Cancer  Annual physical examination.  1/24/2024 January 2025   Intra-abdominal desmoids  Annual abdominal palpation.     If family history of symptomatic desmoids, consider abdominal MRI or CT 1-3 years post colectomy and then every 5-10 years.     NA   NA   Pancreatic screen Consider pancreatic screening if family history of pancreatic cancer. ERCP in 2022    Small bowel polyps and cancer  Consider adding small bowel visualization to CT or MRI for desmoids, especially if duodenal polyposis is advanced.  Flex sig in Dec. 2023 Repeat in Dec. 2024   Duodenal findings:  Stage 0 (no polyposis) - repeat  endoscopy every 4 years.  Stage 1 (minimal polyposis 1-4 tubular adenomas, 1-4 mm each) - repeat EGD every 2-3 years.  Stage 2 (mild polyposis - 5-9 tubular adenomas, 5-9 mm each) - repeat EGD every 1-3 years.  Stage 3 (moderate polyposis - >20 lesions or size >1 cm) - repeat EGD every 6-12 months  Stage 4 (dense polyposis or high grade dysplasia) - surgical evaluation, with expert surveillance every 3-6 months.  Due to limited data, no screening recommendation is made for pancreatic cancer at this time.          I spent a total of 45 minutes on the day of the visit. Please see the note for further information on patient assessment and treatment.     Munira Palmer, ENEIDA, APRN, AGCNS-BC  Clinical Nurse Specialist  Cancer Risk Management Program  [facility]    The patient was seen in conjunction with me today.  I agree with the exam assessment and  am in agreement with the plan.    Clarissa Brown, APRN-CNS, OCN, AGN-BC  Clinical Nurse Specialist  Cancer Risk Management Program  [facility]      CC:  Ida Sandoval MD

## 2024-01-23 NOTE — PATIENT INSTRUCTIONS
Individualized Surveillance Plan for women  With 20% or greater lifetime risk of breast cancer   Per NCCN Breast Cancer Screening and Diagnosis Guidelines Version 1.2023   Recommended screening Test or procedure Last done Next Scheduled    Clinical encounter Clinical exam every 6-12 months.   Refer to genetic counseling if not already done.  Consider risk reduction strategies.    1/24/2024 January 2025   However, some family histories with breast cancers at a very young age, may warrant screening starting earlier.     *May begin at age 40 if breast cancers in the family occur at later ages.     Annual mammogram beginning 10 years younger than the earliest breast cancer in the family but not prior to age 30.     Recommend annual breast MRI to begin 10 years younger than the earliest breast cancer in the family but not prior to age 25.     Breast MRIs are preferably done on day 7-15 of the menstrual cycle in premenopausal women.    NA    NA   Breast screening for patients at high risk due to thoracic radiation between the ages of 10-30    Annual clinical exam beginning 8 years after radiation therapy.     Annual screening mammogram beginning at age 30 or 8 years after radiation therapy     Annual breast MRI, beginning at age 25 or 8 years after radiation therapy.          NA       NA   Women who have a lifetime risk of >20% based on history of LCIS or ADH/ALH Annual screening mammogram beginning at age of LCIS or ADH/ALH but not prior to age 30.     Consider annual MRI to begin at age of diagnosis of LCIS or ADH/ALH but not prior to age 25.     Consider risk reducing strategies. Mammogram today Breast MRI in June     Return to clinic in January 2025 with mammogram following the visit     Recommend risk reducing strategies for women with 1.7% 5 year risk of breast cancer.         Individualized Surveillance Plan for Familial Adenomatous Polyposis                                        Based on NCCN Guidelines  Version 2.2024   Type of Screening  Recommendation  Last Done  Next Due    Recommendations for Children    Colon Cancer Screening  Flexible sigmoidoscopy or colonoscopy every 12 months beginning at age 10-15.        See Below    See Below   Hepatoblastoma  Consider liver palpation, abdominal ultrasound, and measurement of AFP every 3-6 months, during first 5 years of life.     See Below    See Below   Thyroid Cancer Screening  Ultrasound at baseline starting in teenage years.  If normal, consider repeating every 2-5 years and if abnormal, refer to thyroid specialist. [place] interval screenings for families with thyroid cancer. See Below See Below   Recommendations for Adults    Colon Cancer Screening  Colectomy with ileorectal anastomosis (TRACEY)  recommended, then endoscopic evaluation of the rectum every 6-12 months, depending on the polyp burden.      If ileo proctocolectomy with ileal pouch anal anastomosis, endoscopic surveillance of ileal pouch every 1-3 years depending on polyp burden.     Surveillance frequency should be increased to every 6 months for large, flat polyps with villous histology and/or high grade dysplasia  12/7/2023: Flex sig: multiple polyps. One rectal tubular adenoma Flex sig in December 2024   Duodenal or periampullary cancer screening and gastric cancer screening  Baseline upper endoscopy including side-viewing examination. Start between 20-25. Consider earlier screening if family history warrants it. Base subsequent screenings on findings (see below).     Manage non-fundic gland polyps endoscopically if possible.       These occur in the majority of FAP patients and may have focal low grade dysplasia but typically are non progressive.Special screening or surgery should only be considered in the presence of high grade dysplasia. 12/7/2023: Upper endoscopy. Multiple polyps in duodenum. Upper Endoscopy in December 2024   Thyroid Cancer Screening    Baseline ultrasound starting in late teen  years.     If normal, consider repeating every 2-5 years and if abnormal, refer to thyroid specialist.      [place] interval screenings for families with thyroid cancer. 10/7/2022: Thyroid US Benign colloid cysts and benign partially cystic 5 mm  Repeat in 2027   CNS Cancer  Annual physical examination.  1/24/2024 January 2025   Intra-abdominal desmoids  Annual abdominal palpation.      If family history of symptomatic desmoids, consider abdominal MRI or CT 1-3 years post colectomy and then every 5-10 years.      NA    NA   Pancreatic screen Consider pancreatic screening if family history of pancreatic cancer. ERCP in 2022     Small bowel polyps and cancer  Consider adding small bowel visualization to CT or MRI for desmoids, especially if duodenal polyposis is advanced.  Flex sig in Dec. 2023 Repeat in Dec. 2024   Duodenal findings:  Stage 0 (no polyposis) - repeat endoscopy every 4 years.  Stage 1 (minimal polyposis 1-4 tubular adenomas, 1-4mm each) - repeat EGD every 2-3 years.  Stage 2 (mild polyposis - 5-9 tubular adenomas, 5-9 mm each) - repeat EGD every 1-3 years.  Stage 3 (moderate polyposis - >20 lesions or size >1 cm) - repeat EGD every 6-12 months  Stage 4 (dense polyposis or high grade dysplasia) - surgical evaluation, with expert surveillance every 3-6 months.  Due to limited data, no screening recommendation is made for pancreatic cancer at this time.

## 2024-01-23 NOTE — NURSING NOTE
"Oncology Rooming Note    January 23, 2024 11:12 AM   Sarah Duran is a 58 year old female who presents for:    Chief Complaint   Patient presents with    Oncology Clinic Visit     Breast cancer screening      Initial Vitals: /79 (BP Location: Right arm, Patient Position: Sitting, Cuff Size: Adult Regular)   Pulse 73   Temp 97.9  F (36.6  C) (Oral)   Resp 16   Wt 66.9 kg (147 lb 8 oz)   LMP 05/01/2019   SpO2 96%   BMI 26.55 kg/m   Estimated body mass index is 26.55 kg/m  as calculated from the following:    Height as of 12/7/23: 1.588 m (5' 2.5\").    Weight as of this encounter: 66.9 kg (147 lb 8 oz). Body surface area is 1.72 meters squared.  No Pain (0) Comment: Data Unavailable   Patient's last menstrual period was 05/01/2019.  Allergies reviewed: Yes  Medications reviewed: Yes    Medications: Medication refills not needed today.  Pharmacy name entered into [employer]:    [facility] [place], [place] - [address]  [facility], [place] [facility] [address] [facility] [place], [place] [address] [facility] [place], [place] [address]  [place] [address]  [facility] [place], [place] - [address] [facility] [place], [place] - [address] [facility] [phone]    Frailty Screening:   Is the patient here for a new oncology consult visit in cancer care? 2. No      Clinical concerns:  none      Tory Nathan"

## 2024-07-16 ENCOUNTER — ANCILLARY PROCEDURE (OUTPATIENT)
Dept: MRI IMAGING | Facility: CLINIC | Age: 59
End: 2024-07-16
Payer: COMMERCIAL

## 2024-07-16 DIAGNOSIS — R92.30 DENSE BREAST: ICD-10-CM

## 2024-07-16 DIAGNOSIS — N60.91 ATYPICAL DUCTAL HYPERPLASIA OF RIGHT BREAST: ICD-10-CM

## 2024-07-16 DIAGNOSIS — Z12.39 BREAST CANCER SCREENING, HIGH RISK PATIENT: ICD-10-CM

## 2024-07-16 PROCEDURE — 255N000002 HC RX 255 OP 636

## 2024-07-16 PROCEDURE — 77049 MRI BREAST C-+ W/CAD BI: CPT

## 2024-07-16 PROCEDURE — A9585 GADOBUTROL INJECTION: HCPCS

## 2024-07-16 RX ORDER — GADOBUTROL 604.72 MG/ML
7 INJECTION INTRAVENOUS ONCE
Status: COMPLETED | OUTPATIENT
Start: 2024-07-16 | End: 2024-07-16

## 2024-07-16 RX ADMIN — GADOBUTROL 7 ML: 604.72 INJECTION INTRAVENOUS at 13:38

## 2024-08-09 DIAGNOSIS — D13.91 FAP (FAMILIAL ADENOMATOUS POLYPOSIS): ICD-10-CM

## 2024-08-09 NOTE — TELEPHONE ENCOUNTER
Medication:celebrex  Last prescribing provider:miles brown (retired)   Last clinic visit date: 1/23/2024 Miles Brown  Recommendations for requested medication:n/a for FAP/arthritis  Any other pertinent information:routed to covering provider Munira Malagon

## 2024-08-12 ENCOUNTER — TELEPHONE (OUTPATIENT)
Dept: ONCOLOGY | Facility: CLINIC | Age: 59
End: 2024-08-12
Payer: COMMERCIAL

## 2024-08-14 ENCOUNTER — TELEPHONE (OUTPATIENT)
Dept: ONCOLOGY | Facility: CLINIC | Age: 59
End: 2024-08-14
Payer: COMMERCIAL

## 2024-08-14 RX ORDER — CELECOXIB 100 MG/1
100 CAPSULE ORAL DAILY
Qty: 90 CAPSULE | Refills: 3 | Status: SHIPPED | OUTPATIENT
Start: 2024-08-14

## 2024-10-04 ENCOUNTER — ONCOLOGY VISIT (OUTPATIENT)
Dept: ONCOLOGY | Facility: CLINIC | Age: 59
End: 2024-10-04
Attending: NURSE PRACTITIONER
Payer: COMMERCIAL

## 2024-10-04 VITALS
HEART RATE: 62 BPM | BODY MASS INDEX: 28.15 KG/M2 | WEIGHT: 156.4 LBS | DIASTOLIC BLOOD PRESSURE: 73 MMHG | RESPIRATION RATE: 16 BRPM | OXYGEN SATURATION: 99 % | TEMPERATURE: 97.6 F | SYSTOLIC BLOOD PRESSURE: 114 MMHG

## 2024-10-04 DIAGNOSIS — Z85.42 ENCOUNTER FOR FOLLOW-UP SURVEILLANCE OF ENDOMETRIAL CANCER: Primary | ICD-10-CM

## 2024-10-04 DIAGNOSIS — Z08 ENCOUNTER FOR FOLLOW-UP SURVEILLANCE OF ENDOMETRIAL CANCER: Primary | ICD-10-CM

## 2024-10-04 PROCEDURE — 99214 OFFICE O/P EST MOD 30 MIN: CPT | Performed by: NURSE PRACTITIONER

## 2024-10-04 PROCEDURE — 99213 OFFICE O/P EST LOW 20 MIN: CPT | Performed by: NURSE PRACTITIONER

## 2024-10-04 ASSESSMENT — PAIN SCALES - GENERAL: PAINLEVEL: NO PAIN (0)

## 2024-10-04 NOTE — LETTER
10/4/2024      Sarah Duran  [address] [address]      Dear Colleague,    Thank you for referring your patient, Sarah Duran, to the [facility]. Please see a copy of my visit note below.    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 She completed treatment 2021.    Sarah has noticed a slight pink tinge to the tip of her dilator the past month, but no germán bleeding or clots, and nothing in between times she uses her dilator. Not sexually active at this time.    She denies unintentional weight loss, severe night sweats, new adenopathy or masses, trouble breathing, unexplained and persistent cough, bloating, early satiety, abdominal pain, pelvic pain, nausea/vomiting, bowel changes, bladder changes, abnormal vaginal discharge, new swelling in the legs, or or any other new concerns not listed above.    She follows with Munira Palmer with cancer risk management for FAP.     ONCOLOGY HISTORY:  Initially, she reported with daily bleeding in .  She notes she had previously gone 15 months with no bleeding.  She reports the bleeding is sometimes heavy like a period but then can also be somewhat light and only like spotting.  She had an attempted EMB with her primary gynecologist, however this was unsuccessful.  She is here today for a second opinion.     21:  US Pelvis:  Uterus: Measures 8.9 x 5.8 x 6.4 cm. The endometrium is thickened at 2.9 cm. The endometrium is heterogeneous in appearance with an irregular, nodular contour of the endometrial surface. There is hypoechoic complex fluid in the endometrial cavity likely containing blood. There is  vascularity of the endometrium on color imaging. The abnormal endometrium occupies the majority of the uterus. Right Ovary: Measures 2.0 x 1.0 x 1.1 cm and appears unremarkable Right Ovary Blood Flow: Present. Left Ovary: Not seen. Free Fluid: No significant free fluid.There are no suspicious adnexal masses.     2/10/21:  Hysteroscopy, D&C                 Pathology:  Grade 1 endometrial adenocarcinoma, MMR intact, p53 wild type, ER/ND +     2/25/21:  Total laparoscopic hysterectomy, bilateral salpingo-oophorectomy, bilateral sentinel lymph node dissection, cystoscopy                 Pathology:  Stage II, grade 1 endometrial adenocarcinoma, tumor size 5.6 cm, 1/13 mm myometrial invasion, +CASEY, + cervix, 0/2 sentinel LN, no LVSI     4/26/21:  Completed brachytherapy to 3000cGy in 5 Fx to the vagina          OBJECTIVE:    PHYSICAL EXAM:  /73   Pulse 62   Temp 97.6  F (36.4  C) (Oral)   Resp 16   Wt 70.9 kg (156 lb 6.4 oz)   LMP 05/01/2019   SpO2 99%   BMI 28.15 kg/m       CONSTITUTIONAL: Alert non-toxic appearing female in no acute distress  RESPIRATORY: Respiratory effort unlabored  CV/PV: Bilateral lower extremities without edema  GASTROINTESTINAL: Abdomen soft, non-distended, and non-tender to palpation without masses or organomegaly  GENITOURINARY: External genitalia and urethral meatus pink without lesions, masses, or excoriation. Vagina pink and smooth without masses or lesions. Cervix surgically absent. Vaginal cuff without masses or lesions. Few scattered telangiectasia without friability, nodularity, or lesions- consistent with radiation changes. Bimanual exam reveals no masses or fullness. Rectovaginal exam confirms these findings.  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  Continue surveillance with gynecologic oncology team every 3 months x2 years (through 4/2023), then every 6 months x3 years (through 4/2026).   Pelvic exam (speculum and bimanual exam) at each visit. No tumor markers or routine labs unless clinically indicated. No further imaging unless clinically indicated. Pap/HPV testing no longer indicated.  After five year disease free interval, recommend transition to general practitioner for annual surveillance.  Return to clinic in 6 month(s) with Shanta Yusuf CNP for surveillance.   Reviewed signs and symptoms of recurrent disease and when to seek further care.    Treatment/disease related effects:  At risk for vaginal stenosis- continue dilator use once per week. Suspect the occasional pink tinge on the dilator is secondary to telangiectasias- recommend use of lubricant with dilator, to call if she develops spotting in the absence of dilator use.    Genetics:   MMRp, but due to her family history she underwent genetic testing and she is POSITIVE for a APC mutation. Specifically her mutation is called [id number], consistent with a diagnosis of familial adenomatous polyposis (FAP) or attenuated FAP (AFAP). She follows with Munira BLANTON with the Oncology Risk Management clinic.     Health maintenance:   Follow up with PCP for routine cancer screenings, non-gynecologic concerns, and co-morbid conditions    Patient verbalized understanding of and agreement with plan- see AVS for patient instructions      20 minutes spent on date of service on visit, including chart review, face to face visit, documentation, and coordination of care.    REYNOLD Oliva, CNP  [facility]  Division of Gynecologic Oncology        Again, thank you for allowing me to participate in the care of your patient.        Sincerely,        Shanta  REYNOLD Yusuf CNP

## 2024-10-04 NOTE — PROGRESS NOTES
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 She completed treatment 2021.    Sarah has noticed a slight pink tinge to the tip of her dilator the past month, but no germán bleeding or clots, and nothing in between times she uses her dilator. Not sexually active at this time.    She denies unintentional weight loss, severe night sweats, new adenopathy or masses, trouble breathing, unexplained and persistent cough, bloating, early satiety, abdominal pain, pelvic pain, nausea/vomiting, bowel changes, bladder changes, abnormal vaginal discharge, new swelling in the legs, or or any other new concerns not listed above.    She follows with uMnira Palmer with cancer risk management for FAP.     ONCOLOGY HISTORY:  Initially, she reported with daily bleeding in .  She notes she had previously gone 15 months with no bleeding.  She reports the bleeding is sometimes heavy like a period but then can also be somewhat light and only like spotting.  She had an attempted EMB with her primary gynecologist, however this was unsuccessful.  She is here today for a second opinion.     21:  US Pelvis:  Uterus: Measures 8.9 x 5.8 x 6.4 cm. The endometrium is thickened at 2.9 cm. The endometrium is heterogeneous in appearance with an irregular, nodular contour of the endometrial surface. There is hypoechoic complex fluid in the endometrial cavity likely containing blood. There is vascularity of the endometrium on color imaging. The abnormal endometrium occupies the majority of the uterus. Right Ovary: Measures 2.0 x 1.0 x 1.1 cm and appears unremarkable Right Ovary Blood Flow: Present. Left Ovary: Not seen. Free  Fluid: No significant free fluid.There are no suspicious adnexal masses.     2/10/21:  Hysteroscopy, D&C                 Pathology:  Grade 1 endometrial adenocarcinoma, MMR intact, p53 wild type, ER/IN +     2/25/21:  Total laparoscopic hysterectomy, bilateral salpingo-oophorectomy, bilateral sentinel lymph node dissection, cystoscopy                 Pathology:  Stage II, grade 1 endometrial adenocarcinoma, tumor size 5.6 cm, 1/13 mm myometrial invasion, +CASEY, + cervix, 0/2 sentinel LN, no LVSI     4/26/21:  Completed brachytherapy to 3000cGy in 5 Fx to the vagina          OBJECTIVE:    PHYSICAL EXAM:  /73   Pulse 62   Temp 97.6  F (36.4  C) (Oral)   Resp 16   Wt 70.9 kg (156 lb 6.4 oz)   LMP 05/01/2019   SpO2 99%   BMI 28.15 kg/m       CONSTITUTIONAL: Alert non-toxic appearing female in no acute distress  RESPIRATORY: Respiratory effort unlabored  CV/PV: Bilateral lower extremities without edema  GASTROINTESTINAL: Abdomen soft, non-distended, and non-tender to palpation without masses or organomegaly  GENITOURINARY: External genitalia and urethral meatus pink without lesions, masses, or excoriation. Vagina pink and smooth without masses or lesions. Cervix surgically absent. Vaginal cuff without masses or lesions. Few scattered telangiectasia without friability, nodularity, or lesions- consistent with radiation changes. Bimanual exam reveals no masses or fullness. Rectovaginal exam confirms these findings.  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  Continue surveillance with gynecologic oncology team every 3 months x2 years (through 4/2023), then every 6 months x3 years  (through 4/2026).   Pelvic exam (speculum and bimanual exam) at each visit. No tumor markers or routine labs unless clinically indicated. No further imaging unless clinically indicated. Pap/HPV testing no longer indicated.  After five year disease free interval, recommend transition to general practitioner for annual surveillance.  Return to clinic in 6 month(s) with Shanta Yusuf CNP for surveillance.   Reviewed signs and symptoms of recurrent disease and when to seek further care.    Treatment/disease related effects:  At risk for vaginal stenosis- continue dilator use once per week. Suspect the occasional pink tinge on the dilator is secondary to telangiectasias- recommend use of lubricant with dilator, to call if she develops spotting in the absence of dilator use.    Genetics:   MMRp, but due to her family history she underwent genetic testing and she is POSITIVE for a APC mutation. Specifically her mutation is called [id number], consistent with a diagnosis of familial adenomatous polyposis (FAP) or attenuated FAP (AFAP). She follows with Munira BLANTON with the Oncology Risk Management clinic.     Health maintenance:   Follow up with PCP for routine cancer screenings, non-gynecologic concerns, and co-morbid conditions    Patient verbalized understanding of and agreement with plan- see AVS for patient instructions      20 minutes spent on date of service on visit, including chart review, face to face visit, documentation, and coordination of care.    REYNOLD Oliva, CNP  [facility]  Division of Gynecologic Oncology

## 2024-10-04 NOTE — PATIENT INSTRUCTIONS
Your visit today was with Shanta Yusuf CNP for surveillance.    Plan:  No evidence of disease on today's exam  Return to clinic in 6 month(s) with Shanta Yusuf CNP for surveillance  Surveillance visits  every six months through 4/2026.  Keep using the dilator once per week- use a good amount lubricant to minimize friction    SIGNS AND SYMPTOMS OF RECURRENT DISEASE    Symptoms of cancer coming back can vary from person to person and it's important to know your own baseline and health factors that may cause symptoms. The following list includes symptoms that would warrant further investigation with your oncology team.    Vaginal bleeding or spotting  Persistent pelvic, abdominal, or bone pain that is persistent and does not improve over time  New persistent dry cough or shortness of breath at rest without a known cause  Unintentional weight loss  New swelling in the legs  New changes in bowel/bladder patterns  New, persistent bloating/fullness  New, persistent onset of drenching night sweats  New, persistent lumps or bumps in the groin or neck  New severe headaches, tunnel vision, double vision, or seizures  Sudden and persistent fatigue without a known cause, particularly if associated with any of the above symptoms    If you have symptoms that are new, persistent, or concerning to you and have been ongoing for two weeks or longer, please contact your oncology care team.    [facility] triage: [phone]    If you have difficulty reaching triage during off hours, you can call [phone] and ask to speak to the gynecologic oncology resident on call      For urgent questions or concerns, please call rather than send a medical message.

## 2024-10-04 NOTE — NURSING NOTE
"Oncology Rooming Note    October 4, 2024 3:45 PM   Sarah Duran is a 59 year old female who presents for:    Chief Complaint   Patient presents with    Oncology Clinic Visit     Initial Vitals: /73   Pulse 62   Temp 97.6  F (36.4  C) (Oral)   Resp 16   Wt 70.9 kg (156 lb 6.4 oz)   LMP 05/01/2019   SpO2 99%   BMI 28.15 kg/m   Estimated body mass index is 28.15 kg/m  as calculated from the following:    Height as of 12/7/23: 1.588 m (5' 2.5\").    Weight as of this encounter: 70.9 kg (156 lb 6.4 oz). Body surface area is 1.77 meters squared.  No Pain (0) Comment: Data Unavailable   Patient's last menstrual period was 05/01/2019.  Allergies reviewed: Yes  Medications reviewed: Yes    Medications: Medication refills not needed today.  Pharmacy name entered into [employer]:    [facility] [place], [place] - [address]  [facility] [place], [place] - [address] [place] [address] [facility] [place], [place] [address] [facility] [place], [place] - [address]  [place], [place] - [address]  [facility] [place], [place] - [address] [facility] [place], [place] - [address] [facility] [address]    Frailty Screening:   Is the patient here for a new oncology consult visit in cancer care? 2. No      Clinical concerns: follow up        Pili Rosas            "

## 2024-10-20 ENCOUNTER — HEALTH MAINTENANCE LETTER (OUTPATIENT)
Age: 59
End: 2024-10-20

## 2024-11-20 ENCOUNTER — MYC MEDICAL ADVICE (OUTPATIENT)
Dept: GASTROENTEROLOGY | Facility: CLINIC | Age: 59
End: 2024-11-20
Payer: COMMERCIAL

## 2024-11-20 NOTE — TELEPHONE ENCOUNTER
"Endoscopy Scheduling Screen    Have you had any respiratory illness or flu-like symptoms in the last 10 days?  No    What is your communication preference for Instructions and/or Bowel Prep?   MyChart    What insurance is in the chart?  Other:  BCBS    Ordering/Referring Provider: Stephen Gasca MD   (If ordering provider performs procedure, schedule with ordering provider unless otherwise instructed. )    BMI: Estimated body mass index is 28.15 kg/m  as calculated from the following:    Height as of 12/7/23: 1.588 m (5' 2.5\").    Weight as of 10/4/24: 70.9 kg (156 lb 6.4 oz).     Sedation Ordered  MAC/deep sedation.   BMI<= 45 45 < BMI <= 48 48 < BMI < = 50  BMI > 50   No Restrictions No [facility]  No [facility]  [facility] with exceptions Hospital Only OR Only       Do you have a history of malignant hyperthermia?  No    (Females) Are you currently pregnant?   No     Have you been diagnosed or told you have pulmonary hypertension?   No    Do you have an LVAD?  No    Have you been told you have moderate to severe sleep apnea?  No.    Have you been told you have COPD, asthma, or any other lung disease?  Yes     What breathing problems do you have?  Asthma     Do you use home oxygen?  No    Have your breathing problems required an ED visit or hospitalization in the last year?  No.    Do you have any heart conditions?  No     Have you ever had or are you waiting for an organ transplant?  No. Continue scheduling, no site restrictions.    Have you had a stroke or transient ischemic attack (TIA aka \"mini stroke\" in the last 6 months?   No    Have you been diagnosed with or been told you have cirrhosis of the liver?   No.    Are you currently on dialysis?   No    Do you need assistance transferring?   No    BMI: Estimated body mass index is 28.15 kg/m  as calculated from the following:    Height as of 12/7/23: 1.588 m (5' 2.5\").    Weight as of 10/4/24: 70.9 kg (156 lb 6.4 oz).     Is patients BMI > 40 and scheduling " location [facility]?  No    Do you take an injectable or oral medication for weight loss or diabetes (excluding insulin)?  No    Do you take the medication Naltrexone?  No    Do you take blood thinners?  No       Prep   Are you currently on dialysis or do you have chronic kidney disease?  No    Do you have a diagnosis of diabetes?  No    Do you have a diagnosis of cystic fibrosis (CF)?  No    On a regular basis do you go 3 -5 days between bowel movements?  No    BMI > 40?  No    Preferred Pharmacy:    [facility] [place], [place] - [address]  [address]  [facility] [address]  Phone: [phone] Fax: [phone]        [facility] - [place], [place] - [address]  [address] [facility] [address]  Phone: [phone] Fax: [phone]    [facility] [place], [place] - [address]  [address]  [facility] [address]  Phone: [phone] Fax: [phone]    [facility] [place], [place] - [address]  [address] [facility] [address]  Phone: [phone] Fax: [phone] Alternate Fax: [phone], [phone]    [place], [place] - [address]  [address]  [facility] [address]  Phone: [phone] Fax: [phone] Alternate Fax: [phone], [phone]      Final Scheduling Details     Procedure scheduled  EGD w/Flex Sig    Surgeon:  Campos     Date of procedure:  01/14/2025      Pre-OP / PAC:   No - Not required for this site.    Location  [facility] - Per order.    Sedation   MAC/Deep Sedation - Per order.      Patient Reminders:   You will receive a call from a Nurse to review instructions and health history.  This assessment must be completed prior to your procedure.  Failure to complete the Nurse assessment may result in the procedure being cancelled.      On the day of your procedure, please designate an  adult(s) who can drive you home stay with you for the next 24 hours. The medicines used in the exam will make you sleepy. You will not be able to drive.      You cannot take public transportation, ride share services, or non-medical taxi service without a responsible caregiver.  Medical transport services are allowed with the requirement that a responsible caregiver will receive you at your destination.  We require that drivers and caregivers are confirmed prior to your procedure.

## 2024-12-22 ENCOUNTER — HEALTH MAINTENANCE LETTER (OUTPATIENT)
Age: 59
End: 2024-12-22

## 2024-12-26 ENCOUNTER — TELEPHONE (OUTPATIENT)
Dept: GASTROENTEROLOGY | Facility: CLINIC | Age: 59
End: 2024-12-26
Payer: COMMERCIAL

## 2024-12-26 NOTE — TELEPHONE ENCOUNTER
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      Prior to reschedule please review:  Ordering Provider: Campos  Sedation Determined: mac  Does patient have any ASC Exclusions, please identify?: n      Notes on Cancelled Procedure:  Procedure: Upper Endoscopy [EGD] and flex sig  Date: 1/14/25  Location: [facility]; [address] [facility], [address] [place] [address]   Surgeon: Campos      Rescheduled: Yes,   Procedure: Upper Endoscopy [EGD]  and flex sig  Date: 2/25/25   Location: [facility]; [address] [facility], [address] [place] [address]    Surgeon: Campos   Sedation Level Scheduled  mac ,  Reason for Sedation Level ordered   Instructions updated and sent: y     Does patient need PAC or Pre -Op Rescheduled? : no       Did you cancel or rescheduled an EUS procedure? No.

## 2025-02-11 ENCOUNTER — TELEPHONE (OUTPATIENT)
Dept: GASTROENTEROLOGY | Facility: CLINIC | Age: 60
End: 2025-02-11
Payer: COMMERCIAL

## 2025-02-12 NOTE — TELEPHONE ENCOUNTER
Staff message sent to Dr. Gasca for sedation review.     Per previous flexible sigmoidoscopy/EGD procedure completed on 12/7/23 it noted:  Repeat flexible sigmoidoscopy in 1 year for  surveillance. This will be adjusted if needed based on    final pathology. If upper exam performed at same time will be with general anesthesia, otherwise with MAC.     Message sent to provider to review and advise if ok to proceed with MAC sedation as scheduled or if GA is recommended.     ---------------------------------------------------------------------------------------    Pre visit planning completed.      Procedure details:    Patient scheduled for Flexible sigmoidoscopy and Upper endoscopy (EGD) on 2/25/25.     Arrival time: 0630. Procedure time 0800    Facility location: [facility]; [address] [place] [address] [address]rd [facility], [place] [address]. Check in location: Main entrance at registration desk.  *Disclaimer: Drivers are to check in with patient and stay on campus during procedure.     Sedation type: General (see above)    Pre op exam needed? No.    Indication for procedure: FAP      Chart review:     Electronic implanted devices? No    Recent diagnosis of diverticulitis within the last 6 weeks? No      Medication review:    Diabetic? No    Anticoagulants? No    Weight loss medication/injectable? No GLP-1 medication per patient's medication list. Nursing to verify with pre-assessment call.    Other medication HOLDING recommendations:  N/A      Prep for procedure:     Bowel prep recommendation: Standard Miralax.   Due to: provider request/ordered same prep as last flexible sigmoidoscopy completed in 2023. Upon review this was done with Standard Miralax prep.     Procedure information and instructions sent via farmbuy  - bowel prep sent only. Awaiting provider response.         Annemarie Manzo RN  Endoscopy Procedure Pre Assessment   [phone] option 2

## 2025-02-17 NOTE — TELEPHONE ENCOUNTER
Attempted to contact patient in order to complete pre assessment questions.     No answer. Left message to return call to [phone] option 3    Callback communication sent via Zeto.      Annemarie Manzo RN  Endoscopy Procedure Pre Assessment

## 2025-02-17 NOTE — TELEPHONE ENCOUNTER
"Response from Dr. Gasca     \"This should be with general anesthesia.   Per my understanding of anesthesia policy, this can be done in the [facility], and thus same day as already scheduled. Per prior notes, I believe she also needs a full colonoscopy prep for her flex sigs.\"    ---------------------------------------------------------------------------------------  Writer reached out to endoscopy scheduling staff Kassandra who will update the case to GA.     Procedure detail instructions sent via Rivono.     Annemarie Manzo RN  Endoscopy Procedure Pre Assessment   [phone] option 3        "

## 2025-02-18 NOTE — TELEPHONE ENCOUNTER
Pre assessment completed for upcoming procedure.   (Please see previous telephone encounter notes for complete details)    Patient returned call.     Procedure details:    Arrival time and facility location reviewed.    Pre op exam needed? No.    Designated  policy reviewed and that site requests drivers to check in and stay on campus. Instructed to have someone stay 24  hours post procedure.   *Disclaimer - please notify the [facility] Op Manager with any  issues/concerns.    Medication review:    Medications reviewed. Please see supporting documentation below. Holding recommendations discussed (if applicable).     Prep for procedure:    Procedure prep instructions reviewed.      Any additional information needed:  N/A    Patient verbalized understanding and had no questions or concerns at this time.    Caryl Chow RN  Endoscopy Procedure Pre Assessment   [phone] option 3

## 2025-02-25 ENCOUNTER — ANESTHESIA EVENT (OUTPATIENT)
Dept: GASTROENTEROLOGY | Facility: CLINIC | Age: 60
End: 2025-02-25
Payer: COMMERCIAL

## 2025-02-25 ENCOUNTER — HOSPITAL ENCOUNTER (OUTPATIENT)
Facility: CLINIC | Age: 60
Discharge: HOME | End: 2025-02-25
Attending: INTERNAL MEDICINE | Admitting: INTERNAL MEDICINE
Payer: COMMERCIAL

## 2025-02-25 ENCOUNTER — ANESTHESIA (OUTPATIENT)
Dept: GASTROENTEROLOGY | Facility: CLINIC | Age: 60
End: 2025-02-25
Payer: COMMERCIAL

## 2025-02-25 VITALS
HEART RATE: 71 BPM | RESPIRATION RATE: 16 BRPM | TEMPERATURE: 97.8 F | HEIGHT: 63 IN | SYSTOLIC BLOOD PRESSURE: 133 MMHG | BODY MASS INDEX: 27.46 KG/M2 | DIASTOLIC BLOOD PRESSURE: 75 MMHG | OXYGEN SATURATION: 98 % | WEIGHT: 155 LBS

## 2025-02-25 LAB
FLEXIBLE SIGMOIDOSCOPY: NORMAL
UPPER GI ENDOSCOPY: NORMAL

## 2025-02-25 PROCEDURE — 250N000011 HC RX IP 250 OP 636: Performed by: INTERNAL MEDICINE

## 2025-02-25 PROCEDURE — 88305 TISSUE EXAM BY PATHOLOGIST: CPT | Mod: TC | Performed by: INTERNAL MEDICINE

## 2025-02-25 PROCEDURE — 370N000017 HC ANESTHESIA TECHNICAL FEE, PER MIN: Performed by: INTERNAL MEDICINE

## 2025-02-25 PROCEDURE — 45338 SIGMOIDOSCOPY W/TUMR REMOVE: CPT | Performed by: INTERNAL MEDICINE

## 2025-02-25 PROCEDURE — 999N000010 HC STATISTIC ANES STAT CODE-CRNA PER MINUTE: Performed by: INTERNAL MEDICINE

## 2025-02-25 PROCEDURE — 250N000025 HC SEVOFLURANE, PER MIN: Performed by: INTERNAL MEDICINE

## 2025-02-25 PROCEDURE — 43251 EGD REMOVE LESION SNARE: CPT | Performed by: INTERNAL MEDICINE

## 2025-02-25 PROCEDURE — 250N000009 HC RX 250: Performed by: INTERNAL MEDICINE

## 2025-02-25 PROCEDURE — 88305 TISSUE EXAM BY PATHOLOGIST: CPT | Mod: 26 | Performed by: PATHOLOGY

## 2025-02-25 PROCEDURE — 258N000003 HC RX IP 258 OP 636: Performed by: INTERNAL MEDICINE

## 2025-02-25 RX ORDER — PROPOFOL 10 MG/ML
INJECTION, EMULSION INTRAVENOUS PRN
Status: DISCONTINUED | OUTPATIENT
Start: 2025-02-25 | End: 2025-02-25

## 2025-02-25 RX ORDER — NALOXONE HYDROCHLORIDE 0.4 MG/ML
0.4 INJECTION, SOLUTION INTRAMUSCULAR; INTRAVENOUS; SUBCUTANEOUS
Status: DISCONTINUED | OUTPATIENT
Start: 2025-02-25 | End: 2025-02-25 | Stop reason: HOSPADM

## 2025-02-25 RX ORDER — LIDOCAINE HYDROCHLORIDE 20 MG/ML
INJECTION, SOLUTION INFILTRATION; PERINEURAL PRN
Status: DISCONTINUED | OUTPATIENT
Start: 2025-02-25 | End: 2025-02-25

## 2025-02-25 RX ORDER — FENTANYL CITRATE 50 UG/ML
INJECTION, SOLUTION INTRAMUSCULAR; INTRAVENOUS PRN
Status: DISCONTINUED | OUTPATIENT
Start: 2025-02-25 | End: 2025-02-25

## 2025-02-25 RX ORDER — NALOXONE HYDROCHLORIDE 0.4 MG/ML
0.2 INJECTION, SOLUTION INTRAMUSCULAR; INTRAVENOUS; SUBCUTANEOUS
Status: DISCONTINUED | OUTPATIENT
Start: 2025-02-25 | End: 2025-02-25 | Stop reason: HOSPADM

## 2025-02-25 RX ORDER — PROCHLORPERAZINE MALEATE 5 MG/1
10 TABLET ORAL EVERY 6 HOURS PRN
Status: DISCONTINUED | OUTPATIENT
Start: 2025-02-25 | End: 2025-02-25 | Stop reason: HOSPADM

## 2025-02-25 RX ORDER — ONDANSETRON 4 MG/1
4 TABLET, ORALLY DISINTEGRATING ORAL EVERY 6 HOURS PRN
Status: DISCONTINUED | OUTPATIENT
Start: 2025-02-25 | End: 2025-02-25 | Stop reason: HOSPADM

## 2025-02-25 RX ORDER — DEXAMETHASONE SODIUM PHOSPHATE 4 MG/ML
INJECTION, SOLUTION INTRA-ARTICULAR; INTRALESIONAL; INTRAMUSCULAR; INTRAVENOUS; SOFT TISSUE PRN
Status: DISCONTINUED | OUTPATIENT
Start: 2025-02-25 | End: 2025-02-25

## 2025-02-25 RX ORDER — SCOPOLAMINE 1 MG/3D
1 PATCH, EXTENDED RELEASE TRANSDERMAL ONCE
Status: DISCONTINUED | OUTPATIENT
Start: 2025-02-25 | End: 2025-02-25 | Stop reason: HOSPADM

## 2025-02-25 RX ORDER — ONDANSETRON 2 MG/ML
INJECTION INTRAMUSCULAR; INTRAVENOUS PRN
Status: DISCONTINUED | OUTPATIENT
Start: 2025-02-25 | End: 2025-02-25

## 2025-02-25 RX ORDER — SODIUM CHLORIDE, SODIUM LACTATE, POTASSIUM CHLORIDE, CALCIUM CHLORIDE 600; 310; 30; 20 MG/100ML; MG/100ML; MG/100ML; MG/100ML
INJECTION, SOLUTION INTRAVENOUS CONTINUOUS PRN
Status: DISCONTINUED | OUTPATIENT
Start: 2025-02-25 | End: 2025-02-25

## 2025-02-25 RX ORDER — ONDANSETRON 2 MG/ML
4 INJECTION INTRAMUSCULAR; INTRAVENOUS
Status: DISCONTINUED | OUTPATIENT
Start: 2025-02-25 | End: 2025-02-25 | Stop reason: HOSPADM

## 2025-02-25 RX ORDER — LIDOCAINE 40 MG/G
CREAM TOPICAL
Status: DISCONTINUED | OUTPATIENT
Start: 2025-02-25 | End: 2025-02-25 | Stop reason: HOSPADM

## 2025-02-25 RX ORDER — FLUMAZENIL 0.1 MG/ML
0.2 INJECTION, SOLUTION INTRAVENOUS
Status: DISCONTINUED | OUTPATIENT
Start: 2025-02-25 | End: 2025-02-25 | Stop reason: HOSPADM

## 2025-02-25 RX ORDER — ONDANSETRON 2 MG/ML
4 INJECTION INTRAMUSCULAR; INTRAVENOUS EVERY 6 HOURS PRN
Status: DISCONTINUED | OUTPATIENT
Start: 2025-02-25 | End: 2025-02-25 | Stop reason: HOSPADM

## 2025-02-25 RX ADMIN — FENTANYL CITRATE 25 MCG: 50 INJECTION INTRAMUSCULAR; INTRAVENOUS at 08:23

## 2025-02-25 RX ADMIN — ONDANSETRON 4 MG: 2 INJECTION INTRAMUSCULAR; INTRAVENOUS at 08:23

## 2025-02-25 RX ADMIN — PHENYLEPHRINE HYDROCHLORIDE 100 MCG: 10 INJECTION INTRAVENOUS at 08:52

## 2025-02-25 RX ADMIN — DEXAMETHASONE SODIUM PHOSPHATE 4 MG: 4 INJECTION, SOLUTION INTRA-ARTICULAR; INTRALESIONAL; INTRAMUSCULAR; INTRAVENOUS; SOFT TISSUE at 08:21

## 2025-02-25 RX ADMIN — SODIUM CHLORIDE, POTASSIUM CHLORIDE, SODIUM LACTATE AND CALCIUM CHLORIDE: 600; 310; 30; 20 INJECTION, SOLUTION INTRAVENOUS at 08:03

## 2025-02-25 RX ADMIN — FENTANYL CITRATE 50 MCG: 50 INJECTION INTRAMUSCULAR; INTRAVENOUS at 08:08

## 2025-02-25 RX ADMIN — SUGAMMADEX 200 MG: 100 INJECTION, SOLUTION INTRAVENOUS at 08:54

## 2025-02-25 RX ADMIN — PROPOFOL 150 MG: 10 INJECTION, EMULSION INTRAVENOUS at 08:08

## 2025-02-25 RX ADMIN — Medication 50 MG: at 08:09

## 2025-02-25 RX ADMIN — LIDOCAINE HYDROCHLORIDE 80 MG: 20 INJECTION, SOLUTION INFILTRATION; PERINEURAL at 08:08

## 2025-02-25 RX ADMIN — SCOPOLAMINE 1 PATCH: 1.5 PATCH, EXTENDED RELEASE TRANSDERMAL at 08:00

## 2025-02-25 ASSESSMENT — ACTIVITIES OF DAILY LIVING (ADL)
ADLS_ACUITY_SCORE: 45

## 2025-02-25 NOTE — ANESTHESIA CARE TRANSFER NOTE
Patient: Sarah Duran    Procedure: Procedure(s):  ESOPHAGOGASTRODUODENOSCOPY, WITH LESION REMOVAL USING SNARE  SIGMOIDOSCOPY, FLEXIBLE, WITH LESION REMOVAL USING SNARE       Diagnosis: FAP (familial adenomatous polyposis) [D13.91]  Diagnosis Additional Information: No value filed.    Anesthesia Type:   No value filed.     Note:    Oropharynx: oropharynx clear of all foreign objects  Level of Consciousness: awake  Oxygen Supplementation: face mask  Level of Supplemental Oxygen (L/min / FiO2): 6  Independent Airway: airway patency satisfactory and stable  Dentition: dentition unchanged  Vital Signs Stable: post-procedure vital signs reviewed and stable  Report to RN Given: handoff report given  Patient transferred to: Phase II    Handoff Report: Identifed the Patient, Identified the Reponsible Provider, Reviewed the pertinent medical history, Discussed the surgical course, Reviewed Intra-OP anesthesia mangement and issues during anesthesia, Set expectations for post-procedure period and Allowed opportunity for questions and acknowledgement of understanding      Vitals:  Vitals Value Taken Time   /72 02/25/25 0905   Temp 98    Pulse 68 02/25/25 0907   Resp 15 02/25/25 0907   SpO2 99 % 02/25/25 0907   Vitals shown include unfiled device data.    Electronically Signed By: REYNOLD Chow CRNA  February 25, 2025  9:08 AM

## 2025-02-25 NOTE — BRIEF OP NOTE
[facility]    Brief Operative Note    See procedure notes in Procedure tab.    J. See Gasac MD  Professor of Medicine  Division of Gastroenterology, Hepatology and Nutrition  [facility]

## 2025-02-25 NOTE — H&P
Gastroenterology Pre-op History and Physical    Sarah Duran MRN# [id number]   Age: 59 year old YOB: 1965      Date of Surgery: 02/25/25  [facility]      Date of Exam 2/25/2025 Facility Same Day       Primary care provider: Ida Sandoval         Chief Complaint and/or Reason for Procedure:   58 yo female with hx of FAP, prior subtotal colectomy and prior ampullectomy. Routine surveillance. Has required general anesthesia for upper endoscopies in the past.         Past Medical and Surgical History:     Past Medical History:   Diagnosis Date    Acute deep vein thrombosis (DVT) of left lower extremity (H)     Asthma     Atypical ductal hyperplasia of right breast 10/2016    fibrocystic changes, PASH and atypical ductal hyperplasia on needle biopsy, s/p R excisional biopsy with radioactive seed localization    Endometrial cancer (H) 02/15/2021    Factor 5 Leiden mutation, heterozygous     factor 5    Familial adenomatous polyposis 1988    clinical presentation; s/p total abdominal colectomy with TRACEY; confirmed APC+ 2018    GERD (gastroesophageal reflux disease)     Infection due to 2019 novel coronavirus 11/02/2022    Migraine     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    [id number]    PONV (postoperative nausea and vomiting)      Past Surgical History:   Procedure Laterality Date    BIOPSY BREAST SEED LOCALIZATION Right 11/30/2016    Procedure: BIOPSY BREAST SEED LOCALIZATION;  Surgeon: Jessica Rasheed MD;  Location: [facility] OR    COLECTOMY      subtotal    COLONOSCOPY N/A 12/7/2023    Procedure: COLONOSCOPY, FLEXIBLE, WITH LESION REMOVAL USING SNARE, Argon plasma coagulation ablation;  Surgeon: Stephen Gasca MD;  [facility]    COMBINED ESOPHAGOSCOPY, GASTROSCOPY, DUODENOSCOPY (EGD) WITH ARGON PLASMA COAGULATOR (APC) N/A 4/12/2018    Procedure: COMBINED ESOPHAGOSCOPY, GASTROSCOPY, DUODENOSCOPY (EGD) WITH ARGON PLASMA COAGULATOR (APC);   Esophagogastroduodenoscopy with polypectomy and polyp ablation;  Surgeon: Stephen Gasca MD;  [facility]    COMBINED ESOPHAGOSCOPY, GASTROSCOPY, DUODENOSCOPY (EGD) WITH ARGON PLASMA COAGULATOR (APC) N/A 12/7/2023    Procedure: Combined Esophagoscopy, Gastroscopy, Duodenoscopy (Egd) With Argon Plasma Coagulator (Apc) ablation, biopsies, polypectomy;  Surgeon: Stephen Gasca MD;  [facility]    DILATION AND CURETTAGE, HYSTEROSCOPY DIAGNOSTIC, COMBINED N/A 2/10/2021    Procedure: HYSTEROSCOPY, DIAGNOSTIC, WITH DILATION AND CURETTAGE OF UTERUS;  Surgeon: Sandie Samaniego MD;  Location: [facility] OR    ENDOSCOPIC RETROGRADE CHOLANGIOPANCREATOGRAM N/A 9/23/2021    Procedure: ENDOSCOPIC RETROGRADE CHOLANGIOPANCREATOGRAPHY WITH PANCREATIC STENT PLACEMENT AND POLYP ABLATION with Argon Plasma Coagulation;  Surgeon: Shahab Fernández MD;  [facility]    ENDOSCOPIC RETROGRADE CHOLANGIOPANCREATOGRAM N/A 12/1/2022    Procedure: diagnostic ENDOSCOPIC RETROGRADE CHOLANGIOPANCREATOGRAPHY, ablation of duodenal polyps, biopsy of gastric polyp;  Surgeon: Shahab Fernández MD;  [facility] OR    ENDOSCOPIC RETROGRADE CHOLANGIOPANCREATOGRAPHY      ENDOSCOPIC ULTRASOUND UPPER GASTROINTESTINAL TRACT (GI) N/A 7/25/2019    Procedure: Endoscopic Ultrasound;  Surgeon: Stephen Gasca MD;  [facility]    ESOPHAGOSCOPY, GASTROSCOPY, DUODENOSCOPY (EGD), COMBINED  11/5/2012    Procedure: COMBINED ESOPHAGOSCOPY, GASTROSCOPY, DUODENOSCOPY (EGD), BIOPSY SINGLE OR MULTIPLE;;  Surgeon: Angélica Garcia MD;  [facility] GI    ESOPHAGOSCOPY, GASTROSCOPY, DUODENOSCOPY (EGD), COMBINED  12/3/2013    Procedure: COMBINED ESOPHAGOSCOPY, GASTROSCOPY, DUODENOSCOPY (EGD);;  Surgeon: Angélica Garcia MD;  [facility] GI    ESOPHAGOSCOPY, GASTROSCOPY, DUODENOSCOPY (EGD), COMBINED N/A 3/28/2018    Procedure: COMBINED ESOPHAGOSCOPY, GASTROSCOPY, DUODENOSCOPY (EGD);;  Surgeon: Stephen Gasca MD;   [facility] GI    ESOPHAGOSCOPY, GASTROSCOPY, DUODENOSCOPY (EGD), COMBINED N/A 5/30/2019    Procedure: Esophagogastroduodenoscopy with biopsy x2 APC;  Surgeon: Stephen Gasca MD;  [facility]    ESOPHAGOSCOPY, GASTROSCOPY, DUODENOSCOPY (EGD), COMBINED N/A 7/30/2020    Procedure: ESOPHAGOGASTRODUODENOSCOPY (EGD) with polypectomy by hot snare; argonplasma coaglution ablation of polyps;  Surgeon: Stephen Gasca MD;  [facility]    ESOPHAGOSCOPY, GASTROSCOPY, DUODENOSCOPY (EGD), RESECT MUCOSA, COMBINED N/A 7/25/2019    Procedure: Esophagogastroduodenoscopy with gastric mucosal resection and clip application;  Surgeon: Stephen Gasca MD;  [facility]    EXCISE POLYP RECTUM      LAPAROSCOPIC HYSTERECTOMY TOTAL, BILATERAL SALPINGO-OOPHORECTOMY, NODE DISSECTION, COMBINED Bilateral 2/25/2021    Procedure: Laparoscopic removal of uterus, cervix, both ovaries and fallopian tubes, sentinel lymph node dissection, cystoscopy;  Surgeon: Sandie Samaniego MD;  [facility]    LAPAROSCOPIC LYSIS ADHESIONS      SIGMOIDOSCOPY FLEXIBLE      SIGMOIDOSCOPY FLEXIBLE N/A 12/7/2015    Procedure: SIGMOIDOSCOPY FLEXIBLE;  Surgeon: Mariela Bearden MD;  [facility] GI    SIGMOIDOSCOPY FLEXIBLE N/A 3/28/2018    Procedure: SIGMOIDOSCOPY FLEXIBLE;  EGD/Flex Sig;  Surgeon: Stephen Gasca MD;  [facility]    SIGMOIDOSCOPY FLEXIBLE N/A 4/12/2018    Procedure: SIGMOIDOSCOPY FLEXIBLE;  Flexible Sigmoidoscopy with polyp ablation;  Surgeon: Stephen Gasca MD;  [facility]    SIGMOIDOSCOPY FLEXIBLE N/A 5/30/2019    Procedure: Flexible Sigmoidoscopy with polypectomy;  Surgeon: Stephen Gasca MD;  [facility]    SIGMOIDOSCOPY FLEXIBLE N/A 7/30/2020    Procedure: SIGMOIDOSCOPY, FLEXIBLE with Ablation of Polyps;  Surgeon: Stephen Gasca MD;  [facility]    SIGMOIDOSCOPY FLEXIBLE N/A 9/23/2021    Procedure: FLEXIBLE SIGMOIDOSCOPY WITH POLYPECTOMY;  Surgeon: Campos  "Stephen Cui MD;  [facility]    SIGMOIDOSCOPY FLEXIBLE N/A 12/1/2022    Procedure: SIGMOIDOSCOPY, FLEXIBLE with polypectomy;  Surgeon: Stephen Gasca MD;  Location:  OR    [facility] LAP,SURG,COLECTOMY,W/ANAST      for colon cancer            Medications (include herbals and vitamins):        Medications Prior to Admission   Medication Sig Dispense Refill Last Dose/Taking    acetaminophen (TYLENOL) 325 MG tablet Take 2 tablets (650 mg) by mouth every 6 hours as needed for mild pain 30 tablet 0 2/24/2025    albuterol 90 MCG/ACT inhaler Inhale 2 puffs into the lungs every 6 hours as needed.   2/24/2025    ASHWAGANDHA PO Take 150 mg by mouth One gummy per day   2/24/2025    fexofenadine (ALLEGRA) 180 MG tablet Take 180 mg by mouth daily.   2/24/2025    Lactobacillus (PROBIOTIC ACIDOPHILUS PO) Take 100 mg by mouth daily   2/24/2025    Omega-3 Fatty Acids (OMEGA-3 FISH OIL PO) Take 2 g by mouth daily    More than a month    triamcinolone (NASACORT) 55 MCG/ACT Inhaler Spray 2 sprays into both nostrils daily   2/24/2025    VENTOLIN  (90 Base) MCG/ACT inhaler    2/24/2025    Apple Cider Vinegar 500 MG TABS Take 500 mg by mouth daily   2/21/2025    bisacodyl (DULCOLAX) 5 MG EC tablet Refer to \"Getting Ready for a Colonoscopy\" instruction handout 4 tablet 0     calcium-magnesium (CALMAG) 500-250 MG TABS Take 1 tablet by mouth daily       celecoxib (CELEBREX) 100 MG capsule Take 1 capsule (100 mg) by mouth daily 90 capsule 3 2/21/2025    ciclopirox (PENLAC) 8 % external solution Apply topically.       Cranberry 500 MG CAPS Take 1 capsule by mouth daily   2/21/2025    docusate sodium (COLACE) 100 MG capsule Take 100 mg by mouth daily       MELATONIN PO Take 2 mg by mouth nightly as needed        multivitamin, therapeutic with minerals (THERA-VIT-M) TABS Take 3 tablets by mouth 2 times daily    2/23/2025    terbinafine (LAMISIL) 250 MG tablet Take 250 mg by mouth.       ZOLMitriptan (ZOMIG) 5 MG tablet Take 1 " "tablet (5 mg) by mouth at onset of headache for migraine 90 tablet 3              Allergies:      Allergies   Allergen Reactions    Corn-Containing Products Shortness Of Breath     Avoids food; ok in tiny doses, such as in medicines  Avoids food; ok in tiny doses, such as in medicines    Cipro [Ciprofloxacin]      Reddened skin    Flagyl [Metronidazole] Nausea and Vomiting    Garlic Fatigue     GI distress    Keflex [Cephalosporins] Nausea and Vomiting    Omeprazole      PN: headache    Prilosec Otc [Omeprazole Magnesium]      migraines    Cephalexin Rash     PN: Rash, Generalized    Doxycycline Rash     blisters    Erythromycin Rash    Sulfa Antibiotics Rash     Ok to use                Physical Exam:   All vitals have been reviewed  Patient Vitals for the past 8 hrs:   BP Temp Temp src Resp SpO2 Height Weight   02/25/25 0715 (!) 141/79 98.2  F (36.8  C) Oral 16 96 % 1.588 m (5' 2.5\") 70.3 kg (155 lb)     No intake/output data recorded.  Airway assessment:   Patient is able to open mouth wide  Patient is able to stick out tongue  Mallampatti classification: Class I (visualization of the soft palate, fauces, uvula, anterior and posterior pillars)}      Lungs:   No increased work of breathing, good air exchange, clear to auscultation bilaterally, no crackles or wheezing     Cardiovascular:   regular rate and rhythm and normal S1 and S2                 Anesthetic risk and/or ASA classification:   ASA 1    Stephen Gasca MD        "

## 2025-02-25 NOTE — ANESTHESIA POSTPROCEDURE EVALUATION
Patient: Sarah Duran    Procedure: Procedure(s):  ESOPHAGOGASTRODUODENOSCOPY, WITH LESION REMOVAL USING SNARE  SIGMOIDOSCOPY, FLEXIBLE, WITH LESION REMOVAL USING SNARE       Anesthesia Type:  No value filed.    Note:  Disposition: Outpatient   Postop Pain Control: Uneventful            Sign Out: Well controlled pain   PONV: No   Neuro/Psych: Uneventful            Sign Out: Acceptable/Baseline neuro status   Airway/Respiratory: Uneventful            Sign Out: Acceptable/Baseline resp. status   CV/Hemodynamics: Uneventful            Sign Out: Acceptable CV status; No obvious hypovolemia; No obvious fluid overload   Other NRE: NONE   DID A NON-ROUTINE EVENT OCCUR? No           Last vitals:  Vitals Value Taken Time   /76 02/25/25 0945   Temp 36.6  C (97.8  F) 02/25/25 0930   Pulse 67 02/25/25 0948   Resp 18 02/25/25 0948   SpO2 98 % 02/25/25 0948   Vitals shown include unfiled device data.    Electronically Signed By: tK Alfonso MD  February 25, 2025  9:49 AM

## 2025-02-25 NOTE — ANESTHESIA PROCEDURE NOTES
Airway       Patient location during procedure: OR       Procedure Start/Stop Times: 2/25/2025 8:12 AM  Staff -        Anesthesiologist:  Corbin Turcios MD       CRNA: Emerson Fall APRN CRNA       Other Anesthesia Staff: Segundo Newsome       Performed By: SRNA  Consent for Airway        Urgency: elective  Indications and Patient Condition       Indications for airway management: nadeem-procedural       Induction type:intravenous       Mask difficulty assessment: 1 - vent by mask    Final Airway Details       Final airway type: endotracheal airway       Successful airway: ETT - single  Endotracheal Airway Details        ETT size (mm): 6.5       Cuffed: yes       Successful intubation technique: direct laryngoscopy       DL Blade Type: Blunt 2       Grade View of Cords: 2       Adjucts: stylet       Position: Right       Measured from: lips       Secured at (cm): 20       Bite block used: None    Post intubation assessment        Placement verified by: capnometry, equal breath sounds and chest rise        Number of attempts at approach: 1       Secured with: tape       Ease of procedure: easy       Dentition: Intact and Unchanged    Medication(s) Administered   Medication Administration Time: 2/25/2025 8:12 AM

## 2025-02-25 NOTE — ANESTHESIA PREPROCEDURE EVALUATION
Anesthesia Pre-Procedure Evaluation    Patient: Sarah Duran   MRN: [id number] : 1965        Procedure : Procedure(s):  Esophagoscopy, gastroscopy, duodenoscopy (EGD), combined  Sigmoidoscopy flexible          Past Medical History:   Diagnosis Date     Acute deep vein thrombosis (DVT) of left lower extremity (H)      Asthma      Atypical ductal hyperplasia of right breast 10/2016    fibrocystic changes, PASH and atypical ductal hyperplasia on needle biopsy, s/p R excisional biopsy with radioactive seed localization     Endometrial cancer (H) 02/15/2021     Factor 5 Leiden mutation, heterozygous     factor 5     Familial adenomatous polyposis     clinical presentation; s/p total abdominal colectomy with TRACEY; confirmed APC+      GERD (gastroesophageal reflux disease)      Infection due to 2019 novel coronavirus 2022     Migraine      Monoallelic mutation of APC gene 2018    [id number]     PONV (postoperative nausea and vomiting)       Past Surgical History:   Procedure Laterality Date     BIOPSY BREAST SEED LOCALIZATION Right 2016    Procedure: BIOPSY BREAST SEED LOCALIZATION;  Surgeon: Jessica Rasheed MD;  Location: [facility]     COLECTOMY      subtotal     COLONOSCOPY N/A 2023    Procedure: COLONOSCOPY, FLEXIBLE, WITH LESION REMOVAL USING SNARE, Argon plasma coagulation ablation;  Surgeon: Stephen Gasca MD;  Location: [facility]     COMBINED ESOPHAGOSCOPY, GASTROSCOPY, DUODENOSCOPY (EGD) WITH ARGON PLASMA COAGULATOR (APC) N/A 2018    Procedure: COMBINED ESOPHAGOSCOPY, GASTROSCOPY, DUODENOSCOPY (EGD) WITH ARGON PLASMA COAGULATOR (APC);  Esophagogastroduodenoscopy with polypectomy and polyp ablation;  Surgeon: Stephen Gasca MD;  Location: [facility]     COMBINED ESOPHAGOSCOPY, GASTROSCOPY, DUODENOSCOPY (EGD) WITH ARGON PLASMA COAGULATOR (APC) N/A 2023    Procedure: Combined Esophagoscopy, Gastroscopy, Duodenoscopy (Egd) With Argon Plasma Coagulator  (Apc) ablation, biopsies, polypectomy;  Surgeon: Stephen Gasca MD;  Location: [facility]     DILATION AND CURETTAGE, HYSTEROSCOPY DIAGNOSTIC, COMBINED N/A 2/10/2021    Procedure: HYSTEROSCOPY, DIAGNOSTIC, WITH DILATION AND CURETTAGE OF UTERUS;  Surgeon: Sandie Samaniego MD;  Location: [facility]     ENDOSCOPIC RETROGRADE CHOLANGIOPANCREATOGRAM N/A 9/23/2021    Procedure: ENDOSCOPIC RETROGRADE CHOLANGIOPANCREATOGRAPHY WITH PANCREATIC STENT PLACEMENT AND POLYP ABLATION with Argon Plasma Coagulation;  Surgeon: Shahab Fernández MD;  Location: [facility]     ENDOSCOPIC RETROGRADE CHOLANGIOPANCREATOGRAM N/A 12/1/2022    Procedure: diagnostic ENDOSCOPIC RETROGRADE CHOLANGIOPANCREATOGRAPHY, ablation of duodenal polyps, biopsy of gastric polyp;  Surgeon: Shahab Fernández MD;  Location: [facility]     ENDOSCOPIC RETROGRADE CHOLANGIOPANCREATOGRAPHY       ENDOSCOPIC ULTRASOUND UPPER GASTROINTESTINAL TRACT (GI) N/A 7/25/2019    Procedure: Endoscopic Ultrasound;  Surgeon: Stephen Gasca MD;  Location: [facility]     ESOPHAGOSCOPY, GASTROSCOPY, DUODENOSCOPY (EGD), COMBINED  11/5/2012    Procedure: COMBINED ESOPHAGOSCOPY, GASTROSCOPY, DUODENOSCOPY (EGD), BIOPSY SINGLE OR MULTIPLE;;  Surgeon: Angélica Garcia MD;  [facility]: [facility]     ESOPHAGOSCOPY, GASTROSCOPY, DUODENOSCOPY (EGD), COMBINED  12/3/2013    Procedure: COMBINED ESOPHAGOSCOPY, GASTROSCOPY, DUODENOSCOPY (EGD);;  Surgeon: Angélica Garcia MD;  Location: [facility]     ESOPHAGOSCOPY, GASTROSCOPY, DUODENOSCOPY (EGD), COMBINED N/A 3/28/2018    Procedure: COMBINED ESOPHAGOSCOPY, GASTROSCOPY, DUODENOSCOPY (EGD);;  Surgeon: Stephen Gasca MD;  Location: [facility]     ESOPHAGOSCOPY, GASTROSCOPY, DUODENOSCOPY (EGD), COMBINED N/A 5/30/2019    Procedure: Esophagogastroduodenoscopy with biopsy x2 APC;  Surgeon: Stephen Gasca MD;  [facility]     ESOPHAGOSCOPY, GASTROSCOPY, DUODENOSCOPY (EGD), COMBINED N/A 7/30/2020    Procedure:  ESOPHAGOGASTRODUODENOSCOPY (EGD) with polypectomy by hot snare; argonplasma coaglution ablation of polyps;  Surgeon: Stephen Gasca MD;  Location: [facility]     ESOPHAGOSCOPY, GASTROSCOPY, DUODENOSCOPY (EGD), RESECT MUCOSA, COMBINED N/A 7/25/2019    Procedure: Esophagogastroduodenoscopy with gastric mucosal resection and clip application;  Surgeon: Stephen Gasca MD;  Location[facility]     EXCISE POLYP RECTUM       LAPAROSCOPIC HYSTERECTOMY TOTAL, BILATERAL SALPINGO-OOPHORECTOMY, NODE DISSECTION, COMBINED Bilateral 2/25/2021    Procedure: Laparoscopic removal of uterus, cervix, both ovaries and fallopian tubes, sentinel lymph node dissection, cystoscopy;  Surgeon: Sandie Samaniego MD;  Location: [facility]     LAPAROSCOPIC LYSIS ADHESIONS       SIGMOIDOSCOPY FLEXIBLE       SIGMOIDOSCOPY FLEXIBLE N/A 12/7/2015    Procedure: SIGMOIDOSCOPY FLEXIBLE;  Surgeon: Mariela Bearden MD;  Location: [facility]     SIGMOIDOSCOPY FLEXIBLE N/A 3/28/2018    Procedure: SIGMOIDOSCOPY FLEXIBLE;  EGD/Flex Sig;  Surgeon: Stephen Gasca MD;  Location: [facility]     SIGMOIDOSCOPY FLEXIBLE N/A 4/12/2018    Procedure: SIGMOIDOSCOPY FLEXIBLE;  Flexible Sigmoidoscopy with polyp ablation;  Surgeon: Stephen Gasca MD;  [facility]     SIGMOIDOSCOPY FLEXIBLE N/A 5/30/2019    Procedure: Flexible Sigmoidoscopy with polypectomy;  Surgeon: Stephen Gasca MD;  [facility]     SIGMOIDOSCOPY FLEXIBLE N/A 7/30/2020    Procedure: SIGMOIDOSCOPY, FLEXIBLE with Ablation of Polyps;  Surgeon: Stephen Gasca MD;  [facility]     SIGMOIDOSCOPY FLEXIBLE N/A 9/23/2021    Procedure: FLEXIBLE SIGMOIDOSCOPY WITH POLYPECTOMY;  Surgeon: Stephen Gasca MD;  [facility]     SIGMOIDOSCOPY FLEXIBLE N/A 12/1/2022    Procedure: SIGMOIDOSCOPY, FLEXIBLE with polypectomy;  Surgeon: Stephen Gasca MD;  Location[facility]     Z LAP,SURG,COLECTOMY,W/ANAST      for colon cancer      Allergies   Allergen Reactions      Corn-Containing Products Shortness Of Breath     Avoids food; ok in tiny doses, such as in medicines  Avoids food; ok in tiny doses, such as in medicines     Cipro [Ciprofloxacin]      Reddened skin     Flagyl [Metronidazole] Nausea and Vomiting     Garlic Fatigue     GI distress     Keflex [Cephalosporins] Nausea and Vomiting     Omeprazole      PN: headache     Prilosec Otc [Omeprazole Magnesium]      migraines     Cephalexin Rash     PN: Rash, Generalized     Doxycycline Rash     blisters     Erythromycin Rash     Sulfa Antibiotics Rash     Ok to use       Social History     Tobacco Use     Smoking status: Never     Smokeless tobacco: Never   Substance Use Topics     Alcohol use: Not Currently     Comment: 1 or 2 drinks per year      Wt Readings from Last 1 Encounters:   02/25/25 70.3 kg (155 lb)        Anesthesia Evaluation    Type: General and MAC.    History of anesthetic complications  - PONV.  Requests ondansetron intraop.    ROS/MED HX  ENT/Pulmonary:     (+)                     Intermittent, asthma  Treatment: Inhaler prn,                 Neurologic:     (+)      migraines,                       (-) no CVA and no TIA   Cardiovascular:  - neg cardiovascular ROS   (+) Dyslipidemia - -   -  - -                                   (-) hypertension and murmur   METS/Exercise Tolerance: >4 METS    Hematologic: Comments: Factor V Leiden mutation, no history of blood clots    (+) History of blood clots (Factor V Leiden deficiency),    pt is not anticoagulated,           Musculoskeletal:  - neg musculoskeletal ROS  (-) arthritis   GI/Hepatic: Comment: Familial adenomatous polyposis s/p total abdominal colectomy    (+) GERD,                   Renal/Genitourinary:  - neg Renal ROS  (-) renal disease   Endo:    (-) Type II DM, thyroid disease, chronic steroid usage and obesity   Psychiatric/Substance Use:  - neg psychiatric ROS     Infectious Disease: Comment: COVID NEGATIVE 2/21/21   (-) HIV   Malignancy:   (+)  Malignancy, History of Other.Other CA endometrial cancer Active status post Surgery.    Other:  - neg other ROS   (-) Any chance pregnant       Physical Exam    Airway        Mallampati: II   TM distance: > 3 FB   Neck ROM: full     Respiratory Devices and Support         Dental       (+) Minor Abnormalities - some fillings, tiny chips      Cardiovascular          Rhythm and rate: regular and normal (-) no murmur    Pulmonary           breath sounds clear to auscultation         OUTSIDE LABS:  CBC:   Lab Results   Component Value Date    WBC 3.7 (L) 12/01/2022    WBC 4.0 09/23/2021    HGB 13.9 12/01/2022    HGB 14.9 09/23/2021    HCT 42.4 12/01/2022    HCT 45.0 09/23/2021     12/01/2022     09/23/2021     BMP:   Lab Results   Component Value Date     12/01/2022     09/23/2021    POTASSIUM 4.1 12/01/2022    POTASSIUM 3.8 09/23/2021    CHLORIDE 107 12/01/2022    CHLORIDE 109 09/23/2021    CO2 20 (L) 12/01/2022    CO2 23 09/23/2021    BUN 11.4 12/01/2022    BUN 14 09/23/2021    CR 0.84 12/01/2022    CR 0.83 09/23/2021    GLC 97 12/01/2022    GLC 84 09/23/2021     COAGS:   Lab Results   Component Value Date    PTT 26 05/07/2009    INR 1.01 12/01/2022     POC:   Lab Results   Component Value Date     (H) 02/25/2021    HCG Negative 07/25/2019     HEPATIC:   Lab Results   Component Value Date    ALBUMIN 4.2 12/01/2022    PROTTOTAL 7.0 12/01/2022    ALT 15 12/01/2022    AST 24 12/01/2022    ALKPHOS 47 12/01/2022    BILITOTAL 0.6 12/01/2022     OTHER:   Lab Results   Component Value Date    WOLF 9.8 12/01/2022    LIPASE 56 12/01/2022    AMYLASE 111 (H) 12/01/2022    TSH 0.88 03/05/2019       Anesthesia Plan    ASA Status:  2    NPO Status:  NPO Appropriate    Anesthesia Type: General.     - Airway: ETT   Induction: Intravenous, Propofol.   Maintenance: Balanced.        Consents    Anesthesia Plan(s) and associated risks, benefits, and realistic alternatives discussed. Questions answered and  "patient/representative(s) expressed understanding.     - Discussed: Risks, Benefits and Alternatives for BOTH SEDATION and the PROCEDURE were discussed     - Discussed with:  Patient      - Extended Intubation/Ventilatory Support Discussed: No.      - Patient is DNR/DNI Status: No     Use of blood products discussed: Yes.     - Discussed with: Patient.     Postoperative Care    Pain management: Oral pain medications, IV analgesics.   PONV prophylaxis: Ondansetron (or other 5HT-3), Dexamethasone or Solumedrol     Comments:               Corbin Tucrios MD    I have reviewed the pertinent notes and labs in the chart from the past 30 days and (re)examined the patient.  Any updates or changes from those notes are reflected in this note.    Clinically Significant Risk Factors Present on Admission                             # Overweight: Estimated body mass index is 27.9 kg/m  as calculated from the following:    Height as of this encounter: 1.588 m (5' 2.5\").    Weight as of this encounter: 70.3 kg (155 lb).       # Asthma: noted on problem list          "

## 2025-02-25 NOTE — OR NURSING
Pt tolerated EGD and flexible sigmoidoscopy, both with polypectomies, very well, under GA. Pt to go to PACU prior to returning to

## 2025-05-15 NOTE — PROGRESS NOTES
Oncology Risk Management Consultation:  Date on this visit: 2025    Sarah Duran requires high risk screening and surveillance to reduce her risk of cancer secondary to  a history of atypical ductal hyperplasia (ADH). She is considered to be at high risk for breast cancer, with a risk of up to 35%. She has a history of Stage II, Grade I endometrial adenocarcinoma, diagnosed in 2020; followed by Dr. Sandie Lezama. She also carries an APC+ genetic mutation, consistent with a diagnosis of Familial Adenomatous Polyposis (FAP) or attenuated FAP (AFAP) with a history of a thyroid nodule and gastric polyps.     Primary Physician: Ida Sandoval MD     History Of Present Illness:  Ms. Duran is a very pleasant 59 year old female who presents with a personal history of ADH and a likely attenuated version of Familial Adenomatous Polyposis. She is s/p right excisional breast biopsy on 2016. She is also s/p ileocolonic anastomosis in .     Genetic Testin2018: POSITIVE for a APC mutation.  Specifically her mutation is called [id number],  consistent with a diagnosis of familial adenomatous polyposis (FAP) or attenuated FAP (AFAP). The mutation was identified using [employer] testing through [employer].  The testing was done because of her personal history of colonic polyposis and family history of colon cancer.     Of note, Sarah tested negative for mutations in the following genes: BMPR1A, CDH1, CHEK2, MLH1, MSH2, MSH6, MUTYH, PMS2, POLD1, POLE, PTEN, SMAD4, STK11 and TP53 (sequencing and deletion/duplication); EPCAM and GREM1 (deletion/duplication only).        Pertinent history:  Attenuated Familial adenomatous polyposis, s/p ileal rectal anastomosis in May 1988. Confirmed with APC testing.  Subsequent laparotomy in  for bowel obstruction from adhesions.  Menarche at 12.  Nulliparous.  Menopause at 50.  Breast density: scattered fibroglandular densities  Hx of THBSO for  Stage II, Grade I  endometrial cancer (see details below)  Previously on sulindac 150 mg BID to suppress rectal polyps.  Changed to Celebrex 100 mg BID on 2012 by Dr. Bearden     21:  US Pelvis:  Uterus: Measures 8.9 x 5.8 x 6.4 cm. The endometrium is thickened at 2.9 cm. The endometrium is heterogeneous in appearance with an irregular, nodular contour of the endometrial surface. There is hypoechoic complex fluid in the endometrial cavity likely containing blood. There is vascularity of the endometrium on color imaging. The abnormal endometrium occupies the majority of the uterus. Right Ovary: Measures 2.0 x 1.0 x 1.1 cm and appears unremarkable Right Ovary Blood Flow: Present. Left Ovary: Not seen. Free Fluid: No significant free fluid.There are no suspicious adnexal masses.     2/10/21:  Hysteroscopy, D&C. Pathology: Grade 1 endometrial adenocarcinoma, MMR intact, p53 wild type, ER/TN +     21:  Total laparoscopic hysterectomy, bilateral salpingo-oophorectomy, bilateral sentinel lymph node dissection, cystoscopy. Pathology:  Stage II, grade 1 endometrial adenocarcinoma, tumor size 5.6 cm, 1/13 mm myometrial invasion, +CASEY, + cervix, 0/2 sentinel LN,         21:  Completed brachytherapy to 3000cGy in 5 Fx to the vagina     Thyroid Screenin2019 - FNA biopsy of thyroid, for solid appearing nodule in isthmus, biopsy non diagnostic; cyst fluid only.   2020- US of the thyroid: Previously biopsied isthmus nodule is no longer present. There are numerous additional colloid cysts and a benign complex cyst left lobe, not appreciably changed from previous.   2021- US of thyroid; no change in nodules  10/7/2022- US of thyroid: NODULES: There are benign colloid cysts. There is only one partially solid nodule on today's examination, listed below.TI-RADS 2. No FNA. IMPRESSION: Benign colloid cysts and benign partially cystic 5 mm     GI screening history (see chart for further details)  2005  - Small bowel enteroscopy, one tubular adenoma removed from partial anal verge.  12/7/2005 - adenomatous polyp removed from rectum  2/28/2005 - tubular adenoma, duodenum.  11/16/2011 - fundic gland polyps in stomach, tubular adenoma removed from rectum.  11/5/2012 - 1 tubulovillous adenoma, rectum  7/8/2013 - 1 tubulovillous adenoma, rectum  12/3/2013 - 1 tubulovillous adenoma, rectum  7/21/2014 - 1 tubular adenoma and hyperplastic polyp, rectum  1/20/2015 - 1 tubular adenoma and hyperplastic polyp, rectum  12/7/2015 - 1 tubular adenoma, rectum  6/6/2016 - 1 fundic gland polyp with focal adenomatous changes  3/20/2017 - 2 tubular adenomas removed from rectum  4/12/2017 - colonoscopy and EGD, one fundic gland gastric polyp with focal low grade dysplasia removed, multiple duodenal polyps and multiple rectal polyps.  7/25/2019 - EGD: Two previously reported polyps (6.2 mm and 5.3 mm) in the antrum were evaluate by EUS, which did not demonstrate deep layer involvement, and then they were  removed by EMR by using a band ligator and a snare. The defect was then closed with hemoclips. Innumerous fundic gland polyps in the gastric body,  fundus, and cardia. Consistent with diagnosis of FAP. A large fundic gland polyp in the gastric body, which has been previously resected and recent biopsied. Recommendation: Use Aciphex (rabeprazole) 20 mg PO BID for 1 month.  Repeat the upper endoscopy in 1 year for surveillance.      7/30/2020- EGD and flexible sigmoidoscopy:  15-20 duodenal polyps, measuring 1-3 mm, all of which were ablated using argon plasma coagulation. Innumerable fundic gland polyps in a confluent carpet-like distribution throughout the cardia, fundus, and gastric body. Antral-sparing present. A large 2-3 semi-pedunculated polyp in the proximal gastric body  (along the lesser curvature) was resected.  Suspected very small adenomas flanking the pancreaticobiliary orifices at the prior ampullectomy site.  Normal  esophagus. Pathology: Fundic gland polyp with adenomatous change (low grade dysplasia). Negative for intestinal metaplasia, high-grade dysplasia or malignancy. Recommendation: Schedule for flexible sigmoidoscopy (along with EGD and ERCP) with Dr. Fernández in 1-year.      9/23/2021- ENDOSCOPIC RETROGRADE CHOLANGIOPANCREATOGRAPHY ESOPHAGOGASTRODUODENOSCOPY (EGD), SIGMOIDOSCOPY, FLEXIBLE (Dr. Shahab Fernández): Results: Prior biliary sphincterotomy appeared open. Prior pancreatic sphincterotomy appeared open. Two small button like polyps to left of pancreatic orifice. Attempted cold snare polypectomy but slid off both time    After pancreatic stent placed, entire area next to orifice ablated w R colon setting pulse APC. DUODENUM examined, no other polyps to D3. No antral polyps, but entire gastric body replaced w  multiple fundic gland polyps, not biopsied Recommendation:Confirm spontaneous stent passage by performing a KUB x-ray in 4 weeks. Repeat ERCP in 1 year for surveillance.      Flexible sigmoidoscopy (Dr. Gasca) Ten 2 to 4 mm tubular adenomas in rectum. Patent ileo-rectal anastomosis and normal distal ileum.      12/1/2022: ENDOSCOPIC RETROGRADE CHOLANGIOPANCREATOGRAPHY: Multiple gastric polyps likely fundic gland. Very large (4 cm oozing) polyp in gastric fundus, biopsied as may represent adenomatous transformation, vs large fundic gland polyp. Prior endoscopic papillectomy without recurrence A few duodenal polyps ablated. Biopsy was performed largest gastric polyp. Repeat ERCP in one year. Path: Fundic gland polyp with low-grade dysplasia      12/1/2022: Flexible Sigmoidoscopy: Multiple 2 to 3 mm polyps in the rectum, at the recto-sigmoid colon and in the sigmoid colon, removed with a cold snare. Resected and retrieved. Patent end-to-side ileo-colonic anastomosis, characterized by healthy appearing mucosa. Path: Tubular adenomas. Repeat in one year     12/7/2023: Upper GI Endoscopy: Normal esophagus. Carpeting  of the proximal stomach with polyps consistent with fundic gland polyps. Large more protuberant cluster of polyps in the fundus. Previous biopsies by Dr. Fernández 1 yr ago were benign. On probing this appears to be multiple overlapping polyps which were soft with probing. Biopsies again obtained. There was no ulceration. Multiple duodenal polyp. Approximately 20. Large polyps cold snared and retrieves. Smaller polyps ablated with APC. No evidence of residual polyp tissue at the ampullectomy site. Repeat in one year. Path: Duodenal polyps: Duodenal adenoma; negative for dysplasia. Stomach/fundus polyps:  Fundic gland polyps(s) with low-grade dysplasia      12/7/2023: Flexible Sigmoidoscopy: Multiple sigmoid and rectal polyps (approximately 20). Either resected and retrieved or (smaller polyps) ablated with APC. All visible polyps were resected or ablated. Repeat in one year. Path: Colon and Sigmoid polyps: Colonic mucosa with superficial hyperplastic changes No evidence of neoplastic polyp. Rectal polyp: tubular adenoma.    2/25/2025: Flex sig, (Dr. Gasca), Patent functional end-to-end ileo-colonic anastomosis, characterized by healthy appearing mucosa. Multiple 2 to 6 mm polyps in the rectum and in the sigmoid colon, removed with a cold snare. Resected and retrieved. Repeat flexible sigmoidoscopy in 1 year for surveillance with MAC.     2/25/2025: EGD (Dr. Gasca), Normal esophagus. Multiple gastric polyps. Consistent with FAP. None required excision. A single duodenal polyp. Resected and retrieved. Normal area of the papilla post-ampullectomy without polyp tissue. Repeat EGD with side lying view in 3 years (2028) with general anesthesia.       Breast Screening history:   11/28/2016 - R excisional breast biopsy with seed localization, Fibrocystic tissue with ADH on pathology.  4/24/2017 - R breast mammogram, [email or web address].  5/25/2018 - Breast MRI, BiRads2.  11/8/2018 - Screening tomosynthesis mammogram,  BiRads1  6/10/2019 - Breast MRI, BiRads2.  12/20/2019- Screening tomosynthesis mammogram, BiRads1  2/3/2021- Breast MRI, BiRads1  10/8/2021- Screening tomosynthesis mammogram, BiRads1  6/22/2022- Breast MRI, BiRads2  10/21/2022- Screening tomosynthesis mammogram, BiRads1  1/23/2024: Screening tomosynthesis mammogram, BiRads1  7/16/2024: Breast MRI, BiRads1     At this visit, she denies asymmetry, lumps, masses, thickening, pain, nipple discharge and skin changes in her breasts.     Past Medical/Surgical History:  Past Medical History:   Diagnosis Date    Acute deep vein thrombosis (DVT) of left lower extremity (H)     Asthma     Atypical ductal hyperplasia of right breast 10/2016    fibrocystic changes, PASH and atypical ductal hyperplasia on needle biopsy, s/p R excisional biopsy with radioactive seed localization    Endometrial cancer (H) 02/15/2021    Factor 5 Leiden mutation, heterozygous     factor 5    Familial adenomatous polyposis 1988    clinical presentation; s/p total abdominal colectomy with TRACEY; confirmed APC+ 2018    GERD (gastroesophageal reflux disease)     Infection due to 2019 novel coronavirus 11/02/2022    Migraine     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    [id number]    PONV (postoperative nausea and vomiting)      Past Surgical History:   Procedure Laterality Date    BIOPSY BREAST SEED LOCALIZATION Right 11/30/2016    Procedure: BIOPSY BREAST SEED LOCALIZATION;  Surgeon: Jessica Rasheed MD;  Location: [facility]    COLECTOMY      subtotal    COLONOSCOPY N/A 12/7/2023    Procedure: COLONOSCOPY, FLEXIBLE, WITH LESION REMOVAL USING SNARE, Argon plasma coagulation ablation;  Surgeon: Stephen Gasca MD;  Location: [facility]    COMBINED ESOPHAGOSCOPY, GASTROSCOPY, DUODENOSCOPY (EGD) WITH ARGON PLASMA COAGULATOR (APC) N/A 4/12/2018    Procedure: COMBINED ESOPHAGOSCOPY, GASTROSCOPY, DUODENOSCOPY (EGD) WITH ARGON PLASMA COAGULATOR (APC);  Esophagogastroduodenoscopy with polypectomy  and polyp ablation;  Surgeon: Stephen Gasca MD;  Location: [facility]    COMBINED ESOPHAGOSCOPY, GASTROSCOPY, DUODENOSCOPY (EGD) WITH ARGON PLASMA COAGULATOR (APC) N/A 12/7/2023    Procedure: Combined Esophagoscopy, Gastroscopy, Duodenoscopy (Egd) With Argon Plasma Coagulator (Apc) ablation, biopsies, polypectomy;  Surgeon: Stephen Gasca MD;  Location: [facility]    DILATION AND CURETTAGE, HYSTEROSCOPY DIAGNOSTIC, COMBINED N/A 2/10/2021    Procedure: HYSTEROSCOPY, DIAGNOSTIC, WITH DILATION AND CURETTAGE OF UTERUS;  Surgeon: Sandie Samaniego MD;  Location: [facility] OR    ENDOSCOPIC RETROGRADE CHOLANGIOPANCREATOGRAM N/A 9/23/2021    Procedure: ENDOSCOPIC RETROGRADE CHOLANGIOPANCREATOGRAPHY WITH PANCREATIC STENT PLACEMENT AND POLYP ABLATION with Argon Plasma Coagulation;  Surgeon: Shahab Fernández MD;  Location: [facility]    ENDOSCOPIC RETROGRADE CHOLANGIOPANCREATOGRAM N/A 12/1/2022    Procedure: diagnostic ENDOSCOPIC RETROGRADE CHOLANGIOPANCREATOGRAPHY, ablation of duodenal polyps, biopsy of gastric polyp;  Surgeon: Shahab Fernández MD;  Location: [facility]    ENDOSCOPIC RETROGRADE CHOLANGIOPANCREATOGRAPHY      ENDOSCOPIC ULTRASOUND UPPER GASTROINTESTINAL TRACT (GI) N/A 7/25/2019    Procedure: Endoscopic Ultrasound;  Surgeon: Stephen Gasca MD;  Location: [facility]    ESOPHAGOSCOPY, GASTROSCOPY, DUODENOSCOPY (EGD), COMBINED  11/5/2012    Procedure: COMBINED ESOPHAGOSCOPY, GASTROSCOPY, DUODENOSCOPY (EGD), BIOPSY SINGLE OR MULTIPLE;;  Surgeon: Angélica Garcia MD;  Location: [facility]    ESOPHAGOSCOPY, GASTROSCOPY, DUODENOSCOPY (EGD), COMBINED  12/3/2013    Procedure: COMBINED ESOPHAGOSCOPY, GASTROSCOPY, DUODENOSCOPY (EGD);;  Surgeon: Angélica Garcia MD;  [facility]: [facility]    ESOPHAGOSCOPY, GASTROSCOPY, DUODENOSCOPY (EGD), COMBINED N/A 3/28/2018    Procedure: COMBINED ESOPHAGOSCOPY, GASTROSCOPY, DUODENOSCOPY (EGD);;  Surgeon: Stephen Gasca MD;  [facility]: [facility]    ESOPHAGOSCOPY, GASTROSCOPY,  DUODENOSCOPY (EGD), COMBINED N/A 5/30/2019    Procedure: Esophagogastroduodenoscopy with biopsy x2 APC;  Surgeon: Stephen Gasca MD;  [facility]    ESOPHAGOSCOPY, GASTROSCOPY, DUODENOSCOPY (EGD), COMBINED N/A 7/30/2020    Procedure: ESOPHAGOGASTRODUODENOSCOPY (EGD) with polypectomy by hot snare; argonplasma coaglution ablation of polyps;  Surgeon: Stephen Gasca MD;  [facility]    ESOPHAGOSCOPY, GASTROSCOPY, DUODENOSCOPY (EGD), RESECT MUCOSA, COMBINED N/A 7/25/2019    Procedure: Esophagogastroduodenoscopy with gastric mucosal resection and clip application;  Surgeon: Stephen Gasca MD;  Location: [facility]    EXCISE POLYP RECTUM      LAPAROSCOPIC HYSTERECTOMY TOTAL, BILATERAL SALPINGO-OOPHORECTOMY, NODE DISSECTION, COMBINED Bilateral 2/25/2021    Procedure: Laparoscopic removal of uterus, cervix, both ovaries and fallopian tubes, sentinel lymph node dissection, cystoscopy;  Surgeon: Sandie Samaniego MD;  Location: [facility]    LAPAROSCOPIC LYSIS ADHESIONS      SIGMOIDOSCOPY FLEXIBLE      SIGMOIDOSCOPY FLEXIBLE N/A 12/7/2015    Procedure: SIGMOIDOSCOPY FLEXIBLE;  Surgeon: Mariela Bearden MD;  [facility]: [facility]    SIGMOIDOSCOPY FLEXIBLE N/A 3/28/2018    Procedure: SIGMOIDOSCOPY FLEXIBLE;  EGD/Flex Sig;  Surgeon: Stephen Gasca MD;  [facility]: [facility]    SIGMOIDOSCOPY FLEXIBLE N/A 4/12/2018    Procedure: SIGMOIDOSCOPY FLEXIBLE;  Flexible Sigmoidoscopy with polyp ablation;  Surgeon: Stephen Gasca MD;  [facility]    SIGMOIDOSCOPY FLEXIBLE N/A 5/30/2019    Procedure: Flexible Sigmoidoscopy with polypectomy;  Surgeon: Stephen Gasca MD;  [facility]    SIGMOIDOSCOPY FLEXIBLE N/A 7/30/2020    Procedure: SIGMOIDOSCOPY, FLEXIBLE with Ablation of Polyps;  Surgeon: Stephen Gasca MD;  [facility]    SIGMOIDOSCOPY FLEXIBLE N/A 9/23/2021    Procedure: FLEXIBLE SIGMOIDOSCOPY WITH POLYPECTOMY;  Surgeon: Stephen Gasca MD;  [facility]    SIGMOIDOSCOPY  FLEXIBLE N/A 12/1/2022    Procedure: SIGMOIDOSCOPY, FLEXIBLE with polypectomy;  Surgeon: Stephen Gasca MD;  Location:  OR    [facility] LAP,SURG,COLECTOMY,W/ANAST      for colon cancer       Allergies:  Allergies as of 05/20/2025 - Reviewed 04/25/2025   Allergen Reaction Noted    Corn-containing products Shortness Of Breath 07/29/2020    Cipro [ciprofloxacin]  02/24/2005    Flagyl [metronidazole] Nausea and Vomiting 02/24/2005    Garlic Fatigue 07/29/2020    Keflex [cephalosporins] Nausea and Vomiting 02/24/2005    Omeprazole  04/29/2010    Prilosec otc [omeprazole magnesium]  01/28/2012    Cephalexin Rash 06/14/2004    Doxycycline Rash 04/12/2018    Erythromycin Rash 02/24/2005    Sulfa antibiotics Rash 02/24/2005       Current Medications:  Current Outpatient Medications   Medication Sig Dispense Refill    acetaminophen (TYLENOL) 325 MG tablet Take 2 tablets (650 mg) by mouth every 6 hours as needed for mild pain 30 tablet 0    Apple Cider Vinegar 500 MG TABS Take 500 mg by mouth daily      ASHWAGANDHA PO Take 150 mg by mouth One gummy per day      azelaic acid (FINACIA) 15 % external gel Apply to the face daily      calcium-magnesium (CALMAG) 500-250 MG TABS Take 1 tablet by mouth daily      celecoxib (CELEBREX) 100 MG capsule Take 1 capsule (100 mg) by mouth daily 90 capsule 3    clindamycin (CLEOCIN) 150 MG capsule 2 tablets 1 hour prior to procedure - 1 tablet 6 hours after the first dose      Cranberry 500 MG CAPS Take 1 capsule by mouth daily      docusate sodium (COLACE) 100 MG capsule Take 100 mg by mouth daily      fexofenadine (ALLEGRA) 180 MG tablet Take 180 mg by mouth daily.      ibuprofen (ADVIL/MOTRIN) 600 MG tablet Take 600 mg by mouth.      Lactobacillus (PROBIOTIC ACIDOPHILUS PO) Take 100 mg by mouth daily      MELATONIN PO Take 2 mg by mouth nightly as needed       multivitamin, therapeutic with minerals (THERA-VIT-M) TABS Take 3 tablets by mouth 2 times daily       Omega-3 Fatty Acids  "(OMEGA-3 FISH OIL PO) Take 2 g by mouth daily       triamcinolone (NASACORT) 55 MCG/ACT Inhaler Spray 2 sprays into both nostrils daily      VENTOLIN  (90 Base) MCG/ACT inhaler       ZOLMitriptan (ZOMIG) 5 MG tablet Take 1 tablet (5 mg) by mouth at onset of headache for migraine 90 tablet 3    albuterol 90 MCG/ACT inhaler Inhale 2 puffs into the lungs every 6 hours as needed. (Patient not taking: Reported on 2025)      bisacodyl (DULCOLAX) 5 MG EC tablet Refer to \"Getting Ready for a Colonoscopy\" instruction handout (Patient not taking: Reported on 2025) 4 tablet 0    ciclopirox (PENLAC) 8 % external solution Apply topically. (Patient not taking: Reported on 2025)      rOPINIRole (REQUIP) 0.25 MG tablet Take 1 tablet by mouth 3 times daily. (Patient not taking: Reported on 2025)      terbinafine (LAMISIL) 250 MG tablet Take 250 mg by mouth. (Patient not taking: Reported on 2025)          Family History:  Family History   Problem Relation Age of Onset    Hyperlipidemia Mother     Cerebrovascular Disease Mother     Cerebrovascular Disease Father     Colon Cancer Father 57         at 63    Prostate Cancer Brother 53        recurrence at 65    Prostate Cancer Maternal Uncle 65       Social History:  Social History     Socioeconomic History    Marital status:      Spouse name: Roberto    Number of children: 2    Years of education: Not on file    Highest education level: Not on file   Occupational History    Occupation:      Employer: [employer]   Tobacco Use    Smoking status: Never    Smokeless tobacco: Never   Vaping Use    Vaping status: Never Used   Substance and Sexual Activity    Alcohol use: Not Currently     Comment: 1 or 2 drinks per year    Drug use: No    Sexual activity: Not Currently     Partners: Male     Birth control/protection: Surgical     Comment: THBSO for endometrial cancer and postmenopausal prior to surgery   Other Topics Concern    " Parent/sibling w/ CABG, MI or angioplasty before 65F 55M? Not Asked   Social History Narrative    Two adopted daughters, ages 13 and 15     Social Drivers of Health     Financial Resource Strain: Not At Risk (10/9/2023)    Received from [facility]    Financial Resource Strain     Is it hard for you to pay for the very basics like food, housing, medical care or heating?: No   Food Insecurity: No Food Insecurity (5/14/2025)    Received from [facility]    Hunger Vital Sign     Worried About Running Out of Food in the Last Year: Never true     Ran Out of Food in the Last Year: Never true   Transportation Needs: No Transportation Needs (5/14/2025)    Received from [facility]    PRAPARE - Transportation     Lack of Transportation (Medical): No     Lack of Transportation (Non-Medical): No   Physical Activity: Not on file   Stress: Not on file   Social Connections: Unknown (3/12/2024)    Received from [facility] [employer]    Social Connections     Frequency of Communication with Friends and Family: Not on file   Interpersonal Safety: Unknown (2/25/2025)    Interpersonal Safety     Do you feel physically and emotionally safe where you currently live?: Patient unable to answer     Within the past 12 months, have you been hit, slapped, kicked or otherwise physically hurt by someone?: Patient unable to answer     Within the past 12 months, have you been humiliated or emotionally abused in other ways by your partner or ex-partner?: Patient unable to answer   Housing Stability: Low Risk  (5/14/2025)    Received from [facility]    Housing Stability Vital Sign     Unable to Pay for Housing in the Last Year: No     Number of Times Moved in the Last Year: 0     Homeless in the Last Year: No       Physical Exam:  /83 (BP Location: Right arm, Patient Position: Sitting, Cuff Size: Adult Regular)   Pulse 85   Temp 97.4  F (36.3  C) (Oral)   Resp 16   Wt 72.6 kg (160 lb)   LMP 05/01/2019    SpO2 98%   BMI 28.80 kg/m    GENERAL APPEARANCE: healthy, alert and no apparent distress  BREAST: A multipositional, bilateral breast exam was performed. Fairly symmetrical. Nipples everted bilaterally. Right breast: no palpable dominant masses, no nipple discharge, no skin changes. Right axilla: no palpable adenopathy. Left breast: no palpable dominant masses, no nipple discharge, no skin changes. Left axilla: no palpable adenopathy.   LYMPHATICS: No cervical, supraclavicular, or axillary lymphadenopathy  SKIN: no suspicious lesions or rashes on examined skin    Laboratory/Imaging Studies  No results found for any visits on 05/20/25.    ASSESSMENT    Sarah reports that she is doing well at this visit. She has no concerns with her breast tissue, and no updates to her family's medical history. We discussed the screening plan below for her history of FAP and ADH. She will be due for a flex sig with Dr. Gasca in February, 2026. She is having a mammogram today. We will plan for a breast MRI in November. I would like to see her back in one year with a mammogram following our visit.     We revisited the recommendation for chemoprevention for breast cancer due to her history of ADH. Sarah politely declined. She is not interested in starting any new medications.     We reviewed signs and symptoms to monitor for between visits, including any breast lumps, bumps, nipple discharge, nipple inversion, changes to the texture of the skin on the breasts that would look crinkled, puckered, or like the skin of an orange peel, or any other changes to the breast tissue. We reviewed the recommendation for breast self awareness.       INDIVIDUALIZED CANCER RISK MANAGEMENT PLAN:      Individualized Surveillance Plan for Familial Adenomatous Polyposis                                        Based on NCCN Guidelines Version 2.2024   Type of Screening  Recommendation  Last Done  Next Due    Recommendations for Children    Colon Cancer  Screening  Flexible sigmoidoscopy or colonoscopy every 12 months beginning at age 10-15.       See below   See below   Hepatoblastoma  Consider liver palpation, abdominal ultrasound, and measurement of AFP every 3-6 months, during first 5 years of life.    See below   See below   Thyroid Cancer Screening  Ultrasound at baseline starting in teenage years.  If normal, consider repeating every 2-5 years and if abnormal, refer to thyroid specialist. [place] interval screenings for families with thyroid cancer.   See below   See below   Recommendations for Adults    Colon Cancer Screening  Colectomy with ileorectal anastomosis (TRACEY)  recommended, then endoscopic evaluation of the rectum every 6-12 months, depending on the polyp burden.     If ileo proctocolectomy with ileal pouch anal anastomosis, endoscopic surveillance of ileal pouch every 1-3 years depending on polyp burden.    Surveillance frequency should be increased to every 6 months for large, flat polyps with villous histology and/or high grade dysplasia    2/25/2025: Flex sig: multiple polyps. One rectal tubular adenoma    Flex sig due in February of 2026    Duodenal or periampullary cancer screening and gastric cancer screening  Baseline upper endoscopy including side-viewing examination. Start between 20-25. Consider earlier screening if family history warrants it. Base subsequent screenings on findings (see below).    Manage non-fundic gland polyps endoscopically if possible.      These occur in the majority of FAP patients and may have focal low grade dysplasia but typically are non progressive.Special screening or surgery should only be considered in the presence of high grade dysplasia.   2/25/2025: EGD, multiple gastric polyps, one duodenal polyp   Repeat with EGD in 2028   Thyroid Cancer Screening    Baseline ultrasound starting in late teen years.    If normal, consider repeating every 2-5 years and if abnormal, refer to thyroid specialist.     [place]  interval screenings for families with thyroid cancer.   10/7/2022: Thyroid US Benign colloid cysts and benign partially cystic 5 mm    Repeat in 2027   CNS Cancer  Annual physical examination.  May 2025 May 2026   Intra-abdominal desmoids  Annual abdominal palpation.     Suggestive abdominal symptoms should prompt immediate abdominal imaging. Patients should be educated regarding signs and symptoms of intraabdominal desmoids and the importance of prompt reporting of abdominal symptoms to their physicians.     If family history of symptomatic desmoids, consider abdominal MRI or CT 1-3 years post colectomy and then every 5-10 years.     NA   NA   Pancreatic screen Consider pancreatic screening if family history of pancreatic cancer. No family history of pancreatic cancer Review at future visits   Small bowel polyps and cancer  Consider adding small bowel visualization to CT or MRI for desmoids, especially if duodenal polyposis is advanced.        Duodenal findings:  Stage 0 (no polyposis) - repeat endoscopy every 4 years.  Stage 1 (minimal polyposis 1-4 tubular adenomas, 1-4mm each) - repeat EGD every 2-3 years.  Stage 2 (mild polyposis - 5-9 tubular adenomas, 5-9 mm each) - repeat EGD every 1-3 years.  Stage 3 (moderate polyposis - >20 lesions or size >1 cm) - repeat EGD every 6-12 months  Stage 4 (dense polyposis or high grade dysplasia) - surgical evaluation, with expert surveillance every 3-6 months.  Due to limited data, no screening recommendation is made for pancreatic cancer at this time.          Individualized Surveillance Plan for women  With 20% or greater lifetime risk of breast cancer   Per NCCN Breast Cancer Risk Reduction Guidelines Version 2.2024   Recommended screening Test or procedure Last done Next Scheduled    Clinical encounter Clinical exam every 6-12 months.   Refer to genetic counseling if not already done.  Consider risk reduction strategies.   May 2025   May 2026   However, some family  histories with breast cancers at a very young age, may warrant screening starting earlier.    *May begin at age 40 if breast cancers in the family occur at later ages.    Annual mammogram beginning 10 years younger than the earliest breast cancer in the family but not prior to age 30.    Recommend annual breast MRI to begin 10 years younger than the earliest breast cancer in the family but not prior to age 25.    Breast MRIs are preferably done on day 7-15 of the menstrual cycle in premenopausal women.   NA   NA   Breast screening for patients at high risk due to thoracic radiation between the ages of 10-30   Annual clinical exam beginning 8 years after radiation therapy.    Annual screening mammogram beginning at age 30 or 8 years after radiation therapy    Annual breast MRI, beginning at age 25 or 8 years after radiation therapy.     NA   NA   Women who have a lifetime risk of >20% based on history of LCIS or ADH/ALH Annual screening mammogram beginning at age of LCIS or ADH/ALH but not prior to age 30.    Consider annual MRI to begin at age of diagnosis of LCIS or ADH/ALH but not prior to age 25.    Recommend risk reducing strategies if possible.   1/23/2024: Screening tomosynthesis mammogram, [email or web address]    7/16/2024: Breast MRI, [email or web address]   Mammogram today    Breast MRI in November    Return to clinic in May, 2026 with a mammogram following our visit    Recommend risk reducing strategies for women with 1.7% 5 year risk of breast cancer.   Declined          I spent a total of 31 minutes on the day of the visit. Please see the note for further information on patient assessment and treatment.     Munira Palmer, DNP, APRN, AGCNS-BC  Clinical Nurse Specialist  Cancer Risk Management Program  [facility]    Cc:  Ida Sandoval MD

## 2025-05-20 ENCOUNTER — ONCOLOGY VISIT (OUTPATIENT)
Dept: ONCOLOGY | Facility: CLINIC | Age: 60
End: 2025-05-20
Payer: COMMERCIAL

## 2025-05-20 ENCOUNTER — ANCILLARY PROCEDURE (OUTPATIENT)
Dept: MAMMOGRAPHY | Facility: CLINIC | Age: 60
End: 2025-05-20
Payer: COMMERCIAL

## 2025-05-20 VITALS
HEART RATE: 85 BPM | SYSTOLIC BLOOD PRESSURE: 134 MMHG | RESPIRATION RATE: 16 BRPM | TEMPERATURE: 97.4 F | OXYGEN SATURATION: 98 % | BODY MASS INDEX: 28.8 KG/M2 | WEIGHT: 160 LBS | DIASTOLIC BLOOD PRESSURE: 83 MMHG

## 2025-05-20 DIAGNOSIS — N60.99 ATYPICAL HYPERPLASIA OF BREAST: ICD-10-CM

## 2025-05-20 DIAGNOSIS — Z12.13 SCREENING FOR SMALL INTESTINE CANCER: ICD-10-CM

## 2025-05-20 DIAGNOSIS — D13.91 FAMILIAL ADENOMATOUS POLYPOSIS: Primary | Chronic | ICD-10-CM

## 2025-05-20 DIAGNOSIS — Z12.11 SPECIAL SCREENING FOR MALIGNANT NEOPLASMS, COLON: ICD-10-CM

## 2025-05-20 DIAGNOSIS — Z12.31 VISIT FOR SCREENING MAMMOGRAM: ICD-10-CM

## 2025-05-20 PROCEDURE — 99213 OFFICE O/P EST LOW 20 MIN: CPT

## 2025-05-20 PROCEDURE — 99214 OFFICE O/P EST MOD 30 MIN: CPT

## 2025-05-20 PROCEDURE — 77067 SCR MAMMO BI INCL CAD: CPT | Performed by: STUDENT IN AN ORGANIZED HEALTH CARE EDUCATION/TRAINING PROGRAM

## 2025-05-20 PROCEDURE — 77063 BREAST TOMOSYNTHESIS BI: CPT | Performed by: STUDENT IN AN ORGANIZED HEALTH CARE EDUCATION/TRAINING PROGRAM

## 2025-05-20 RX ORDER — ROPINIROLE 0.25 MG/1
1 TABLET, FILM COATED ORAL 3 TIMES DAILY
COMMUNITY
Start: 2024-10-15

## 2025-05-20 RX ORDER — AZELAIC ACID 0.15 G/G
GEL TOPICAL
COMMUNITY
Start: 2025-05-14

## 2025-05-20 RX ORDER — CLINDAMYCIN HYDROCHLORIDE 150 MG/1
CAPSULE ORAL
COMMUNITY
Start: 2024-08-30

## 2025-05-20 RX ORDER — IBUPROFEN 600 MG/1
600 TABLET, FILM COATED ORAL
COMMUNITY
Start: 2024-06-12

## 2025-05-20 ASSESSMENT — PAIN SCALES - GENERAL: PAINLEVEL_OUTOF10: NO PAIN (0)

## 2025-05-20 NOTE — NURSING NOTE
"Oncology Rooming Note    May 20, 2025 2:35 PM   Sarah Duran is a 59 year old female who presents for:    Chief Complaint   Patient presents with    Oncology Clinic Visit     Familial adenomatous polyposis, confirmed with APC+ genetic testing      Initial Vitals: /83 (BP Location: Right arm, Patient Position: Sitting, Cuff Size: Adult Regular)   Pulse 85   Temp 97.4  F (36.3  C) (Oral)   Resp 16   Wt 72.6 kg (160 lb)   LMP 05/01/2019   SpO2 98%   BMI 28.80 kg/m   Estimated body mass index is 28.8 kg/m  as calculated from the following:    Height as of 4/25/25: 1.588 m (5' 2.5\").    Weight as of this encounter: 72.6 kg (160 lb). Body surface area is 1.79 meters squared.  No Pain (0) Comment: Data Unavailable   Patient's last menstrual period was 05/01/2019.  Allergies reviewed: Yes  Medications reviewed: Yes    Medications: MEDICATION REFILLS NEEDED TODAY. Provider was notified.  Pharmacy name entered into [facility]:    [facility] - [place], [place] - [address] [place] [address] PHARMACY [place], [place] - [address] [place] [address] PHARMACY [place], [place] - [address] [facility] [id number]    Frailty Screening:   Is the patient here for a new oncology consult visit in cancer care? 2. No    PHQ9:  Did this patient require a PHQ9?: No      Clinical concerns: pt needs celebrex refilled      Denys Mcallister              "

## 2025-05-20 NOTE — LETTER
2025      Sarah Duran  [address] [facility] [address]      Dear Colleague,    Thank you for referring your patient, Sarah Duran, to the [facility]. Please see a copy of my visit note below.    Oncology Risk Management Consultation:  Date on this visit: 2025    Sarah Duran requires high risk screening and surveillance to reduce her risk of cancer secondary to  a history of atypical ductal hyperplasia (ADH). She is considered to be at high risk for breast cancer, with a risk of up to 35%. She has a history of Stage II, Grade I endometrial adenocarcinoma, diagnosed in 2020; followed by Dr. Sandie Lezama. She also carries an APC+ genetic mutation, consistent with a diagnosis of Familial Adenomatous Polyposis (FAP) or attenuated FAP (AFAP) with a history of a thyroid nodule and gastric polyps.     Primary Physician: Ida Sandoval MD     History Of Present Illness:  Ms. Duran is a very pleasant 59 year old female who presents with a personal history of ADH and a likely attenuated version of Familial Adenomatous Polyposis. She is s/p right excisional breast biopsy on 2016. She is also s/p ileocolonic anastomosis in .     Genetic Testin2018: POSITIVE for a APC mutation.  Specifically her mutation is called [id number],  consistent with a diagnosis of familial adenomatous polyposis (FAP) or attenuated FAP (AFAP). The mutation was identified using [employer] testing through [employer].  The testing was done because of her personal history of colonic polyposis and family history of colon cancer.     Of note, Sarah tested negative for mutations in the following genes: BMPR1A, CDH1, CHEK2, MLH1, MSH2, MSH6, MUTYH, PMS2, POLD1, POLE, PTEN, SMAD4, STK11 and TP53 (sequencing and deletion/duplication); EPCAM and GREM1 (deletion/duplication only).        Pertinent history:  Attenuated Familial adenomatous polyposis, s/p ileal rectal  anastomosis in May 1988. Confirmed with APC testing.  Subsequent laparotomy in  for bowel obstruction from adhesions.  Menarche at 12.  Nulliparous.  Menopause at 50.  Breast density: scattered fibroglandular densities  Hx of THBSO for Stage II, Grade I  endometrial cancer (see details below)  Previously on sulindac 150 mg BID to suppress rectal polyps.  Changed to Celebrex 100 mg BID on 2012 by Dr. Bearden     21:  US Pelvis:  Uterus: Measures 8.9 x 5.8 x 6.4 cm. The endometrium is thickened at 2.9 cm. The endometrium is heterogeneous in appearance with an irregular, nodular contour of the endometrial surface. There is hypoechoic complex fluid in the endometrial cavity likely containing blood. There is vascularity of the endometrium on color imaging. The abnormal endometrium occupies the majority of the uterus. Right Ovary: Measures 2.0 x 1.0 x 1.1 cm and appears unremarkable Right Ovary Blood Flow: Present. Left Ovary: Not seen. Free Fluid: No significant free fluid.There are no suspicious adnexal masses.     2/10/21:  Hysteroscopy, D&C. Pathology: Grade 1 endometrial adenocarcinoma, MMR intact, p53 wild type, ER/IA +     21:  Total laparoscopic hysterectomy, bilateral salpingo-oophorectomy, bilateral sentinel lymph node dissection, cystoscopy. Pathology:  Stage II, grade 1 endometrial adenocarcinoma, tumor size 5.6 cm, 1/13 mm myometrial invasion, +CASEY, + cervix, 0/2 sentinel LN,         21:  Completed brachytherapy to 3000cGy in 5 Fx to the vagina     Thyroid Screenin2019 - FNA biopsy of thyroid, for solid appearing nodule in isthmus, biopsy non diagnostic; cyst fluid only.   2020- US of the thyroid: Previously biopsied isthmus nodule is no longer present. There are numerous additional colloid cysts and a benign complex cyst left lobe, not appreciably changed from previous.   2021- US of thyroid; no change in nodules  10/7/2022- US of thyroid: NODULES: There are benign  colloid cysts. There is only one partially solid nodule on today's examination, listed below.TI-RADS 2. No FNA. IMPRESSION: Benign colloid cysts and benign partially cystic 5 mm     GI screening history (see chart for further details)  11/7/2005 - Small bowel enteroscopy, one tubular adenoma removed from partial anal verge.  12/7/2005 - adenomatous polyp removed from rectum  2/28/2005 - tubular adenoma, duodenum.  11/16/2011 - fundic gland polyps in stomach, tubular adenoma removed from rectum.  11/5/2012 - 1 tubulovillous adenoma, rectum  7/8/2013 - 1 tubulovillous adenoma, rectum  12/3/2013 - 1 tubulovillous adenoma, rectum  7/21/2014 - 1 tubular adenoma and hyperplastic polyp, rectum  1/20/2015 - 1 tubular adenoma and hyperplastic polyp, rectum  12/7/2015 - 1 tubular adenoma, rectum  6/6/2016 - 1 fundic gland polyp with focal adenomatous changes  3/20/2017 - 2 tubular adenomas removed from rectum  4/12/2017 - colonoscopy and EGD, one fundic gland gastric polyp with focal low grade dysplasia removed, multiple duodenal polyps and multiple rectal polyps.  7/25/2019 - EGD: Two previously reported polyps (6.2 mm and 5.3 mm) in the antrum were evaluate by EUS, which did not demonstrate deep layer involvement, and then they were  removed by EMR by using a band ligator and a snare. The defect was then closed with hemoclips. Innumerous fundic gland polyps in the gastric body,  fundus, and cardia. Consistent with diagnosis of FAP. A large fundic gland polyp in the gastric body, which has been previously resected and recent biopsied. Recommendation: Use Aciphex (rabeprazole) 20 mg PO BID for 1 month.  Repeat the upper endoscopy in 1 year for surveillance.      7/30/2020- EGD and flexible sigmoidoscopy:  15-20 duodenal polyps, measuring 1-3 mm, all of which were ablated using argon plasma coagulation. Innumerable fundic gland polyps in a confluent carpet-like distribution throughout the cardia, fundus, and gastric body.  Antral-sparing present. A large 2-3 semi-pedunculated polyp in the proximal gastric body  (along the lesser curvature) was resected.  Suspected very small adenomas flanking the pancreaticobiliary orifices at the prior ampullectomy site.  Normal esophagus. Pathology: Fundic gland polyp with adenomatous change (low grade dysplasia). Negative for intestinal metaplasia, high-grade dysplasia or malignancy. Recommendation: Schedule for flexible sigmoidoscopy (along with EGD and ERCP) with Dr. Fernández in 1-year.      9/23/2021- ENDOSCOPIC RETROGRADE CHOLANGIOPANCREATOGRAPHY ESOPHAGOGASTRODUODENOSCOPY (EGD), SIGMOIDOSCOPY, FLEXIBLE (Dr. Shahab Fernández): Results: Prior biliary sphincterotomy appeared open. Prior pancreatic sphincterotomy appeared open. Two small button like polyps to left of pancreatic orifice. Attempted cold snare polypectomy but slid off both time    After pancreatic stent placed, entire area next to orifice ablated w R colon setting pulse APC. DUODENUM examined, no other polyps to D3. No antral polyps, but entire gastric body replaced w  multiple fundic gland polyps, not biopsied Recommendation:Confirm spontaneous stent passage by performing a KUB x-ray in 4 weeks. Repeat ERCP in 1 year for surveillance.      Flexible sigmoidoscopy (Dr. Gasca) Ten 2 to 4 mm tubular adenomas in rectum. Patent ileo-rectal anastomosis and normal distal ileum.      12/1/2022: ENDOSCOPIC RETROGRADE CHOLANGIOPANCREATOGRAPHY: Multiple gastric polyps likely fundic gland. Very large (4 cm oozing) polyp in gastric fundus, biopsied as may represent adenomatous transformation, vs large fundic gland polyp. Prior endoscopic papillectomy without recurrence A few duodenal polyps ablated. Biopsy was performed largest gastric polyp. Repeat ERCP in one year. Path: Fundic gland polyp with low-grade dysplasia      12/1/2022: Flexible Sigmoidoscopy: Multiple 2 to 3 mm polyps in the rectum, at the recto-sigmoid colon and in the sigmoid colon,  removed with a cold snare. Resected and retrieved. Patent end-to-side ileo-colonic anastomosis, characterized by healthy appearing mucosa. Path: Tubular adenomas. Repeat in one year     12/7/2023: Upper GI Endoscopy: Normal esophagus. Carpeting of the proximal stomach with polyps consistent with fundic gland polyps. Large more protuberant cluster of polyps in the fundus. Previous biopsies by Dr. Fernández 1 yr ago were benign. On probing this appears to be multiple overlapping polyps which were soft with probing. Biopsies again obtained. There was no ulceration. Multiple duodenal polyp. Approximately 20. Large polyps cold snared and retrieves. Smaller polyps ablated with APC. No evidence of residual polyp tissue at the ampullectomy site. Repeat in one year. Path: Duodenal polyps: Duodenal adenoma; negative for dysplasia. Stomach/fundus polyps:  Fundic gland polyps(s) with low-grade dysplasia      12/7/2023: Flexible Sigmoidoscopy: Multiple sigmoid and rectal polyps (approximately 20). Either resected and retrieved or (smaller polyps) ablated with APC. All visible polyps were resected or ablated. Repeat in one year. Path: Colon and Sigmoid polyps: Colonic mucosa with superficial hyperplastic changes No evidence of neoplastic polyp. Rectal polyp: tubular adenoma.    2/25/2025: Flex sig, (Dr. Gasca), Patent functional end-to-end ileo-colonic anastomosis, characterized by healthy appearing mucosa. Multiple 2 to 6 mm polyps in the rectum and in the sigmoid colon, removed with a cold snare. Resected and retrieved. Repeat flexible sigmoidoscopy in 1 year for surveillance with MAC.     2/25/2025: EGD (Dr. Gasca), Normal esophagus. Multiple gastric polyps. Consistent with FAP. None required excision. A single duodenal polyp. Resected and retrieved. Normal area of the papilla post-ampullectomy without polyp tissue. Repeat EGD with side lying view in 3 years (2028) with general anesthesia.       Breast Screening history:    11/28/2016 - R excisional breast biopsy with seed localization, Fibrocystic tissue with ADH on pathology.  4/24/2017 - R breast mammogram, [email or web address].  5/25/2018 - Breast MRI, BiRads2.  11/8/2018 - Screening tomosynthesis mammogram, BiRads1  6/10/2019 - Breast MRI, BiRads2.  12/20/2019- Screening tomosynthesis mammogram, BiRads1  2/3/2021- Breast MRI, BiRads1  10/8/2021- Screening tomosynthesis mammogram, BiRads1  6/22/2022- Breast MRI, BiRads2  10/21/2022- Screening tomosynthesis mammogram, BiRads1  1/23/2024: Screening tomosynthesis mammogram, BiRads1  7/16/2024: Breast MRI, BiRads1     At this visit, she denies asymmetry, lumps, masses, thickening, pain, nipple discharge and skin changes in her breasts.     Past Medical/Surgical History:  Past Medical History:   Diagnosis Date     Acute deep vein thrombosis (DVT) of left lower extremity (H)      Asthma      Atypical ductal hyperplasia of right breast 10/2016    fibrocystic changes, PASH and atypical ductal hyperplasia on needle biopsy, s/p R excisional biopsy with radioactive seed localization     Endometrial cancer (H) 02/15/2021     Factor 5 Leiden mutation, heterozygous     factor 5     Familial adenomatous polyposis 1988    clinical presentation; s/p total abdominal colectomy with TRACEY; confirmed APC+ 2018     GERD (gastroesophageal reflux disease)      Infection due to 2019 novel coronavirus 11/02/2022     Migraine      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    [id number]     PONV (postoperative nausea and vomiting)      Past Surgical History:   Procedure Laterality Date     BIOPSY BREAST SEED LOCALIZATION Right 11/30/2016    Procedure: BIOPSY BREAST SEED LOCALIZATION;  Surgeon: Jessica Rasheed MD;  Location: [facility] OR     COLECTOMY      subtotal     COLONOSCOPY N/A 12/7/2023    Procedure: COLONOSCOPY, FLEXIBLE, WITH LESION REMOVAL USING SNARE, Argon plasma coagulation ablation;  Surgeon: Stephen Gasca MD;  Location: [facility] OR     COMBINED  ESOPHAGOSCOPY, GASTROSCOPY, DUODENOSCOPY (EGD) WITH ARGON PLASMA COAGULATOR (APC) N/A 4/12/2018    Procedure: COMBINED ESOPHAGOSCOPY, GASTROSCOPY, DUODENOSCOPY (EGD) WITH ARGON PLASMA COAGULATOR (APC);  Esophagogastroduodenoscopy with polypectomy and polyp ablation;  Surgeon: Stephen Gasca MD;  Location: [facility]     COMBINED ESOPHAGOSCOPY, GASTROSCOPY, DUODENOSCOPY (EGD) WITH ARGON PLASMA COAGULATOR (APC) N/A 12/7/2023    Procedure: Combined Esophagoscopy, Gastroscopy, Duodenoscopy (Egd) With Argon Plasma Coagulator (Apc) ablation, biopsies, polypectomy;  Surgeon: Stephen Gasca MD;  Location: [facility]     DILATION AND CURETTAGE, HYSTEROSCOPY DIAGNOSTIC, COMBINED N/A 2/10/2021    Procedure: HYSTEROSCOPY, DIAGNOSTIC, WITH DILATION AND CURETTAGE OF UTERUS;  Surgeon: Sandie Samaniego MD;  Location: [facility] OR     ENDOSCOPIC RETROGRADE CHOLANGIOPANCREATOGRAM N/A 9/23/2021    Procedure: ENDOSCOPIC RETROGRADE CHOLANGIOPANCREATOGRAPHY WITH PANCREATIC STENT PLACEMENT AND POLYP ABLATION with Argon Plasma Coagulation;  Surgeon: Shahab Fernández MD;  Location: [facility]     ENDOSCOPIC RETROGRADE CHOLANGIOPANCREATOGRAM N/A 12/1/2022    Procedure: diagnostic ENDOSCOPIC RETROGRADE CHOLANGIOPANCREATOGRAPHY, ablation of duodenal polyps, biopsy of gastric polyp;  Surgeon: Shahab Fernández MD;  Location: [facility]     ENDOSCOPIC RETROGRADE CHOLANGIOPANCREATOGRAPHY       ENDOSCOPIC ULTRASOUND UPPER GASTROINTESTINAL TRACT (GI) N/A 7/25/2019    Procedure: Endoscopic Ultrasound;  Surgeon: Stephen Gasca MD;  Location: [facility]     ESOPHAGOSCOPY, GASTROSCOPY, DUODENOSCOPY (EGD), COMBINED  11/5/2012    Procedure: COMBINED ESOPHAGOSCOPY, GASTROSCOPY, DUODENOSCOPY (EGD), BIOPSY SINGLE OR MULTIPLE;;  Surgeon: Angélica Garcia MD;  Location: [facility]     ESOPHAGOSCOPY, GASTROSCOPY, DUODENOSCOPY (EGD), COMBINED  12/3/2013    Procedure: COMBINED ESOPHAGOSCOPY, GASTROSCOPY, DUODENOSCOPY (EGD);;  Surgeon: Angélica Garcia  MD Marquise;  Location: [facility]     ESOPHAGOSCOPY, GASTROSCOPY, DUODENOSCOPY (EGD), COMBINED N/A 3/28/2018    Procedure: COMBINED ESOPHAGOSCOPY, GASTROSCOPY, DUODENOSCOPY (EGD);;  Surgeon: Stephen Gasca MD;  Location: [facility]     ESOPHAGOSCOPY, GASTROSCOPY, DUODENOSCOPY (EGD), COMBINED N/A 5/30/2019    Procedure: Esophagogastroduodenoscopy with biopsy x2 APC;  Surgeon: Stephen Gasca MD;  Location: [facility]     ESOPHAGOSCOPY, GASTROSCOPY, DUODENOSCOPY (EGD), COMBINED N/A 7/30/2020    Procedure: ESOPHAGOGASTRODUODENOSCOPY (EGD) with polypectomy by hot snare; argonplasma coaglution ablation of polyps;  Surgeon: Stephen Gasca MD;  Location: [facility]     ESOPHAGOSCOPY, GASTROSCOPY, DUODENOSCOPY (EGD), RESECT MUCOSA, COMBINED N/A 7/25/2019    Procedure: Esophagogastroduodenoscopy with gastric mucosal resection and clip application;  Surgeon: Stephen Gasca MD;  Location: [facility]     EXCISE POLYP RECTUM       LAPAROSCOPIC HYSTERECTOMY TOTAL, BILATERAL SALPINGO-OOPHORECTOMY, NODE DISSECTION, COMBINED Bilateral 2/25/2021    Procedure: Laparoscopic removal of uterus, cervix, both ovaries and fallopian tubes, sentinel lymph node dissection, cystoscopy;  Surgeon: Sandie Samaniego MD;  Location: [facility]     LAPAROSCOPIC LYSIS ADHESIONS       SIGMOIDOSCOPY FLEXIBLE       SIGMOIDOSCOPY FLEXIBLE N/A 12/7/2015    Procedure: SIGMOIDOSCOPY FLEXIBLE;  Surgeon: Mariela Bearden MD;  [facility]: [facility]     SIGMOIDOSCOPY FLEXIBLE N/A 3/28/2018    Procedure: SIGMOIDOSCOPY FLEXIBLE;  EGD/Flex Sig;  Surgeon: Stephen Gasca MD;  [facility]: [facility]     SIGMOIDOSCOPY FLEXIBLE N/A 4/12/2018    Procedure: SIGMOIDOSCOPY FLEXIBLE;  Flexible Sigmoidoscopy with polyp ablation;  Surgeon: Stephen Gasca MD;  [facility]     SIGMOIDOSCOPY FLEXIBLE N/A 5/30/2019    Procedure: Flexible Sigmoidoscopy with polypectomy;  Surgeon: Stephen Gasca MD;  [facility]     SIGMOIDOSCOPY FLEXIBLE N/A 7/30/2020     Procedure: SIGMOIDOSCOPY, FLEXIBLE with Ablation of Polyps;  Surgeon: Stephen Gasca MD;  [facility]     SIGMOIDOSCOPY FLEXIBLE N/A 9/23/2021    Procedure: FLEXIBLE SIGMOIDOSCOPY WITH POLYPECTOMY;  Surgeon: Stephen Gasca MD;  [facility]     SIGMOIDOSCOPY FLEXIBLE N/A 12/1/2022    Procedure: SIGMOIDOSCOPY, FLEXIBLE with polypectomy;  Surgeon: Stephen Gasca MD;  [facility]     C LAP,SURG,COLECTOMY,W/ANAST      for colon cancer       Allergies:  Allergies as of 05/20/2025 - Reviewed 04/25/2025   Allergen Reaction Noted     Corn-containing products Shortness Of Breath 07/29/2020     Cipro [ciprofloxacin]  02/24/2005     Flagyl [metronidazole] Nausea and Vomiting 02/24/2005     Garlic Fatigue 07/29/2020     Keflex [cephalosporins] Nausea and Vomiting 02/24/2005     Omeprazole  04/29/2010     Prilosec otc [omeprazole magnesium]  01/28/2012     Cephalexin Rash 06/14/2004     Doxycycline Rash 04/12/2018     Erythromycin Rash 02/24/2005     Sulfa antibiotics Rash 02/24/2005       Current Medications:  Current Outpatient Medications   Medication Sig Dispense Refill     acetaminophen (TYLENOL) 325 MG tablet Take 2 tablets (650 mg) by mouth every 6 hours as needed for mild pain 30 tablet 0     Apple Cider Vinegar 500 MG TABS Take 500 mg by mouth daily       ASHWAGANDHA PO Take 150 mg by mouth One gummy per day       azelaic acid (FINACIA) 15 % external gel Apply to the face daily       calcium-magnesium (CALMAG) 500-250 MG TABS Take 1 tablet by mouth daily       celecoxib (CELEBREX) 100 MG capsule Take 1 capsule (100 mg) by mouth daily 90 capsule 3     clindamycin (CLEOCIN) 150 MG capsule 2 tablets 1 hour prior to procedure - 1 tablet 6 hours after the first dose       Cranberry 500 MG CAPS Take 1 capsule by mouth daily       docusate sodium (COLACE) 100 MG capsule Take 100 mg by mouth daily       fexofenadine (ALLEGRA) 180 MG tablet Take 180 mg by mouth daily.       ibuprofen  "(ADVIL/MOTRIN) 600 MG tablet Take 600 mg by mouth.       Lactobacillus (PROBIOTIC ACIDOPHILUS PO) Take 100 mg by mouth daily       MELATONIN PO Take 2 mg by mouth nightly as needed        multivitamin, therapeutic with minerals (THERA-VIT-M) TABS Take 3 tablets by mouth 2 times daily        Omega-3 Fatty Acids (OMEGA-3 FISH OIL PO) Take 2 g by mouth daily        triamcinolone (NASACORT) 55 MCG/ACT Inhaler Spray 2 sprays into both nostrils daily       VENTOLIN  (90 Base) MCG/ACT inhaler        ZOLMitriptan (ZOMIG) 5 MG tablet Take 1 tablet (5 mg) by mouth at onset of headache for migraine 90 tablet 3     albuterol 90 MCG/ACT inhaler Inhale 2 puffs into the lungs every 6 hours as needed. (Patient not taking: Reported on 2025)       bisacodyl (DULCOLAX) 5 MG EC tablet Refer to \"Getting Ready for a Colonoscopy\" instruction handout (Patient not taking: Reported on 2025) 4 tablet 0     ciclopirox (PENLAC) 8 % external solution Apply topically. (Patient not taking: Reported on 2025)       rOPINIRole (REQUIP) 0.25 MG tablet Take 1 tablet by mouth 3 times daily. (Patient not taking: Reported on 2025)       terbinafine (LAMISIL) 250 MG tablet Take 250 mg by mouth. (Patient not taking: Reported on 2025)          Family History:  Family History   Problem Relation Age of Onset     Hyperlipidemia Mother      Cerebrovascular Disease Mother      Cerebrovascular Disease Father      Colon Cancer Father 57         at 63     Prostate Cancer Brother 53        recurrence at 65     Prostate Cancer Maternal Uncle 65       Social History:  Social History     Socioeconomic History     Marital status:      Spouse name: Roberto     Number of children: 2     Years of education: Not on file     Highest education level: Not on file   Occupational History     Occupation:      Employer: [employer]   Tobacco Use     Smoking status: Never     Smokeless tobacco: Never   Vaping Use     Vaping " status: Never Used   Substance and Sexual Activity     Alcohol use: Not Currently     Comment: 1 or 2 drinks per year     Drug use: No     Sexual activity: Not Currently     Partners: Male     Birth control/protection: Surgical     Comment: THBSO for endometrial cancer and postmenopausal prior to surgery   Other Topics Concern     Parent/sibling w/ CABG, MI or angioplasty before 65F 55M? Not Asked   Social History Narrative    Two adopted daughters, ages 13 and 15     Social Drivers of Health     Financial Resource Strain: Not At Risk (10/9/2023)    Received from [employer]    Financial Resource Strain      Is it hard for you to pay for the very basics like food, housing, medical care or heating?: No   Food Insecurity: No Food Insecurity (5/14/2025)    Received from [employer]    Hunger Vital Sign      Worried About Running Out of Food in the Last Year: Never true      Ran Out of Food in the Last Year: Never true   Transportation Needs: No Transportation Needs (5/14/2025)    Received from [facility]    PRAPARE - Transportation      Lack of Transportation (Medical): No      Lack of Transportation (Non-Medical): No   Physical Activity: Not on file   Stress: Not on file   Social Connections: Unknown (3/12/2024)    [facility]    Social Connections      Frequency of Communication with Friends and Family: Not on file   Interpersonal Safety: Unknown (2/25/2025)    Interpersonal Safety      Do you feel physically and emotionally safe where you currently live?: Patient unable to answer      Within the past 12 months, have you been hit, slapped, kicked or otherwise physically hurt by someone?: Patient unable to answer      Within the past 12 months, have you been humiliated or emotionally abused in other ways by your partner or ex-partner?: Patient unable to answer   Housing Stability: Low Risk  (5/14/2025)    Received from [employer]    Housing Stability Vital  Sign      Unable to Pay for Housing in the Last Year: No      Number of Times Moved in the Last Year: 0      Homeless in the Last Year: No       Physical Exam:  /83 (BP Location: Right arm, Patient Position: Sitting, Cuff Size: Adult Regular)   Pulse 85   Temp 97.4  F (36.3  C) (Oral)   Resp 16   Wt 72.6 kg (160 lb)   LMP 05/01/2019   SpO2 98%   BMI 28.80 kg/m    GENERAL APPEARANCE: healthy, alert and no apparent distress  BREAST: A multipositional, bilateral breast exam was performed. Fairly symmetrical. Nipples everted bilaterally. Right breast: no palpable dominant masses, no nipple discharge, no skin changes. Right axilla: no palpable adenopathy. Left breast: no palpable dominant masses, no nipple discharge, no skin changes. Left axilla: no palpable adenopathy.   LYMPHATICS: No cervical, supraclavicular, or axillary lymphadenopathy  SKIN: no suspicious lesions or rashes on examined skin    Laboratory/Imaging Studies  No results found for any visits on 05/20/25.    ASSESSMENT    Sarah reports that she is doing well at this visit. She has no concerns with her breast tissue, and no updates to her family's medical history. We discussed the screening plan below for her history of FAP and ADH. She will be due for a flex sig with Dr. Gasca in February, 2026. She is having a mammogram today. We will plan for a breast MRI in November. I would like to see her back in one year with a mammogram following our visit.     We revisited the recommendation for chemoprevention for breast cancer due to her history of ADH. Sarah politely declined. She is not interested in starting any new medications.     We reviewed signs and symptoms to monitor for between visits, including any breast lumps, bumps, nipple discharge, nipple inversion, changes to the texture of the skin on the breasts that would look crinkled, puckered, or like the skin of an orange peel, or any other changes to the breast tissue. We reviewed the  recommendation for breast self awareness.       INDIVIDUALIZED CANCER RISK MANAGEMENT PLAN:      Individualized Surveillance Plan for Familial Adenomatous Polyposis                                        Based on NCCN Guidelines Version 2.2024   Type of Screening  Recommendation  Last Done  Next Due    Recommendations for Children    Colon Cancer Screening  Flexible sigmoidoscopy or colonoscopy every 12 months beginning at age 10-15.       See below   See below   Hepatoblastoma  Consider liver palpation, abdominal ultrasound, and measurement of AFP every 3-6 months, during first 5 years of life.    See below   See below   Thyroid Cancer Screening  Ultrasound at baseline starting in teenage years.  If normal, consider repeating every 2-5 years and if abnormal, refer to thyroid specialist. [place] interval screenings for families with thyroid cancer.   See below   See below   Recommendations for Adults    Colon Cancer Screening  Colectomy with ileorectal anastomosis (TRACEY)  recommended, then endoscopic evaluation of the rectum every 6-12 months, depending on the polyp burden.     If ileo proctocolectomy with ileal pouch anal anastomosis, endoscopic surveillance of ileal pouch every 1-3 years depending on polyp burden.    Surveillance frequency should be increased to every 6 months for large, flat polyps with villous histology and/or high grade dysplasia    2/25/2025: Flex sig: multiple polyps. One rectal tubular adenoma    Flex sig due in February of 2026    Duodenal or periampullary cancer screening and gastric cancer screening  Baseline upper endoscopy including side-viewing examination. Start between 20-25. Consider earlier screening if family history warrants it. Base subsequent screenings on findings (see below).    Manage non-fundic gland polyps endoscopically if possible.      These occur in the majority of [facility] patients and may have focal low grade dysplasia but typically are non progressive.Special screening or  surgery should only be considered in the presence of high grade dysplasia.   2/25/2025: EGD, multiple gastric polyps, one duodenal polyp   Repeat with EGD in 2028   Thyroid Cancer Screening    Baseline ultrasound starting in late teen years.    If normal, consider repeating every 2-5 years and if abnormal, refer to thyroid specialist.     [place] interval screenings for families with thyroid cancer.   10/7/2022: Thyroid US Benign colloid cysts and benign partially cystic 5 mm    Repeat in 2027   CNS Cancer  Annual physical examination.  May 2025 May 2026   Intra-abdominal desmoids  Annual abdominal palpation.     Suggestive abdominal symptoms should prompt immediate abdominal imaging. Patients should be educated regarding signs and symptoms of intraabdominal desmoids and the importance of prompt reporting of abdominal symptoms to their physicians.     If family history of symptomatic desmoids, consider abdominal MRI or CT 1-3 years post colectomy and then every 5-10 years.     NA   NA   Pancreatic screen Consider pancreatic screening if family history of pancreatic cancer. No family history of pancreatic cancer Review at future visits   Small bowel polyps and cancer  Consider adding small bowel visualization to CT or MRI for desmoids, especially if duodenal polyposis is advanced.        Duodenal findings:  Stage 0 (no polyposis) - repeat endoscopy every 4 years.  Stage 1 (minimal polyposis 1-4 tubular adenomas, 1-4mm each) - repeat EGD every 2-3 years.  Stage 2 (mild polyposis - 5-9 tubular adenomas, 5-9 mm each) - repeat EGD every 1-3 years.  Stage 3 (moderate polyposis - >20 lesions or size >1 cm) - repeat EGD every 6-12 months  Stage 4 (dense polyposis or high grade dysplasia) - surgical evaluation, with expert surveillance every 3-6 months.  Due to limited data, no screening recommendation is made for pancreatic cancer at this time.          Individualized Surveillance Plan for women  With 20% or greater  lifetime risk of breast cancer   Per NCCN Breast Cancer Risk Reduction Guidelines Version 2.2024   Recommended screening Test or procedure Last done Next Scheduled    Clinical encounter Clinical exam every 6-12 months.   Refer to genetic counseling if not already done.  Consider risk reduction strategies.   May 2025   May 2026   However, some family histories with breast cancers at a very young age, may warrant screening starting earlier.    *May begin at age 40 if breast cancers in the family occur at later ages.    Annual mammogram beginning 10 years younger than the earliest breast cancer in the family but not prior to age 30.    Recommend annual breast MRI to begin 10 years younger than the earliest breast cancer in the family but not prior to age 25.    Breast MRIs are preferably done on day 7-15 of the menstrual cycle in premenopausal women.   NA   NA   Breast screening for patients at high risk due to thoracic radiation between the ages of 10-30   Annual clinical exam beginning 8 years after radiation therapy.    Annual screening mammogram beginning at age 30 or 8 years after radiation therapy    Annual breast MRI, beginning at age 25 or 8 years after radiation therapy.     NA   NA   Women who have a lifetime risk of >20% based on history of LCIS or ADH/ALH Annual screening mammogram beginning at age of LCIS or ADH/ALH but not prior to age 30.    Consider annual MRI to begin at age of diagnosis of LCIS or ADH/ALH but not prior to age 25.    Recommend risk reducing strategies if possible.   1/23/2024: Screening tomosynthesis mammogram, [email or web address]    7/16/2024: Breast MRI, [email or web address]   Mammogram today    Breast MRI in November    Return to clinic in May, 2026 with a mammogram following our visit    Recommend risk reducing strategies for women with 1.7% 5 year risk of breast cancer.   Declined          I spent a total of 31 minutes on the day of the visit. Please see the note for further information on patient  assessment and treatment.     Munira Palmer DNP, REYNOLD, [facility]  Clinical Nurse Specialist  Cancer Risk Management Program  [facility]    Cc:  Ida Sandoval MD              Again, thank you for allowing me to participate in the care of your patient.        Sincerely,        REYNOLD Rodrigez CNP    Electronically signed

## 2025-05-20 NOTE — PATIENT INSTRUCTIONS
Individualized Surveillance Plan for Familial Adenomatous Polyposis                                        Based on NCCN Guidelines Version 2.2024   Type of Screening  Recommendation  Last Done  Next Due    Recommendations for Children    Colon Cancer Screening  Flexible sigmoidoscopy or colonoscopy every 12 months beginning at age 10-15.       See below   See below   Hepatoblastoma  Consider liver palpation, abdominal ultrasound, and measurement of AFP every 3-6 months, during first 5 years of life.    See below   See below   Thyroid Cancer Screening  Ultrasound at baseline starting in teenage years.  If normal, consider repeating every 2-5 years and if abnormal, refer to thyroid specialist. [place] interval screenings for families with thyroid cancer.   See below   See below   Recommendations for Adults    Colon Cancer Screening  Colectomy with ileorectal anastomosis (TRACEY)  recommended, then endoscopic evaluation of the rectum every 6-12 months, depending on the polyp burden.     If ileo proctocolectomy with ileal pouch anal anastomosis, endoscopic surveillance of ileal pouch every 1-3 years depending on polyp burden.    Surveillance frequency should be increased to every 6 months for large, flat polyps with villous histology and/or high grade dysplasia    2/25/2025: Flex sig: multiple polyps. One rectal tubular adenoma    Flex sig due in February of 2026    Duodenal or periampullary cancer screening and gastric cancer screening  Baseline upper endoscopy including side-viewing examination. Start between 20-25. Consider earlier screening if family history warrants it. Base subsequent screenings on findings (see below).    Manage non-fundic gland polyps endoscopically if possible.      These occur in the majority of [facility] patients and may have focal low grade dysplasia but typically are non progressive.Special screening or surgery should only be considered in the presence of high grade dysplasia.   2/25/2025: EGD,  multiple gastric polyps, one duodenal polyp   Repeat with EGD in 2028   Thyroid Cancer Screening    Baseline ultrasound starting in late teen years.    If normal, consider repeating every 2-5 years and if abnormal, refer to thyroid specialist.     [place] interval screenings for families with thyroid cancer.   10/7/2022: Thyroid US Benign colloid cysts and benign partially cystic 5 mm    Repeat in 2027   CNS Cancer  Annual physical examination.  May 2025 May 2026   Intra-abdominal desmoids  Annual abdominal palpation.     Suggestive abdominal symptoms should prompt immediate abdominal imaging. Patients should be educated regarding signs and symptoms of intraabdominal desmoids and the importance of prompt reporting of abdominal symptoms to their physicians.     If family history of symptomatic desmoids, consider abdominal MRI or CT 1-3 years post colectomy and then every 5-10 years.     NA   NA   Pancreatic screen Consider pancreatic screening if family history of pancreatic cancer. No family history of pancreatic cancer Review at future visits   Small bowel polyps and cancer  Consider adding small bowel visualization to CT or MRI for desmoids, especially if duodenal polyposis is advanced.        Duodenal findings:  Stage 0 (no polyposis) - repeat endoscopy every 4 years.  Stage 1 (minimal polyposis 1-4 tubular adenomas, 1-4mm each) - repeat EGD every 2-3 years.  Stage 2 (mild polyposis - 5-9 tubular adenomas, 5-9 mm each) - repeat EGD every 1-3 years.  Stage 3 (moderate polyposis - >20 lesions or size >1 cm) - repeat EGD every 6-12 months  Stage 4 (dense polyposis or high grade dysplasia) - surgical evaluation, with expert surveillance every 3-6 months.  Due to limited data, no screening recommendation is made for pancreatic cancer at this time.          Individualized Surveillance Plan for women  With 20% or greater lifetime risk of breast cancer   Per NCCN Breast Cancer Risk Reduction Guidelines Version 2.2024    Recommended screening Test or procedure Last done Next Scheduled    Clinical encounter Clinical exam every 6-12 months.   Refer to genetic counseling if not already done.  Consider risk reduction strategies.   May 2025   May 2026   However, some family histories with breast cancers at a very young age, may warrant screening starting earlier.    *May begin at age 40 if breast cancers in the family occur at later ages.    Annual mammogram beginning 10 years younger than the earliest breast cancer in the family but not prior to age 30.    Recommend annual breast MRI to begin 10 years younger than the earliest breast cancer in the family but not prior to age 25.    Breast MRIs are preferably done on day 7-15 of the menstrual cycle in premenopausal women.   NA   NA   Breast screening for patients at high risk due to thoracic radiation between the ages of 10-30   Annual clinical exam beginning 8 years after radiation therapy.    Annual screening mammogram beginning at age 30 or 8 years after radiation therapy    Annual breast MRI, beginning at age 25 or 8 years after radiation therapy.     NA   NA   Women who have a lifetime risk of >20% based on history of LCIS or ADH/ALH Annual screening mammogram beginning at age of LCIS or ADH/ALH but not prior to age 30.    Consider annual MRI to begin at age of diagnosis of LCIS or ADH/ALH but not prior to age 25.    Recommend risk reducing strategies if possible.   1/23/2024: Screening tomosynthesis mammogram, [email or web address]    7/16/2024: Breast MRI, [email or web address]   Mammogram today    Breast MRI in October    Return to clinic in April, 2026 with a mammogram following our visit    Recommend risk reducing strategies for women with 1.7% 5 year risk of breast cancer.

## 2025-05-27 ENCOUNTER — TELEPHONE (OUTPATIENT)
Dept: GASTROENTEROLOGY | Facility: CLINIC | Age: 60
End: 2025-05-27
Payer: COMMERCIAL

## 2025-05-27 NOTE — TELEPHONE ENCOUNTER
Left Voicemail (1st Attempt) for the patient to call back and schedule the following:    Appointment type: new   Provider: Dr. Gasca   Return date: next available   Specialty phone number: [phone]  Additional appointment(s) needed:   Additonal Notes:         * I just left this patient a voicemail and sent a mychart. Could you try reaching her next week for scheduling with [facility]?  Next available, in person or virtual. Referred by Munira Palmer per below.   Appt notes: discuss options of celecoxib vs sulindac for polyp prevention in [facility]

## 2025-05-29 ENCOUNTER — TELEPHONE (OUTPATIENT)
Dept: GASTROENTEROLOGY | Facility: CLINIC | Age: 60
End: 2025-05-29
Payer: COMMERCIAL

## 2025-05-29 NOTE — TELEPHONE ENCOUNTER
Left Voicemail (2nd Attempt) for the patient to call back and schedule the following:    Appointment type: New   Provider: Dr. Gasca   Return date: next available   Specialty phone number: [phone]  Additional appointment(s) needed:   Additonal Notes:       *I just left this patient a voicemail and sent a mychart. Could you try reaching her next week for scheduling with [facility]?  Next available, in person or virtual. Referred by Munira Palmer per below.   Appt notes: discuss options of celecoxib vs sulindac for polyp prevention in [facility]

## 2025-07-15 ENCOUNTER — MYC MEDICAL ADVICE (OUTPATIENT)
Dept: GASTROENTEROLOGY | Facility: CLINIC | Age: 60
End: 2025-07-15
Payer: COMMERCIAL

## (undated) DEVICE — SOL NACL 0.9% IRRIG 1000ML BOTTLE 2F7124

## (undated) DEVICE — SOL WATER IRRIG 1000ML BOTTLE 2F7114

## (undated) DEVICE — GLOVE PROTEXIS BLUE W/NEU-THERA 7.0  2D73EB70

## (undated) DEVICE — KIT ENDO FIRST STEP DISINFECTANT 200ML W/POUCH EP-4

## (undated) DEVICE — Device

## (undated) DEVICE — SPECIMEN TRAP POLYP 4 CHAMBER EZ710202 EZ710202

## (undated) DEVICE — TUBING SUCTION 10'X3/16" N510

## (undated) DEVICE — ESU GROUND PAD ADULT W/CORD E7507

## (undated) DEVICE — ENDO SNARE EXACTO COLD 9MM LOOP 2.4MMX230CM 00711115

## (undated) DEVICE — LINEN GOWN XLG 5407

## (undated) DEVICE — ENDO LIGATOR MUCOSECTOMY MULTI-BAND DUETTE DT-6-5F

## (undated) DEVICE — ENDO TUBING CO2 SMARTCAP STERILE DISP 100145CO2EXT

## (undated) DEVICE — GOWN IMPERVIOUS BREATHABLE SMART XLG 89045

## (undated) DEVICE — ESU ERBE FIAPC PROBE 2.3MMX220CM

## (undated) DEVICE — ENDO FUSION OMNI-TOME 21 FS-OMNI-21 G48675

## (undated) DEVICE — ENDO FORCEP BX CAPTURA PRO SPIKE G50696

## (undated) DEVICE — ENDO TROCAR FIRST ENTRY KII FIOS Z-THRD 12X100MM CTF73

## (undated) DEVICE — SUCTION MANIFOLD NEPTUNE 2 SYS 4 PORT 0702-020-000

## (undated) DEVICE — ENDO BITE BLOCK ADULT OMNI-BLOC

## (undated) DEVICE — ENDO TROCAR SLEEVE KII Z-THREADED 05X100MM CTS02

## (undated) DEVICE — SU VICRYL 3-0 SH 27" J316H

## (undated) DEVICE — KIT CONNECTOR FOR OLYMPUS ENDOSCOPES DEFENDO 100310

## (undated) DEVICE — DEVICE RETRIEVAL ROTH NET PLATINUM UNIV 2.5MMX230CM 00715050

## (undated) DEVICE — PREP CHLORAPREP 26ML TINTED ORANGE  260815

## (undated) DEVICE — TUBING IRRIG CYSTO/BLADDER SET 81" LF 2C4040

## (undated) DEVICE — ENDO FORCEP ENDOJAW BIOPSY 2.8MMX160CM FB-220K

## (undated) DEVICE — DEVICE ENDO STITCH APPLIER 10MM 173016

## (undated) DEVICE — SU VICRYL 0 TIE 54" J608H

## (undated) DEVICE — LINEN TOWEL PACK X30 5481

## (undated) DEVICE — ENDO SCOPE WARMER SEAL  C3101

## (undated) DEVICE — ENDO TRAP POLYP E-TRAP 00711099

## (undated) DEVICE — RETR ELEV / UTERINE MANIPULATOR V-CARE MED CUP 60-6085-201A

## (undated) DEVICE — NDL INSUFFLATION 13GA 120MM C2201

## (undated) DEVICE — TUBING SUCTION 12"X1/4" N612

## (undated) DEVICE — PACK ENDOSCOPY GI CUSTOM UMMC

## (undated) DEVICE — SNARE CAPIVATOR II 15MM M00561232

## (undated) DEVICE — SUCTION MANIFOLD NEPTUNE 2 SYS 1 PORT 702-025-000

## (undated) DEVICE — ENDO CAP AND TUBING STERILE FOR ENDOGATOR  100130

## (undated) DEVICE — PAD CHUX UNDERPAD 23X24" 7136

## (undated) DEVICE — BIOPSY VALVE BIOSHIELD 00711135

## (undated) DEVICE — CLIP ENDO RESOLUTION 360 2.8,,X235CM M00521230

## (undated) DEVICE — ENDO TROCAR FIRST ENTRY KII FIOS Z-THRD 05X100MM CTF03

## (undated) DEVICE — DRAPE SHEET MED 44X70" 9355

## (undated) DEVICE — ENDO PROBE COVER ULTRASOUND BALLOON LATEX  MAJ-249

## (undated) DEVICE — WIPES FOLEY CARE SURESTEP PROVON DFC100

## (undated) DEVICE — DRAPE UNDER BUTTOCK 8483

## (undated) DEVICE — LINEN TOWEL PACK X5 5464

## (undated) DEVICE — KOH COLPOTOMIZER OCCLUDER  CPO-6

## (undated) DEVICE — SPECIMEN TRAP MUCOUS 40ML LUKI C30200A

## (undated) DEVICE — NDL 30GA 0.5" 305106

## (undated) DEVICE — SEAL SET MYOSURE ROD LENS SCOPE SINGLE USE 40-902

## (undated) DEVICE — GUIDEWIRE NOVAGOLD .018X260CM STR TIP M00552000

## (undated) DEVICE — SOL NACL 0.9% INJ 1000ML BAG 2B1324X

## (undated) DEVICE — SNARE CAPIVATOR II POLYPECTOMY 25X240MM M00561190

## (undated) DEVICE — TUBING SYS AQUILEX BLUE INFLOW AQL-110 YLW OUTFLOW AQL-111

## (undated) DEVICE — SPONGE LAP 18X18" X8435

## (undated) DEVICE — ESU ENDO SCISSORS 5MM CVD 5DCS

## (undated) DEVICE — CLIP HEMOCLIP ENDOSCOPIC INSTINCT 2.8X230CM INSC-7-230-SS

## (undated) DEVICE — GLOVE PROTEXIS POWDER FREE SMT 6.5  2D72PT65X

## (undated) DEVICE — ADH SKIN CLOSURE PREMIERPRO EXOFIN 1.0ML 3470

## (undated) DEVICE — SUCTION MANIFOLD DORNOCH ULTRA CART UL-CL500

## (undated) DEVICE — JELLY LUBRICATING SURGILUBE 2OZ TUBE

## (undated) DEVICE — SUCTION IRR STRYKERFLOW II W/TIP 250-070-520

## (undated) DEVICE — SU WND CLOSURE RELOAD VLOC 180 ABS 0 GREEN 8" VLOCA008L

## (undated) DEVICE — TAPE CLOTH 3" CARDINAL 3TRCL03

## (undated) DEVICE — ENDO SUCTION TRAP POLYP 4 CHAMBER H334

## (undated) DEVICE — SU MONOCRYL 4-0 PS-2 27" UND Y426H

## (undated) DEVICE — TUBING IRR TUR Y TYPE 2C4041

## (undated) DEVICE — LINEN TOWEL PACK X6 WHITE 5487

## (undated) DEVICE — DEVICE RETRIEVAL ROTH NET 3.0MMX230CM 00711052

## (undated) DEVICE — SNARE CAPIVATOR II POLYPECTOMY 15X240MM M00561230

## (undated) DEVICE — SU VICRYL 4-0 PS-2 18" UND J496H

## (undated) DEVICE — ESU ELEC BLADE 2.75" COATED/INSULATED E1455

## (undated) DEVICE — KIT PATIENT POSITIONING PIGAZZI LATEX FREE 40580

## (undated) DEVICE — DEVICE SUTURE GRASPER TROCAR CLOSURE 14GA PMITCSG

## (undated) DEVICE — ESU LIGASURE LAPAROSCOPIC BLUNT TIP SEALER 5MMX37CM LF1837

## (undated) DEVICE — WIPE PREMOIST CLEANSING WASHCLOTHS 7988

## (undated) DEVICE — SOL NACL 0.9% IRRIG 3000ML BAG 2B7477

## (undated) DEVICE — NDL COUNTER 20CT 31142493

## (undated) DEVICE — PREP TECHNICARE 08.5OZ C222-8Z

## (undated) DEVICE — CONNECTOR WATER VALVE

## (undated) DEVICE — SOL NACL 0.9% IRRIG 500ML BOTTLE 2F7123

## (undated) RX ORDER — FENTANYL CITRATE 50 UG/ML
INJECTION, SOLUTION INTRAMUSCULAR; INTRAVENOUS
Status: DISPENSED
Start: 2021-02-25

## (undated) RX ORDER — PROPOFOL 10 MG/ML
INJECTION, EMULSION INTRAVENOUS
Status: DISPENSED
Start: 2025-02-25

## (undated) RX ORDER — DEXAMETHASONE SODIUM PHOSPHATE 4 MG/ML
INJECTION, SOLUTION INTRA-ARTICULAR; INTRALESIONAL; INTRAMUSCULAR; INTRAVENOUS; SOFT TISSUE
Status: DISPENSED
Start: 2018-04-12

## (undated) RX ORDER — WATER 10 ML/10ML
INJECTION INTRAMUSCULAR; INTRAVENOUS; SUBCUTANEOUS
Status: DISPENSED
Start: 2018-04-12

## (undated) RX ORDER — DEXAMETHASONE SODIUM PHOSPHATE 4 MG/ML
INJECTION, SOLUTION INTRA-ARTICULAR; INTRALESIONAL; INTRAMUSCULAR; INTRAVENOUS; SOFT TISSUE
Status: DISPENSED
Start: 2021-09-23

## (undated) RX ORDER — FENTANYL CITRATE 50 UG/ML
INJECTION, SOLUTION INTRAMUSCULAR; INTRAVENOUS
Status: DISPENSED
Start: 2020-07-30

## (undated) RX ORDER — PROPOFOL 10 MG/ML
INJECTION, EMULSION INTRAVENOUS
Status: DISPENSED
Start: 2023-12-07

## (undated) RX ORDER — SIMETHICONE 20 MG/.3ML
EMULSION ORAL
Status: DISPENSED
Start: 2018-03-28

## (undated) RX ORDER — WATER 10 ML/10ML
INJECTION INTRAMUSCULAR; INTRAVENOUS; SUBCUTANEOUS
Status: DISPENSED
Start: 2021-09-23

## (undated) RX ORDER — FENTANYL CITRATE 50 UG/ML
INJECTION, SOLUTION INTRAMUSCULAR; INTRAVENOUS
Status: DISPENSED
Start: 2021-09-23

## (undated) RX ORDER — SIMETHICONE 40MG/0.6ML
SUSPENSION, DROPS(FINAL DOSAGE FORM)(ML) ORAL
Status: DISPENSED
Start: 2025-02-25

## (undated) RX ORDER — APREPITANT 40 MG/1
CAPSULE ORAL
Status: DISPENSED
Start: 2022-12-01

## (undated) RX ORDER — FENTANYL CITRATE 50 UG/ML
INJECTION, SOLUTION INTRAMUSCULAR; INTRAVENOUS
Status: DISPENSED
Start: 2021-02-10

## (undated) RX ORDER — ONDANSETRON 2 MG/ML
INJECTION INTRAMUSCULAR; INTRAVENOUS
Status: DISPENSED
Start: 2018-03-28

## (undated) RX ORDER — KETOROLAC TROMETHAMINE 30 MG/ML
INJECTION, SOLUTION INTRAMUSCULAR; INTRAVENOUS
Status: DISPENSED
Start: 2021-02-10

## (undated) RX ORDER — DEXAMETHASONE SODIUM PHOSPHATE 4 MG/ML
INJECTION, SOLUTION INTRA-ARTICULAR; INTRALESIONAL; INTRAMUSCULAR; INTRAVENOUS; SOFT TISSUE
Status: DISPENSED
Start: 2025-02-25

## (undated) RX ORDER — ONDANSETRON 2 MG/ML
INJECTION INTRAMUSCULAR; INTRAVENOUS
Status: DISPENSED
Start: 2018-04-12

## (undated) RX ORDER — IOPAMIDOL 510 MG/ML
INJECTION, SOLUTION INTRAVASCULAR
Status: DISPENSED
Start: 2020-07-30

## (undated) RX ORDER — ONDANSETRON 2 MG/ML
INJECTION INTRAMUSCULAR; INTRAVENOUS
Status: DISPENSED
Start: 2022-12-01

## (undated) RX ORDER — FENTANYL CITRATE 50 UG/ML
INJECTION, SOLUTION INTRAMUSCULAR; INTRAVENOUS
Status: DISPENSED
Start: 2018-04-12

## (undated) RX ORDER — PROPOFOL 10 MG/ML
INJECTION, EMULSION INTRAVENOUS
Status: DISPENSED
Start: 2021-02-25

## (undated) RX ORDER — DEXAMETHASONE SODIUM PHOSPHATE 4 MG/ML
INJECTION, SOLUTION INTRA-ARTICULAR; INTRALESIONAL; INTRAMUSCULAR; INTRAVENOUS; SOFT TISSUE
Status: DISPENSED
Start: 2022-12-01

## (undated) RX ORDER — PROPOFOL 10 MG/ML
INJECTION, EMULSION INTRAVENOUS
Status: DISPENSED
Start: 2022-12-01

## (undated) RX ORDER — ONDANSETRON 2 MG/ML
INJECTION INTRAMUSCULAR; INTRAVENOUS
Status: DISPENSED
Start: 2021-02-10

## (undated) RX ORDER — ONDANSETRON 2 MG/ML
INJECTION INTRAMUSCULAR; INTRAVENOUS
Status: DISPENSED
Start: 2021-09-23

## (undated) RX ORDER — IOPAMIDOL 510 MG/ML
INJECTION, SOLUTION INTRAVASCULAR
Status: DISPENSED
Start: 2023-12-07

## (undated) RX ORDER — SCOPOLAMINE 1 MG/3D
PATCH, EXTENDED RELEASE TRANSDERMAL
Status: DISPENSED
Start: 2025-02-25

## (undated) RX ORDER — HYDROMORPHONE HYDROCHLORIDE 1 MG/ML
INJECTION, SOLUTION INTRAMUSCULAR; INTRAVENOUS; SUBCUTANEOUS
Status: DISPENSED
Start: 2021-02-25

## (undated) RX ORDER — FENTANYL CITRATE 50 UG/ML
INJECTION, SOLUTION INTRAMUSCULAR; INTRAVENOUS
Status: DISPENSED
Start: 2022-12-01

## (undated) RX ORDER — PROPOFOL 10 MG/ML
INJECTION, EMULSION INTRAVENOUS
Status: DISPENSED
Start: 2018-04-12

## (undated) RX ORDER — FENTANYL CITRATE 50 UG/ML
INJECTION, SOLUTION INTRAMUSCULAR; INTRAVENOUS
Status: DISPENSED
Start: 2019-05-30

## (undated) RX ORDER — BUPIVACAINE HYDROCHLORIDE 2.5 MG/ML
INJECTION, SOLUTION EPIDURAL; INFILTRATION; INTRACAUDAL
Status: DISPENSED
Start: 2021-02-10

## (undated) RX ORDER — LIDOCAINE HYDROCHLORIDE 20 MG/ML
INJECTION, SOLUTION EPIDURAL; INFILTRATION; INTRACAUDAL; PERINEURAL
Status: DISPENSED
Start: 2021-02-25

## (undated) RX ORDER — FENTANYL CITRATE-0.9 % NACL/PF 10 MCG/ML
PLASTIC BAG, INJECTION (ML) INTRAVENOUS
Status: DISPENSED
Start: 2022-12-01

## (undated) RX ORDER — OXYCODONE HYDROCHLORIDE 5 MG/1
TABLET ORAL
Status: DISPENSED
Start: 2021-02-25

## (undated) RX ORDER — GABAPENTIN 300 MG/1
CAPSULE ORAL
Status: DISPENSED
Start: 2021-02-10

## (undated) RX ORDER — KETOROLAC TROMETHAMINE 30 MG/ML
INJECTION, SOLUTION INTRAMUSCULAR; INTRAVENOUS
Status: DISPENSED
Start: 2021-02-25

## (undated) RX ORDER — SCOLOPAMINE TRANSDERMAL SYSTEM 1 MG/1
PATCH, EXTENDED RELEASE TRANSDERMAL
Status: DISPENSED
Start: 2023-12-07

## (undated) RX ORDER — ONDANSETRON 2 MG/ML
INJECTION INTRAMUSCULAR; INTRAVENOUS
Status: DISPENSED
Start: 2020-07-30

## (undated) RX ORDER — FENTANYL CITRATE 50 UG/ML
INJECTION, SOLUTION INTRAMUSCULAR; INTRAVENOUS
Status: DISPENSED
Start: 2025-02-25

## (undated) RX ORDER — DEXAMETHASONE SODIUM PHOSPHATE 4 MG/ML
INJECTION, SOLUTION INTRA-ARTICULAR; INTRALESIONAL; INTRAMUSCULAR; INTRAVENOUS; SOFT TISSUE
Status: DISPENSED
Start: 2023-12-07

## (undated) RX ORDER — FENTANYL CITRATE 50 UG/ML
INJECTION, SOLUTION INTRAMUSCULAR; INTRAVENOUS
Status: DISPENSED
Start: 2019-07-25

## (undated) RX ORDER — IOPAMIDOL 612 MG/ML
INJECTION, SOLUTION INTRATHECAL
Status: DISPENSED
Start: 2025-02-25

## (undated) RX ORDER — FENTANYL CITRATE-0.9 % NACL/PF 10 MCG/ML
PLASTIC BAG, INJECTION (ML) INTRAVENOUS
Status: DISPENSED
Start: 2023-12-07

## (undated) RX ORDER — FENTANYL CITRATE 50 UG/ML
INJECTION, SOLUTION INTRAMUSCULAR; INTRAVENOUS
Status: DISPENSED
Start: 2023-12-07

## (undated) RX ORDER — PROPOFOL 10 MG/ML
INJECTION, EMULSION INTRAVENOUS
Status: DISPENSED
Start: 2021-09-23

## (undated) RX ORDER — FENTANYL CITRATE 50 UG/ML
INJECTION, SOLUTION INTRAMUSCULAR; INTRAVENOUS
Status: DISPENSED
Start: 2018-03-28

## (undated) RX ORDER — MEPERIDINE HYDROCHLORIDE 25 MG/ML
INJECTION INTRAMUSCULAR; INTRAVENOUS; SUBCUTANEOUS
Status: DISPENSED
Start: 2019-05-30

## (undated) RX ORDER — ONDANSETRON 2 MG/ML
INJECTION INTRAMUSCULAR; INTRAVENOUS
Status: DISPENSED
Start: 2021-02-25

## (undated) RX ORDER — ACETAMINOPHEN 325 MG/1
TABLET ORAL
Status: DISPENSED
Start: 2021-02-10

## (undated) RX ORDER — FENTANYL CITRATE-0.9 % NACL/PF 10 MCG/ML
PLASTIC BAG, INJECTION (ML) INTRAVENOUS
Status: DISPENSED
Start: 2021-09-23

## (undated) RX ORDER — GLYCOPYRROLATE 0.2 MG/ML
INJECTION, SOLUTION INTRAMUSCULAR; INTRAVENOUS
Status: DISPENSED
Start: 2022-12-01

## (undated) RX ORDER — IOPAMIDOL 510 MG/ML
INJECTION, SOLUTION INTRAVASCULAR
Status: DISPENSED
Start: 2019-05-30

## (undated) RX ORDER — DEXAMETHASONE SODIUM PHOSPHATE 4 MG/ML
INJECTION, SOLUTION INTRA-ARTICULAR; INTRALESIONAL; INTRAMUSCULAR; INTRAVENOUS; SOFT TISSUE
Status: DISPENSED
Start: 2021-02-25

## (undated) RX ORDER — LIDOCAINE HYDROCHLORIDE 20 MG/ML
INJECTION, SOLUTION EPIDURAL; INFILTRATION; INTRACAUDAL; PERINEURAL
Status: DISPENSED
Start: 2018-04-12

## (undated) RX ORDER — SIMETHICONE 40MG/0.6ML
SUSPENSION, DROPS(FINAL DOSAGE FORM)(ML) ORAL
Status: DISPENSED
Start: 2020-07-30

## (undated) RX ORDER — ONDANSETRON 2 MG/ML
INJECTION INTRAMUSCULAR; INTRAVENOUS
Status: DISPENSED
Start: 2023-12-07

## (undated) RX ORDER — SIMETHICONE 40MG/0.6ML
SUSPENSION, DROPS(FINAL DOSAGE FORM)(ML) ORAL
Status: DISPENSED
Start: 2022-12-01

## (undated) RX ORDER — CEFAZOLIN SODIUM 2 G/100ML
INJECTION, SOLUTION INTRAVENOUS
Status: DISPENSED
Start: 2021-02-25

## (undated) RX ORDER — SIMETHICONE 40MG/0.6ML
SUSPENSION, DROPS(FINAL DOSAGE FORM)(ML) ORAL
Status: DISPENSED
Start: 2023-12-07

## (undated) RX ORDER — LIDOCAINE HYDROCHLORIDE 20 MG/ML
INJECTION, SOLUTION EPIDURAL; INFILTRATION; INTRACAUDAL; PERINEURAL
Status: DISPENSED
Start: 2021-09-23

## (undated) RX ORDER — ONDANSETRON 4 MG/1
TABLET, FILM COATED ORAL
Status: DISPENSED
Start: 2021-02-25

## (undated) RX ORDER — HEPARIN SODIUM 5000 [USP'U]/.5ML
INJECTION, SOLUTION INTRAVENOUS; SUBCUTANEOUS
Status: DISPENSED
Start: 2021-02-25

## (undated) RX ORDER — MEPERIDINE HYDROCHLORIDE 25 MG/ML
INJECTION INTRAMUSCULAR; INTRAVENOUS; SUBCUTANEOUS
Status: DISPENSED
Start: 2021-02-25

## (undated) RX ORDER — LIDOCAINE HYDROCHLORIDE 10 MG/ML
INJECTION, SOLUTION EPIDURAL; INFILTRATION; INTRACAUDAL; PERINEURAL
Status: DISPENSED
Start: 2022-12-01

## (undated) RX ORDER — ONDANSETRON 2 MG/ML
INJECTION INTRAMUSCULAR; INTRAVENOUS
Status: DISPENSED
Start: 2025-02-25

## (undated) RX ORDER — SCOLOPAMINE TRANSDERMAL SYSTEM 1 MG/1
PATCH, EXTENDED RELEASE TRANSDERMAL
Status: DISPENSED
Start: 2022-12-01